# Patient Record
Sex: MALE | Race: BLACK OR AFRICAN AMERICAN | NOT HISPANIC OR LATINO | Employment: UNEMPLOYED | URBAN - METROPOLITAN AREA
[De-identification: names, ages, dates, MRNs, and addresses within clinical notes are randomized per-mention and may not be internally consistent; named-entity substitution may affect disease eponyms.]

---

## 2017-01-10 ENCOUNTER — LAB VISIT (OUTPATIENT)
Dept: LAB | Facility: HOSPITAL | Age: 52
End: 2017-01-10
Attending: NURSE PRACTITIONER
Payer: MEDICAID

## 2017-01-10 ENCOUNTER — OFFICE VISIT (OUTPATIENT)
Dept: INTERNAL MEDICINE | Facility: CLINIC | Age: 52
End: 2017-01-10
Payer: MEDICAID

## 2017-01-10 VITALS
HEIGHT: 66 IN | TEMPERATURE: 97 F | BODY MASS INDEX: 32.56 KG/M2 | OXYGEN SATURATION: 97 % | RESPIRATION RATE: 16 BRPM | WEIGHT: 202.63 LBS | HEART RATE: 90 BPM | DIASTOLIC BLOOD PRESSURE: 98 MMHG | SYSTOLIC BLOOD PRESSURE: 178 MMHG

## 2017-01-10 DIAGNOSIS — Z79.4 TYPE 2 DIABETES MELLITUS WITHOUT COMPLICATION, WITH LONG-TERM CURRENT USE OF INSULIN: ICD-10-CM

## 2017-01-10 DIAGNOSIS — I10 ESSENTIAL HYPERTENSION: ICD-10-CM

## 2017-01-10 DIAGNOSIS — R60.9 EDEMA, UNSPECIFIED TYPE: ICD-10-CM

## 2017-01-10 DIAGNOSIS — K21.00 GASTROESOPHAGEAL REFLUX DISEASE WITH ESOPHAGITIS: Chronic | ICD-10-CM

## 2017-01-10 DIAGNOSIS — K85.22 ALCOHOL-INDUCED ACUTE PANCREATITIS WITH INFECTED NECROSIS: ICD-10-CM

## 2017-01-10 DIAGNOSIS — E11.69 HYPERLIPIDEMIA ASSOCIATED WITH TYPE 2 DIABETES MELLITUS: ICD-10-CM

## 2017-01-10 DIAGNOSIS — E78.5 HYPERLIPIDEMIA ASSOCIATED WITH TYPE 2 DIABETES MELLITUS: ICD-10-CM

## 2017-01-10 DIAGNOSIS — E11.9 TYPE 2 DIABETES MELLITUS WITHOUT COMPLICATION, WITH LONG-TERM CURRENT USE OF INSULIN: ICD-10-CM

## 2017-01-10 DIAGNOSIS — I10 ESSENTIAL HYPERTENSION: Primary | ICD-10-CM

## 2017-01-10 LAB
BASOPHILS # BLD AUTO: 0.02 K/UL
BASOPHILS NFR BLD: 0.3 %
DIFFERENTIAL METHOD: ABNORMAL
EOSINOPHIL # BLD AUTO: 0.1 K/UL
EOSINOPHIL NFR BLD: 1.2 %
ERYTHROCYTE [DISTWIDTH] IN BLOOD BY AUTOMATED COUNT: 13 %
HCT VFR BLD AUTO: 37.7 %
HGB BLD-MCNC: 12.2 G/DL
LYMPHOCYTES # BLD AUTO: 2.1 K/UL
LYMPHOCYTES NFR BLD: 35.6 %
MCH RBC QN AUTO: 29.8 PG
MCHC RBC AUTO-ENTMCNC: 32.4 %
MCV RBC AUTO: 92 FL
MONOCYTES # BLD AUTO: 0.4 K/UL
MONOCYTES NFR BLD: 6.4 %
NEUTROPHILS # BLD AUTO: 3.3 K/UL
NEUTROPHILS NFR BLD: 56.2 %
PLATELET # BLD AUTO: 294 K/UL
PMV BLD AUTO: 11.6 FL
RBC # BLD AUTO: 4.09 M/UL
WBC # BLD AUTO: 5.79 K/UL

## 2017-01-10 PROCEDURE — 80061 LIPID PANEL: CPT

## 2017-01-10 PROCEDURE — 99999 PR PBB SHADOW E&M-EST. PATIENT-LVL IV: CPT | Mod: PBBFAC,,, | Performed by: NURSE PRACTITIONER

## 2017-01-10 PROCEDURE — 84443 ASSAY THYROID STIM HORMONE: CPT

## 2017-01-10 PROCEDURE — 36415 COLL VENOUS BLD VENIPUNCTURE: CPT | Mod: PO

## 2017-01-10 PROCEDURE — 99215 OFFICE O/P EST HI 40 MIN: CPT | Mod: S$PBB,,, | Performed by: NURSE PRACTITIONER

## 2017-01-10 PROCEDURE — 80053 COMPREHEN METABOLIC PANEL: CPT

## 2017-01-10 PROCEDURE — 83036 HEMOGLOBIN GLYCOSYLATED A1C: CPT

## 2017-01-10 PROCEDURE — 85025 COMPLETE CBC W/AUTO DIFF WBC: CPT

## 2017-01-10 RX ORDER — PEN NEEDLE, DIABETIC 31 G X1/4"
1 NEEDLE, DISPOSABLE MISCELLANEOUS NIGHTLY
Qty: 100 EACH | Refills: 3 | Status: SHIPPED | OUTPATIENT
Start: 2017-01-10 | End: 2018-02-19 | Stop reason: SDUPTHER

## 2017-01-10 RX ORDER — ATORVASTATIN CALCIUM 10 MG/1
10 TABLET, FILM COATED ORAL DAILY
Qty: 30 TABLET | Refills: 6 | Status: SHIPPED | OUTPATIENT
Start: 2017-01-10 | End: 2017-03-27 | Stop reason: SDUPTHER

## 2017-01-10 RX ORDER — INSULIN GLARGINE 100 [IU]/ML
40 INJECTION, SOLUTION SUBCUTANEOUS NIGHTLY
Qty: 1 BOX | Refills: 3 | Status: SHIPPED | OUTPATIENT
Start: 2017-01-10 | End: 2017-03-14 | Stop reason: SDUPTHER

## 2017-01-10 RX ORDER — SPIRONOLACTONE 50 MG/1
50 TABLET, FILM COATED ORAL DAILY
Qty: 90 TABLET | Refills: 3 | Status: SHIPPED | OUTPATIENT
Start: 2017-01-10 | End: 2018-02-09 | Stop reason: SDUPTHER

## 2017-01-10 RX ORDER — BLOOD SUGAR DIAGNOSTIC
1 STRIP MISCELLANEOUS DAILY
Qty: 100 EACH | Refills: 11 | Status: SHIPPED | OUTPATIENT
Start: 2017-01-10 | End: 2017-01-31 | Stop reason: SDUPTHER

## 2017-01-10 RX ORDER — VERAPAMIL HYDROCHLORIDE 180 MG/1
180 TABLET, FILM COATED, EXTENDED RELEASE ORAL DAILY
Qty: 30 TABLET | Refills: 1 | Status: SHIPPED | OUTPATIENT
Start: 2017-01-10 | End: 2017-03-14 | Stop reason: SDUPTHER

## 2017-01-10 RX ORDER — PANTOPRAZOLE SODIUM 20 MG/1
40 TABLET, DELAYED RELEASE ORAL 2 TIMES DAILY
Qty: 60 TABLET | Refills: 0 | Status: SHIPPED | OUTPATIENT
Start: 2017-01-10 | End: 2017-02-24 | Stop reason: SDUPTHER

## 2017-01-10 RX ORDER — FUROSEMIDE 20 MG/1
20 TABLET ORAL 2 TIMES DAILY
Qty: 60 TABLET | Refills: 1 | Status: SHIPPED | OUTPATIENT
Start: 2017-01-10 | End: 2017-10-31 | Stop reason: SDUPTHER

## 2017-01-10 RX ORDER — LISINOPRIL 40 MG/1
40 TABLET ORAL DAILY
Qty: 30 TABLET | Refills: 0 | Status: SHIPPED | OUTPATIENT
Start: 2017-01-10 | End: 2017-08-28

## 2017-01-10 RX ORDER — POTASSIUM CHLORIDE 750 MG/1
10 CAPSULE, EXTENDED RELEASE ORAL DAILY
Qty: 30 CAPSULE | Refills: 1 | Status: ON HOLD | OUTPATIENT
Start: 2017-01-10 | End: 2017-02-08 | Stop reason: HOSPADM

## 2017-01-10 NOTE — MR AVS SNAPSHOT
[facility]  [address] [address]  [place] [address]  Phone: [phone]                  Jorgito Arreaga   1/10/2017 10:40 AM   Office Visit    Description:  Male : 1965   Provider:  BLANCHE Pruitt,SHIREENP-C   Department:  [facility]           Reason for Visit     Follow-up     Medication Refill           Diagnoses this Visit        Comments    Essential hypertension    -  Primary     Edema, unspecified type         Type 2 diabetes mellitus without complication, with long-term current use of insulin         Gastroesophageal reflux disease with esophagitis         Alcohol-induced acute pancreatitis with infected necrosis         Hyperlipidemia associated with type 2 diabetes mellitus                To Do List           Goals (5 Years of Data)     None       These Medications        Disp Refills Start End    verapamil (CALAN-SR) 180 MG CR tablet 30 tablet 1 1/10/2017     Take 1 tablet (180 mg total) by mouth once daily. - Oral    Pharmacy: [address] [address] Ph #: [phone]       spironolactone (ALDACTONE) 50 MG tablet 90 tablet 3 1/10/2017 1/10/2018    Take 1 tablet (50 mg total) by mouth once daily. - Oral    Pharmacy: [address] [address] Ph #: [phone]       atorvastatin (LIPITOR) 10 MG tablet 30 tablet 6 1/10/2017     Take 1 tablet (10 mg total) by mouth once daily. - Oral    Pharmacy: [address] [address] Ph #: [phone]       furosemide (LASIX) 20 MG tablet 60 tablet 1 1/10/2017     Take 1 tablet (20 mg total) by mouth 2 (two) times daily. - Oral    Pharmacy: [facility] [address] [place] [id number] Ph #: [phone]       insulin glargine (LANTUS SOLOSTAR) 100 unit/mL (3 mL) InPn pen 1 Box 3 1/10/2017     Inject 40 Units into the skin every evening. - Subcutaneous    Pharmacy: [facility]  "Pharmacy [address] [place] [address] Ph #: [phone]       PEN NEEDLE 31 gauge x 1/4" Ndle 100 each 3 1/10/2017     Inject 1 each into the skin nightly. - Subcutaneous    Pharmacy: [address] Ph #: [phone]       CONTOUR TEST STRIPS Strp 100 each 11 1/10/2017     1 strip by Other route once daily. - Other    Pharmacy: [address] Ph #: [phone]       lisinopril (PRINIVIL,ZESTRIL) 40 MG tablet 30 tablet 0 1/10/2017     Take 1 tablet (40 mg total) by mouth once daily. - Oral    Pharmacy: [address] Ph #: [phone]       pantoprazole (PROTONIX) 20 MG tablet 60 tablet 0 1/10/2017 1/10/2018    Take 2 tablets (40 mg total) by mouth 2 (two) times daily. - Oral    Pharmacy: [address] Ph #: [phone]       potassium chloride (MICRO-K) 10 MEQ CpSR 30 capsule 1 1/10/2017     Take 1 capsule (10 mEq total) by mouth once daily. - Oral    Pharmacy: [address] Ph #: [phone]         [facility]     [facility] - 24/7 Assistance  Registered nurses in the [facility] provide clinical advisement, health education, appointment booking, and other advisory services.  Call for this free service at [phone].             Medications           Message regarding Medications     Verify the changes and/or additions to your medication regime listed below are the same as discussed with your clinician today.  If any of these changes or additions are incorrect, please notify your healthcare provider.        CHANGE how you are taking these medications     Start Taking Instead of    atorvastatin (LIPITOR) 10 MG tablet atorvastatin (LIPITOR) 10 MG tablet    Dosage:  Take 1 tablet (10 mg total) by mouth once daily. " "Dosage:  Take 10 mg by mouth once daily.    Reason for Change:  Reorder     insulin glargine (LANTUS SOLOSTAR) 100 unit/mL (3 mL) InPn pen LANTUS SOLOSTAR 100 unit/mL (3 mL) InPn pen    Dosage:  Inject 40 Units into the skin every evening. Dosage:  INJECT 15 UNITS SUB-Q IN THE EVENING    Reason for Change:  Reorder     PEN NEEDLE 31 gauge x 1/4" Ndle PEN NEEDLE 31 gauge x 1/4" Ndle    Dosage:  Inject 1 each into the skin nightly.     Reason for Change:  Reorder     CONTOUR TEST STRIPS Strp CONTOUR TEST STRIPS Strp    Dosage:  1 strip by Other route once daily.     Reason for Change:  Reorder            Verify that the below list of medications is an accurate representation of the medications you are currently taking.  If none reported, the list may be blank. If incorrect, please contact your healthcare provider. Carry this list with you in case of emergency.           Current Medications     blood-glucose meter (CONTOUR METER) Misc Use to check sugar before meals and at bedtime    insulin glargine (LANTUS SOLOSTAR) 100 unit/mL (3 mL) InPn pen Inject 40 Units into the skin every evening.    lisinopril (PRINIVIL,ZESTRIL) 40 MG tablet Take 1 tablet (40 mg total) by mouth once daily.    atorvastatin (LIPITOR) 10 MG tablet Take 1 tablet (10 mg total) by mouth once daily.    CONTOUR TEST STRIPS Strp 1 strip by Other route once daily.    furosemide (LASIX) 20 MG tablet Take 1 tablet (20 mg total) by mouth 2 (two) times daily.    hydrocodone-acetaminophen 7.5-325mg (NORCO) 7.5-325 mg per tablet Take 1 tablet by mouth every 6 (six) hours as needed for Pain.    MICROLET LANCET Misc     pantoprazole (PROTONIX) 20 MG tablet Take 2 tablets (40 mg total) by mouth 2 (two) times daily.    PEN NEEDLE 31 gauge x 1/4" Ndle Inject 1 each into the skin nightly.    potassium chloride (MICRO-K) 10 MEQ CpSR Take 1 capsule (10 mEq total) by mouth once daily.    spironolactone (ALDACTONE) 50 MG tablet Take 1 tablet (50 mg total) by mouth " "once daily.    verapamil (CALAN-SR) 180 MG CR tablet Take 1 tablet (180 mg total) by mouth once daily.           Clinical Reference Information           Vital Signs - Last Recorded  Most recent update: 1/10/2017 11:19 AM by Keron Sánchez MA    BP Pulse Temp Resp    (!) 178/98 (BP Location: Right arm, Patient Position: Sitting, BP Method: Manual) 90 96.6 °F (35.9 °C) (Tympanic) 16    Ht Wt SpO2 BMI    5' 6" (1.676 m) 91.9 kg (202 lb 9.6 oz) 97% 32.7 kg/m2      Blood Pressure          Most Recent Value    BP  (!)  178/98      Allergies as of 1/10/2017     Lipitor [Atorvastatin]      Immunizations Administered on Date of Encounter - 1/10/2017     None      Orders Placed During Today's Visit      Normal Orders This Visit    Ambulatory Referral to General Surgery     Future Labs/Procedures Expected by Expires    CBC auto differential  1/10/2017 3/11/2018    Comprehensive metabolic panel  1/10/2017 4/10/2017    Hemoglobin A1c  1/10/2017 1/10/2018    Lipid panel  1/10/2017 3/11/2018    TSH  1/10/2017 3/11/2018      "

## 2017-01-10 NOTE — PROGRESS NOTES
"Subjective:       Patient ID: Jorgito Arreaga is a 51 y.o. male.    Chief Complaint: Follow-up and Medication Refill (ALL )    HPI Comments: Pt lost his job and subsequently his insurance several months ago. Due to this he missed his follow ups with us and all specialists. He also could not afford to fill his medications and has only been on lisinopril and modified lantus dosing for the last 3-4 months.       Essential hypertension: pt has been taking lisinopril but has been out of other meds. Does not check at home    Type 2 diabetes: been taking lantus every other day to stretch it. Not checking blood sugar often, but when he does it is around 200     Gastroesophageal reflux disease with esophagitis: some occasional abdominal discomfort, improves when watches diet    Alcohol-induced acute pancreatitis with infected necrosis : Has not seen surgeon as per Dr Louise recommendations. Would like to see someone now.    Hyperlipidemia : Has not been taking meds even prior to loosing insurance because they "make him feel lousy sometimes" would like to restart lipitor as he has not had a true allergic reaction and try it again      Review of Systems   Constitutional: Negative for activity change, appetite change, chills, diaphoresis, fatigue and unexpected weight change.   HENT: Negative.    Eyes: Negative.    Respiratory: Negative for chest tightness, shortness of breath and wheezing.    Cardiovascular: Negative for chest pain and palpitations.   Gastrointestinal: Positive for abdominal distention, abdominal pain and nausea. Negative for constipation, diarrhea and vomiting.   Endocrine: Negative for cold intolerance, heat intolerance, polydipsia, polyphagia and polyuria.   Genitourinary: Negative for decreased urine volume, difficulty urinating, hematuria and urgency.   Musculoskeletal: Negative for arthralgias and myalgias.   Skin: Negative for rash and wound.   Neurological: Negative for dizziness, weakness, " light-headedness, numbness and headaches.   Hematological: Negative for adenopathy.   Psychiatric/Behavioral: Negative for dysphoric mood, sleep disturbance and suicidal ideas. The patient is not nervous/anxious.        Objective:      Physical Exam   Constitutional: He is oriented to person, place, and time. He appears well-developed and well-nourished. No distress.   HENT:   Head: Normocephalic and atraumatic.   Right Ear: Hearing, tympanic membrane, external ear and ear canal normal.   Left Ear: Hearing, tympanic membrane, external ear and ear canal normal.   Nose: Nose normal. No mucosal edema, rhinorrhea, sinus tenderness or nasal deformity.   Mouth/Throat: Oropharynx is clear and moist. No oral lesions. Normal dentition. No oropharyngeal exudate, posterior oropharyngeal edema or posterior oropharyngeal erythema.   Eyes: Conjunctivae, EOM and lids are normal. Pupils are equal, round, and reactive to light.   Neck: Normal range of motion and full passive range of motion without pain. Neck supple. No JVD present. No tracheal deviation present. No thyromegaly present.   Cardiovascular: Normal rate, regular rhythm, S1 normal, S2 normal, normal heart sounds and intact distal pulses.    No murmur heard.  Pulmonary/Chest: Effort normal and breath sounds normal. No stridor. No respiratory distress.   Abdominal: Soft. Normal appearance and bowel sounds are normal. He exhibits distension (mild). There is generalized tenderness (mild).   Musculoskeletal: Normal range of motion. He exhibits no edema or tenderness.   Lymphadenopathy:     He has no cervical adenopathy.   Neurological: He is alert and oriented to person, place, and time. He has normal reflexes.   Skin: Skin is warm, dry and intact. No rash noted. He is not diaphoretic. No cyanosis. Nails show no clubbing.   Psychiatric: He has a normal mood and affect. His speech is normal and behavior is normal. Judgment and thought content normal. Cognition and memory are  "normal.       Assessment:       1. Essential hypertension    2. Edema, unspecified type    3. Type 2 diabetes mellitus without complication, with long-term current use of insulin    4. Gastroesophageal reflux disease with esophagitis    5. Alcohol-induced acute pancreatitis with infected necrosis    6. Hyperlipidemia associated with type 2 diabetes mellitus        Plan:       Resume all previous meds. Get baseline labs today and follow up with Dr Donaldson to est care and monitor response to meds in 2 weeks.   Instructed to make appointments with specialist ([facility])  for follow up soon now that he has insurance again      *Essential hypertension  -     verapamil (CALAN-SR) 180 MG CR tablet; Take 1 tablet (180 mg total) by mouth once daily.  Dispense: 30 tablet; Refill: 1  -     furosemide (LASIX) 20 MG tablet; Take 1 tablet (20 mg total) by mouth 2 (two) times daily.  Dispense: 60 tablet; Refill: 1  -     lisinopril (PRINIVIL,ZESTRIL) 40 MG tablet; Take 1 tablet (40 mg total) by mouth once daily.  Dispense: 30 tablet; Refill: 0  -     potassium chloride (MICRO-K) 10 MEQ CpSR; Take 1 capsule (10 mEq total) by mouth once daily.  Dispense: 30 capsule; Refill: 1  -     CBC auto differential; Future; Expected date: 1/10/17  -     Comprehensive metabolic panel; Future; Expected date: 1/10/17  -     TSH; Future; Expected date: 1/10/17    Edema, unspecified type  -     spironolactone (ALDACTONE) 50 MG tablet; Take 1 tablet (50 mg total) by mouth once daily.  Dispense: 90 tablet; Refill: 3  -     Comprehensive metabolic panel; Future; Expected date: 1/10/17    Type 2 diabetes mellitus without complication, with long-term current use of insulin  -     insulin glargine (LANTUS SOLOSTAR) 100 unit/mL (3 mL) InPn pen; Inject 40 Units into the skin every evening.  Dispense: 1 Box; Refill: 3  -     PEN NEEDLE 31 gauge x 1/4" Ndle; Inject 1 each into the skin nightly.  Dispense: 100 each; Refill: 3  -     CONTOUR TEST STRIPS " Strp; 1 strip by Other route once daily.  Dispense: 100 each; Refill: 11  -     Hemoglobin A1c; Future; Expected date: 1/10/17  - Return to normal lantus doseing and take and record fasting sugar daily. If less that 70 hold lantus and call office. Bring record of sugars to office    Gastroesophageal reflux disease with esophagitis  -     pantoprazole (PROTONIX) 20 MG tablet; Take 2 tablets (40 mg total) by mouth 2 (two) times daily.  Dispense: 60 tablet; Refill: 0   Discussed diet/ lifestyle changes for improvement    Alcohol-induced acute pancreatitis with infected necrosis  -     Ambulatory Referral to General Surgery- Would like to see Dr Baker out here in [place]    Hyperlipidemia associated with type 2 diabetes mellitus  -     atorvastatin (LIPITOR) 10 MG tablet; Take 1 tablet (10 mg total) by mouth once daily.  Dispense: 30 tablet; Refill: 6  -     Lipid panel; Future; Expected date: 1/10/17   Will inform if Lipitor is causing any adverse affects again    **

## 2017-01-11 ENCOUNTER — TELEPHONE (OUTPATIENT)
Dept: INTERNAL MEDICINE | Facility: CLINIC | Age: 52
End: 2017-01-11

## 2017-01-11 LAB
ALBUMIN SERPL BCP-MCNC: 4.3 G/DL
ALP SERPL-CCNC: 119 U/L
ALT SERPL W/O P-5'-P-CCNC: 26 U/L
ANION GAP SERPL CALC-SCNC: 10 MMOL/L
AST SERPL-CCNC: 18 U/L
BILIRUB SERPL-MCNC: 0.6 MG/DL
BUN SERPL-MCNC: 17 MG/DL
CALCIUM SERPL-MCNC: 9.8 MG/DL
CHLORIDE SERPL-SCNC: 104 MMOL/L
CHOLEST/HDLC SERPL: 4 {RATIO}
CO2 SERPL-SCNC: 28 MMOL/L
CREAT SERPL-MCNC: 0.9 MG/DL
EST. GFR  (AFRICAN AMERICAN): >60 ML/MIN/1.73 M^2
EST. GFR  (NON AFRICAN AMERICAN): >60 ML/MIN/1.73 M^2
ESTIMATED AVG GLUCOSE: 157 MG/DL
GLUCOSE SERPL-MCNC: 123 MG/DL
HBA1C MFR BLD HPLC: 7.1 %
HDL/CHOLESTEROL RATIO: 25 %
HDLC SERPL-MCNC: 276 MG/DL
HDLC SERPL-MCNC: 69 MG/DL
LDLC SERPL CALC-MCNC: 179.6 MG/DL
NONHDLC SERPL-MCNC: 207 MG/DL
POTASSIUM SERPL-SCNC: 4.7 MMOL/L
PROT SERPL-MCNC: 7.8 G/DL
SODIUM SERPL-SCNC: 142 MMOL/L
TRIGL SERPL-MCNC: 137 MG/DL
TSH SERPL DL<=0.005 MIU/L-ACNC: 1.05 UIU/ML

## 2017-01-11 NOTE — TELEPHONE ENCOUNTER
----- Message from BLANCHE Pruitt,FNP-C sent at 1/11/2017 11:43 AM CST -----  Diabetes level good, continue lantus  Thyroid, kidney, liver, electrolyte levels good  Cholesterol high, be sure to restart lipitor  Still slightly anemic but improved over last 5 months, no treatment needed at this time.     Keep f/u as scheduled with Dr Donaldson

## 2017-01-12 NOTE — TELEPHONE ENCOUNTER
----- Message from Tiffanie Romero LPN sent at 1/11/2017  4:15 PM CST -----  Please follow up with patient.   ----- Message -----     From: BLANCHE Pruitt,FNP-C     Sent: 1/11/2017  11:43 AM       To: [facility] Staff    Diabetes level good, continue lantus  Thyroid, kidney, liver, electrolyte levels good  Cholesterol high, be sure to restart lipitor  Still slightly anemic but improved over last 5 months, no treatment needed at this time.     Keep f/u as scheduled with Dr Donaldson

## 2017-01-18 ENCOUNTER — PATIENT OUTREACH (OUTPATIENT)
Dept: ADMINISTRATIVE | Facility: HOSPITAL | Age: 52
End: 2017-01-18

## 2017-01-18 NOTE — LETTER
January 18, 2017    Jorgito Arreaga  [address]  [place] [address]             [facility]  [address]  [place] [address]  Phone: [phone] Dear Mr. Arreaga:    [facility] is committed to your overall health.  To help you get the most out of each of your visits, we will review your information to make sure you are up to date on all of your recommended tests and/or procedures.      BLANCHE Pruitt,FNP-C has found that you may be due for   Health Maintenance Due   Topic    Eye Exam     TETANUS VACCINE     Pneumococcal PPSV23 (Medium Risk) (1)    Influenza Vaccine     Colonoscopy         If you have had any of the above done at another facility, please bring the records or information with you so that your record at [facility] will be complete.    If you are currently taking medication, please bring it with you to your appointment for review.    We will be happy to assist you with scheduling any necessary appointments or you may contact the [facility] appointment desk at [phone] to schedule at your convenience.     Thank you for choosing [facility] for your healthcare needs,      If you have any questions or concerns, please don't hesitate to call.    Sincerely,  Randi GRAHAM LPN Care Coordinator  [facility]

## 2017-01-31 DIAGNOSIS — E11.9 TYPE 2 DIABETES MELLITUS WITHOUT COMPLICATION, WITH LONG-TERM CURRENT USE OF INSULIN: ICD-10-CM

## 2017-01-31 DIAGNOSIS — Z79.4 TYPE 2 DIABETES MELLITUS WITHOUT COMPLICATION, WITH LONG-TERM CURRENT USE OF INSULIN: ICD-10-CM

## 2017-01-31 RX ORDER — LANCETS
1 EACH MISCELLANEOUS
Qty: 100 EACH | Refills: 5 | Status: SHIPPED | OUTPATIENT
Start: 2017-01-31 | End: 2018-06-22 | Stop reason: SDUPTHER

## 2017-01-31 RX ORDER — DEXTROSE 4 G
TABLET,CHEWABLE ORAL
Qty: 1 EACH | Refills: 0 | Status: SHIPPED | OUTPATIENT
Start: 2017-01-31 | End: 2017-03-15

## 2017-01-31 RX ORDER — BLOOD SUGAR DIAGNOSTIC
1 STRIP MISCELLANEOUS DAILY
Qty: 100 EACH | Refills: 11 | Status: SHIPPED | OUTPATIENT
Start: 2017-01-31 | End: 2017-03-14 | Stop reason: SDUPTHER

## 2017-01-31 NOTE — TELEPHONE ENCOUNTER
[facility] pharmacy requesting a new script for the contour test strips, True Metrix Glucose monitor and lancets

## 2017-02-04 ENCOUNTER — HOSPITAL ENCOUNTER (INPATIENT)
Facility: HOSPITAL | Age: 52
LOS: 4 days | Discharge: HOME | DRG: 440 | End: 2017-02-08
Attending: EMERGENCY MEDICINE | Admitting: INTERNAL MEDICINE
Payer: MEDICAID

## 2017-02-04 DIAGNOSIS — R79.89 ELEVATED LFTS: ICD-10-CM

## 2017-02-04 DIAGNOSIS — K85.22 ALCOHOL-INDUCED ACUTE PANCREATITIS WITH INFECTED NECROSIS: Primary | ICD-10-CM

## 2017-02-04 DIAGNOSIS — R11.2 INTRACTABLE VOMITING WITH NAUSEA, UNSPECIFIED VOMITING TYPE: ICD-10-CM

## 2017-02-04 DIAGNOSIS — K85.21 ALCOHOL-INDUCED ACUTE PANCREATITIS WITH UNINFECTED NECROSIS: ICD-10-CM

## 2017-02-04 DIAGNOSIS — I10 ESSENTIAL HYPERTENSION: Chronic | ICD-10-CM

## 2017-02-04 LAB
ALBUMIN SERPL BCP-MCNC: 4.2 G/DL
ALP SERPL-CCNC: 112 U/L
ALT SERPL W/O P-5'-P-CCNC: 252 U/L
AMPHET+METHAMPHET UR QL: NEGATIVE
AMYLASE SERPL-CCNC: 76 U/L
ANION GAP SERPL CALC-SCNC: 23 MMOL/L
AST SERPL-CCNC: 728 U/L
BARBITURATES UR QL SCN>200 NG/ML: NEGATIVE
BASOPHILS # BLD AUTO: 0.02 K/UL
BASOPHILS NFR BLD: 0.3 %
BENZODIAZ UR QL SCN>200 NG/ML: NEGATIVE
BILIRUB SERPL-MCNC: 1.5 MG/DL
BILIRUB UR QL STRIP: NEGATIVE
BUN SERPL-MCNC: 27 MG/DL
BZE UR QL SCN: NEGATIVE
CALCIUM SERPL-MCNC: 9.4 MG/DL
CANNABINOIDS UR QL SCN: NEGATIVE
CHLORIDE SERPL-SCNC: 98 MMOL/L
CLARITY UR REFRACT.AUTO: CLEAR
CO2 SERPL-SCNC: 17 MMOL/L
COLOR UR AUTO: ABNORMAL
CREAT SERPL-MCNC: 1.2 MG/DL
CREAT UR-MCNC: 281.3 MG/DL
DIFFERENTIAL METHOD: ABNORMAL
EOSINOPHIL # BLD AUTO: 0 K/UL
EOSINOPHIL NFR BLD: 0 %
ERYTHROCYTE [DISTWIDTH] IN BLOOD BY AUTOMATED COUNT: 12.1 %
EST. GFR  (AFRICAN AMERICAN): >60 ML/MIN/1.73 M^2
EST. GFR  (NON AFRICAN AMERICAN): >60 ML/MIN/1.73 M^2
ETHANOL SERPL-MCNC: 42 MG/DL
GLUCOSE SERPL-MCNC: 205 MG/DL
GLUCOSE UR QL STRIP: NEGATIVE
HCT VFR BLD AUTO: 40.8 %
HGB BLD-MCNC: 13.8 G/DL
HGB UR QL STRIP: NEGATIVE
INR PPP: 1.2
KETONES UR QL STRIP: NEGATIVE
LEUKOCYTE ESTERASE UR QL STRIP: NEGATIVE
LIPASE SERPL-CCNC: 273 U/L
LYMPHOCYTES # BLD AUTO: 1.3 K/UL
LYMPHOCYTES NFR BLD: 20.8 %
MCH RBC QN AUTO: 29.2 PG
MCHC RBC AUTO-ENTMCNC: 33.8 %
MCV RBC AUTO: 86 FL
METHADONE UR QL SCN>300 NG/ML: NEGATIVE
MONOCYTES # BLD AUTO: 0.5 K/UL
MONOCYTES NFR BLD: 8.1 %
NEUTROPHILS # BLD AUTO: 4.3 K/UL
NEUTROPHILS NFR BLD: 70.6 %
NITRITE UR QL STRIP: NEGATIVE
OPIATES UR QL SCN: NEGATIVE
PCP UR QL SCN>25 NG/ML: NEGATIVE
PH UR STRIP: 5 [PH] (ref 5–8)
PLATELET # BLD AUTO: 269 K/UL
PMV BLD AUTO: 10.4 FL
POTASSIUM SERPL-SCNC: 4.5 MMOL/L
PROT SERPL-MCNC: 8 G/DL
PROT UR QL STRIP: NEGATIVE
PROTHROMBIN TIME: 12.2 SEC
RBC # BLD AUTO: 4.73 M/UL
SODIUM SERPL-SCNC: 138 MMOL/L
SP GR UR STRIP: >=1.03 (ref 1–1.03)
TOXICOLOGY INFORMATION: NORMAL
URN SPEC COLLECT METH UR: ABNORMAL
UROBILINOGEN UR STRIP-ACNC: NEGATIVE EU/DL
WBC # BLD AUTO: 6.14 K/UL

## 2017-02-04 PROCEDURE — 93005 ELECTROCARDIOGRAM TRACING: CPT

## 2017-02-04 PROCEDURE — 63600175 PHARM REV CODE 636 W HCPCS: Performed by: EMERGENCY MEDICINE

## 2017-02-04 PROCEDURE — 85610 PROTHROMBIN TIME: CPT

## 2017-02-04 PROCEDURE — 25000003 PHARM REV CODE 250: Performed by: EMERGENCY MEDICINE

## 2017-02-04 PROCEDURE — 80053 COMPREHEN METABOLIC PANEL: CPT

## 2017-02-04 PROCEDURE — 93010 ELECTROCARDIOGRAM REPORT: CPT | Mod: ,,, | Performed by: INTERNAL MEDICINE

## 2017-02-04 PROCEDURE — 82150 ASSAY OF AMYLASE: CPT

## 2017-02-04 PROCEDURE — 11000001 HC ACUTE MED/SURG PRIVATE ROOM

## 2017-02-04 PROCEDURE — 99285 EMERGENCY DEPT VISIT HI MDM: CPT | Mod: 25

## 2017-02-04 PROCEDURE — 96375 TX/PRO/DX INJ NEW DRUG ADDON: CPT

## 2017-02-04 PROCEDURE — 96376 TX/PRO/DX INJ SAME DRUG ADON: CPT

## 2017-02-04 PROCEDURE — 80320 DRUG SCREEN QUANTALCOHOLS: CPT

## 2017-02-04 PROCEDURE — 85025 COMPLETE CBC W/AUTO DIFF WBC: CPT

## 2017-02-04 PROCEDURE — 83690 ASSAY OF LIPASE: CPT

## 2017-02-04 PROCEDURE — 96361 HYDRATE IV INFUSION ADD-ON: CPT

## 2017-02-04 PROCEDURE — 81003 URINALYSIS AUTO W/O SCOPE: CPT

## 2017-02-04 PROCEDURE — 82570 ASSAY OF URINE CREATININE: CPT

## 2017-02-04 PROCEDURE — 96365 THER/PROPH/DIAG IV INF INIT: CPT

## 2017-02-04 RX ORDER — MORPHINE SULFATE 4 MG/ML
4 INJECTION, SOLUTION INTRAMUSCULAR; INTRAVENOUS
Status: COMPLETED | OUTPATIENT
Start: 2017-02-04 | End: 2017-02-04

## 2017-02-04 RX ORDER — HYDROMORPHONE HYDROCHLORIDE 2 MG/ML
INJECTION, SOLUTION INTRAMUSCULAR; INTRAVENOUS; SUBCUTANEOUS
Status: DISPENSED
Start: 2017-02-04 | End: 2017-02-05

## 2017-02-04 RX ORDER — HYDROMORPHONE HYDROCHLORIDE 2 MG/ML
1 INJECTION, SOLUTION INTRAMUSCULAR; INTRAVENOUS; SUBCUTANEOUS
Status: COMPLETED | OUTPATIENT
Start: 2017-02-04 | End: 2017-02-04

## 2017-02-04 RX ORDER — SODIUM CHLORIDE 9 MG/ML
1000 INJECTION, SOLUTION INTRAVENOUS
Status: COMPLETED | OUTPATIENT
Start: 2017-02-04 | End: 2017-02-04

## 2017-02-04 RX ORDER — ONDANSETRON 2 MG/ML
8 INJECTION INTRAMUSCULAR; INTRAVENOUS
Status: COMPLETED | OUTPATIENT
Start: 2017-02-04 | End: 2017-02-04

## 2017-02-04 RX ORDER — SODIUM CHLORIDE 9 MG/ML
INJECTION, SOLUTION INTRAVENOUS CONTINUOUS
Status: DISCONTINUED | OUTPATIENT
Start: 2017-02-04 | End: 2017-02-08 | Stop reason: HOSPADM

## 2017-02-04 RX ADMIN — PROMETHAZINE HYDROCHLORIDE 12.5 MG: 25 INJECTION INTRAMUSCULAR; INTRAVENOUS at 08:02

## 2017-02-04 RX ADMIN — MORPHINE SULFATE 4 MG: 4 INJECTION, SOLUTION INTRAMUSCULAR; INTRAVENOUS at 08:02

## 2017-02-04 RX ADMIN — HYDROMORPHONE HYDROCHLORIDE 1 MG: 2 INJECTION, SOLUTION INTRAMUSCULAR; INTRAVENOUS; SUBCUTANEOUS at 11:02

## 2017-02-04 RX ADMIN — THIAMINE HYDROCHLORIDE 100 MG: 100 INJECTION, SOLUTION INTRAMUSCULAR; INTRAVENOUS at 07:02

## 2017-02-04 RX ADMIN — SODIUM CHLORIDE 1000 ML: 0.9 INJECTION, SOLUTION INTRAVENOUS at 07:02

## 2017-02-04 RX ADMIN — SODIUM CHLORIDE: 0.9 INJECTION, SOLUTION INTRAVENOUS at 08:02

## 2017-02-04 RX ADMIN — FOLIC ACID 1 MG: 5 INJECTION, SOLUTION INTRAMUSCULAR; INTRAVENOUS; SUBCUTANEOUS at 07:02

## 2017-02-04 RX ADMIN — MORPHINE SULFATE 4 MG: 4 INJECTION, SOLUTION INTRAMUSCULAR; INTRAVENOUS at 07:02

## 2017-02-04 RX ADMIN — ONDANSETRON 8 MG: 2 INJECTION INTRAMUSCULAR; INTRAVENOUS at 07:02

## 2017-02-04 RX ADMIN — MORPHINE SULFATE 4 MG: 4 INJECTION, SOLUTION INTRAMUSCULAR; INTRAVENOUS at 10:02

## 2017-02-04 RX ADMIN — SODIUM CHLORIDE 1000 ML: 0.9 INJECTION, SOLUTION INTRAVENOUS at 08:02

## 2017-02-04 NOTE — IP AVS SNAPSHOT
[address] [place] [address]           Patient Discharge Instructions     Our goal is to set you up for success. This packet includes information on your condition, medications, and your home care. It will help you to care for yourself so you don't get sicker and need to go back to the hospital.     Please ask your nurse if you have any questions.        There are many details to remember when preparing to leave the hospital. Here is what you will need to do:    1. Take your medicine. If you are prescribed medications, review your Medication List in the following pages. You may have new medications to  at the pharmacy and others that you'll need to stop taking. Review the instructions for how and when to take your medications. Talk with your doctor or nurses if you are unsure of what to do.     2. Go to your follow-up appointments. Specific follow-up information is listed in the following pages. Your may be contacted by a transition nurse or clinical provider about future appointments. Be sure we have all of the phone numbers to reach you, if needed. Please contact your provider's office if you are unable to make an appointment.     3. Watch for warning signs. Your doctor or nurse will give you detailed warning signs to watch for and when to call for assistance. These instructions may also include educational information about your condition. If you experience any of warning signs to your health, call your doctor.               ** Verify the list of medication(s) below is accurate and up to date. Carry this with you in case of emergency. If your medications have changed, please notify your healthcare provider.             Medication List      START taking these medications        Additional Info                      tramadol 50 mg tablet   Commonly known as:  ULTRAM   Quantity:  15 tablet   Refills:  0   Dose:  50 mg    Instructions:  Take 1 tablet (50 mg  total) by mouth every 6 (six) hours as needed for Pain.     Begin Date    AM    Noon    PM    Bedtime         CONTINUE taking these medications        Additional Info                      atorvastatin 10 MG tablet   Commonly known as:  LIPITOR   Quantity:  30 tablet   Refills:  6   Dose:  10 mg    Instructions:  Take 1 tablet (10 mg total) by mouth once daily.     Begin Date    AM    Noon    PM    Bedtime       blood-glucose meter Misc   Commonly known as:  CONTOUR METER   Quantity:  1 each   Refills:  0   Indications:  DM, new diagnosis, uncontrolled wityh A1c 8.9%, Insulin Requiring   Comments:  Please dispense preferred unit or patient choice and Please dispense with 100 Strips and Lancets    Instructions:  Use to check sugar before meals and at bedtime     Begin Date    AM    Noon    PM    Bedtime       CONTOUR TEST STRIPS Strp   Quantity:  100 each   Refills:  11   Dose:  1 strip   Generic drug:  blood sugar diagnostic    Instructions:  1 strip by Other route once daily.     Begin Date    AM    Noon    PM    Bedtime       furosemide 20 MG tablet   Commonly known as:  LASIX   Quantity:  60 tablet   Refills:  1   Dose:  20 mg    Instructions:  Take 1 tablet (20 mg total) by mouth 2 (two) times daily.     Begin Date    AM    Noon    PM    Bedtime       insulin glargine 100 unit/mL (3 mL) Inpn pen   Commonly known as:  LANTUS SOLOSTAR   Quantity:  1 Box   Refills:  3   Dose:  40 Units    Instructions:  Inject 40 Units into the skin every evening.     Begin Date    AM    Noon    PM    Bedtime       lisinopril 40 MG tablet   Commonly known as:  PRINIVIL,ZESTRIL   Quantity:  30 tablet   Refills:  0   Dose:  40 mg    Instructions:  Take 1 tablet (40 mg total) by mouth once daily.     Begin Date    AM    Noon    PM    Bedtime       MICROLET LANCET Misc   Quantity:  100 each   Refills:  5   Dose:  1 each   Generic drug:  lancets    Instructions:  Inject 1 each into the skin every meal as needed.     Begin Date    AM     "Noon    PM    Bedtime       pantoprazole 20 MG tablet   Commonly known as:  PROTONIX   Quantity:  60 tablet   Refills:  0   Dose:  40 mg    Instructions:  Take 2 tablets (40 mg total) by mouth 2 (two) times daily.     Begin Date    AM    Noon    PM    Bedtime       PEN NEEDLE 31 gauge x 1/4" Ndle   Quantity:  100 each   Refills:  3   Dose:  1 each   Generic drug:  pen needle, diabetic    Instructions:  Inject 1 each into the skin nightly.     Begin Date    AM    Noon    PM    Bedtime       spironolactone 50 MG tablet   Commonly known as:  ALDACTONE   Quantity:  90 tablet   Refills:  3   Dose:  50 mg    Last time this was given:  50 mg on 2/8/2017 12:15 PM   Instructions:  Take 1 tablet (50 mg total) by mouth once daily.     Begin Date    AM    Noon    PM    Bedtime       verapamil 180 MG CR tablet   Commonly known as:  CALAN-SR   Quantity:  30 tablet   Refills:  1   Dose:  180 mg    Last time this was given:  120 mg on 2/7/2017  9:36 PM   Instructions:  Take 1 tablet (180 mg total) by mouth once daily.     Begin Date    AM    Noon    PM    Bedtime         STOP taking these medications     hydrocodone-acetaminophen 7.5-325mg 7.5-325 mg per tablet   Commonly known as:  NORCO       potassium chloride 10 MEQ Cpsr   Commonly known as:  MICRO-K            Where to Get Your Medications      These medications were sent to [facility] [address] [place] [address] [facility]  [address] [facility] [address]     Phone:  [phone]     tramadol 50 mg tablet                  Please bring to all follow up appointments:    1. A copy of your discharge instructions.  2. All medicines you are currently taking in their original bottles.  3. Identification and insurance card.    Please arrive 15 minutes ahead of scheduled appointment time.    Please call 24 hours in advance if you must reschedule your appointment and/or time.        Your Scheduled Appointments     Feb 14, 2017 10:40 AM CST   New Patient with " "[facility]   [facility]    [address]  [place] [address]   [phone]            Feb 14, 2017 11:00 AM [facility]   Hospital Follow Up with Derrek Donaldson DO   [facility] Medicine ([facility]    [address]  [place] [address]   [phone]              Follow-up Information     Follow up with Derrek Donaldson DO.    Specialty:  Family Medicine    Why:  as scheduled on 2/14 at 11:00am    Contact information:    [address] [address]  [place] [address]  [phone]          Follow up with Joo Baker MD.    Specialty:  General Surgery    Why:  at [facility] on 2/14 at 10:40 am     Contact information:    [address]  [place] [address]  [phone]          Discharge Instructions     Future Orders    Activity as tolerated     Call MD for:  persistent nausea and vomiting or diarrhea     Call MD for:  severe uncontrolled pain     Call MD for:  temperature >100.4     Diet general     Questions:    Total calories:      Fat restriction, if any:      Protein restriction, if any:      Na restriction, if any:      Fluid restriction:      Additional restrictions:        Discharge References/Attachments     ACUTE PANCREATITIS, DISCHARGE INSTRUCTIONS FOR (ENGLISH)        Primary Diagnosis     Your primary diagnosis was:  Alcohol-Induced Pancreatitis      Admission Information     Date & Time Provider Department CSN    2/4/2017  6:51 PM Olya Paz MD [facility] -  [id number]      Care Providers     Provider Role Specialty Primary office phone    Olya Paz MD Attending Provider Internal Medicine [phone]    Olya Paz MD Team Attending  Internal Medicine [phone]      Your Vitals Were     BP Pulse Temp Resp Height Weight    157/88 (BP Location: Left arm, Patient Position: Sitting, BP Method: Automatic) 78 98.5 °F (36.9 °C) (Oral) 20 5' 6" (1.676 m) 89.6 kg (197 lb 9.6 oz)    SpO2 BMI             99% 31.89 kg/m2         Recent " Lab Values        6/8/2016 8/5/2016 1/10/2017                     5:44 AM  2:40 PM 12:22 PM         A1C 5.5 8.9 (H) 7.1 (H)         Comment for A1C at  2:40 PM on 8/5/2016:  According to ADA guidelines, hemoglobin A1C <7.0% represents  optimal control in non-pregnant diabetic patients.  Different  metrics may apply to specific populations.   Standards of Medical Care in Diabetes - 2016.  For the purpose of screening for the presence of diabetes:  <5.7%     Consistent with the absence of diabetes  5.7-6.4%  Consistent with increasing risk for diabetes   (prediabetes)  >or=6.5%  Consistent with diabetes  Currently no consensus exists for use of hemoglobin A1C  for diagnosis of diabetes for children.      Comment for A1C at 12:22 PM on 1/10/2017:  According to ADA guidelines, hemoglobin A1C <7.0% represents  optimal control in non-pregnant diabetic patients.  Different  metrics may apply to specific populations.   Standards of Medical Care in Diabetes - 2016.  For the purpose of screening for the presence of diabetes:  <5.7%     Consistent with the absence of diabetes  5.7-6.4%  Consistent with increasing risk for diabetes   (prediabetes)  >or=6.5%  Consistent with diabetes  Currently no consensus exists for use of hemoglobin A1C  for diagnosis of diabetes for children.        Pending Labs     Order Current Status    Blood culture Preliminary result      Allergies as of 2/8/2017     No Known Allergies      [facility]     [facility] Nurse Care Line - 24/7 Assistance  Unless otherwise directed by your provider, please contact [facility], our nurse care line that is available for 24/7 assistance.     Registered nurses in the [facility] provide clinical advisement, health education, appointment booking, and other advisory services.  Call for this free service at [phone].        Advance Directives     An advance directive is a document which, in the event you are no longer able to make  decisions for yourself, tells your healthcare team what kind of treatment you do or do not want to receive, or who you would like to make those decisions for you.  If you do not currently have an advance directive, [facility] encourages you to create one.  For more information call:  [phone]), [phone] ([phone]),  or log on to [email or web address].        Language Assistance Services     ATTENTION: Language assistance services are available, free of charge. Please call [phone].      ATENCIÓN: Si habla español, tiene a white disposición servicios gratuitos de asistencia lingüística. Llame al [phone].     CHÚ Ý: N?u b?n nói Ti?ng Vi?t, có các d?ch v? h? tr? ngôn ng? mi?n phí dành cho b?n. G?i s? [phone].        Diabetes Discharge Instructions                                    [facility] complies with applicable Federal civil rights laws and does not discriminate on the basis of race, color, national origin, age, disability, or sex.

## 2017-02-05 LAB
ALBUMIN SERPL BCP-MCNC: 3.5 G/DL
ALP SERPL-CCNC: 104 U/L
ALT SERPL W/O P-5'-P-CCNC: 204 U/L
ANION GAP SERPL CALC-SCNC: 17 MMOL/L
AST SERPL-CCNC: 354 U/L
BASOPHILS # BLD AUTO: 0.01 K/UL
BASOPHILS NFR BLD: 0.1 %
BILIRUB SERPL-MCNC: 2.9 MG/DL
BUN SERPL-MCNC: 20 MG/DL
CALCIUM SERPL-MCNC: 8.5 MG/DL
CHLORIDE SERPL-SCNC: 101 MMOL/L
CO2 SERPL-SCNC: 21 MMOL/L
CREAT SERPL-MCNC: 1 MG/DL
DIFFERENTIAL METHOD: ABNORMAL
EOSINOPHIL # BLD AUTO: 0 K/UL
EOSINOPHIL NFR BLD: 0 %
ERYTHROCYTE [DISTWIDTH] IN BLOOD BY AUTOMATED COUNT: 12.7 %
EST. GFR  (AFRICAN AMERICAN): >60 ML/MIN/1.73 M^2
EST. GFR  (NON AFRICAN AMERICAN): >60 ML/MIN/1.73 M^2
GLUCOSE SERPL-MCNC: 280 MG/DL
HCT VFR BLD AUTO: 37.2 %
HGB BLD-MCNC: 12.7 G/DL
LYMPHOCYTES # BLD AUTO: 1.1 K/UL
LYMPHOCYTES NFR BLD: 10.7 %
MAGNESIUM SERPL-MCNC: 1.4 MG/DL
MCH RBC QN AUTO: 29.5 PG
MCHC RBC AUTO-ENTMCNC: 34.1 %
MCV RBC AUTO: 87 FL
MONOCYTES # BLD AUTO: 0.7 K/UL
MONOCYTES NFR BLD: 7.2 %
NEUTROPHILS # BLD AUTO: 8.2 K/UL
NEUTROPHILS NFR BLD: 82 %
PHOSPHATE SERPL-MCNC: 3.3 MG/DL
PLATELET # BLD AUTO: 183 K/UL
PMV BLD AUTO: 10.1 FL
POTASSIUM SERPL-SCNC: 4 MMOL/L
PROT SERPL-MCNC: 6.8 G/DL
RBC # BLD AUTO: 4.3 M/UL
SODIUM SERPL-SCNC: 139 MMOL/L
WBC # BLD AUTO: 10 K/UL

## 2017-02-05 PROCEDURE — 80053 COMPREHEN METABOLIC PANEL: CPT

## 2017-02-05 PROCEDURE — 83735 ASSAY OF MAGNESIUM: CPT

## 2017-02-05 PROCEDURE — 25000003 PHARM REV CODE 250: Performed by: EMERGENCY MEDICINE

## 2017-02-05 PROCEDURE — 63600175 PHARM REV CODE 636 W HCPCS: Performed by: INTERNAL MEDICINE

## 2017-02-05 PROCEDURE — 25000003 PHARM REV CODE 250: Performed by: INTERNAL MEDICINE

## 2017-02-05 PROCEDURE — 11000001 HC ACUTE MED/SURG PRIVATE ROOM

## 2017-02-05 PROCEDURE — 85025 COMPLETE CBC W/AUTO DIFF WBC: CPT

## 2017-02-05 PROCEDURE — 84100 ASSAY OF PHOSPHORUS: CPT

## 2017-02-05 PROCEDURE — 36415 COLL VENOUS BLD VENIPUNCTURE: CPT

## 2017-02-05 RX ORDER — HYDROMORPHONE HYDROCHLORIDE 2 MG/ML
1 INJECTION, SOLUTION INTRAMUSCULAR; INTRAVENOUS; SUBCUTANEOUS
Status: DISCONTINUED | OUTPATIENT
Start: 2017-02-05 | End: 2017-02-08 | Stop reason: HOSPADM

## 2017-02-05 RX ORDER — CHLORDIAZEPOXIDE HYDROCHLORIDE 10 MG/1
10 CAPSULE, GELATIN COATED ORAL 3 TIMES DAILY
Status: DISCONTINUED | OUTPATIENT
Start: 2017-02-05 | End: 2017-02-06

## 2017-02-05 RX ORDER — HEPARIN SODIUM 5000 [USP'U]/ML
5000 INJECTION, SOLUTION INTRAVENOUS; SUBCUTANEOUS EVERY 8 HOURS
Status: DISCONTINUED | OUTPATIENT
Start: 2017-02-05 | End: 2017-02-08 | Stop reason: HOSPADM

## 2017-02-05 RX ORDER — IBUPROFEN 200 MG
1 TABLET ORAL DAILY
Status: DISCONTINUED | OUTPATIENT
Start: 2017-02-05 | End: 2017-02-08 | Stop reason: HOSPADM

## 2017-02-05 RX ORDER — HYDRALAZINE HYDROCHLORIDE 20 MG/ML
10 INJECTION INTRAMUSCULAR; INTRAVENOUS EVERY 6 HOURS PRN
Status: DISCONTINUED | OUTPATIENT
Start: 2017-02-05 | End: 2017-02-08 | Stop reason: HOSPADM

## 2017-02-05 RX ORDER — MORPHINE SULFATE 4 MG/ML
4 INJECTION, SOLUTION INTRAMUSCULAR; INTRAVENOUS ONCE
Status: COMPLETED | OUTPATIENT
Start: 2017-02-05 | End: 2017-02-05

## 2017-02-05 RX ORDER — ACETAMINOPHEN 10 MG/ML
1000 INJECTION, SOLUTION INTRAVENOUS EVERY 8 HOURS
Status: DISCONTINUED | OUTPATIENT
Start: 2017-02-05 | End: 2017-02-05

## 2017-02-05 RX ORDER — ONDANSETRON 2 MG/ML
4 INJECTION INTRAMUSCULAR; INTRAVENOUS EVERY 8 HOURS PRN
Status: DISCONTINUED | OUTPATIENT
Start: 2017-02-05 | End: 2017-02-08 | Stop reason: HOSPADM

## 2017-02-05 RX ORDER — ENALAPRILAT 1.25 MG/ML
1.25 INJECTION INTRAVENOUS EVERY 6 HOURS
Status: COMPLETED | OUTPATIENT
Start: 2017-02-05 | End: 2017-02-05

## 2017-02-05 RX ORDER — ACETAMINOPHEN 10 MG/ML
1000 INJECTION, SOLUTION INTRAVENOUS EVERY 8 HOURS
Status: COMPLETED | OUTPATIENT
Start: 2017-02-05 | End: 2017-02-06

## 2017-02-05 RX ORDER — MORPHINE SULFATE 4 MG/ML
4 INJECTION, SOLUTION INTRAMUSCULAR; INTRAVENOUS
Status: DISCONTINUED | OUTPATIENT
Start: 2017-02-05 | End: 2017-02-05

## 2017-02-05 RX ADMIN — CHLORDIAZEPOXIDE HYDROCHLORIDE 10 MG: 10 CAPSULE ORAL at 09:02

## 2017-02-05 RX ADMIN — HYDROMORPHONE HYDROCHLORIDE 1 MG: 2 INJECTION, SOLUTION INTRAMUSCULAR; INTRAVENOUS; SUBCUTANEOUS at 09:02

## 2017-02-05 RX ADMIN — INSULIN DETEMIR 20 UNITS: 100 INJECTION, SOLUTION SUBCUTANEOUS at 09:02

## 2017-02-05 RX ADMIN — MORPHINE SULFATE 4 MG: 4 INJECTION, SOLUTION INTRAMUSCULAR; INTRAVENOUS at 01:02

## 2017-02-05 RX ADMIN — ENALAPRILAT 1.25 MG: 1.25 INJECTION, SOLUTION INTRAVENOUS at 05:02

## 2017-02-05 RX ADMIN — HYDRALAZINE HYDROCHLORIDE 10 MG: 20 INJECTION INTRAMUSCULAR; INTRAVENOUS at 01:02

## 2017-02-05 RX ADMIN — SODIUM BICARBONATE 150 MEQ: 84 INJECTION, SOLUTION INTRAVENOUS at 01:02

## 2017-02-05 RX ADMIN — ACETAMINOPHEN 1000 MG: 10 INJECTION, SOLUTION INTRAVENOUS at 08:02

## 2017-02-05 RX ADMIN — ENALAPRILAT 1.25 MG: 1.25 INJECTION, SOLUTION INTRAVENOUS at 11:02

## 2017-02-05 RX ADMIN — INSULIN DETEMIR 20 UNITS: 100 INJECTION, SOLUTION SUBCUTANEOUS at 01:02

## 2017-02-05 RX ADMIN — HEPARIN SODIUM 5000 UNITS: 5000 INJECTION, SOLUTION INTRAVENOUS; SUBCUTANEOUS at 01:02

## 2017-02-05 RX ADMIN — CHLORDIAZEPOXIDE HYDROCHLORIDE 10 MG: 10 CAPSULE ORAL at 01:02

## 2017-02-05 RX ADMIN — HEPARIN SODIUM 5000 UNITS: 5000 INJECTION, SOLUTION INTRAVENOUS; SUBCUTANEOUS at 05:02

## 2017-02-05 RX ADMIN — SODIUM CHLORIDE: 0.9 INJECTION, SOLUTION INTRAVENOUS at 01:02

## 2017-02-05 RX ADMIN — ENALAPRILAT 1.25 MG: 1.25 INJECTION, SOLUTION INTRAVENOUS at 04:02

## 2017-02-05 RX ADMIN — MORPHINE SULFATE 4 MG: 4 INJECTION, SOLUTION INTRAMUSCULAR; INTRAVENOUS at 03:02

## 2017-02-05 RX ADMIN — ENALAPRILAT 1.25 MG: 1.25 INJECTION, SOLUTION INTRAVENOUS at 12:02

## 2017-02-05 RX ADMIN — SODIUM CHLORIDE: 0.9 INJECTION, SOLUTION INTRAVENOUS at 11:02

## 2017-02-05 RX ADMIN — HEPARIN SODIUM 5000 UNITS: 5000 INJECTION, SOLUTION INTRAVENOUS; SUBCUTANEOUS at 09:02

## 2017-02-05 RX ADMIN — MORPHINE SULFATE 4 MG: 4 INJECTION, SOLUTION INTRAMUSCULAR; INTRAVENOUS at 06:02

## 2017-02-05 NOTE — ED NOTES
Patient verbally verified and Spelled Full Name and Date of Birth.  C/O pain across epigastric area with nausea & vomiting since 0200.  Hx pancreatitis & went on a drinking binge yesterday (vodka).  Has been sober since June until yesterday  (Wife states pt has been drinking 4-5 pints vodka/day since July) Extreme tenderness to upper abdomen,  No blood with emesis.  LOC: The patient is awake, alert and aware of environment with an appropriate affect, the patient is oriented x 3 and speaking appropriately.  APPEARANCE: Patient in obvious pain, patient is clean and well groomed, patient's clothing is properly fastened.  HEENT: Brief WNL  SKIN: cool & dry   MUSCULOSKELETAL: MAEW, states his legs feel weak  RESPIRATORY: Brief WNL  CARDIAC: Brief WNL  GASTRO: Brief WNL  : Brief WNL  Peripheral Vasc: Brief WNL  NEURO: Brief WNL  PSYCH: Brief WNL

## 2017-02-05 NOTE — PROGRESS NOTES
Notified MD Rivera that the patient has arrived from [facility].  Advised that his BP is 193/104.  He ordered  Hydralazine 10mg IVP Q6H PRN for a SBP >170.  Will administer and continue to monitor BP

## 2017-02-05 NOTE — NURSING
Patient arrived to the room via stretcher with EMS  Oriented to the room, call light, fall precautions, rounding sheet and menu  Patient stated understanding  Heart monitor applied  Vital signs taken and BP elevated at 194/103.  All other vitals stable

## 2017-02-05 NOTE — PROGRESS NOTES
Patient BP still elevated at 188/102. Notified MD Rivera.  Advised that he received both 10mg of hydralazine and 4mg of morphine. He asked if the patient was still in pain. I advised yes.  He  Said to administer another 4mg of morphine.  Will administer and continue to monitor patients pain and BP.

## 2017-02-05 NOTE — PLAN OF CARE
Problem: Patient Care Overview  Goal: Plan of Care Review  Outcome: Ongoing (interventions implemented as appropriate)  Patient free of falls this shift. Pain better controled with dilaudid. Only received IV pain med twice this shift. Patient on ice chips. Started on librium today. No signs of DTs.

## 2017-02-05 NOTE — PLAN OF CARE
Problem: Patient Care Overview  Goal: Plan of Care Review  Outcome: Ongoing (interventions implemented as appropriate)  Patient AAOx4.  BP elevated.  Other vitals stable. Pain controlled with PRN meds. IV fluids maintained.  BG elevated.  Insulin administered.  No falls on shift.  Yates encouraged

## 2017-02-05 NOTE — ED NOTES
Pt reporting continued pain at this time. MD Garland notified. See chart for new order given for morphine.

## 2017-02-05 NOTE — ED PROVIDER NOTES
"Encounter Date: 2/4/2017       History     Chief Complaint   Patient presents with    Abdominal Pain     Pt states, " I have been dx'd with severe alcohol induced pancreatitis and I quit drinking but started back." C/o epigastric and RUQ pain. Reports emesis today.      Review of patient's allergies indicates:  No Known Allergies  Patient is a 51 y.o. male presenting with the following complaint: abdominal pain. The history is provided by the patient.   Abdominal Pain   The current episode started today (wife states he was in ICU in june 2016 for alcoholic pancreatitis.  restarted drinking in July and drinks about 4-5 pints of vodka a day since then.). The onset of the illness was gradual. The abdominal pain is located in the epigastric region. The abdominal pain does not radiate. Pain scale: moderate. The abdominal pain is relieved by nothing. The abdominal pain is exacerbated by vomiting, eating and alcohol use. The other symptoms of the illness do not include fever, fatigue, jaundice, melena, shortness of breath, nausea, vomiting, diarrhea, dysuria, hematemesis or hematochezia.   The patient states that she believes she is currently not pregnant. The patient has not had a change in bowel habit. Risk factors: pt has hx of pancreatitis, restarted etoh use yesterday and drank several pints of vodka.  woke up at 2am  c epigastric pain and vomiting.  no hematemesis. Symptoms associated with the illness do not include chills, anorexia, diaphoresis, heartburn, constipation, urgency, hematuria, frequency or back pain. Significant associated medical issues do not include PUD, GERD, inflammatory bowel disease, diabetes, sickle cell disease, gallstones, liver disease, substance abuse, diverticulitis, HIV or cardiac disease.     Past Medical History   Diagnosis Date    Diabetes mellitus, type 2     GERD (gastroesophageal reflux disease)     Hypertension     Inflammatory polyps of colon     Pancreatitis, alcoholic, " acute     Port catheter in place     TIA (transient ischemic attack)      No past medical history pertinent negatives.  History reviewed. No pertinent past surgical history.  Family History   Problem Relation Age of Onset    Hypertension Mother     Hypertension Father      Social History   Substance Use Topics    Smoking status: Never Smoker    Smokeless tobacco: Never Used    Alcohol use Yes      Comment:  Daily - States that he drinks 1/2 pint of vodka and about 1-2 cans of beer daily     Review of Systems   Constitutional: Negative for chills, diaphoresis, fatigue and fever.   HENT: Negative for sore throat.    Respiratory: Negative for shortness of breath.    Cardiovascular: Negative for chest pain.   Gastrointestinal: Positive for abdominal pain. Negative for anorexia, constipation, diarrhea, heartburn, hematemesis, hematochezia, jaundice, melena, nausea and vomiting.   Genitourinary: Negative for dysuria, frequency, hematuria and urgency.   Musculoskeletal: Negative for back pain.   Skin: Negative for rash.   Neurological: Negative for weakness.   Hematological: Does not bruise/bleed easily.   All other systems reviewed and are negative.      Physical Exam   Initial Vitals   BP Pulse Resp Temp SpO2   02/04/17 1847 02/04/17 1847 02/04/17 1847 02/04/17 1847 02/04/17 1847   111/65 80 16 97.8 °F (36.6 °C) 97 %     Physical Exam    Nursing note and vitals reviewed.  Constitutional: He appears well-developed and well-nourished. He is not diaphoretic. No distress.   HENT:   Head: Normocephalic and atraumatic.   Eyes: EOM are normal. Pupils are equal, round, and reactive to light. No scleral icterus.   Neck: Normal range of motion. Neck supple. No thyromegaly present.   Cardiovascular: Normal rate, regular rhythm, normal heart sounds and intact distal pulses. Exam reveals no gallop and no friction rub.    No murmur heard.  Pulmonary/Chest: Breath sounds normal. No respiratory distress. He has no wheezes. He has  no rhonchi. He exhibits no tenderness.   Abdominal: Soft. Bowel sounds are normal. He exhibits no distension. There is tenderness in the epigastric area. There is no rebound and no guarding.       Musculoskeletal: Normal range of motion. He exhibits no edema or tenderness.   Lymphadenopathy:     He has no cervical adenopathy.   Neurological: He is alert and oriented to person, place, and time. He has normal strength. No cranial nerve deficit or sensory deficit.   Skin: Skin is warm and dry.   Psychiatric: He has a normal mood and affect. His behavior is normal. Judgment and thought content normal.         ED Course   Procedures  Labs Reviewed   CBC W/ AUTO DIFFERENTIAL - Abnormal; Notable for the following:        Result Value    Hemoglobin 13.8 (*)     All other components within normal limits   COMPREHENSIVE METABOLIC PANEL - Abnormal; Notable for the following:     CO2 17 (*)     Glucose 205 (*)     BUN, Bld 27 (*)     Total Bilirubin 1.5 (*)      (*)      (*)     Anion Gap 23 (*)     All other components within normal limits   LIPASE - Abnormal; Notable for the following:     Lipase 273 (*)     All other components within normal limits   URINALYSIS - Abnormal; Notable for the following:     Specific Gravity, UA >=1.030 (*)     All other components within normal limits   ALCOHOL,MEDICAL (ETHANOL) - Abnormal; Notable for the following:     Alcohol, Medical, Serum 42 (*)     All other components within normal limits   PROTIME-INR   AMYLASE   DRUG SCREEN PANEL, URINE EMERGENCY     EKG Readings: (Independently Interpreted)   Initial Reading: No STEMI. Rhythm: Normal Sinus Rhythm. Heart Rate: 73. Ectopy: No Ectopy. Conduction: Normal. ST Segments: Normal ST Segments. T Waves: Normal. Axis: Normal. Clinical Impression: Normal Sinus Rhythm       Vitals:    02/04/17 1847 02/04/17 1924 02/04/17 1946 02/04/17 2008   BP: 111/65 (!) 167/96 139/85 (!) 152/85   Pulse: 80 80 86 75   Resp: 16 18 (!) 22 (!) 22  "  Temp: 97.8 °F (36.6 °C)      TempSrc: Oral      SpO2: 97% 100% 100% 100%   Weight: 84.4 kg (186 lb)      Height: 5' 6" (1.676 m)       02/04/17 2055 02/04/17 2120 02/04/17 2235 02/04/17 2301   BP: (!) 153/89 (!) 152/82 (!) 163/88 (!) 168/96   Pulse: 70 74 88 83   Resp: (!) 28 (!) 24 20 (!) 24   Temp:   97.9 °F (36.6 °C)    TempSrc:   Oral    SpO2: 99% 98% 99% 98%   Weight:       Height:        02/04/17 2331 02/04/17 2334   BP: (!) 179/99 (!) 188/104   Pulse: 87 91   Resp: 17 (!) 23   Temp:     TempSrc:     SpO2: 98% 99%   Weight:     Height:         Results for orders placed or performed during the hospital encounter of 02/04/17   CBC W/ AUTO DIFFERENTIAL   Result Value Ref Range    WBC 6.14 3.90 - 12.70 K/uL    RBC 4.73 4.60 - 6.20 M/uL    Hemoglobin 13.8 (L) 14.0 - 18.0 g/dL    Hematocrit 40.8 40.0 - 54.0 %    MCV 86 82 - 98 fL    MCH 29.2 27.0 - 31.0 pg    MCHC 33.8 32.0 - 36.0 %    RDW 12.1 11.5 - 14.5 %    Platelets 269 150 - 350 K/uL    MPV 10.4 9.2 - 12.9 fL    Gran # 4.3 1.8 - 7.7 K/uL    Lymph # 1.3 1.0 - 4.8 K/uL    Mono # 0.5 0.3 - 1.0 K/uL    Eos # 0.0 0.0 - 0.5 K/uL    Baso # 0.02 0.00 - 0.20 K/uL    Gran% 70.6 38.0 - 73.0 %    Lymph% 20.8 18.0 - 48.0 %    Mono% 8.1 4.0 - 15.0 %    Eosinophil% 0.0 0.0 - 8.0 %    Basophil% 0.3 0.0 - 1.9 %    Differential Method Automated    Comp. Metabolic Panel   Result Value Ref Range    Sodium 138 136 - 145 mmol/L    Potassium 4.5 3.5 - 5.1 mmol/L    Chloride 98 95 - 110 mmol/L    CO2 17 (L) 23 - 29 mmol/L    Glucose 205 (H) 70 - 110 mg/dL    BUN, Bld 27 (H) 6 - 20 mg/dL    Creatinine 1.2 0.5 - 1.4 mg/dL    Calcium 9.4 8.7 - 10.5 mg/dL    Total Protein 8.0 6.0 - 8.4 g/dL    Albumin 4.2 3.5 - 5.2 g/dL    Total Bilirubin 1.5 (H) 0.1 - 1.0 mg/dL    Alkaline Phosphatase 112 55 - 135 U/L     (H) 10 - 40 U/L     (H) 10 - 44 U/L    Anion Gap 23 (H) 8 - 16 mmol/L    eGFR if African American >60.0 >60 mL/min/1.73 m^2    eGFR if non African American >60.0 >60 " mL/min/1.73 m^2   Lipase   Result Value Ref Range    Lipase 273 (H) 4 - 60 U/L   Urinalysis - Clean Catch   Result Value Ref Range    Specimen UA Urine, Clean Catch     Color, UA Brittany Yellow, Straw, Brittany    Appearance, UA Clear Clear    pH, UA 5.0 5.0 - 8.0    Specific Gravity, UA >=1.030 (A) 1.005 - 1.030    Protein, UA Negative Negative    Glucose, UA Negative Negative    Ketones, UA Negative Negative    Bilirubin (UA) Negative Negative    Occult Blood UA Negative Negative    Nitrite, UA Negative Negative    Urobilinogen, UA Negative <2.0 EU/dL    Leukocytes, UA Negative Negative   Protime-INR   Result Value Ref Range    Prothrombin Time 12.2 9.0 - 12.5 sec    INR 1.2 0.8 - 1.2   Amylase   Result Value Ref Range    Amylase 76 20 - 110 U/L   Ethanol   Result Value Ref Range    Alcohol, Medical, Serum 42 (H) <10 mg/dL   Drug screen panel, emergency   Result Value Ref Range    Benzodiazepines Negative     Methadone metabolites Negative     Cocaine (Metab.) Negative     Opiate Scrn, Ur Negative     Barbiturate Screen, Ur Negative     Amphetamine Screen, Ur Negative     THC Negative     Phencyclidine Negative     Creatinine, Random Ur 281.3 23.0 - 375.0 mg/dL    Toxicology Information SEE COMMENT          Imaging Results         X-Ray Chest 1 View (Final result) Result time:  02/04/17 19:47:43    Final result by Srinivasa Ray MD (02/04/17 19:47:43)    Impression:     No acute cardiopulmonary disease.      Electronically signed by: SRINIVASA RAY MD  Date:     02/04/17  Time:    19:47     Narrative:    Exam: Portable chest radiograph    Clinical History:   epigastric abd pain.    Findings:     The lungs are clear. The cardiac silhouette is within normal limits.            CT Abdomen Pelvis  Without Contrast (Final result) Result time:  02/04/17 19:57:03    Final result by Srinivasa Ray MD (02/04/17 19:57:03)    Impression:             Indistinct soft tissue planes surrounding the pancreas which could represent acute  pancreatitis, the patient had pancreatitis on a prior CT scan dated 08/11/2016 and the indistinct fat planes may be secondary to a healing pancreatitis.  Small fluid collection adjacent to the tail of the pancreas and extending into the left lateral gutter is much smaller on this scan in comparison to the prior study.        All CT scans at this facility use dose modulation, iterative reconstruction, and/or weight based dosing when appropriate to reduce radiation dose to as low as reasonably achievable.       Electronically signed by: TANIYA WADR MD  Date:     02/04/17  Time:    19:57     Narrative:    Exam: CT abdomen and pelvis without intravenous contrast.    Technique: Axial CT images performed through the abdomen and pelvis without intravenous contrast. Multiplanar reformats were performed and interpreted.    Clinical History: epigastric abd pain.       Findings:         Lung bases are clear.    No urolithiasis, hydronephrosis, or perinephric stranding.  The liver appears normal.  The spleen is normal.  The adrenal glands are normal.  There is indistinct fat planes surrounding the head, body and tail of the pancreas.  While this could represent acute pancreatitis differential considerations would include residual of the patient's pancreatitis described on prior CT scan dated 08/11/2016.  There is a small fluid collection adjacent to the tail of the pancreas which extends into the left lateral lateral gutter consistent with an improving fluid collection/phlegmon as seen on prior CT scan.  Atherosclerotic calcifications of a nondilated abdominal aorta and proximal iliac vessels.   The gallbladder is unremarkable.  The bowel is nondistended and within normal limits.    The urinary bladder is unremarkable.       Degenerative changes of facets at the L5-S1 level.              Medications   0.9%  NaCl infusion ( Intravenous New Bag 2/4/17 7962)   hydromorphone (PF) (DILAUDID) 2 mg/mL injection (not administered)    sodium chloride 0.9% bolus 1,000 mL (0 mLs Intravenous Stopped 2/4/17 2000)   morphine injection 4 mg (4 mg Intravenous Given 2/4/17 1936)   ondansetron injection 8 mg (8 mg Intravenous Given 2/4/17 1936)   thiamine (B-1) 100 mg in dextrose 5 % 50 mL IVPB (100 mg Intravenous Given 2/4/17 1937)   folic acid 1 mg in sodium chloride 0.9% 100 mL IVPB (1 mg Intravenous Given 2/4/17 1939)   0.9%  NaCl infusion (0 mLs Intravenous Stopped 2/4/17 2047)   promethazine (PHENERGAN) 12.5 mg in dextrose 5 % 50 mL IVPB (0 mg Intravenous Stopped 2/4/17 2115)   morphine injection 4 mg (4 mg Intravenous Given 2/4/17 2046)   morphine injection 4 mg (4 mg Intravenous Given 2/4/17 2234)   hydromorphone (PF) injection 1 mg (1 mg Intravenous Given 2/4/17 2346)       8:00 PM Re-evaluation. Dr. Garland reassessed the pt. The pt is resting comfortably and is in no acute distress.  Pt requests to go to [facility] if he needs to be admitted. Discussed available test results and notified pt of pending labs. Answered any questions at this time.     8:16 PM - CONSULT: Dr. Diamond discussed the case with Dr. Rivera. Agrees with current management. Recommends admit, control pain and emesis and will see pt in hospital room.  Admitting Service: hosp med   Admitting Physician: Nicole   Admit to inpt med surg    8:20 PM - Re-evaluation:  Discussed test results, shared treatment plan, and the need for admission with patient and family. They understand and agree to the plan as discussed. Answered questions at this time.     All historical, clinical, radiographic, and laboratory findings were reviewed with the patient/family in detail along with the indications for admission in order to receive treatment for pancreatitis and intractable vomiting.  All remaining questions and concerns were addressed at that time and the patient/family agrees to proceed accordingly.  Similarly all pertinent details of the encounter were discussed with Dr. Rivera who  agrees to accept the patient for admission to [facility] for further care as outlined above.  Patient will be transferred by [facility] ambulance services secondary to a need for ongoing IVF administration en route.  Ivan Garland MD  8:40 PM    Pre-hypertension/Hypertension: The pt has been informed that they may have pre-hypertension or hypertension based on a blood pressure reading in the ED. I recommend that the pt call the PCP listed on their discharge instructions or a physician of their choice this week to arrange f/u for further evaluation of possible pre-hypertension or hypertension.           New Prescriptions    No medications on file          ED Diagnosis  1. Alcohol-induced acute pancreatitis with infected necrosis    2. Intractable vomiting with nausea, unspecified vomiting type    3. Elevated LFTs                             ED Course     Clinical Impression:   The primary encounter diagnosis was Alcohol-induced acute pancreatitis with infected necrosis. Diagnoses of Intractable vomiting with nausea, unspecified vomiting type and Elevated LFTs were also pertinent to this visit.    Disposition:   Disposition: Admitted  Condition: Stable       Ivan Garland Jr., MD  02/04/17 1125

## 2017-02-05 NOTE — H&P
"[facility]  History & Physical    Patient Name: Jorgito Arreaga  MRN: [id number]  Admission Date: 2/4/2017  Attending Physician: Rainer Rivera MD  Primary Care Provider: Derrek Donaldson DO         Patient information was obtained from patient and ER records.     Subjective:     Principal Problem:Alcohol-induced acute pancreatitis    Chief Complaint:   Chief Complaint   Patient presents with    Abdominal Pain     Pt states, " I have been dx'd with severe alcohol induced pancreatitis and I quit drinking but started back." C/o epigastric and RUQ pain. Reports emesis today.         HPI: Mr. Arreaga is a 52 y/o AA male with h/o chronic alcoholism, T2DM admitted August 2016 for acute alcoholic pancreatitis, but unfortunately continues to drink since his discharge 5 months ago, presented to [facility] ED c/o acute epigastric abdominal pain associated with intractable nausea and vomiting. He is unable to keep anything down. Labs show lipase of 273, CO2 17, Cr 1.2, Glucose 204, blood alcohol level of 42. CT abdomen shows evidence of acute pancreatitis.    Past Medical History   Diagnosis Date    Diabetes mellitus, type 2     GERD (gastroesophageal reflux disease)     Hypertension     Inflammatory polyps of colon     Pancreatitis, alcoholic, acute     Port catheter in place     TIA (transient ischemic attack)        History reviewed. No pertinent past surgical history.    Review of patient's allergies indicates:  No Known Allergies    No current facility-administered medications on file prior to encounter.      Current Outpatient Prescriptions on File Prior to Encounter   Medication Sig    atorvastatin (LIPITOR) 10 MG tablet Take 1 tablet (10 mg total) by mouth once daily.    furosemide (LASIX) 20 MG tablet Take 1 tablet (20 mg total) by mouth 2 (two) times daily.    insulin glargine (LANTUS SOLOSTAR) 100 unit/mL (3 mL) InPn pen Inject 40 Units into the skin every evening.    " "lisinopril (PRINIVIL,ZESTRIL) 40 MG tablet Take 1 tablet (40 mg total) by mouth once daily.    pantoprazole (PROTONIX) 20 MG tablet Take 2 tablets (40 mg total) by mouth 2 (two) times daily.    potassium chloride (MICRO-K) 10 MEQ CpSR Take 1 capsule (10 mEq total) by mouth once daily.    spironolactone (ALDACTONE) 50 MG tablet Take 1 tablet (50 mg total) by mouth once daily.    verapamil (CALAN-SR) 180 MG CR tablet Take 1 tablet (180 mg total) by mouth once daily.    blood-glucose meter (CONTOUR METER) Misc Use to check sugar before meals and at bedtime    CONTOUR TEST STRIPS Strp 1 strip by Other route once daily.    hydrocodone-acetaminophen 7.5-325mg (NORCO) 7.5-325 mg per tablet Take 1 tablet by mouth every 6 (six) hours as needed for Pain.    MICROLET LANCET Misc Inject 1 each into the skin every meal as needed.    PEN NEEDLE 31 gauge x 1/4" Ndle Inject 1 each into the skin nightly.     Family History     Problem Relation (Age of Onset)    Hypertension Mother, Father        Social History Main Topics    Smoking status: Never Smoker    Smokeless tobacco: Never Used    Alcohol use Yes      Comment:  Daily - States that he drinks 1/2 pint of vodka and about 1-2 cans of beer daily    Drug use: No    Sexual activity: Yes     Partners: Female     Review of Systems   Constitutional: Negative.  Negative for appetite change, fatigue and fever.   HENT: Negative.  Negative for congestion, nosebleeds and sore throat.    Eyes: Negative.  Negative for photophobia, redness and visual disturbance.   Respiratory: Negative.  Negative for cough, shortness of breath and wheezing.    Cardiovascular: Negative.  Negative for chest pain, palpitations and leg swelling.   Gastrointestinal: Positive for abdominal pain. Negative for constipation, diarrhea, nausea and vomiting.   Endocrine: Negative.  Negative for polydipsia, polyphagia and polyuria.   Genitourinary: Negative.  Negative for dysuria, flank pain, frequency and " urgency.   Musculoskeletal: Negative.  Negative for arthralgias, back pain and joint swelling.   Skin: Negative.  Negative for color change, pallor and rash.   Allergic/Immunologic: Negative.  Negative for environmental allergies, food allergies and immunocompromised state.   Neurological: Negative.  Negative for dizziness, syncope, weakness, light-headedness, numbness and headaches.   Hematological: Negative.    Psychiatric/Behavioral: Negative for confusion and hallucinations. The patient is nervous/anxious.    All other systems reviewed and are negative.    Objective:     Vital Signs (Most Recent):  Temp: 98.5 °F (36.9 °C) (02/05/17 0423)  Pulse: 107 (02/05/17 0423)  Resp: 18 (02/05/17 0423)  BP: (!) 181/110 (02/05/17 0423)  SpO2: (!) 93 % (02/05/17 0423) Vital Signs (24h Range):  Temp:  [97.8 °F (36.6 °C)-98.5 °F (36.9 °C)] 98.5 °F (36.9 °C)  Pulse:  [] 107  Resp:  [16-28] 18  SpO2:  [93 %-100 %] 93 %  BP: (111-194)/() 181/110     Weight: 85.2 kg (187 lb 14.4 oz)  Body mass index is 30.33 kg/(m^2).    Physical Exam   Constitutional: He is oriented to person, place, and time. He appears well-developed and well-nourished. No distress.   Uncomfortable   HENT:   Head: Normocephalic and atraumatic.   Eyes: Conjunctivae and EOM are normal. Pupils are equal, round, and reactive to light. No scleral icterus.   Neck: Normal range of motion. Neck supple. No thyromegaly present.   Cardiovascular: Normal rate, regular rhythm and normal heart sounds.    No murmur heard.  Pulmonary/Chest: Effort normal and breath sounds normal. No respiratory distress. He has no wheezes. He exhibits no tenderness.   Abdominal: Soft. Bowel sounds are normal. There is tenderness (epigastric).   Musculoskeletal: Normal range of motion. He exhibits no edema or tenderness.   Lymphadenopathy:     He has no cervical adenopathy.   Neurological: He is alert and oriented to person, place, and time. No cranial nerve deficit. He exhibits  normal muscle tone. Coordination normal.   Skin: Skin is warm and dry. He is not diaphoretic.   Psychiatric: He has a normal mood and affect. His behavior is normal. Thought content normal.   Nursing note and vitals reviewed.       Significant Labs:   BMP:   Recent Labs  Lab 02/04/17  1900   *      K 4.5   CL 98   CO2 17*   BUN 27*   CREATININE 1.2   CALCIUM 9.4     CBC:   Recent Labs  Lab 02/04/17 1900   WBC 6.14   HGB 13.8*   HCT 40.8        CMP:   Recent Labs  Lab 02/04/17  1900      K 4.5   CL 98   CO2 17*   *   BUN 27*   CREATININE 1.2   CALCIUM 9.4   PROT 8.0   ALBUMIN 4.2   BILITOT 1.5*   ALKPHOS 112   *   *   ANIONGAP 23*   EGFRNONAA >60.0     Lactic Acid: No results for input(s): LACTATE in the last 48 hours.  Lipase:   Recent Labs  Lab 02/04/17  1900   LIPASE 273*     Urine Studies:   Recent Labs  Lab 02/04/17 1910   COLORU Brittany   APPEARANCEUA Clear   PHUR 5.0   SPECGRAV >=1.030*   PROTEINUA Negative   GLUCUA Negative   KETONESU Negative   BILIRUBINUA Negative   OCCULTUA Negative   NITRITE Negative   UROBILINOGEN Negative   LEUKOCYTESUR Negative     All pertinent labs within the past 24 hours have been reviewed.    Significant Imaging:   Imaging Results         X-Ray Chest 1 View (Final result) Result time:  02/04/17 19:47:43    Final result by Srinivasa Ray MD (02/04/17 19:47:43)    Impression:     No acute cardiopulmonary disease.      Electronically signed by: SRINIVASA RAY MD  Date:     02/04/17  Time:    19:47     Narrative:    Exam: Portable chest radiograph    Clinical History:   epigastric abd pain.    Findings:     The lungs are clear. The cardiac silhouette is within normal limits.            CT Abdomen Pelvis  Without Contrast (Final result) Result time:  02/04/17 19:57:03    Final result by Srinivasa Ray MD (02/04/17 19:57:03)    Impression:             Indistinct soft tissue planes surrounding the pancreas which could represent acute  pancreatitis, the patient had pancreatitis on a prior CT scan dated 08/11/2016 and the indistinct fat planes may be secondary to a healing pancreatitis.  Small fluid collection adjacent to the tail of the pancreas and extending into the left lateral gutter is much smaller on this scan in comparison to the prior study.        All CT scans at this facility use dose modulation, iterative reconstruction, and/or weight based dosing when appropriate to reduce radiation dose to as low as reasonably achievable.       Electronically signed by: TANIYA WARD MD  Date:     02/04/17  Time:    19:57     Narrative:    Exam: CT abdomen and pelvis without intravenous contrast.    Technique: Axial CT images performed through the abdomen and pelvis without intravenous contrast. Multiplanar reformats were performed and interpreted.    Clinical History: epigastric abd pain.       Findings:         Lung bases are clear.    No urolithiasis, hydronephrosis, or perinephric stranding.  The liver appears normal.  The spleen is normal.  The adrenal glands are normal.  There is indistinct fat planes surrounding the head, body and tail of the pancreas.  While this could represent acute pancreatitis differential considerations would include residual of the patient's pancreatitis described on prior CT scan dated 08/11/2016.  There is a small fluid collection adjacent to the tail of the pancreas which extends into the left lateral lateral gutter consistent with an improving fluid collection/phlegmon as seen on prior CT scan.  Atherosclerotic calcifications of a nondilated abdominal aorta and proximal iliac vessels.   The gallbladder is unremarkable.  The bowel is nondistended and within normal limits.    The urinary bladder is unremarkable.       Degenerative changes of facets at the L5-S1 level.                Assessment/Plan:     * Alcohol-induced acute pancreatitis  - Aggressive IV hydration  - Pain control  - Needs to quit alcohol  - Keep  NPO      Intractable vomiting with nausea  - IV zofran, phenergan prn  - NPO  - IV fluids  - Supportive care      Type 2 diabetes mellitus with hyperglycemia  - Resume lantus at half the dose as patient is NPO.  - Place him on sliding scale.      VTE Risk Mitigation         Ordered     Medium Risk of VTE  Once      02/05/17 0043     Place sequential compression device  Until discontinued      02/05/17 0043     Place SASKIA hose  Until discontinued      02/05/17 0043     heparin (porcine) injection 5,000 Units  Every 8 hours     Route:  Subcutaneous        02/05/17 0043        Rainer Rivera MD  Department of Hospital Medicine   [facility]

## 2017-02-05 NOTE — SUBJECTIVE & OBJECTIVE
"Past Medical History   Diagnosis Date    Diabetes mellitus, type 2     GERD (gastroesophageal reflux disease)     Hypertension     Inflammatory polyps of colon     Pancreatitis, alcoholic, acute     Port catheter in place     TIA (transient ischemic attack)        History reviewed. No pertinent past surgical history.    Review of patient's allergies indicates:  No Known Allergies    No current facility-administered medications on file prior to encounter.      Current Outpatient Prescriptions on File Prior to Encounter   Medication Sig    atorvastatin (LIPITOR) 10 MG tablet Take 1 tablet (10 mg total) by mouth once daily.    furosemide (LASIX) 20 MG tablet Take 1 tablet (20 mg total) by mouth 2 (two) times daily.    insulin glargine (LANTUS SOLOSTAR) 100 unit/mL (3 mL) InPn pen Inject 40 Units into the skin every evening.    lisinopril (PRINIVIL,ZESTRIL) 40 MG tablet Take 1 tablet (40 mg total) by mouth once daily.    pantoprazole (PROTONIX) 20 MG tablet Take 2 tablets (40 mg total) by mouth 2 (two) times daily.    potassium chloride (MICRO-K) 10 MEQ CpSR Take 1 capsule (10 mEq total) by mouth once daily.    spironolactone (ALDACTONE) 50 MG tablet Take 1 tablet (50 mg total) by mouth once daily.    verapamil (CALAN-SR) 180 MG CR tablet Take 1 tablet (180 mg total) by mouth once daily.    blood-glucose meter (CONTOUR METER) Misc Use to check sugar before meals and at bedtime    CONTOUR TEST STRIPS Strp 1 strip by Other route once daily.    hydrocodone-acetaminophen 7.5-325mg (NORCO) 7.5-325 mg per tablet Take 1 tablet by mouth every 6 (six) hours as needed for Pain.    MICROLET LANCET Misc Inject 1 each into the skin every meal as needed.    PEN NEEDLE 31 gauge x 1/4" Ndle Inject 1 each into the skin nightly.     Family History     Problem Relation (Age of Onset)    Hypertension Mother, Father        Social History Main Topics    Smoking status: Never Smoker    Smokeless tobacco: Never Used    " Alcohol use Yes      Comment:  Daily - States that he drinks 1/2 pint of vodka and about 1-2 cans of beer daily    Drug use: No    Sexual activity: Yes     Partners: Female     Review of Systems   Constitutional: Negative.  Negative for appetite change, fatigue and fever.   HENT: Negative.  Negative for congestion, nosebleeds and sore throat.    Eyes: Negative.  Negative for photophobia, redness and visual disturbance.   Respiratory: Negative.  Negative for cough, shortness of breath and wheezing.    Cardiovascular: Negative.  Negative for chest pain, palpitations and leg swelling.   Gastrointestinal: Positive for abdominal pain. Negative for constipation, diarrhea, nausea and vomiting.   Endocrine: Negative.  Negative for polydipsia, polyphagia and polyuria.   Genitourinary: Negative.  Negative for dysuria, flank pain, frequency and urgency.   Musculoskeletal: Negative.  Negative for arthralgias, back pain and joint swelling.   Skin: Negative.  Negative for color change, pallor and rash.   Allergic/Immunologic: Negative.  Negative for environmental allergies, food allergies and immunocompromised state.   Neurological: Negative.  Negative for dizziness, syncope, weakness, light-headedness, numbness and headaches.   Hematological: Negative.    Psychiatric/Behavioral: Negative for confusion and hallucinations. The patient is nervous/anxious.    All other systems reviewed and are negative.    Objective:     Vital Signs (Most Recent):  Temp: 98.5 °F (36.9 °C) (02/05/17 0423)  Pulse: 107 (02/05/17 0423)  Resp: 18 (02/05/17 0423)  BP: (!) 181/110 (02/05/17 0423)  SpO2: (!) 93 % (02/05/17 0423) Vital Signs (24h Range):  Temp:  [97.8 °F (36.6 °C)-98.5 °F (36.9 °C)] 98.5 °F (36.9 °C)  Pulse:  [] 107  Resp:  [16-28] 18  SpO2:  [93 %-100 %] 93 %  BP: (111-194)/() 181/110     Weight: 85.2 kg (187 lb 14.4 oz)  Body mass index is 30.33 kg/(m^2).    Physical Exam   Constitutional: He is oriented to person, place, and  time. He appears well-developed and well-nourished. No distress.   Uncomfortable   HENT:   Head: Normocephalic and atraumatic.   Eyes: Conjunctivae and EOM are normal. Pupils are equal, round, and reactive to light. No scleral icterus.   Neck: Normal range of motion. Neck supple. No thyromegaly present.   Cardiovascular: Normal rate, regular rhythm and normal heart sounds.    No murmur heard.  Pulmonary/Chest: Effort normal and breath sounds normal. No respiratory distress. He has no wheezes. He exhibits no tenderness.   Abdominal: Soft. Bowel sounds are normal. There is tenderness (epigastric).   Musculoskeletal: Normal range of motion. He exhibits no edema or tenderness.   Lymphadenopathy:     He has no cervical adenopathy.   Neurological: He is alert and oriented to person, place, and time. No cranial nerve deficit. He exhibits normal muscle tone. Coordination normal.   Skin: Skin is warm and dry. He is not diaphoretic.   Psychiatric: He has a normal mood and affect. His behavior is normal. Thought content normal.   Nursing note and vitals reviewed.       Significant Labs:   BMP:   Recent Labs  Lab 02/04/17  1900   *      K 4.5   CL 98   CO2 17*   BUN 27*   CREATININE 1.2   CALCIUM 9.4     CBC:   Recent Labs  Lab 02/04/17 1900   WBC 6.14   HGB 13.8*   HCT 40.8        CMP:   Recent Labs  Lab 02/04/17  1900      K 4.5   CL 98   CO2 17*   *   BUN 27*   CREATININE 1.2   CALCIUM 9.4   PROT 8.0   ALBUMIN 4.2   BILITOT 1.5*   ALKPHOS 112   *   *   ANIONGAP 23*   EGFRNONAA >60.0     Lactic Acid: No results for input(s): LACTATE in the last 48 hours.  Lipase:   Recent Labs  Lab 02/04/17 1900   LIPASE 273*     Urine Studies:   Recent Labs  Lab 02/04/17 1910   COLORU Brittany   APPEARANCEUA Clear   PHUR 5.0   SPECGRAV >=1.030*   PROTEINUA Negative   GLUCUA Negative   KETONESU Negative   BILIRUBINUA Negative   OCCULTUA Negative   NITRITE Negative   UROBILINOGEN Negative    LEUKOCYTESUR Negative     All pertinent labs within the past 24 hours have been reviewed.    Significant Imaging:   Imaging Results         X-Ray Chest 1 View (Final result) Result time:  02/04/17 19:47:43    Final result by Srinivasa Ray MD (02/04/17 19:47:43)    Impression:     No acute cardiopulmonary disease.      Electronically signed by: SRINIVASA RAY MD  Date:     02/04/17  Time:    19:47     Narrative:    Exam: Portable chest radiograph    Clinical History:   epigastric abd pain.    Findings:     The lungs are clear. The cardiac silhouette is within normal limits.            CT Abdomen Pelvis  Without Contrast (Final result) Result time:  02/04/17 19:57:03    Final result by Srinivasa Ray MD (02/04/17 19:57:03)    Impression:             Indistinct soft tissue planes surrounding the pancreas which could represent acute pancreatitis, the patient had pancreatitis on a prior CT scan dated 08/11/2016 and the indistinct fat planes may be secondary to a healing pancreatitis.  Small fluid collection adjacent to the tail of the pancreas and extending into the left lateral gutter is much smaller on this scan in comparison to the prior study.        All CT scans at this facility use dose modulation, iterative reconstruction, and/or weight based dosing when appropriate to reduce radiation dose to as low as reasonably achievable.       Electronically signed by: SRINIVASA RAY MD  Date:     02/04/17  Time:    19:57     Narrative:    Exam: CT abdomen and pelvis without intravenous contrast.    Technique: Axial CT images performed through the abdomen and pelvis without intravenous contrast. Multiplanar reformats were performed and interpreted.    Clinical History: epigastric abd pain.       Findings:         Lung bases are clear.    No urolithiasis, hydronephrosis, or perinephric stranding.  The liver appears normal.  The spleen is normal.  The adrenal glands are normal.  There is indistinct fat planes surrounding the head,  body and tail of the pancreas.  While this could represent acute pancreatitis differential considerations would include residual of the patient's pancreatitis described on prior CT scan dated 08/11/2016.  There is a small fluid collection adjacent to the tail of the pancreas which extends into the left lateral lateral gutter consistent with an improving fluid collection/phlegmon as seen on prior CT scan.  Atherosclerotic calcifications of a nondilated abdominal aorta and proximal iliac vessels.   The gallbladder is unremarkable.  The bowel is nondistended and within normal limits.    The urinary bladder is unremarkable.       Degenerative changes of facets at the L5-S1 level.

## 2017-02-06 LAB
ALBUMIN SERPL BCP-MCNC: 3.3 G/DL
ALP SERPL-CCNC: 123 U/L
ALT SERPL W/O P-5'-P-CCNC: 125 U/L
ANION GAP SERPL CALC-SCNC: 15 MMOL/L
AST SERPL-CCNC: 96 U/L
BILIRUB SERPL-MCNC: 2.6 MG/DL
BUN SERPL-MCNC: 12 MG/DL
CALCIUM SERPL-MCNC: 8.8 MG/DL
CHLORIDE SERPL-SCNC: 104 MMOL/L
CO2 SERPL-SCNC: 20 MMOL/L
CREAT SERPL-MCNC: 1.2 MG/DL
EST. GFR  (AFRICAN AMERICAN): >60 ML/MIN/1.73 M^2
EST. GFR  (NON AFRICAN AMERICAN): >60 ML/MIN/1.73 M^2
GLUCOSE SERPL-MCNC: 119 MG/DL
LIPASE SERPL-CCNC: 89 U/L
POCT GLUCOSE: 161 MG/DL (ref 70–110)
POCT GLUCOSE: 161 MG/DL (ref 70–110)
POCT GLUCOSE: 242 MG/DL (ref 70–110)
POTASSIUM SERPL-SCNC: 3.9 MMOL/L
PROT SERPL-MCNC: 7.4 G/DL
SODIUM SERPL-SCNC: 139 MMOL/L

## 2017-02-06 PROCEDURE — 63600175 PHARM REV CODE 636 W HCPCS: Performed by: INTERNAL MEDICINE

## 2017-02-06 PROCEDURE — 25000003 PHARM REV CODE 250: Performed by: EMERGENCY MEDICINE

## 2017-02-06 PROCEDURE — 36415 COLL VENOUS BLD VENIPUNCTURE: CPT

## 2017-02-06 PROCEDURE — 87040 BLOOD CULTURE FOR BACTERIA: CPT

## 2017-02-06 PROCEDURE — 11000001 HC ACUTE MED/SURG PRIVATE ROOM

## 2017-02-06 PROCEDURE — 80053 COMPREHEN METABOLIC PANEL: CPT

## 2017-02-06 PROCEDURE — 83690 ASSAY OF LIPASE: CPT

## 2017-02-06 PROCEDURE — 25000003 PHARM REV CODE 250: Performed by: INTERNAL MEDICINE

## 2017-02-06 PROCEDURE — C9113 INJ PANTOPRAZOLE SODIUM, VIA: HCPCS | Performed by: INTERNAL MEDICINE

## 2017-02-06 RX ORDER — PANTOPRAZOLE SODIUM 40 MG/10ML
40 INJECTION, POWDER, LYOPHILIZED, FOR SOLUTION INTRAVENOUS DAILY
Status: DISCONTINUED | OUTPATIENT
Start: 2017-02-06 | End: 2017-02-08 | Stop reason: HOSPADM

## 2017-02-06 RX ORDER — VERAPAMIL HYDROCHLORIDE 120 MG/1
120 TABLET, FILM COATED, EXTENDED RELEASE ORAL NIGHTLY
Status: DISCONTINUED | OUTPATIENT
Start: 2017-02-06 | End: 2017-02-08 | Stop reason: HOSPADM

## 2017-02-06 RX ORDER — CHLORDIAZEPOXIDE HYDROCHLORIDE 10 MG/1
20 CAPSULE, GELATIN COATED ORAL 3 TIMES DAILY
Status: DISCONTINUED | OUTPATIENT
Start: 2017-02-06 | End: 2017-02-06

## 2017-02-06 RX ORDER — LORAZEPAM 2 MG/ML
1 INJECTION INTRAMUSCULAR EVERY 6 HOURS PRN
Status: DISCONTINUED | OUTPATIENT
Start: 2017-02-06 | End: 2017-02-08 | Stop reason: HOSPADM

## 2017-02-06 RX ORDER — KETOROLAC TROMETHAMINE 30 MG/ML
30 INJECTION, SOLUTION INTRAMUSCULAR; INTRAVENOUS ONCE
Status: COMPLETED | OUTPATIENT
Start: 2017-02-06 | End: 2017-02-06

## 2017-02-06 RX ORDER — CEFEPIME HYDROCHLORIDE 1 G/50ML
1 INJECTION, SOLUTION INTRAVENOUS
Status: DISCONTINUED | OUTPATIENT
Start: 2017-02-06 | End: 2017-02-08 | Stop reason: HOSPADM

## 2017-02-06 RX ORDER — DIPHENHYDRAMINE HCL 25 MG
25 CAPSULE ORAL EVERY 6 HOURS PRN
Status: DISCONTINUED | OUTPATIENT
Start: 2017-02-06 | End: 2017-02-08 | Stop reason: HOSPADM

## 2017-02-06 RX ORDER — CHLORDIAZEPOXIDE HYDROCHLORIDE 25 MG/1
25 CAPSULE, GELATIN COATED ORAL 3 TIMES DAILY
Status: DISCONTINUED | OUTPATIENT
Start: 2017-02-06 | End: 2017-02-08 | Stop reason: HOSPADM

## 2017-02-06 RX ADMIN — HYDROMORPHONE HYDROCHLORIDE 1 MG: 2 INJECTION, SOLUTION INTRAMUSCULAR; INTRAVENOUS; SUBCUTANEOUS at 09:02

## 2017-02-06 RX ADMIN — HEPARIN SODIUM 5000 UNITS: 5000 INJECTION, SOLUTION INTRAVENOUS; SUBCUTANEOUS at 05:02

## 2017-02-06 RX ADMIN — HYDROMORPHONE HYDROCHLORIDE 1 MG: 2 INJECTION, SOLUTION INTRAMUSCULAR; INTRAVENOUS; SUBCUTANEOUS at 05:02

## 2017-02-06 RX ADMIN — CEFEPIME 1 G: 1 INJECTION, POWDER, FOR SOLUTION INTRAMUSCULAR; INTRAVENOUS at 02:02

## 2017-02-06 RX ADMIN — HYDRALAZINE HYDROCHLORIDE 10 MG: 20 INJECTION INTRAMUSCULAR; INTRAVENOUS at 03:02

## 2017-02-06 RX ADMIN — CHLORDIAZEPOXIDE HYDROCHLORIDE 10 MG: 10 CAPSULE ORAL at 05:02

## 2017-02-06 RX ADMIN — DIPHENHYDRAMINE HYDROCHLORIDE 25 MG: 25 CAPSULE ORAL at 08:02

## 2017-02-06 RX ADMIN — HEPARIN SODIUM 5000 UNITS: 5000 INJECTION, SOLUTION INTRAVENOUS; SUBCUTANEOUS at 01:02

## 2017-02-06 RX ADMIN — SODIUM CHLORIDE: 0.9 INJECTION, SOLUTION INTRAVENOUS at 05:02

## 2017-02-06 RX ADMIN — CEFEPIME 1 G: 1 INJECTION, POWDER, FOR SOLUTION INTRAMUSCULAR; INTRAVENOUS at 08:02

## 2017-02-06 RX ADMIN — HYDROMORPHONE HYDROCHLORIDE 1 MG: 2 INJECTION, SOLUTION INTRAMUSCULAR; INTRAVENOUS; SUBCUTANEOUS at 01:02

## 2017-02-06 RX ADMIN — PANTOPRAZOLE SODIUM 40 MG: 40 INJECTION, POWDER, FOR SOLUTION INTRAVENOUS at 07:02

## 2017-02-06 RX ADMIN — ACETAMINOPHEN 1000 MG: 10 INJECTION, SOLUTION INTRAVENOUS at 05:02

## 2017-02-06 RX ADMIN — KETOROLAC TROMETHAMINE 30 MG: 30 INJECTION, SOLUTION INTRAMUSCULAR at 04:02

## 2017-02-06 RX ADMIN — HEPARIN SODIUM 5000 UNITS: 5000 INJECTION, SOLUTION INTRAVENOUS; SUBCUTANEOUS at 09:02

## 2017-02-06 RX ADMIN — ONDANSETRON 4 MG: 2 INJECTION INTRAMUSCULAR; INTRAVENOUS at 02:02

## 2017-02-06 RX ADMIN — ACETAMINOPHEN 1000 MG: 10 INJECTION, SOLUTION INTRAVENOUS at 01:02

## 2017-02-06 RX ADMIN — CHLORDIAZEPOXIDE HYDROCHLORIDE 20 MG: 10 CAPSULE ORAL at 01:02

## 2017-02-06 RX ADMIN — VERAPAMIL HYDROCHLORIDE 120 MG: 120 TABLET, FILM COATED, EXTENDED RELEASE ORAL at 08:02

## 2017-02-06 RX ADMIN — INSULIN DETEMIR 20 UNITS: 100 INJECTION, SOLUTION SUBCUTANEOUS at 09:02

## 2017-02-06 RX ADMIN — CEFEPIME 1 G: 1 INJECTION, POWDER, FOR SOLUTION INTRAMUSCULAR; INTRAVENOUS at 05:02

## 2017-02-06 RX ADMIN — CHLORDIAZEPOXIDE HYDROCHLORIDE 25 MG: 25 CAPSULE ORAL at 09:02

## 2017-02-06 RX ADMIN — SODIUM CHLORIDE: 0.9 INJECTION, SOLUTION INTRAVENOUS at 08:02

## 2017-02-06 NOTE — NURSING
Patient assessed for diabetes educational needs following chart review  He reports was diagnosed 6/2016 and received diabetes education at that time  He has a home glucose meter and checks daily  He is prescribed insulin, but self-adjusts his dose depending on his glucose  He has good recall on teach back, but reinforced info on target glucose values/hyper/hypoglycemia  Verbalizes understanding   Does not need literature

## 2017-02-06 NOTE — PROGRESS NOTES
Patient started complaining of nausea.  PRN Zofran administered.  Will continue to monitor patients nausea.  Will hold off on advancing diet until reevaluated by day shift.

## 2017-02-06 NOTE — PLAN OF CARE
Problem: Patient Care Overview  Goal: Plan of Care Review  Outcome: Ongoing (interventions implemented as appropriate)  Patient free of falls this shift. Pain meds given once. BP remains elevated. Patient TMAX 99.0 this shift. Started on clear liquid diet. Tolerated well. Patient reports improved pain control today.

## 2017-02-06 NOTE — PLAN OF CARE
Problem: Patient Care Overview  Goal: Plan of Care Review  Outcome: Ongoing (interventions implemented as appropriate)  Patient AAOx4.  BP elevated with a SBP in the 160s and DBP in the 100s. Patient spiked a temperature of 101.3.  Iv tylenol ordered x 3 doses.  Patient still has a low grade fever. Patient HR elevated to the 120s-130s.  Librium administered as ordered. No signs of DTs. IV fluids maintained. Dilaudid administered for pain. Patient tolerated ice chips and sips of water on shift.  Will advance diet to clear liquid in AM.   No falls on shift.  [place] encouraged

## 2017-02-06 NOTE — PROGRESS NOTES
Patient seen and examined. Admits to drinking 1 pint of vodka daily. Added librium scheduled due to risk of DT/ETOH withdrawal. Ok to start ice chips.

## 2017-02-06 NOTE — PROGRESS NOTES
[facility] Hospital Medicine  Progress Note    Patient Name: Jorgito Arreaga  MRN: [id number]  Patient Class: IP- Inpatient   Admission Date: 2/4/2017  Length of Stay: 2 days  Attending Physician: Olya Paz MD  Primary Care Provider: Derrek Donaldson DO        Subjective:     Principal Problem:Alcohol-induced acute pancreatitis    HPI:  Mr. Arreaga is a 52 y/o AA male with h/o chronic alcoholism, T2DM admitted August 2016 for acute alcoholic pancreatitis, but unfortunately continues to drink since his discharge 5 months ago, presented to [facility] ED c/o acute epigastric abdominal pain associated with intractable nausea and vomiting. He is unable to keep anything down. Labs show lipase of 273, CO2 17, Cr 1.2, Glucose 204, blood alcohol level of 42. CT abdomen shows evidence of acute pancreatitis.    Hospital Course:  Patient was treated with bowel rest, IV hydration, pain control and librium for DT prophylaxis. Patient spiked a fever; blood cultures were obtained and patient was empirically placed on cefepime.     Interval History:   Patient spiked a fever overnight. Blood cultures were obtained. Empirically started on cefepime. Lipase and LFTs are trending down. Tolerating clears. Monitor fever curve overnight.     Review of Systems   Constitutional: Negative for activity change, appetite change, chills, diaphoresis, fatigue and fever.   HENT: Negative for congestion, ear pain, mouth sores, sinus pressure, sore throat, tinnitus and trouble swallowing.    Eyes: Negative for pain, discharge, redness and visual disturbance.   Respiratory: Negative for apnea, cough, chest tightness, shortness of breath and wheezing.    Cardiovascular: Negative for chest pain, palpitations and leg swelling.   Gastrointestinal: Positive for abdominal pain. Negative for abdominal distention, blood in stool, constipation, diarrhea, nausea and vomiting.        Improved abdominal pain    Endocrine: Negative for cold  intolerance, heat intolerance, polydipsia, polyphagia and polyuria.   Genitourinary: Negative for decreased urine volume, difficulty urinating, discharge, dysuria, hematuria, scrotal swelling and urgency.   Musculoskeletal: Negative for arthralgias, joint swelling, myalgias and neck stiffness.   Skin: Negative for pallor, rash and wound.   Allergic/Immunologic: Negative for environmental allergies, food allergies and immunocompromised state.   Neurological: Negative for dizziness, tremors, seizures, syncope, speech difficulty, weakness, light-headedness and headaches.   Hematological: Negative for adenopathy. Does not bruise/bleed easily.   Psychiatric/Behavioral: Negative for agitation, behavioral problems, confusion, decreased concentration, hallucinations, sleep disturbance and suicidal ideas. The patient is not nervous/anxious.      Objective:     Vital Signs (Most Recent):  Temp: 98.4 °F (36.9 °C) (02/06/17 1513)  Pulse: 84 (02/06/17 1513)  Resp: 18 (02/06/17 1513)  BP: (!) 151/100 (02/06/17 1513)  SpO2: 97 % (02/06/17 1513) Vital Signs (24h Range):  Temp:  [98.4 °F (36.9 °C)-102.5 °F (39.2 °C)] 98.4 °F (36.9 °C)  Pulse:  [] 84  Resp:  [18] 18  SpO2:  [95 %-97 %] 97 %  BP: (126-176)/() 151/100     Weight: 85.2 kg (187 lb 14.4 oz)  Body mass index is 30.33 kg/(m^2).    Intake/Output Summary (Last 24 hours) at 02/06/17 1736  Last data filed at 02/06/17 0600   Gross per 24 hour   Intake             2550 ml   Output              700 ml   Net             1850 ml      Physical Exam   Constitutional: He is oriented to person, place, and time. He appears well-developed and well-nourished. No distress.   HENT:   Head: Normocephalic and atraumatic.   Eyes: Conjunctivae and EOM are normal. Pupils are equal, round, and reactive to light.   Neck: Normal range of motion. Neck supple. No JVD present. No tracheal deviation present. No thyromegaly present.   Cardiovascular: Normal rate and regular rhythm.  Exam  reveals no gallop and no friction rub.    No murmur heard.  Pulmonary/Chest: Effort normal. He has no wheezes. He has no rales. He exhibits no tenderness.   Abdominal: Soft. Bowel sounds are normal. He exhibits no distension and no mass. There is tenderness.   mid epigastric tenderness    Musculoskeletal: Normal range of motion. He exhibits no edema or tenderness.   Neurological: He is alert and oriented to person, place, and time.   Skin: Skin is warm and dry. No rash noted.   Psychiatric: He has a normal mood and affect. Thought content normal.       Significant Labs:   Blood Culture: No results for input(s): LABBLOO in the last 48 hours.  BMP:   Recent Labs  Lab 02/05/17 0457 02/06/17  0455   * 119*    139   K 4.0 3.9    104   CO2 21* 20*   BUN 20 12   CREATININE 1.0 1.2   CALCIUM 8.5* 8.8   MG 1.4*  --      CBC:   Recent Labs  Lab 02/04/17 1900 02/05/17 0457   WBC 6.14 10.00   HGB 13.8* 12.7*   HCT 40.8 37.2*    183     CMP:   Recent Labs  Lab 02/04/17 1900 02/05/17 0457 02/06/17  0455    139 139   K 4.5 4.0 3.9   CL 98 101 104   CO2 17* 21* 20*   * 280* 119*   BUN 27* 20 12   CREATININE 1.2 1.0 1.2   CALCIUM 9.4 8.5* 8.8   PROT 8.0 6.8 7.4   ALBUMIN 4.2 3.5 3.3*   BILITOT 1.5* 2.9* 2.6*   ALKPHOS 112 104 123   * 354* 96*   * 204* 125*   ANIONGAP 23* 17* 15   EGFRNONAA >60.0 >60 >60     Lipase:   Recent Labs  Lab 02/04/17 1900 02/06/17  0455   LIPASE 273* 89*       Significant Imaging: I have reviewed all pertinent imaging results/findings within the past 24 hours.    Assessment/Plan:      * Alcohol-induced acute pancreatitis  - Aggressive IV hydration  - Pain control  -explained importance of ETOH cessation   -advanced from NPO to clears   -monitor fever curve, blood cultures. Empirically started on cefepime due to fever       Essential hypertension  -IV hydralazine  -resume home dose of verapamil       VTE Risk Mitigation         Ordered     Medium  Risk of VTE  Once      02/05/17 0043     Place sequential compression device  Until discontinued      02/05/17 0043     Place SASKIA hose  Until discontinued      02/05/17 0043     heparin (porcine) injection 5,000 Units  Every 8 hours     Route:  Subcutaneous        02/05/17 0043          Olya Paz MD  Department of Hospital Medicine   [facility]

## 2017-02-06 NOTE — PROGRESS NOTES
Patient Temp up to 102.5.  -140.  /124.  Administered 10mg of IV Hydralazine.  Notified MD Rivera. He stated to give 30mg IV Toradol once.  Will continue to monitor patients vitals

## 2017-02-06 NOTE — PROGRESS NOTES
Patient has a temperature of 101.3.  HR jumping up to 135.  MD Rivera notified and he advised to give 1g of IV Tylenol Q8H for 4 doses.  Will administer and continue to monitor patients temperature and HR.

## 2017-02-06 NOTE — ASSESSMENT & PLAN NOTE
- Aggressive IV hydration  - Pain control  -explained importance of ETOH cessation   -advanced from NPO to clears   -monitor fever curve, blood cultures. Empirically started on cefepime due to fever

## 2017-02-06 NOTE — PROGRESS NOTES
Patient has a temperature of 101.4 and a HR of 125-130.  Spoke to MD Rivera.  He advised to get blood cultures and to give Cefepime 1g IVPB Q8H.  Will collect Blood cultures and administer Abx as ordered.

## 2017-02-06 NOTE — SUBJECTIVE & OBJECTIVE
Interval History:   Patient spiked a fever overnight. Blood cultures were obtained. Empirically started on cefepime. Lipase and LFTs are trending down. Tolerating clears. Monitor fever curve overnight.     Review of Systems   Constitutional: Negative for activity change, appetite change, chills, diaphoresis, fatigue and fever.   HENT: Negative for congestion, ear pain, mouth sores, sinus pressure, sore throat, tinnitus and trouble swallowing.    Eyes: Negative for pain, discharge, redness and visual disturbance.   Respiratory: Negative for apnea, cough, chest tightness, shortness of breath and wheezing.    Cardiovascular: Negative for chest pain, palpitations and leg swelling.   Gastrointestinal: Positive for abdominal pain. Negative for abdominal distention, blood in stool, constipation, diarrhea, nausea and vomiting.        Improved abdominal pain    Endocrine: Negative for cold intolerance, heat intolerance, polydipsia, polyphagia and polyuria.   Genitourinary: Negative for decreased urine volume, difficulty urinating, discharge, dysuria, hematuria, scrotal swelling and urgency.   Musculoskeletal: Negative for arthralgias, joint swelling, myalgias and neck stiffness.   Skin: Negative for pallor, rash and wound.   Allergic/Immunologic: Negative for environmental allergies, food allergies and immunocompromised state.   Neurological: Negative for dizziness, tremors, seizures, syncope, speech difficulty, weakness, light-headedness and headaches.   Hematological: Negative for adenopathy. Does not bruise/bleed easily.   Psychiatric/Behavioral: Negative for agitation, behavioral problems, confusion, decreased concentration, hallucinations, sleep disturbance and suicidal ideas. The patient is not nervous/anxious.      Objective:     Vital Signs (Most Recent):  Temp: 98.4 °F (36.9 °C) (02/06/17 1513)  Pulse: 84 (02/06/17 1513)  Resp: 18 (02/06/17 1513)  BP: (!) 151/100 (02/06/17 1513)  SpO2: 97 % (02/06/17 1513) Vital  Signs (24h Range):  Temp:  [98.4 °F (36.9 °C)-102.5 °F (39.2 °C)] 98.4 °F (36.9 °C)  Pulse:  [] 84  Resp:  [18] 18  SpO2:  [95 %-97 %] 97 %  BP: (126-176)/() 151/100     Weight: 85.2 kg (187 lb 14.4 oz)  Body mass index is 30.33 kg/(m^2).    Intake/Output Summary (Last 24 hours) at 02/06/17 1736  Last data filed at 02/06/17 0600   Gross per 24 hour   Intake             2550 ml   Output              700 ml   Net             1850 ml      Physical Exam   Constitutional: He is oriented to person, place, and time. He appears well-developed and well-nourished. No distress.   HENT:   Head: Normocephalic and atraumatic.   Eyes: Conjunctivae and EOM are normal. Pupils are equal, round, and reactive to light.   Neck: Normal range of motion. Neck supple. No JVD present. No tracheal deviation present. No thyromegaly present.   Cardiovascular: Normal rate and regular rhythm.  Exam reveals no gallop and no friction rub.    No murmur heard.  Pulmonary/Chest: Effort normal. He has no wheezes. He has no rales. He exhibits no tenderness.   Abdominal: Soft. Bowel sounds are normal. He exhibits no distension and no mass. There is tenderness.   mid epigastric tenderness    Musculoskeletal: Normal range of motion. He exhibits no edema or tenderness.   Neurological: He is alert and oriented to person, place, and time.   Skin: Skin is warm and dry. No rash noted.   Psychiatric: He has a normal mood and affect. Thought content normal.       Significant Labs:   Blood Culture: No results for input(s): LABBLOO in the last 48 hours.  BMP:   Recent Labs  Lab 02/05/17  0457 02/06/17  0455   * 119*    139   K 4.0 3.9    104   CO2 21* 20*   BUN 20 12   CREATININE 1.0 1.2   CALCIUM 8.5* 8.8   MG 1.4*  --      CBC:   Recent Labs  Lab 02/04/17  1900 02/05/17  0457   WBC 6.14 10.00   HGB 13.8* 12.7*   HCT 40.8 37.2*    183     CMP:   Recent Labs  Lab 02/04/17  1900 02/05/17  0457 02/06/17  0455    139 139   K  4.5 4.0 3.9   CL 98 101 104   CO2 17* 21* 20*   * 280* 119*   BUN 27* 20 12   CREATININE 1.2 1.0 1.2   CALCIUM 9.4 8.5* 8.8   PROT 8.0 6.8 7.4   ALBUMIN 4.2 3.5 3.3*   BILITOT 1.5* 2.9* 2.6*   ALKPHOS 112 104 123   * 354* 96*   * 204* 125*   ANIONGAP 23* 17* 15   EGFRNONAA >60.0 >60 >60     Lipase:   Recent Labs  Lab 02/04/17  1900 02/06/17  0455   LIPASE 273* 89*       Significant Imaging: I have reviewed all pertinent imaging results/findings within the past 24 hours.

## 2017-02-07 LAB
ALBUMIN SERPL BCP-MCNC: 2.7 G/DL
ALP SERPL-CCNC: 96 U/L
ALT SERPL W/O P-5'-P-CCNC: 68 U/L
ANION GAP SERPL CALC-SCNC: 9 MMOL/L
AST SERPL-CCNC: 35 U/L
BASOPHILS # BLD AUTO: 0.02 K/UL
BASOPHILS NFR BLD: 0.3 %
BILIRUB SERPL-MCNC: 1.9 MG/DL
BUN SERPL-MCNC: 7 MG/DL
CALCIUM SERPL-MCNC: 8.3 MG/DL
CHLORIDE SERPL-SCNC: 104 MMOL/L
CO2 SERPL-SCNC: 25 MMOL/L
CREAT SERPL-MCNC: 0.8 MG/DL
DIFFERENTIAL METHOD: ABNORMAL
EOSINOPHIL # BLD AUTO: 0.1 K/UL
EOSINOPHIL NFR BLD: 1.2 %
ERYTHROCYTE [DISTWIDTH] IN BLOOD BY AUTOMATED COUNT: 12.8 %
EST. GFR  (AFRICAN AMERICAN): >60 ML/MIN/1.73 M^2
EST. GFR  (NON AFRICAN AMERICAN): >60 ML/MIN/1.73 M^2
GLUCOSE SERPL-MCNC: 58 MG/DL
HCT VFR BLD AUTO: 32.5 %
HGB BLD-MCNC: 11 G/DL
LIPASE SERPL-CCNC: 34 U/L
LYMPHOCYTES # BLD AUTO: 1.3 K/UL
LYMPHOCYTES NFR BLD: 21.6 %
MCH RBC QN AUTO: 29.3 PG
MCHC RBC AUTO-ENTMCNC: 33.8 %
MCV RBC AUTO: 87 FL
MONOCYTES # BLD AUTO: 0.4 K/UL
MONOCYTES NFR BLD: 7 %
NEUTROPHILS # BLD AUTO: 4.2 K/UL
NEUTROPHILS NFR BLD: 69.9 %
PLATELET # BLD AUTO: 146 K/UL
PMV BLD AUTO: 10.4 FL
POTASSIUM SERPL-SCNC: 3.2 MMOL/L
PROT SERPL-MCNC: 6.1 G/DL
RBC # BLD AUTO: 3.75 M/UL
SODIUM SERPL-SCNC: 138 MMOL/L
WBC # BLD AUTO: 6.02 K/UL

## 2017-02-07 PROCEDURE — 11000001 HC ACUTE MED/SURG PRIVATE ROOM

## 2017-02-07 PROCEDURE — C9113 INJ PANTOPRAZOLE SODIUM, VIA: HCPCS | Performed by: INTERNAL MEDICINE

## 2017-02-07 PROCEDURE — 85025 COMPLETE CBC W/AUTO DIFF WBC: CPT

## 2017-02-07 PROCEDURE — 83690 ASSAY OF LIPASE: CPT

## 2017-02-07 PROCEDURE — 63600175 PHARM REV CODE 636 W HCPCS: Performed by: INTERNAL MEDICINE

## 2017-02-07 PROCEDURE — 80053 COMPREHEN METABOLIC PANEL: CPT

## 2017-02-07 PROCEDURE — 25000003 PHARM REV CODE 250: Performed by: EMERGENCY MEDICINE

## 2017-02-07 PROCEDURE — 25000003 PHARM REV CODE 250: Performed by: INTERNAL MEDICINE

## 2017-02-07 PROCEDURE — 63600175 PHARM REV CODE 636 W HCPCS: Performed by: HOSPITALIST

## 2017-02-07 PROCEDURE — 25500020 PHARM REV CODE 255: Performed by: INTERNAL MEDICINE

## 2017-02-07 PROCEDURE — 36415 COLL VENOUS BLD VENIPUNCTURE: CPT

## 2017-02-07 PROCEDURE — 63600175 PHARM REV CODE 636 W HCPCS: Performed by: NURSE PRACTITIONER

## 2017-02-07 RX ORDER — SPIRONOLACTONE 25 MG/1
50 TABLET ORAL DAILY
Status: DISCONTINUED | OUTPATIENT
Start: 2017-02-07 | End: 2017-02-08 | Stop reason: HOSPADM

## 2017-02-07 RX ORDER — ACETAMINOPHEN 10 MG/ML
1000 INJECTION, SOLUTION INTRAVENOUS ONCE
Status: COMPLETED | OUTPATIENT
Start: 2017-02-07 | End: 2017-02-07

## 2017-02-07 RX ORDER — ACETAMINOPHEN 10 MG/ML
1000 INJECTION, SOLUTION INTRAVENOUS EVERY 8 HOURS
Status: DISPENSED | OUTPATIENT
Start: 2017-02-07 | End: 2017-02-07

## 2017-02-07 RX ADMIN — HEPARIN SODIUM 5000 UNITS: 5000 INJECTION, SOLUTION INTRAVENOUS; SUBCUTANEOUS at 09:02

## 2017-02-07 RX ADMIN — INSULIN DETEMIR 20 UNITS: 100 INJECTION, SOLUTION SUBCUTANEOUS at 09:02

## 2017-02-07 RX ADMIN — CEFEPIME 1 G: 1 INJECTION, POWDER, FOR SOLUTION INTRAMUSCULAR; INTRAVENOUS at 10:02

## 2017-02-07 RX ADMIN — IOHEXOL 50 ML: 350 INJECTION, SOLUTION INTRAVENOUS at 11:02

## 2017-02-07 RX ADMIN — ACETAMINOPHEN 1000 MG: 10 INJECTION, SOLUTION INTRAVENOUS at 11:02

## 2017-02-07 RX ADMIN — SPIRONOLACTONE 50 MG: 25 TABLET ORAL at 04:02

## 2017-02-07 RX ADMIN — CHLORDIAZEPOXIDE HYDROCHLORIDE 25 MG: 25 CAPSULE ORAL at 09:02

## 2017-02-07 RX ADMIN — IOHEXOL 75 ML: 350 INJECTION, SOLUTION INTRAVENOUS at 04:02

## 2017-02-07 RX ADMIN — VERAPAMIL HYDROCHLORIDE 120 MG: 120 TABLET, FILM COATED, EXTENDED RELEASE ORAL at 09:02

## 2017-02-07 RX ADMIN — CHLORDIAZEPOXIDE HYDROCHLORIDE 25 MG: 25 CAPSULE ORAL at 05:02

## 2017-02-07 RX ADMIN — HEPARIN SODIUM 5000 UNITS: 5000 INJECTION, SOLUTION INTRAVENOUS; SUBCUTANEOUS at 05:02

## 2017-02-07 RX ADMIN — SODIUM CHLORIDE: 0.9 INJECTION, SOLUTION INTRAVENOUS at 03:02

## 2017-02-07 RX ADMIN — ACETAMINOPHEN 1000 MG: 10 INJECTION, SOLUTION INTRAVENOUS at 02:02

## 2017-02-07 RX ADMIN — CEFEPIME 1 G: 1 INJECTION, POWDER, FOR SOLUTION INTRAMUSCULAR; INTRAVENOUS at 01:02

## 2017-02-07 RX ADMIN — ACETAMINOPHEN 1000 MG: 10 INJECTION, SOLUTION INTRAVENOUS at 03:02

## 2017-02-07 RX ADMIN — PANTOPRAZOLE SODIUM 40 MG: 40 INJECTION, POWDER, FOR SOLUTION INTRAVENOUS at 08:02

## 2017-02-07 RX ADMIN — CHLORDIAZEPOXIDE HYDROCHLORIDE 25 MG: 25 CAPSULE ORAL at 03:02

## 2017-02-07 RX ADMIN — CEFEPIME 1 G: 1 INJECTION, POWDER, FOR SOLUTION INTRAMUSCULAR; INTRAVENOUS at 04:02

## 2017-02-07 RX ADMIN — HEPARIN SODIUM 5000 UNITS: 5000 INJECTION, SOLUTION INTRAVENOUS; SUBCUTANEOUS at 03:02

## 2017-02-07 NOTE — SUBJECTIVE & OBJECTIVE
Interval History:   Advance diet as tolerated. Still spiking fever. Ordered CT abdomen with pancreas protocol. Continue abx and monitor fever curve. Patient states abdominal pain has mostly resolved.     Review of Systems   Constitutional: Positive for fever. Negative for activity change, appetite change, chills, diaphoresis and fatigue.   HENT: Negative for congestion, ear pain, mouth sores, sinus pressure, sore throat, tinnitus and trouble swallowing.    Eyes: Negative for pain, discharge, redness and visual disturbance.   Respiratory: Negative for apnea, cough, chest tightness, shortness of breath and wheezing.    Cardiovascular: Negative for chest pain, palpitations and leg swelling.   Gastrointestinal: Negative for abdominal distention, abdominal pain, blood in stool, constipation, diarrhea, nausea and vomiting.   Endocrine: Negative for cold intolerance, heat intolerance, polydipsia, polyphagia and polyuria.   Genitourinary: Negative for decreased urine volume, difficulty urinating, discharge, dysuria, hematuria, scrotal swelling and urgency.   Musculoskeletal: Negative for arthralgias, joint swelling, myalgias and neck stiffness.   Skin: Negative for pallor, rash and wound.   Allergic/Immunologic: Negative for environmental allergies, food allergies and immunocompromised state.   Neurological: Negative for dizziness, tremors, seizures, syncope, speech difficulty, weakness, light-headedness and headaches.   Hematological: Negative for adenopathy. Does not bruise/bleed easily.   Psychiatric/Behavioral: Negative for agitation, behavioral problems, confusion, decreased concentration, hallucinations, sleep disturbance and suicidal ideas. The patient is not nervous/anxious.      Objective:     Vital Signs (Most Recent):  Temp: 99 °F (37.2 °C) (02/07/17 1248)  Pulse: 99 (02/07/17 1507)  Resp: 18 (02/07/17 1248)  BP: (!) 155/114 (02/07/17 1248)  SpO2: 99 % (02/07/17 1248) Vital Signs (24h Range):  Temp:  [98.1 °F  (36.7 °C)-101 °F (38.3 °C)] 99 °F (37.2 °C)  Pulse:  [88-99] 99  Resp:  [18] 18  SpO2:  [94 %-99 %] 99 %  BP: (146-158)/() 155/114     Weight: 85.2 kg (187 lb 14.4 oz)  Body mass index is 30.33 kg/(m^2).    Intake/Output Summary (Last 24 hours) at 02/07/17 1547  Last data filed at 02/07/17 1300   Gross per 24 hour   Intake             6780 ml   Output             1000 ml   Net             5780 ml      Physical Exam   Constitutional: He is oriented to person, place, and time. He appears well-developed and well-nourished. No distress.   HENT:   Head: Normocephalic and atraumatic.   Eyes: Conjunctivae and EOM are normal. Pupils are equal, round, and reactive to light.   Neck: Normal range of motion. Neck supple. No JVD present. No tracheal deviation present. No thyromegaly present.   Cardiovascular: Normal rate and regular rhythm.  Exam reveals no gallop and no friction rub.    No murmur heard.  Pulmonary/Chest: Effort normal. He has no wheezes. He has no rales. He exhibits no tenderness.   Abdominal: Soft. Bowel sounds are normal. He exhibits no distension and no mass. There is no tenderness.   Musculoskeletal: Normal range of motion. He exhibits no edema or tenderness.   Neurological: He is alert and oriented to person, place, and time.   Skin: Skin is warm and dry. No rash noted.   Psychiatric: He has a normal mood and affect. Thought content normal.       Significant Labs:   CMP:   Recent Labs  Lab 02/06/17 0455 02/07/17  0526    138   K 3.9 3.2*    104   CO2 20* 25   * 58*   BUN 12 7   CREATININE 1.2 0.8   CALCIUM 8.8 8.3*   PROT 7.4 6.1   ALBUMIN 3.3* 2.7*   BILITOT 2.6* 1.9*   ALKPHOS 123 96   AST 96* 35   * 68*   ANIONGAP 15 9   EGFRNONAA >60 >60     Lipase:   Recent Labs  Lab 02/06/17 0455 02/07/17  0526   LIPASE 89* 34       Significant Imaging: I have reviewed all pertinent imaging results/findings within the past 24 hours.

## 2017-02-07 NOTE — PROGRESS NOTES
[facility] Hospital Medicine  Progress Note    Patient Name: Jorgito Arreaga  MRN: [id number]  Patient Class: IP- Inpatient   Admission Date: 2/4/2017  Length of Stay: 3 days  Attending Physician: Olya Paz MD  Primary Care Provider: Derrek Donaldson DO        Subjective:     Principal Problem:Alcohol-induced acute pancreatitis    HPI:  Mr. Arreaga is a 52 y/o AA male with h/o chronic alcoholism, T2DM admitted August 2016 for acute alcoholic pancreatitis, but unfortunately continues to drink since his discharge 5 months ago, presented to [facility] ED c/o acute epigastric abdominal pain associated with intractable nausea and vomiting. He is unable to keep anything down. Labs show lipase of 273, CO2 17, Cr 1.2, Glucose 204, blood alcohol level of 42. CT abdomen shows evidence of acute pancreatitis.    Hospital Course:  Patient was treated with bowel rest, IV hydration, pain control and librium for DT prophylaxis. Patient spiked a fever; blood cultures were obtained and patient was empirically placed on cefepime. Due to persistent fever, CT abdomen pancreas protocol was ordered.     Interval History:   Advance diet as tolerated. Still spiking fever. Ordered CT abdomen with pancreas protocol. Continue abx and monitor fever curve. Patient states abdominal pain has mostly resolved.     Review of Systems   Constitutional: Positive for fever. Negative for activity change, appetite change, chills, diaphoresis and fatigue.   HENT: Negative for congestion, ear pain, mouth sores, sinus pressure, sore throat, tinnitus and trouble swallowing.    Eyes: Negative for pain, discharge, redness and visual disturbance.   Respiratory: Negative for apnea, cough, chest tightness, shortness of breath and wheezing.    Cardiovascular: Negative for chest pain, palpitations and leg swelling.   Gastrointestinal: Negative for abdominal distention, abdominal pain, blood in stool, constipation, diarrhea, nausea and vomiting.    Endocrine: Negative for cold intolerance, heat intolerance, polydipsia, polyphagia and polyuria.   Genitourinary: Negative for decreased urine volume, difficulty urinating, discharge, dysuria, hematuria, scrotal swelling and urgency.   Musculoskeletal: Negative for arthralgias, joint swelling, myalgias and neck stiffness.   Skin: Negative for pallor, rash and wound.   Allergic/Immunologic: Negative for environmental allergies, food allergies and immunocompromised state.   Neurological: Negative for dizziness, tremors, seizures, syncope, speech difficulty, weakness, light-headedness and headaches.   Hematological: Negative for adenopathy. Does not bruise/bleed easily.   Psychiatric/Behavioral: Negative for agitation, behavioral problems, confusion, decreased concentration, hallucinations, sleep disturbance and suicidal ideas. The patient is not nervous/anxious.      Objective:     Vital Signs (Most Recent):  Temp: 99 °F (37.2 °C) (02/07/17 1248)  Pulse: 99 (02/07/17 1507)  Resp: 18 (02/07/17 1248)  BP: (!) 155/114 (02/07/17 1248)  SpO2: 99 % (02/07/17 1248) Vital Signs (24h Range):  Temp:  [98.1 °F (36.7 °C)-101 °F (38.3 °C)] 99 °F (37.2 °C)  Pulse:  [88-99] 99  Resp:  [18] 18  SpO2:  [94 %-99 %] 99 %  BP: (146-158)/() 155/114     Weight: 85.2 kg (187 lb 14.4 oz)  Body mass index is 30.33 kg/(m^2).    Intake/Output Summary (Last 24 hours) at 02/07/17 1547  Last data filed at 02/07/17 1300   Gross per 24 hour   Intake             6780 ml   Output             1000 ml   Net             5780 ml      Physical Exam   Constitutional: He is oriented to person, place, and time. He appears well-developed and well-nourished. No distress.   HENT:   Head: Normocephalic and atraumatic.   Eyes: Conjunctivae and EOM are normal. Pupils are equal, round, and reactive to light.   Neck: Normal range of motion. Neck supple. No JVD present. No tracheal deviation present. No thyromegaly present.   Cardiovascular: Normal rate and  regular rhythm.  Exam reveals no gallop and no friction rub.    No murmur heard.  Pulmonary/Chest: Effort normal. He has no wheezes. He has no rales. He exhibits no tenderness.   Abdominal: Soft. Bowel sounds are normal. He exhibits no distension and no mass. There is no tenderness.   Musculoskeletal: Normal range of motion. He exhibits no edema or tenderness.   Neurological: He is alert and oriented to person, place, and time.   Skin: Skin is warm and dry. No rash noted.   Psychiatric: He has a normal mood and affect. Thought content normal.       Significant Labs:   CMP:   Recent Labs  Lab 02/06/17 0455 02/07/17 0526    138   K 3.9 3.2*    104   CO2 20* 25   * 58*   BUN 12 7   CREATININE 1.2 0.8   CALCIUM 8.8 8.3*   PROT 7.4 6.1   ALBUMIN 3.3* 2.7*   BILITOT 2.6* 1.9*   ALKPHOS 123 96   AST 96* 35   * 68*   ANIONGAP 15 9   EGFRNONAA >60 >60     Lipase:   Recent Labs  Lab 02/06/17 0455 02/07/17 0526   LIPASE 89* 34       Significant Imaging: I have reviewed all pertinent imaging results/findings within the past 24 hours.    Assessment/Plan:      * Alcohol-induced acute pancreatitis  - Aggressive IV hydration  - Pain control  -explained importance of ETOH cessation   -advanced from NPO to clears to regular diet as tolerated  -monitor fever curve, blood cultures. Empirically started on cefepime due to fever   -CT abdomen (pancreas protocol) ordered to assess for pseudocyst or necrosis due to persistent fever       Essential hypertension  -IV hydralazine  -resume home dose of verapamil       VTE Risk Mitigation         Ordered     Medium Risk of VTE  Once      02/05/17 0043     Place sequential compression device  Until discontinued      02/05/17 0043     Place SASKIA hose  Until discontinued      02/05/17 0043     heparin (porcine) injection 5,000 Units  Every 8 hours     Route:  Subcutaneous        02/05/17 0043          Olya Paz MD  Department of Hospital Medicine   [facility]

## 2017-02-07 NOTE — PLAN OF CARE
Problem: Patient Care Overview  Goal: Plan of Care Review  Outcome: Ongoing (interventions implemented as appropriate)  Patient AAOx4.  BP elevated on shift.  Patient become febrile.  IV tylenol reordered.  Librium adminsitered as ordered.  IV abx administered as ordered.  IV fluids administered as ordered.  No falls on shift.  [place] encouraged

## 2017-02-07 NOTE — PROGRESS NOTES
Patient has a temperature of 101.  Notified MD Correa.  He stated that it was okay to reorder the IV Tylenol Q8H for 3 doses.  Will administer and continue to monitor.

## 2017-02-07 NOTE — PLAN OF CARE
02/07/17 1232   Discharge Assessment   Assessment Type Discharge Planning Assessment   Confirmed/corrected address and phone number on facesheet? Yes  (corrected)   Assessment information obtained from? Medical Record;Patient   Expected Length of Stay (days) (tbd)   Communicated expected length of stay with patient/caregiver yes   Type of Healthcare Directive Received Other (Comment)  (does not have, declines information)   Prior to hospitilization cognitive status: Alert/Oriented   Prior to hospitalization functional status: Independent   Current cognitive status: Alert/Oriented   Current Functional Status: Independent   Arrived From home or self-care   Lives With spouse   Able to Return to Prior Arrangements yes   Is patient able to care for self after discharge? Yes   How many people do you have in your home that can help with your care after discharge? 1   Who are your caregiver(s) and their phone number(s)? Lucia Butts ( spouse ) [phone]   Patient's perception of discharge disposition home or selfcare   Readmission Within The Last 30 Days no previous admission in last 30 days   Patient currently being followed by outpatient case management? No   Patient currently receives home health services? No   Does the patient currently use HME? No   Patient currently receives private duty nursing? No   Patient currently receives any other outside agency services? No   Equipment Currently Used at Home none   Do you have any problems affording any of your prescribed medications? No   Is the patient taking medications as prescribed? yes   Do you have any financial concerns preventing you from receiving the healthcare you need? No   Does the patient have transportation to healthcare appointments? Yes   Transportation Available family or friend will provide;car   On Dialysis? No   Does the patient receive services at the [facility]? No   Are there any open cases? No   Discharge Plan A Home;Home with family    Patient/Family In Agreement With Plan yes

## 2017-02-07 NOTE — ASSESSMENT & PLAN NOTE
- Aggressive IV hydration  - Pain control  -explained importance of ETOH cessation   -advanced from NPO to clears to regular diet as tolerated  -monitor fever curve, blood cultures. Empirically started on cefepime due to fever   -CT abdomen (pancreas protocol) ordered to assess for pseudocyst or necrosis due to persistent fever

## 2017-02-08 VITALS
SYSTOLIC BLOOD PRESSURE: 157 MMHG | DIASTOLIC BLOOD PRESSURE: 88 MMHG | RESPIRATION RATE: 20 BRPM | HEIGHT: 66 IN | OXYGEN SATURATION: 99 % | BODY MASS INDEX: 31.76 KG/M2 | TEMPERATURE: 99 F | HEART RATE: 78 BPM | WEIGHT: 197.63 LBS

## 2017-02-08 PROBLEM — R11.2 INTRACTABLE VOMITING WITH NAUSEA: Status: RESOLVED | Noted: 2017-02-04 | Resolved: 2017-02-08

## 2017-02-08 LAB — POCT GLUCOSE: 227 MG/DL (ref 70–110)

## 2017-02-08 PROCEDURE — 25000003 PHARM REV CODE 250: Performed by: EMERGENCY MEDICINE

## 2017-02-08 PROCEDURE — 63600175 PHARM REV CODE 636 W HCPCS: Performed by: INTERNAL MEDICINE

## 2017-02-08 PROCEDURE — 3E0234Z INTRODUCTION OF SERUM, TOXOID AND VACCINE INTO MUSCLE, PERCUTANEOUS APPROACH: ICD-10-PCS | Performed by: INTERNAL MEDICINE

## 2017-02-08 PROCEDURE — 99233 SBSQ HOSP IP/OBS HIGH 50: CPT | Mod: ,,, | Performed by: SURGERY

## 2017-02-08 PROCEDURE — 90670 PCV13 VACCINE IM: CPT | Performed by: INTERNAL MEDICINE

## 2017-02-08 PROCEDURE — 90471 IMMUNIZATION ADMIN: CPT | Performed by: INTERNAL MEDICINE

## 2017-02-08 PROCEDURE — 90472 IMMUNIZATION ADMIN EACH ADD: CPT | Performed by: INTERNAL MEDICINE

## 2017-02-08 PROCEDURE — 90686 IIV4 VACC NO PRSV 0.5 ML IM: CPT | Performed by: INTERNAL MEDICINE

## 2017-02-08 PROCEDURE — 25000003 PHARM REV CODE 250: Performed by: INTERNAL MEDICINE

## 2017-02-08 PROCEDURE — C9113 INJ PANTOPRAZOLE SODIUM, VIA: HCPCS | Performed by: INTERNAL MEDICINE

## 2017-02-08 RX ORDER — TRAMADOL HYDROCHLORIDE 50 MG/1
50 TABLET ORAL EVERY 6 HOURS PRN
Qty: 15 TABLET | Refills: 0 | Status: SHIPPED | OUTPATIENT
Start: 2017-02-08 | End: 2017-02-18

## 2017-02-08 RX ADMIN — INFLUENZA A VIRUS A/CALIFORNIA/7/2009 X-179A (H1N1) ANTIGEN (FORMALDEHYDE INACTIVATED), INFLUENZA A VIRUS A/HONG KONG/4801/2014 X-263B (H3N2) ANTIGEN (FORMALDEHYDE INACTIVATED), INFLUENZA B VIRUS B/PHUKET/3073/2013 ANTIGEN (FORMALDEHYDE INACTIVATED), AND INFLUENZA B VIRUS B/BRISBANE/60/2008 ANTIGEN (FORMALDEHYDE INACTIVATED) 0.5 ML: 15; 15; 15; 15 INJECTION, SUSPENSION INTRAMUSCULAR at 04:02

## 2017-02-08 RX ADMIN — HEPARIN SODIUM 5000 UNITS: 5000 INJECTION, SOLUTION INTRAVENOUS; SUBCUTANEOUS at 01:02

## 2017-02-08 RX ADMIN — CEFEPIME 1 G: 1 INJECTION, POWDER, FOR SOLUTION INTRAMUSCULAR; INTRAVENOUS at 01:02

## 2017-02-08 RX ADMIN — CEFEPIME 1 G: 1 INJECTION, POWDER, FOR SOLUTION INTRAMUSCULAR; INTRAVENOUS at 11:02

## 2017-02-08 RX ADMIN — HEPARIN SODIUM 5000 UNITS: 5000 INJECTION, SOLUTION INTRAVENOUS; SUBCUTANEOUS at 06:02

## 2017-02-08 RX ADMIN — CHLORDIAZEPOXIDE HYDROCHLORIDE 25 MG: 25 CAPSULE ORAL at 01:02

## 2017-02-08 RX ADMIN — SPIRONOLACTONE 50 MG: 25 TABLET ORAL at 12:02

## 2017-02-08 RX ADMIN — CHLORDIAZEPOXIDE HYDROCHLORIDE 25 MG: 25 CAPSULE ORAL at 06:02

## 2017-02-08 RX ADMIN — PNEUMOCOCCAL 13-VALENT CONJUGATE VACCINE 0.5 ML: 2.2; 2.2; 2.2; 2.2; 2.2; 4.4; 2.2; 2.2; 2.2; 2.2; 2.2; 2.2; 2.2 INJECTION, SUSPENSION INTRAMUSCULAR at 04:02

## 2017-02-08 RX ADMIN — SODIUM CHLORIDE: 0.9 INJECTION, SOLUTION INTRAVENOUS at 01:02

## 2017-02-08 RX ADMIN — PANTOPRAZOLE SODIUM 40 MG: 40 INJECTION, POWDER, FOR SOLUTION INTRAVENOUS at 12:02

## 2017-02-08 NOTE — CONSULTS
Consult Note  General Surgery    Consult Requested By: Hospital medicine  Reason for Consult: Alcoholic pancreatitis    SUBJECTIVE:     History of Present Illness:  Patient is a 51 y.o. male presents with another episode of abdominal pain related to alcoholic pancreatitis.    Patient states that he was admitted to the hospital about 6 months ago with severe pancreatitis.  He had.  Pancreatic inflammation and developed a pseudocyst.  He was to see a surgeon about the pseudocyst however due to the flattening and other issues she was unable to keep these appointments.    The patient states he was doing well and improving and still he started drinking some alcohol during the end of football season.  He developed a sharp [place] abdominal pain located in the epigastrium.  He presented to the emergency room was found to have another episode of gallstone pancreatitis.    A repeat CAT scan done at admission showed improvement in the amount of sterile pancreatic necrosis in the size of the pseudocyst.  Currently he is resting comfortably and is in no acute distress.         Scheduled Meds:   ceFEPime (MAXIPIME) IVPB  1 g Intravenous Q8H    chlordiazepoxide  25 mg Oral TID    heparin (porcine)  5,000 Units Subcutaneous Q8H    insulin detemir  20 Units Subcutaneous QHS    nicotine  1 patch Transdermal Daily    pantoprazole  40 mg Intravenous Daily    spironolactone  50 mg Oral Daily    verapamil  120 mg Oral Nightly     Continuous Infusions:   sodium chloride 0.9% 125 mL/hr at 02/08/17 0152     PRN Meds:diphenhydrAMINE, hydrALAZINE, HYDROmorphone, flu vac [id number] 36mos up(PF), lorazepam, ondansetron, pneumoc 13-guanako conj-dip cr(PF), promethazine (PHENERGAN) IVPB    Review of patient's allergies indicates:  No Known Allergies    Past Medical History   Diagnosis Date    Diabetes mellitus, type 2     GERD (gastroesophageal reflux disease)     Hypertension     Inflammatory polyps of colon     Pancreatitis, alcoholic,  acute     Port catheter in place     TIA (transient ischemic attack)      History reviewed. No pertinent past surgical history.  Family History   Problem Relation Age of Onset    Hypertension Mother     Hypertension Father      Social History   Substance Use Topics    Smoking status: Never Smoker    Smokeless tobacco: Never Used    Alcohol use Yes      Comment:  Daily - States that he drinks 1/2 pint of vodka and about 1-2 cans of beer daily        Review of Systems:  Constitutional: no fever or chills, pain well controlled  Respiratory: no cough or shortness of breath  Cardiovascular: no chest pain or palpitations  Gastrointestinal: no nausea or vomiting, tolerating diet, positive for abdominal pain, The abdominal pain has now resolved  Genitourinary: no hematuria or dysuria  Integument/Breast: no rash or pruritis  Hematologic/Lymphatic: no easy bruising or lymphadenopathy  Musculoskeletal: no arthralgias or myalgias  Neurological: no seizures or tremors  Behavioral/Psych: no auditory or visual hallucinations  Endocrine: no heat or cold intolerance    OBJECTIVE:     Vital Signs (Most Recent)  Temp: 98 °F (36.7 °C) (02/08/17 1200)  Pulse: 82 (02/08/17 1300)  Resp: 18 (02/08/17 1200)  BP: (!) 162/109 (02/08/17 1200)  SpO2: 100 % (02/08/17 1200)    Vital Signs Range (Last 24H):  Temp:  [98 °F (36.7 °C)-100.3 °F (37.9 °C)]   Pulse:  []   Resp:  [18-20]   BP: (139-166)/()   SpO2:  [94 %-100 %]     Physical Exam:  General: no distress, mildly obese  Eyes:  conjunctivae/corneas clear.  Throat: lips, mucosa, and tongue normal; teeth and gums normal and no throat erythema  Neck: supple, symmetrical, trachea midline  Heart: regular rate and rhythm, S1, S2 normal, no murmur, rub or gallop  Abdomen: soft, non-tender non-distented; bowel sounds normal; no masses,  no organomegaly  Extremities: no cyanosis or edema, or clubbing  Pulses: 2+ and symmetric  Skin: Skin color, texture, turgor normal. No rashes or  lesions    Laboratory:  CBC:   Recent Labs  Lab 02/07/17  0526   WBC 6.02   RBC 3.75*   HGB 11.0*   HCT 32.5*   *     BMP:   Recent Labs  Lab 02/07/17  0526   GLU 58*      K 3.2*      CO2 25   BUN 7   CREATININE 0.8   CALCIUM 8.3*     CMP:   Recent Labs  Lab 02/07/17  0526   GLU 58*   CALCIUM 8.3*   ALBUMIN 2.7*   PROT 6.1      K 3.2*   CO2 25      BUN 7   CREATININE 0.8   ALKPHOS 96   ALT 68*   AST 35   BILITOT 1.9*       Diagnostic Results:  CT: Reviewed    ASSESSMENT/PLAN:     Patient Active Problem List   Diagnosis    Alcohol-induced acute pancreatitis    Gastroesophageal reflux disease    Alcohol abuse    Essential hypertension    Edema    Type 2 diabetes mellitus with hyperglycemia     Improvement in the CAT scan findings associated with acute/chronic alcoholic pancreatitis.  Patient continues to drink alcohol       Recommendations  1.  ABSTAIN FROM ALL ALCOHOL    2.  No need for surgical intervention regarding the sterile pancreatic necrosis or improvement in the pseudocyst  3.Patient could follow-up with the pancreatic surgeons at [facility] to discuss procedures for chronic pancreatitis however he needs to abstain from alcohol       No need for surgical intervention.  Please recall if needed

## 2017-02-08 NOTE — PLAN OF CARE
Problem: Patient Care Overview  Goal: Plan of Care Review  Outcome: Ongoing (interventions implemented as appropriate)  pateint rested comfortably throughout the night, no complaints of pain, remains NPO and verblaized understanding of plan of care.

## 2017-02-08 NOTE — NURSING
Reviewed AVS with pt. IV removed. Telemonitor removed. Immunizations given. Pt will remain on unit until his ride arrives.

## 2017-02-09 NOTE — DISCHARGE SUMMARY
[facility] Hospital Medicine  Discharge Summary      Patient Name: Jorgito Arreaga  MRN: [id number]  Admission Date: 2/4/2017  Hospital Length of Stay: 4 days  Discharge Date and Time:  02/08/2017 7:06 PM  Attending Physician: No att. providers found   Discharging Provider: Olya Hurst MD  Primary Care Provider: Derrek Donaldson DO      HPI:   Mr. Arreaga is a 52 y/o AA male with h/o chronic alcoholism, T2DM admitted August 2016 for acute alcoholic pancreatitis, but unfortunately continues to drink since his discharge 5 months ago, presented to [facility] ED c/o acute epigastric abdominal pain associated with intractable nausea and vomiting. He is unable to keep anything down. Labs show lipase of 273, CO2 17, Cr 1.2, Glucose 204, blood alcohol level of 42. CT abdomen shows evidence of acute pancreatitis.    * No surgery found *      Indwelling Lines/Drains at time of discharge:   Lines/Drains/Airways          No matching active lines, drains, or airways        Hospital Course:   Patient was treated with bowel rest, IV hydration, pain control and librium for DT prophylaxis. Patient spiked a fever; blood cultures were obtained and patient was empirically placed on cefepime. 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 Patient had an episode of acute pancreatitis last year due to drinking. Patient has an appointment with General surgery (Dr. Baker) on 2/14 for surgical evaluation. Dr. Denny assessed patient during hospital course and stated that patient does not need surgical intervention at this time. Counseled patient on ETOH cessation.      Consults:   Consults         Status Ordering Provider     Inpatient consult to General Surgery  Once     Provider:  Robin Ruiz MD    Completed OLYA HURST          Significant Diagnostic Studies:   CT abdomen/pelvis w/ contrast:  Minor left lung base  atelectasis is present.  Liver is grossly normal.  Gallbladder is present in mildly distended.  Small amount of perisplenic fluid is present.  There is ill-defined enlargement of low attenuation of the pancreas particularly prominent involving the pancreatic body and tail region.  The overall appearance is similar to the recent noncontrast CT.  When compared to the older contrast-enhanced CT the extent of the low attenuation is decreased in volume.  Findings are consistent with chronic pancreatic necrosis.  The overall appearance actually appears relatively improved.  There is poorly defined fluid and soft tissue thickening tracking along the renal fascia greater on the left than right extending deep into the left pelvis.  Both kidneys reveal normal enhancement.  Persistent left IVC is noted.  The bowel loops are nondilated.  The appendix in the right lower quadrant is normal.  No simple fluid or ascites.  Lumbar vertebra appear normal.  Prostate gland is enlarged.    Pending Diagnostic Studies:     None        Final Active Diagnoses:    Diagnosis Date Noted POA    PRINCIPAL PROBLEM:  Alcohol-induced acute pancreatitis [K85.20] 06/06/2016 Yes    Type 2 diabetes mellitus with hyperglycemia [E11.65] 08/22/2016 Yes    Essential hypertension [I10] 06/06/2016 Yes     Chronic      Problems Resolved During this Admission:    Diagnosis Date Noted Date Resolved POA    Intractable vomiting with nausea [R11.2] 02/04/2017 02/08/2017 Yes          Discharged Condition: stable    Disposition: Home or Self Care    Follow Up:  Follow-up Information     Follow up with Derrek Donaldson DO.    Specialty:  Family Medicine    Why:  as scheduled on 2/14 at 11:00am    Contact information:    [address] [address] [address]  [phone]          Follow up with Joo Baker MD.    Specialty:  General Surgery    Why:  at [facility][1] on 2/14 at 10:40 am     Contact information:    [address]  [facility]  [address]  [phone]          Patient Instructions:     Diet general     Activity as tolerated     Call MD for:  temperature >100.4     Call MD for:  severe uncontrolled pain     Call MD for:  persistent nausea and vomiting or diarrhea       Medications:  Reconciled Home Medications:   Discharge Medication List as of 2/8/2017  4:21 PM      START taking these medications    Details   tramadol (ULTRAM) 50 mg tablet Take 1 tablet (50 mg total) by mouth every 6 (six) hours as needed for Pain., Starting 2/8/2017, Until Sat 2/18/17, Normal         CONTINUE these medications which have NOT CHANGED    Details   atorvastatin (LIPITOR) 10 MG tablet Take 1 tablet (10 mg total) by mouth once daily., Starting 1/10/2017, Until Discontinued, Normal      furosemide (LASIX) 20 MG tablet Take 1 tablet (20 mg total) by mouth 2 (two) times daily., Starting 1/10/2017, Until Discontinued, Normal      insulin glargine (LANTUS SOLOSTAR) 100 unit/mL (3 mL) InPn pen Inject 40 Units into the skin every evening., Starting 1/10/2017, Until Discontinued, Normal      lisinopril (PRINIVIL,ZESTRIL) 40 MG tablet Take 1 tablet (40 mg total) by mouth once daily., Starting 1/10/2017, Until Discontinued, Normal      pantoprazole (PROTONIX) 20 MG tablet Take 2 tablets (40 mg total) by mouth 2 (two) times daily., Starting 1/10/2017, Until Wed 1/10/18, Normal      spironolactone (ALDACTONE) 50 MG tablet Take 1 tablet (50 mg total) by mouth once daily., Starting 1/10/2017, Until Wed 1/10/18, Normal      verapamil (CALAN-SR) 180 MG CR tablet Take 1 tablet (180 mg total) by mouth once daily., Starting 1/10/2017, Until Discontinued, Normal      blood-glucose meter (CONTOUR METER) Misc Use to check sugar before meals and at bedtime, Print      CONTOUR TEST STRIPS Strp 1 strip by Other route once daily., Starting 1/31/2017, Until Discontinued, Normal      MICROLET LANCET Misc Inject 1 each into the skin every meal as needed., Starting 1/31/2017, Until  "Discontinued, Normal      PEN NEEDLE 31 gauge x 1/4" Ndle Inject 1 each into the skin nightly., Starting 1/10/2017, Until Discontinued, Normal         STOP taking these medications       potassium chloride (MICRO-K) 10 MEQ CpSR Comments:   Reason for Stopping:         hydrocodone-acetaminophen 7.5-325mg (NORCO) 7.5-325 mg per tablet Comments:   Reason for Stopping:             Time spent on the discharge of patient: 30 minutes    Olya Paz MD  Department of Hospital Medicine  [facility]  "

## 2017-02-11 LAB — BACTERIA BLD CULT: NORMAL

## 2017-02-14 ENCOUNTER — OFFICE VISIT (OUTPATIENT)
Dept: SURGERY | Facility: CLINIC | Age: 52
End: 2017-02-14
Payer: MEDICAID

## 2017-02-14 ENCOUNTER — OFFICE VISIT (OUTPATIENT)
Dept: INTERNAL MEDICINE | Facility: CLINIC | Age: 52
End: 2017-02-14
Payer: MEDICAID

## 2017-02-14 VITALS
DIASTOLIC BLOOD PRESSURE: 98 MMHG | TEMPERATURE: 98 F | BODY MASS INDEX: 31.49 KG/M2 | SYSTOLIC BLOOD PRESSURE: 160 MMHG | HEART RATE: 83 BPM | WEIGHT: 195.13 LBS

## 2017-02-14 VITALS
TEMPERATURE: 97 F | OXYGEN SATURATION: 96 % | SYSTOLIC BLOOD PRESSURE: 147 MMHG | HEIGHT: 66 IN | WEIGHT: 194.44 LBS | HEART RATE: 82 BPM | BODY MASS INDEX: 31.25 KG/M2 | RESPIRATION RATE: 16 BRPM | DIASTOLIC BLOOD PRESSURE: 102 MMHG

## 2017-02-14 DIAGNOSIS — Z86.010 HISTORY OF COLON POLYPS: ICD-10-CM

## 2017-02-14 DIAGNOSIS — F10.10 ALCOHOL ABUSE: Chronic | ICD-10-CM

## 2017-02-14 DIAGNOSIS — K86.0 ALCOHOL-INDUCED CHRONIC PANCREATITIS: Primary | ICD-10-CM

## 2017-02-14 DIAGNOSIS — E11.65 TYPE 2 DIABETES MELLITUS WITH HYPERGLYCEMIA, WITH LONG-TERM CURRENT USE OF INSULIN: ICD-10-CM

## 2017-02-14 DIAGNOSIS — Z79.4 TYPE 2 DIABETES MELLITUS WITH HYPERGLYCEMIA, WITH LONG-TERM CURRENT USE OF INSULIN: ICD-10-CM

## 2017-02-14 DIAGNOSIS — E11.59 HYPERTENSION ASSOCIATED WITH DIABETES: ICD-10-CM

## 2017-02-14 DIAGNOSIS — I15.2 HYPERTENSION ASSOCIATED WITH DIABETES: ICD-10-CM

## 2017-02-14 PROCEDURE — 99999 PR PBB SHADOW E&M-EST. PATIENT-LVL III: CPT | Mod: PBBFAC,,, | Performed by: FAMILY MEDICINE

## 2017-02-14 PROCEDURE — 99214 OFFICE O/P EST MOD 30 MIN: CPT | Mod: S$PBB,,, | Performed by: SURGERY

## 2017-02-14 PROCEDURE — 99214 OFFICE O/P EST MOD 30 MIN: CPT | Mod: S$PBB,,, | Performed by: FAMILY MEDICINE

## 2017-02-14 PROCEDURE — 99213 OFFICE O/P EST LOW 20 MIN: CPT | Mod: PBBFAC,27,PO | Performed by: FAMILY MEDICINE

## 2017-02-14 PROCEDURE — 99999 PR PBB SHADOW E&M-EST. PATIENT-LVL III: CPT | Mod: PBBFAC,,, | Performed by: SURGERY

## 2017-02-14 PROCEDURE — 90715 TDAP VACCINE 7 YRS/> IM: CPT | Mod: PBBFAC,PO | Performed by: FAMILY MEDICINE

## 2017-02-14 PROCEDURE — 90471 IMMUNIZATION ADMIN: CPT | Mod: PBBFAC,PO | Performed by: FAMILY MEDICINE

## 2017-02-14 NOTE — LETTER
February 14, 2017      BLANCHE Pruitt,FNP-C  [address]  [place] [address]           [facility]  [address]  [place] [address]  Phone: [phone]          Patient: Jorgito Arreaga   MR Number: [id number]   YOB: 1965   Date of Visit: 2/14/2017       Dear Pilar Almonte:    Thank you for referring Jorgito Arreaga to me for evaluation. Attached you will find relevant portions of my assessment and plan of care.    If you have questions, please do not hesitate to call me. I look forward to following Jorgito Arreaga along with you.    Sincerely,    Joo Baker MD    Enclosure  CC:  No Recipients    If you would like to receive this communication electronically, please contact [email or web address] or [phone] to request more information on [facility] access.    For providers and/or their staff who would like to refer a patient to [facility], please contact us through our one-stop-shop provider referral line, Moises Vora, at [phone].    If you feel you have received this communication in error or would no longer like to receive these types of communications, please e-mail [email or web address]

## 2017-02-14 NOTE — PROGRESS NOTES
"Subjective:       Patient ID: Jorgito Arreaga is a 51 y.o. male.    Chief Complaint: Follow-up (hosptial follow up pancreatitis)    HPI     52 yo AA M presenting alone for follow-up from recent hospitalization for pancreatitis (admitted 2/4 and discharged 2/8).  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  Pt reports recent hospitalization was due to ETOH induced pancreatitis - his second admission within 6 months for same complaint.  Reports drinking 1/2 pint of vodka followed by beer daily.   Today, he denies abdominal pain and states he is feeling better.  He reports drinking some ETOH since discharge, but believes he can stop drinking on his own.  He reports recent compliance with RX medications and started monitoring his blood sugar again now that he has insurance.  His wife works as an ED tech for [facility].      Family History   Problem Relation Age of Onset    Hypertension Mother     Hypertension Father      Current Outpatient Prescriptions on File Prior to Visit   Medication Sig Dispense Refill    atorvastatin (LIPITOR) 10 MG tablet Take 1 tablet (10 mg total) by mouth once daily. 30 tablet 6    blood-glucose meter (CONTOUR METER) Misc Use to check sugar before meals and at bedtime 1 each 0    CONTOUR TEST STRIPS Strp 1 strip by Other route once daily. 100 each 11    furosemide (LASIX) 20 MG tablet Take 1 tablet (20 mg total) by mouth 2 (two) times daily. 60 tablet 1    insulin glargine (LANTUS SOLOSTAR) 100 unit/mL (3 mL) InPn pen Inject 40 Units into the skin every evening. 1 Box 3    lisinopril (PRINIVIL,ZESTRIL) 40 MG tablet Take 1 tablet (40 mg total) by mouth once daily. 30 tablet 0    MICROLET LANCET Misc Inject 1 each into the skin every meal as needed. 100 each 5    pantoprazole (PROTONIX) 20 MG tablet Take 2 tablets (40 mg total) by mouth 2 (two) times daily. 60 tablet 0    PEN NEEDLE 31 gauge x 1/4" Ndle Inject 1 each into the skin nightly. 100 " "each 3    spironolactone (ALDACTONE) 50 MG tablet Take 1 tablet (50 mg total) by mouth once daily. 90 tablet 3    tramadol (ULTRAM) 50 mg tablet Take 1 tablet (50 mg total) by mouth every 6 (six) hours as needed for Pain. 15 tablet 0    verapamil (CALAN-SR) 180 MG CR tablet Take 1 tablet (180 mg total) by mouth once daily. 30 tablet 1     No current facility-administered medications on file prior to visit.        Review of Systems   Constitutional: Negative for fatigue and fever.   HENT: Negative for ear pain and sore throat.    Respiratory: Negative for shortness of breath.    Cardiovascular: Negative for chest pain and leg swelling.   Gastrointestinal: Negative for abdominal pain, nausea and vomiting.   Musculoskeletal: Negative for back pain.   Neurological: Negative for dizziness, light-headedness and headaches.       Objective:     Visit Vitals    BP (!) 147/102 (BP Location: Left arm, Patient Position: Sitting, BP Method: Automatic)    Pulse 82    Temp 96.7 °F (35.9 °C) (Tympanic)    Resp 16    Ht 5' 6" (1.676 m)    Wt 88.2 kg (194 lb 7.1 oz)    SpO2 96%    BMI 31.38 kg/m2        Physical Exam   Constitutional: He is oriented to person, place, and time. He appears well-developed and well-nourished. No distress.   HENT:   Head: Normocephalic and atraumatic.   Right Ear: External ear normal.   Left Ear: External ear normal.   Nose: Nose normal.   Mouth/Throat: Oropharynx is clear and moist. No oropharyngeal exudate.   Eyes: Conjunctivae are normal. Right eye exhibits no discharge. Left eye exhibits no discharge.   Neck: Neck supple. No tracheal deviation present. No thyromegaly present.   Cardiovascular: Normal rate, regular rhythm, normal heart sounds and intact distal pulses.    No murmur heard.  Pulmonary/Chest: Effort normal. No respiratory distress. He has no wheezes.   Abdominal: Soft. Bowel sounds are normal. He exhibits no distension and no mass. There is no tenderness.   Musculoskeletal: " Normal range of motion. He exhibits no tenderness.   Neurological: He is alert and oriented to person, place, and time.   Skin: Skin is warm and dry. He is not diaphoretic.   Psychiatric: He has a normal mood and affect. His behavior is normal. Judgment and thought content normal.       Assessment & Plan     Hypertension: trending down, recently became more compliant with medications. Will continue to monitor and have pt come back for a re-check in 4 weeks.      Diabetes: recent HgBAIC indicates tight control.  Now has strips/monitor for testing so will start regular testing.  Continue 40 units Lantus every evening for now.      Hypercholesterolemia: recently started back on Lipitor. Will continue to monitor.    Recurrent pancreatitis: Stop drinking alcohol    Tetanus today.    Schedule colonoscopy and eye exam.

## 2017-02-14 NOTE — PROGRESS NOTES
Duane is 51-year-old -American male patient who is being seen for the evaluation of known abdominal pain due to chronic pancreatitis.  The patient has long history of alcohol abuse and is continued to drink at this time.  The problem with the particular patient is that he drinks intermittently and has exacerbation of pancreatitis.  He was seen by an emergency room physician several days ago and had completed abdominal CT scan.  The CT scan showed findings consistent with the necrosis and severe chronic pancreatitis with small pseudocyst as well.  The patient has been referred for surgical evaluation.  The physical exam is unremarkable.  He denies of any nausea and vomiting.  He denies of any standing chronic abdominal pain.  I have personally reviewed all the abdominal CT images with the patient present.  All the anatomical landmarks were covered.  The CT scan showed diffuse phlegmon was pancreatitis along with possible necrosis as well as pseudocyst at the tail of the pancreas.  I have spoken with the patient about the pathogenesis of alcohol-induced pancreatitis as well as the morbidity and mortality of the pancreatitis.  The patient was strongly recommended to avoid alcohol at all cost.  At this time, the patient will not require any surgical intervention unless the phlegmons are infected.  Total time approximately half an hour was spent for this patient, and more than 50% of that was spent for treatment planning and counseling.    Joo Baker

## 2017-02-14 NOTE — MEDICAL/APP STUDENT
"Subjective:       Patient ID: Jorgito Arreaga is a 51 y.o. male.    Chief Complaint: Follow-up (hosptial follow up pancreatitis)    HPI     50 yo AA M presenting alone for follow-up from recent hospitalization for pancreatitis (admitted 2/4 and discharged 2/8).  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  Pt reports recent hospitalization was due to ETOH induced pancreatitis - his second admission within 6 months for same complaint.  Reports drinking 1/2 pint of vodka followed by beer daily.   Today, he denies abdominal pain and states he is feeling better.  He reports drinking some ETOH since discharge, but believes he can stop drinking on his own.  He reports recent compliance with RX medications and started monitoring his blood sugar again now that he has insurance.  His wife works as an ED tech for [employer].      Family History   Problem Relation Age of Onset    Hypertension Mother     Hypertension Father      Current Outpatient Prescriptions on File Prior to Visit   Medication Sig Dispense Refill    atorvastatin (LIPITOR) 10 MG tablet Take 1 tablet (10 mg total) by mouth once daily. 30 tablet 6    blood-glucose meter (CONTOUR METER) Misc Use to check sugar before meals and at bedtime 1 each 0    CONTOUR TEST STRIPS Strp 1 strip by Other route once daily. 100 each 11    furosemide (LASIX) 20 MG tablet Take 1 tablet (20 mg total) by mouth 2 (two) times daily. 60 tablet 1    insulin glargine (LANTUS SOLOSTAR) 100 unit/mL (3 mL) InPn pen Inject 40 Units into the skin every evening. 1 Box 3    lisinopril (PRINIVIL,ZESTRIL) 40 MG tablet Take 1 tablet (40 mg total) by mouth once daily. 30 tablet 0    MICROLET LANCET Misc Inject 1 each into the skin every meal as needed. 100 each 5    pantoprazole (PROTONIX) 20 MG tablet Take 2 tablets (40 mg total) by mouth 2 (two) times daily. 60 tablet 0    PEN NEEDLE 31 gauge x 1/4" Ndle Inject 1 each into the skin nightly. 100 " "each 3    spironolactone (ALDACTONE) 50 MG tablet Take 1 tablet (50 mg total) by mouth once daily. 90 tablet 3    tramadol (ULTRAM) 50 mg tablet Take 1 tablet (50 mg total) by mouth every 6 (six) hours as needed for Pain. 15 tablet 0    verapamil (CALAN-SR) 180 MG CR tablet Take 1 tablet (180 mg total) by mouth once daily. 30 tablet 1     No current facility-administered medications on file prior to visit.        Review of Systems   Constitutional: Negative for fatigue and fever.   HENT: Negative for ear pain and sore throat.    Respiratory: Negative for shortness of breath.    Cardiovascular: Negative for chest pain and leg swelling.   Gastrointestinal: Negative for abdominal pain, nausea and vomiting.   Musculoskeletal: Negative for back pain.   Neurological: Negative for dizziness, light-headedness and headaches.       Objective:     Visit Vitals    BP (!) 147/102 (BP Location: Left arm, Patient Position: Sitting, BP Method: Automatic)    Pulse 82    Temp 96.7 °F (35.9 °C) (Tympanic)    Resp 16    Ht 5' 6" (1.676 m)    Wt 88.2 kg (194 lb 7.1 oz)    SpO2 96%    BMI 31.38 kg/m2        Physical Exam   Constitutional: He is oriented to person, place, and time. He appears well-developed and well-nourished. No distress.   HENT:   Head: Normocephalic and atraumatic.   Right Ear: External ear normal.   Left Ear: External ear normal.   Nose: Nose normal.   Mouth/Throat: Oropharynx is clear and moist. No oropharyngeal exudate.   Eyes: Conjunctivae are normal. Right eye exhibits no discharge. Left eye exhibits no discharge.   Neck: Neck supple. No tracheal deviation present. No thyromegaly present.   Cardiovascular: Normal rate, regular rhythm, normal heart sounds and intact distal pulses.    No murmur heard.  Pulmonary/Chest: Effort normal. No respiratory distress. He has no wheezes.   Abdominal: Soft. Bowel sounds are normal. He exhibits no distension and no mass. There is no tenderness.   Musculoskeletal: " Normal range of motion. He exhibits edema. He exhibits no tenderness.   Neurological: He is alert and oriented to person, place, and time.   Skin: Skin is warm and dry. He is not diaphoretic.   Psychiatric: He has a normal mood and affect. His behavior is normal. Judgment and thought content normal.       Assessment & Plan     Hypertension: trending down, recently became more compliant with medications. Will continue to monitor and have pt come back for a re-check in 4 weeks.      Diabetes: recent HgBAIC indicates tight control.  Now has strips/monitor for testing so will start regular testing.  Continue 40 units Lantus every evening for now.      Hypercholesterolemia: recently started back on Lipitor. Will continue to monitor.    Stop drinking alcohol    Tetanus today.    Schedule colonoscopy and eye exam.

## 2017-02-14 NOTE — MR AVS SNAPSHOT
[facility]  [address] [address] [address]  Phone: [phone]                  Jorgito Arreaga   2017 11:00 AM   Office Visit    Description:  Male : 1965   Provider:  Derrek Donaldson DO   Department:  [facility]           Reason for Visit     Follow-up           Diagnoses this Visit        Comments    Essential hypertension    -  Primary     Recurrent pancreatitis         Alcohol abuse         Type 2 diabetes mellitus with hyperglycemia, with long-term current use of insulin         History of colon polyps                To Do List           Future Appointments        Provider Department Dept Phone    3/14/2017 10:00 AM BLANCHE Pruitt,FNP-C [facility] [phone]      Goals (5 Years of Data)     None      Follow-Up and Disposition     Return in about 4 weeks (around 3/14/2017) for Blood pressure monitoring.      [facility]     [facility] Nurse Care Line - / Assistance  Registered nurses in the [facility] provide clinical advisement, health education, appointment booking, and other advisory services.  Call for this free service at [phone].             Medications           Message regarding Medications     Verify the changes and/or additions to your medication regime listed below are the same as discussed with your clinician today.  If any of these changes or additions are incorrect, please notify your healthcare provider.             Verify that the below list of medications is an accurate representation of the medications you are currently taking.  If none reported, the list may be blank. If incorrect, please contact your healthcare provider. Carry this list with you in case of emergency.           Current Medications     atorvastatin (LIPITOR) 10 MG tablet Take 1 tablet (10 mg total) by mouth once daily.    blood-glucose meter (CONTOUR METER) Misc Use to check sugar before meals and  "at bedtime    CONTOUR TEST STRIPS Strp 1 strip by Other route once daily.    furosemide (LASIX) 20 MG tablet Take 1 tablet (20 mg total) by mouth 2 (two) times daily.    insulin glargine (LANTUS SOLOSTAR) 100 unit/mL (3 mL) InPn pen Inject 40 Units into the skin every evening.    lisinopril (PRINIVIL,ZESTRIL) 40 MG tablet Take 1 tablet (40 mg total) by mouth once daily.    MICROLET LANCET Misc Inject 1 each into the skin every meal as needed.    pantoprazole (PROTONIX) 20 MG tablet Take 2 tablets (40 mg total) by mouth 2 (two) times daily.    PEN NEEDLE 31 gauge x 1/4" Ndle Inject 1 each into the skin nightly.    spironolactone (ALDACTONE) 50 MG tablet Take 1 tablet (50 mg total) by mouth once daily.    tramadol (ULTRAM) 50 mg tablet Take 1 tablet (50 mg total) by mouth every 6 (six) hours as needed for Pain.    verapamil (CALAN-SR) 180 MG CR tablet Take 1 tablet (180 mg total) by mouth once daily.           Clinical Reference Information           Your Vitals Were     BP Pulse Temp Resp    147/102 (BP Location: Left arm, Patient Position: Sitting, BP Method: Automatic) 82 96.7 °F (35.9 °C) (Tympanic) 16    Height Weight SpO2 BMI    5' 6" (1.676 m) 88.2 kg (194 lb 7.1 oz) 96% 31.38 kg/m2      Blood Pressure          Most Recent Value    BP  (!)  147/102      Allergies as of 2/14/2017     No Known Allergies      Immunizations Administered on Date of Encounter - 2/14/2017     Name Date Dose VIS Date Route    TDAP 2/14/2017 0.5 mL 2/24/2015 Intramuscular      Orders Placed During Today's Visit      Normal Orders This Visit    Ambulatory referral to Ophthalmology     Case request GI: COLONOSCOPY     Tdap Vaccine       Language Assistance Services     ATTENTION: Language assistance services are available, free of charge. Please call [phone].      ATENCIÓN: Si habla carlos, tiene a white disposición servicios gratuitos de asistencia lingüística. Llame al [phone].     CHÚ Ý: N?u b?n nói Ti?ng Vi?t, có các d?ch " v? h? tr? ngôn ng? mi?n phí malinah cho b?n. G?i s? [phone].         [facility] complies with applicable Federal civil rights laws and does not discriminate on the basis of race, color, national origin, age, disability, or sex.

## 2017-02-24 ENCOUNTER — TELEPHONE (OUTPATIENT)
Dept: INTERNAL MEDICINE | Facility: CLINIC | Age: 52
End: 2017-02-24

## 2017-02-24 DIAGNOSIS — K21.00 GASTROESOPHAGEAL REFLUX DISEASE WITH ESOPHAGITIS: Chronic | ICD-10-CM

## 2017-02-24 RX ORDER — PANTOPRAZOLE SODIUM 40 MG/1
40 TABLET, DELAYED RELEASE ORAL DAILY
Qty: 30 TABLET | Refills: 5 | Status: SHIPPED | OUTPATIENT
Start: 2017-02-24 | End: 2017-09-15 | Stop reason: SDUPTHER

## 2017-02-24 NOTE — TELEPHONE ENCOUNTER
Patient's insurance isn't covering the protonix, is there something else that can be sent to the pharmacy. The pharmacist stated that if it is written take 1 40 mg tablet daily dispense 30 then the insurance will cover it. Please review and advise.

## 2017-03-01 ENCOUNTER — PATIENT OUTREACH (OUTPATIENT)
Dept: ADMINISTRATIVE | Facility: HOSPITAL | Age: 52
End: 2017-03-01

## 2017-03-01 NOTE — LETTER
March 1, 2017    Jorgito Arreaga  [address] [facility] [address]             [facility]  [address]  [facility] [address]  Phone: [phone] Dear Mr. Arreaga:    [facility] is committed to your overall health.  To help you get the most out of each of your visits, we will review your information to make sure you are up to date on all of your recommended tests and/or procedures.      Derrek Donaldson DO has found that you may be due for   Health Maintenance Due   Topic    Eye Exam     Pneumococcal PPSV23 (Medium Risk) (1)    Colonoscopy         If you have had any of the above done at another facility, please bring the records or information with you so that your record at [facility] will be complete.    If you are currently taking medication, please bring it with you to your appointment for review.    We will be happy to assist you with scheduling any necessary appointments or you may contact the [facility] appointment desk at [phone] to schedule at your convenience.     Thank you for choosing [facility] for your healthcare needs,            If you have any questions or concerns, please don't hesitate to call.    Sincerely,  Randi GRAHAM LPN Care Coordinator  [facility]

## 2017-03-14 ENCOUNTER — OFFICE VISIT (OUTPATIENT)
Dept: INTERNAL MEDICINE | Facility: CLINIC | Age: 52
End: 2017-03-14
Payer: MEDICAID

## 2017-03-14 ENCOUNTER — PATIENT OUTREACH (OUTPATIENT)
Dept: ADMINISTRATIVE | Facility: HOSPITAL | Age: 52
End: 2017-03-14
Payer: MEDICAID

## 2017-03-14 VITALS
TEMPERATURE: 96 F | HEART RATE: 90 BPM | HEIGHT: 66 IN | DIASTOLIC BLOOD PRESSURE: 103 MMHG | BODY MASS INDEX: 31.78 KG/M2 | WEIGHT: 197.75 LBS | RESPIRATION RATE: 18 BRPM | OXYGEN SATURATION: 95 % | SYSTOLIC BLOOD PRESSURE: 141 MMHG

## 2017-03-14 DIAGNOSIS — I15.2 HYPERTENSION ASSOCIATED WITH DIABETES: ICD-10-CM

## 2017-03-14 DIAGNOSIS — R60.9 EDEMA, UNSPECIFIED TYPE: ICD-10-CM

## 2017-03-14 DIAGNOSIS — Z79.4 TYPE 2 DIABETES MELLITUS WITH HYPERGLYCEMIA, WITH LONG-TERM CURRENT USE OF INSULIN: Primary | ICD-10-CM

## 2017-03-14 DIAGNOSIS — E11.65 TYPE 2 DIABETES MELLITUS WITH HYPERGLYCEMIA, WITH LONG-TERM CURRENT USE OF INSULIN: Primary | ICD-10-CM

## 2017-03-14 DIAGNOSIS — E11.59 HYPERTENSION ASSOCIATED WITH DIABETES: ICD-10-CM

## 2017-03-14 DIAGNOSIS — K63.5 POLYP OF COLON, UNSPECIFIED PART OF COLON, UNSPECIFIED TYPE: ICD-10-CM

## 2017-03-14 PROCEDURE — 99214 OFFICE O/P EST MOD 30 MIN: CPT | Mod: PBBFAC,PO | Performed by: NURSE PRACTITIONER

## 2017-03-14 PROCEDURE — 99214 OFFICE O/P EST MOD 30 MIN: CPT | Mod: S$PBB,,, | Performed by: NURSE PRACTITIONER

## 2017-03-14 PROCEDURE — 99999 PR PBB SHADOW E&M-EST. PATIENT-LVL IV: CPT | Mod: PBBFAC,,, | Performed by: NURSE PRACTITIONER

## 2017-03-14 RX ORDER — VERAPAMIL HYDROCHLORIDE 240 MG/1
240 TABLET, FILM COATED, EXTENDED RELEASE ORAL DAILY
Qty: 30 TABLET | Refills: 1 | Status: SHIPPED | OUTPATIENT
Start: 2017-03-14 | End: 2017-05-24 | Stop reason: SDUPTHER

## 2017-03-14 RX ORDER — ASPIRIN 81 MG/1
81 TABLET ORAL DAILY
COMMUNITY
End: 2017-10-18

## 2017-03-14 RX ORDER — BLOOD SUGAR DIAGNOSTIC
1 STRIP MISCELLANEOUS 3 TIMES DAILY
Qty: 100 EACH | Refills: 11 | Status: SHIPPED | OUTPATIENT
Start: 2017-03-14 | End: 2017-03-15

## 2017-03-14 RX ORDER — INSULIN GLARGINE 100 [IU]/ML
45 INJECTION, SOLUTION SUBCUTANEOUS NIGHTLY
Qty: 1 BOX | Refills: 3 | Status: SHIPPED | OUTPATIENT
Start: 2017-03-14 | End: 2017-08-07

## 2017-03-14 NOTE — MR AVS SNAPSHOT
[facility]  [address] [address]  [place] [address]  Phone: [phone]                  Jorgito Arreaga   3/14/2017 10:00 AM   Office Visit    Description:  Male : 1965   Provider:  BLANCHE Pruitt FNP-C   Department:  [place] [facility]           Reason for Visit     Follow-up           Diagnoses this Visit        Comments    Type 2 diabetes mellitus with hyperglycemia, with long-term current use of insulin    -  Primary     Edema, unspecified type         Hypertension associated with diabetes         Recurrent pancreatitis         Essential hypertension         Type 2 diabetes mellitus without complication, with long-term current use of insulin         Polyp of colon, unspecified part of colon, unspecified type                To Do List           Future Appointments        Provider Department Dept Phone    3/28/2017 10:20 AM BLANCHE Pruitt,AMANDA [facility] [phone]      Goals (5 Years of Data)     None       These Medications        Disp Refills Start End    verapamil (CALAN-SR) 240 MG CR tablet 30 tablet 1 3/14/2017     Take 1 tablet (240 mg total) by mouth once daily. - Oral    Pharmacy: [facility] [place] [place] [facility] Ph #: [phone]       CONTOUR TEST STRIPS Strp 100 each 11 3/14/2017     1 strip by Other route 3 (three) times daily. - Other    Pharmacy: [facility] [place] [place] [facility] Ph #: [phone]       insulin glargine (LANTUS SOLOSTAR) 100 unit/mL (3 mL) InPn pen 1 Box 3 3/14/2017     Inject 45 Units into the skin every evening. - Subcutaneous    [facility] [id number] Ph #: [phone]         [facility]     [facility] On [facility] Nurse Care Line -  Assistance  Registered nurses in the [facility] provide clinical advisement, health education, appointment booking, and other  advisory services.  Call for this free service at [phone].             Medications           Message regarding Medications     Verify the changes and/or additions to your medication regime listed below are the same as discussed with your clinician today.  If any of these changes or additions are incorrect, please notify your healthcare provider.        CHANGE how you are taking these medications     Start Taking Instead of    verapamil (CALAN-SR) 240 MG CR tablet verapamil (CALAN-SR) 180 MG CR tablet    Dosage:  Take 1 tablet (240 mg total) by mouth once daily. Dosage:  Take 1 tablet (180 mg total) by mouth once daily.    Reason for Change:  Reorder     CONTOUR TEST STRIPS Strp CONTOUR TEST STRIPS Strp    Dosage:  1 strip by Other route 3 (three) times daily. Dosage:  1 strip by Other route once daily.    Reason for Change:  Reorder     insulin glargine (LANTUS SOLOSTAR) 100 unit/mL (3 mL) InPn pen insulin glargine (LANTUS SOLOSTAR) 100 unit/mL (3 mL) InPn pen    Dosage:  Inject 45 Units into the skin every evening. Dosage:  Inject 40 Units into the skin every evening.    Reason for Change:  Reorder            Verify that the below list of medications is an accurate representation of the medications you are currently taking.  If none reported, the list may be blank. If incorrect, please contact your healthcare provider. Carry this list with you in case of emergency.           Current Medications     aspirin (ECOTRIN) 81 MG EC tablet Take 81 mg by mouth once daily.    atorvastatin (LIPITOR) 10 MG tablet Take 1 tablet (10 mg total) by mouth once daily.    blood-glucose meter (CONTOUR METER) Misc Use to check sugar before meals and at bedtime    CONTOUR TEST STRIPS Strp 1 strip by Other route 3 (three) times daily.    furosemide (LASIX) 20 MG tablet Take 1 tablet (20 mg total) by mouth 2 (two) times daily.    insulin glargine (LANTUS SOLOSTAR) 100 unit/mL (3 mL) InPn pen Inject 45 Units into the skin every  "evening.    lisinopril (PRINIVIL,ZESTRIL) 40 MG tablet Take 1 tablet (40 mg total) by mouth once daily.    MICROLET LANCET Misc Inject 1 each into the skin every meal as needed.    pantoprazole (PROTONIX) 40 MG tablet Take 1 tablet (40 mg total) by mouth once daily.    PEN NEEDLE 31 gauge x 1/4" Ndle Inject 1 each into the skin nightly.    spironolactone (ALDACTONE) 50 MG tablet Take 1 tablet (50 mg total) by mouth once daily.    verapamil (CALAN-SR) 240 MG CR tablet Take 1 tablet (240 mg total) by mouth once daily.           Clinical Reference Information           Your Vitals Were     BP Pulse Temp Resp Height Weight    141/103 (BP Location: Left arm, Patient Position: Sitting, BP Method: Automatic) 90 96 °F (35.6 °C) (Tympanic) 18 5' 6" (1.676 m) 89.7 kg (197 lb 12 oz)    SpO2 BMI             95% 31.92 kg/m2         Blood Pressure          Most Recent Value    BP  (!)  141/103      Allergies as of 3/14/2017     No Known Allergies      Immunizations Administered on Date of Encounter - 3/14/2017     None      Orders Placed During Today's Visit      Normal Orders This Visit    Case request GI: COLONOSCOPY       Instructions      Hypoglycemia (Low Blood Sugar)     Fast-acting sugar includes a cup of nonfat milk.     Too little sugar (glucose) in your blood is called hypoglycemia or low blood sugar. Low blood sugar usually means anything lower than 70 mg/dL. Talk with your healthcare provider about your target range and what level is too low for you. Diabetes itself doesnt cause low blood sugar. But some of the treatments for diabetes, such as pills or insulin, may raise your risk for it. Low blood sugar may cause you to pass out or have a seizure. So always treat low blood sugar right away, but don't overeat.  Special note: Always carry a source of fast-acting sugar and a snack in case of hypoglycemia.   What you may notice  If you have low blood sugar, you may have one or more of these symptoms:  · Shakiness or " dizziness  · Cold, clammy skin or sweating  · Feelings of hunger  · Headache  · Nervousness  · A hard, fast heartbeat  · Weakness  · Confusion or irritability  · Blurred vision  · Having nightmares or waking up confused or sweating  · Numbness or tingling in the lips or tongue  What you should do  Here are tips to follow if you have hypoglycemia:   · First check your blood sugar. If it is too low (out of your target range), eat or drink 15 to 20 grams of fast-acting sugar. This may be 3 to 4 glucose tablets, 4 ounces (half a cup) of fruit juice or regular (nondiet) soda, 8 ounces (1 cup) of fat-free milk, or 1 tablespoon of honey. Dont take more than this, or your blood sugar may go too high.  · Wait 15 minutes. Then recheck your blood sugar if you can.  · If your blood sugar is still too low, repeat the steps above and check your blood sugar again. If your blood sugar still has not returned to your target range, contact your healthcare provider or seek emergency care.  · Once your blood sugar returns to target range, eat a snack or meal.  Preventing low blood sugar  Things you can do include the following:   · If your condition needs a strict treatment plan, eat your meals and snacks at the same times each day. Dont skip meals!  · If your treatment plan lets you change when you eat and what you eat, learn how to change the time and dose of your rapid-acting insulin to match this.   · Ask your healthcare provider if it is safe for you to drink alcohol. Never drink on an empty stomach.  · Take your medicine at the prescribed times.  · Always carry a source of fast-acting sugar and a snack when youre away from home.  Other things to do  Additional tips include the following:  · Carry a medical ID card, a compact USB drive, or wear a medical alert bracelet or necklace. It should say that you have diabetes. It should also say what to do if you pass out or have a seizure.  · Make sure your family, friends, and  coworkers know the signs of low blood sugar. Tell them what to do if your blood sugar falls very low and you cant treat yourself.  · Keep a glucagon emergency kit handy. Be sure your family, friends, and coworkers know how and when to use it. Check it regularly and replace the glucagon before it expires.  · Talk with your health care team about other things you can do to prevent low blood sugar.     If you have unexplained hypoglycemia or hypoglycemia several times, call your healthcare provider.   Date Last Reviewed: 5/1/2016 © 2000-2016 [employer]. [address] [place] [address]. All rights reserved. This information is not intended as a substitute for professional medical care. Always follow your healthcare professional's instructions.        Low-Salt Choices  Eating salt (sodium) can make your body retain too much water. Excess water makes your heart work harder. Canned, packaged, and frozen foods are easy to prepare, but they are often high in sodium. Here are some ideas for low-salt foods you can easily prepare yourself.    For breakfast  · Fruit or 100% fruit juice  · Whole-wheat bread or an English muffin. Compare sodium content on labels.  · Low-fat milk or yogurt  · Unsalted eggs  · Shredded wheat  · Corn tortillas  · Unsalted steamed rice  · Regular (not instant) hot cereal, made without salt  Stay away from:  · Sausage, steven, and ham  · Flour tortillas  · Packaged muffins, pancakes, and biscuits  · Instant hot cereals  · Cottage cheese  For lunch and dinner  · Fresh fish, chicken, turkey, or meat--baked, broiled, or roasted without salt  · Dry beans, cooked without salt  · Tofu, stir-fried without salt  · Unsalted fresh fruit and vegetables, or frozen or canned fruit and vegetables with no added salt  Stay away from:  · Lunch or deli meat that is cured or smoked  · Cheese  · Tomato juice and catsup  · Canned vegetables, soups, and fish not labeled as no-salt-added or reduced  sodium  · Packaged gravies and sauces  · Olives, pickles, and relish  · Bottled salad dressings  For snacks and desserts  · Yogurt  · Unsalted, air popped popcorn  · Unsalted nuts or seeds  Stay away from:  · Pies and cakes  · Packaged dessert mixes  · Pizza  · Canned and packaged puddings  · Pretzels, chips, crackers, and nuts--unless the label says unsalted  Date Last Reviewed: 6/17/2015  © 8295-9108 [employer]. [address] [place] [address]. All rights reserved. This information is not intended as a substitute for professional medical care. Always follow your healthcare professional's instructions.        Eating Heart-Healthy Food: Using the DASH Plan    Eating for your heart doesnt have to be hard or boring. You just need to know how to make healthier choices. The DASH eating plan has been developed to help you do just that. DASH stands for Dietary Approaches to Stop Hypertension. It is a plan that has been proven to be healthier for your heart and to lower your risk for high blood pressure. It can also help lower your risk for cancer, heart disease, osteoporosis, and diabetes.  Choosing from each food group  Choose foods from each of the food groups below each day. Try to get the recommended number of servings for each food group. The serving numbers are based on a diet of 2,000 calories a day. Talk to your doctor if youre unsure about your calorie needs. Along with getting the correct servings, the DASH plan also recommends a sodium intake less than 2,300 mg per day.        Grains  Servings: 6 to 8 a day  A serving is:  · 1 slice bread  · 1 ounce dry cereal  · Half a cup cooked rice, pasta or cereal  Best choices: Whole grains and any grains high in fiber. Vegetables  Servings: 4 to 5 a day  A serving is:  · 1 cup raw leafy vegetable  · Half a cup cut-up raw or cooked vegetable  · Half a cup vegetable juice  Best choices: Fresh or frozen vegetables prepared without added salt or  fat.   Fruits  Servings: 4 to 5 a day  A serving is:  · 1 medium fruit  · One-quarter cup dried fruit  · Half a cup fresh, frozen, or canned fruit  · Half a cup of 100% fruit juices  Best choices: A variety of fresh fruits of different colors. Whole fruits are a better choice than fruit juices. Low-fat or fat-free dairy  Servings: 2 to 3 a day  A serving is:  · 1 cup milk  · 1 cup yogurt  · One and a half ounces cheese  Best choices: Skim or 1% milk, low-fat or fat-free yogurt or buttermilk, and low-fat cheeses.         Lean meats, poultry, fish  Servings: 6 or fewer a day  A serving is:  · 1 ounce cooked meats, poultry, or fish  · 1 egg  Best choices: Lean poultry and fish. Trim away visible fat. Broil, grill, roast, or boil instead of frying. Remove skin from poultry before eating. Limit how much red meat you eat.  Nuts, seeds, beans  Servings: 4 to 5 a week  A serving is:  · One-third cup nuts (one and a half ounces)  · 2 tablespoons nut butter or seeds  · Half a cup cooked dry beans or legumes  Best choices: Dry roasted nuts with no salt added, lentils, kidney beans, garbanzo beans, and whole babin beans.   Fats and oils  Servings: 2 to 3 a day  A serving is:  · 1 teaspoon vegetable oil  · 1 teaspoon soft margarine  · 1 tablespoon mayonnaise  · 2 tablespoons salad dressing  Best choices: Nut and vegetable oils (nontropical vegetable oils), such as olive and canola oil. Sweets  Servings: 5 a week or fewer  A serving is:  · 1 tablespoon sugar, maple syrup, or honey  · 1 tablespoon jam or jelly  · 1 half-ounce jelly beans (about 15)  · 1 cup lemonade  Best choices: Dried fruit can be a satisfying sweet. Choose low-fat sweets. And watch your serving sizes!      For more on the DASH eating plan, visit:  www.nhlbi.nih.gov/health/health-topics/topics/dash   Date Last Reviewed: 6/1/2016  © 8189-0992 [employer]. [address] [place] [address], [place] [address]. All rights reserved. This information is not  intended as a substitute for professional medical care. Always follow your healthcare professional's instructions.             Language Assistance Services     ATTENTION: Language assistance services are available, free of charge. Please call [phone].      ATENCIÓN: Si sandra gonzalez, tiene a white disposición servicios gratuitos de asistencia lingüística. Llame al [phone].     [facility] Ý: N?u b?n nói Ti?ng Vi?t, có các d?ch v? h? tr? ngôn ng? mi?n phí dành cho b?n. G?i s? [phone].         [facility] complies with applicable Federal civil rights laws and does not discriminate on the basis of race, color, national origin, age, disability, or sex.

## 2017-03-14 NOTE — PROGRESS NOTES
Subjective:       Patient ID: Jorgito Arreaga is a 51 y.o. male.    Chief Complaint: Follow-up    Hypertension   Associated symptoms include peripheral edema (relieved with lasix). Pertinent negatives include no anxiety, blurred vision, chest pain, headaches, malaise/fatigue, palpitations, PND, shortness of breath or sweats. There are no associated agents to hypertension. Risk factors for coronary artery disease include dyslipidemia, family history, diabetes mellitus, male gender, obesity, sedentary lifestyle and stress. Past treatments include ACE inhibitors, calcium channel blockers and diuretics. The current treatment provides moderate improvement. Compliance problems include diet, exercise and psychosocial issues (ETOH abuse).  There is no history of kidney disease, CAD/MI, heart failure, PVD or retinopathy.   Diabetes   He presents for his follow-up diabetic visit. He has type 2 diabetes mellitus. No MedicAlert identification noted. His disease course has been stable. There are no hypoglycemic associated symptoms. Pertinent negatives for hypoglycemia include no dizziness, headaches, nervousness/anxiousness or sweats. Pertinent negatives for diabetes include no blurred vision, no chest pain, no fatigue, no polydipsia, no polyphagia, no polyuria, no visual change and no weakness. There are no hypoglycemic complications. Symptoms are stable. Pertinent negatives for diabetic complications include no heart disease, nephropathy, PVD or retinopathy. Current diabetic treatment includes insulin injections and oral agent (monotherapy). He is compliant with treatment most of the time. His weight is stable. Diabetic current diet: mixed, some days healthy/ low sodium and some days unhealthy. When asked about meal planning, he reported none. He has not had a previous visit with a dietitian. He participates in exercise three times a week (walking). There is no change in his home blood glucose trend. His breakfast blood  glucose range is generally 180-200 mg/dl. His highest blood glucose is >200 mg/dl. An ACE inhibitor/angiotensin II receptor blocker is being taken. He does not see a podiatrist.Eye exam is not current (plans to schedule with [facility] soon).     Review of Systems   Constitutional: Negative for appetite change, chills, fatigue, malaise/fatigue and unexpected weight change.   Eyes: Negative for blurred vision.   Respiratory: Negative for shortness of breath.    Cardiovascular: Negative for chest pain, palpitations and PND.   Gastrointestinal: Negative.    Endocrine: Negative for polydipsia, polyphagia and polyuria.   Genitourinary: Negative.    Musculoskeletal: Negative for arthralgias and myalgias.   Skin: Negative for color change and rash.   Neurological: Negative for dizziness, syncope, weakness, light-headedness and headaches.   Psychiatric/Behavioral: Negative for dysphoric mood. The patient is not nervous/anxious.         Denies ETOH abuse recently       Objective:      Physical Exam    Assessment:       1. Type 2 diabetes mellitus with hyperglycemia, with long-term current use of insulin    2. Edema, unspecified type    3. Hypertension associated with diabetes    4. Recurrent pancreatitis    5. Polyp of colon, unspecified part of colon, unspecified type        Plan:       *Type 2 diabetes mellitus with hyperglycemia, with long-term current use of insulin  Eye exam due. Will have done at [facility]  Increasing lantus. Discussed hypogylcemia protocol  Follow up in 2 weeks, sooner if recurrent hypoglycemia  -     CONTOUR TEST STRIPS Strp; 1 strip by Other route 3 (three) times daily.  Dispense: 100 each; Refill: 11  -     insulin glargine (LANTUS SOLOSTAR) 100 unit/mL (3 mL) InPn pen; Inject 45 Units into the skin every evening.  Dispense: 1 Box; Refill: 3    Edema, unspecified type  Continue lasix    Hypertension associated with diabetes  Continue lisinopril-HCTZ and spirolactone  Check at home,  RTC for > 160/90  To ER for CP, palp, severe HA, SOB  F/u in 2 weeks for recheck  -     verapamil (CALAN-SR) 240 MG CR tablet; Take 1 tablet (240 mg total) by mouth once daily.  Dispense: 30 tablet; Refill: 1    Recurrent pancreatitis  Continue off of ETOH    Polyp of colon, unspecified part of colon, unspecified type  Last colonscopy 5 years ago with [facility]/ Dr Cui: multiple polyps recommend f/u 5 years been more than 5   -     Case request GI: COLONOSCOPY    **

## 2017-03-14 NOTE — LETTER
March 14, 2017    Jorgito Arreaga  [address] [facility] [address]             [facility]  [address]  [facility] [address]  Phone: [phone] Dear Mr. Arreaga:    [facility] is committed to your overall health.  To help you get the most out of each of your visits, we will review your information to make sure you are up to date on all of your recommended tests and/or procedures.      DARBY DARDEN NP has found that you may be due for   Health Maintenance Due   Topic    Eye Exam     Pneumococcal PPSV23 (Medium Risk) (1)    Colonoscopy         If you have had any of the above done at another facility, please bring the records or information with you so that your record at [facility] will be complete.    If you are currently taking medication, please bring it with you to your appointment for review.    We will be happy to assist you with scheduling any necessary appointments or you may contact the [facility] appointment desk at [phone] to schedule at your convenience.     Thank you for choosing [facility] for your healthcare needs.  If you have any questions or concerns, please don't hesitate to call.    Sincerely,  Randi GRAHAM LPN Care Coordinator  [facility]

## 2017-03-14 NOTE — PATIENT INSTRUCTIONS
Hypoglycemia (Low Blood Sugar)     Fast-acting sugar includes a cup of nonfat milk.     Too little sugar (glucose) in your blood is called hypoglycemia or low blood sugar. Low blood sugar usually means anything lower than 70 mg/dL. Talk with your healthcare provider about your target range and what level is too low for you. Diabetes itself doesnt cause low blood sugar. But some of the treatments for diabetes, such as pills or insulin, may raise your risk for it. Low blood sugar may cause you to pass out or have a seizure. So always treat low blood sugar right away, but don't overeat.  Special note: Always carry a source of fast-acting sugar and a snack in case of hypoglycemia.   What you may notice  If you have low blood sugar, you may have one or more of these symptoms:  · Shakiness or dizziness  · Cold, clammy skin or sweating  · Feelings of hunger  · Headache  · Nervousness  · A hard, fast heartbeat  · Weakness  · Confusion or irritability  · Blurred vision  · Having nightmares or waking up confused or sweating  · Numbness or tingling in the lips or tongue  What you should do  Here are tips to follow if you have hypoglycemia:   · First check your blood sugar. If it is too low (out of your target range), eat or drink 15 to 20 grams of fast-acting sugar. This may be 3 to 4 glucose tablets, 4 ounces (half a cup) of fruit juice or regular (nondiet) soda, 8 ounces (1 cup) of fat-free milk, or 1 tablespoon of honey. Dont take more than this, or your blood sugar may go too high.  · Wait 15 minutes. Then recheck your blood sugar if you can.  · If your blood sugar is still too low, repeat the steps above and check your blood sugar again. If your blood sugar still has not returned to your target range, contact your healthcare provider or seek emergency care.  · Once your blood sugar returns to target range, eat a snack or meal.  Preventing low blood sugar  Things you can do include the following:   · If your condition  needs a strict treatment plan, eat your meals and snacks at the same times each day. Dont skip meals!  · If your treatment plan lets you change when you eat and what you eat, learn how to change the time and dose of your rapid-acting insulin to match this.   · Ask your healthcare provider if it is safe for you to drink alcohol. Never drink on an empty stomach.  · Take your medicine at the prescribed times.  · Always carry a source of fast-acting sugar and a snack when youre away from home.  Other things to do  Additional tips include the following:  · Carry a medical ID card, a compact USB drive, or wear a medical alert bracelet or necklace. It should say that you have diabetes. It should also say what to do if you pass out or have a seizure.  · Make sure your family, friends, and coworkers know the signs of low blood sugar. Tell them what to do if your blood sugar falls very low and you cant treat yourself.  · Keep a glucagon emergency kit handy. Be sure your family, friends, and coworkers know how and when to use it. Check it regularly and replace the glucagon before it expires.  · Talk with your health care team about other things you can do to prevent low blood sugar.     If you have unexplained hypoglycemia or hypoglycemia several times, call your healthcare provider.   Date Last Reviewed: 5/1/2016 © 2000-2016 [employer]. [address] [place] [address], [place] [address]. All rights reserved. This information is not intended as a substitute for professional medical care. Always follow your healthcare professional's instructions.        Low-Salt Choices  Eating salt (sodium) can make your body retain too much water. Excess water makes your heart work harder. Canned, packaged, and frozen foods are easy to prepare, but they are often high in sodium. Here are some ideas for low-salt foods you can easily prepare yourself.    For breakfast  · Fruit or 100% fruit juice  · Whole-wheat bread or an English  muffin. Compare sodium content on labels.  · Low-fat milk or yogurt  · Unsalted eggs  · Shredded wheat  · Corn tortillas  · Unsalted steamed rice  · Regular (not instant) hot cereal, made without salt  Stay away from:  · Sausage, steven, and ham  · Flour tortillas  · Packaged muffins, pancakes, and biscuits  · Instant hot cereals  · Cottage cheese  For lunch and dinner  · Fresh fish, chicken, turkey, or meat--baked, broiled, or roasted without salt  · Dry beans, cooked without salt  · Tofu, stir-fried without salt  · Unsalted fresh fruit and vegetables, or frozen or canned fruit and vegetables with no added salt  Stay away from:  · Lunch or deli meat that is cured or smoked  · Cheese  · Tomato juice and catsup  · Canned vegetables, soups, and fish not labeled as no-salt-added or reduced sodium  · Packaged gravies and sauces  · Olives, pickles, and relish  · Bottled salad dressings  For snacks and desserts  · Yogurt  · Unsalted, air popped popcorn  · Unsalted nuts or seeds  Stay away from:  · Pies and cakes  · Packaged dessert mixes  · Pizza  · Canned and packaged puddings  · Pretzels, chips, crackers, and nuts--unless the label says unsalted  Date Last Reviewed: 6/17/2015  © 4744-2828 [employer]. [address] [place] [address]. All rights reserved. This information is not intended as a substitute for professional medical care. Always follow your healthcare professional's instructions.        Eating Heart-Healthy Food: Using the DASH Plan    Eating for your heart doesnt have to be hard or boring. You just need to know how to make healthier choices. The DASH eating plan has been developed to help you do just that. DASH stands for Dietary Approaches to Stop Hypertension. It is a plan that has been proven to be healthier for your heart and to lower your risk for high blood pressure. It can also help lower your risk for cancer, heart disease, osteoporosis, and diabetes.  Choosing from each food  group  Choose foods from each of the food groups below each day. Try to get the recommended number of servings for each food group. The serving numbers are based on a diet of 2,000 calories a day. Talk to your doctor if youre unsure about your calorie needs. Along with getting the correct servings, the DASH plan also recommends a sodium intake less than 2,300 mg per day.        Grains  Servings: 6 to 8 a day  A serving is:  · 1 slice bread  · 1 ounce dry cereal  · Half a cup cooked rice, pasta or cereal  Best choices: Whole grains and any grains high in fiber. Vegetables  Servings: 4 to 5 a day  A serving is:  · 1 cup raw leafy vegetable  · Half a cup cut-up raw or cooked vegetable  · Half a cup vegetable juice  Best choices: Fresh or frozen vegetables prepared without added salt or fat.   Fruits  Servings: 4 to 5 a day  A serving is:  · 1 medium fruit  · One-quarter cup dried fruit  · Half a cup fresh, frozen, or canned fruit  · Half a cup of 100% fruit juices  Best choices: A variety of fresh fruits of different colors. Whole fruits are a better choice than fruit juices. Low-fat or fat-free dairy  Servings: 2 to 3 a day  A serving is:  · 1 cup milk  · 1 cup yogurt  · One and a half ounces cheese  Best choices: Skim or 1% milk, low-fat or fat-free yogurt or buttermilk, and low-fat cheeses.         Lean meats, poultry, fish  Servings: 6 or fewer a day  A serving is:  · 1 ounce cooked meats, poultry, or fish  · 1 egg  Best choices: Lean poultry and fish. Trim away visible fat. Broil, grill, roast, or boil instead of frying. Remove skin from poultry before eating. Limit how much red meat you eat.  Nuts, seeds, beans  Servings: 4 to 5 a week  A serving is:  · One-third cup nuts (one and a half ounces)  · 2 tablespoons nut butter or seeds  · Half a cup cooked dry beans or legumes  Best choices: Dry roasted nuts with no salt added, lentils, kidney beans, garbanzo beans, and whole babin beans.   Fats and oils  Servings:  2 to 3 a day  A serving is:  · 1 teaspoon vegetable oil  · 1 teaspoon soft margarine  · 1 tablespoon mayonnaise  · 2 tablespoons salad dressing  Best choices: Nut and vegetable oils (nontropical vegetable oils), such as olive and canola oil. Sweets  Servings: 5 a week or fewer  A serving is:  · 1 tablespoon sugar, maple syrup, or honey  · 1 tablespoon jam or jelly  · 1 half-ounce jelly beans (about 15)  · 1 cup lemonade  Best choices: Dried fruit can be a satisfying sweet. Choose low-fat sweets. And watch your serving sizes!      For more on the DASH eating plan, visit:  www.nhlbi.nih.gov/health/health-topics/topics/dash   Date Last Reviewed: 6/1/2016  © 1195-2238 [employer]. [address] [place] [address], [place] [address]. All rights reserved. This information is not intended as a substitute for professional medical care. Always follow your healthcare professional's instructions.

## 2017-03-15 ENCOUNTER — TELEPHONE (OUTPATIENT)
Dept: INTERNAL MEDICINE | Facility: CLINIC | Age: 52
End: 2017-03-15

## 2017-03-15 DIAGNOSIS — E11.65 TYPE 2 DIABETES MELLITUS WITH HYPERGLYCEMIA, WITH LONG-TERM CURRENT USE OF INSULIN: Primary | ICD-10-CM

## 2017-03-15 DIAGNOSIS — Z79.4 TYPE 2 DIABETES MELLITUS WITH HYPERGLYCEMIA, WITH LONG-TERM CURRENT USE OF INSULIN: Primary | ICD-10-CM

## 2017-03-15 RX ORDER — INSULIN PUMP SYRINGE, 3 ML
EACH MISCELLANEOUS
Qty: 1 EACH | Refills: 0 | Status: ON HOLD | OUTPATIENT
Start: 2017-03-15 | End: 2018-06-15 | Stop reason: HOSPADM

## 2017-03-15 NOTE — TELEPHONE ENCOUNTER
The pharmacy stated that the True Metrix strip/ machine would be covered and a new script will need to be sent.

## 2017-03-15 NOTE — TELEPHONE ENCOUNTER
[facility] sent over a fax stating that Contour blood glucose strips are not covered on the  Patient's insurance. Please review and advise.

## 2017-03-21 DIAGNOSIS — Z79.4 TYPE 2 DIABETES MELLITUS WITH HYPERGLYCEMIA, WITH LONG-TERM CURRENT USE OF INSULIN: ICD-10-CM

## 2017-03-21 DIAGNOSIS — E11.65 TYPE 2 DIABETES MELLITUS WITH HYPERGLYCEMIA, WITH LONG-TERM CURRENT USE OF INSULIN: ICD-10-CM

## 2017-03-21 RX ORDER — BLOOD SUGAR DIAGNOSTIC
STRIP MISCELLANEOUS
Qty: 100 STRIP | Refills: 11 | Status: SHIPPED | OUTPATIENT
Start: 2017-03-21 | End: 2017-03-22 | Stop reason: SDUPTHER

## 2017-03-22 NOTE — TELEPHONE ENCOUNTER
----- Message from Lauri Wu sent at 3/22/2017 11:59 AM CDT -----  Contact: [phone]  Pt states he is out of test strips./ States the ones that were order were the wrong ones./ He needs test strips for One Touch./ Pt can be reached at [phone]     [facility] [place], [place] - [address] [address]  [address]  [facility] [address]  Phone: [phone] Fax: [phone]

## 2017-03-27 DIAGNOSIS — E11.69 HYPERLIPIDEMIA ASSOCIATED WITH TYPE 2 DIABETES MELLITUS: ICD-10-CM

## 2017-03-27 DIAGNOSIS — E78.5 HYPERLIPIDEMIA ASSOCIATED WITH TYPE 2 DIABETES MELLITUS: ICD-10-CM

## 2017-03-27 RX ORDER — ATORVASTATIN CALCIUM 10 MG/1
10 TABLET, FILM COATED ORAL DAILY
Qty: 30 TABLET | Refills: 6 | Status: SHIPPED | OUTPATIENT
Start: 2017-03-27 | End: 2017-03-28 | Stop reason: SDUPTHER

## 2017-03-28 DIAGNOSIS — E78.5 HYPERLIPIDEMIA ASSOCIATED WITH TYPE 2 DIABETES MELLITUS: ICD-10-CM

## 2017-03-28 DIAGNOSIS — E11.69 HYPERLIPIDEMIA ASSOCIATED WITH TYPE 2 DIABETES MELLITUS: ICD-10-CM

## 2017-03-29 RX ORDER — ATORVASTATIN CALCIUM 10 MG/1
10 TABLET, FILM COATED ORAL DAILY
Qty: 30 TABLET | Refills: 6 | Status: SHIPPED | OUTPATIENT
Start: 2017-03-29 | End: 2018-04-27 | Stop reason: SDUPTHER

## 2017-05-10 ENCOUNTER — TELEPHONE (OUTPATIENT)
Dept: INTERNAL MEDICINE | Facility: CLINIC | Age: 52
End: 2017-05-10

## 2017-05-10 RX ORDER — TRAMADOL HYDROCHLORIDE 50 MG/1
TABLET ORAL
Qty: 15 TABLET | Refills: 0 | OUTPATIENT
Start: 2017-05-10

## 2017-05-24 DIAGNOSIS — E11.59 HYPERTENSION ASSOCIATED WITH DIABETES: ICD-10-CM

## 2017-05-24 DIAGNOSIS — I15.2 HYPERTENSION ASSOCIATED WITH DIABETES: ICD-10-CM

## 2017-05-24 RX ORDER — VERAPAMIL HYDROCHLORIDE 240 MG/1
TABLET, FILM COATED, EXTENDED RELEASE ORAL
Qty: 30 TABLET | Refills: 0 | Status: SHIPPED | OUTPATIENT
Start: 2017-05-24 | End: 2017-06-26 | Stop reason: SDUPTHER

## 2017-06-26 DIAGNOSIS — E11.59 HYPERTENSION ASSOCIATED WITH DIABETES: ICD-10-CM

## 2017-06-26 DIAGNOSIS — I15.2 HYPERTENSION ASSOCIATED WITH DIABETES: ICD-10-CM

## 2017-06-26 RX ORDER — VERAPAMIL HYDROCHLORIDE 240 MG/1
TABLET, FILM COATED, EXTENDED RELEASE ORAL
Qty: 30 TABLET | Refills: 0 | Status: SHIPPED | OUTPATIENT
Start: 2017-06-26 | End: 2017-07-26 | Stop reason: SDUPTHER

## 2017-06-26 NOTE — TELEPHONE ENCOUNTER
----- Message from Christian Galloway sent at 6/26/2017  3:45 PM CDT -----  ...1. What is the name of the medication you are requesting? Verapamil   2. What is the dose? 240 mg  3. How do you take the medication? Orally, topically, etc? orally  4. How often do you take this medication? Everyday  5. Do you need a 30 day or 90 day supply? 30  6. How many refills are you requesting? 1  7. What is your preferred pharmacy and location of the pharmacy? ..  8. Who can we contact with further questions? Please call pt back at [phone]

## 2017-07-20 ENCOUNTER — PATIENT OUTREACH (OUTPATIENT)
Dept: ADMINISTRATIVE | Facility: HOSPITAL | Age: 52
End: 2017-07-20

## 2017-07-20 NOTE — LETTER
July 20, 2017    Jorgito Arreaga  [address] [facility] [address]             [facility]  [address]  [facility] [address]  Phone: [phone] Dear Mr. Arreaga:    [facility] is committed to your overall health.  To help you get the most out of each of your visits, we will review your information to make sure you are up to date on all of your recommended tests and/or procedures.      Derrek Donaldson DO has found that you may be due for:   Health Maintenance Due   Topic    Pneumococcal PPSV23 (Medium Risk) (1)    Colonoscopy     Hemoglobin A1c     Foot Exam      If you have had any of the above done at another facility, please bring the records or information with you so that your record at [facility] will be complete.  If you are currently taking medication, please bring it with you to your appointment for review.  We will be happy to assist you with scheduling any necessary appointments or you may contact the [facility] appointment desk at [phone] to schedule at your convenience.   Thank you for choosing [facility] for your healthcare needs,    If you have any questions or concerns, please don't hesitate to call.    Sincerely,  Shahrzad YOUNG LPN  Care Coordination Department  [facility]

## 2017-07-26 DIAGNOSIS — E11.59 HYPERTENSION ASSOCIATED WITH DIABETES: ICD-10-CM

## 2017-07-26 DIAGNOSIS — I15.2 HYPERTENSION ASSOCIATED WITH DIABETES: ICD-10-CM

## 2017-07-26 RX ORDER — VERAPAMIL HYDROCHLORIDE 240 MG/1
TABLET, FILM COATED, EXTENDED RELEASE ORAL
Qty: 30 TABLET | Refills: 2 | Status: SHIPPED | OUTPATIENT
Start: 2017-07-26 | End: 2017-10-31 | Stop reason: SDUPTHER

## 2017-08-04 DIAGNOSIS — E11.9 TYPE 2 DIABETES MELLITUS WITHOUT COMPLICATION: ICD-10-CM

## 2017-08-07 DIAGNOSIS — Z79.4 TYPE 2 DIABETES MELLITUS WITH HYPERGLYCEMIA, WITH LONG-TERM CURRENT USE OF INSULIN: ICD-10-CM

## 2017-08-07 DIAGNOSIS — E11.65 TYPE 2 DIABETES MELLITUS WITH HYPERGLYCEMIA, WITH LONG-TERM CURRENT USE OF INSULIN: ICD-10-CM

## 2017-08-07 RX ORDER — INSULIN GLARGINE 100 [IU]/ML
45 INJECTION, SOLUTION SUBCUTANEOUS NIGHTLY
Qty: 15 ML | Refills: 1 | Status: SHIPPED | OUTPATIENT
Start: 2017-08-07 | End: 2017-10-11 | Stop reason: SDUPTHER

## 2017-08-07 RX ORDER — INSULIN GLARGINE 100 [IU]/ML
45 INJECTION, SOLUTION SUBCUTANEOUS NIGHTLY
Qty: 1 BOX | Refills: 3 | Status: CANCELLED | OUTPATIENT
Start: 2017-08-07
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
## 2017-08-24 ENCOUNTER — LAB VISIT (OUTPATIENT)
Dept: LAB | Facility: HOSPITAL | Age: 52
End: 2017-08-24
Attending: FAMILY MEDICINE
Payer: MEDICAID

## 2017-08-24 DIAGNOSIS — E11.9 TYPE 2 DIABETES MELLITUS WITHOUT COMPLICATION: ICD-10-CM

## 2017-08-24 PROCEDURE — 36415 COLL VENOUS BLD VENIPUNCTURE: CPT | Mod: PO

## 2017-08-24 PROCEDURE — 83036 HEMOGLOBIN GLYCOSYLATED A1C: CPT

## 2017-08-25 ENCOUNTER — TELEPHONE (OUTPATIENT)
Dept: INTERNAL MEDICINE | Facility: CLINIC | Age: 52
End: 2017-08-25

## 2017-08-25 DIAGNOSIS — E11.9 TYPE 2 DIABETES MELLITUS WITHOUT COMPLICATION: ICD-10-CM

## 2017-08-25 LAB
ESTIMATED AVG GLUCOSE: 321 MG/DL
HBA1C MFR BLD HPLC: 12.8 %

## 2017-08-25 NOTE — TELEPHONE ENCOUNTER
----- Message from Derrek Donaldson DO sent at 8/25/2017  8:16 AM CDT -----  Diabetes suddenly out of control. Needs appointment with me or Pilar HERNADEZ.

## 2017-08-26 DIAGNOSIS — I10 ESSENTIAL HYPERTENSION: ICD-10-CM

## 2017-08-28 RX ORDER — LISINOPRIL 40 MG/1
TABLET ORAL
Qty: 30 TABLET | Refills: 11 | Status: SHIPPED | OUTPATIENT
Start: 2017-08-28 | End: 2017-10-18 | Stop reason: CLARIF

## 2017-09-14 ENCOUNTER — TELEPHONE (OUTPATIENT)
Dept: INTERNAL MEDICINE | Facility: CLINIC | Age: 52
End: 2017-09-14

## 2017-09-14 NOTE — TELEPHONE ENCOUNTER
Tried calling pt at home and on cell. Left msg for pt to call us back to reschedule his appt for tomorrow with  because he will be in a meeting at 9:15. Will try back again.

## 2017-09-15 ENCOUNTER — OFFICE VISIT (OUTPATIENT)
Dept: INTERNAL MEDICINE | Facility: CLINIC | Age: 52
End: 2017-09-15
Payer: MEDICAID

## 2017-09-15 VITALS
HEIGHT: 67 IN | DIASTOLIC BLOOD PRESSURE: 89 MMHG | BODY MASS INDEX: 33.67 KG/M2 | RESPIRATION RATE: 18 BRPM | WEIGHT: 214.5 LBS | TEMPERATURE: 96 F | HEART RATE: 86 BPM | OXYGEN SATURATION: 97 % | SYSTOLIC BLOOD PRESSURE: 142 MMHG

## 2017-09-15 DIAGNOSIS — E11.65 TYPE 2 DIABETES MELLITUS WITH HYPERGLYCEMIA, WITH LONG-TERM CURRENT USE OF INSULIN: ICD-10-CM

## 2017-09-15 DIAGNOSIS — Z12.11 SCREENING FOR MALIGNANT NEOPLASM OF COLON: ICD-10-CM

## 2017-09-15 DIAGNOSIS — N47.6 BALANOPOSTHITIS: ICD-10-CM

## 2017-09-15 DIAGNOSIS — I15.2 HYPERTENSION ASSOCIATED WITH DIABETES: Primary | ICD-10-CM

## 2017-09-15 DIAGNOSIS — E11.59 HYPERTENSION ASSOCIATED WITH DIABETES: Primary | ICD-10-CM

## 2017-09-15 DIAGNOSIS — Z79.4 TYPE 2 DIABETES MELLITUS WITH HYPERGLYCEMIA, WITH LONG-TERM CURRENT USE OF INSULIN: ICD-10-CM

## 2017-09-15 DIAGNOSIS — B35.6 TINEA CRURIS: ICD-10-CM

## 2017-09-15 DIAGNOSIS — K21.00 GASTROESOPHAGEAL REFLUX DISEASE WITH ESOPHAGITIS: Chronic | ICD-10-CM

## 2017-09-15 DIAGNOSIS — L73.9 FOLLICULITIS: ICD-10-CM

## 2017-09-15 PROCEDURE — 3079F DIAST BP 80-89 MM HG: CPT | Mod: ,,, | Performed by: FAMILY MEDICINE

## 2017-09-15 PROCEDURE — 99999 PR PBB SHADOW E&M-EST. PATIENT-LVL III: CPT | Mod: PBBFAC,,, | Performed by: FAMILY MEDICINE

## 2017-09-15 PROCEDURE — 3046F HEMOGLOBIN A1C LEVEL >9.0%: CPT | Mod: ,,, | Performed by: FAMILY MEDICINE

## 2017-09-15 PROCEDURE — 99215 OFFICE O/P EST HI 40 MIN: CPT | Mod: S$PBB,,, | Performed by: FAMILY MEDICINE

## 2017-09-15 PROCEDURE — 99213 OFFICE O/P EST LOW 20 MIN: CPT | Mod: PBBFAC,PO | Performed by: FAMILY MEDICINE

## 2017-09-15 PROCEDURE — 3077F SYST BP >= 140 MM HG: CPT | Mod: ,,, | Performed by: FAMILY MEDICINE

## 2017-09-15 PROCEDURE — 3008F BODY MASS INDEX DOCD: CPT | Mod: ,,, | Performed by: FAMILY MEDICINE

## 2017-09-15 PROCEDURE — 4010F ACE/ARB THERAPY RXD/TAKEN: CPT | Mod: ,,, | Performed by: FAMILY MEDICINE

## 2017-09-15 RX ORDER — MUPIROCIN 20 MG/G
OINTMENT TOPICAL 3 TIMES DAILY
Qty: 30 G | Refills: 0 | Status: SHIPPED | OUTPATIENT
Start: 2017-09-15 | End: 2017-10-18 | Stop reason: ALTCHOICE

## 2017-09-15 RX ORDER — PANTOPRAZOLE SODIUM 40 MG/1
40 TABLET, DELAYED RELEASE ORAL DAILY
Qty: 30 TABLET | Refills: 5 | Status: SHIPPED | OUTPATIENT
Start: 2017-09-15 | End: 2018-04-20 | Stop reason: SDUPTHER

## 2017-09-15 RX ORDER — CLOTRIMAZOLE AND BETAMETHASONE DIPROPIONATE 10; .64 MG/G; MG/G
CREAM TOPICAL 2 TIMES DAILY
Qty: 15 G | Refills: 0 | Status: SHIPPED | OUTPATIENT
Start: 2017-09-15 | End: 2017-10-18

## 2017-09-15 NOTE — ASSESSMENT & PLAN NOTE
Pt needs to have a f/u visit in 1 month for bp recheck.  a1c in December.  Sugars uncontrolled according to last a1c

## 2017-09-15 NOTE — PATIENT INSTRUCTIONS
Understanding Carbohydrates, Fats, and Protein  Food is a source of fuel and nourishment for your body. Its also a source of pleasure. Having diabetes doesnt mean you have to eat special foods or give up desserts. Instead, your dietitian can show you how to plan meals to suit your body. To start, learn how different foods affect blood sugar.  Carbohydrates  Carbohydrates are the main source of fuel for the body. Carbohydrates raise blood sugar. Many people think carbohydrates are only found in pasta or bread. But carbohydrates are actually in many kinds of foods:  · Sugars occur naturally in foods such as fruit, milk, honey, and molasses. Sugars can also be added to many foods, from cereals and yogurt to candy and desserts. Sugars raise blood sugar.  · Starches are found in bread, cereals, pasta, and dried beans. Theyre also found in corn, peas, potatoes, yam, acorn squash, and butternut squash. Starches also raise blood sugar.   · Fiber is found in foods such as vegetables, fruits, beans, and whole grains. Unlike other carbs, fiber isnt digested or absorbed. So it doesnt raise blood sugar. In fact, fiber can help keep blood sugar from rising too fast. It also helps keep blood cholesterol at a healthy level.  Did you know?  Even though carbohydrates raise blood sugar, its best to have some in every meal. They are an important part of a healthy diet.   Fat  Fat is an energy source that can be stored until needed. Fat does not raise blood sugar. However, it can raise blood cholesterol, increasing the risk of heart disease. Fat is also high in calories, which can cause weight gain. Not all types of fat are the same.  More Healthy:  · Monounsaturated fats are mostly found in vegetable oils, such as olive, canola, and peanut oils. They are also found in avocados and some nuts. Monounsaturated fats are healthy for your heart. Thats because they lower LDL (unhealthy) cholesterol.  · Polyunsaturated fats are mostly  found in vegetable oils, such as corn, safflower, and soybean oils. They are also found in some seeds, nuts, and fish. Polyunsaturated fats lower LDL (unhealthy) cholesterol. So, choosing them instead of saturated fats is healthy for your heart. Certain unsaturated fats can help lower triglycerides.   Less Healthy:  · Saturated fats are found in animal products, such as meat, poultry, whole milk, lard, and butter. Saturated fats raise LDL cholesterol and are not healthy for your heart.  · Hydrogenated oils and trans fats are formed when vegetable oils are processed into solid fats. They are found in many processed foods. Hydrogenated oils and trans fats raise LDL cholesterol and lower HDL (healthy) cholesterol. They are not healthy for your heart.  Protein  Protein helps the body build and repair muscle and other tissue. Protein has little or no effect on blood sugar. However, many foods that contain protein also contain saturated fat. By choosing low-fat protein sources, you can get the benefits of protein without the extra fat:  · Plant protein is found in dry beans and peas, nuts, and soy products, such as tofu and soymilk. These sources tend to be cholesterol-free and low in saturated fat.  · Animal protein is found in fish, poultry, meat, cheese, milk, and eggs. These contain cholesterol and can be high in saturated fat. Aim for lean, lower-fat choices.  Date Last Reviewed: 3/1/2016  © 5360-9183 [employer]. [address] [place] [address], [place] [address]. All rights reserved. This information is not intended as a substitute for professional medical care. Always follow your healthcare professional's instructions.

## 2017-09-15 NOTE — ASSESSMENT & PLAN NOTE
Until sugars are controlled, this will continue to be an issue. Keep area clean and dry.  Use vaseline to affected area, for worsening, rtc.

## 2017-09-15 NOTE — PROGRESS NOTES
Subjective:       Patient ID: Jorgito Arreaga is a 52 y.o. male.    Chief Complaint: Penis Pain    Protective Sensation (w/ 10 gram monofilament):  Right: Intact  Left: Intact    Visual Inspection:  Nails Intact - without Evidence of Foot Deformity- Bilateral    Pedal Pulses:   Right: Present  Left: Present    Posterior tibialis:   Right:Present  Left: Present        Penis Pain   The patient's primary symptoms include genital itching, genital lesions and penile pain. The patient's pertinent negatives include no testicular pain. This is a recurrent problem. Episode onset: 3 months. The problem occurs intermittently. The problem has been waxing and waning. The pain is medium. Pertinent negatives include no abdominal pain, chest pain, dysuria, fever, flank pain, headaches, hematuria, nausea, shortness of breath or vomiting. There is no reported injury. The problem affects both sides. The injury mechanism is unknown. Nothing aggravates the symptoms. He has tried nothing for the symptoms. The treatment provided no relief. He is sexually active. He never uses condoms. No, his partner does not have an STD. There is no history of chlamydia, erectile dysfunction, gonorrhea, herpes simplex, HIV or syphilis.     Review of Systems   Constitutional: Negative for activity change, appetite change and fever.   HENT: Negative for congestion.    Eyes: Negative for pain and redness.   Respiratory: Negative for chest tightness and shortness of breath.    Cardiovascular: Negative for chest pain.   Gastrointestinal: Negative for abdominal pain, nausea and vomiting.   Genitourinary: Positive for penile pain and penile swelling. Negative for dysuria, flank pain and testicular pain.   Musculoskeletal: Negative for arthralgias.   Skin: Negative for color change and pallor.   Neurological: Negative for speech difficulty and headaches.       Objective:      Physical Exam   Constitutional: He is oriented to person, place, and time. He appears  well-developed and well-nourished. No distress.   HENT:   Head: Normocephalic and atraumatic.   Nose: Nose normal.   Eyes: Conjunctivae are normal. Pupils are equal, round, and reactive to light. Right eye exhibits no discharge. Left eye exhibits no discharge.   Neck: Normal range of motion. Neck supple. No thyromegaly present.   Cardiovascular: Normal rate, regular rhythm and intact distal pulses.    Pulmonary/Chest: Effort normal and breath sounds normal. No respiratory distress. He has no wheezes.   Abdominal: Soft. Bowel sounds are normal. There is no tenderness. No hernia.   Genitourinary: Testes normal. Uncircumcised. Penile erythema present. No hypospadias or penile tenderness. No discharge found.         Musculoskeletal: He exhibits no edema.   Lymphadenopathy:     He has no cervical adenopathy.   Neurological: He is alert and oriented to person, place, and time.   Skin: Skin is warm and dry. No rash noted. He is not diaphoretic. No erythema.   Nursing note and vitals reviewed.      Assessment:       1. Hypertension associated with diabetes    2. Screening for malignant neoplasm of colon    3. Type 2 diabetes mellitus with hyperglycemia, with long-term current use of insulin    4. Folliculitis    5. Gastroesophageal reflux disease with esophagitis    6. Tinea cruris    7. Balanoposthitis        Plan:           Screening for malignant neoplasm of colon  c-scope    Gastroesophageal reflux disease  proton pump inhibitor      Hypertension associated with diabetes  Pt needs to have a f/u visit in 1 month for bp recheck.  a1c in December.  Sugars uncontrolled according to last a1c    Folliculitis  Bactroban.    Tinea cruris  lotrisone    Balanoposthitis  Until sugars are controlled, this will continue to be an issue. Keep area clean and dry.  Use vaseline to affected area, for worsening, rtc.

## 2017-10-04 ENCOUNTER — PATIENT OUTREACH (OUTPATIENT)
Dept: ADMINISTRATIVE | Facility: HOSPITAL | Age: 52
End: 2017-10-04

## 2017-10-11 DIAGNOSIS — E11.65 TYPE 2 DIABETES MELLITUS WITH HYPERGLYCEMIA, WITH LONG-TERM CURRENT USE OF INSULIN: ICD-10-CM

## 2017-10-11 DIAGNOSIS — Z79.4 TYPE 2 DIABETES MELLITUS WITH HYPERGLYCEMIA, WITH LONG-TERM CURRENT USE OF INSULIN: ICD-10-CM

## 2017-10-11 RX ORDER — INSULIN GLARGINE 100 [IU]/ML
INJECTION, SOLUTION SUBCUTANEOUS
Qty: 45 ML | Refills: 3 | Status: SHIPPED | OUTPATIENT
Start: 2017-10-11 | End: 2018-06-08 | Stop reason: SDUPTHER

## 2017-10-17 ENCOUNTER — PATIENT OUTREACH (OUTPATIENT)
Dept: ADMINISTRATIVE | Facility: HOSPITAL | Age: 52
End: 2017-10-17

## 2017-10-18 ENCOUNTER — OFFICE VISIT (OUTPATIENT)
Dept: INTERNAL MEDICINE | Facility: CLINIC | Age: 52
End: 2017-10-18
Payer: MEDICAID

## 2017-10-18 VITALS
OXYGEN SATURATION: 97 % | DIASTOLIC BLOOD PRESSURE: 93 MMHG | SYSTOLIC BLOOD PRESSURE: 161 MMHG | RESPIRATION RATE: 14 BRPM | HEART RATE: 80 BPM | BODY MASS INDEX: 34.78 KG/M2 | WEIGHT: 221.56 LBS | HEIGHT: 67 IN | TEMPERATURE: 97 F

## 2017-10-18 DIAGNOSIS — N47.6 BALANOPOSTHITIS: ICD-10-CM

## 2017-10-18 DIAGNOSIS — Z28.9 DELAYED IMMUNIZATIONS: ICD-10-CM

## 2017-10-18 DIAGNOSIS — Z12.11 SCREENING FOR MALIGNANT NEOPLASM OF COLON: ICD-10-CM

## 2017-10-18 DIAGNOSIS — E11.65 TYPE 2 DIABETES MELLITUS WITH HYPERGLYCEMIA, WITH LONG-TERM CURRENT USE OF INSULIN: Primary | ICD-10-CM

## 2017-10-18 DIAGNOSIS — E11.59 HYPERTENSION ASSOCIATED WITH DIABETES: ICD-10-CM

## 2017-10-18 DIAGNOSIS — I15.2 HYPERTENSION ASSOCIATED WITH DIABETES: ICD-10-CM

## 2017-10-18 DIAGNOSIS — Z79.4 TYPE 2 DIABETES MELLITUS WITH HYPERGLYCEMIA, WITH LONG-TERM CURRENT USE OF INSULIN: Primary | ICD-10-CM

## 2017-10-18 PROCEDURE — 99999 PR PBB SHADOW E&M-EST. PATIENT-LVL V: CPT | Mod: PBBFAC,,, | Performed by: FAMILY MEDICINE

## 2017-10-18 PROCEDURE — 99215 OFFICE O/P EST HI 40 MIN: CPT | Mod: PBBFAC,PO | Performed by: FAMILY MEDICINE

## 2017-10-18 PROCEDURE — 90686 IIV4 VACC NO PRSV 0.5 ML IM: CPT | Mod: PBBFAC,PO

## 2017-10-18 PROCEDURE — 99214 OFFICE O/P EST MOD 30 MIN: CPT | Mod: S$PBB,,, | Performed by: FAMILY MEDICINE

## 2017-10-18 RX ORDER — LISINOPRIL 20 MG/1
20 TABLET ORAL DAILY
Qty: 90 TABLET | Refills: 0 | Status: SHIPPED | OUTPATIENT
Start: 2017-10-18 | End: 2017-12-05 | Stop reason: DRUGHIGH

## 2017-10-18 RX ORDER — ASPIRIN 325 MG
325 TABLET ORAL DAILY
COMMUNITY
End: 2018-06-22 | Stop reason: SDUPTHER

## 2017-10-18 RX ORDER — METFORMIN HYDROCHLORIDE 500 MG/1
500 TABLET ORAL 2 TIMES DAILY WITH MEALS
Qty: 180 TABLET | Refills: 3 | Status: SHIPPED | OUTPATIENT
Start: 2017-10-18 | End: 2018-06-08 | Stop reason: SDUPTHER

## 2017-10-18 RX ORDER — GLIPIZIDE 2.5 MG/1
2.5 TABLET, EXTENDED RELEASE ORAL
Qty: 90 TABLET | Refills: 3 | Status: SHIPPED | OUTPATIENT
Start: 2017-10-18 | End: 2018-05-25 | Stop reason: SDUPTHER

## 2017-10-18 NOTE — PROGRESS NOTES
Subjective:       Patient ID: Jorgito Arreaga is a 52 y.o. male.    Chief Complaint: Follow-up    Hypertension   This is a chronic problem. The current episode started more than 1 year ago. The problem is uncontrolled. Pertinent negatives include no anxiety, blurred vision, chest pain, palpitations or shortness of breath. There are no associated agents to hypertension. Risk factors for coronary artery disease include diabetes mellitus, obesity and male gender. Past treatments include ACE inhibitors. The current treatment provides moderate improvement. Compliance problems: pt not taking meds.      Review of Systems   Eyes: Negative for blurred vision.   Respiratory: Negative for shortness of breath.    Cardiovascular: Negative for chest pain and palpitations.   Gastrointestinal: Negative for abdominal pain.       Objective:      Physical Exam   Constitutional: He appears well-developed and well-nourished. No distress.   HENT:   Head: Normocephalic and atraumatic.   Right Ear: External ear normal.   Left Ear: External ear normal.   Nose: Nose normal.   Pulmonary/Chest: Effort normal and breath sounds normal. No respiratory distress. He has no wheezes.   Skin: Skin is warm and dry. No rash noted. He is not diaphoretic. No erythema.   Nursing note and vitals reviewed.      Assessment:       1. Type 2 diabetes mellitus with hyperglycemia, with long-term current use of insulin    2. Balanoposthitis    3. Hypertension associated with diabetes    4. Delayed immunizations    5. Screening for malignant neoplasm of colon        Plan:           Hypertension associated with diabetes  Pt not taking meds, will renew.  Follow up     Type 2 diabetes mellitus with hyperglycemia  Add glipizide and metformin

## 2017-10-30 ENCOUNTER — PATIENT OUTREACH (OUTPATIENT)
Dept: ADMINISTRATIVE | Facility: HOSPITAL | Age: 52
End: 2017-10-30

## 2017-10-31 DIAGNOSIS — I15.2 HYPERTENSION ASSOCIATED WITH DIABETES: ICD-10-CM

## 2017-10-31 DIAGNOSIS — E11.59 HYPERTENSION ASSOCIATED WITH DIABETES: ICD-10-CM

## 2017-10-31 DIAGNOSIS — I10 ESSENTIAL HYPERTENSION: ICD-10-CM

## 2017-10-31 RX ORDER — VERAPAMIL HYDROCHLORIDE 240 MG/1
TABLET, FILM COATED, EXTENDED RELEASE ORAL
Qty: 30 TABLET | Refills: 2 | Status: SHIPPED | OUTPATIENT
Start: 2017-10-31 | End: 2018-02-09 | Stop reason: SDUPTHER

## 2017-10-31 RX ORDER — FUROSEMIDE 20 MG/1
TABLET ORAL
Qty: 60 TABLET | Refills: 1 | Status: SHIPPED | OUTPATIENT
Start: 2017-10-31 | End: 2018-03-02 | Stop reason: ALTCHOICE

## 2017-11-16 ENCOUNTER — PATIENT OUTREACH (OUTPATIENT)
Dept: ADMINISTRATIVE | Facility: HOSPITAL | Age: 52
End: 2017-11-16

## 2017-11-22 ENCOUNTER — HOSPITAL ENCOUNTER (OUTPATIENT)
Dept: RADIOLOGY | Facility: HOSPITAL | Age: 52
Discharge: HOME | End: 2017-11-22
Attending: FAMILY MEDICINE
Payer: MEDICAID

## 2017-11-22 ENCOUNTER — OFFICE VISIT (OUTPATIENT)
Dept: INTERNAL MEDICINE | Facility: CLINIC | Age: 52
End: 2017-11-22
Payer: MEDICAID

## 2017-11-22 VITALS
SYSTOLIC BLOOD PRESSURE: 136 MMHG | BODY MASS INDEX: 35.15 KG/M2 | WEIGHT: 218.69 LBS | DIASTOLIC BLOOD PRESSURE: 80 MMHG | HEART RATE: 89 BPM | OXYGEN SATURATION: 97 % | TEMPERATURE: 97 F | HEIGHT: 66 IN | RESPIRATION RATE: 18 BRPM

## 2017-11-22 DIAGNOSIS — M25.562 ACUTE PAIN OF BOTH KNEES: ICD-10-CM

## 2017-11-22 DIAGNOSIS — M25.561 ACUTE PAIN OF BOTH KNEES: ICD-10-CM

## 2017-11-22 DIAGNOSIS — R60.0 LOCALIZED EDEMA: ICD-10-CM

## 2017-11-22 DIAGNOSIS — M17.0 PRIMARY OSTEOARTHRITIS OF BOTH KNEES: ICD-10-CM

## 2017-11-22 DIAGNOSIS — Z79.4 TYPE 2 DIABETES MELLITUS WITH HYPERGLYCEMIA, WITH LONG-TERM CURRENT USE OF INSULIN: Primary | ICD-10-CM

## 2017-11-22 DIAGNOSIS — E11.65 TYPE 2 DIABETES MELLITUS WITH HYPERGLYCEMIA, WITH LONG-TERM CURRENT USE OF INSULIN: Primary | ICD-10-CM

## 2017-11-22 PROCEDURE — 73562 X-RAY EXAM OF KNEE 3: CPT | Mod: 26,LT,, | Performed by: RADIOLOGY

## 2017-11-22 PROCEDURE — 99214 OFFICE O/P EST MOD 30 MIN: CPT | Mod: S$PBB,,, | Performed by: FAMILY MEDICINE

## 2017-11-22 PROCEDURE — 73562 X-RAY EXAM OF KNEE 3: CPT | Mod: TC,50,PO

## 2017-11-22 PROCEDURE — 73562 X-RAY EXAM OF KNEE 3: CPT | Mod: 26,RT,, | Performed by: RADIOLOGY

## 2017-11-22 PROCEDURE — 99999 PR PBB SHADOW E&M-EST. PATIENT-LVL IV: CPT | Mod: PBBFAC,,, | Performed by: FAMILY MEDICINE

## 2017-11-22 PROCEDURE — 99214 OFFICE O/P EST MOD 30 MIN: CPT | Mod: PBBFAC,25,PO | Performed by: FAMILY MEDICINE

## 2017-11-22 RX ORDER — MELOXICAM 15 MG/1
15 TABLET ORAL DAILY
Qty: 30 TABLET | Refills: 0 | Status: SHIPPED | OUTPATIENT
Start: 2017-11-22 | End: 2018-01-15

## 2017-11-22 NOTE — PROGRESS NOTES
Subjective:       Patient ID: Jorgito Arreaga is a 52 y.o. male.    Chief Complaint: Follow-up and Diabetes    Knee Pain    The incident occurred more than 1 week ago. There was no injury mechanism. The pain is present in the left knee and right knee. The quality of the pain is described as aching and cramping. The pain is severe. The pain has been worsening since onset. Associated symptoms include numbness. Pertinent negatives include no loss of sensation.     Review of Systems   Respiratory: Negative for shortness of breath.    Cardiovascular: Negative for chest pain.   Gastrointestinal: Negative for abdominal pain.   Neurological: Positive for numbness. Negative for dizziness and headaches.        Tingling in feet       Objective:      Physical Exam   Constitutional: He appears well-developed and well-nourished. No distress.   HENT:   Head: Normocephalic and atraumatic.   Right Ear: External ear normal.   Left Ear: External ear normal.   Nose: Nose normal.   Pulmonary/Chest: Effort normal and breath sounds normal. No respiratory distress. He has no wheezes.   Musculoskeletal: He exhibits edema.   Neurological: He is alert.   Feet NTTP.   Skin: Skin is warm and dry. No rash noted. He is not diaphoretic. No erythema.   Nursing note and vitals reviewed.      Assessment:       1. Type 2 diabetes mellitus with hyperglycemia, with long-term current use of insulin    2. Uncontrolled secondary diabetes with peripheral neuropathy    3. Acute pain of both knees    4. Localized edema    5. Primary osteoarthritis of both knees        Plan:           Acute pain of both knees  Given that weightbearing x-ray show.  Patient will follow up on next visit for bilateral knee injection.    Type 2 diabetes mellitus with hyperglycemia, with long-term current use of insulin  -     Ambulatory consult to Diabetic Education    Uncontrolled secondary diabetes with peripheral neuropathy  -     Ambulatory consult to Diabetic  Education    Acute pain of both knees  -     Cancel: X-Ray Knee AP Standing Bilateral; Future; Expected date: 11/22/2017    Localized edema    Primary osteoarthritis of both knees  -     meloxicam (MOBIC) 15 MG tablet; Take 1 tablet (15 mg total) by mouth once daily.  Dispense: 30 tablet; Refill: 0  -     Cancel: X-Ray Knee AP Standing Bilateral; Future; Expected date: 11/22/2017    Other orders  -     Cancel: Case request GI: COLONOSCOPY

## 2017-11-22 NOTE — ASSESSMENT & PLAN NOTE
Given that weightbearing x-ray show.  Patient will follow up on next visit for bilateral knee injection.

## 2017-11-27 ENCOUNTER — PATIENT OUTREACH (OUTPATIENT)
Dept: ADMINISTRATIVE | Facility: HOSPITAL | Age: 52
End: 2017-11-27

## 2017-12-05 ENCOUNTER — OFFICE VISIT (OUTPATIENT)
Dept: INTERNAL MEDICINE | Facility: CLINIC | Age: 52
End: 2017-12-05
Payer: MEDICAID

## 2017-12-05 ENCOUNTER — LAB VISIT (OUTPATIENT)
Dept: LAB | Facility: HOSPITAL | Age: 52
End: 2017-12-05
Attending: FAMILY MEDICINE
Payer: MEDICAID

## 2017-12-05 VITALS
WEIGHT: 218.94 LBS | BODY MASS INDEX: 35.19 KG/M2 | TEMPERATURE: 98 F | HEIGHT: 66 IN | OXYGEN SATURATION: 97 % | DIASTOLIC BLOOD PRESSURE: 100 MMHG | SYSTOLIC BLOOD PRESSURE: 164 MMHG | HEART RATE: 81 BPM

## 2017-12-05 DIAGNOSIS — E11.59 HYPERTENSION ASSOCIATED WITH DIABETES: ICD-10-CM

## 2017-12-05 DIAGNOSIS — Z12.11 SCREEN FOR COLON CANCER: ICD-10-CM

## 2017-12-05 DIAGNOSIS — E11.65 TYPE 2 DIABETES MELLITUS WITH HYPERGLYCEMIA, WITH LONG-TERM CURRENT USE OF INSULIN: ICD-10-CM

## 2017-12-05 DIAGNOSIS — Z79.4 TYPE 2 DIABETES MELLITUS WITH HYPERGLYCEMIA, WITH LONG-TERM CURRENT USE OF INSULIN: ICD-10-CM

## 2017-12-05 DIAGNOSIS — Z00.00 ROUTINE GENERAL MEDICAL EXAMINATION AT A HEALTH CARE FACILITY: Primary | ICD-10-CM

## 2017-12-05 DIAGNOSIS — I15.2 HYPERTENSION ASSOCIATED WITH DIABETES: ICD-10-CM

## 2017-12-05 LAB
ESTIMATED AVG GLUCOSE: 148 MG/DL
HBA1C MFR BLD HPLC: 6.8 %

## 2017-12-05 PROCEDURE — 83036 HEMOGLOBIN GLYCOSYLATED A1C: CPT

## 2017-12-05 PROCEDURE — 99396 PREV VISIT EST AGE 40-64: CPT | Mod: S$PBB,,, | Performed by: FAMILY MEDICINE

## 2017-12-05 PROCEDURE — 99999 PR PBB SHADOW E&M-EST. PATIENT-LVL IV: CPT | Mod: PBBFAC,,, | Performed by: FAMILY MEDICINE

## 2017-12-05 PROCEDURE — 36415 COLL VENOUS BLD VENIPUNCTURE: CPT | Mod: PO

## 2017-12-05 PROCEDURE — 99214 OFFICE O/P EST MOD 30 MIN: CPT | Mod: PBBFAC,PO | Performed by: FAMILY MEDICINE

## 2017-12-05 RX ORDER — ISOSORBIDE DINITRATE AND HYDRALAZINE HYDROCHLORIDE 37.5; 2 MG/1; MG/1
1 TABLET ORAL 3 TIMES DAILY
Qty: 90 TABLET | Refills: 0 | Status: SHIPPED | OUTPATIENT
Start: 2017-12-05 | End: 2017-12-05 | Stop reason: CLARIF

## 2017-12-05 RX ORDER — ISOSORBIDE DINITRATE 20 MG/1
20 TABLET ORAL 3 TIMES DAILY
Qty: 90 TABLET | Refills: 0 | Status: SHIPPED | OUTPATIENT
Start: 2017-12-05 | End: 2018-06-08 | Stop reason: SDUPTHER

## 2017-12-05 RX ORDER — LISINOPRIL 40 MG/1
40 TABLET ORAL DAILY
Qty: 90 TABLET | Refills: 0 | Status: SHIPPED | OUTPATIENT
Start: 2017-12-05 | End: 2018-05-25 | Stop reason: SDUPTHER

## 2017-12-05 RX ORDER — HYDRALAZINE HYDROCHLORIDE 50 MG/1
50 TABLET, FILM COATED ORAL 3 TIMES DAILY
Qty: 60 TABLET | Refills: 0 | Status: SHIPPED | OUTPATIENT
Start: 2017-12-05 | End: 2018-05-25 | Stop reason: SDUPTHER

## 2017-12-05 RX ORDER — AMLODIPINE BESYLATE 10 MG/1
10 TABLET ORAL DAILY
Qty: 90 TABLET | Refills: 0 | Status: CANCELLED | OUTPATIENT
Start: 2017-12-05

## 2017-12-05 NOTE — PROGRESS NOTES
Subjective:       Patient ID: Jorgito Arreaga is a 52 y.o. male.    Chief Complaint: Follow-up (knee pain)    Subjective:     Jorgito Arreaga is a 52 y.o. male and is here for a comprehensive physical exam. The patient reports problems - HTN, DM.    Do you take any herbs or supplements that were not prescribed by a doctor? no  Are you taking calcium supplements? no  Are you taking aspirin daily? yes     History:  Any STD's in the past? none    The following portions of the patient's history were reviewed and updated as appropriate: allergies, current medications, past family history, past medical history, past social history, past surgical history and problem list.    Review of Systems  Do you have pain that bothers you in your daily life? Knee pain  Pertinent items are noted in HPI.     Plan:    No problem-specific Assessment & Plan notes found for this encounter.    2. Patient Counseling:  --Nutrition: Stressed importance of moderation in sodium/caffeine intake, saturated fat and cholesterol, caloric balance, sufficient intake of fresh fruits, vegetables, fiber, calcium, iron, and 1 mg of folate supplement per day (for females capable of pregnancy).  --Discussed the issue of estrogen replacement, calcium supplement, and the daily use of baby aspirin.  --Exercise: Stressed the importance of regular exercise.   --Substance Abuse: Discussed cessation/primary prevention of tobacco, alcohol, or other drug use; driving or other dangerous activities under the influence; availability of treatment for abuse.    --Sexuality: Discussed sexually transmitted diseases, partner selection, use of condoms, avoidance of unintended pregnancy  and contraceptive alternatives.   --Injury prevention: Discussed safety belts, safety helmets, smoke detector, smoking near bedding or upholstery.   --Dental health: Discussed importance of regular tooth brushing, flossing, and dental visits.  --Immunizations reviewed.  --Discussed benefits  of screening colonoscopy.  --After hours service discussed with patient    3. Discussed the patient's BMI with him.  The BMI elevated.  The patient is asked to make an attempt to improve diet and exercise patterns to aid in medical management of this problem.    4. Follow up in 3 days        Review of Systems   Respiratory: Negative for shortness of breath.    Cardiovascular: Negative for chest pain.   Gastrointestinal: Negative for abdominal pain.       Objective:      Physical Exam   Constitutional: He is oriented to person, place, and time. He appears well-developed and well-nourished. No distress.   HENT:   Head: Normocephalic and atraumatic.   Nose: Nose normal.   Mouth/Throat: Oropharynx is clear and moist.   Eyes: Conjunctivae are normal. Right eye exhibits no discharge. Left eye exhibits no discharge.   Cardiovascular: Normal rate and regular rhythm.    Pulmonary/Chest: Effort normal and breath sounds normal. No respiratory distress. He has no wheezes.   Abdominal: Soft. He exhibits no distension. There is no tenderness.   Musculoskeletal: He exhibits no edema.   Neurological: He is alert and oriented to person, place, and time.   Skin: Skin is warm and dry. No rash noted. He is not diaphoretic. No erythema.   Psychiatric: He has a normal mood and affect.   Nursing note and vitals reviewed.      Assessment:       1. Routine general medical examination at a health care facility    2. Screen for colon cancer    3. Type 2 diabetes mellitus with hyperglycemia, with long-term current use of insulin    4. Hypertension associated with diabetes        Plan:           Routine general medical examination at a health care facility  See above note    Routine general medical examination at a health care facility    Screen for colon cancer  -     Fecal Immunochemical Test (iFOBT); Future; Expected date: 12/05/2017    Type 2 diabetes mellitus with hyperglycemia, with long-term current use of insulin  -     Hemoglobin A1c;  Future; Expected date: 12/05/2017  -     Ambulatory consult to Diabetic Education    Hypertension associated with diabetes  -     lisinopril (PRINIVIL,ZESTRIL) 40 MG tablet; Take 1 tablet (40 mg total) by mouth once daily.  Dispense: 90 tablet; Refill: 0  -     isosorbide-hydrALAZINE 20-37.5 mg (BIDIL) 20-37.5 mg Tab; Take 1 tablet by mouth 3 (three) times daily.  Dispense: 90 tablet; Refill: 0    Other orders  -     Cancel: Case request GI: COLONOSCOPY  -     Cancel: Pneumococcal Polysaccharide Vaccine (23 Valent) (SQ/IM)  -     Cancel: amLODIPine (NORVASC) 10 MG tablet; Take 1 tablet (10 mg total) by mouth once daily.  Dispense: 90 tablet; Refill: 0

## 2017-12-06 NOTE — PROGRESS NOTES
Results have been reviewed . All labs are within normal range.   If you have any questions please feel free to contact me.  Pls tell pt that he is doing an excellent job with DM.

## 2017-12-08 ENCOUNTER — OFFICE VISIT (OUTPATIENT)
Dept: INTERNAL MEDICINE | Facility: CLINIC | Age: 52
End: 2017-12-08
Payer: MEDICAID

## 2017-12-08 VITALS
OXYGEN SATURATION: 97 % | WEIGHT: 218.94 LBS | TEMPERATURE: 98 F | SYSTOLIC BLOOD PRESSURE: 136 MMHG | RESPIRATION RATE: 18 BRPM | DIASTOLIC BLOOD PRESSURE: 88 MMHG | BODY MASS INDEX: 34.36 KG/M2 | HEIGHT: 67 IN | HEART RATE: 102 BPM

## 2017-12-08 DIAGNOSIS — M25.562 CHRONIC PAIN OF BOTH KNEES: Primary | ICD-10-CM

## 2017-12-08 DIAGNOSIS — G89.29 CHRONIC PAIN OF BOTH KNEES: Primary | ICD-10-CM

## 2017-12-08 DIAGNOSIS — M25.561 CHRONIC PAIN OF BOTH KNEES: Primary | ICD-10-CM

## 2017-12-08 PROCEDURE — 99213 OFFICE O/P EST LOW 20 MIN: CPT | Mod: PBBFAC,PO | Performed by: FAMILY MEDICINE

## 2017-12-08 PROCEDURE — 99999 PR PBB SHADOW E&M-EST. PATIENT-LVL III: CPT | Mod: PBBFAC,,, | Performed by: FAMILY MEDICINE

## 2017-12-08 PROCEDURE — 20610 DRAIN/INJ JOINT/BURSA W/O US: CPT | Mod: PBBFAC,PO | Performed by: FAMILY MEDICINE

## 2017-12-08 PROCEDURE — 99499 UNLISTED E&M SERVICE: CPT | Mod: S$PBB,,, | Performed by: FAMILY MEDICINE

## 2017-12-08 RX ORDER — BETAMETHASONE SODIUM PHOSPHATE AND BETAMETHASONE ACETATE 3; 3 MG/ML; MG/ML
6 INJECTION, SUSPENSION INTRA-ARTICULAR; INTRALESIONAL; INTRAMUSCULAR; SOFT TISSUE
Status: DISCONTINUED | OUTPATIENT
Start: 2017-12-08 | End: 2017-12-08 | Stop reason: HOSPADM

## 2017-12-08 RX ADMIN — BETAMETHASONE ACETATE AND BETAMETHASONE SODIUM PHOSPHATE 6 MG: 3; 3 INJECTION, SUSPENSION INTRA-ARTICULAR; INTRALESIONAL; INTRAMUSCULAR; SOFT TISSUE at 09:12

## 2017-12-08 NOTE — PROCEDURES
Large Joint Aspiration/Injection of bilateral knees  Date/Time: 12/8/2017 9:24 AM  Performed by: JENNIFER CLARK  Authorized by: JENNIFER CLARK     Consent Done?:  Yes (Written)  Indications:  Pain  Procedure site marked: Yes    Timeout: Prior to procedure the correct patient, procedure, and site was verified      Location:  Knee  Prep: Patient was prepped and draped in usual sterile fashion    Ultrasonic Guidance for needle placement: No  Needle size:  22 G  Approach:  Anterolateral  Medications:  6 mg betamethasone acetate-betamethasone sodium phosphate 6 mg/mL  Patient tolerance:  Patient tolerated the procedure well with no immediate complications

## 2017-12-12 ENCOUNTER — TELEPHONE (OUTPATIENT)
Dept: INTERNAL MEDICINE | Facility: CLINIC | Age: 52
End: 2017-12-12

## 2017-12-21 ENCOUNTER — LAB VISIT (OUTPATIENT)
Dept: LAB | Facility: HOSPITAL | Age: 52
End: 2017-12-21
Attending: FAMILY MEDICINE
Payer: MEDICAID

## 2017-12-21 DIAGNOSIS — Z12.11 SCREEN FOR COLON CANCER: ICD-10-CM

## 2017-12-21 LAB — HEMOCCULT STL QL IA: NEGATIVE

## 2017-12-21 PROCEDURE — 82274 ASSAY TEST FOR BLOOD FECAL: CPT

## 2018-01-11 ENCOUNTER — TELEPHONE (OUTPATIENT)
Dept: INTERNAL MEDICINE | Facility: CLINIC | Age: 53
End: 2018-01-11

## 2018-01-11 NOTE — TELEPHONE ENCOUNTER
----- Message from Arianna Taylor sent at 1/11/2018  2:42 PM CST -----  Contact: wife Lucia  Calling concerning coming in sooner than schedule for prostate issues. Please call wife ASAP today URGENT!! @ [phone]. Thanks, jamar

## 2018-01-12 DIAGNOSIS — E11.9 TYPE 2 DIABETES MELLITUS WITHOUT COMPLICATION: ICD-10-CM

## 2018-01-15 ENCOUNTER — OFFICE VISIT (OUTPATIENT)
Dept: INTERNAL MEDICINE | Facility: CLINIC | Age: 53
End: 2018-01-15
Payer: MEDICAID

## 2018-01-15 ENCOUNTER — LAB VISIT (OUTPATIENT)
Dept: LAB | Facility: HOSPITAL | Age: 53
End: 2018-01-15
Attending: FAMILY MEDICINE
Payer: MEDICAID

## 2018-01-15 VITALS
BODY MASS INDEX: 33.35 KG/M2 | HEIGHT: 67 IN | OXYGEN SATURATION: 98 % | RESPIRATION RATE: 16 BRPM | TEMPERATURE: 98 F | DIASTOLIC BLOOD PRESSURE: 86 MMHG | WEIGHT: 212.5 LBS | HEART RATE: 102 BPM | SYSTOLIC BLOOD PRESSURE: 150 MMHG

## 2018-01-15 DIAGNOSIS — Z28.9 DELAYED IMMUNIZATIONS: ICD-10-CM

## 2018-01-15 DIAGNOSIS — R35.0 URINARY FREQUENCY: Primary | ICD-10-CM

## 2018-01-15 DIAGNOSIS — R35.0 URINARY FREQUENCY: ICD-10-CM

## 2018-01-15 LAB — GLUCOSE SERPL-MCNC: 276 MG/DL (ref 70–110)

## 2018-01-15 PROCEDURE — 99214 OFFICE O/P EST MOD 30 MIN: CPT | Mod: S$PBB,,, | Performed by: FAMILY MEDICINE

## 2018-01-15 PROCEDURE — 99215 OFFICE O/P EST HI 40 MIN: CPT | Mod: PBBFAC,PO,25 | Performed by: FAMILY MEDICINE

## 2018-01-15 PROCEDURE — 90471 IMMUNIZATION ADMIN: CPT | Mod: PBBFAC,PO

## 2018-01-15 PROCEDURE — 99999 PR PBB SHADOW E&M-EST. PATIENT-LVL V: CPT | Mod: PBBFAC,,, | Performed by: FAMILY MEDICINE

## 2018-01-15 PROCEDURE — 82948 REAGENT STRIP/BLOOD GLUCOSE: CPT | Mod: PBBFAC,PO | Performed by: FAMILY MEDICINE

## 2018-01-15 PROCEDURE — 36415 COLL VENOUS BLD VENIPUNCTURE: CPT | Mod: PO

## 2018-01-15 PROCEDURE — 84154 ASSAY OF PSA FREE: CPT

## 2018-01-15 NOTE — PROGRESS NOTES
Subjective:       Patient ID: Jorgito Arreaga is a 52 y.o. male.    Chief Complaint: Urinary Urgency and Urinary Frequency    Urinary Frequency    This is a new problem. Episode onset: 2 weeks. The problem occurs every urination (urinating at least 20 times per day). The problem has been gradually worsening. The patient is experiencing no pain. There has been no fever. He is sexually active. There is no history of pyelonephritis. Associated symptoms include frequency and urgency. Pertinent negatives include no discharge, flank pain, hematuria, nausea, vomiting, weight loss, bubble bath use, constipation or withholding. He has tried nothing for the symptoms. There is no history of kidney stones, a single kidney or STD.     Review of Systems   Constitutional: Negative for weight loss.   Respiratory: Negative for shortness of breath.    Cardiovascular: Negative for chest pain.   Gastrointestinal: Negative for abdominal pain, constipation, nausea and vomiting.   Genitourinary: Positive for frequency and urgency. Negative for flank pain and hematuria.       Objective:      Physical Exam   Constitutional: He appears well-developed and well-nourished. No distress.   HENT:   Head: Normocephalic and atraumatic.   Nose: Nose normal.   Mouth/Throat: Oropharynx is clear and moist.   Pulmonary/Chest: Effort normal and breath sounds normal. No respiratory distress. He has no wheezes.   Skin: Skin is warm and dry. No rash noted. He is not diaphoretic. No erythema.   Nursing note and vitals reviewed.      Assessment:       1. Urinary frequency    2. Uncontrolled secondary diabetes with peripheral neuropathy    3. Delayed immunizations        Plan:     Problem List Items Addressed This Visit        Renal/    Urinary frequency - Primary    Current Assessment & Plan     CBG > 270, feel that frequency related to poorly controlled glucose, but pt states that his issue began before he noticed his glucose was becoming uncontrolled          Relevant Orders    POCT Glucose (Completed)    PSA, total and free    URINALYSIS    Urine culture       ID    Delayed immunizations    Relevant Orders    Pneumococcal Polysaccharide Vaccine (23 Valent) (SQ/IM) (Completed)       Endocrine    Uncontrolled secondary diabetes with peripheral neuropathy    Relevant Orders    POCT Glucose (Completed)

## 2018-01-15 NOTE — ASSESSMENT & PLAN NOTE
CBG > 270, feel that frequency related to poorly controlled glucose, but pt states that his issue began before he noticed his glucose was becoming uncontrolled

## 2018-01-16 ENCOUNTER — LAB VISIT (OUTPATIENT)
Dept: LAB | Facility: HOSPITAL | Age: 53
End: 2018-01-16
Attending: FAMILY MEDICINE
Payer: MEDICAID

## 2018-01-16 DIAGNOSIS — R35.0 URINARY FREQUENCY: Primary | ICD-10-CM

## 2018-01-16 DIAGNOSIS — R35.0 URINARY FREQUENCY: ICD-10-CM

## 2018-01-16 LAB
BILIRUB UR QL STRIP: NEGATIVE
CLARITY UR REFRACT.AUTO: CLEAR
COLOR UR AUTO: YELLOW
GLUCOSE UR QL STRIP: NEGATIVE
HGB UR QL STRIP: NEGATIVE
KETONES UR QL STRIP: NEGATIVE
LEUKOCYTE ESTERASE UR QL STRIP: NEGATIVE
NITRITE UR QL STRIP: NEGATIVE
PH UR STRIP: 7 [PH] (ref 5–8)
PROSTATE SPECIFIC ANTIGEN, TOTAL: 1.3 NG/ML
PROT UR QL STRIP: NEGATIVE
PSA FREE MFR SERPL: 36.15 %
PSA FREE SERPL-MCNC: 0.47 NG/ML
SP GR UR STRIP: 1.01 (ref 1–1.03)
URN SPEC COLLECT METH UR: NORMAL
UROBILINOGEN UR STRIP-ACNC: <2 EU/DL

## 2018-01-16 PROCEDURE — 87086 URINE CULTURE/COLONY COUNT: CPT

## 2018-01-16 PROCEDURE — 81003 URINALYSIS AUTO W/O SCOPE: CPT | Mod: PO

## 2018-01-18 LAB — BACTERIA UR CULT: NO GROWTH

## 2018-01-22 ENCOUNTER — PATIENT OUTREACH (OUTPATIENT)
Dept: ADMINISTRATIVE | Facility: HOSPITAL | Age: 53
End: 2018-01-22

## 2018-02-09 DIAGNOSIS — I15.2 HYPERTENSION ASSOCIATED WITH DIABETES: ICD-10-CM

## 2018-02-09 DIAGNOSIS — E11.59 HYPERTENSION ASSOCIATED WITH DIABETES: ICD-10-CM

## 2018-02-09 DIAGNOSIS — R60.9 EDEMA, UNSPECIFIED TYPE: ICD-10-CM

## 2018-02-09 RX ORDER — VERAPAMIL HYDROCHLORIDE 240 MG/1
TABLET, FILM COATED, EXTENDED RELEASE ORAL
Qty: 30 TABLET | Refills: 2 | Status: SHIPPED | OUTPATIENT
Start: 2018-02-09 | End: 2018-05-25 | Stop reason: SDUPTHER

## 2018-02-09 RX ORDER — SPIRONOLACTONE 50 MG/1
TABLET, FILM COATED ORAL
Qty: 30 TABLET | Refills: 11 | Status: SHIPPED | OUTPATIENT
Start: 2018-02-09 | End: 2018-06-08 | Stop reason: SDUPTHER

## 2018-02-16 ENCOUNTER — PATIENT OUTREACH (OUTPATIENT)
Dept: ADMINISTRATIVE | Facility: HOSPITAL | Age: 53
End: 2018-02-16

## 2018-02-19 DIAGNOSIS — Z79.4 TYPE 2 DIABETES MELLITUS WITHOUT COMPLICATION, WITH LONG-TERM CURRENT USE OF INSULIN: ICD-10-CM

## 2018-02-19 DIAGNOSIS — E11.9 TYPE 2 DIABETES MELLITUS WITHOUT COMPLICATION, WITH LONG-TERM CURRENT USE OF INSULIN: ICD-10-CM

## 2018-02-20 RX ORDER — PEN NEEDLE, DIABETIC 32GX 5/32"
NEEDLE, DISPOSABLE MISCELLANEOUS
Qty: 50 EACH | Refills: 7 | Status: SHIPPED | OUTPATIENT
Start: 2018-02-20 | End: 2018-06-22 | Stop reason: SDUPTHER

## 2018-02-22 ENCOUNTER — TELEPHONE (OUTPATIENT)
Dept: INTERNAL MEDICINE | Facility: CLINIC | Age: 53
End: 2018-02-22

## 2018-02-22 NOTE — TELEPHONE ENCOUNTER
Spoke with pts wife and she said the pt is having issues again on his bottom area and is itching. He is wanting a refill of a fungus cream you prescribed him but I did not see it on his medication list.   He wanted to make an appt with you for tomorrow but you are not here and they would rather see you. So want to know if you can just refill the fungus medication?

## 2018-02-22 NOTE — TELEPHONE ENCOUNTER
----- Message from Susan Solares sent at 2/22/2018 11:54 AM CST -----  Contact: pt wife   Please call pt wife back at [phone] concerning pt need a prescription for a cream.

## 2018-02-22 NOTE — TELEPHONE ENCOUNTER
Left MSG for pt that he needs to call his pharmacy and have them fax us the refill request BC  is not sure what cream it is he wants but will be more than happy to refill if pharmacy send request.

## 2018-02-22 NOTE — TELEPHONE ENCOUNTER
----- Message from Meet Sanon sent at 2/22/2018  2:38 PM CST -----  Contact: Pt-wife   Pt-wife requested a callback from the nurse please will elaborate during callback...[phone]

## 2018-02-22 NOTE — TELEPHONE ENCOUNTER
Spoke with pts wife and informed her that the Rx they are requesting we do not know what they are talking about but if they get the pharmacy to send the request he will be happy to fill it. Pts wife verbalized understanding

## 2018-02-23 RX ORDER — MUPIROCIN 20 MG/G
OINTMENT TOPICAL
Qty: 22 G | Refills: 1 | Status: SHIPPED | OUTPATIENT
Start: 2018-02-23 | End: 2018-05-11 | Stop reason: SDUPTHER

## 2018-02-23 RX ORDER — CLOTRIMAZOLE AND BETAMETHASONE DIPROPIONATE 10; .64 MG/G; MG/G
CREAM TOPICAL
Qty: 15 G | Refills: 1 | Status: SHIPPED | OUTPATIENT
Start: 2018-02-23 | End: 2018-05-11 | Stop reason: SDUPTHER

## 2018-03-02 ENCOUNTER — OFFICE VISIT (OUTPATIENT)
Dept: INTERNAL MEDICINE | Facility: CLINIC | Age: 53
End: 2018-03-02
Payer: MEDICAID

## 2018-03-02 ENCOUNTER — TELEPHONE (OUTPATIENT)
Dept: INTERNAL MEDICINE | Facility: CLINIC | Age: 53
End: 2018-03-02

## 2018-03-02 ENCOUNTER — LAB VISIT (OUTPATIENT)
Dept: LAB | Facility: HOSPITAL | Age: 53
End: 2018-03-02
Attending: FAMILY MEDICINE
Payer: MEDICAID

## 2018-03-02 VITALS
HEART RATE: 111 BPM | BODY MASS INDEX: 31.94 KG/M2 | TEMPERATURE: 98 F | RESPIRATION RATE: 16 BRPM | HEIGHT: 67 IN | OXYGEN SATURATION: 98 % | WEIGHT: 203.5 LBS | DIASTOLIC BLOOD PRESSURE: 58 MMHG | SYSTOLIC BLOOD PRESSURE: 110 MMHG

## 2018-03-02 DIAGNOSIS — I15.2 HYPERTENSION ASSOCIATED WITH DIABETES: Primary | ICD-10-CM

## 2018-03-02 DIAGNOSIS — E11.59 HYPERTENSION ASSOCIATED WITH DIABETES: Primary | ICD-10-CM

## 2018-03-02 DIAGNOSIS — Z79.899 ENCOUNTER FOR LONG-TERM (CURRENT) USE OF MEDICATIONS: ICD-10-CM

## 2018-03-02 DIAGNOSIS — K63.5 POLYP OF COLON, UNSPECIFIED PART OF COLON, UNSPECIFIED TYPE: ICD-10-CM

## 2018-03-02 LAB
ALBUMIN SERPL BCP-MCNC: 4.2 G/DL
ALP SERPL-CCNC: 72 U/L
ALT SERPL W/O P-5'-P-CCNC: 16 U/L
ANION GAP SERPL CALC-SCNC: 10 MMOL/L
AST SERPL-CCNC: 17 U/L
BILIRUB SERPL-MCNC: 0.7 MG/DL
BUN SERPL-MCNC: 23 MG/DL
CALCIUM SERPL-MCNC: 9.8 MG/DL
CHLORIDE SERPL-SCNC: 99 MMOL/L
CHOLEST SERPL-MCNC: 237 MG/DL
CHOLEST/HDLC SERPL: 3.8 {RATIO}
CO2 SERPL-SCNC: 25 MMOL/L
CREAT SERPL-MCNC: 1.5 MG/DL
EST. GFR  (AFRICAN AMERICAN): >60 ML/MIN/1.73 M^2
EST. GFR  (NON AFRICAN AMERICAN): 53.1 ML/MIN/1.73 M^2
GLUCOSE SERPL-MCNC: 507 MG/DL
HDLC SERPL-MCNC: 63 MG/DL
HDLC SERPL: 26.6 %
LDLC SERPL CALC-MCNC: 117.2 MG/DL
NONHDLC SERPL-MCNC: 174 MG/DL
POTASSIUM SERPL-SCNC: 5.2 MMOL/L
PROT SERPL-MCNC: 7.7 G/DL
SODIUM SERPL-SCNC: 134 MMOL/L
TRIGL SERPL-MCNC: 284 MG/DL

## 2018-03-02 PROCEDURE — 80053 COMPREHEN METABOLIC PANEL: CPT | Mod: PO

## 2018-03-02 PROCEDURE — 99215 OFFICE O/P EST HI 40 MIN: CPT | Mod: PBBFAC,PO | Performed by: FAMILY MEDICINE

## 2018-03-02 PROCEDURE — 36415 COLL VENOUS BLD VENIPUNCTURE: CPT | Mod: PO

## 2018-03-02 PROCEDURE — 99999 PR PBB SHADOW E&M-EST. PATIENT-LVL V: CPT | Mod: PBBFAC,,, | Performed by: FAMILY MEDICINE

## 2018-03-02 PROCEDURE — 99214 OFFICE O/P EST MOD 30 MIN: CPT | Mod: S$PBB,,, | Performed by: FAMILY MEDICINE

## 2018-03-02 PROCEDURE — 80061 LIPID PANEL: CPT

## 2018-03-02 RX ORDER — FUROSEMIDE 20 MG/1
10 TABLET ORAL DAILY
Qty: 30 TABLET | Refills: 0 | Status: SHIPPED | OUTPATIENT
Start: 2018-03-02 | End: 2018-05-25 | Stop reason: SDUPTHER

## 2018-03-02 NOTE — ASSESSMENT & PLAN NOTE
Needs cmp to re-assess rfp. Given low Bp, and tachycardia, want to ensure pt doesn't have any kidney etiology

## 2018-03-02 NOTE — PROGRESS NOTES
Subjective:       Patient ID: Jorgito Arreaga is a 52 y.o. male.    Chief Complaint: Follow-up (htn)    Hypertension   This is a recurrent problem. The current episode started more than 1 year ago. The problem has been resolved since onset. The problem is controlled. Pertinent negatives include no anxiety, blurred vision, chest pain, peripheral edema or shortness of breath. There are no associated agents to hypertension. Risk factors for coronary artery disease include diabetes mellitus, male gender and obesity. Past treatments include ACE inhibitors and diuretics. The current treatment provides significant improvement. There are no compliance problems.      Review of Systems   Eyes: Negative for blurred vision.   Respiratory: Negative for shortness of breath.    Cardiovascular: Negative for chest pain.   Gastrointestinal: Negative for abdominal pain.       Objective:      Physical Exam   Constitutional: He appears well-developed and well-nourished. No distress.   HENT:   Head: Normocephalic and atraumatic.   Right Ear: External ear normal.   Left Ear: External ear normal.   Nose: Nose normal.   Pulmonary/Chest: Effort normal and breath sounds normal. No respiratory distress. He has no wheezes.   Skin: Skin is warm and dry. No rash noted. He is not diaphoretic. No erythema.   Nursing note and vitals reviewed.      Assessment:       1. Hypertension associated with diabetes    2. Polyp of colon, unspecified part of colon, unspecified type    3. Uncontrolled secondary diabetes with peripheral neuropathy    4. Encounter for long-term (current) use of medications        Plan:     Problem List Items Addressed This Visit        Psychiatric    Encounter for long-term (current) use of medications    Current Assessment & Plan     Needs cmp to re-assess rfp. Given low Bp, and tachycardia, want to ensure pt doesn't have any kidney etiology         Relevant Orders    Comprehensive metabolic panel       Cardiac/Vascular     Hypertension associated with diabetes - Primary    Current Assessment & Plan     Given low Bp, and tachycardia, decrease lasix to 10mg, f/u 2 months.         Relevant Medications    furosemide (LASIX) 20 MG tablet       Endocrine    Uncontrolled secondary diabetes with peripheral neuropathy    Relevant Orders    Lipid panel       GI    Polyp of colon    Current Assessment & Plan     No records, but I he has a personal hx of multiple polyps while at [facility]. He states 42 polyps.  F/u c-scope         Relevant Orders    Case request GI: COLONOSCOPY (Completed)

## 2018-03-02 NOTE — ASSESSMENT & PLAN NOTE
No records, but I he has a personal hx of multiple polyps while at [facility]. He states 42 polyps.  F/u c-scope

## 2018-03-05 NOTE — PROGRESS NOTES
Please call pt and schedule appt to review abnormal lab findings.  Lipids elevated, KOREY - likely 2/2 to Dm, hyperkalemia, Pt needs to see diabetic education, and he needs to get medication very soon. His glucose levels are dangerous

## 2018-03-06 ENCOUNTER — TELEPHONE (OUTPATIENT)
Dept: DIABETES | Facility: CLINIC | Age: 53
End: 2018-03-06

## 2018-03-06 NOTE — TELEPHONE ENCOUNTER
----- Message from Romelia Brumfield LPN sent at 3/5/2018  3:14 PM CST -----  Left ms for pt that he needs to call us asap regarding lab work and meds and appts. Can someone in diabetes please call this pt to schedule him for an appt sooner that 5/11/18 to be seen by diabetes. Please let me know when yall are able to get him scheduled. It can be in [place]. Thanks!

## 2018-03-07 ENCOUNTER — DOCUMENTATION ONLY (OUTPATIENT)
Dept: ENDOSCOPY | Facility: HOSPITAL | Age: 53
End: 2018-03-07

## 2018-03-07 ENCOUNTER — TELEPHONE (OUTPATIENT)
Dept: INTERNAL MEDICINE | Facility: CLINIC | Age: 53
End: 2018-03-07

## 2018-03-07 NOTE — TELEPHONE ENCOUNTER
----- Message from Salina Madden sent at 3/7/2018  8:01 AM CST -----  Contact: pt  He's calling in regards to result pls call pt back at [phone] (home)

## 2018-03-12 PROBLEM — Z00.00 ROUTINE GENERAL MEDICAL EXAMINATION AT A HEALTH CARE FACILITY: Status: RESOLVED | Noted: 2017-12-05 | Resolved: 2018-03-12

## 2018-04-12 ENCOUNTER — HOSPITAL ENCOUNTER (EMERGENCY)
Facility: HOSPITAL | Age: 53
Discharge: HOME | End: 2018-04-12
Attending: EMERGENCY MEDICINE
Payer: MEDICAID

## 2018-04-12 VITALS
DIASTOLIC BLOOD PRESSURE: 89 MMHG | HEART RATE: 73 BPM | BODY MASS INDEX: 31.32 KG/M2 | SYSTOLIC BLOOD PRESSURE: 149 MMHG | RESPIRATION RATE: 16 BRPM | WEIGHT: 197 LBS | TEMPERATURE: 98 F | OXYGEN SATURATION: 97 %

## 2018-04-12 DIAGNOSIS — K85.90 ACUTE PANCREATITIS, UNSPECIFIED COMPLICATION STATUS, UNSPECIFIED PANCREATITIS TYPE: Primary | ICD-10-CM

## 2018-04-12 DIAGNOSIS — R10.13 EPIGASTRIC PAIN: ICD-10-CM

## 2018-04-12 LAB
ALBUMIN SERPL BCP-MCNC: 4.5 G/DL
ALP SERPL-CCNC: 63 U/L
ALT SERPL W/O P-5'-P-CCNC: 14 U/L
AMYLASE SERPL-CCNC: 57 U/L
ANION GAP SERPL CALC-SCNC: 13 MMOL/L
AST SERPL-CCNC: 11 U/L
BASOPHILS # BLD AUTO: 0.02 K/UL
BASOPHILS NFR BLD: 0.3 %
BILIRUB SERPL-MCNC: 1 MG/DL
BUN SERPL-MCNC: 18 MG/DL
CALCIUM SERPL-MCNC: 10.7 MG/DL
CHLORIDE SERPL-SCNC: 98 MMOL/L
CO2 SERPL-SCNC: 24 MMOL/L
CREAT SERPL-MCNC: 1.3 MG/DL
DIFFERENTIAL METHOD: ABNORMAL
EOSINOPHIL # BLD AUTO: 0.1 K/UL
EOSINOPHIL NFR BLD: 0.9 %
ERYTHROCYTE [DISTWIDTH] IN BLOOD BY AUTOMATED COUNT: 12.2 %
EST. GFR  (AFRICAN AMERICAN): >60 ML/MIN/1.73 M^2
EST. GFR  (NON AFRICAN AMERICAN): >60 ML/MIN/1.73 M^2
GLUCOSE SERPL-MCNC: 322 MG/DL
HCT VFR BLD AUTO: 41.1 %
HGB BLD-MCNC: 13.5 G/DL
LIPASE SERPL-CCNC: 133 U/L
LYMPHOCYTES # BLD AUTO: 1.6 K/UL
LYMPHOCYTES NFR BLD: 24.1 %
MCH RBC QN AUTO: 29.1 PG
MCHC RBC AUTO-ENTMCNC: 32.8 G/DL
MCV RBC AUTO: 89 FL
MONOCYTES # BLD AUTO: 0.9 K/UL
MONOCYTES NFR BLD: 12.5 %
NEUTROPHILS # BLD AUTO: 4.2 K/UL
NEUTROPHILS NFR BLD: 61.9 %
PLATELET # BLD AUTO: 289 K/UL
PMV BLD AUTO: 10.7 FL
POTASSIUM SERPL-SCNC: 4.1 MMOL/L
PROT SERPL-MCNC: 8.5 G/DL
RBC # BLD AUTO: 4.64 M/UL
SODIUM SERPL-SCNC: 135 MMOL/L
WBC # BLD AUTO: 6.81 K/UL

## 2018-04-12 PROCEDURE — 63600175 PHARM REV CODE 636 W HCPCS: Performed by: EMERGENCY MEDICINE

## 2018-04-12 PROCEDURE — 80053 COMPREHEN METABOLIC PANEL: CPT

## 2018-04-12 PROCEDURE — 85025 COMPLETE CBC W/AUTO DIFF WBC: CPT

## 2018-04-12 PROCEDURE — 25000003 PHARM REV CODE 250: Performed by: EMERGENCY MEDICINE

## 2018-04-12 PROCEDURE — 99284 EMERGENCY DEPT VISIT MOD MDM: CPT | Mod: 25

## 2018-04-12 PROCEDURE — 96361 HYDRATE IV INFUSION ADD-ON: CPT

## 2018-04-12 PROCEDURE — 96374 THER/PROPH/DIAG INJ IV PUSH: CPT

## 2018-04-12 PROCEDURE — 96375 TX/PRO/DX INJ NEW DRUG ADDON: CPT

## 2018-04-12 PROCEDURE — 83690 ASSAY OF LIPASE: CPT

## 2018-04-12 PROCEDURE — 82150 ASSAY OF AMYLASE: CPT

## 2018-04-12 RX ORDER — ONDANSETRON 2 MG/ML
4 INJECTION INTRAMUSCULAR; INTRAVENOUS
Status: COMPLETED | OUTPATIENT
Start: 2018-04-12 | End: 2018-04-12

## 2018-04-12 RX ORDER — HYDROCODONE BITARTRATE AND ACETAMINOPHEN 7.5; 325 MG/1; MG/1
1 TABLET ORAL EVERY 6 HOURS PRN
Qty: 11 TABLET | Refills: 0 | Status: SHIPPED | OUTPATIENT
Start: 2018-04-12 | End: 2018-04-22

## 2018-04-12 RX ORDER — PROMETHAZINE HYDROCHLORIDE 25 MG/1
25 TABLET ORAL EVERY 6 HOURS PRN
Qty: 15 TABLET | Refills: 0 | Status: SHIPPED | OUTPATIENT
Start: 2018-04-12 | End: 2018-05-11 | Stop reason: SDUPTHER

## 2018-04-12 RX ORDER — FENTANYL CITRATE 50 UG/ML
25 INJECTION, SOLUTION INTRAMUSCULAR; INTRAVENOUS
Status: COMPLETED | OUTPATIENT
Start: 2018-04-12 | End: 2018-04-12

## 2018-04-12 RX ADMIN — ONDANSETRON 4 MG: 2 INJECTION INTRAMUSCULAR; INTRAVENOUS at 09:04

## 2018-04-12 RX ADMIN — FENTANYL CITRATE 25 MCG: 50 INJECTION, SOLUTION INTRAMUSCULAR; INTRAVENOUS at 09:04

## 2018-04-12 RX ADMIN — SODIUM CHLORIDE 1000 ML: 0.9 INJECTION, SOLUTION INTRAVENOUS at 09:04

## 2018-04-12 NOTE — ED PROVIDER NOTES
Encounter Date: 4/12/2018       History     Chief Complaint   Patient presents with    Abdominal Pain     Abd pain onset Sat, hx pancreatitis     The history is provided by the patient.   Abdominal Pain   The current episode started several days ago. The onset of the illness was gradual. The problem has not changed since onset.The abdominal pain is located in the epigastric region. The abdominal pain does not radiate. The abdominal pain is relieved by nothing. The other symptoms of the illness include nausea and vomiting. The other symptoms of the illness do not include fever, fatigue, melena, shortness of breath, diarrhea or dysuria.   Nausea began 3 to 5 days ago. The nausea is associated with eating.   The vomiting began more than 2 days ago.   Symptoms associated with the illness do not include back pain.     Review of patient's allergies indicates:  No Known Allergies  Past Medical History:   Diagnosis Date    Cataracts, bilateral     Diabetes mellitus, type 2     GERD (gastroesophageal reflux disease)     Hypertension     Inflammatory polyps of colon     Pancreatitis, alcoholic, acute     Port catheter in place     TIA (transient ischemic attack)      Past Surgical History:   Procedure Laterality Date    COLONOSCOPY       Family History   Problem Relation Age of Onset    Hypertension Mother     Hypertension Father      Social History   Substance Use Topics    Smoking status: Former Smoker     Types: Cigarettes    Smokeless tobacco: Never Used    Alcohol use Yes      Comment:  Daily - States that he drinks 1/2 pint of vodka and about 1-2 cans of beer daily     Review of Systems   Constitutional: Negative for fatigue and fever.   HENT: Negative for sore throat.    Respiratory: Negative for shortness of breath.    Cardiovascular: Negative for chest pain.   Gastrointestinal: Positive for abdominal pain, nausea and vomiting. Negative for diarrhea and melena.   Genitourinary: Negative for dysuria.    Musculoskeletal: Negative for back pain.   Skin: Negative for rash.   Neurological: Negative for weakness.   Hematological: Does not bruise/bleed easily.       Physical Exam     Initial Vitals [04/12/18 0909]   BP Pulse Resp Temp SpO2   (!) 145/93 102 18 98.4 °F (36.9 °C) 99 %      MAP       110.33         Physical Exam    Nursing note and vitals reviewed.  Constitutional: He appears well-developed and well-nourished. No distress.   HENT:   Head: Normocephalic and atraumatic.   Mouth/Throat: Oropharynx is clear and moist.   Eyes: Conjunctivae and EOM are normal. Pupils are equal, round, and reactive to light.   Neck: Normal range of motion. Neck supple.   Cardiovascular: Normal rate, regular rhythm and normal heart sounds. Exam reveals no gallop and no friction rub.    No murmur heard.  Pulmonary/Chest: Breath sounds normal. No respiratory distress. He has no wheezes. He has no rhonchi. He has no rales.   Abdominal: Soft. Bowel sounds are normal. He exhibits no distension and no mass. There is tenderness in the epigastric area. There is no rebound and no guarding.   Musculoskeletal: Normal range of motion. He exhibits no edema.   Neurological: He is alert and oriented to person, place, and time. He has normal strength.   Skin: Skin is warm and dry. No rash noted.   Psychiatric: He has a normal mood and affect. Thought content normal.         ED Course   Procedures  Labs Reviewed   CBC W/ AUTO DIFFERENTIAL - Abnormal; Notable for the following:        Result Value    Hemoglobin 13.5 (*)     All other components within normal limits   COMPREHENSIVE METABOLIC PANEL - Abnormal; Notable for the following:     Sodium 135 (*)     Glucose 322 (*)     Calcium 10.7 (*)     Total Protein 8.5 (*)     All other components within normal limits   LIPASE - Abnormal; Notable for the following:     Lipase 133 (*)     All other components within normal limits   AMYLASE   URINALYSIS        ED Vital Signs:  Vitals:    04/12/18 0909  04/12/18 0931 04/12/18 1001   BP: (!) 145/93 138/81 (!) 149/89   Pulse: 102 75 73   Resp: 18 16 16   Temp: 98.4 °F (36.9 °C)     TempSrc: Oral     SpO2: 99% 96% 97%   Weight: 89.4 kg (197 lb)           Abnormal Lab Results:  Labs Reviewed   CBC W/ AUTO DIFFERENTIAL - Abnormal; Notable for the following:        Result Value    Hemoglobin 13.5 (*)     All other components within normal limits   COMPREHENSIVE METABOLIC PANEL - Abnormal; Notable for the following:     Sodium 135 (*)     Glucose 322 (*)     Calcium 10.7 (*)     Total Protein 8.5 (*)     All other components within normal limits   LIPASE - Abnormal; Notable for the following:     Lipase 133 (*)     All other components within normal limits   AMYLASE   URINALYSIS          All Lab Results:  Results for orders placed or performed during the hospital encounter of 04/12/18   CBC auto differential   Result Value Ref Range    WBC 6.81 3.90 - 12.70 K/uL    RBC 4.64 4.60 - 6.20 M/uL    Hemoglobin 13.5 (L) 14.0 - 18.0 g/dL    Hematocrit 41.1 40.0 - 54.0 %    MCV 89 82 - 98 fL    MCH 29.1 27.0 - 31.0 pg    MCHC 32.8 32.0 - 36.0 g/dL    RDW 12.2 11.5 - 14.5 %    Platelets 289 150 - 350 K/uL    MPV 10.7 9.2 - 12.9 fL    Gran # (ANC) 4.2 1.8 - 7.7 K/uL    Lymph # 1.6 1.0 - 4.8 K/uL    Mono # 0.9 0.3 - 1.0 K/uL    Eos # 0.1 0.0 - 0.5 K/uL    Baso # 0.02 0.00 - 0.20 K/uL    Gran% 61.9 38.0 - 73.0 %    Lymph% 24.1 18.0 - 48.0 %    Mono% 12.5 4.0 - 15.0 %    Eosinophil% 0.9 0.0 - 8.0 %    Basophil% 0.3 0.0 - 1.9 %    Differential Method Automated    Comprehensive metabolic panel   Result Value Ref Range    Sodium 135 (L) 136 - 145 mmol/L    Potassium 4.1 3.5 - 5.1 mmol/L    Chloride 98 95 - 110 mmol/L    CO2 24 23 - 29 mmol/L    Glucose 322 (H) 70 - 110 mg/dL    BUN, Bld 18 6 - 20 mg/dL    Creatinine 1.3 0.5 - 1.4 mg/dL    Calcium 10.7 (H) 8.7 - 10.5 mg/dL    Total Protein 8.5 (H) 6.0 - 8.4 g/dL    Albumin 4.5 3.5 - 5.2 g/dL    Total Bilirubin 1.0 0.1 - 1.0 mg/dL     Alkaline Phosphatase 63 55 - 135 U/L    AST 11 10 - 40 U/L    ALT 14 10 - 44 U/L    Anion Gap 13 8 - 16 mmol/L    eGFR if African American >60.0 >60 mL/min/1.73 m^2    eGFR if non African American >60.0 >60 mL/min/1.73 m^2   Lipase   Result Value Ref Range    Lipase 133 (H) 4 - 60 U/L   Amylase   Result Value Ref Range    Amylase 57 20 - 110 U/L           Imaging Results:  Imaging Results          X-Ray Abdomen Flat And Erect (Final result)  Result time 04/12/18 10:37:49    Final result by SAMMY Anderson Sr., MD (04/12/18 10:37:49)                 Impression:      1. The bowel gas pattern is normal in appearance.   2. There is a mild amount of dextroconvex curvature of the thoracolumbar spine.      Electronically signed by: SAMMY ANDERSON MD  Date:     04/12/18  Time:    10:37              Narrative:    Flat and erect KUB    History: Epigastric pain    Finding: The bowel gas pattern is normal in appearance. There is no pneumoperitoneum. There is a mild amount of dextroconvex curvature of the thoracolumbar spine.                                 The Emergency Provider reviewed the vital signs and test results, which are outlined above.    ED Discussions:  10:44 AM: Reassessed pt at this time.  Pt states his condition has improved at this time. Discussed with pt all pertinent ED information and results. Discussed pt dx of pancreatitis   and plan of tx. Gave pt all f/u and return to the ED instructions. All questions and concerns were addressed at this time. Pt expresses understanding of information and instructions, and is comfortable with plan to discharge. Pt is stable for discharge.                                    Clinical Impression:       ICD-10-CM ICD-9-CM   1. Acute pancreatitis, unspecified complication status, unspecified pancreatitis type K85.90 577.0   2. Epigastric pain R10.13 789.06         Disposition:   Disposition: Discharged  Condition: Stable                        Job Gould MD  04/12/18  1043

## 2018-04-19 ENCOUNTER — PATIENT OUTREACH (OUTPATIENT)
Dept: ADMINISTRATIVE | Facility: HOSPITAL | Age: 53
End: 2018-04-19

## 2018-04-19 NOTE — LETTER
April 19, 2018    Jorgito Arreaga  [address] [place] [address]             [facility]  [address]  [facility] [address]  Phone: [phone] Dear Mr. Arreaga:    [facility] is committed to your overall health.  To help you get the most out of each of your visits, we will review your information to make sure you are up to date on all of your recommended tests and/or procedures.      Otf Roblero MD has found that you may be due for   Health Maintenance Due   Topic    Colonoscopy     Eye Exam         If you have had any of the above done at another facility, please bring the records or information with you so that your record at [facility] will be complete.    If you are currently taking medication, please bring it with you to your appointment for review.    We will be happy to assist you with scheduling any necessary appointments or you may contact the [facility] appointment desk at [phone] to schedule at your convenience.     Thank you for choosing [facility] for your healthcare needs,    Randi GRAHAM LPN Care Coordinator  [facility]  [phone]

## 2018-04-20 DIAGNOSIS — K21.00 GASTROESOPHAGEAL REFLUX DISEASE WITH ESOPHAGITIS: Chronic | ICD-10-CM

## 2018-04-20 RX ORDER — PANTOPRAZOLE SODIUM 40 MG/1
TABLET, DELAYED RELEASE ORAL
Qty: 30 TABLET | Refills: 5 | Status: SHIPPED | OUTPATIENT
Start: 2018-04-20 | End: 2018-06-08 | Stop reason: SDUPTHER

## 2018-04-27 DIAGNOSIS — E78.5 HYPERLIPIDEMIA ASSOCIATED WITH TYPE 2 DIABETES MELLITUS: ICD-10-CM

## 2018-04-27 DIAGNOSIS — E11.69 HYPERLIPIDEMIA ASSOCIATED WITH TYPE 2 DIABETES MELLITUS: ICD-10-CM

## 2018-04-30 RX ORDER — ATORVASTATIN CALCIUM 10 MG/1
TABLET, FILM COATED ORAL
Qty: 30 TABLET | Refills: 6 | Status: SHIPPED | OUTPATIENT
Start: 2018-04-30 | End: 2018-05-11 | Stop reason: SDUPTHER

## 2018-05-03 ENCOUNTER — TELEPHONE (OUTPATIENT)
Dept: INTERNAL MEDICINE | Facility: CLINIC | Age: 53
End: 2018-05-03

## 2018-05-03 NOTE — TELEPHONE ENCOUNTER
Left msg for pt that he missed his appt today with  for 2pm. That I do see he has another appt with  for 5/11/18. To call us back if he needs to reschedule.

## 2018-05-11 ENCOUNTER — TELEPHONE (OUTPATIENT)
Dept: INTERNAL MEDICINE | Facility: CLINIC | Age: 53
End: 2018-05-11

## 2018-05-11 ENCOUNTER — DOCUMENTATION ONLY (OUTPATIENT)
Dept: ENDOSCOPY | Facility: HOSPITAL | Age: 53
End: 2018-05-11

## 2018-05-11 ENCOUNTER — TELEPHONE (OUTPATIENT)
Dept: GASTROENTEROLOGY | Facility: CLINIC | Age: 53
End: 2018-05-11

## 2018-05-11 ENCOUNTER — OFFICE VISIT (OUTPATIENT)
Dept: INTERNAL MEDICINE | Facility: CLINIC | Age: 53
End: 2018-05-11
Payer: MEDICAID

## 2018-05-11 ENCOUNTER — LAB VISIT (OUTPATIENT)
Dept: LAB | Facility: HOSPITAL | Age: 53
End: 2018-05-11
Attending: FAMILY MEDICINE
Payer: MEDICAID

## 2018-05-11 VITALS
HEIGHT: 67 IN | OXYGEN SATURATION: 98 % | TEMPERATURE: 98 F | HEART RATE: 98 BPM | BODY MASS INDEX: 33.21 KG/M2 | DIASTOLIC BLOOD PRESSURE: 90 MMHG | WEIGHT: 211.63 LBS | SYSTOLIC BLOOD PRESSURE: 130 MMHG | RESPIRATION RATE: 16 BRPM

## 2018-05-11 DIAGNOSIS — K92.1 MELENA: ICD-10-CM

## 2018-05-11 DIAGNOSIS — E11.69 HYPERLIPIDEMIA ASSOCIATED WITH TYPE 2 DIABETES MELLITUS: ICD-10-CM

## 2018-05-11 DIAGNOSIS — R11.0 NAUSEA: ICD-10-CM

## 2018-05-11 DIAGNOSIS — K92.1 MELENA: Primary | ICD-10-CM

## 2018-05-11 DIAGNOSIS — E78.5 HYPERLIPIDEMIA ASSOCIATED WITH TYPE 2 DIABETES MELLITUS: ICD-10-CM

## 2018-05-11 DIAGNOSIS — N47.6 BALANOPOSTHITIS: ICD-10-CM

## 2018-05-11 LAB
ALBUMIN SERPL BCP-MCNC: 3.9 G/DL
ALP SERPL-CCNC: 57 U/L
ALT SERPL W/O P-5'-P-CCNC: 14 U/L
ANION GAP SERPL CALC-SCNC: 8 MMOL/L
AST SERPL-CCNC: 15 U/L
BASOPHILS # BLD AUTO: 0.01 K/UL
BASOPHILS NFR BLD: 0.2 %
BILIRUB SERPL-MCNC: 0.6 MG/DL
BUN SERPL-MCNC: 18 MG/DL
CALCIUM SERPL-MCNC: 9.3 MG/DL
CHLORIDE SERPL-SCNC: 105 MMOL/L
CO2 SERPL-SCNC: 26 MMOL/L
CREAT SERPL-MCNC: 1 MG/DL
DIFFERENTIAL METHOD: ABNORMAL
EOSINOPHIL # BLD AUTO: 0.1 K/UL
EOSINOPHIL NFR BLD: 1.7 %
ERYTHROCYTE [DISTWIDTH] IN BLOOD BY AUTOMATED COUNT: 12.4 %
EST. GFR  (AFRICAN AMERICAN): >60 ML/MIN/1.73 M^2
EST. GFR  (NON AFRICAN AMERICAN): >60 ML/MIN/1.73 M^2
GLUCOSE SERPL-MCNC: 202 MG/DL
HCT VFR BLD AUTO: 35.3 %
HGB BLD-MCNC: 11.6 G/DL
LYMPHOCYTES # BLD AUTO: 1.7 K/UL
LYMPHOCYTES NFR BLD: 36.6 %
MCH RBC QN AUTO: 29.3 PG
MCHC RBC AUTO-ENTMCNC: 32.9 G/DL
MCV RBC AUTO: 89 FL
MONOCYTES # BLD AUTO: 0.5 K/UL
MONOCYTES NFR BLD: 11 %
NEUTROPHILS # BLD AUTO: 2.3 K/UL
NEUTROPHILS NFR BLD: 50.5 %
PLATELET # BLD AUTO: 183 K/UL
PMV BLD AUTO: 10 FL
POTASSIUM SERPL-SCNC: 4.8 MMOL/L
PROT SERPL-MCNC: 7.3 G/DL
RBC # BLD AUTO: 3.96 M/UL
SODIUM SERPL-SCNC: 139 MMOL/L
WBC # BLD AUTO: 4.62 K/UL

## 2018-05-11 PROCEDURE — 80053 COMPREHEN METABOLIC PANEL: CPT | Mod: PO

## 2018-05-11 PROCEDURE — 99214 OFFICE O/P EST MOD 30 MIN: CPT | Mod: S$PBB,,, | Performed by: FAMILY MEDICINE

## 2018-05-11 PROCEDURE — 36415 COLL VENOUS BLD VENIPUNCTURE: CPT | Mod: PO

## 2018-05-11 PROCEDURE — 99215 OFFICE O/P EST HI 40 MIN: CPT | Mod: PBBFAC,PO | Performed by: FAMILY MEDICINE

## 2018-05-11 PROCEDURE — 85025 COMPLETE CBC W/AUTO DIFF WBC: CPT | Mod: PO

## 2018-05-11 PROCEDURE — 99999 PR PBB SHADOW E&M-EST. PATIENT-LVL V: CPT | Mod: PBBFAC,,, | Performed by: FAMILY MEDICINE

## 2018-05-11 RX ORDER — CLOTRIMAZOLE AND BETAMETHASONE DIPROPIONATE 10; .64 MG/G; MG/G
CREAM TOPICAL
Qty: 15 G | Refills: 1 | Status: CANCELLED | OUTPATIENT
Start: 2018-05-11

## 2018-05-11 RX ORDER — MUPIROCIN 20 MG/G
OINTMENT TOPICAL
Qty: 22 G | Refills: 1 | Status: CANCELLED | OUTPATIENT
Start: 2018-05-11

## 2018-05-11 RX ORDER — PROMETHAZINE HYDROCHLORIDE 25 MG/1
25 TABLET ORAL EVERY 6 HOURS PRN
Qty: 15 TABLET | Refills: 0 | Status: SHIPPED | OUTPATIENT
Start: 2018-05-11 | End: 2018-06-22 | Stop reason: ALTCHOICE

## 2018-05-11 RX ORDER — MUPIROCIN 20 MG/G
OINTMENT TOPICAL
Qty: 22 G | Refills: 1 | Status: ON HOLD | OUTPATIENT
Start: 2018-05-11 | End: 2018-12-07 | Stop reason: HOSPADM

## 2018-05-11 RX ORDER — ATORVASTATIN CALCIUM 10 MG/1
10 TABLET, FILM COATED ORAL DAILY
Qty: 90 TABLET | Refills: 3 | Status: SHIPPED | OUTPATIENT
Start: 2018-05-11 | End: 2018-06-08 | Stop reason: SDUPTHER

## 2018-05-11 RX ORDER — CLOTRIMAZOLE AND BETAMETHASONE DIPROPIONATE 10; .64 MG/G; MG/G
CREAM TOPICAL
Qty: 15 G | Refills: 1 | Status: SHIPPED | OUTPATIENT
Start: 2018-05-11 | End: 2018-06-22 | Stop reason: SDUPTHER

## 2018-05-11 NOTE — PROGRESS NOTES
Subjective:       Patient ID: Jorgito Arreaga is a 52 y.o. male.    Chief Complaint: Hospital Follow Up (pancreas/knees) and Rectal Bleeding    Melenic stools - descr as black and tarry.  Also noting black blood, and occasional red blood.      Rectal Bleeding   This is a new problem. Episode onset: 3/14/18. The problem occurs 2 to 4 times per day. The problem has been unchanged. Associated symptoms include abdominal pain, a change in bowel habit, fatigue, nausea and weakness. Pertinent negatives include no diaphoresis, rash or vomiting. Nothing aggravates the symptoms. He has tried nothing for the symptoms. The treatment provided no relief.     Review of Systems   Constitutional: Positive for fatigue. Negative for diaphoresis.   Gastrointestinal: Positive for abdominal pain, change in bowel habit, hematochezia and nausea. Negative for vomiting.   Skin: Negative for rash.   Neurological: Positive for weakness.       Objective:      Physical Exam   Constitutional: He appears well-developed and well-nourished. No distress.   HENT:   Head: Normocephalic and atraumatic.   Pulmonary/Chest: Effort normal and breath sounds normal. No respiratory distress. He has no wheezes.   Abdominal: Soft. Bowel sounds are normal. There is tenderness in the epigastric area and left upper quadrant.   Skin: Skin is warm and dry. No rash noted. He is not diaphoretic. No erythema.   Nursing note and vitals reviewed.      Assessment:       1. Melena    2. Hyperlipidemia associated with type 2 diabetes mellitus    3. Nausea    4. Balanoposthitis        Plan:     Problem List Items Addressed This Visit        Renal/    RESOLVED: Balanoposthitis    Current Assessment & Plan     Pt had an acute issue in the past, treated, and states that it reoccurs.         Relevant Medications    mupirocin (BACTROBAN) 2 % ointment    clotrimazole-betamethasone 1-0.05% (LOTRISONE) cream    Other Relevant Orders    Ambulatory Referral to Urology       GI     Nausea    Relevant Medications    promethazine (PHENERGAN) 25 MG tablet    Melena - Primary    Current Assessment & Plan     No alcohol, no spicy foods, no caffeine.         Relevant Orders    Comprehensive metabolic panel    CBC auto differential    Case request GI: ESOPHAGOGASTRODUODENOSCOPY (EGD) (Completed)      Other Visit Diagnoses     Hyperlipidemia associated with type 2 diabetes mellitus        Relevant Medications    atorvastatin (LIPITOR) 10 MG tablet

## 2018-05-11 NOTE — TELEPHONE ENCOUNTER
Dr. Mann's next available appointment is 6/6/18 at 2PM. Called to notify patient. No answer; left message to call back. Mailing appointment slip.

## 2018-05-11 NOTE — TELEPHONE ENCOUNTER
Pt has been scheduled for EGD and Colonoscopy 7/6/2018. Pt stated it had to be on a Friday due to his work schedule.

## 2018-05-11 NOTE — TELEPHONE ENCOUNTER
Hey can someone in urology please call the pt to have him scheduled per  for urology in [place]. The referral is in. The pt has medicaid so it will not allow me to schedule him and he needs to be soon. Thanks in advance.

## 2018-05-11 NOTE — PROGRESS NOTES
5/11/2018 Pt called to schedule colonoscopy.  Pt stated he could only have the procedure on a Friday.  Pt scheduled. PCP notified and instructions sent via mail.

## 2018-05-16 ENCOUNTER — DOCUMENTATION ONLY (OUTPATIENT)
Dept: ENDOSCOPY | Facility: HOSPITAL | Age: 53
End: 2018-05-16

## 2018-05-16 NOTE — PROGRESS NOTES
5.16.2018 0926  Phoned pt on cell number in an attempt to move his procedure up due to new availability on 6/8/2018.  No answer.  Message left with call back number.

## 2018-05-22 ENCOUNTER — PATIENT OUTREACH (OUTPATIENT)
Dept: ADMINISTRATIVE | Facility: HOSPITAL | Age: 53
End: 2018-05-22

## 2018-05-22 NOTE — PROGRESS NOTES
Attempted to schedule diabetic eye exam. Patient has not schedule with [facility], will schedule and call back to retrieve results.

## 2018-05-24 DIAGNOSIS — E11.59 HYPERTENSION ASSOCIATED WITH DIABETES: ICD-10-CM

## 2018-05-24 DIAGNOSIS — I15.2 HYPERTENSION ASSOCIATED WITH DIABETES: ICD-10-CM

## 2018-05-24 RX ORDER — VERAPAMIL HYDROCHLORIDE 240 MG/1
TABLET, FILM COATED, EXTENDED RELEASE ORAL
Qty: 30 TABLET | Refills: 2 | OUTPATIENT
Start: 2018-05-24

## 2018-05-25 ENCOUNTER — OFFICE VISIT (OUTPATIENT)
Dept: INTERNAL MEDICINE | Facility: CLINIC | Age: 53
End: 2018-05-25
Payer: MEDICAID

## 2018-05-25 ENCOUNTER — LAB VISIT (OUTPATIENT)
Dept: LAB | Facility: HOSPITAL | Age: 53
End: 2018-05-25
Attending: FAMILY MEDICINE
Payer: MEDICAID

## 2018-05-25 VITALS
BODY MASS INDEX: 32.98 KG/M2 | DIASTOLIC BLOOD PRESSURE: 82 MMHG | WEIGHT: 210.13 LBS | SYSTOLIC BLOOD PRESSURE: 132 MMHG | TEMPERATURE: 96 F | OXYGEN SATURATION: 97 % | HEIGHT: 67 IN | RESPIRATION RATE: 18 BRPM | HEART RATE: 93 BPM

## 2018-05-25 DIAGNOSIS — Z79.899 ENCOUNTER FOR LONG-TERM (CURRENT) USE OF MEDICATIONS: ICD-10-CM

## 2018-05-25 DIAGNOSIS — E11.59 HYPERTENSION ASSOCIATED WITH DIABETES: ICD-10-CM

## 2018-05-25 DIAGNOSIS — Z79.4 TYPE 2 DIABETES MELLITUS WITH HYPERGLYCEMIA, WITH LONG-TERM CURRENT USE OF INSULIN: ICD-10-CM

## 2018-05-25 DIAGNOSIS — E11.65 TYPE 2 DIABETES MELLITUS WITH HYPERGLYCEMIA, WITH LONG-TERM CURRENT USE OF INSULIN: ICD-10-CM

## 2018-05-25 DIAGNOSIS — I15.2 HYPERTENSION ASSOCIATED WITH DIABETES: ICD-10-CM

## 2018-05-25 DIAGNOSIS — K92.1 MELENA: Primary | ICD-10-CM

## 2018-05-25 DIAGNOSIS — M17.0 PRIMARY OSTEOARTHRITIS OF BOTH KNEES: ICD-10-CM

## 2018-05-25 LAB
ANION GAP SERPL CALC-SCNC: 8 MMOL/L
BASOPHILS # BLD AUTO: 0.02 K/UL
BASOPHILS NFR BLD: 0.4 %
BUN SERPL-MCNC: 16 MG/DL
CALCIUM SERPL-MCNC: 9.4 MG/DL
CHLORIDE SERPL-SCNC: 108 MMOL/L
CO2 SERPL-SCNC: 26 MMOL/L
CREAT SERPL-MCNC: 0.8 MG/DL
DIFFERENTIAL METHOD: ABNORMAL
EOSINOPHIL # BLD AUTO: 0 K/UL
EOSINOPHIL NFR BLD: 0.8 %
ERYTHROCYTE [DISTWIDTH] IN BLOOD BY AUTOMATED COUNT: 13.5 %
EST. GFR  (AFRICAN AMERICAN): >60 ML/MIN/1.73 M^2
EST. GFR  (NON AFRICAN AMERICAN): >60 ML/MIN/1.73 M^2
GLUCOSE SERPL-MCNC: 106 MG/DL
HCT VFR BLD AUTO: 32.3 %
HGB BLD-MCNC: 10.2 G/DL
LYMPHOCYTES # BLD AUTO: 1.7 K/UL
LYMPHOCYTES NFR BLD: 34.9 %
MCH RBC QN AUTO: 28.9 PG
MCHC RBC AUTO-ENTMCNC: 31.6 G/DL
MCV RBC AUTO: 92 FL
MONOCYTES # BLD AUTO: 0.4 K/UL
MONOCYTES NFR BLD: 8.8 %
NEUTROPHILS # BLD AUTO: 2.7 K/UL
NEUTROPHILS NFR BLD: 54.9 %
PLATELET # BLD AUTO: 265 K/UL
PMV BLD AUTO: 9.4 FL
POTASSIUM SERPL-SCNC: 4.2 MMOL/L
RBC # BLD AUTO: 3.53 M/UL
SODIUM SERPL-SCNC: 142 MMOL/L
WBC # BLD AUTO: 4.9 K/UL

## 2018-05-25 PROCEDURE — 36415 COLL VENOUS BLD VENIPUNCTURE: CPT | Mod: PO

## 2018-05-25 PROCEDURE — 20610 DRAIN/INJ JOINT/BURSA W/O US: CPT | Mod: S$PBB,50,, | Performed by: FAMILY MEDICINE

## 2018-05-25 PROCEDURE — 99214 OFFICE O/P EST MOD 30 MIN: CPT | Mod: 25,S$PBB,, | Performed by: FAMILY MEDICINE

## 2018-05-25 PROCEDURE — 20610 DRAIN/INJ JOINT/BURSA W/O US: CPT | Mod: 50,PBBFAC,PO | Performed by: FAMILY MEDICINE

## 2018-05-25 PROCEDURE — 99999 PR PBB SHADOW E&M-EST. PATIENT-LVL III: CPT | Mod: PBBFAC,,, | Performed by: FAMILY MEDICINE

## 2018-05-25 PROCEDURE — 80048 BASIC METABOLIC PNL TOTAL CA: CPT | Mod: PO

## 2018-05-25 PROCEDURE — 20610 DRAIN/INJ JOINT/BURSA W/O US: CPT | Mod: PBBFAC,PO | Performed by: FAMILY MEDICINE

## 2018-05-25 PROCEDURE — 85025 COMPLETE CBC W/AUTO DIFF WBC: CPT | Mod: PO

## 2018-05-25 PROCEDURE — 99213 OFFICE O/P EST LOW 20 MIN: CPT | Mod: PBBFAC,PO,25 | Performed by: FAMILY MEDICINE

## 2018-05-25 RX ORDER — BETAMETHASONE SODIUM PHOSPHATE AND BETAMETHASONE ACETATE 3; 3 MG/ML; MG/ML
6 INJECTION, SUSPENSION INTRA-ARTICULAR; INTRALESIONAL; INTRAMUSCULAR; SOFT TISSUE
Status: DISCONTINUED | OUTPATIENT
Start: 2018-05-25 | End: 2018-05-25 | Stop reason: HOSPADM

## 2018-05-25 RX ORDER — HYDRALAZINE HYDROCHLORIDE 50 MG/1
50 TABLET, FILM COATED ORAL 3 TIMES DAILY
Qty: 180 TABLET | Refills: 0 | Status: SHIPPED | OUTPATIENT
Start: 2018-05-25 | End: 2018-06-08 | Stop reason: SDUPTHER

## 2018-05-25 RX ORDER — POTASSIUM CHLORIDE 750 MG/1
10 TABLET, EXTENDED RELEASE ORAL 2 TIMES DAILY
Qty: 180 TABLET | Refills: 0 | Status: SHIPPED | OUTPATIENT
Start: 2018-05-25 | End: 2018-06-08 | Stop reason: SDUPTHER

## 2018-05-25 RX ORDER — LISINOPRIL 40 MG/1
40 TABLET ORAL DAILY
Qty: 90 TABLET | Refills: 0 | Status: SHIPPED | OUTPATIENT
Start: 2018-05-25 | End: 2018-06-08 | Stop reason: SDUPTHER

## 2018-05-25 RX ORDER — FUROSEMIDE 20 MG/1
10 TABLET ORAL DAILY
Qty: 90 TABLET | Refills: 0 | Status: SHIPPED | OUTPATIENT
Start: 2018-05-25 | End: 2018-06-08 | Stop reason: SDUPTHER

## 2018-05-25 RX ORDER — GLIPIZIDE 2.5 MG/1
2.5 TABLET, EXTENDED RELEASE ORAL
Qty: 90 TABLET | Refills: 3 | Status: SHIPPED | OUTPATIENT
Start: 2018-05-25 | End: 2018-06-08 | Stop reason: SDUPTHER

## 2018-05-25 RX ORDER — VERAPAMIL HYDROCHLORIDE 240 MG/1
240 TABLET, FILM COATED, EXTENDED RELEASE ORAL DAILY
Qty: 90 TABLET | Refills: 0 | Status: SHIPPED | OUTPATIENT
Start: 2018-05-25 | End: 2018-06-08 | Stop reason: SDUPTHER

## 2018-05-25 RX ADMIN — BETAMETHASONE ACETATE AND BETAMETHASONE SODIUM PHOSPHATE 6 MG: 3; 3 INJECTION, SUSPENSION INTRA-ARTICULAR; INTRALESIONAL; INTRAMUSCULAR; SOFT TISSUE at 09:05

## 2018-05-25 NOTE — PROGRESS NOTES
Subjective:       Patient ID: Jorgito Arreaga is a 52 y.o. male.    Chief Complaint: Injections (hitesh knee)    Melena / BRBPR.  O: 2 months ago  L: rectum  D: constant  C: resolved  Yakov: ppi, stopped drinking  Exac:        Review of Systems   Respiratory: Negative for shortness of breath.    Cardiovascular: Negative for chest pain.   Gastrointestinal: Negative for abdominal pain.       Objective:      Physical Exam   Constitutional: He appears well-developed and well-nourished. No distress.   HENT:   Head: Normocephalic and atraumatic.   Pulmonary/Chest: Effort normal and breath sounds normal. No respiratory distress. He has no wheezes.   Abdominal: Soft. He exhibits no distension. There is no tenderness. There is no guarding.   Skin: Skin is warm and dry. No rash noted. He is not diaphoretic. No erythema.   Nursing note and vitals reviewed.      Assessment:       1. Melena    2. Hypertension associated with diabetes    3. Type 2 diabetes mellitus with hyperglycemia, with long-term current use of insulin    4. Primary osteoarthritis of both knees    5. Encounter for long-term (current) use of medications        Plan:     Problem List Items Addressed This Visit        Psychiatric    Encounter for long-term (current) use of medications    Relevant Orders    Basic metabolic panel    CBC auto differential       Cardiac/Vascular    Hypertension associated with diabetes    Relevant Medications    verapamil (CALAN-SR) 240 MG CR tablet    lisinopril (PRINIVIL,ZESTRIL) 40 MG tablet    hydrALAZINE (APRESOLINE) 50 MG tablet    furosemide (LASIX) 20 MG tablet    potassium chloride SA (K-DUR,KLOR-CON) 10 MEQ tablet       Endocrine    Type 2 diabetes mellitus with hyperglycemia    Relevant Medications    glipiZIDE (GLUCOTROL) 2.5 MG TR24       GI    Melena - Primary    Current Assessment & Plan     Pt has not had melena in a week.  cbc, bmp              Orthopedic    Primary osteoarthritis of both knees    Relevant Medications     betamethasone acetate-betamethasone sodium phosphate injection 6 mg    Other Relevant Orders    Large Joint Aspiration/Injection (Completed)

## 2018-05-25 NOTE — PROCEDURES
Large Joint Aspiration/Injection  Date/Time: 5/25/2018 9:41 AM  Performed by: JENNIFER CLARK  Authorized by: JENNIFER CLARK     Consent Done?:  Yes (Written)  Indications:  Pain  Procedure site marked: Yes    Timeout: Prior to procedure the correct patient, procedure, and site was verified      Location:  Knee  Site:  R knee and L knee  Prep: Patient was prepped and draped in usual sterile fashion    Ultrasonic Guidance for needle placement: No  Needle size:  25 G  Approach:  Anterolateral  Medications:  6 mg betamethasone acetate-betamethasone sodium phosphate 6 mg/mL  Patient tolerance:  Patient tolerated the procedure well with no immediate complications

## 2018-05-26 ENCOUNTER — PATIENT OUTREACH (OUTPATIENT)
Dept: ADMINISTRATIVE | Facility: HOSPITAL | Age: 53
End: 2018-05-26

## 2018-05-26 NOTE — LETTER
May 26, 2018      We are seeing Jorgito Arreaga, 1965, at [facility]. Otf Roblero MD is their primary care physician. To help with our [place] maintenance records could you please send the following:       MOST RECENT EYE EXAM     Please fax to [facility] at [phone], attention Nevaeh Morgan.     Thank-you in advance for your assistance. If you have any questions or concerns please contact me at [phone].     Nevaeh DE LA CRUZ LPN  Care Coordination Department  [facility]  [phone]

## 2018-06-08 ENCOUNTER — OFFICE VISIT (OUTPATIENT)
Dept: INTERNAL MEDICINE | Facility: CLINIC | Age: 53
End: 2018-06-08
Payer: MEDICAID

## 2018-06-08 ENCOUNTER — LAB VISIT (OUTPATIENT)
Dept: LAB | Facility: HOSPITAL | Age: 53
End: 2018-06-08
Attending: FAMILY MEDICINE
Payer: MEDICAID

## 2018-06-08 VITALS
TEMPERATURE: 97 F | OXYGEN SATURATION: 96 % | RESPIRATION RATE: 18 BRPM | WEIGHT: 210.31 LBS | HEART RATE: 90 BPM | DIASTOLIC BLOOD PRESSURE: 74 MMHG | SYSTOLIC BLOOD PRESSURE: 122 MMHG | BODY MASS INDEX: 33.01 KG/M2 | HEIGHT: 67 IN

## 2018-06-08 DIAGNOSIS — K63.5 POLYP OF COLON, UNSPECIFIED PART OF COLON, UNSPECIFIED TYPE: ICD-10-CM

## 2018-06-08 DIAGNOSIS — E11.65 TYPE 2 DIABETES MELLITUS WITH HYPERGLYCEMIA, WITH LONG-TERM CURRENT USE OF INSULIN: ICD-10-CM

## 2018-06-08 DIAGNOSIS — E11.69 HYPERLIPIDEMIA ASSOCIATED WITH TYPE 2 DIABETES MELLITUS: ICD-10-CM

## 2018-06-08 DIAGNOSIS — I15.2 HYPERTENSION ASSOCIATED WITH DIABETES: Primary | ICD-10-CM

## 2018-06-08 DIAGNOSIS — E78.5 HYPERLIPIDEMIA ASSOCIATED WITH TYPE 2 DIABETES MELLITUS: ICD-10-CM

## 2018-06-08 DIAGNOSIS — Z12.11 SCREEN FOR COLON CANCER: ICD-10-CM

## 2018-06-08 DIAGNOSIS — K21.00 GASTROESOPHAGEAL REFLUX DISEASE WITH ESOPHAGITIS: Chronic | ICD-10-CM

## 2018-06-08 DIAGNOSIS — Z79.4 TYPE 2 DIABETES MELLITUS WITH HYPERGLYCEMIA, WITH LONG-TERM CURRENT USE OF INSULIN: ICD-10-CM

## 2018-06-08 DIAGNOSIS — R60.9 EDEMA, UNSPECIFIED TYPE: ICD-10-CM

## 2018-06-08 DIAGNOSIS — E11.59 HYPERTENSION ASSOCIATED WITH DIABETES: Primary | ICD-10-CM

## 2018-06-08 PROBLEM — M25.561 ACUTE PAIN OF BOTH KNEES: Status: RESOLVED | Noted: 2017-11-22 | Resolved: 2018-06-08

## 2018-06-08 PROBLEM — R11.0 NAUSEA: Status: RESOLVED | Noted: 2018-05-11 | Resolved: 2018-06-08

## 2018-06-08 PROBLEM — R60.0 LOCALIZED EDEMA: Status: RESOLVED | Noted: 2017-11-22 | Resolved: 2018-06-08

## 2018-06-08 PROBLEM — M25.562 ACUTE PAIN OF BOTH KNEES: Status: RESOLVED | Noted: 2017-11-22 | Resolved: 2018-06-08

## 2018-06-08 LAB
ESTIMATED AVG GLUCOSE: 192 MG/DL
HBA1C MFR BLD HPLC: 8.3 %

## 2018-06-08 PROCEDURE — 99214 OFFICE O/P EST MOD 30 MIN: CPT | Mod: S$PBB,,, | Performed by: FAMILY MEDICINE

## 2018-06-08 PROCEDURE — 83036 HEMOGLOBIN GLYCOSYLATED A1C: CPT

## 2018-06-08 PROCEDURE — 99999 PR PBB SHADOW E&M-EST. PATIENT-LVL V: CPT | Mod: PBBFAC,,, | Performed by: FAMILY MEDICINE

## 2018-06-08 PROCEDURE — 36415 COLL VENOUS BLD VENIPUNCTURE: CPT | Mod: PO

## 2018-06-08 PROCEDURE — 99215 OFFICE O/P EST HI 40 MIN: CPT | Mod: PBBFAC,PO | Performed by: FAMILY MEDICINE

## 2018-06-08 RX ORDER — ISOSORBIDE DINITRATE 20 MG/1
20 TABLET ORAL 3 TIMES DAILY
Qty: 90 TABLET | Refills: 0 | Status: SHIPPED | OUTPATIENT
Start: 2018-06-08 | End: 2018-06-22 | Stop reason: SDUPTHER

## 2018-06-08 RX ORDER — POTASSIUM CHLORIDE 750 MG/1
10 TABLET, EXTENDED RELEASE ORAL 2 TIMES DAILY
Qty: 180 TABLET | Refills: 0 | Status: SHIPPED | OUTPATIENT
Start: 2018-06-08 | End: 2018-06-22 | Stop reason: SDUPTHER

## 2018-06-08 RX ORDER — ATORVASTATIN CALCIUM 10 MG/1
10 TABLET, FILM COATED ORAL DAILY
Qty: 90 TABLET | Refills: 3 | Status: SHIPPED | OUTPATIENT
Start: 2018-06-08 | End: 2018-06-22 | Stop reason: SDUPTHER

## 2018-06-08 RX ORDER — HYDRALAZINE HYDROCHLORIDE 50 MG/1
50 TABLET, FILM COATED ORAL 3 TIMES DAILY
Qty: 180 TABLET | Refills: 0 | Status: SHIPPED | OUTPATIENT
Start: 2018-06-08 | End: 2018-06-22 | Stop reason: SDUPTHER

## 2018-06-08 RX ORDER — METFORMIN HYDROCHLORIDE 500 MG/1
500 TABLET ORAL 2 TIMES DAILY WITH MEALS
Qty: 180 TABLET | Refills: 0 | Status: SHIPPED | OUTPATIENT
Start: 2018-06-08 | End: 2018-06-22 | Stop reason: SDUPTHER

## 2018-06-08 RX ORDER — VERAPAMIL HYDROCHLORIDE 240 MG/1
240 TABLET, FILM COATED, EXTENDED RELEASE ORAL DAILY
Qty: 90 TABLET | Refills: 1 | Status: SHIPPED | OUTPATIENT
Start: 2018-06-08 | End: 2018-06-22 | Stop reason: SDUPTHER

## 2018-06-08 RX ORDER — GLIPIZIDE 2.5 MG/1
2.5 TABLET, EXTENDED RELEASE ORAL
Qty: 90 TABLET | Refills: 3 | Status: SHIPPED | OUTPATIENT
Start: 2018-06-08 | End: 2018-06-22 | Stop reason: SDUPTHER

## 2018-06-08 RX ORDER — LISINOPRIL 40 MG/1
40 TABLET ORAL DAILY
Qty: 90 TABLET | Refills: 0 | Status: SHIPPED | OUTPATIENT
Start: 2018-06-08 | End: 2018-06-22 | Stop reason: SDUPTHER

## 2018-06-08 RX ORDER — SPIRONOLACTONE 50 MG/1
50 TABLET, FILM COATED ORAL DAILY
Qty: 90 TABLET | Refills: 1 | Status: SHIPPED | OUTPATIENT
Start: 2018-06-08 | End: 2018-06-22 | Stop reason: SDUPTHER

## 2018-06-08 RX ORDER — PANTOPRAZOLE SODIUM 40 MG/1
40 TABLET, DELAYED RELEASE ORAL DAILY
Qty: 90 TABLET | Refills: 1 | Status: SHIPPED | OUTPATIENT
Start: 2018-06-08 | End: 2018-06-22 | Stop reason: SDUPTHER

## 2018-06-08 RX ORDER — INSULIN GLARGINE 100 [IU]/ML
INJECTION, SOLUTION SUBCUTANEOUS
Qty: 45 ML | Refills: 3 | Status: SHIPPED | OUTPATIENT
Start: 2018-06-08 | End: 2018-06-22 | Stop reason: SDUPTHER

## 2018-06-08 RX ORDER — FUROSEMIDE 20 MG/1
10 TABLET ORAL DAILY
Qty: 90 TABLET | Refills: 0 | Status: SHIPPED | OUTPATIENT
Start: 2018-06-08 | End: 2018-06-22 | Stop reason: SDUPTHER

## 2018-06-08 NOTE — PROGRESS NOTES
Subjective:       Patient ID: Jorgito Arreaga is a 52 y.o. male.    Chief Complaint: Follow-up (Dm, Htn) and Spasms (leg,thighs)    Hypertension   This is a chronic problem. The current episode started more than 1 year ago. The problem is controlled. Pertinent negatives include no anxiety, blurred vision, chest pain or shortness of breath. There are no associated agents to hypertension. Risk factors for coronary artery disease include diabetes mellitus, dyslipidemia and male gender.     Review of Systems   Eyes: Negative for blurred vision.   Respiratory: Negative for shortness of breath.    Cardiovascular: Negative for chest pain.   Gastrointestinal: Negative for abdominal pain.       Objective:      Physical Exam   Constitutional: He appears well-developed and well-nourished. No distress.   HENT:   Head: Normocephalic and atraumatic.   Nose: Nose normal.   Mouth/Throat: Oropharynx is clear and moist.   Pulmonary/Chest: Effort normal and breath sounds normal. No respiratory distress. He has no wheezes.   Skin: Skin is warm and dry. No rash noted. He is not diaphoretic. No erythema.   Nursing note and vitals reviewed.      Assessment:       1. Hypertension associated with diabetes    2. Uncontrolled secondary diabetes with peripheral neuropathy    3. Screen for colon cancer    4. Polyp of colon, unspecified part of colon, unspecified type    5. Hyperlipidemia associated with type 2 diabetes mellitus    6. Type 2 diabetes mellitus with hyperglycemia, with long-term current use of insulin    7. Edema, unspecified type    8. Gastroesophageal reflux disease with esophagitis        Plan:     Problem List Items Addressed This Visit        Cardiac/Vascular    Hypertension associated with diabetes - Primary    Relevant Medications    furosemide (LASIX) 20 MG tablet    hydrALAZINE (APRESOLINE) 50 MG tablet    isosorbide dinitrate (ISORDIL) 20 MG tablet    lisinopril (PRINIVIL,ZESTRIL) 40 MG tablet    verapamil (CALAN-SR)  240 MG CR tablet    potassium chloride SA (K-DUR,KLOR-CON) 10 MEQ tablet       Endocrine    Type 2 diabetes mellitus with hyperglycemia    Relevant Medications    insulin glargine (BASAGLAR KWIKPEN U-100 INSULIN) 100 unit/mL (3 mL) InPn pen    glipiZIDE (GLUCOTROL) 2.5 MG TR24    Uncontrolled secondary diabetes with peripheral neuropathy    Relevant Orders    Ambulatory Referral to Optometry    Hemoglobin A1c       GI    Gastroesophageal reflux disease (Chronic)    Relevant Medications    pantoprazole (PROTONIX) 40 MG tablet    Polyp of colon    Current Assessment & Plan     History of 42 polyps on last c-scope, > 8 yrs ago         Relevant Orders    Case request GI: COLONOSCOPY (Completed)    Screen for colon cancer    Relevant Orders    Case request GI: COLONOSCOPY (Completed)       Other    Edema    Relevant Medications    spironolactone (ALDACTONE) 50 MG tablet      Other Visit Diagnoses     Hyperlipidemia associated with type 2 diabetes mellitus        Relevant Medications    atorvastatin (LIPITOR) 10 MG tablet

## 2018-06-11 NOTE — PROGRESS NOTES
Please call pt and schedule appt to review abnormal lab findings.  Patient is an appointment to discuss his diabetes and possibly consider starting Victoza.  His A1c is above 8.

## 2018-06-14 PROBLEM — Z86.010 HISTORY OF COLON POLYPS: Status: ACTIVE | Noted: 2018-06-14

## 2018-06-14 PROBLEM — Z86.0100 HISTORY OF COLON POLYPS: Status: ACTIVE | Noted: 2018-06-14

## 2018-06-15 ENCOUNTER — ANESTHESIA (OUTPATIENT)
Dept: ENDOSCOPY | Facility: HOSPITAL | Age: 53
End: 2018-06-15
Payer: MEDICAID

## 2018-06-15 ENCOUNTER — ANESTHESIA EVENT (OUTPATIENT)
Dept: ENDOSCOPY | Facility: HOSPITAL | Age: 53
End: 2018-06-15
Payer: MEDICAID

## 2018-06-15 ENCOUNTER — HOSPITAL ENCOUNTER (OUTPATIENT)
Facility: HOSPITAL | Age: 53
Discharge: HOME | End: 2018-06-15
Attending: INTERNAL MEDICINE | Admitting: INTERNAL MEDICINE
Payer: MEDICAID

## 2018-06-15 ENCOUNTER — SURGERY (OUTPATIENT)
Age: 53
End: 2018-06-15

## 2018-06-15 VITALS
WEIGHT: 205 LBS | RESPIRATION RATE: 18 BRPM | SYSTOLIC BLOOD PRESSURE: 124 MMHG | TEMPERATURE: 98 F | HEART RATE: 70 BPM | OXYGEN SATURATION: 99 % | BODY MASS INDEX: 32.95 KG/M2 | DIASTOLIC BLOOD PRESSURE: 70 MMHG | HEIGHT: 66 IN

## 2018-06-15 DIAGNOSIS — Z86.010 HISTORY OF COLON POLYPS: ICD-10-CM

## 2018-06-15 DIAGNOSIS — D12.3 BENIGN NEOPLASM OF TRANSVERSE COLON: ICD-10-CM

## 2018-06-15 DIAGNOSIS — K21.9 GERD (GASTROESOPHAGEAL REFLUX DISEASE): ICD-10-CM

## 2018-06-15 DIAGNOSIS — K21.9 GASTROESOPHAGEAL REFLUX DISEASE WITHOUT ESOPHAGITIS: Primary | Chronic | ICD-10-CM

## 2018-06-15 LAB — POCT GLUCOSE: 100 MG/DL (ref 70–110)

## 2018-06-15 PROCEDURE — 45385 COLONOSCOPY W/LESION REMOVAL: CPT | Performed by: INTERNAL MEDICINE

## 2018-06-15 PROCEDURE — 00813 ANES UPR LWR GI NDSC PX: CPT | Performed by: INTERNAL MEDICINE

## 2018-06-15 PROCEDURE — 88305 TISSUE EXAM BY PATHOLOGIST: CPT | Performed by: PATHOLOGY

## 2018-06-15 PROCEDURE — 27201012 HC FORCEPS, HOT/COLD, DISP: Performed by: INTERNAL MEDICINE

## 2018-06-15 PROCEDURE — 37000009 HC ANESTHESIA EA ADD 15 MINS: Performed by: INTERNAL MEDICINE

## 2018-06-15 PROCEDURE — 43239 EGD BIOPSY SINGLE/MULTIPLE: CPT | Mod: 51,,, | Performed by: INTERNAL MEDICINE

## 2018-06-15 PROCEDURE — 43239 EGD BIOPSY SINGLE/MULTIPLE: CPT | Performed by: INTERNAL MEDICINE

## 2018-06-15 PROCEDURE — 25000003 PHARM REV CODE 250: Performed by: NURSE ANESTHETIST, CERTIFIED REGISTERED

## 2018-06-15 PROCEDURE — 45385 COLONOSCOPY W/LESION REMOVAL: CPT | Mod: ,,, | Performed by: INTERNAL MEDICINE

## 2018-06-15 PROCEDURE — 88305 TISSUE EXAM BY PATHOLOGIST: CPT | Mod: 26,,, | Performed by: PATHOLOGY

## 2018-06-15 PROCEDURE — 37000008 HC ANESTHESIA 1ST 15 MINUTES: Performed by: INTERNAL MEDICINE

## 2018-06-15 PROCEDURE — 25000003 PHARM REV CODE 250: Performed by: INTERNAL MEDICINE

## 2018-06-15 PROCEDURE — 27201089 HC SNARE, DISP (ANY): Performed by: INTERNAL MEDICINE

## 2018-06-15 PROCEDURE — 63600175 PHARM REV CODE 636 W HCPCS: Performed by: NURSE ANESTHETIST, CERTIFIED REGISTERED

## 2018-06-15 RX ORDER — LIDOCAINE HYDROCHLORIDE 20 MG/ML
INJECTION, SOLUTION EPIDURAL; INFILTRATION; INTRACAUDAL; PERINEURAL
Status: DISCONTINUED | OUTPATIENT
Start: 2018-06-15 | End: 2018-06-15

## 2018-06-15 RX ORDER — SODIUM CHLORIDE, SODIUM LACTATE, POTASSIUM CHLORIDE, CALCIUM CHLORIDE 600; 310; 30; 20 MG/100ML; MG/100ML; MG/100ML; MG/100ML
INJECTION, SOLUTION INTRAVENOUS CONTINUOUS
Status: DISCONTINUED | OUTPATIENT
Start: 2018-06-15 | End: 2018-06-15 | Stop reason: HOSPADM

## 2018-06-15 RX ORDER — PROPOFOL 10 MG/ML
VIAL (ML) INTRAVENOUS
Status: DISCONTINUED | OUTPATIENT
Start: 2018-06-15 | End: 2018-06-15

## 2018-06-15 RX ADMIN — SODIUM CHLORIDE, SODIUM LACTATE, POTASSIUM CHLORIDE, AND CALCIUM CHLORIDE: .6; .31; .03; .02 INJECTION, SOLUTION INTRAVENOUS at 07:06

## 2018-06-15 RX ADMIN — PROPOFOL 60 MG: 10 INJECTION, EMULSION INTRAVENOUS at 07:06

## 2018-06-15 RX ADMIN — PROPOFOL 50 MG: 10 INJECTION, EMULSION INTRAVENOUS at 07:06

## 2018-06-15 RX ADMIN — PROPOFOL 100 MG: 10 INJECTION, EMULSION INTRAVENOUS at 07:06

## 2018-06-15 RX ADMIN — PROPOFOL 40 MG: 10 INJECTION, EMULSION INTRAVENOUS at 07:06

## 2018-06-15 RX ADMIN — LIDOCAINE HYDROCHLORIDE 80 MG: 20 INJECTION, SOLUTION EPIDURAL; INFILTRATION; INTRACAUDAL; PERINEURAL at 07:06

## 2018-06-15 NOTE — TRANSFER OF CARE
"Anesthesia Transfer of Care Note    Patient: Jorgito Arreaga    Procedure(s) Performed: Procedure(s) (LRB):  ESOPHAGOGASTRODUODENOSCOPY (EGD) (N/A)  COLONOSCOPY (N/A)    Patient location: Other: GI PACU    Anesthesia Type: MAC    Transport from OR: Transported from OR on room air with adequate spontaneous ventilation    Post pain: adequate analgesia    Post assessment: no apparent anesthetic complications    Post vital signs: stable    Level of consciousness: awake    Nausea/Vomiting: no nausea/vomiting    Complications: none    Transfer of care protocol was followed      Last vitals:   Visit Vitals  /85 (BP Location: Left arm, Patient Position: Lying)   Pulse (!) 18   Temp 36.6 °C (97.9 °F) (Oral)   Resp (!) 70   Ht 5' 6" (1.676 m)   Wt 93 kg (205 lb)   SpO2 100%   BMI 33.09 kg/m²     "

## 2018-06-15 NOTE — DISCHARGE SUMMARY
Endoscopy Discharge Summary      Admit Date: 6/15/2018    Discharge Date and Time:  6/15/2018 7:54 AM    Attending Physician: Jim Hicks MD     Discharge Physician: Jim Hicks MD     Principal Admitting Diagnoses: Gastroesophageal reflux disease         Discharge Diagnosis: The primary encounter diagnosis was Gastroesophageal reflux disease without esophagitis. Diagnoses of History of colon polyps, GERD (gastroesophageal reflux disease), and Benign neoplasm of transverse colon were also pertinent to this visit.     Discharged Condition: Good    Indication for Admission: Gastroesophageal reflux disease     Hospital Course: Patient was admitted for an inpatient procedure and tolerated the procedure well with no complications.    Significant Diagnostic Studies:  EGD & COLON    Pathology (if any):  Specimen (12h ago through future)    Start     Ordered    06/15/18 0739  Specimen to Pathology - Surgery  Once     Comments:  1. Antrum bx, r/o h.pylori2. Colon Polyps      06/15/18 0752          Disposition: Home.    Bleeding: None    No Implants         Follow Up/Patient Instructions:   Current Discharge Medication List      CONTINUE these medications which have NOT CHANGED    Details   aspirin 325 MG tablet Take 325 mg by mouth once daily.      atorvastatin (LIPITOR) 10 MG tablet Take 1 tablet (10 mg total) by mouth once daily.  Qty: 90 tablet, Refills: 3    Comments: Please consider 90 day supplies to promote better adherence  Associated Diagnoses: Hyperlipidemia associated with type 2 diabetes mellitus      blood sugar diagnostic (ONETOUCH ULTRA TEST) Strp USE TO CHECK SUGAR BEFORE MEALS AND AT BEDTIME  Qty: 100 strip, Refills: 11    Associated Diagnoses: Type 2 diabetes mellitus with hyperglycemia, with long-term current use of insulin      clotrimazole-betamethasone 1-0.05% (LOTRISONE) cream APPLY  CREAM TOPICALLY TO AFFECTED AREA TWICE DAILY  Qty: 15 g, Refills: 1    Comments: Please consider 90 day supplies  to promote better adherence  Associated Diagnoses: Balanoposthitis      furosemide (LASIX) 20 MG tablet Take 0.5 tablets (10 mg total) by mouth once daily.  Qty: 90 tablet, Refills: 0    Associated Diagnoses: Hypertension associated with diabetes      glipiZIDE (GLUCOTROL) 2.5 MG TR24 Take 1 tablet (2.5 mg total) by mouth daily with breakfast.  Qty: 90 tablet, Refills: 3    Associated Diagnoses: Type 2 diabetes mellitus with hyperglycemia, with long-term current use of insulin      hydrALAZINE (APRESOLINE) 50 MG tablet Take 1 tablet (50 mg total) by mouth 3 (three) times daily.  Qty: 180 tablet, Refills: 0    Associated Diagnoses: Hypertension associated with diabetes      insulin glargine (BASAGLAR KWIKPEN U-100 INSULIN) 100 unit/mL (3 mL) InPn pen INJECT 45 UNITS SUB-Q IN THE EVENING  Qty: 45 mL, Refills: 3    Comments: Please consider 90 day supplies to promote better adherence  Associated Diagnoses: Type 2 diabetes mellitus with hyperglycemia, with long-term current use of insulin      isosorbide dinitrate (ISORDIL) 20 MG tablet Take 1 tablet (20 mg total) by mouth 3 (three) times daily.  Qty: 90 tablet, Refills: 0    Associated Diagnoses: Hypertension associated with diabetes      lisinopril (PRINIVIL,ZESTRIL) 40 MG tablet Take 1 tablet (40 mg total) by mouth once daily.  Qty: 90 tablet, Refills: 0    Associated Diagnoses: Hypertension associated with diabetes      metFORMIN (GLUCOPHAGE) 500 MG tablet Take 1 tablet (500 mg total) by mouth 2 (two) times daily with meals.  Qty: 180 tablet, Refills: 0      !! MICROLET LANCET Misc Inject 1 each into the skin every meal as needed.  Qty: 100 each, Refills: 5    Associated Diagnoses: Type 2 diabetes mellitus without complication, with long-term current use of insulin      mupirocin (BACTROBAN) 2 % ointment APPLY  OINTMENT TOPICALLY TO AFFECTED AREA THREE TIMES DAILY  Qty: 22 g, Refills: 1    Comments: Please consider 90 day supplies to promote better  "adherence  Associated Diagnoses: Balanoposthitis      !! ONETOUCH DELICA LANCETS 33 gauge Misc USE TO CHECK SUGAR BEFORE MEALS AND AT BEDTIME  Qty: 100 each, Refills: 11      pantoprazole (PROTONIX) 40 MG tablet Take 1 tablet (40 mg total) by mouth once daily.  Qty: 90 tablet, Refills: 1    Comments: Please consider 90 day supplies to promote better adherence  Associated Diagnoses: Gastroesophageal reflux disease with esophagitis      potassium chloride SA (K-DUR,KLOR-CON) 10 MEQ tablet Take 1 tablet (10 mEq total) by mouth 2 (two) times daily.  Qty: 180 tablet, Refills: 0    Associated Diagnoses: Hypertension associated with diabetes      promethazine (PHENERGAN) 25 MG tablet Take 1 tablet (25 mg total) by mouth every 6 (six) hours as needed for Nausea.  Qty: 15 tablet, Refills: 0    Associated Diagnoses: Nausea      spironolactone (ALDACTONE) 50 MG tablet Take 1 tablet (50 mg total) by mouth once daily.  Qty: 90 tablet, Refills: 1    Comments: Please consider 90 day supplies to promote better adherence  Associated Diagnoses: Edema, unspecified type      [employer]IC[employer] PEN NEEDLE 31 gauge x 1/4" Ndle USE ONE ONCE NIGHTLY  Qty: 50 each, Refills: 7    Comments: Please consider 90 day supplies to promote better adherence  Associated Diagnoses: Type 2 diabetes mellitus without complication, with long-term current use of insulin      verapamil (CALAN-SR) 240 MG CR tablet Take 1 tablet (240 mg total) by mouth once daily.  Qty: 90 tablet, Refills: 1    Comments: Please consider 90 day supplies to promote better adherence  Associated Diagnoses: Hypertension associated with diabetes       !! - Potential duplicate medications found. Please discuss with provider.      STOP taking these medications       blood-glucose meter kit Comments:   Reason for Stopping:                 Discharge Procedure Orders  Diet general     Activity as tolerated     Call MD for:  temperature >100.4     Call MD for:  persistent nausea and vomiting "     Call MD for:  severe uncontrolled pain     Call MD for:  difficulty breathing, headache or visual disturbances     Call MD for:  redness, tenderness, or signs of infection (pain, swelling, redness, odor or green/yellow discharge around incision site)     Call MD for:  hives     Call MD for:  persistent dizziness or light-headedness     No dressing needed         Follow-up Information     Otf Roblero MD.    Specialty:  Family Medicine  Why:  As needed  Contact information:  [address] [address] [address] [phone]

## 2018-06-15 NOTE — ANESTHESIA PREPROCEDURE EVALUATION
06/15/2018  Jorgito Arreaga is a 52 y.o., male.    Anesthesia Evaluation    I have reviewed the Patient Summary Reports.    I have reviewed the Nursing Notes.   I have reviewed the Medications.     Review of Systems  Anesthesia Hx:  No problems with previous Anesthesia Denies Hx of Anesthetic complications  History of prior surgery of interest to airway management or planning: Previous anesthesia: MAC Denies Family Hx of Anesthesia complications.   Denies Personal Hx of Anesthesia complications.   Social:  Former Smoker, Alcohol Use    Hematology/Oncology:  Hematology Normal   Oncology Normal     EENT/Dental:EENT/Dental Normal   Cardiovascular:   Hypertension, well controlled    Pulmonary:  Pulmonary Normal    Renal/:   Chronic Renal Disease, ARF    Hepatic/GI:   GERD, well controlled  Pancreatic Disease   Musculoskeletal:   Arthritis     Neurological:   TIA,    Endocrine:   Diabetes, well controlled, type 2    Psych:   Psychiatric History          Physical Exam  General:  Well nourished    Airway/Jaw/Neck:  Airway Findings: Mouth Opening: Normal Tongue: Normal  General Airway Assessment: Adult  Mallampati: II  TM Distance: Normal, at least 6 cm      Dental:  Dental Findings: In tact   Chest/Lungs:  Chest/Lungs Findings: Clear to auscultation, Normal Respiratory Rate     Heart/Vascular:  Heart Findings: Rate: Normal  Rhythm: Regular Rhythm  Sounds: Normal        Mental Status:  Mental Status Findings:  Cooperative, Alert and Oriented         Anesthesia Plan  Type of Anesthesia, risks & benefits discussed:  Anesthesia Type:  MAC  Patient's Preference:   Intra-op Monitoring Plan: standard ASA monitors  Intra-op Monitoring Plan Comments:   Post Op Pain Control Plan:   Post Op Pain Control Plan Comments:   Induction:   IV  Beta Blocker:  Patient is not currently on a Beta-Blocker (No further documentation  required).       Informed Consent: Patient understands risks and agrees with Anesthesia plan.  Questions answered. Anesthesia consent signed with patient.  ASA Score: 3     Day of Surgery Review of History & Physical: I have interviewed and examined the patient. I have reviewed the patient's H&P dated:            Ready For Surgery From Anesthesia Perspective.

## 2018-06-15 NOTE — INTERVAL H&P NOTE
The patient has been examined and the H&P has been reviewed:    I concur with the findings and no changes have occurred since H&P was written.    Anesthesia/Surgery risks, benefits and alternative options discussed and understood by patient/family.          Active Hospital Problems    Diagnosis  POA    *Gastroesophageal reflux disease [K21.9]  Yes     Priority: 1 - High     Chronic    GERD (gastroesophageal reflux disease) [K21.9]  Yes    History of colon polyps [Z86.010]  Not Applicable     Last Colon in 2010        Resolved Hospital Problems    Diagnosis Date Resolved POA   No resolved problems to display.

## 2018-06-15 NOTE — ANESTHESIA POSTPROCEDURE EVALUATION
"Anesthesia Post Evaluation    Patient: Jorgito Arreaga    Procedure(s) Performed: Procedure(s) (LRB):  ESOPHAGOGASTRODUODENOSCOPY (EGD) (N/A)  COLONOSCOPY (N/A)    Final Anesthesia Type: MAC  Patient location during evaluation: GI PACU  Patient participation: Yes- Able to Participate  Level of consciousness: awake and alert and oriented  Post-procedure vital signs: reviewed and stable  Pain management: adequate  Airway patency: patent  PONV status at discharge: No PONV  Anesthetic complications: no      Cardiovascular status: hemodynamically stable and blood pressure returned to baseline  Respiratory status: unassisted, spontaneous ventilation and room air  Hydration status: euvolemic  Follow-up not needed.        Visit Vitals  /85 (BP Location: Left arm, Patient Position: Lying)   Pulse (!) 18   Temp 36.6 °C (97.9 °F) (Oral)   Resp (!) 70   Ht 5' 6" (1.676 m)   Wt 93 kg (205 lb)   SpO2 100%   BMI 33.09 kg/m²       Pain/Marely Score: Pain Assessment Performed: Yes (6/15/2018  7:13 AM)  Presence of Pain: denies (6/15/2018  7:13 AM)      "

## 2018-06-15 NOTE — DISCHARGE INSTRUCTIONS
If you develop fevers, severe abdominal pain, significant bleeding, nausea or vomiting, please contact us or come to our Emergency Department at [facility].  Our  contact number is [phone] available for emergencies or any question that you may have.      You may return to normal activities tomorrow.  Written discharge instructions were provided to you today and have them handy since you may not remember our conversation today.       I also recommend that you follow a high fiber diet and take calcium supplements daily as well as to continue your present medications.      If you take Aspirin, please resume taking it at your prior dose today. If we obtained biopsies or removed polyps during your procedure, we will contact you within 5 to 6 days with the results.      Thanks for trusting us with your healthcare needs.      Sincerely,     Jim Hicks M.D.        To rate your experience with Dr. Hicks, please click on the link below     [email or web address]

## 2018-06-15 NOTE — PLAN OF CARE
Dr Hicks came to bedside and discussed findings. NO N/V,  no abdominal pain, no GI bleeding, and vitals stable.  Pt discharged from unit.

## 2018-06-15 NOTE — ANESTHESIA RELEASE NOTE
"Anesthesia Release from PACU Note    Patient: Jorgito Arreaga    Procedure(s) Performed: Procedure(s) (LRB):  ESOPHAGOGASTRODUODENOSCOPY (EGD) (N/A)  COLONOSCOPY (N/A)    Anesthesia type: MAC    Post pain: Adequate analgesia    Post assessment: no apparent anesthetic complications, tolerated procedure well and no evidence of recall    Last Vitals:   Visit Vitals  /85 (BP Location: Left arm, Patient Position: Lying)   Pulse (!) 18   Temp 36.6 °C (97.9 °F) (Oral)   Resp (!) 70   Ht 5' 6" (1.676 m)   Wt 93 kg (205 lb)   SpO2 100%   BMI 33.09 kg/m²       Post vital signs: stable    Level of consciousness: awake, alert  and oriented    Nausea/Vomiting: no nausea/no vomiting    Complications: none    Airway Patency: patent    Respiratory: unassisted, spontaneous ventilation, room air    Cardiovascular: stable and blood pressure at baseline    Hydration: euvolemic  "

## 2018-06-15 NOTE — [employer] PATIENT INSTRUCTIONS
Discharge Summary/Instructions after an Endoscopic Procedure  Patient Name: Jorgito Arreaga  Patient MRN: [id number]  Patient YOB: 1965  Friday, Sandi 15, 2018 Jim Hicks MD  RESTRICTIONS:  During your procedure today, you received medications for sedation.  These   medications may affect your judgment, balance and coordination.  Therefore,   for 24 hours, you have the following restrictions:   - DO NOT drive a car, operate machinery, make legal/financial decisions,   sign important papers or drink alcohol.    ACTIVITY:  Today: no heavy lifting, straining or running due to procedural   sedation/anesthesia.  The following day: return to full activity including work.  DIET:  Eat and drink normally unless instructed otherwise.     TREATMENT FOR COMMON SIDE EFFECTS:  - Mild abdominal pain, nausea, belching, bloating or excessive gas:  rest,   eat lightly and use a heating pad.  - Sore Throat: treat with throat lozenges and/or gargle with warm salt   water.  - Because air was used during the procedure, expelling large amounts of air   from your rectum or belching is normal.  - If a bowel prep was taken, you may not have a bowel movement for 1-3 days.    This is normal.  SYMPTOMS TO WATCH FOR AND REPORT TO YOUR PHYSICIAN:  1. Abdominal pain or bloating, other than gas cramps.  2. Chest pain.  3. Back pain.  4. Signs of infection such as: chills or fever occurring within 24 hours   after the procedure.  5. Rectal bleeding, which would show as bright red, maroon, or black stools.   (A tablespoon of blood from the rectum is not serious, especially if   hemorrhoids are present.)  6. Vomiting.  7. Weakness or dizziness.  GO DIRECTLY TO THE NEAREST EMERGENCY ROOM IF YOU HAVE ANY OF THE FOLLOWING:      Difficulty breathing              Chills and/or fever over 101 F   Persistent vomiting and/or vomiting blood   Severe abdominal pain   Severe chest pain   Black, tarry stools   Bleeding- more than one tablespoon   Any  other symptom or condition that you feel may need urgent attention  Your doctor recommends these additional instructions:  If any biopsies were taken, your doctors clinic will contact you in 1 to 2   weeks with any results.  - The patient will be observed post-procedure, until all discharge criteria   are met.   - Discharge patient to home (ambulatory).   - Patient has a contact number available for emergencies.  The signs and   symptoms of potential delayed complications were discussed with the   patient.  Return to normal activities tomorrow.  Written discharge   instructions were provided to the patient.   - Resume previous diet.   - Continue present medications.   - Await pathology results.   - No repeat upper endoscopy.   - Return to referring physician as previously scheduled.   - Follow an antireflux regimen daily.  For questions, problems or results please call your physician Jim Hicks MD at Work:  [phone]  If you have any questions about the above instructions, call the GI   department at [phone] or call the endoscopy unit at [phone]   from 7am until 3 pm.  [facility] [place], EMERGENCY ROOM PHONE NUMBER:   [phone]  IF A COMPLICATION OR EMERGENCY SITUATION ARISES AND YOU ARE UNABLE TO REACH   YOUR PHYSICIAN - GO DIRECTLY TO THE EMERGENCY ROOM.  I have read or have had read to me these discharge instructions for my   procedure and have received a written copy.  I understand these   instructions and will follow-up with my physician if I have any questions.     __________________________________       _____________________________________  Nurse Signature                                          Patient/Designated   Responsible Party Signature  Jim Hicks MD  6/15/2018 7:58:22 AM  This report has been verified and signed electronically.  [employer]

## 2018-06-15 NOTE — [employer] PATIENT INSTRUCTIONS
Discharge Summary/Instructions after an Endoscopic Procedure  Patient Name: Jorgito Arreaga  Patient MRN: [id number]  Patient YOB: 1965  Friday, Sandi 15, 2018 Jim Hicks MD  RESTRICTIONS:  During your procedure today, you received medications for sedation.  These   medications may affect your judgment, balance and coordination.  Therefore,   for 24 hours, you have the following restrictions:   - DO NOT drive a car, operate machinery, make legal/financial decisions,   sign important papers or drink alcohol.    ACTIVITY:  Today: no heavy lifting, straining or running due to procedural   sedation/anesthesia.  The following day: return to full activity including work.  DIET:  Eat and drink normally unless instructed otherwise.     TREATMENT FOR COMMON SIDE EFFECTS:  - Mild abdominal pain, nausea, belching, bloating or excessive gas:  rest,   eat lightly and use a heating pad.  - Sore Throat: treat with throat lozenges and/or gargle with warm salt   water.  - Because air was used during the procedure, expelling large amounts of air   from your rectum or belching is normal.  - If a bowel prep was taken, you may not have a bowel movement for 1-3 days.    This is normal.  SYMPTOMS TO WATCH FOR AND REPORT TO YOUR PHYSICIAN:  1. Abdominal pain or bloating, other than gas cramps.  2. Chest pain.  3. Back pain.  4. Signs of infection such as: chills or fever occurring within 24 hours   after the procedure.  5. Rectal bleeding, which would show as bright red, maroon, or black stools.   (A tablespoon of blood from the rectum is not serious, especially if   hemorrhoids are present.)  6. Vomiting.  7. Weakness or dizziness.  GO DIRECTLY TO THE NEAREST EMERGENCY ROOM IF YOU HAVE ANY OF THE FOLLOWING:      Difficulty breathing              Chills and/or fever over 101 F   Persistent vomiting and/or vomiting blood   Severe abdominal pain   Severe chest pain   Black, tarry stools   Bleeding- more than one tablespoon   Any  other symptom or condition that you feel may need urgent attention  Your doctor recommends these additional instructions:  If any biopsies were taken, your doctors clinic will contact you in 1 to 2   weeks with any results.  - The patient will be observed post-procedure, until all discharge criteria   are met.   - Discharge patient to home (ambulatory).   - Patient has a contact number available for emergencies.  The signs and   symptoms of potential delayed complications were discussed with the   patient.  Return to normal activities tomorrow.  Written discharge   instructions were provided to the patient.   - Resume previous diet.   - Continue present medications.   - Await pathology results.   - Repeat colonoscopy in 5 years for surveillance.   - Return to referring physician as previously scheduled.  For questions, problems or results please call your physician Jim Hicks MD at Work:  [phone]  If you have any questions about the above instructions, call the GI   department at [phone] or call the endoscopy unit at [phone]   from 7am until 3 pm.  [facility] [place], EMERGENCY ROOM PHONE NUMBER:   [phone]  IF A COMPLICATION OR EMERGENCY SITUATION ARISES AND YOU ARE UNABLE TO REACH   YOUR PHYSICIAN - GO DIRECTLY TO THE EMERGENCY ROOM.  I have read or have had read to me these discharge instructions for my   procedure and have received a written copy.  I understand these   instructions and will follow-up with my physician if I have any questions.     __________________________________       _____________________________________  Nurse Signature                                          Patient/Designated   Responsible Party Signature  Jim Hicks MD  6/15/2018 7:56:50 AM  This report has been verified and signed electronically.  [employer]

## 2018-06-18 NOTE — PROGRESS NOTES
Please let the patient know that biopsies taken during procedure are essentially OK.     FINAL PATHOLOGIC DIAGNOSIS  1  Antrum biopsy. Rule out H. pylori:  Antral mucosa without significant histologic alteration;  H. pylori is not identified on H&E-stained sections.    Please let the patient know that The colon polyp(s) removed showed adenomatous tissue. No further action is needed at this point. Adenomatous polyps are the most common of the classically neoplastic polyps. About two-thirds of all colonic polyps are adenomas. Adenomas are by definition dysplastic and thus have malignant potential. Most colorectal cancers arise from adenomas, but only a small minority of adenomas progress to cancer (5 percent or less). Studies reporting the average age at presentation of patients with adenomatous polyps versus colorectal cancer suggest the time for development of adenomas to cancer is about 7 to 10 years. The risk of progression is higher for advanced adenomas.      Colon polyps:  Fragments of tubular adenoma and normal colonic mucosa    Jim Hicks M.D.

## 2018-06-22 ENCOUNTER — OFFICE VISIT (OUTPATIENT)
Dept: INTERNAL MEDICINE | Facility: CLINIC | Age: 53
End: 2018-06-22
Payer: MEDICAID

## 2018-06-22 VITALS
DIASTOLIC BLOOD PRESSURE: 74 MMHG | RESPIRATION RATE: 18 BRPM | BODY MASS INDEX: 33.98 KG/M2 | HEART RATE: 107 BPM | TEMPERATURE: 98 F | SYSTOLIC BLOOD PRESSURE: 126 MMHG | HEIGHT: 66 IN | OXYGEN SATURATION: 96 % | WEIGHT: 211.44 LBS

## 2018-06-22 DIAGNOSIS — K21.00 GASTROESOPHAGEAL REFLUX DISEASE WITH ESOPHAGITIS: Chronic | ICD-10-CM

## 2018-06-22 DIAGNOSIS — E11.59 HYPERTENSION ASSOCIATED WITH DIABETES: Primary | ICD-10-CM

## 2018-06-22 DIAGNOSIS — Z79.4 TYPE 2 DIABETES MELLITUS WITH HYPERGLYCEMIA, WITH LONG-TERM CURRENT USE OF INSULIN: ICD-10-CM

## 2018-06-22 DIAGNOSIS — Z79.4 TYPE 2 DIABETES MELLITUS WITHOUT COMPLICATION, WITH LONG-TERM CURRENT USE OF INSULIN: ICD-10-CM

## 2018-06-22 DIAGNOSIS — N47.6 BALANOPOSTHITIS: ICD-10-CM

## 2018-06-22 DIAGNOSIS — I15.2 HYPERTENSION ASSOCIATED WITH DIABETES: Primary | ICD-10-CM

## 2018-06-22 DIAGNOSIS — R60.9 EDEMA, UNSPECIFIED TYPE: ICD-10-CM

## 2018-06-22 DIAGNOSIS — E11.69 HYPERLIPIDEMIA ASSOCIATED WITH TYPE 2 DIABETES MELLITUS: ICD-10-CM

## 2018-06-22 DIAGNOSIS — E11.65 TYPE 2 DIABETES MELLITUS WITH HYPERGLYCEMIA, WITH LONG-TERM CURRENT USE OF INSULIN: ICD-10-CM

## 2018-06-22 DIAGNOSIS — E11.9 TYPE 2 DIABETES MELLITUS WITHOUT COMPLICATION, WITH LONG-TERM CURRENT USE OF INSULIN: ICD-10-CM

## 2018-06-22 DIAGNOSIS — E78.5 HYPERLIPIDEMIA ASSOCIATED WITH TYPE 2 DIABETES MELLITUS: ICD-10-CM

## 2018-06-22 PROCEDURE — 99999 PR PBB SHADOW E&M-EST. PATIENT-LVL III: CPT | Mod: PBBFAC,,, | Performed by: FAMILY MEDICINE

## 2018-06-22 PROCEDURE — 99213 OFFICE O/P EST LOW 20 MIN: CPT | Mod: PBBFAC,PO | Performed by: FAMILY MEDICINE

## 2018-06-22 PROCEDURE — 99214 OFFICE O/P EST MOD 30 MIN: CPT | Mod: S$PBB,,, | Performed by: FAMILY MEDICINE

## 2018-06-22 RX ORDER — ATORVASTATIN CALCIUM 10 MG/1
10 TABLET, FILM COATED ORAL DAILY
Qty: 90 TABLET | Refills: 3 | Status: SHIPPED | OUTPATIENT
Start: 2018-06-22 | End: 2018-10-03 | Stop reason: SDUPTHER

## 2018-06-22 RX ORDER — ASPIRIN 325 MG
325 TABLET ORAL DAILY
Qty: 360 TABLET | Refills: 1 | Status: SHIPPED | OUTPATIENT
Start: 2018-06-22 | End: 2019-12-31 | Stop reason: SDUPTHER

## 2018-06-22 RX ORDER — LANCETS 33 GAUGE
1 EACH MISCELLANEOUS
Qty: 100 EACH | Refills: 11 | Status: SHIPPED | OUTPATIENT
Start: 2018-06-22 | End: 2018-07-23 | Stop reason: SDUPTHER

## 2018-06-22 RX ORDER — CLOTRIMAZOLE AND BETAMETHASONE DIPROPIONATE 10; .64 MG/G; MG/G
CREAM TOPICAL
Qty: 15 G | Refills: 1 | Status: SHIPPED | OUTPATIENT
Start: 2018-06-22 | End: 2018-06-28

## 2018-06-22 RX ORDER — FUROSEMIDE 20 MG/1
10 TABLET ORAL DAILY
Qty: 90 TABLET | Refills: 0 | Status: ON HOLD | OUTPATIENT
Start: 2018-06-22 | End: 2018-07-21

## 2018-06-22 RX ORDER — GLIPIZIDE 2.5 MG/1
2.5 TABLET, EXTENDED RELEASE ORAL
Qty: 90 TABLET | Refills: 3 | Status: SHIPPED | OUTPATIENT
Start: 2018-06-22 | End: 2018-10-03 | Stop reason: SDUPTHER

## 2018-06-22 RX ORDER — INSULIN GLARGINE 100 [IU]/ML
INJECTION, SOLUTION SUBCUTANEOUS
Qty: 45 ML | Refills: 3 | Status: SHIPPED | OUTPATIENT
Start: 2018-06-22 | End: 2018-10-03 | Stop reason: SDUPTHER

## 2018-06-22 RX ORDER — SPIRONOLACTONE 50 MG/1
50 TABLET, FILM COATED ORAL DAILY
Qty: 90 TABLET | Refills: 1 | Status: ON HOLD | OUTPATIENT
Start: 2018-06-22 | End: 2018-07-21

## 2018-06-22 RX ORDER — LISINOPRIL 40 MG/1
40 TABLET ORAL DAILY
Qty: 90 TABLET | Refills: 0 | Status: ON HOLD | OUTPATIENT
Start: 2018-06-22 | End: 2018-07-21

## 2018-06-22 RX ORDER — ISOSORBIDE DINITRATE 20 MG/1
20 TABLET ORAL 3 TIMES DAILY
Qty: 90 TABLET | Refills: 0 | Status: SHIPPED | OUTPATIENT
Start: 2018-06-22 | End: 2018-06-28

## 2018-06-22 RX ORDER — METFORMIN HYDROCHLORIDE 500 MG/1
500 TABLET ORAL 2 TIMES DAILY WITH MEALS
Qty: 180 TABLET | Refills: 0 | Status: SHIPPED | OUTPATIENT
Start: 2018-06-22 | End: 2018-10-03 | Stop reason: SDUPTHER

## 2018-06-22 RX ORDER — VERAPAMIL HYDROCHLORIDE 240 MG/1
240 TABLET, FILM COATED, EXTENDED RELEASE ORAL DAILY
Qty: 90 TABLET | Refills: 1 | Status: SHIPPED | OUTPATIENT
Start: 2018-06-22 | End: 2018-10-03 | Stop reason: SDUPTHER

## 2018-06-22 RX ORDER — PEN NEEDLE, DIABETIC 30 GX3/16"
NEEDLE, DISPOSABLE MISCELLANEOUS
Qty: 50 EACH | Refills: 7 | Status: SHIPPED | OUTPATIENT
Start: 2018-06-22 | End: 2018-10-03 | Stop reason: SDUPTHER

## 2018-06-22 RX ORDER — HYDRALAZINE HYDROCHLORIDE 50 MG/1
50 TABLET, FILM COATED ORAL 3 TIMES DAILY
Qty: 180 TABLET | Refills: 0 | Status: SHIPPED | OUTPATIENT
Start: 2018-06-22 | End: 2018-10-03 | Stop reason: SDUPTHER

## 2018-06-22 RX ORDER — POTASSIUM CHLORIDE 750 MG/1
10 TABLET, EXTENDED RELEASE ORAL 2 TIMES DAILY
Qty: 180 TABLET | Refills: 0 | Status: ON HOLD | OUTPATIENT
Start: 2018-06-22 | End: 2018-07-21 | Stop reason: HOSPADM

## 2018-06-22 RX ORDER — LANCETS
1 EACH MISCELLANEOUS
Qty: 100 EACH | Refills: 5 | Status: ON HOLD | OUTPATIENT
Start: 2018-06-22 | End: 2018-07-21 | Stop reason: HOSPADM

## 2018-06-22 RX ORDER — PANTOPRAZOLE SODIUM 40 MG/1
40 TABLET, DELAYED RELEASE ORAL DAILY
Qty: 90 TABLET | Refills: 1 | Status: SHIPPED | OUTPATIENT
Start: 2018-06-22 | End: 2018-10-03 | Stop reason: SDUPTHER

## 2018-06-22 NOTE — PROGRESS NOTES
Subjective:       Patient ID: Jorgito Arreaga is a 52 y.o. male.    Chief Complaint: Follow-up and Results    Hypertension   This is a chronic problem. The current episode started more than 1 month ago. The problem has been resolved since onset. The problem is controlled. Pertinent negatives include no anxiety, chest pain, peripheral edema or shortness of breath. There are no associated agents to hypertension. Risk factors for coronary artery disease include diabetes mellitus, male gender and obesity.     Review of Systems   Respiratory: Negative for shortness of breath.    Cardiovascular: Negative for chest pain.   Gastrointestinal: Negative for abdominal pain.       Objective:      Physical Exam   Constitutional: He appears well-developed and well-nourished. No distress.   HENT:   Head: Normocephalic and atraumatic.   Nose: Nose normal.   Mouth/Throat: Oropharynx is clear and moist.   Pulmonary/Chest: Effort normal and breath sounds normal. No respiratory distress. He has no wheezes.   Skin: Skin is warm and dry. No rash noted. He is not diaphoretic. No erythema.   Nursing note and vitals reviewed.      Assessment:       1. Hypertension associated with diabetes    2. Hyperlipidemia associated with type 2 diabetes mellitus    3. Type 2 diabetes mellitus with hyperglycemia, with long-term current use of insulin    4. Balanoposthitis    5. Type 2 diabetes mellitus without complication, with long-term current use of insulin    6. Gastroesophageal reflux disease with esophagitis    7. Edema, unspecified type        Plan:     Problem List Items Addressed This Visit        Cardiac/Vascular    Hypertension associated with diabetes - Primary    Relevant Medications    furosemide (LASIX) 20 MG tablet    hydrALAZINE (APRESOLINE) 50 MG tablet    isosorbide dinitrate (ISORDIL) 20 MG tablet    lisinopril (PRINIVIL,ZESTRIL) 40 MG tablet    potassium chloride SA (K-DUR,KLOR-CON) 10 MEQ tablet    verapamil (CALAN-SR) 240 MG CR  "tablet       Endocrine    Type 2 diabetes mellitus with hyperglycemia    Relevant Medications    blood sugar diagnostic (ONETOUCH ULTRA TEST) Strp    glipiZIDE (GLUCOTROL) 2.5 MG TR24    insulin glargine (BASAGLAR KWIKPEN U-100 INSULIN) 100 unit/mL (3 mL) InPn pen       GI    GERD (gastroesophageal reflux disease)    Relevant Medications    pantoprazole (PROTONIX) 40 MG tablet       Other    Edema    Relevant Medications    spironolactone (ALDACTONE) 50 MG tablet      Other Visit Diagnoses     Hyperlipidemia associated with type 2 diabetes mellitus        Relevant Medications    atorvastatin (LIPITOR) 10 MG tablet    Balanoposthitis        Relevant Medications    clotrimazole-betamethasone 1-0.05% (LOTRISONE) cream    Type 2 diabetes mellitus without complication, with long-term current use of insulin        Relevant Medications    metFORMIN (GLUCOPHAGE) 500 MG tablet    MICROLET LANCET Misc    pen needle, diabetic ([employer] PEN NEEDLE) 31 gauge x 1/4" Ndle        "

## 2018-06-28 DIAGNOSIS — N47.6 BALANOPOSTHITIS: ICD-10-CM

## 2018-06-28 DIAGNOSIS — I15.2 HYPERTENSION ASSOCIATED WITH DIABETES: ICD-10-CM

## 2018-06-28 DIAGNOSIS — E11.59 HYPERTENSION ASSOCIATED WITH DIABETES: ICD-10-CM

## 2018-06-28 RX ORDER — ISOSORBIDE DINITRATE 20 MG/1
TABLET ORAL
Qty: 90 TABLET | Refills: 0 | Status: SHIPPED | OUTPATIENT
Start: 2018-06-28 | End: 2018-10-03 | Stop reason: SDUPTHER

## 2018-06-28 RX ORDER — CLOTRIMAZOLE AND BETAMETHASONE DIPROPIONATE 10; .64 MG/G; MG/G
CREAM TOPICAL
Qty: 45 G | Refills: 0 | Status: ON HOLD | OUTPATIENT
Start: 2018-06-28 | End: 2018-12-07 | Stop reason: HOSPADM

## 2018-07-10 ENCOUNTER — HOSPITAL ENCOUNTER (EMERGENCY)
Facility: HOSPITAL | Age: 53
Discharge: HOME | End: 2018-07-10
Attending: EMERGENCY MEDICINE
Payer: MEDICAID

## 2018-07-10 VITALS
RESPIRATION RATE: 19 BRPM | HEIGHT: 66 IN | BODY MASS INDEX: 32.38 KG/M2 | WEIGHT: 201.5 LBS | OXYGEN SATURATION: 99 % | TEMPERATURE: 99 F | HEART RATE: 81 BPM | DIASTOLIC BLOOD PRESSURE: 79 MMHG | SYSTOLIC BLOOD PRESSURE: 125 MMHG

## 2018-07-10 DIAGNOSIS — N28.9 RENAL INSUFFICIENCY: Primary | ICD-10-CM

## 2018-07-10 DIAGNOSIS — R07.9 CHEST PAIN: ICD-10-CM

## 2018-07-10 LAB
ALBUMIN SERPL BCP-MCNC: 3.7 G/DL
ALP SERPL-CCNC: 50 U/L
ALT SERPL W/O P-5'-P-CCNC: 18 U/L
AMYLASE SERPL-CCNC: 39 U/L
ANION GAP SERPL CALC-SCNC: 10 MMOL/L
ANION GAP SERPL CALC-SCNC: 16 MMOL/L
AST SERPL-CCNC: 19 U/L
BASOPHILS # BLD AUTO: 0.01 K/UL
BASOPHILS NFR BLD: 0.1 %
BILIRUB SERPL-MCNC: 0.8 MG/DL
BILIRUB UR QL STRIP: NEGATIVE
BNP SERPL-MCNC: <10 PG/ML
BUN SERPL-MCNC: 16 MG/DL
BUN SERPL-MCNC: 19 MG/DL
CALCIUM SERPL-MCNC: 8.9 MG/DL
CALCIUM SERPL-MCNC: 9.5 MG/DL
CHLORIDE SERPL-SCNC: 102 MMOL/L
CHLORIDE SERPL-SCNC: 95 MMOL/L
CK SERPL-CCNC: 61 U/L
CLARITY UR REFRACT.AUTO: ABNORMAL
CO2 SERPL-SCNC: 21 MMOL/L
CO2 SERPL-SCNC: 22 MMOL/L
COLOR UR AUTO: YELLOW
CREAT SERPL-MCNC: 1.5 MG/DL
CREAT SERPL-MCNC: 2.3 MG/DL
DIFFERENTIAL METHOD: ABNORMAL
EOSINOPHIL # BLD AUTO: 0.2 K/UL
EOSINOPHIL NFR BLD: 2.3 %
ERYTHROCYTE [DISTWIDTH] IN BLOOD BY AUTOMATED COUNT: 13.1 %
EST. GFR  (AFRICAN AMERICAN): 36.6 ML/MIN/1.73 M^2
EST. GFR  (AFRICAN AMERICAN): >60 ML/MIN/1.73 M^2
EST. GFR  (NON AFRICAN AMERICAN): 31.7 ML/MIN/1.73 M^2
EST. GFR  (NON AFRICAN AMERICAN): 53.1 ML/MIN/1.73 M^2
GLUCOSE SERPL-MCNC: 278 MG/DL
GLUCOSE SERPL-MCNC: 387 MG/DL
GLUCOSE UR QL STRIP: ABNORMAL
HCT VFR BLD AUTO: 34.4 %
HGB BLD-MCNC: 11.2 G/DL
HGB UR QL STRIP: NEGATIVE
KETONES UR QL STRIP: NEGATIVE
LEUKOCYTE ESTERASE UR QL STRIP: NEGATIVE
LIPASE SERPL-CCNC: 54 U/L
LYMPHOCYTES # BLD AUTO: 1 K/UL
LYMPHOCYTES NFR BLD: 10.9 %
MCH RBC QN AUTO: 30.2 PG
MCHC RBC AUTO-ENTMCNC: 32.6 G/DL
MCV RBC AUTO: 93 FL
MONOCYTES # BLD AUTO: 0.8 K/UL
MONOCYTES NFR BLD: 8 %
NEUTROPHILS # BLD AUTO: 7.5 K/UL
NEUTROPHILS NFR BLD: 78.4 %
NITRITE UR QL STRIP: NEGATIVE
PH UR STRIP: 6 [PH] (ref 5–8)
PLATELET # BLD AUTO: 211 K/UL
PMV BLD AUTO: 10.7 FL
POTASSIUM SERPL-SCNC: 4.4 MMOL/L
POTASSIUM SERPL-SCNC: 4.4 MMOL/L
PROT SERPL-MCNC: 7.4 G/DL
PROT UR QL STRIP: ABNORMAL
RBC # BLD AUTO: 3.71 M/UL
SODIUM SERPL-SCNC: 133 MMOL/L
SODIUM SERPL-SCNC: 133 MMOL/L
SP GR UR STRIP: >=1.03 (ref 1–1.03)
TROPONIN I SERPL DL<=0.01 NG/ML-MCNC: <0.006 NG/ML
URN SPEC COLLECT METH UR: ABNORMAL
UROBILINOGEN UR STRIP-ACNC: NEGATIVE EU/DL
WBC # BLD AUTO: 9.52 K/UL

## 2018-07-10 PROCEDURE — 96361 HYDRATE IV INFUSION ADD-ON: CPT

## 2018-07-10 PROCEDURE — 82150 ASSAY OF AMYLASE: CPT

## 2018-07-10 PROCEDURE — 80048 BASIC METABOLIC PNL TOTAL CA: CPT

## 2018-07-10 PROCEDURE — 96374 THER/PROPH/DIAG INJ IV PUSH: CPT

## 2018-07-10 PROCEDURE — 80053 COMPREHEN METABOLIC PANEL: CPT

## 2018-07-10 PROCEDURE — 99284 EMERGENCY DEPT VISIT MOD MDM: CPT | Mod: 25

## 2018-07-10 PROCEDURE — 63600175 PHARM REV CODE 636 W HCPCS: Performed by: EMERGENCY MEDICINE

## 2018-07-10 PROCEDURE — 85025 COMPLETE CBC W/AUTO DIFF WBC: CPT

## 2018-07-10 PROCEDURE — 25000003 PHARM REV CODE 250: Performed by: EMERGENCY MEDICINE

## 2018-07-10 PROCEDURE — 93010 ELECTROCARDIOGRAM REPORT: CPT | Mod: ,,, | Performed by: INTERNAL MEDICINE

## 2018-07-10 PROCEDURE — 99900035 HC TECH TIME PER 15 MIN (STAT)

## 2018-07-10 PROCEDURE — 83880 ASSAY OF NATRIURETIC PEPTIDE: CPT

## 2018-07-10 PROCEDURE — 93005 ELECTROCARDIOGRAM TRACING: CPT

## 2018-07-10 PROCEDURE — 83690 ASSAY OF LIPASE: CPT

## 2018-07-10 PROCEDURE — 84484 ASSAY OF TROPONIN QUANT: CPT

## 2018-07-10 PROCEDURE — 81003 URINALYSIS AUTO W/O SCOPE: CPT

## 2018-07-10 PROCEDURE — 82550 ASSAY OF CK (CPK): CPT

## 2018-07-10 RX ORDER — HYDROCODONE BITARTRATE AND ACETAMINOPHEN 5; 325 MG/1; MG/1
1 TABLET ORAL EVERY 4 HOURS PRN
Qty: 11 TABLET | Refills: 0 | Status: ON HOLD | OUTPATIENT
Start: 2018-07-10 | End: 2018-07-21 | Stop reason: HOSPADM

## 2018-07-10 RX ORDER — ONDANSETRON 2 MG/ML
4 INJECTION INTRAMUSCULAR; INTRAVENOUS
Status: COMPLETED | OUTPATIENT
Start: 2018-07-10 | End: 2018-07-10

## 2018-07-10 RX ORDER — OMEPRAZOLE 20 MG/1
20 CAPSULE, DELAYED RELEASE ORAL DAILY
Qty: 30 CAPSULE | Refills: 11 | Status: SHIPPED | OUTPATIENT
Start: 2018-07-10 | End: 2018-07-19

## 2018-07-10 RX ORDER — PROMETHAZINE HYDROCHLORIDE 25 MG/1
25 TABLET ORAL EVERY 6 HOURS PRN
Qty: 15 TABLET | Refills: 0 | Status: SHIPPED | OUTPATIENT
Start: 2018-07-10 | End: 2018-09-04

## 2018-07-10 RX ADMIN — SODIUM CHLORIDE 1000 ML: 0.9 INJECTION, SOLUTION INTRAVENOUS at 11:07

## 2018-07-10 RX ADMIN — ONDANSETRON 4 MG: 2 INJECTION INTRAMUSCULAR; INTRAVENOUS at 11:07

## 2018-07-10 RX ADMIN — DICYCLOMINE HYDROCHLORIDE 50 ML: 10 SOLUTION ORAL at 11:07

## 2018-07-10 NOTE — ED PROVIDER NOTES
Encounter Date: 7/10/2018       History     Chief Complaint   Patient presents with    Chest Pain     lower R side chest pain, with nausea, and dizziness     The history is provided by the patient.   Chest Pain   The current episode started several days ago. Chest pain occurs constantly. The chest pain is unchanged. The quality of the pain is described as aching. The pain does not radiate. Primary symptoms include abdominal pain and nausea. Pertinent negatives for primary symptoms include no fever, no shortness of breath, no cough, no wheezing, no palpitations, no vomiting and no dizziness.   The abdominal pain began more than 2 days ago. The abdominal pain is located in the epigastric region. The abdominal pain does not radiate.   Pertinent negatives for associated symptoms include no weakness.     Review of patient's allergies indicates:  No Known Allergies  Past Medical History:   Diagnosis Date    Cataracts, bilateral     Diabetes mellitus, type 2     GERD (gastroesophageal reflux disease)     Hypertension     Inflammatory polyps of colon     Pancreatitis, alcoholic, acute     Port catheter in place     TIA (transient ischemic attack)      Past Surgical History:   Procedure Laterality Date    COLONOSCOPY      COLONOSCOPY N/A 6/15/2018    Procedure: COLONOSCOPY;  Surgeon: Jim Hicks MD;  Location: [facility];  Service: Endoscopy;  Laterality: N/A;    ESOPHAGOGASTRODUODENOSCOPY N/A 6/15/2018    Procedure: ESOPHAGOGASTRODUODENOSCOPY (EGD);  Surgeon: Jim Hicks MD;  Location: [facility];  Service: Endoscopy;  Laterality: N/A;     Family History   Problem Relation Age of Onset    Hypertension Mother     Hypertension Father      Social History   Substance Use Topics    Smoking status: Former Smoker     Types: Cigarettes    Smokeless tobacco: Never Used    Alcohol use Yes      Comment:  Daily - States that he drinks 1/2 pint of vodka and about 1-2 cans of beer daily     Review of Systems    Constitutional: Negative for fever.   HENT: Negative for sore throat.    Respiratory: Negative for cough, shortness of breath and wheezing.    Cardiovascular: Positive for chest pain. Negative for palpitations.   Gastrointestinal: Positive for abdominal pain and nausea. Negative for vomiting.   Genitourinary: Negative for dysuria.   Musculoskeletal: Negative for back pain.   Skin: Negative for rash.   Neurological: Negative for dizziness and weakness.   Hematological: Does not bruise/bleed easily.       Physical Exam     Initial Vitals [07/10/18 1008]   BP Pulse Resp Temp SpO2   (!) 109/57 109 20 97.4 °F (36.3 °C) 97 %      MAP       --         Physical Exam    Nursing note and vitals reviewed.  Constitutional: He appears well-developed and well-nourished. No distress.   HENT:   Head: Normocephalic and atraumatic.   Mouth/Throat: Oropharynx is clear and moist.   Eyes: Conjunctivae and EOM are normal. Pupils are equal, round, and reactive to light.   Neck: Normal range of motion. Neck supple.   Cardiovascular: Normal rate, regular rhythm and normal heart sounds. Exam reveals no gallop and no friction rub.    No murmur heard.  Pulmonary/Chest: Breath sounds normal. No respiratory distress. He has no wheezes. He has no rhonchi. He has no rales.   Abdominal: Soft. Bowel sounds are normal. He exhibits no distension and no mass. There is no tenderness. There is no rebound and no guarding.   Musculoskeletal: Normal range of motion. He exhibits no edema.   Neurological: He is alert and oriented to person, place, and time. He has normal strength.   Skin: Skin is warm and dry. No rash noted.   Psychiatric: He has a normal mood and affect. Thought content normal.         ED Course   Procedures  Labs Reviewed   CBC W/ AUTO DIFFERENTIAL - Abnormal; Notable for the following:        Result Value    RBC 3.71 (*)     Hemoglobin 11.2 (*)     Hematocrit 34.4 (*)     Gran% 78.4 (*)     Lymph% 10.9 (*)     All other components within  normal limits   COMPREHENSIVE METABOLIC PANEL - Abnormal; Notable for the following:     Sodium 133 (*)     CO2 22 (*)     Glucose 387 (*)     Creatinine 2.3 (*)     Alkaline Phosphatase 50 (*)     eGFR if  36.6 (*)     eGFR if non  31.7 (*)     All other components within normal limits   URINALYSIS, REFLEX TO URINE CULTURE - Abnormal; Notable for the following:     Appearance, UA Hazy (*)     Specific Gravity, UA >=1.030 (*)     Protein, UA Trace (*)     Glucose, UA 2+ (*)     All other components within normal limits    Narrative:     Preferred Collection Type->Urine, Clean Catch   BASIC METABOLIC PANEL - Abnormal; Notable for the following:     Sodium 133 (*)     CO2 21 (*)     Glucose 278 (*)     Creatinine 1.5 (*)     eGFR if non  53.1 (*)     All other components within normal limits   LIPASE   AMYLASE   B-TYPE NATRIURETIC PEPTIDE   CK   TROPONIN I     EKG Readings: (Independently Interpreted)   Rhythm: Normal Sinus Rhythm. Heart Rate: 92. Ectopy: No Ectopy. Conduction: Normal. ST Segments: Normal ST Segments. T Waves: Normal. Clinical Impression: Normal Sinus Rhythm       Imaging Results          X-Ray Chest PA And Lateral (Final result)  Result time 07/10/18 10:41:32    Final result by Srinivasa Dubose MD (07/10/18 10:41:32)                 Impression:      No acute process seen.      Electronically signed by: Srinivasa Dubose MD  Date:    07/10/2018  Time:    10:41             Narrative:    EXAMINATION:  XR CHEST PA AND LATERAL    CLINICAL HISTORY:  epigastric pain;    COMPARISON:  02/04/2017    FINDINGS:  Aorta demonstrates atherosclerotic disease.    Cardiac silhouette is normal.  The lungs demonstrate no evidence of active disease.  No evidence of pleural effusion or pneumothorax.  Bones appear intact.                              ED Vital Signs:  Vitals:    07/10/18 1008 07/10/18 1019 07/10/18 1020 07/10/18 1042   BP: (!) 109/57   (!) 113/56   Pulse: 109 96  "96 96   Resp: 20   20   Temp: 97.4 °F (36.3 °C)      TempSrc: Oral      SpO2: 97%   97%   Weight: 91.4 kg (201 lb 8 oz)      Height: 5' 6" (1.676 m)       07/10/18 1132 07/10/18 1202 07/10/18 1401   BP: 132/81 131/75 123/82   Pulse: 82 63 82   Resp: 17 17 20   Temp:      TempSrc:      SpO2: 96% 100% 98%   Weight:      Height:            Abnormal Lab Results:  Labs Reviewed   CBC W/ AUTO DIFFERENTIAL - Abnormal; Notable for the following:        Result Value    RBC 3.71 (*)     Hemoglobin 11.2 (*)     Hematocrit 34.4 (*)     Gran% 78.4 (*)     Lymph% 10.9 (*)     All other components within normal limits   COMPREHENSIVE METABOLIC PANEL - Abnormal; Notable for the following:     Sodium 133 (*)     CO2 22 (*)     Glucose 387 (*)     Creatinine 2.3 (*)     Alkaline Phosphatase 50 (*)     eGFR if  36.6 (*)     eGFR if non  31.7 (*)     All other components within normal limits   URINALYSIS, REFLEX TO URINE CULTURE - Abnormal; Notable for the following:     Appearance, UA Hazy (*)     Specific Gravity, UA >=1.030 (*)     Protein, UA Trace (*)     Glucose, UA 2+ (*)     All other components within normal limits    Narrative:     Preferred Collection Type->Urine, Clean Catch   BASIC METABOLIC PANEL - Abnormal; Notable for the following:     Sodium 133 (*)     CO2 21 (*)     Glucose 278 (*)     Creatinine 1.5 (*)     eGFR if non  53.1 (*)     All other components within normal limits   LIPASE   AMYLASE   B-TYPE NATRIURETIC PEPTIDE   CK   TROPONIN I          All Lab Results:  Results for orders placed or performed during the hospital encounter of 07/10/18   CBC auto differential   Result Value Ref Range    WBC 9.52 3.90 - 12.70 K/uL    RBC 3.71 (L) 4.60 - 6.20 M/uL    Hemoglobin 11.2 (L) 14.0 - 18.0 g/dL    Hematocrit 34.4 (L) 40.0 - 54.0 %    MCV 93 82 - 98 fL    MCH 30.2 27.0 - 31.0 pg    MCHC 32.6 32.0 - 36.0 g/dL    RDW 13.1 11.5 - 14.5 %    Platelets 211 150 - 350 K/uL    " MPV 10.7 9.2 - 12.9 fL    Gran # (ANC) 7.5 1.8 - 7.7 K/uL    Lymph # 1.0 1.0 - 4.8 K/uL    Mono # 0.8 0.3 - 1.0 K/uL    Eos # 0.2 0.0 - 0.5 K/uL    Baso # 0.01 0.00 - 0.20 K/uL    Gran% 78.4 (H) 38.0 - 73.0 %    Lymph% 10.9 (L) 18.0 - 48.0 %    Mono% 8.0 4.0 - 15.0 %    Eosinophil% 2.3 0.0 - 8.0 %    Basophil% 0.1 0.0 - 1.9 %    Differential Method Automated    Comprehensive metabolic panel   Result Value Ref Range    Sodium 133 (L) 136 - 145 mmol/L    Potassium 4.4 3.5 - 5.1 mmol/L    Chloride 95 95 - 110 mmol/L    CO2 22 (L) 23 - 29 mmol/L    Glucose 387 (H) 70 - 110 mg/dL    BUN, Bld 19 6 - 20 mg/dL    Creatinine 2.3 (H) 0.5 - 1.4 mg/dL    Calcium 9.5 8.7 - 10.5 mg/dL    Total Protein 7.4 6.0 - 8.4 g/dL    Albumin 3.7 3.5 - 5.2 g/dL    Total Bilirubin 0.8 0.1 - 1.0 mg/dL    Alkaline Phosphatase 50 (L) 55 - 135 U/L    AST 19 10 - 40 U/L    ALT 18 10 - 44 U/L    Anion Gap 16 8 - 16 mmol/L    eGFR if African American 36.6 (A) >60 mL/min/1.73 m^2    eGFR if non  31.7 (A) >60 mL/min/1.73 m^2   Urinalysis, Reflex to Urine Culture Urine, Clean Catch   Result Value Ref Range    Specimen UA Urine, Clean Catch     Color, UA Yellow Yellow, Straw, Brittany    Appearance, UA Hazy (A) Clear    pH, UA 6.0 5.0 - 8.0    Specific Gravity, UA >=1.030 (A) 1.005 - 1.030    Protein, UA Trace (A) Negative    Glucose, UA 2+ (A) Negative    Ketones, UA Negative Negative    Bilirubin (UA) Negative Negative    Occult Blood UA Negative Negative    Nitrite, UA Negative Negative    Urobilinogen, UA Negative <2.0 EU/dL    Leukocytes, UA Negative Negative   Lipase   Result Value Ref Range    Lipase 54 4 - 60 U/L   Amylase   Result Value Ref Range    Amylase 39 20 - 110 U/L   Brain natriuretic peptide   Result Value Ref Range    BNP <10 0 - 99 pg/mL   CK   Result Value Ref Range    CPK 61 20 - 200 U/L   Troponin I   Result Value Ref Range    Troponin I <0.006 0.000 - 0.026 ng/mL   Basic metabolic panel   Result Value Ref Range     Sodium 133 (L) 136 - 145 mmol/L    Potassium 4.4 3.5 - 5.1 mmol/L    Chloride 102 95 - 110 mmol/L    CO2 21 (L) 23 - 29 mmol/L    Glucose 278 (H) 70 - 110 mg/dL    BUN, Bld 16 6 - 20 mg/dL    Creatinine 1.5 (H) 0.5 - 1.4 mg/dL    Calcium 8.9 8.7 - 10.5 mg/dL    Anion Gap 10 8 - 16 mmol/L    eGFR if African American >60.0 >60 mL/min/1.73 m^2    eGFR if non  53.1 (A) >60 mL/min/1.73 m^2           Imaging Results:  Imaging Results          X-Ray Chest PA And Lateral (Final result)  Result time 07/10/18 10:41:32    Final result by Srinivasa Dubose MD (07/10/18 10:41:32)                 Impression:      No acute process seen.      Electronically signed by: Srinivasa Dubose MD  Date:    07/10/2018  Time:    10:41             Narrative:    EXAMINATION:  XR CHEST PA AND LATERAL    CLINICAL HISTORY:  epigastric pain;    COMPARISON:  02/04/2017    FINDINGS:  Aorta demonstrates atherosclerotic disease.    Cardiac silhouette is normal.  The lungs demonstrate no evidence of active disease.  No evidence of pleural effusion or pneumothorax.  Bones appear intact.                                   The Emergency Provider reviewed the vital signs and test results, which are outlined above.    ED Discussions:  11:40 AM: Dr. Gould discussed the pt's case with Dr. Meyer (nephrology) who recommends giving IV fluids and repeating labs in a few hours.    3:08 PM: Reassessed pt at this time.  Pt states his condition has improved at this time. Creatinine 1.5 now Discussed with pt all pertinent ED information and results. Discussed pt dx of renal insufficiency and plan of tx. Gave pt all f/u and return to the ED instructions. All questions and concerns were addressed at this time. Pt expresses understanding of information and instructions, and is comfortable with plan to discharge. Pt is stable for discharge.                                  Clinical Impression:       ICD-10-CM ICD-9-CM   1. Renal insufficiency N28.9 593.9    2. Chest pain R07.9 786.50           Disposition:   Disposition: Discharged  Condition: Stable                        Job Gould MD  07/10/18 0349

## 2018-07-19 ENCOUNTER — HOSPITAL ENCOUNTER (INPATIENT)
Facility: HOSPITAL | Age: 53
LOS: 2 days | Discharge: HOME | DRG: 683 | End: 2018-07-21
Attending: EMERGENCY MEDICINE | Admitting: INTERNAL MEDICINE
Payer: MEDICAID

## 2018-07-19 DIAGNOSIS — I15.2 HYPERTENSION ASSOCIATED WITH DIABETES: ICD-10-CM

## 2018-07-19 DIAGNOSIS — E11.59 HYPERTENSION ASSOCIATED WITH DIABETES: ICD-10-CM

## 2018-07-19 DIAGNOSIS — N17.9 AKI (ACUTE KIDNEY INJURY): Primary | ICD-10-CM

## 2018-07-19 DIAGNOSIS — R42 DIZZINESS: ICD-10-CM

## 2018-07-19 DIAGNOSIS — E86.0 DEHYDRATION: ICD-10-CM

## 2018-07-19 DIAGNOSIS — R60.9 EDEMA, UNSPECIFIED TYPE: ICD-10-CM

## 2018-07-19 DIAGNOSIS — R79.89 PRERENAL AZOTEMIA: ICD-10-CM

## 2018-07-19 DIAGNOSIS — F10.10 ALCOHOL ABUSE: Chronic | ICD-10-CM

## 2018-07-19 LAB
ALBUMIN SERPL BCP-MCNC: 4.5 G/DL
ALP SERPL-CCNC: 53 U/L
ALT SERPL W/O P-5'-P-CCNC: 12 U/L
AMPHET+METHAMPHET UR QL: NEGATIVE
AMYLASE SERPL-CCNC: 44 U/L
ANION GAP SERPL CALC-SCNC: 12 MMOL/L
ANION GAP SERPL CALC-SCNC: 13 MMOL/L
AST SERPL-CCNC: 11 U/L
BACTERIA #/AREA URNS AUTO: NORMAL /HPF
BARBITURATES UR QL SCN>200 NG/ML: NEGATIVE
BASOPHILS # BLD AUTO: 0.03 K/UL
BASOPHILS NFR BLD: 0.5 %
BENZODIAZ UR QL SCN>200 NG/ML: NEGATIVE
BILIRUB SERPL-MCNC: 0.8 MG/DL
BILIRUB UR QL STRIP: NEGATIVE
BNP SERPL-MCNC: <10 PG/ML
BUN SERPL-MCNC: 43 MG/DL
BUN SERPL-MCNC: 51 MG/DL
BZE UR QL SCN: NEGATIVE
CALCIUM SERPL-MCNC: 10.3 MG/DL
CALCIUM SERPL-MCNC: 9.3 MG/DL
CANNABINOIDS UR QL SCN: NEGATIVE
CHLORIDE SERPL-SCNC: 102 MMOL/L
CHLORIDE SERPL-SCNC: 96 MMOL/L
CK SERPL-CCNC: 107 U/L
CLARITY UR REFRACT.AUTO: CLEAR
CO2 SERPL-SCNC: 20 MMOL/L
CO2 SERPL-SCNC: 24 MMOL/L
COLOR UR AUTO: YELLOW
CREAT SERPL-MCNC: 2.8 MG/DL
CREAT SERPL-MCNC: 4.3 MG/DL
CREAT UR-MCNC: 165.3 MG/DL
DIFFERENTIAL METHOD: ABNORMAL
EOSINOPHIL # BLD AUTO: 0.1 K/UL
EOSINOPHIL NFR BLD: 0.8 %
ERYTHROCYTE [DISTWIDTH] IN BLOOD BY AUTOMATED COUNT: 12.4 %
EST. GFR  (AFRICAN AMERICAN): 17.2 ML/MIN/1.73 M^2
EST. GFR  (AFRICAN AMERICAN): 28.9 ML/MIN/1.73 M^2
EST. GFR  (NON AFRICAN AMERICAN): 14.9 ML/MIN/1.73 M^2
EST. GFR  (NON AFRICAN AMERICAN): 25 ML/MIN/1.73 M^2
ETHANOL SERPL-MCNC: <10 MG/DL
GLUCOSE SERPL-MCNC: 253 MG/DL
GLUCOSE SERPL-MCNC: 350 MG/DL
GLUCOSE SERPL-MCNC: 354 MG/DL (ref 70–110)
GLUCOSE UR QL STRIP: ABNORMAL
HCT VFR BLD AUTO: 36.8 %
HGB BLD-MCNC: 12.1 G/DL
HGB UR QL STRIP: ABNORMAL
KETONES UR QL STRIP: NEGATIVE
LEUKOCYTE ESTERASE UR QL STRIP: NEGATIVE
LIPASE SERPL-CCNC: 10 U/L
LYMPHOCYTES # BLD AUTO: 1.8 K/UL
LYMPHOCYTES NFR BLD: 29 %
MAGNESIUM SERPL-MCNC: 2.3 MG/DL
MCH RBC QN AUTO: 29.7 PG
MCHC RBC AUTO-ENTMCNC: 32.9 G/DL
MCV RBC AUTO: 90 FL
METHADONE UR QL SCN>300 NG/ML: NEGATIVE
MICROSCOPIC COMMENT: NORMAL
MONOCYTES # BLD AUTO: 0.5 K/UL
MONOCYTES NFR BLD: 8.1 %
NEUTROPHILS # BLD AUTO: 3.9 K/UL
NEUTROPHILS NFR BLD: 61.4 %
NITRITE UR QL STRIP: NEGATIVE
OPIATES UR QL SCN: NEGATIVE
PCP UR QL SCN>25 NG/ML: NEGATIVE
PH UR STRIP: 5 [PH] (ref 5–8)
PHOSPHATE SERPL-MCNC: 5.3 MG/DL
PLATELET # BLD AUTO: 343 K/UL
PMV BLD AUTO: 10.4 FL
POCT GLUCOSE: 260 MG/DL (ref 70–110)
POCT GLUCOSE: 354 MG/DL (ref 70–110)
POTASSIUM SERPL-SCNC: 4.8 MMOL/L
POTASSIUM SERPL-SCNC: 5.8 MMOL/L
PROT SERPL-MCNC: 8.4 G/DL
PROT UR QL STRIP: NEGATIVE
RBC # BLD AUTO: 4.07 M/UL
RBC #/AREA URNS AUTO: 0 /HPF (ref 0–4)
SODIUM SERPL-SCNC: 133 MMOL/L
SODIUM SERPL-SCNC: 134 MMOL/L
SP GR UR STRIP: 1.02 (ref 1–1.03)
SQUAMOUS #/AREA URNS AUTO: 1 /HPF
TOXICOLOGY INFORMATION: NORMAL
TROPONIN I SERPL DL<=0.01 NG/ML-MCNC: <0.006 NG/ML
TSH SERPL DL<=0.005 MIU/L-ACNC: 1.62 UIU/ML
URN SPEC COLLECT METH UR: ABNORMAL
UROBILINOGEN UR STRIP-ACNC: NEGATIVE EU/DL
WBC # BLD AUTO: 6.28 K/UL
WBC #/AREA URNS AUTO: 2 /HPF (ref 0–5)
YEAST UR QL AUTO: NORMAL

## 2018-07-19 PROCEDURE — 80307 DRUG TEST PRSMV CHEM ANLYZR: CPT

## 2018-07-19 PROCEDURE — 83690 ASSAY OF LIPASE: CPT

## 2018-07-19 PROCEDURE — 81000 URINALYSIS NONAUTO W/SCOPE: CPT | Mod: 59

## 2018-07-19 PROCEDURE — 21400001 HC TELEMETRY ROOM

## 2018-07-19 PROCEDURE — 84100 ASSAY OF PHOSPHORUS: CPT

## 2018-07-19 PROCEDURE — 85025 COMPLETE CBC W/AUTO DIFF WBC: CPT

## 2018-07-19 PROCEDURE — 84484 ASSAY OF TROPONIN QUANT: CPT

## 2018-07-19 PROCEDURE — 96360 HYDRATION IV INFUSION INIT: CPT

## 2018-07-19 PROCEDURE — 83735 ASSAY OF MAGNESIUM: CPT

## 2018-07-19 PROCEDURE — 93005 ELECTROCARDIOGRAM TRACING: CPT

## 2018-07-19 PROCEDURE — 82962 GLUCOSE BLOOD TEST: CPT

## 2018-07-19 PROCEDURE — 80048 BASIC METABOLIC PNL TOTAL CA: CPT

## 2018-07-19 PROCEDURE — 84443 ASSAY THYROID STIM HORMONE: CPT

## 2018-07-19 PROCEDURE — 25000003 PHARM REV CODE 250: Performed by: EMERGENCY MEDICINE

## 2018-07-19 PROCEDURE — 80320 DRUG SCREEN QUANTALCOHOLS: CPT

## 2018-07-19 PROCEDURE — 82150 ASSAY OF AMYLASE: CPT

## 2018-07-19 PROCEDURE — 83880 ASSAY OF NATRIURETIC PEPTIDE: CPT

## 2018-07-19 PROCEDURE — 82550 ASSAY OF CK (CPK): CPT

## 2018-07-19 PROCEDURE — 63600175 PHARM REV CODE 636 W HCPCS: Performed by: HOSPITALIST

## 2018-07-19 PROCEDURE — 96361 HYDRATE IV INFUSION ADD-ON: CPT

## 2018-07-19 PROCEDURE — 99900035 HC TECH TIME PER 15 MIN (STAT)

## 2018-07-19 PROCEDURE — 80053 COMPREHEN METABOLIC PANEL: CPT

## 2018-07-19 PROCEDURE — 25000003 PHARM REV CODE 250: Performed by: HOSPITALIST

## 2018-07-19 PROCEDURE — 93010 ELECTROCARDIOGRAM REPORT: CPT | Mod: ,,, | Performed by: INTERNAL MEDICINE

## 2018-07-19 PROCEDURE — 99285 EMERGENCY DEPT VISIT HI MDM: CPT | Mod: 25

## 2018-07-19 RX ORDER — SODIUM CHLORIDE 9 MG/ML
INJECTION, SOLUTION INTRAVENOUS CONTINUOUS
Status: DISCONTINUED | OUTPATIENT
Start: 2018-07-19 | End: 2018-07-19

## 2018-07-19 RX ORDER — ACETAMINOPHEN 325 MG/1
650 TABLET ORAL EVERY 6 HOURS PRN
Status: DISCONTINUED | OUTPATIENT
Start: 2018-07-19 | End: 2018-07-21 | Stop reason: HOSPADM

## 2018-07-19 RX ORDER — ONDANSETRON 2 MG/ML
4 INJECTION INTRAMUSCULAR; INTRAVENOUS EVERY 12 HOURS PRN
Status: DISCONTINUED | OUTPATIENT
Start: 2018-07-19 | End: 2018-07-19

## 2018-07-19 RX ORDER — ACETAMINOPHEN 325 MG/1
650 TABLET ORAL EVERY 8 HOURS PRN
Status: DISCONTINUED | OUTPATIENT
Start: 2018-07-19 | End: 2018-07-19

## 2018-07-19 RX ORDER — HEPARIN SODIUM 5000 [USP'U]/ML
5000 INJECTION, SOLUTION INTRAVENOUS; SUBCUTANEOUS EVERY 8 HOURS
Status: DISCONTINUED | OUTPATIENT
Start: 2018-07-19 | End: 2018-07-21 | Stop reason: HOSPADM

## 2018-07-19 RX ORDER — RAMELTEON 8 MG/1
8 TABLET ORAL NIGHTLY PRN
Status: DISCONTINUED | OUTPATIENT
Start: 2018-07-19 | End: 2018-07-21 | Stop reason: HOSPADM

## 2018-07-19 RX ORDER — SODIUM CHLORIDE 9 MG/ML
INJECTION, SOLUTION INTRAVENOUS CONTINUOUS
Status: DISCONTINUED | OUTPATIENT
Start: 2018-07-19 | End: 2018-07-21

## 2018-07-19 RX ORDER — IBUPROFEN 200 MG
16 TABLET ORAL
Status: DISCONTINUED | OUTPATIENT
Start: 2018-07-19 | End: 2018-07-21 | Stop reason: HOSPADM

## 2018-07-19 RX ORDER — PANTOPRAZOLE SODIUM 40 MG/1
40 TABLET, DELAYED RELEASE ORAL DAILY
Status: DISCONTINUED | OUTPATIENT
Start: 2018-07-20 | End: 2018-07-21 | Stop reason: HOSPADM

## 2018-07-19 RX ORDER — HYDROCODONE BITARTRATE AND ACETAMINOPHEN 5; 325 MG/1; MG/1
1 TABLET ORAL EVERY 6 HOURS PRN
Status: DISCONTINUED | OUTPATIENT
Start: 2018-07-19 | End: 2018-07-21 | Stop reason: HOSPADM

## 2018-07-19 RX ORDER — ONDANSETRON 2 MG/ML
4 INJECTION INTRAMUSCULAR; INTRAVENOUS EVERY 8 HOURS PRN
Status: DISCONTINUED | OUTPATIENT
Start: 2018-07-19 | End: 2018-07-21 | Stop reason: HOSPADM

## 2018-07-19 RX ORDER — INSULIN ASPART 100 [IU]/ML
0-5 INJECTION, SOLUTION INTRAVENOUS; SUBCUTANEOUS
Status: DISCONTINUED | OUTPATIENT
Start: 2018-07-19 | End: 2018-07-21 | Stop reason: HOSPADM

## 2018-07-19 RX ORDER — POLYETHYLENE GLYCOL-3350 AND ELECTROLYTES 236; 6.74; 5.86; 2.97; 22.74 G/274.31G; G/274.31G; G/274.31G; G/274.31G; G/274.31G
POWDER, FOR SOLUTION ORAL
Status: ON HOLD | COMMUNITY
Start: 2018-06-14 | End: 2018-07-21 | Stop reason: HOSPADM

## 2018-07-19 RX ORDER — IBUPROFEN 200 MG
24 TABLET ORAL
Status: DISCONTINUED | OUTPATIENT
Start: 2018-07-19 | End: 2018-07-21 | Stop reason: HOSPADM

## 2018-07-19 RX ORDER — GLUCAGON 1 MG
1 KIT INJECTION
Status: DISCONTINUED | OUTPATIENT
Start: 2018-07-19 | End: 2018-07-21 | Stop reason: HOSPADM

## 2018-07-19 RX ORDER — ATORVASTATIN CALCIUM 10 MG/1
10 TABLET, FILM COATED ORAL DAILY
Status: DISCONTINUED | OUTPATIENT
Start: 2018-07-20 | End: 2018-07-21 | Stop reason: HOSPADM

## 2018-07-19 RX ORDER — ASPIRIN 81 MG/1
81 TABLET ORAL DAILY
Status: DISCONTINUED | OUTPATIENT
Start: 2018-07-20 | End: 2018-07-20 | Stop reason: SDUPTHER

## 2018-07-19 RX ADMIN — SODIUM CHLORIDE: 0.9 INJECTION, SOLUTION INTRAVENOUS at 09:07

## 2018-07-19 RX ADMIN — HEPARIN SODIUM 5000 UNITS: 5000 INJECTION, SOLUTION INTRAVENOUS; SUBCUTANEOUS at 09:07

## 2018-07-19 RX ADMIN — INSULIN DETEMIR 45 UNITS: 100 INJECTION, SOLUTION SUBCUTANEOUS at 09:07

## 2018-07-19 RX ADMIN — SODIUM CHLORIDE: 0.9 INJECTION, SOLUTION INTRAVENOUS at 05:07

## 2018-07-19 RX ADMIN — SODIUM CHLORIDE 1000 ML: 0.9 INJECTION, SOLUTION INTRAVENOUS at 10:07

## 2018-07-19 NOTE — ED NOTES
Pt ate all of meal provided. Lying in stretcher, sleeping. Arouses to verbal stimuli. No complaints at this time. Updated on plan of care. Apologized to delay in bed assignment at [facility] [place] [facility].

## 2018-07-19 NOTE — ED PROVIDER NOTES
"Encounter Date: 7/19/2018       History     Chief Complaint   Patient presents with    Dizziness     Pt c/o dizziness and back pain to point of "blacking out".    Back Pain     Neck to lower back. Worse between shoulder blades.    Nausea     No appetite.     The history is provided by the patient.   Dizziness   This is a recurrent problem. The current episode started more than 2 days ago. The problem occurs every several days. The problem has not changed since onset.Pertinent negatives include no chest pain, no abdominal pain, no headaches and no shortness of breath. The symptoms are aggravated by walking. The symptoms are relieved by rest. He has tried nothing for the symptoms.   Back Pain    Pertinent negatives include no chest pain, no fever, no headaches, no abdominal pain, no dysuria and no weakness.   Nausea   Pertinent negatives include no chest pain, no abdominal pain, no headaches and no shortness of breath.     Review of patient's allergies indicates:  No Known Allergies  Past Medical History:   Diagnosis Date    Cataracts, bilateral     Diabetes mellitus, type 2     GERD (gastroesophageal reflux disease)     Hypertension     Inflammatory polyps of colon     Pancreatitis, alcoholic, acute     Port catheter in place     TIA (transient ischemic attack)      Past Surgical History:   Procedure Laterality Date    COLONOSCOPY      COLONOSCOPY N/A 6/15/2018    Procedure: COLONOSCOPY;  Surgeon: Jim Hicks MD;  Location: [facility];  Service: Endoscopy;  Laterality: N/A;    ESOPHAGOGASTRODUODENOSCOPY N/A 6/15/2018    Procedure: ESOPHAGOGASTRODUODENOSCOPY (EGD);  Surgeon: Jim Hicks MD;  Location: [facility];  Service: Endoscopy;  Laterality: N/A;     Family History   Problem Relation Age of Onset    Hypertension Mother     Hypertension Father      Social History   Substance Use Topics    Smoking status: Former Smoker     Types: Cigarettes    Smokeless tobacco: Never Used    Alcohol use Yes "      Comment:  Daily - States that he drinks 1/2 pint of vodka and about 1-2 cans of beer daily     Review of Systems   Constitutional: Negative for fever.   HENT: Negative for sore throat.    Respiratory: Negative for shortness of breath.    Cardiovascular: Negative for chest pain.   Gastrointestinal: Positive for nausea. Negative for abdominal pain.   Genitourinary: Negative for dysuria.   Musculoskeletal: Positive for back pain.   Skin: Negative for rash.   Neurological: Positive for dizziness. Negative for weakness and headaches.   Hematological: Does not bruise/bleed easily.       Physical Exam     Initial Vitals [07/19/18 0939]   BP Pulse Resp Temp SpO2   136/62 94 18 98.3 °F (36.8 °C) 98 %      MAP       --         Physical Exam    Nursing note and vitals reviewed.  Constitutional: He appears well-developed and well-nourished. No distress.   HENT:   Head: Normocephalic and atraumatic.   Mouth/Throat: Oropharynx is clear and moist.   Eyes: Conjunctivae and EOM are normal. Pupils are equal, round, and reactive to light.   Neck: Normal range of motion. Neck supple.   Cardiovascular: Normal rate, regular rhythm and normal heart sounds. Exam reveals no gallop and no friction rub.    No murmur heard.  Pulmonary/Chest: Breath sounds normal. No respiratory distress. He has no wheezes. He has no rhonchi. He has no rales.   Abdominal: Soft. Bowel sounds are normal. He exhibits no distension and no mass. There is no tenderness. There is no rebound and no guarding.   Musculoskeletal: Normal range of motion. He exhibits no edema.        Cervical back: He exhibits spasm (Paraspinal). He exhibits no bony tenderness.        Thoracic back: He exhibits spasm (Paraspinal). He exhibits no bony tenderness.   Neurological: He is alert and oriented to person, place, and time. He has normal strength.   Skin: Skin is warm and dry. No rash noted.   Psychiatric: He has a normal mood and affect. Thought content normal.         ED Course    Procedures  Labs Reviewed   CBC W/ AUTO DIFFERENTIAL - Abnormal; Notable for the following:        Result Value    RBC 4.07 (*)     Hemoglobin 12.1 (*)     Hematocrit 36.8 (*)     All other components within normal limits   COMPREHENSIVE METABOLIC PANEL - Abnormal; Notable for the following:     Sodium 133 (*)     Potassium 5.8 (*)     Glucose 350 (*)     BUN, Bld 51 (*)     Creatinine 4.3 (*)     Alkaline Phosphatase 53 (*)     eGFR if  17.2 (*)     eGFR if non  14.9 (*)     All other components within normal limits   URINALYSIS, REFLEX TO URINE CULTURE - Abnormal; Notable for the following:     Glucose, UA 3+ (*)     Occult Blood UA Trace (*)     All other components within normal limits    Narrative:     Preferred Collection Type->Urine, Clean Catch   POCT GLUCOSE MONITORING CONTINUOUS - Abnormal; Notable for the following:     POC Glucose 354 (*)     All other components within normal limits   LIPASE   AMYLASE   B-TYPE NATRIURETIC PEPTIDE   CK   TROPONIN I   ALCOHOL,MEDICAL (ETHANOL)   DRUG SCREEN PANEL, URINE EMERGENCY    Narrative:     Preferred Collection Type->Urine, Clean Catch   TSH   URINALYSIS MICROSCOPIC    Narrative:     Preferred Collection Type->Urine, Clean Catch     EKG Readings: (Independently Interpreted)   Rhythm: Normal Sinus Rhythm. Heart Rate: 80. Ectopy: No Ectopy. Conduction: Normal. ST Segments: Normal ST Segments. T Waves: Normal. Clinical Impression: Normal Sinus Rhythm       Imaging Results          CT Renal Stone Study ABD Pelvis WO (Final result)  Result time 07/19/18 11:52:25    Final result by SAMMY Anderson Sr., MD (07/19/18 11:52:25)                 Impression:      1. There is haziness adjacent to the tail of the pancreas with inflammatory changes extending from the tail of the pancreas to the left pericolic gutter. There are several pseudocysts or abscesses in the area of the inflammatory changes leading to the left pericolic gutter. One of  the larger areas measures 12 mm.  This is consistent with the patient's history.  2. There is a mild amount of atherosclerosis.  3. There is a mild amount dextroconvex curvature of the thoracolumbar spine.  All CT scans at this facility use dose modulation, iterative reconstruction, and/or weight base dosing when appropriate to reduce radiation dose when appropriate to reduce radiation dose to as low as reasonably achievable.      Electronically signed by: Mayo Anderson MD  Date:    07/19/2018  Time:    11:52             Narrative:    EXAMINATION:  CT RENAL STONE STUDY ABD PELVIS WO    CLINICAL HISTORY:  renal insufficiency; chronic pancreatic problems; back pain    TECHNIQUE:  Standard abdomen and pelvis CT protocol without oral or IV contrast was performed.    FINDINGS:  Finding: Comparison was made to a prior examination performed on 02/07/2017.  The size of the heart is within normal limits. The lungs are clear. There is no pneumothorax or pleural effusion.    There is haziness adjacent to the tail of the pancreas with inflammatory changes extending from the tail of the pancreas to the left pericolic gutter.  There are several pseudocysts or abscesses in the area of the inflammatory changes leading to the left pericolic gutter.  One of the larger areas measures 12 mm.  The liver, gallbladder, spleen, adrenals, and kidneys are normal in appearance. The ureters and the urinary bladder are normal in appearance. The appendix and the rest of the gastrointestinal system are normal in appearance. There is no free fluid within the abdomen or pelvis. There is no pneumoperitoneum.  There is a mild amount of atherosclerosis.  There is a mild amount dextroconvex curvature of the thoracolumbar spine.                               X-Ray Chest PA And Lateral (Final result)  Result time 07/19/18 10:22:50    Final result by SAMMY Anderson Sr., MD (07/19/18 10:22:50)                 Impression:      1. There is no evidence of  an acute pulmonary process.  2. There is spina bifida occulta of T1.  .      Electronically signed by: Mayo Anderson MD  Date:    07/19/2018  Time:    10:22             Narrative:    EXAMINATION:  XR CHEST PA AND LATERAL    CLINICAL HISTORY:  weakness;    COMPARISON:  07/10/2018    FINDINGS:  The size and contour of the heart are normal.  There is no evidence of an acute pulmonary process.  There is no pneumothorax or pleural effusion.  There is spina bifida occulta of T1.                               CT Head Without Contrast (Final result)  Result time 07/19/18 10:17:29    Final result by Srinivasa Dubose MD (07/19/18 10:17:29)                 Impression:      No acute abnormality.    All CT scans at this facility use dose modulation, iterative reconstruction, and/or weight based dosing when appropriate to reduce radiation dose to as low as reasonable achievable.      Electronically signed by: Srinivasa Dubose MD  Date:    07/19/2018  Time:    10:17             Narrative:    EXAMINATION:  CT HEAD WITHOUT CONTRAST    CLINICAL HISTORY:  dizziness;    TECHNIQUE:  Low dose axial CT images obtained throughout the head without intravenous contrast. Sagittal and coronal reconstructions were performed.    All CT scans at this facility use dose modulation, iterative reconstruction, and/or weight based dosing when appropriate to reduce radiation dose to as low as reasonable achievable.    COMPARISON:  None.    FINDINGS:  Intracranial compartment:    The brain parenchyma appears normal. No parenchymal mass, hemorrhage, edema or major vascular distribution infarct.    Ventricles and sulci are normal in size for age without evidence of hydrocephalus.    No extra-axial blood or fluid collections.    Skull/extracranial contents (limited evaluation): No fracture. Mastoid air cells and paranasal sinuses are essentially clear.                              ED Vital Signs:  Vitals:    07/19/18 0939 07/19/18 0953 07/19/18 1025 07/19/18  "1026   BP: 136/62  (!) 102/59 98/64   Pulse: 94 84 82 77   Resp: 18  (!) 21 (!) 21   Temp: 98.3 °F (36.8 °C)      TempSrc: Oral      SpO2: 98%  95% 95%   Weight: 92 kg (202 lb 13.2 oz)      Height: 5' 6" (1.676 m)       07/19/18 1027 07/19/18 1138 07/19/18 1202   BP: 95/62 (!) 104/54 107/77   Pulse: 83 76 76   Resp: (!) 21 18 18   Temp:      TempSrc:      SpO2: 96% 98% 97%   Weight:      Height:            Abnormal Lab Results:  Labs Reviewed   CBC W/ AUTO DIFFERENTIAL - Abnormal; Notable for the following:        Result Value    RBC 4.07 (*)     Hemoglobin 12.1 (*)     Hematocrit 36.8 (*)     All other components within normal limits   COMPREHENSIVE METABOLIC PANEL - Abnormal; Notable for the following:     Sodium 133 (*)     Potassium 5.8 (*)     Glucose 350 (*)     BUN, Bld 51 (*)     Creatinine 4.3 (*)     Alkaline Phosphatase 53 (*)     eGFR if  17.2 (*)     eGFR if non  14.9 (*)     All other components within normal limits   URINALYSIS, REFLEX TO URINE CULTURE - Abnormal; Notable for the following:     Glucose, UA 3+ (*)     Occult Blood UA Trace (*)     All other components within normal limits    Narrative:     Preferred Collection Type->Urine, Clean Catch   POCT GLUCOSE MONITORING CONTINUOUS - Abnormal; Notable for the following:     POC Glucose 354 (*)     All other components within normal limits   LIPASE   AMYLASE   B-TYPE NATRIURETIC PEPTIDE   CK   TROPONIN I   ALCOHOL,MEDICAL (ETHANOL)   DRUG SCREEN PANEL, URINE EMERGENCY    Narrative:     Preferred Collection Type->Urine, Clean Catch   TSH   URINALYSIS MICROSCOPIC    Narrative:     Preferred Collection Type->Urine, Clean Catch          All Lab Results:  Results for orders placed or performed during the hospital encounter of 07/19/18   CBC auto differential   Result Value Ref Range    WBC 6.28 3.90 - 12.70 K/uL    RBC 4.07 (L) 4.60 - 6.20 M/uL    Hemoglobin 12.1 (L) 14.0 - 18.0 g/dL    Hematocrit 36.8 (L) 40.0 - 54.0 % "    MCV 90 82 - 98 fL    MCH 29.7 27.0 - 31.0 pg    MCHC 32.9 32.0 - 36.0 g/dL    RDW 12.4 11.5 - 14.5 %    Platelets 343 150 - 350 K/uL    MPV 10.4 9.2 - 12.9 fL    Gran # (ANC) 3.9 1.8 - 7.7 K/uL    Lymph # 1.8 1.0 - 4.8 K/uL    Mono # 0.5 0.3 - 1.0 K/uL    Eos # 0.1 0.0 - 0.5 K/uL    Baso # 0.03 0.00 - 0.20 K/uL    Gran% 61.4 38.0 - 73.0 %    Lymph% 29.0 18.0 - 48.0 %    Mono% 8.1 4.0 - 15.0 %    Eosinophil% 0.8 0.0 - 8.0 %    Basophil% 0.5 0.0 - 1.9 %    Differential Method Automated    Comprehensive metabolic panel   Result Value Ref Range    Sodium 133 (L) 136 - 145 mmol/L    Potassium 5.8 (H) 3.5 - 5.1 mmol/L    Chloride 96 95 - 110 mmol/L    CO2 24 23 - 29 mmol/L    Glucose 350 (H) 70 - 110 mg/dL    BUN, Bld 51 (H) 6 - 20 mg/dL    Creatinine 4.3 (H) 0.5 - 1.4 mg/dL    Calcium 10.3 8.7 - 10.5 mg/dL    Total Protein 8.4 6.0 - 8.4 g/dL    Albumin 4.5 3.5 - 5.2 g/dL    Total Bilirubin 0.8 0.1 - 1.0 mg/dL    Alkaline Phosphatase 53 (L) 55 - 135 U/L    AST 11 10 - 40 U/L    ALT 12 10 - 44 U/L    Anion Gap 13 8 - 16 mmol/L    eGFR if African American 17.2 (A) >60 mL/min/1.73 m^2    eGFR if non African American 14.9 (A) >60 mL/min/1.73 m^2   Urinalysis, Reflex to Urine Culture Urine, Clean Catch   Result Value Ref Range    Specimen UA Urine, Clean Catch     Color, UA Yellow Yellow, Straw, Brittany    Appearance, UA Clear Clear    pH, UA 5.0 5.0 - 8.0    Specific Gravity, UA 1.025 1.005 - 1.030    Protein, UA Negative Negative    Glucose, UA 3+ (A) Negative    Ketones, UA Negative Negative    Bilirubin (UA) Negative Negative    Occult Blood UA Trace (A) Negative    Nitrite, UA Negative Negative    Urobilinogen, UA Negative <2.0 EU/dL    Leukocytes, UA Negative Negative   Lipase   Result Value Ref Range    Lipase 10 4 - 60 U/L   Amylase   Result Value Ref Range    Amylase 44 20 - 110 U/L   Brain natriuretic peptide   Result Value Ref Range    BNP <10 0 - 99 pg/mL   CK   Result Value Ref Range     20 - 200 U/L    Troponin I   Result Value Ref Range    Troponin I <0.006 0.000 - 0.026 ng/mL   Ethanol   Result Value Ref Range    Alcohol, Medical, Serum <10 <10 mg/dL   Drug screen panel, emergency   Result Value Ref Range    Benzodiazepines Negative     Methadone metabolites Negative     Cocaine (Metab.) Negative     Opiate Scrn, Ur Negative     Barbiturate Screen, Ur Negative     Amphetamine Screen, Ur Negative     THC Negative     Phencyclidine Negative     Creatinine, Random Ur 165.3 23.0 - 375.0 mg/dL    Toxicology Information SEE COMMENT    TSH   Result Value Ref Range    TSH 1.621 0.400 - 4.000 uIU/mL   Urinalysis Microscopic   Result Value Ref Range    RBC, UA 0 0 - 4 /hpf    WBC, UA 2 0 - 5 /hpf    Bacteria, UA None None-Occ /hpf    Yeast, UA None None    Squam Epithel, UA 1 /hpf    Microscopic Comment SEE COMMENT    POCT glucose   Result Value Ref Range    POC Glucose 354 (A) 70 - 110 MG/DL           Imaging Results:  Imaging Results          CT Renal Stone Study ABD Pelvis WO (Final result)  Result time 07/19/18 11:52:25    Final result by SAMMY Anderson Sr., MD (07/19/18 11:52:25)                 Impression:      1. There is haziness adjacent to the tail of the pancreas with inflammatory changes extending from the tail of the pancreas to the left pericolic gutter. There are several pseudocysts or abscesses in the area of the inflammatory changes leading to the left pericolic gutter. One of the larger areas measures 12 mm.  This is consistent with the patient's history.  2. There is a mild amount of atherosclerosis.  3. There is a mild amount dextroconvex curvature of the thoracolumbar spine.  All CT scans at this facility use dose modulation, iterative reconstruction, and/or weight base dosing when appropriate to reduce radiation dose when appropriate to reduce radiation dose to as low as reasonably achievable.      Electronically signed by: Mayo Anderson MD  Date:    07/19/2018  Time:    11:52              Narrative:    EXAMINATION:  CT RENAL STONE STUDY ABD PELVIS WO    CLINICAL HISTORY:  renal insufficiency; chronic pancreatic problems; back pain    TECHNIQUE:  Standard abdomen and pelvis CT protocol without oral or IV contrast was performed.    FINDINGS:  Finding: Comparison was made to a prior examination performed on 02/07/2017.  The size of the heart is within normal limits. The lungs are clear. There is no pneumothorax or pleural effusion.    There is haziness adjacent to the tail of the pancreas with inflammatory changes extending from the tail of the pancreas to the left pericolic gutter.  There are several pseudocysts or abscesses in the area of the inflammatory changes leading to the left pericolic gutter.  One of the larger areas measures 12 mm.  The liver, gallbladder, spleen, adrenals, and kidneys are normal in appearance. The ureters and the urinary bladder are normal in appearance. The appendix and the rest of the gastrointestinal system are normal in appearance. There is no free fluid within the abdomen or pelvis. There is no pneumoperitoneum.  There is a mild amount of atherosclerosis.  There is a mild amount dextroconvex curvature of the thoracolumbar spine.                               X-Ray Chest PA And Lateral (Final result)  Result time 07/19/18 10:22:50    Final result by SAMMY Anderson Sr., MD (07/19/18 10:22:50)                 Impression:      1. There is no evidence of an acute pulmonary process.  2. There is spina bifida occulta of T1.  .      Electronically signed by: Mayo Anderson MD  Date:    07/19/2018  Time:    10:22             Narrative:    EXAMINATION:  XR CHEST PA AND LATERAL    CLINICAL HISTORY:  weakness;    COMPARISON:  07/10/2018    FINDINGS:  The size and contour of the heart are normal.  There is no evidence of an acute pulmonary process.  There is no pneumothorax or pleural effusion.  There is spina bifida occulta of T1.                               CT Head Without  Contrast (Final result)  Result time 07/19/18 10:17:29    Final result by Srinivasa Dubose MD (07/19/18 10:17:29)                 Impression:      No acute abnormality.    All CT scans at this facility use dose modulation, iterative reconstruction, and/or weight based dosing when appropriate to reduce radiation dose to as low as reasonable achievable.      Electronically signed by: Srinivasa Dubose MD  Date:    07/19/2018  Time:    10:17             Narrative:    EXAMINATION:  CT HEAD WITHOUT CONTRAST    CLINICAL HISTORY:  dizziness;    TECHNIQUE:  Low dose axial CT images obtained throughout the head without intravenous contrast. Sagittal and coronal reconstructions were performed.    All CT scans at this facility use dose modulation, iterative reconstruction, and/or weight based dosing when appropriate to reduce radiation dose to as low as reasonable achievable.    COMPARISON:  None.    FINDINGS:  Intracranial compartment:    The brain parenchyma appears normal. No parenchymal mass, hemorrhage, edema or major vascular distribution infarct.    Ventricles and sulci are normal in size for age without evidence of hydrocephalus.    No extra-axial blood or fluid collections.    Skull/extracranial contents (limited evaluation): No fracture. Mastoid air cells and paranasal sinuses are essentially clear.                                   The Emergency Provider reviewed the vital signs and test results, which are outlined above.    ED Discussions:  11:07 AM: Dr. Gould discussed the pt's case with Kamilah () who recommends CT abd/pelvis without contrast to rule out obstruction..      All historical, clinical, radiographic, and laboratory findings were reviewed with the patient/family in detail along with the indications for transport to the facility in [place] in order to receive iv fluids, nephrology consult.  All remaining questions and concerns were addressed at this time and the patient/family communicates  understanding and agrees to proceed accordingly.  Similarly, all pertinent details of the encounter were discussed with Dr. Burris via Kamilah JORGENSEN who agrees to receive the patient at [facility] for further care as outlined above.  The patient will be transferred by [facility] ambulance services secondary to a need for ongoing iv fluids en route.  Job Gould MD  12:18 PM                                Clinical Impression:       ICD-10-CM ICD-9-CM   1. KOREY (acute kidney injury) N17.9 584.9   2. Dizziness R42 780.4           Disposition:   Disposition: Admitted  Condition: Fair                        Job Gould MD  07/19/18 5905

## 2018-07-19 NOTE — ED NOTES
Pt lying in stretcher, NAD. Requesting meal. Will discuss with MD options available for patient's meal.

## 2018-07-19 NOTE — ED NOTES
Pt c/o intermittent back pain since Monday, also reports dizziness since yesterday morning at work. Dizziness worse with certain movements such as bending over and standing up. Back pain worse with movement. Reports symptoms were worse while he was at work. Symptoms mild at this time.     Level of Consciousness: Patient is awake, alert, oriented to person, place, time, and situation.    Appearance: Pt resting comfortably in stretcher, no acute distress at this time. Clothing appropriately placed and clean. Hygiene is appropriate.   Skin: Skin is warm, dry, and intact. Skin turgor is normal/elastic. Mucous membranes moist. Skin color is normal for ethnicity. No skin breakdown noted.  Musculoskeletal: Moves all extremities well. Full active ROM. No deformities noted. Denies any weakness. Gait unwitnessed, denies use of assistive devices at home. Reports back pain, from neck to mid back with movement, worse when at work. Denies any known injury or trauma.   Respiratory: Airway open and patent. Respirations equal and unlabored. Breath sounds clear to auscultation. Denies any SOB.   Cardiac: Regular rate and rhythm, NSR at 71 bpm. No peripheral edema noted. Radial and pedal pulses present and normal. Capillary refill is within normal limits. Denies chest pain.    GI: Abdomen soft, non-tender to all quadrants with palpitation. Bowel sounds present and active in all quads. Abdomen symmetric. Reports mild intermittent nausea, none at this time.    Neurological: Symmetrical expressions noted to face. No obvious neurological deficits noted. Reports intermittent dizziness with motion, genoveva standing up from bent over position.   Psychosocial: Speech spontaneous, clear, and coherent. Appropriate to situation. Pt is calm and cooperative.     Pt informed of plan of care, verbalizes understanding, and denies any other questions, complaints, or concerns at this time. Bed in locked in lowest position, siderails up x2, call light  within reach. Pt continued on cardiac monitor, blood pressure cuff and continuous pulse ox. Will continue to monitor.

## 2018-07-19 NOTE — ED NOTES
Pt lying in stretcher. NAD. Lights dimmed. Resp e/u. No complaints or questions at this time. Awaiting room assignment. Pt aware. Will continue to monitor.

## 2018-07-20 LAB
ANION GAP SERPL CALC-SCNC: 11 MMOL/L
BASOPHILS # BLD AUTO: 0.02 K/UL
BASOPHILS NFR BLD: 0.4 %
BUN SERPL-MCNC: 42 MG/DL
CALCIUM SERPL-MCNC: 9.5 MG/DL
CHLORIDE SERPL-SCNC: 106 MMOL/L
CO2 SERPL-SCNC: 22 MMOL/L
CREAT SERPL-MCNC: 2.1 MG/DL
CREAT UR-MCNC: 68.2 MG/DL
DIFFERENTIAL METHOD: ABNORMAL
EOSINOPHIL # BLD AUTO: 0.1 K/UL
EOSINOPHIL NFR BLD: 1.1 %
ERYTHROCYTE [DISTWIDTH] IN BLOOD BY AUTOMATED COUNT: 12.6 %
EST. GFR  (AFRICAN AMERICAN): 40.9 ML/MIN/1.73 M^2
EST. GFR  (NON AFRICAN AMERICAN): 35.4 ML/MIN/1.73 M^2
ESTIMATED AVG GLUCOSE: 209 MG/DL
GLUCOSE SERPL-MCNC: 75 MG/DL
HBA1C MFR BLD HPLC: 8.9 %
HCT VFR BLD AUTO: 34.7 %
HGB BLD-MCNC: 11.4 G/DL
LYMPHOCYTES # BLD AUTO: 1.9 K/UL
LYMPHOCYTES NFR BLD: 40.7 %
MCH RBC QN AUTO: 30.1 PG
MCHC RBC AUTO-ENTMCNC: 32.9 G/DL
MCV RBC AUTO: 92 FL
MONOCYTES # BLD AUTO: 0.4 K/UL
MONOCYTES NFR BLD: 9.1 %
NEUTROPHILS # BLD AUTO: 2.3 K/UL
NEUTROPHILS NFR BLD: 48.5 %
PLATELET # BLD AUTO: 305 K/UL
PMV BLD AUTO: 10.3 FL
POCT GLUCOSE: 157 MG/DL (ref 70–110)
POCT GLUCOSE: 165 MG/DL (ref 70–110)
POCT GLUCOSE: 307 MG/DL (ref 70–110)
POTASSIUM SERPL-SCNC: 4.3 MMOL/L
RBC # BLD AUTO: 3.79 M/UL
SODIUM SERPL-SCNC: 139 MMOL/L
SODIUM UR-SCNC: 114 MMOL/L
WBC # BLD AUTO: 4.74 K/UL

## 2018-07-20 PROCEDURE — 85025 COMPLETE CBC W/AUTO DIFF WBC: CPT

## 2018-07-20 PROCEDURE — 36415 COLL VENOUS BLD VENIPUNCTURE: CPT

## 2018-07-20 PROCEDURE — 84300 ASSAY OF URINE SODIUM: CPT

## 2018-07-20 PROCEDURE — 25000003 PHARM REV CODE 250: Performed by: NURSE PRACTITIONER

## 2018-07-20 PROCEDURE — 83036 HEMOGLOBIN GLYCOSYLATED A1C: CPT

## 2018-07-20 PROCEDURE — 25000003 PHARM REV CODE 250: Performed by: HOSPITALIST

## 2018-07-20 PROCEDURE — 63600175 PHARM REV CODE 636 W HCPCS: Performed by: HOSPITALIST

## 2018-07-20 PROCEDURE — 82570 ASSAY OF URINE CREATININE: CPT

## 2018-07-20 PROCEDURE — 99233 SBSQ HOSP IP/OBS HIGH 50: CPT | Mod: ,,, | Performed by: INTERNAL MEDICINE

## 2018-07-20 PROCEDURE — 96372 THER/PROPH/DIAG INJ SC/IM: CPT

## 2018-07-20 PROCEDURE — 80048 BASIC METABOLIC PNL TOTAL CA: CPT

## 2018-07-20 PROCEDURE — 21400001 HC TELEMETRY ROOM

## 2018-07-20 RX ORDER — THIAMINE HCL 100 MG
100 TABLET ORAL DAILY
Status: DISCONTINUED | OUTPATIENT
Start: 2018-07-20 | End: 2018-07-21 | Stop reason: HOSPADM

## 2018-07-20 RX ORDER — ASPIRIN 325 MG
325 TABLET ORAL DAILY
Status: DISCONTINUED | OUTPATIENT
Start: 2018-07-21 | End: 2018-07-21 | Stop reason: HOSPADM

## 2018-07-20 RX ORDER — FOLIC ACID 1 MG/1
1 TABLET ORAL DAILY
Status: DISCONTINUED | OUTPATIENT
Start: 2018-07-20 | End: 2018-07-21 | Stop reason: HOSPADM

## 2018-07-20 RX ORDER — CHLORDIAZEPOXIDE HYDROCHLORIDE 10 MG/1
10 CAPSULE, GELATIN COATED ORAL 3 TIMES DAILY
Status: DISCONTINUED | OUTPATIENT
Start: 2018-07-20 | End: 2018-07-20

## 2018-07-20 RX ADMIN — FOLIC ACID 1 MG: 1 TABLET ORAL at 02:07

## 2018-07-20 RX ADMIN — Medication 100 MG: at 02:07

## 2018-07-20 RX ADMIN — PANTOPRAZOLE SODIUM 40 MG: 40 TABLET, DELAYED RELEASE ORAL at 09:07

## 2018-07-20 RX ADMIN — HEPARIN SODIUM 5000 UNITS: 5000 INJECTION, SOLUTION INTRAVENOUS; SUBCUTANEOUS at 02:07

## 2018-07-20 RX ADMIN — INSULIN DETEMIR 45 UNITS: 100 INJECTION, SOLUTION SUBCUTANEOUS at 09:07

## 2018-07-20 RX ADMIN — INSULIN ASPART 4 UNITS: 100 INJECTION, SOLUTION INTRAVENOUS; SUBCUTANEOUS at 11:07

## 2018-07-20 RX ADMIN — THERA TABS 1 TABLET: TAB at 02:07

## 2018-07-20 RX ADMIN — ATORVASTATIN CALCIUM 10 MG: 10 TABLET, FILM COATED ORAL at 11:07

## 2018-07-20 RX ADMIN — HEPARIN SODIUM 5000 UNITS: 5000 INJECTION, SOLUTION INTRAVENOUS; SUBCUTANEOUS at 06:07

## 2018-07-20 RX ADMIN — HEPARIN SODIUM 5000 UNITS: 5000 INJECTION, SOLUTION INTRAVENOUS; SUBCUTANEOUS at 09:07

## 2018-07-20 RX ADMIN — ASPIRIN 81 MG: 81 TABLET, COATED ORAL at 11:07

## 2018-07-20 NOTE — PLAN OF CARE
Met with patient. Patient denies any post hospital needs or services at this time.  Patient is independent with all his adl's and iadl's, works full time and indicated that he has very good family support. Transitional Care Folder, Discharge Planning Begins on Admission pamphlet, [facility] Bedside Delivery pamphlet, Advance Directive information given to patient along with the contact information given.Instructed patient  to call with any questions or concerns.        [facility] - [place], [place] - [address]  [id number]  [place] [address]  Phone: [phone] Fax: [phone]    [facility] - [place], [place] - [address] AT [facility]  [address]  [place] [address]  Phone: [phone] Fax: [phone]    Otf Roblero MD  Payor: MEDICAID / Plan: [facility] / Product Type: Managed Medicaid /           07/20/18 1508   Discharge Assessment   Assessment Type Discharge Planning Assessment   Confirmed/corrected address and phone number on facesheet? Yes   Assessment information obtained from? Patient;Medical Record   Expected Length of Stay (days) (tbd)   Communicated expected length of stay with patient/caregiver no   Prior to hospitilization cognitive status: Alert/Oriented   Prior to hospitalization functional status: Independent   Current cognitive status: Alert/Oriented   Current Functional Status: Independent   Facility Arrived From: home   Lives With spouse   Able to Return to Prior Arrangements yes   Is patient able to care for self after discharge? Yes   Who are your caregiver(s) and their phone number(s)? Lucia Arreaga ( spouse 0 [phone]   Patient's perception of discharge disposition home or selfcare   Readmission Within The Last 30 Days no previous admission in last 30 days   Patient currently being followed by outpatient case management? No   Patient currently receives any  other outside agency services? No   Equipment Currently Used at Home glucometer;other (see comments)  (b/p monitor)   Do you have any problems affording any of your prescribed medications? TBD   Is the patient taking medications as prescribed? yes   Does the patient have transportation home? Yes   Transportation Available car;family or friend will provide   Does the patient receive services at the [facility]? No   Discharge Plan A Home;Home with family   Discharge Plan B Home   Patient/Family In Agreement With Plan yes

## 2018-07-20 NOTE — ED NOTES
Spoke with ANNA Van with hospital medicine regarding orders for patient. Will place new orders. Pt lying in stretcher, NAD. Resp e/u. Will continue to monitor.

## 2018-07-20 NOTE — ASSESSMENT & PLAN NOTE
Pt states that he has reduced the amount he is drinking  Advised complete cessation due to pancreatitis  Monitor for S/S of alcohol withdrawal  Ativan prn for S/S withdrawal  Folic acid, MVI and thiamine

## 2018-07-20 NOTE — ASSESSMENT & PLAN NOTE
No hydronephrosis present on CT scan  Hold lasix and lisinopril, metformin and glipizide  Continue IV hydration  Nephrology consult  Daily BMP

## 2018-07-20 NOTE — H&P
"[facility] Hospital Medicine  History & Physical    Patient Name: Jorgito Arreaga  MRN: [id number]  Admission Date: 7/19/2018  Attending Physician: Nilesh Burris MD   Primary Care Provider: Otf Roblero MD         Patient information was obtained from patient, past medical records and ER records.     Subjective:     Principal Problem:KOREY (acute kidney injury)    Chief Complaint:   Chief Complaint   Patient presents with    Dizziness     Pt c/o dizziness and back pain to point of "blacking out".    Back Pain     Neck to lower back. Worse between shoulder blades.    Nausea     No appetite.        HPI: Jorgito Arreaga is a 53 yo male with PMhx of chronic pancreatitis, DM II &  HTN who presented to [facility] ED with complaints of dizziness mostly noted with position changes. Pt explains over the last 3 days his symptoms of dizziness, generalized weakness, nausea, poor appetite and SOB has worsened. He explains over the July 4th weekend he was having abdominal pain which he attributed to his pancreas. On July 10, he presented for evaluation and his creatinine = 2.3 (baseline 0.8). He was given IV hydration, creatinine improved and he was sent home. His symptoms have been waxing and waning until the last 3 days when they worsened. He takes lisinopril, metformin, spironolactone and lasix. Pt denies fever, chills, cough, chest pain, abdominal pain, vomiting, diarrhea and urinary symptoms. His back pain has improved at this time. CT of Head was negative for acute findings; CXR negative, CT renal stone study indicates inflammatory changes to tail of pancreas and pseudocysts. Kidneys are normal in appearance. Labs noted anemia, potassium 5.8, BUN 51, creatinine 4.3, Na 133, blood glucose = 354 and  Lipase normal. Pt is admitted for evaluation and management of KOREY.     Past Medical History:   Diagnosis Date    Cataracts, bilateral     Diabetes mellitus, type 2     GERD (gastroesophageal reflux " disease)     Hypertension     Inflammatory polyps of colon     Pancreatitis, alcoholic, acute     Port catheter in place     TIA (transient ischemic attack)        Past Surgical History:   Procedure Laterality Date    COLONOSCOPY      COLONOSCOPY N/A 6/15/2018    Procedure: COLONOSCOPY;  Surgeon: Jim Hicks MD;  Location: [facility];  Service: Endoscopy;  Laterality: N/A;    ESOPHAGOGASTRODUODENOSCOPY N/A 6/15/2018    Procedure: ESOPHAGOGASTRODUODENOSCOPY (EGD);  Surgeon: Jim Hicks MD;  Location: [facility];  Service: Endoscopy;  Laterality: N/A;       Review of patient's allergies indicates:  No Known Allergies    No current facility-administered medications on file prior to encounter.      Current Outpatient Prescriptions on File Prior to Encounter   Medication Sig    aspirin 325 MG tablet Take 1 tablet (325 mg total) by mouth once daily.    atorvastatin (LIPITOR) 10 MG tablet Take 1 tablet (10 mg total) by mouth once daily.    blood sugar diagnostic (ONETOUCH ULTRA TEST) Strp USE TO CHECK SUGAR BEFORE MEALS AND AT BEDTIME    clotrimazole-betamethasone 1-0.05% (LOTRISONE) cream APPLY  CREAM TOPICALLY TO AFFECTED AREA TWICE DAILY    furosemide (LASIX) 20 MG tablet Take 1/2 tablet (10 mg total) by mouth once daily.    glipiZIDE (GLUCOTROL) 2.5 MG TR24 Take 1 tablet (2.5 mg total) by mouth daily with breakfast.    hydrALAZINE (APRESOLINE) 50 MG tablet Take 1 tablet (50 mg total) by mouth 3 (three) times daily.    HYDROcodone-acetaminophen (NORCO) 5-325 mg per tablet Take 1 tablet by mouth every 4 (four) hours as needed for Pain.    insulin glargine (BASAGLAR KWIKPEN U-100 INSULIN) 100 unit/mL (3 mL) InPn pen INJECT 45 UNITS UNDER THE SKIN IN THE EVENING (Patient taking differently: Inject 50 Units into the skin every evening. INJECT 45 UNITS UNDER THE SKIN IN THE EVENING)    isosorbide dinitrate (ISORDIL) 20 MG tablet TAKE ONE TABLET BY MOUTH THREE TIMES DAILY    lancets (ONEUCH DELICA  "LANCETS) 33 gauge Misc Use to check sugar 4 (four) times daily before meals and nightly.    lisinopril (PRINIVIL,ZESTRIL) 40 MG tablet Take 1 tablet (40 mg total) by mouth once daily.    metFORMIN (GLUCOPHAGE) 500 MG tablet Take 1 tablet (500 mg total) by mouth 2 (two) times daily with meals.    MICROLET LANCET Misc Inject 1 each into the skin every meal as needed.    mupirocin (BACTROBAN) 2 % ointment APPLY  OINTMENT TOPICALLY TO AFFECTED AREA THREE TIMES DAILY    pantoprazole (PROTONIX) 40 MG tablet Take 1 tablet (40 mg total) by mouth once daily.    pen needle, diabetic 31 gauge x 3/16" Ndle USE ONE NEEDLE ONCE NIGHTLY    potassium chloride SA (K-DUR,KLOR-CON) 10 MEQ tablet Take 1 tablet (10 mEq total) by mouth 2 (two) times daily.    promethazine (PHENERGAN) 25 MG tablet Take 1 tablet (25 mg total) by mouth every 6 (six) hours as needed for Nausea.    spironolactone (ALDACTONE) 50 MG tablet Take 1 tablet (50 mg total) by mouth once daily.    verapamil (CALAN-SR) 240 MG CR tablet Take 1 tablet (240 mg total) by mouth once daily.     Family History     Problem Relation (Age of Onset)    Hypertension Mother, Father        Social History Main Topics    Smoking status: Former Smoker     Types: Cigarettes    Smokeless tobacco: Never Used    Alcohol use Yes      Comment:  Daily - States that he drinks 1/2 pint of vodka and about 1-2 cans of beer daily    Drug use: No    Sexual activity: Yes     Partners: Female     Review of Systems   Constitutional: Positive for activity change, appetite change and fatigue. Negative for chills, diaphoresis and fever.   HENT: Negative.    Eyes: Negative for pain and redness.   Respiratory: Negative for cough, shortness of breath and wheezing.    Cardiovascular: Negative for chest pain, palpitations and leg swelling.   Gastrointestinal: Positive for nausea. Negative for abdominal pain, constipation, diarrhea and vomiting.   Genitourinary: Negative for decreased urine " volume, difficulty urinating, flank pain, frequency and hematuria.   Musculoskeletal: Positive for back pain.   Skin: Negative for pallor, rash and wound.   Neurological: Positive for dizziness, weakness and light-headedness. Negative for seizures, facial asymmetry, numbness and headaches.   Psychiatric/Behavioral: Negative.      Objective:     Vital Signs (Most Recent):  Temp: 97.9 °F (36.6 °C) (07/20/18 1101)  Pulse: 70 (07/20/18 1101)  Resp: 18 (07/20/18 1101)  BP: 124/83 (07/20/18 1101)  SpO2: 96 % (07/20/18 1101) Vital Signs (24h Range):  Temp:  [97.9 °F (36.6 °C)-98.7 °F (37.1 °C)] 97.9 °F (36.6 °C)  Pulse:  [60-75] 70  Resp:  [16-20] 18  SpO2:  [95 %-98 %] 96 %  BP: (102-132)/(58-87) 124/83     Weight: 92 kg (202 lb 13.2 oz)  Body mass index is 32.74 kg/m².    Physical Exam   Constitutional: He is oriented to person, place, and time. He appears well-developed and well-nourished.   HENT:   Head: Normocephalic and atraumatic.   Nose: Nose normal.   Mouth/Throat: Oropharynx is clear and moist.   Eyes: Conjunctivae are normal. Pupils are equal, round, and reactive to light. No scleral icterus.   Neck: Normal range of motion. Neck supple.   Cardiovascular: Normal rate, regular rhythm and normal heart sounds.  Exam reveals no gallop and no friction rub.    No murmur heard.  Pulmonary/Chest: Effort normal and breath sounds normal.   Abdominal: Soft. Bowel sounds are normal.   Musculoskeletal: Normal range of motion. He exhibits no edema or tenderness.   Paraspinal muscle tenderness to thoracic area   Neurological: He is alert and oriented to person, place, and time.   Skin: Skin is warm and dry.   Psychiatric: He has a normal mood and affect. His behavior is normal.   Nursing note and vitals reviewed.        CRANIAL NERVES     CN III, IV, VI   Pupils are equal, round, and reactive to light.       Significant Labs:   CBC:   Recent Labs  Lab 07/19/18  1008 07/20/18  0551   WBC 6.28 4.74   HGB 12.1* 11.4*   HCT 36.8*  34.7*    305     CMP:   Recent Labs  Lab 07/19/18  1008 07/19/18 2125 07/20/18  0551   * 134* 139   K 5.8* 4.8 4.3   CL 96 102 106   CO2 24 20* 22*   * 253* 75   BUN 51* 43* 42*   CREATININE 4.3* 2.8* 2.1*   CALCIUM 10.3 9.3 9.5   PROT 8.4  --   --    ALBUMIN 4.5  --   --    BILITOT 0.8  --   --    ALKPHOS 53*  --   --    AST 11  --   --    ALT 12  --   --    ANIONGAP 13 12 11   EGFRNONAA 14.9* 25.0* 35.4*     Urine Studies:   Recent Labs  Lab 07/19/18  1009   COLORU Yellow   APPEARANCEUA Clear   PHUR 5.0   SPECGRAV 1.025   PROTEINUA Negative   GLUCUA 3+*   KETONESU Negative   BILIRUBINUA Negative   OCCULTUA Trace*   NITRITE Negative   UROBILINOGEN Negative   LEUKOCYTESUR Negative   RBCUA 0   WBCUA 2   BACTERIA None   SQUAMEPITHEL 1     All pertinent labs within the past 24 hours have been reviewed.    Significant Imaging: I have reviewed all pertinent imaging results/findings within the past 24 hours.    Assessment/Plan:     * KOREY (acute kidney injury)    No hydronephrosis present on CT scan  Hold lasix and lisinopril, metformin and glipizide  Continue IV hydration  Nephrology consult  Daily BMP            Type 2 diabetes mellitus with hyperglycemia    Uncontrolled  Resume long acting insulin  Diabetic diet  Hold metformin and glipizide  Hgb A1 C pending          Hypertension associated with diabetes    Hold antihypertensives as b/p in normal range - resume at time of discharge          Alcohol abuse    Pt states that he has reduced the amount he is drinking  Advised complete cessation due to pancreatitis  Monitor for S/S of alcohol withdrawal  Ativan prn for S/S withdrawal  Folic acid, MVI and thiamine            VTE Risk Mitigation         Ordered     heparin (porcine) injection 5,000 Units  Every 8 hours      07/19/18 2124     IP VTE HIGH RISK PATIENT  Once      07/19/18 2111     Place sequential compression device  Until discontinued      07/19/18 2111             Tiffanie Osborne,  NP  Department of Hospital Medicine   [facility]

## 2018-07-20 NOTE — ASSESSMENT & PLAN NOTE
Uncontrolled  Resume long acting insulin  Diabetic diet  Hold metformin and glipizide  Hgb A1 C pending

## 2018-07-20 NOTE — HPI
Jorgito Arreaga is a 51 yo male with PMhx of chronic pancreatitis, DM II &  HTN who presented to [facility] ED with complaints of dizziness mostly noted with position changes. Pt explains over the last 3 days his symptoms of dizziness, generalized weakness, nausea, poor appetite and SOB has worsened. He explains over the July 4th weekend he was having abdominal pain which he attributed to his pancreas. On July 10, he presented for evaluation and his creatinine = 2.3 (baseline 0.8). He was given IV hydration, creatinine improved and he was sent home. His symptoms have been waxing and waning until the last 3 days when they worsened. He takes lisinopril, metformin, spironolactone and lasix. Pt denies fever, chills, cough, chest pain, abdominal pain, vomiting, diarrhea and urinary symptoms. His back pain has improved at this time. CT of Head was negative for acute findings; CXR negative, CT renal stone study indicates inflammatory changes to tail of pancreas and pseudocysts. Kidneys are normal in appearance. Labs noted anemia, potassium 5.8, BUN 51, creatinine 4.3, Na 133, blood glucose = 354 and  Lipase normal. Pt is admitted for evaluation and management of KOREY.

## 2018-07-20 NOTE — ED NOTES
Level of Consciousness: Patient is awake, alert, oriented to person, place, time, and situation.    Appearance: Pt resting comfortably in stretcher, no acute distress at this time. Clothing appropriately placed and clean. Hygiene is appropriate.   Skin: Skin is warm, dry, and intact. Skin turgor is normal/elastic. Mucous membranes moist. Skin color is normal for ethnicity. No skin breakdown noted.  Musculoskeletal: Moves all extremities well. Full active ROM. No deformities noted. Denies any weakness. Gait unwitnessed, denies use of assistive devices at home. Reports back pain, from neck to mid back with movement, mild at this time. Denies any known injury or trauma.   Respiratory: Airway open and patent. Respirations equal and unlabored. Breath sounds clear to auscultation. Denies any SOB.   Cardiac: Regular rate and rhythm. No peripheral edema noted. Radial and pedal pulses present and normal. Capillary refill is within normal limits. Denies chest pain.    GI: Abdomen soft, non-tender to all quadrants with palpitation. Bowel sounds present and active in all quads. Abdomen symmetric. Reports mild intermittent nausea, none at this time.    Neurological: Symmetrical expressions noted to face. No obvious neurological deficits noted. Denies any dizziness at this time.   Psychosocial: Speech spontaneous, clear, and coherent. Appropriate to situation. Pt is calm and cooperative.

## 2018-07-20 NOTE — ED NOTES
Level of Consciousness: Patient is awake, alert, oriented to person, place, time, and situation.    Appearance: Pt resting comfortably in stretcher, no acute distress at this time. Clothing appropriately placed and clean. Hygiene is appropriate.   Skin: Skin is warm, dry, and intact. Skin turgor is normal/elastic. Mucous membranes moist. Skin color is normal for ethnicity. No skin breakdown noted.  Musculoskeletal: Moves all extremities well. Full active ROM. No deformities noted. Denies any weakness. Reports mild back pain, from neck to mid back with movement. Denies any known injury or trauma.   Respiratory: Airway open and patent. Respirations equal and unlabored. Breath sounds clear to auscultation. Denies any SOB.   Cardiac: Regular rate and rhythm. No peripheral edema noted. Radial and pedal pulses present and normal. Capillary refill is within normal limits. Denies chest pain.    GI: Abdomen soft, non-tender to all quadrants with palpitation. Bowel sounds present and active in all quads. Abdomen symmetric. Reports mild intermittent nausea, none at this time.    Neurological: Symmetrical expressions noted to face. No obvious neurological deficits noted. Denies any dizziness at this time.   Psychosocial: Speech spontaneous, clear, and coherent. Appropriate to situation. Pt is calm and cooperative.

## 2018-07-20 NOTE — SUBJECTIVE & OBJECTIVE
Past Medical History:   Diagnosis Date    Cataracts, bilateral     Diabetes mellitus, type 2     GERD (gastroesophageal reflux disease)     Hypertension     Inflammatory polyps of colon     Pancreatitis, alcoholic, acute     Port catheter in place     TIA (transient ischemic attack)        Past Surgical History:   Procedure Laterality Date    COLONOSCOPY      COLONOSCOPY N/A 6/15/2018    Procedure: COLONOSCOPY;  Surgeon: Jim Hicks MD;  Location: [facility] END[facility];  Service: Endoscopy;  Laterality: N/A;    ESOPHAGOGASTRODUODENOSCOPY N/A 6/15/2018    Procedure: ESOPHAGOGASTRODUODENOSCOPY (EGD);  Surgeon: iJm Hicks MD;  Location: [facility] ENDO;  Service: Endoscopy;  Laterality: N/A;       Review of patient's allergies indicates:  No Known Allergies  Current Facility-Administered Medications   Medication Frequency    0.9%  NaCl infusion Continuous    acetaminophen tablet 650 mg Q6H PRN    [START ON 7/21/2018] aspirin tablet 325 mg Daily    atorvastatin tablet 10 mg Daily    dextrose 50% injection 12.5 g PRN    dextrose 50% injection 25 g PRN    folic acid tablet 1 mg Daily    glucagon (human recombinant) injection 1 mg PRN    glucose chewable tablet 16 g PRN    glucose chewable tablet 24 g PRN    heparin (porcine) injection 5,000 Units Q8H    HYDROcodone-acetaminophen 5-325 mg per tablet 1 tablet Q6H PRN    insulin aspart U-100 pen 0-5 Units QID (AC + HS) PRN    insulin detemir U-100 pen 45 Units QHS    lorazepam (ATIVAN) injection 1 mg Q4H PRN    multivitamin tablet 1 tablet Daily    ondansetron injection 4 mg Q8H PRN    pantoprazole EC tablet 40 mg Daily    promethazine (PHENERGAN) 6.25 mg in dextrose 5 % 50 mL IVPB Q6H PRN    ramelteon tablet 8 mg Nightly PRN    thiamine tablet 100 mg Daily     Family History     Problem Relation (Age of Onset)    Hypertension Mother, Father        Social History Main Topics    Smoking status: Former Smoker     Types: Cigarettes    Smokeless tobacco:  Never Used    Alcohol use Yes      Comment:  Daily - States that he drinks 1/2 pint of vodka and about 1-2 cans of beer daily    Drug use: No    Sexual activity: Yes     Partners: Female     Review of Systems   Constitutional: Negative.    HENT: Negative.    Respiratory: Negative.    Cardiovascular: Negative.    Gastrointestinal: Positive for nausea.   Genitourinary: Negative.    Neurological: Negative.    Psychiatric/Behavioral: Negative.      Objective:     Vital Signs (Most Recent):  Temp: 97.8 °F (36.6 °C) (07/20/18 1750)  Pulse: 86 (07/20/18 1750)  Resp: 18 (07/20/18 1750)  BP: (!) 140/78 (07/20/18 1750)  SpO2: 99 % (07/20/18 1750)  O2 Device (Oxygen Therapy): room air (07/20/18 1750) Vital Signs (24h Range):  Temp:  [97.1 °F (36.2 °C)-98.7 °F (37.1 °C)] 97.8 °F (36.6 °C)  Pulse:  [60-86] 86  Resp:  [16-20] 18  SpO2:  [96 %-99 %] 99 %  BP: (103-140)/(58-92) 140/78     Weight: 92 kg (202 lb 13.2 oz) (07/19/18 0939)  Body mass index is 32.74 kg/m².  Body surface area is 2.07 meters squared.    I/O last 3 completed shifts:  In: 1416.7 [I.V.:416.7; IV Piggyback:1000]  Out: 1175 [Urine:1175]    Physical Exam   Constitutional: He is oriented to person, place, and time. He appears well-developed and well-nourished. No distress.   HENT:   Head: Normocephalic and atraumatic.   Neck: No JVD present.   Cardiovascular: Normal rate and regular rhythm.  Exam reveals no friction rub.    Pulmonary/Chest: Effort normal and breath sounds normal. No respiratory distress. He has no rales.   Abdominal: Soft. Bowel sounds are normal. He exhibits distension. There is no tenderness. There is no guarding.   Musculoskeletal: He exhibits no edema.   Neurological: He is alert and oriented to person, place, and time.   Skin: Skin is warm and dry.   Psychiatric: He has a normal mood and affect. His behavior is normal. Thought content normal.   Nursing note and vitals reviewed.      Significant Labs: reviewed  BMP  Lab Results   Component  Value Date     07/20/2018    K 4.3 07/20/2018     07/20/2018    CO2 22 (L) 07/20/2018    BUN 42 (H) 07/20/2018    CREATININE 2.1 (H) 07/20/2018    CALCIUM 9.5 07/20/2018    ANIONGAP 11 07/20/2018    ESTGFRAFRICA 40.9 (A) 07/20/2018    EGFRNONAA 35.4 (A) 07/20/2018     Lab Results   Component Value Date    WBC 4.74 07/20/2018    HGB 11.4 (L) 07/20/2018    HCT 34.7 (L) 07/20/2018    MCV 92 07/20/2018     07/20/2018     Lab Results   Component Value Date    AMYLASE 44 07/19/2018     Lab Results   Component Value Date    LIPASE 10 07/19/2018       Significant Imaging: reviewed

## 2018-07-20 NOTE — ASSESSMENT & PLAN NOTE
Despite having a h/o of chronic pancreatitis, pt uses alcohol in clusters  Advised pt to avoid alcohol all together  Amylase and lipase are normal, likely because pt has chronic pancreatitis, not acute

## 2018-07-20 NOTE — CONSULTS
[facility] -   Nephrology  Consult Note    Patient Name: Jorgito Arreaga  MRN: [id number]  Admission Date: 7/19/2018  Hospital Length of Stay: 1 days  Attending Provider: Nilesh Burris MD   Primary Care Physician: Otf Roblero MD  Principal Problem:KOREY (acute kidney injury)     Reason for consult: KOREY  Referring physician: Dr. Burris    Consults  Subjective:     HPI: Pt was seen and examined. H/o was reviewed. Pt is a 53 y/o male with DM, HTN, chronic pancreatitis and intermittent alcohol use who presented with severe nausea without vomiting. s Cr was found to be elevated over baseline of 1.5 to 4.3 mg/dl. Pt was not eating or drinking much for about 1-2 days. He denies diarrhea, no NSAIds, no abx, no urinary issues. Pt now feels better, no new c/o's, no discomfort, no n/v, no diarrhea, no new issues since admit. s Cr has improved with IV hydration.    Past Medical History:   Diagnosis Date    Cataracts, bilateral     Diabetes mellitus, type 2     GERD (gastroesophageal reflux disease)     Hypertension     Inflammatory polyps of colon     Pancreatitis, alcoholic, acute     Port catheter in place     TIA (transient ischemic attack)        Past Surgical History:   Procedure Laterality Date    COLONOSCOPY      COLONOSCOPY N/A 6/15/2018    Procedure: COLONOSCOPY;  Surgeon: Jim Hicks MD;  Location: [facility];  Service: Endoscopy;  Laterality: N/A;    ESOPHAGOGASTRODUODENOSCOPY N/A 6/15/2018    Procedure: ESOPHAGOGASTRODUODENOSCOPY (EGD);  Surgeon: Jim Hicks MD;  Location: [facility];  Service: Endoscopy;  Laterality: N/A;       Review of patient's allergies indicates:  No Known Allergies  Current Facility-Administered Medications   Medication Frequency    0.9%  NaCl infusion Continuous    acetaminophen tablet 650 mg Q6H PRN    [START ON 7/21/2018] aspirin tablet 325 mg Daily    atorvastatin tablet 10 mg Daily    dextrose 50% injection 12.5 g PRN    dextrose 50% injection 25  g PRN    folic acid tablet 1 mg Daily    glucagon (human recombinant) injection 1 mg PRN    glucose chewable tablet 16 g PRN    glucose chewable tablet 24 g PRN    heparin (porcine) injection 5,000 Units Q8H    HYDROcodone-acetaminophen 5-325 mg per tablet 1 tablet Q6H PRN    insulin aspart U-100 pen 0-5 Units QID (AC + HS) PRN    insulin detemir U-100 pen 45 Units QHS    lorazepam (ATIVAN) injection 1 mg Q4H PRN    multivitamin tablet 1 tablet Daily    ondansetron injection 4 mg Q8H PRN    pantoprazole EC tablet 40 mg Daily    promethazine (PHENERGAN) 6.25 mg in dextrose 5 % 50 mL IVPB Q6H PRN    ramelteon tablet 8 mg Nightly PRN    thiamine tablet 100 mg Daily     Family History     Problem Relation (Age of Onset)    Hypertension Mother, Father        Social History Main Topics    Smoking status: Former Smoker     Types: Cigarettes    Smokeless tobacco: Never Used    Alcohol use Yes      Comment:  Daily - States that he drinks 1/2 pint of vodka and about 1-2 cans of beer daily    Drug use: No    Sexual activity: Yes     Partners: Female     Review of Systems   Constitutional: Negative.    HENT: Negative.    Respiratory: Negative.    Cardiovascular: Negative.    Gastrointestinal: Positive for nausea.   Genitourinary: Negative.    Neurological: Negative.    Psychiatric/Behavioral: Negative.      Objective:     Vital Signs (Most Recent):  Temp: 97.8 °F (36.6 °C) (07/20/18 1750)  Pulse: 86 (07/20/18 1750)  Resp: 18 (07/20/18 1750)  BP: (!) 140/78 (07/20/18 1750)  SpO2: 99 % (07/20/18 1750)  O2 Device (Oxygen Therapy): room air (07/20/18 1750) Vital Signs (24h Range):  Temp:  [97.1 °F (36.2 °C)-98.7 °F (37.1 °C)] 97.8 °F (36.6 °C)  Pulse:  [60-86] 86  Resp:  [16-20] 18  SpO2:  [96 %-99 %] 99 %  BP: (103-140)/(58-92) 140/78     Weight: 92 kg (202 lb 13.2 oz) (07/19/18 0939)  Body mass index is 32.74 kg/m².  Body surface area is 2.07 meters squared.    I/O last 3 completed shifts:  In: 1416.7  [I.V.:416.7; IV Piggyback:1000]  Out: 1175 [Urine:1175]    Physical Exam   Constitutional: He is oriented to person, place, and time. He appears well-developed and well-nourished. No distress.   HENT:   Head: Normocephalic and atraumatic.   Neck: No JVD present.   Cardiovascular: Normal rate and regular rhythm.  Exam reveals no friction rub.    Pulmonary/Chest: Effort normal and breath sounds normal. No respiratory distress. He has no rales.   Abdominal: Soft. Bowel sounds are normal. He exhibits distension. There is no tenderness. There is no guarding.   Musculoskeletal: He exhibits no edema.   Neurological: He is alert and oriented to person, place, and time.   Skin: Skin is warm and dry.   Psychiatric: He has a normal mood and affect. His behavior is normal. Thought content normal.   Nursing note and vitals reviewed.      Significant Labs: reviewed  BMP  Lab Results   Component Value Date     07/20/2018    K 4.3 07/20/2018     07/20/2018    CO2 22 (L) 07/20/2018    BUN 42 (H) 07/20/2018    CREATININE 2.1 (H) 07/20/2018    CALCIUM 9.5 07/20/2018    ANIONGAP 11 07/20/2018    ESTGFRAFRICA 40.9 (A) 07/20/2018    EGFRNONAA 35.4 (A) 07/20/2018     Lab Results   Component Value Date    WBC 4.74 07/20/2018    HGB 11.4 (L) 07/20/2018    HCT 34.7 (L) 07/20/2018    MCV 92 07/20/2018     07/20/2018     Lab Results   Component Value Date    AMYLASE 44 07/19/2018     Lab Results   Component Value Date    LIPASE 10 07/19/2018       Significant Imaging: reviewed    Assessment/Plan:   53 y/o male with KOREY:    * KOREY (acute kidney injury)    Renal: KOREY: unsure what causes the KOREY, but pt was severely nauseated on presentation  Prerenal azotemia due to dehydration is the possible etiology  Will send urine Na and Cr to calculate FENa  K normal  s Cr has now improved  KOREY resolving  Hemodynamically stable          Alcohol abuse    Despite having a h/o of chronic pancreatitis, pt uses alcohol in clusters  Advised pt  to avoid alcohol all together  Amylase and lipase are normal, likely because pt has chronic pancreatitis, not acute        Plans and recommendations:  As discussed above  Agree with current mgmt  Continue IV hydration with NS  Total time spent 70 minutes including time needed to review the records, the   patient evaluation, documentation, face-to-face discussion with the patient,   more than 50% of the time was spent on coordination of care and counseling.    Level V visit.'    Delonte Santizo MD   Nephrology  [facility]

## 2018-07-20 NOTE — ED NOTES
Pt updated on the further delay of bed assignment at [facility]. Aware of the lack of cafeteria at this location, and the need for a renal diet due to current condition. Pt offered to swap to a larger, more comfortable bed, but denied the need to be moved at this time. Patient will contact wife and family about possibly bringing in food for patient, and if not will be given another lean cuisine for meal. Apologized to patient regarding delay. Pt verbalized understanding, and will be updated with any further information.

## 2018-07-20 NOTE — SUBJECTIVE & OBJECTIVE
Past Medical History:   Diagnosis Date    Cataracts, bilateral     Diabetes mellitus, type 2     GERD (gastroesophageal reflux disease)     Hypertension     Inflammatory polyps of colon     Pancreatitis, alcoholic, acute     Port catheter in place     TIA (transient ischemic attack)        Past Surgical History:   Procedure Laterality Date    COLONOSCOPY      COLONOSCOPY N/A 6/15/2018    Procedure: COLONOSCOPY;  Surgeon: Jim Hicks MD;  Location: [facility];  Service: Endoscopy;  Laterality: N/A;    ESOPHAGOGASTRODUODENOSCOPY N/A 6/15/2018    Procedure: ESOPHAGOGASTRODUODENOSCOPY (EGD);  Surgeon: Jim Hicks MD;  Location: [facility];  Service: Endoscopy;  Laterality: N/A;       Review of patient's allergies indicates:  No Known Allergies    No current facility-administered medications on file prior to encounter.      Current Outpatient Prescriptions on File Prior to Encounter   Medication Sig    aspirin 325 MG tablet Take 1 tablet (325 mg total) by mouth once daily.    atorvastatin (LIPITOR) 10 MG tablet Take 1 tablet (10 mg total) by mouth once daily.    blood sugar diagnostic (ONETOUCH ULTRA TEST) Strp USE TO CHECK SUGAR BEFORE MEALS AND AT BEDTIME    clotrimazole-betamethasone 1-0.05% (LOTRISONE) cream APPLY  CREAM TOPICALLY TO AFFECTED AREA TWICE DAILY    furosemide (LASIX) 20 MG tablet Take 1/2 tablet (10 mg total) by mouth once daily.    glipiZIDE (GLUCOTROL) 2.5 MG TR24 Take 1 tablet (2.5 mg total) by mouth daily with breakfast.    hydrALAZINE (APRESOLINE) 50 MG tablet Take 1 tablet (50 mg total) by mouth 3 (three) times daily.    HYDROcodone-acetaminophen (NORCO) 5-325 mg per tablet Take 1 tablet by mouth every 4 (four) hours as needed for Pain.    insulin glargine (BASAGLAR KWIKPEN U-100 INSULIN) 100 unit/mL (3 mL) InPn pen INJECT 45 UNITS UNDER THE SKIN IN THE EVENING (Patient taking differently: Inject 50 Units into the skin every evening. INJECT 45 UNITS UNDER THE SKIN IN THE  "EVENING)    isosorbide dinitrate (ISORDIL) 20 MG tablet TAKE ONE TABLET BY MOUTH THREE TIMES DAILY    lancets (ONETOUCH DELICA LANCETS) 33 gauge Misc Use to check sugar 4 (four) times daily before meals and nightly.    lisinopril (PRINIVIL,ZESTRIL) 40 MG tablet Take 1 tablet (40 mg total) by mouth once daily.    metFORMIN (GLUCOPHAGE) 500 MG tablet Take 1 tablet (500 mg total) by mouth 2 (two) times daily with meals.    MICROLET LANCET Misc Inject 1 each into the skin every meal as needed.    mupirocin (BACTROBAN) 2 % ointment APPLY  OINTMENT TOPICALLY TO AFFECTED AREA THREE TIMES DAILY    pantoprazole (PROTONIX) 40 MG tablet Take 1 tablet (40 mg total) by mouth once daily.    pen needle, diabetic 31 gauge x 3/16" Ndle USE ONE NEEDLE ONCE NIGHTLY    potassium chloride SA (K-DUR,KLOR-CON) 10 MEQ tablet Take 1 tablet (10 mEq total) by mouth 2 (two) times daily.    promethazine (PHENERGAN) 25 MG tablet Take 1 tablet (25 mg total) by mouth every 6 (six) hours as needed for Nausea.    spironolactone (ALDACTONE) 50 MG tablet Take 1 tablet (50 mg total) by mouth once daily.    verapamil (CALAN-SR) 240 MG CR tablet Take 1 tablet (240 mg total) by mouth once daily.     Family History     Problem Relation (Age of Onset)    Hypertension Mother, Father        Social History Main Topics    Smoking status: Former Smoker     Types: Cigarettes    Smokeless tobacco: Never Used    Alcohol use Yes      Comment:  Daily - States that he drinks 1/2 pint of vodka and about 1-2 cans of beer daily    Drug use: No    Sexual activity: Yes     Partners: Female     Review of Systems   Constitutional: Positive for activity change, appetite change and fatigue. Negative for chills, diaphoresis and fever.   HENT: Negative.    Eyes: Negative for pain and redness.   Respiratory: Negative for cough, shortness of breath and wheezing.    Cardiovascular: Negative for chest pain, palpitations and leg swelling.   Gastrointestinal: Positive " for nausea. Negative for abdominal pain, constipation, diarrhea and vomiting.   Genitourinary: Negative for decreased urine volume, difficulty urinating, flank pain, frequency and hematuria.   Musculoskeletal: Positive for back pain.   Skin: Negative for pallor, rash and wound.   Neurological: Positive for dizziness, weakness and light-headedness. Negative for seizures, facial asymmetry, numbness and headaches.   Psychiatric/Behavioral: Negative.      Objective:     Vital Signs (Most Recent):  Temp: 97.9 °F (36.6 °C) (07/20/18 1101)  Pulse: 70 (07/20/18 1101)  Resp: 18 (07/20/18 1101)  BP: 124/83 (07/20/18 1101)  SpO2: 96 % (07/20/18 1101) Vital Signs (24h Range):  Temp:  [97.9 °F (36.6 °C)-98.7 °F (37.1 °C)] 97.9 °F (36.6 °C)  Pulse:  [60-75] 70  Resp:  [16-20] 18  SpO2:  [95 %-98 %] 96 %  BP: (102-132)/(58-87) 124/83     Weight: 92 kg (202 lb 13.2 oz)  Body mass index is 32.74 kg/m².    Physical Exam   Constitutional: He is oriented to person, place, and time. He appears well-developed and well-nourished.   HENT:   Head: Normocephalic and atraumatic.   Nose: Nose normal.   Mouth/Throat: Oropharynx is clear and moist.   Eyes: Conjunctivae are normal. Pupils are equal, round, and reactive to light. No scleral icterus.   Neck: Normal range of motion. Neck supple.   Cardiovascular: Normal rate, regular rhythm and normal heart sounds.  Exam reveals no gallop and no friction rub.    No murmur heard.  Pulmonary/Chest: Effort normal and breath sounds normal.   Abdominal: Soft. Bowel sounds are normal.   Musculoskeletal: Normal range of motion. He exhibits no edema or tenderness.   Paraspinal muscle tenderness to thoracic area   Neurological: He is alert and oriented to person, place, and time.   Skin: Skin is warm and dry.   Psychiatric: He has a normal mood and affect. His behavior is normal.   Nursing note and vitals reviewed.        CRANIAL NERVES     CN III, IV, VI   Pupils are equal, round, and reactive to light.        Significant Labs:   CBC:   Recent Labs  Lab 07/19/18  1008 07/20/18  0551   WBC 6.28 4.74   HGB 12.1* 11.4*   HCT 36.8* 34.7*    305     CMP:   Recent Labs  Lab 07/19/18  1008 07/19/18  2125 07/20/18  0551   * 134* 139   K 5.8* 4.8 4.3   CL 96 102 106   CO2 24 20* 22*   * 253* 75   BUN 51* 43* 42*   CREATININE 4.3* 2.8* 2.1*   CALCIUM 10.3 9.3 9.5   PROT 8.4  --   --    ALBUMIN 4.5  --   --    BILITOT 0.8  --   --    ALKPHOS 53*  --   --    AST 11  --   --    ALT 12  --   --    ANIONGAP 13 12 11   EGFRNONAA 14.9* 25.0* 35.4*     Urine Studies:   Recent Labs  Lab 07/19/18  1009   COLORU Yellow   APPEARANCEUA Clear   PHUR 5.0   SPECGRAV 1.025   PROTEINUA Negative   GLUCUA 3+*   KETONESU Negative   BILIRUBINUA Negative   OCCULTUA Trace*   NITRITE Negative   UROBILINOGEN Negative   LEUKOCYTESUR Negative   RBCUA 0   WBCUA 2   BACTERIA None   SQUAMEPITHEL 1     All pertinent labs within the past 24 hours have been reviewed.    Significant Imaging: I have reviewed all pertinent imaging results/findings within the past 24 hours.

## 2018-07-20 NOTE — ED NOTES
LOC: The patient is awake, alert and aware of environment with an appropriate affect, the patient is oriented x 3 and speaking appropriately.  APPEARANCE: Patient resting comfortably and in no acute distress, patient is clean and well groomed, patient's clothing is properly fastened.  HEENT: Brief WNL  SKIN: Brief WNL.   MUSCULOSKELETAL: Brief WNL  RESPIRATORY: Brief WNL  CARDIAC: Brief WNL  GASTRO: Brief WNL  : Brief WNL  Peripheral Vasc: Brief WNL  NEURO: Brief WNL. Reports some dizziness with movement. aaox 3 perrla  PSYCH: Brief WNL

## 2018-07-20 NOTE — ASSESSMENT & PLAN NOTE
Renal: KOREY: unsure what causes the KOREY, but pt was severely nauseated on presentation  Prerenal azotemia due to dehydration is the possible etiology  Will send urine Na and Cr to calculate FENa  K normal  s Cr has now improved  KOREY resolving  Hemodynamically stable

## 2018-07-20 NOTE — ED NOTES
Discussed moving patient to back patient care rooms for comfort measures. Pt accepts the option to be moved to try and get some rest. No further complaints or questions. Will move patient to back room 18.

## 2018-07-20 NOTE — ED NOTES
Hospital medicine paged to ask about patient's standing orders, and administration of home medication.

## 2018-07-20 NOTE — HPI
Pt was seen and examined. H/o was reviewed. Pt is a 53 y/o male with DM, HTN, chronic pancreatitis and intermittent alcohol use who presented with severe nausea without vomiting. s Cr was found to be elevated over baseline of 1.5 to 4.3 mg/dl. Pt was not eating or drinking much for about 1-2 days. He denies diarrhea, no NSAIds, no abx, no urinary issues. Pt now feels better, no new c/o's, no discomfort, no n/v, no diarrhea, no new issues since admit. s Cr has improved with IV hydration.

## 2018-07-20 NOTE — PLAN OF CARE
Problem: Patient Care Overview  Goal: Plan of Care Review  Outcome: Ongoing (interventions implemented as appropriate)  Pt AAO x4.  VSS. Pt remained afebrile. Medications administered as ordered. NS at 150. Plan of care reviewed with patient. Patient verbalizes understanding. Bed low, side rails up x2, wheels locked, call light within reach. Pt. Instructed to call for assitance. Resting at this time. Will continue to monitor.

## 2018-07-21 VITALS
DIASTOLIC BLOOD PRESSURE: 99 MMHG | WEIGHT: 208.56 LBS | HEIGHT: 66 IN | OXYGEN SATURATION: 97 % | HEART RATE: 86 BPM | SYSTOLIC BLOOD PRESSURE: 138 MMHG | TEMPERATURE: 98 F | RESPIRATION RATE: 18 BRPM | BODY MASS INDEX: 33.52 KG/M2

## 2018-07-21 PROBLEM — N17.9 AKI (ACUTE KIDNEY INJURY): Status: RESOLVED | Noted: 2018-07-19 | Resolved: 2018-07-21

## 2018-07-21 LAB
ANION GAP SERPL CALC-SCNC: 10 MMOL/L
BASOPHILS # BLD AUTO: 0.02 K/UL
BASOPHILS NFR BLD: 0.4 %
BUN SERPL-MCNC: 25 MG/DL
CALCIUM SERPL-MCNC: 9.4 MG/DL
CHLORIDE SERPL-SCNC: 107 MMOL/L
CO2 SERPL-SCNC: 20 MMOL/L
CREAT SERPL-MCNC: 1.4 MG/DL
DIFFERENTIAL METHOD: ABNORMAL
EOSINOPHIL # BLD AUTO: 0.1 K/UL
EOSINOPHIL NFR BLD: 1 %
ERYTHROCYTE [DISTWIDTH] IN BLOOD BY AUTOMATED COUNT: 13 %
EST. GFR  (AFRICAN AMERICAN): >60 ML/MIN/1.73 M^2
EST. GFR  (NON AFRICAN AMERICAN): 58 ML/MIN/1.73 M^2
GLUCOSE SERPL-MCNC: 55 MG/DL
HCT VFR BLD AUTO: 34.4 %
HGB BLD-MCNC: 11.1 G/DL
LYMPHOCYTES # BLD AUTO: 2 K/UL
LYMPHOCYTES NFR BLD: 42.3 %
MCH RBC QN AUTO: 29.9 PG
MCHC RBC AUTO-ENTMCNC: 32.3 G/DL
MCV RBC AUTO: 93 FL
MONOCYTES # BLD AUTO: 0.3 K/UL
MONOCYTES NFR BLD: 5.2 %
NEUTROPHILS # BLD AUTO: 2.5 K/UL
NEUTROPHILS NFR BLD: 51.1 %
PLATELET # BLD AUTO: 317 K/UL
PMV BLD AUTO: 10.6 FL
POCT GLUCOSE: 341 MG/DL (ref 70–110)
POCT GLUCOSE: 55 MG/DL (ref 70–110)
POCT GLUCOSE: 66 MG/DL (ref 70–110)
POTASSIUM SERPL-SCNC: 4.2 MMOL/L
RBC # BLD AUTO: 3.71 M/UL
SODIUM SERPL-SCNC: 137 MMOL/L
WBC # BLD AUTO: 4.8 K/UL

## 2018-07-21 PROCEDURE — 80048 BASIC METABOLIC PNL TOTAL CA: CPT

## 2018-07-21 PROCEDURE — 25000003 PHARM REV CODE 250: Performed by: HOSPITALIST

## 2018-07-21 PROCEDURE — 36415 COLL VENOUS BLD VENIPUNCTURE: CPT

## 2018-07-21 PROCEDURE — 63600175 PHARM REV CODE 636 W HCPCS: Performed by: HOSPITALIST

## 2018-07-21 PROCEDURE — 85025 COMPLETE CBC W/AUTO DIFF WBC: CPT

## 2018-07-21 PROCEDURE — 99233 SBSQ HOSP IP/OBS HIGH 50: CPT | Mod: ,,, | Performed by: INTERNAL MEDICINE

## 2018-07-21 PROCEDURE — 25000003 PHARM REV CODE 250: Performed by: NURSE PRACTITIONER

## 2018-07-21 RX ORDER — LISINOPRIL 40 MG/1
40 TABLET ORAL DAILY
Qty: 90 TABLET | Refills: 0 | Status: SHIPPED | OUTPATIENT
Start: 2018-07-21 | End: 2018-09-21 | Stop reason: SDUPTHER

## 2018-07-21 RX ORDER — FUROSEMIDE 20 MG/1
10 TABLET ORAL DAILY
Qty: 90 TABLET | Refills: 0 | Status: SHIPPED | OUTPATIENT
Start: 2018-07-21 | End: 2018-08-02

## 2018-07-21 RX ORDER — SPIRONOLACTONE 50 MG/1
50 TABLET, FILM COATED ORAL DAILY
Qty: 90 TABLET | Refills: 1 | Status: SHIPPED | OUTPATIENT
Start: 2018-07-21 | End: 2018-10-03 | Stop reason: SDUPTHER

## 2018-07-21 RX ADMIN — PANTOPRAZOLE SODIUM 40 MG: 40 TABLET, DELAYED RELEASE ORAL at 09:07

## 2018-07-21 RX ADMIN — THERA TABS 1 TABLET: TAB at 09:07

## 2018-07-21 RX ADMIN — FOLIC ACID 1 MG: 1 TABLET ORAL at 09:07

## 2018-07-21 RX ADMIN — ASPIRIN 325 MG ORAL TABLET 325 MG: 325 PILL ORAL at 09:07

## 2018-07-21 RX ADMIN — ATORVASTATIN CALCIUM 10 MG: 10 TABLET, FILM COATED ORAL at 09:07

## 2018-07-21 RX ADMIN — SODIUM CHLORIDE: 0.9 INJECTION, SOLUTION INTRAVENOUS at 01:07

## 2018-07-21 RX ADMIN — Medication 100 MG: at 09:07

## 2018-07-21 RX ADMIN — HEPARIN SODIUM 5000 UNITS: 5000 INJECTION, SOLUTION INTRAVENOUS; SUBCUTANEOUS at 06:07

## 2018-07-21 RX ADMIN — INSULIN ASPART 4 UNITS: 100 INJECTION, SOLUTION INTRAVENOUS; SUBCUTANEOUS at 12:07

## 2018-07-21 NOTE — HOSPITAL COURSE
Subsequently pt admitted to tele and rehydrated aggressively with NS and his Lasix, aldactone, lisinopril and metformin held. He responded well to IVF and his Bun/Cr improved to 25/1.4 and he is feeling lot better, his weakness, dizziness all resolved. He is eating drinking well and walking around well. He had a BM too. He has had no s/s of any pancreatitis. He wishes to be discahrged home now and he was seen and examined and deemed stable for discharge. His Lasix has been changed to prn only. He was given dietary advise which he accepts and understands. He will see his PCP in 3 days and repeat his BMP and then resume his Lisinopril, Aldactone and Metformin. He understands and accepts.

## 2018-07-21 NOTE — PLAN OF CARE
Problem: Patient Care Overview  Goal: Interdisciplinary Rounds/Family Conf  Outcome: Ongoing (interventions implemented as appropriate)  Pt stable. Plan of care reviewed with patient. Patient verbalized understanding. Bed low, wheels locked, bed alarm on, call light within reach. Patient instructed to call for assistance. Will continue to monitor.     NS @ 150ml/hr  POCT provided      Value Min Max   Temp 97.1 °F (36.2 °C) 98.8 °F (37.1 °C)   Pulse 63 86   Resp 16 20   BP: Systolic 121 140   BP: Diastolic 71 94   MAP (mmHg) 92 109   SpO2 95 % 99 %

## 2018-07-21 NOTE — SUBJECTIVE & OBJECTIVE
Interval History: Pt was seen and examined. Stable last pm, no new c/o's, no further n/v, no diarrhea, no abd pain, no fever, no discomfort.    Review of patient's allergies indicates:  No Known Allergies  Current Facility-Administered Medications   Medication Frequency    0.9%  NaCl infusion Continuous    acetaminophen tablet 650 mg Q6H PRN    aspirin tablet 325 mg Daily    atorvastatin tablet 10 mg Daily    dextrose 50% injection 12.5 g PRN    dextrose 50% injection 25 g PRN    folic acid tablet 1 mg Daily    glucagon (human recombinant) injection 1 mg PRN    glucose chewable tablet 16 g PRN    glucose chewable tablet 24 g PRN    heparin (porcine) injection 5,000 Units Q8H    HYDROcodone-acetaminophen 5-325 mg per tablet 1 tablet Q6H PRN    insulin aspart U-100 pen 0-5 Units QID (AC + HS) PRN    insulin detemir U-100 pen 45 Units QHS    lorazepam (ATIVAN) injection 1 mg Q4H PRN    multivitamin tablet 1 tablet Daily    ondansetron injection 4 mg Q8H PRN    pantoprazole EC tablet 40 mg Daily    promethazine (PHENERGAN) 6.25 mg in dextrose 5 % 50 mL IVPB Q6H PRN    ramelteon tablet 8 mg Nightly PRN    thiamine tablet 100 mg Daily       Objective:     Vital Signs (Most Recent):  Temp: 97.9 °F (36.6 °C) (07/21/18 0759)  Pulse: 83 (07/21/18 0759)  Resp: 16 (07/21/18 0759)  BP: 120/79 (07/21/18 0759)  SpO2: 97 % (07/21/18 0759)  O2 Device (Oxygen Therapy): room air (07/21/18 0759) Vital Signs (24h Range):  Temp:  [97.1 °F (36.2 °C)-98.8 °F (37.1 °C)] 97.9 °F (36.6 °C)  Pulse:  [58-86] 83  Resp:  [16-20] 16  SpO2:  [95 %-99 %] 97 %  BP: (120-140)/(78-94) 120/79     Weight: 94.6 kg (208 lb 8.9 oz) (07/21/18 0426)  Body mass index is 33.66 kg/m².  Body surface area is 2.1 meters squared.    I/O last 3 completed shifts:  In: 1466.7 [I.V.:1466.7]  Out: 2800 [Urine:2800]    Physical Exam   Constitutional: He is oriented to person, place, and time. He appears well-developed and well-nourished. No distress.    HENT:   Head: Normocephalic and atraumatic.   Neck: No JVD present.   Cardiovascular: Normal rate and regular rhythm.  Exam reveals no friction rub.    Pulmonary/Chest: Effort normal and breath sounds normal. No respiratory distress. He has no rales.   Abdominal: Soft. Bowel sounds are normal. He exhibits distension. There is no tenderness. There is no guarding.   Musculoskeletal: He exhibits no edema.   Neurological: He is alert and oriented to person, place, and time.   Skin: Skin is warm and dry.   Psychiatric: He has a normal mood and affect. His behavior is normal. Thought content normal.   Nursing note and vitals reviewed.      Significant Labs: reviewed  [facility]  Lab Results   Component Value Date     07/21/2018    K 4.2 07/21/2018     07/21/2018    CO2 20 (L) 07/21/2018    BUN 25 (H) 07/21/2018    CREATININE 1.4 07/21/2018    CALCIUM 9.4 07/21/2018    ANIONGAP 10 07/21/2018    ESTGFRAFRICA >60 07/21/2018    EGFRNONAA 58 (A) 07/21/2018     Lab Results   Component Value Date    WBC 4.80 07/21/2018    HGB 11.1 (L) 07/21/2018    HCT 34.4 (L) 07/21/2018    MCV 93 07/21/2018     07/21/2018          Significant Imaging:  reviewed

## 2018-07-21 NOTE — DISCHARGE SUMMARY
[facility] Hospital Medicine  Discharge Summary      Patient Name: Jorgito Arreaga  MRN: [id number]  Admission Date: 7/19/2018  Hospital Length of Stay: 2 days  Discharge Date and Time:  07/21/2018 2:42 PM  Attending Physician: Fan Jo MD   Discharging Provider: Fan Jo MD  Primary Care Provider: Otf Roblero MD      HPI:   Jorgito Arreaga is a 51 yo male with PMhx of chronic pancreatitis, DM II &  HTN who presented to [facility] ED with complaints of dizziness mostly noted with position changes. Pt explains over the last 3 days his symptoms of dizziness, generalized weakness, nausea, poor appetite and SOB has worsened. He explains over the July 4th weekend he was having abdominal pain which he attributed to his pancreas. On July 10, he presented for evaluation and his creatinine = 2.3 (baseline 0.8). He was given IV hydration, creatinine improved and he was sent home. His symptoms have been waxing and waning until the last 3 days when they worsened. He takes lisinopril, metformin, spironolactone and lasix. Pt denies fever, chills, cough, chest pain, abdominal pain, vomiting, diarrhea and urinary symptoms. His back pain has improved at this time. CT of Head was negative for acute findings; CXR negative, CT renal stone study indicates inflammatory changes to tail of pancreas and pseudocysts. Kidneys are normal in appearance. Labs noted anemia, potassium 5.8, BUN 51, creatinine 4.3, Na 133, blood glucose = 354 and  Lipase normal. Pt is admitted for evaluation and management of KOREY.     * No surgery found *      Hospital Course:   Subsequently pt admitted to [facility] and rehydrated aggressively with NS and his Lasix, aldactone, lisinopril and metformin held. He responded well to IVF and his Bun/Cr improved to 25/1.4 and he is feeling lot better, his weakness, dizziness all resolved. He is eating drinking well and walking around well. He had a BM too. He has had no s/s of any pancreatitis. He  wishes to be discahrged home now and he was seen and examined and deemed stable for discharge. His Lasix has been changed to prn only. He was given dietary advise which he accepts and understands. He will see his PCP in 3 days and repeat his BMP and then resume his Lisinopril, Aldactone and Metformin. He understands and accepts.      Consults:   Consults         Status Ordering Provider     Inpatient consult to Nephrology  Once     Provider:  Delonte Santizo MD    Acknowledged JESSICA ROBLERO     IP consult to case management  Once     Provider:  (Not yet assigned)    GEORGIE Barraza new Assessment & Plan notes have been filed under this hospital service since the last note was generated.  Service: Hospital Medicine    Final Active Diagnoses:    Diagnosis Date Noted POA    Type 2 diabetes mellitus with hyperglycemia [E11.65] 08/22/2016 Yes    Hypertension associated with diabetes [E11.59, I10] 06/06/2016 Yes      Problems Resolved During this Admission:    Diagnosis Date Noted Date Resolved POA    PRINCIPAL PROBLEM:  KOREY (acute kidney injury) [N17.9] 07/19/2018 07/21/2018 Yes    Dizziness [R42]  07/21/2018 Yes    Prerenal azotemia [R79.89]  07/21/2018 Yes    Dehydration [E86.0]  07/21/2018 Yes    Alcohol abuse [F10.10] 06/06/2016 07/21/2018 Yes     Chronic       Discharged Condition: stable    Disposition: Home or Self Care    Follow Up:  Follow-up Information     Otf Roblero MD. Go in 3 days.    Specialty:  Family Medicine  Why:  Hospital follow up for ARF, repeat BMP  Contact information:  [address] [address] [address] [phone]                 Patient Instructions:     Diet diabetic     Diet Cardiac     Activity as tolerated         Significant Diagnostic Studies: Labs:   BMP:   Recent Labs  Lab 07/19/18  2125 07/20/18  0551 07/21/18  0524   * 75 55*   * 139 137   K 4.8 4.3 4.2    106 107   CO2 20* 22* 20*   BUN 43* 42* 25*   CREATININE 2.8* 2.1*  1.4   CALCIUM 9.3 9.5 9.4   , CMP   Recent Labs  Lab 07/19/18  2125 07/20/18  0551 07/21/18  0524   * 139 137   K 4.8 4.3 4.2    106 107   CO2 20* 22* 20*   * 75 55*   BUN 43* 42* 25*   CREATININE 2.8* 2.1* 1.4   CALCIUM 9.3 9.5 9.4   ANIONGAP 12 11 10   ESTGFRAFRICA 28.9* 40.9* >60   EGFRNONAA 25.0* 35.4* 58*   , CBC   Recent Labs  Lab 07/20/18  0551 07/21/18  0524   WBC 4.74 4.80   HGB 11.4* 11.1*   HCT 34.7* 34.4*    317   , Troponin   Recent Labs  Lab 07/19/18  1008   TROPONINI <0.006   , A1C:   Recent Labs  Lab 06/08/18  1011 07/20/18  1420   HGBA1C 8.3* 8.9*   All labs within the past 24 hours have been reviewed      Pending Diagnostic Studies:     None         Medications:  Reconciled Home Medications:      Medication List      CHANGE how you take these medications    insulin glargine 100 unit/mL (3 mL) Inpn pen  Commonly known as:  BASAGLAR KWIKPEN U-100 INSULIN  INJECT 45 UNITS UNDER THE SKIN IN THE EVENING  What changed:  · how much to take  · how to take this  · when to take this  · additional instructions     lancets 33 gauge Misc  Commonly known as:  ONETOUCH DELICA LANCETS  Use to check sugar 4 (four) times daily before meals and nightly.  What changed:  Another medication with the same name was removed. Continue taking this medication, and follow the directions you see here.     lisinopril 40 MG tablet  Commonly known as:  PRINIVIL,ZESTRIL  Take 1 tablet (40 mg total) by mouth once daily. Hold for three days and then resume at half the tab for 3 days then 1 tab full daily  What changed:  additional instructions     spironolactone 50 MG tablet  Commonly known as:  ALDACTONE  Take 1 tablet (50 mg total) by mouth once daily. Hold for three days  What changed:  additional instructions        CONTINUE taking these medications    aspirin 325 MG tablet  Take 1 tablet (325 mg total) by mouth once daily.     atorvastatin 10 MG tablet  Commonly known as:  LIPITOR  Take 1 tablet (10 mg  "total) by mouth once daily.     blood sugar diagnostic Strp  Commonly known as:  ONETOUCH ULTRA TEST  USE TO CHECK SUGAR BEFORE MEALS AND AT BEDTIME     clotrimazole-betamethasone 1-0.05% cream  Commonly known as:  LOTRISONE  APPLY  CREAM TOPICALLY TO AFFECTED AREA TWICE DAILY     furosemide 20 MG tablet  Commonly known as:  LASIX  Take 1/2 tablet (10 mg total) by mouth once daily.     glipiZIDE 2.5 MG Tr24  Commonly known as:  GLUCOTROL  Take 1 tablet (2.5 mg total) by mouth daily with breakfast.     hydrALAZINE 50 MG tablet  Commonly known as:  APRESOLINE  Take 1 tablet (50 mg total) by mouth 3 (three) times daily.     isosorbide dinitrate 20 MG tablet  Commonly known as:  ISORDIL  TAKE ONE TABLET BY MOUTH THREE TIMES DAILY     metFORMIN 500 MG tablet  Commonly known as:  GLUCOPHAGE  Take 1 tablet (500 mg total) by mouth 2 (two) times daily with meals.     mupirocin 2 % ointment  Commonly known as:  BACTROBAN  APPLY  OINTMENT TOPICALLY TO AFFECTED AREA THREE TIMES DAILY     pantoprazole 40 MG tablet  Commonly known as:  PROTONIX  Take 1 tablet (40 mg total) by mouth once daily.     pen needle, diabetic 31 gauge x 3/16" Ndle  USE ONE NEEDLE ONCE NIGHTLY     promethazine 25 MG tablet  Commonly known as:  PHENERGAN  Take 1 tablet (25 mg total) by mouth every 6 (six) hours as needed for Nausea.     verapamil 240 MG CR tablet  Commonly known as:  CALAN-SR  Take 1 tablet (240 mg total) by mouth once daily.        STOP taking these medications    GAVILYTE-G 236-22.74-6.74 -5.86 gram suspension  Generic drug:  polyethylene glycol     HYDROcodone-acetaminophen 5-325 mg per tablet  Commonly known as:  NORCO     potassium chloride SA 10 MEQ tablet  Commonly known as:  K-DUR,KLOR-CON            Indwelling Lines/Drains at time of discharge:   Lines/Drains/Airways          No matching active lines, drains, or airways          Time spent on the discharge of patient: 35 minutes  Patient was seen and examined on the date of " discharge and determined to be suitable for discharge.         Fan Jo MD  Department of Hospital Medicine  [facility]

## 2018-07-21 NOTE — PROGRESS NOTES
Patient discharged home. PIV removed. Tele-monitor removed. Discharge instructions given to the patient, patient acknowleged understanding.

## 2018-07-23 ENCOUNTER — TELEPHONE (OUTPATIENT)
Dept: INTERNAL MEDICINE | Facility: CLINIC | Age: 53
End: 2018-07-23

## 2018-07-23 ENCOUNTER — HOSPITAL ENCOUNTER (EMERGENCY)
Facility: HOSPITAL | Age: 53
Discharge: HOME | End: 2018-07-23
Attending: EMERGENCY MEDICINE
Payer: MEDICAID

## 2018-07-23 ENCOUNTER — OFFICE VISIT (OUTPATIENT)
Dept: INTERNAL MEDICINE | Facility: CLINIC | Age: 53
End: 2018-07-23
Payer: MEDICAID

## 2018-07-23 ENCOUNTER — LAB VISIT (OUTPATIENT)
Dept: LAB | Facility: HOSPITAL | Age: 53
End: 2018-07-23
Attending: FAMILY MEDICINE
Payer: MEDICAID

## 2018-07-23 VITALS
SYSTOLIC BLOOD PRESSURE: 132 MMHG | BODY MASS INDEX: 31.6 KG/M2 | OXYGEN SATURATION: 98 % | HEART RATE: 76 BPM | HEIGHT: 66 IN | DIASTOLIC BLOOD PRESSURE: 83 MMHG | RESPIRATION RATE: 16 BRPM | TEMPERATURE: 99 F | WEIGHT: 196.63 LBS

## 2018-07-23 VITALS
TEMPERATURE: 98 F | RESPIRATION RATE: 16 BRPM | DIASTOLIC BLOOD PRESSURE: 84 MMHG | HEIGHT: 67 IN | BODY MASS INDEX: 31.21 KG/M2 | OXYGEN SATURATION: 95 % | HEART RATE: 86 BPM | WEIGHT: 198.88 LBS | SYSTOLIC BLOOD PRESSURE: 123 MMHG

## 2018-07-23 DIAGNOSIS — Z79.4 TYPE 2 DIABETES MELLITUS WITH HYPERGLYCEMIA, WITH LONG-TERM CURRENT USE OF INSULIN: Primary | ICD-10-CM

## 2018-07-23 DIAGNOSIS — N28.9 RENAL DYSFUNCTION: Primary | ICD-10-CM

## 2018-07-23 DIAGNOSIS — E11.65 TYPE 2 DIABETES MELLITUS WITH HYPERGLYCEMIA, WITH LONG-TERM CURRENT USE OF INSULIN: ICD-10-CM

## 2018-07-23 DIAGNOSIS — E11.65 TYPE 2 DIABETES MELLITUS WITH HYPERGLYCEMIA, WITH LONG-TERM CURRENT USE OF INSULIN: Primary | ICD-10-CM

## 2018-07-23 DIAGNOSIS — Z79.4 TYPE 2 DIABETES MELLITUS WITH HYPERGLYCEMIA, WITH LONG-TERM CURRENT USE OF INSULIN: ICD-10-CM

## 2018-07-23 DIAGNOSIS — R73.9 HYPERGLYCEMIA: ICD-10-CM

## 2018-07-23 DIAGNOSIS — E86.0 DEHYDRATION: ICD-10-CM

## 2018-07-23 LAB
ALBUMIN SERPL BCP-MCNC: 4.3 G/DL
ALP SERPL-CCNC: 51 U/L
ALT SERPL W/O P-5'-P-CCNC: 18 U/L
ANION GAP SERPL CALC-SCNC: 11 MMOL/L
ANION GAP SERPL CALC-SCNC: 13 MMOL/L
ANION GAP SERPL CALC-SCNC: 14 MMOL/L
AST SERPL-CCNC: 14 U/L
BACTERIA #/AREA URNS AUTO: NORMAL /HPF
BASOPHILS # BLD AUTO: 0.04 K/UL
BASOPHILS NFR BLD: 0.6 %
BILIRUB SERPL-MCNC: 0.6 MG/DL
BILIRUB UR QL STRIP: NEGATIVE
BUN SERPL-MCNC: 22 MG/DL
BUN SERPL-MCNC: 25 MG/DL
BUN SERPL-MCNC: 25 MG/DL
CALCIUM SERPL-MCNC: 10 MG/DL
CALCIUM SERPL-MCNC: 10.4 MG/DL
CALCIUM SERPL-MCNC: 8.7 MG/DL
CHLORIDE SERPL-SCNC: 109 MMOL/L
CHLORIDE SERPL-SCNC: 95 MMOL/L
CHLORIDE SERPL-SCNC: 97 MMOL/L
CK SERPL-CCNC: 53 U/L
CLARITY UR REFRACT.AUTO: CLEAR
CO2 SERPL-SCNC: 22 MMOL/L
CO2 SERPL-SCNC: 24 MMOL/L
CO2 SERPL-SCNC: 26 MMOL/L
COLOR UR AUTO: YELLOW
CREAT SERPL-MCNC: 1.5 MG/DL
CREAT SERPL-MCNC: 1.9 MG/DL
CREAT SERPL-MCNC: 2.1 MG/DL
DIFFERENTIAL METHOD: ABNORMAL
EOSINOPHIL # BLD AUTO: 0 K/UL
EOSINOPHIL NFR BLD: 0.6 %
ERYTHROCYTE [DISTWIDTH] IN BLOOD BY AUTOMATED COUNT: 12.2 %
EST. GFR  (AFRICAN AMERICAN): 40.9 ML/MIN/1.73 M^2
EST. GFR  (AFRICAN AMERICAN): 46.2 ML/MIN/1.73 M^2
EST. GFR  (AFRICAN AMERICAN): >60 ML/MIN/1.73 M^2
EST. GFR  (NON AFRICAN AMERICAN): 35.4 ML/MIN/1.73 M^2
EST. GFR  (NON AFRICAN AMERICAN): 39.9 ML/MIN/1.73 M^2
EST. GFR  (NON AFRICAN AMERICAN): 53.1 ML/MIN/1.73 M^2
GLUCOSE SERPL-MCNC: 106 MG/DL
GLUCOSE SERPL-MCNC: 468 MG/DL
GLUCOSE SERPL-MCNC: 507 MG/DL
GLUCOSE UR QL STRIP: ABNORMAL
HCT VFR BLD AUTO: 35.9 %
HGB BLD-MCNC: 12.1 G/DL
HGB UR QL STRIP: NEGATIVE
KETONES UR QL STRIP: NEGATIVE
LEUKOCYTE ESTERASE UR QL STRIP: NEGATIVE
LYMPHOCYTES # BLD AUTO: 2 K/UL
LYMPHOCYTES NFR BLD: 29.2 %
MCH RBC QN AUTO: 29.9 PG
MCHC RBC AUTO-ENTMCNC: 33.7 G/DL
MCV RBC AUTO: 89 FL
MICROSCOPIC COMMENT: NORMAL
MONOCYTES # BLD AUTO: 0.6 K/UL
MONOCYTES NFR BLD: 8.5 %
NEUTROPHILS # BLD AUTO: 4.2 K/UL
NEUTROPHILS NFR BLD: 61 %
NITRITE UR QL STRIP: NEGATIVE
PH UR STRIP: 6 [PH] (ref 5–8)
PLATELET # BLD AUTO: 363 K/UL
PMV BLD AUTO: 11 FL
POCT GLUCOSE: 222 MG/DL (ref 70–110)
POCT GLUCOSE: 484 MG/DL (ref 70–110)
POTASSIUM SERPL-SCNC: 4.4 MMOL/L
PROT SERPL-MCNC: 8.3 G/DL
PROT UR QL STRIP: NEGATIVE
RBC # BLD AUTO: 4.05 M/UL
SODIUM SERPL-SCNC: 134 MMOL/L
SODIUM SERPL-SCNC: 135 MMOL/L
SODIUM SERPL-SCNC: 142 MMOL/L
SP GR UR STRIP: <=1.005 (ref 1–1.03)
URN SPEC COLLECT METH UR: ABNORMAL
UROBILINOGEN UR STRIP-ACNC: NEGATIVE EU/DL
WBC # BLD AUTO: 6.85 K/UL
YEAST UR QL AUTO: NORMAL

## 2018-07-23 PROCEDURE — 82962 GLUCOSE BLOOD TEST: CPT

## 2018-07-23 PROCEDURE — 82550 ASSAY OF CK (CPK): CPT

## 2018-07-23 PROCEDURE — 80048 BASIC METABOLIC PNL TOTAL CA: CPT | Mod: PO

## 2018-07-23 PROCEDURE — 80053 COMPREHEN METABOLIC PANEL: CPT

## 2018-07-23 PROCEDURE — 80048 BASIC METABOLIC PNL TOTAL CA: CPT | Mod: 91

## 2018-07-23 PROCEDURE — 99284 EMERGENCY DEPT VISIT MOD MDM: CPT | Mod: 25,27

## 2018-07-23 PROCEDURE — 81000 URINALYSIS NONAUTO W/SCOPE: CPT

## 2018-07-23 PROCEDURE — 63600175 PHARM REV CODE 636 W HCPCS: Performed by: EMERGENCY MEDICINE

## 2018-07-23 PROCEDURE — 99213 OFFICE O/P EST LOW 20 MIN: CPT | Mod: S$PBB,,, | Performed by: FAMILY MEDICINE

## 2018-07-23 PROCEDURE — 85025 COMPLETE CBC W/AUTO DIFF WBC: CPT

## 2018-07-23 PROCEDURE — 25000003 PHARM REV CODE 250: Performed by: EMERGENCY MEDICINE

## 2018-07-23 PROCEDURE — 96361 HYDRATE IV INFUSION ADD-ON: CPT

## 2018-07-23 PROCEDURE — 36415 COLL VENOUS BLD VENIPUNCTURE: CPT | Mod: PO

## 2018-07-23 PROCEDURE — 99999 PR PBB SHADOW E&M-EST. PATIENT-LVL V: CPT | Mod: PBBFAC,,, | Performed by: FAMILY MEDICINE

## 2018-07-23 PROCEDURE — 96374 THER/PROPH/DIAG INJ IV PUSH: CPT

## 2018-07-23 PROCEDURE — 93005 ELECTROCARDIOGRAM TRACING: CPT

## 2018-07-23 PROCEDURE — 99900035 HC TECH TIME PER 15 MIN (STAT)

## 2018-07-23 PROCEDURE — 99215 OFFICE O/P EST HI 40 MIN: CPT | Mod: PBBFAC,PO,25 | Performed by: FAMILY MEDICINE

## 2018-07-23 PROCEDURE — 93010 ELECTROCARDIOGRAM REPORT: CPT | Mod: ,,, | Performed by: NUCLEAR MEDICINE

## 2018-07-23 RX ORDER — LANCETS 33 GAUGE
1 EACH MISCELLANEOUS
Qty: 100 EACH | Refills: 11 | Status: SHIPPED | OUTPATIENT
Start: 2018-07-23 | End: 2018-07-26 | Stop reason: SDUPTHER

## 2018-07-23 RX ADMIN — INSULIN HUMAN 10 UNITS: 100 INJECTION, SOLUTION PARENTERAL at 06:07

## 2018-07-23 RX ADMIN — SODIUM CHLORIDE 1000 ML: 0.9 INJECTION, SOLUTION INTRAVENOUS at 07:07

## 2018-07-23 RX ADMIN — SODIUM CHLORIDE 2000 ML: 0.9 INJECTION, SOLUTION INTRAVENOUS at 05:07

## 2018-07-23 NOTE — TELEPHONE ENCOUNTER
Tried calling pts number twice with no answer or way to leave a MSG to let him know to go to the ER per  BC his Glucose is really high and his kidney function is worse. Called pts wife and asked her to have the pt call us or for her to call us back to let us know the pt knows to go to the ER. She said she will get back with us and call him right now that she was at work,

## 2018-07-23 NOTE — TELEPHONE ENCOUNTER
Hey can yall call this pt please to schedule him to see Ory per  preferably here at the [facility]? Thanks!

## 2018-07-23 NOTE — PROGRESS NOTES
Subjective:       Patient ID: Jorgito Arreaga is a 52 y.o. male.    Chief Complaint: Hospital Follow Up    Pt d/c'd from hospital for dehydration, ARF.    O: in the past week.  L:  D: improved.  C:  Yakov: IVF rescusitation  Exac:        Review of Systems   Respiratory: Negative for shortness of breath.    Cardiovascular: Negative for chest pain.   Gastrointestinal: Negative for abdominal pain.       Objective:      Physical Exam   Constitutional: He appears well-developed and well-nourished. No distress.   HENT:   Head: Normocephalic and atraumatic.   Eyes: Conjunctivae are normal.   No pallor   Pulmonary/Chest: Effort normal and breath sounds normal. No respiratory distress. He has no wheezes.   Skin: Skin is warm and dry. No rash noted. He is not diaphoretic. No erythema.   Nursing note and vitals reviewed.      Assessment:       1. Type 2 diabetes mellitus with hyperglycemia, with long-term current use of insulin        Plan:     Problem List Items Addressed This Visit        Endocrine    Type 2 diabetes mellitus with hyperglycemia - Primary    Relevant Orders    Ambulatory consult to Diabetic Education    Basic metabolic panel

## 2018-07-23 NOTE — TELEPHONE ENCOUNTER
pts home number does not work. Left msg on cell number for pt to call us back regarding making a FU appt for this week. To call us at [phone] to minda

## 2018-07-23 NOTE — TELEPHONE ENCOUNTER
----- Message from Rosangela Wu sent at 7/23/2018  9:06 AM CDT -----  Contact: pt  The pt need to be worked in this week for a hospital f/u, the pt can be reached at [phone]///[id number]

## 2018-07-23 NOTE — ED PROVIDER NOTES
"Encounter Date: 7/23/2018       History     Chief Complaint   Patient presents with    Abnormal Lab     Sent from PCP for elevated glucose and renal function test.      HPI   Pt was sent from Clinic to ER for management of Hyperglycemia/Renal insufficiency per lab collected at 15:00 today. Pt reports recent hospitalization for similar problem last week. Pt denies sx, No CP,No SOB,N/V/D,Abd pain, weakness, numbness, or dysuria. Pt does note polyuria of a "gallon" of urine last night.    Review of patient's allergies indicates:  No Known Allergies  Past Medical History:   Diagnosis Date    Cataracts, bilateral     Diabetes mellitus, type 2     GERD (gastroesophageal reflux disease)     Hypertension     Inflammatory polyps of colon     Pancreatitis, alcoholic, acute     Port catheter in place     TIA (transient ischemic attack)      Past Surgical History:   Procedure Laterality Date    COLONOSCOPY      COLONOSCOPY N/A 6/15/2018    Procedure: COLONOSCOPY;  Surgeon: Jim Hicks MD;  Location: [facility];  Service: Endoscopy;  Laterality: N/A;    ESOPHAGOGASTRODUODENOSCOPY N/A 6/15/2018    Procedure: ESOPHAGOGASTRODUODENOSCOPY (EGD);  Surgeon: Jim Hicks MD;  Location: [facility];  Service: Endoscopy;  Laterality: N/A;     Family History   Problem Relation Age of Onset    Hypertension Mother     Hypertension Father      Social History   Substance Use Topics    Smoking status: Former Smoker     Types: Cigarettes    Smokeless tobacco: Never Used    Alcohol use Yes      Comment:  Daily - States that he drinks 1/2 pint of vodka and about 1-2 cans of beer daily     Review of Systems   HENT: Negative.    Respiratory: Negative.    Cardiovascular: Negative.    Gastrointestinal: Negative.    Endocrine: Positive for polyuria.   All other systems reviewed and are negative.      Physical Exam     Initial Vitals [07/23/18 1730]   BP Pulse Resp Temp SpO2   (!) 135/90 104 16 98.6 °F (37 °C) 96 %      MAP       --   "       Physical Exam    Nursing note and vitals reviewed.  Constitutional: He appears well-developed and well-nourished. No distress.   HENT:   Head: Normocephalic and atraumatic.   Mouth/Throat: Oropharynx is clear and moist.   Eyes: Conjunctivae and EOM are normal. Pupils are equal, round, and reactive to light.   Neck: Normal range of motion. Neck supple.   Cardiovascular: Normal rate and regular rhythm.   Pulmonary/Chest: Breath sounds normal. No respiratory distress.   Abdominal: Soft. Bowel sounds are normal.   Musculoskeletal: Normal range of motion.   Neurological: He is alert and oriented to person, place, and time. He has normal strength.   Skin: Skin is warm and dry.   Psychiatric: He has a normal mood and affect. Thought content normal.         ED Course   Procedures  Labs Reviewed   CBC W/ AUTO DIFFERENTIAL - Abnormal; Notable for the following:        Result Value    RBC 4.05 (*)     Hemoglobin 12.1 (*)     Hematocrit 35.9 (*)     Platelets 363 (*)     All other components within normal limits   COMPREHENSIVE METABOLIC PANEL - Abnormal; Notable for the following:     Sodium 135 (*)     Glucose 507 (*)     BUN, Bld 25 (*)     Creatinine 2.1 (*)     Alkaline Phosphatase 51 (*)     eGFR if  40.9 (*)     eGFR if non  35.4 (*)     All other components within normal limits    Narrative:      Glucose  critical result(s) called and verbal readback obtained from   Catalina Cm, 07/23/2018 18:31   URINALYSIS - Abnormal; Notable for the following:     Specific Gravity, UA <=1.005 (*)     Glucose, UA 3+ (*)     All other components within normal limits   BASIC METABOLIC PANEL - Abnormal; Notable for the following:     CO2 22 (*)     BUN, Bld 22 (*)     Creatinine 1.5 (*)     eGFR if non  53.1 (*)     All other components within normal limits    Narrative:     Draw after 3rd NS bolus is complete.   POCT GLUCOSE - Abnormal; Notable for the following:     POCT Glucose 484  "(*)     All other components within normal limits   CK   CK   URINALYSIS MICROSCOPIC   POCT GLUCOSE MONITORING CONTINUOUS     EKG Readings: (Independently Interpreted)   Initial Reading: No STEMI. Rhythm: Normal Sinus Rhythm. Heart Rate: 96. Ectopy: No Ectopy. ST Segments: Normal ST Segments. T Waves: Normal. Axis: Left Axis Deviation. Clinical Impression: Normal Sinus Rhythm       Imaging Results          X-Ray Chest 1 View (Final result)  Result time 07/23/18 18:21:39    Final result by Otf Wood MD (07/23/18 18:21:39)                 Impression:      No acute process.      Electronically signed by: Otf Wood MD  Date:    07/23/2018  Time:    18:21             Narrative:    EXAMINATION:  XR CHEST 1 VIEW    CLINICAL HISTORY:  hyperglycemia;    FINDINGS:  Comparison study 07/10/2018.  No change.  Mild elevation right hemidiaphragm with minimal plate like atelectasis or scarring right lung base.  Otherwise, lungs are clear.  Normal sized heart.  Aortic tortuosity.  No congestion.  Spina bifida occulta with nonunion posterior neural arch T1.                                    Vitals:    07/23/18 1730 07/23/18 1803 07/23/18 1839 07/23/18 1900   BP: (!) 135/90  132/79 137/79   Pulse: 104 102 90 90   Resp: 16   16   Temp: 98.6 °F (37 °C)      TempSrc: Oral      SpO2: 96%  100% 100%   Weight: 89.2 kg (196 lb 10.4 oz)      Height: 5' 6" (1.676 m)       07/23/18 1925 07/23/18 1930 07/23/18 2025 07/23/18 2030   BP: 135/70 (!) 143/78 135/87 132/83   Pulse: 87 85 80 76   Resp: 18 16  16   Temp:       TempSrc:       SpO2: 98% 99% 100% 98%   Weight:       Height:           Results for orders placed or performed during the hospital encounter of 07/23/18   CBC auto differential   Result Value Ref Range    WBC 6.85 3.90 - 12.70 K/uL    RBC 4.05 (L) 4.60 - 6.20 M/uL    Hemoglobin 12.1 (L) 14.0 - 18.0 g/dL    Hematocrit 35.9 (L) 40.0 - 54.0 %    MCV 89 82 - 98 fL    MCH 29.9 27.0 - 31.0 pg    MCHC 33.7 32.0 - 36.0 g/dL    " RDW 12.2 11.5 - 14.5 %    Platelets 363 (H) 150 - 350 K/uL    MPV 11.0 9.2 - 12.9 fL    Gran # (ANC) 4.2 1.8 - 7.7 K/uL    Lymph # 2.0 1.0 - 4.8 K/uL    Mono # 0.6 0.3 - 1.0 K/uL    Eos # 0.0 0.0 - 0.5 K/uL    Baso # 0.04 0.00 - 0.20 K/uL    Gran% 61.0 38.0 - 73.0 %    Lymph% 29.2 18.0 - 48.0 %    Mono% 8.5 4.0 - 15.0 %    Eosinophil% 0.6 0.0 - 8.0 %    Basophil% 0.6 0.0 - 1.9 %    Differential Method Automated    Comprehensive metabolic panel   Result Value Ref Range    Sodium 135 (L) 136 - 145 mmol/L    Potassium 4.4 3.5 - 5.1 mmol/L    Chloride 97 95 - 110 mmol/L    CO2 24 23 - 29 mmol/L    Glucose 507 (HH) 70 - 110 mg/dL    BUN, Bld 25 (H) 6 - 20 mg/dL    Creatinine 2.1 (H) 0.5 - 1.4 mg/dL    Calcium 10.0 8.7 - 10.5 mg/dL    Total Protein 8.3 6.0 - 8.4 g/dL    Albumin 4.3 3.5 - 5.2 g/dL    Total Bilirubin 0.6 0.1 - 1.0 mg/dL    Alkaline Phosphatase 51 (L) 55 - 135 U/L    AST 14 10 - 40 U/L    ALT 18 10 - 44 U/L    Anion Gap 14 8 - 16 mmol/L    eGFR if African American 40.9 (A) >60 mL/min/1.73 m^2    eGFR if non  35.4 (A) >60 mL/min/1.73 m^2   Urinalysis   Result Value Ref Range    Specimen UA Urine, Clean Catch     Color, UA Yellow Yellow, Straw, Brittany    Appearance, UA Clear Clear    pH, UA 6.0 5.0 - 8.0    Specific Gravity, UA <=1.005 (A) 1.005 - 1.030    Protein, UA Negative Negative    Glucose, UA 3+ (A) Negative    Ketones, UA Negative Negative    Bilirubin (UA) Negative Negative    Occult Blood UA Negative Negative    Nitrite, UA Negative Negative    Urobilinogen, UA Negative <2.0 EU/dL    Leukocytes, UA Negative Negative   CK   Result Value Ref Range    CPK 53 20 - 200 U/L   Urinalysis Microscopic   Result Value Ref Range    Bacteria, UA None None-Occ /hpf    Yeast, UA None None    Microscopic Comment SEE COMMENT    Basic metabolic panel   Result Value Ref Range    Sodium 142 136 - 145 mmol/L    Potassium 4.4 3.5 - 5.1 mmol/L    Chloride 109 95 - 110 mmol/L    CO2 22 (L) 23 - 29 mmol/L     Glucose 106 70 - 110 mg/dL    BUN, Bld 22 (H) 6 - 20 mg/dL    Creatinine 1.5 (H) 0.5 - 1.4 mg/dL    Calcium 8.7 8.7 - 10.5 mg/dL    Anion Gap 11 8 - 16 mmol/L    eGFR if African American >60.0 >60 mL/min/1.73 m^2    eGFR if non  53.1 (A) >60 mL/min/1.73 m^2   POCT glucose   Result Value Ref Range    POCT Glucose 484 (HH) 70 - 110 mg/dL         Imaging Results          X-Ray Chest 1 View (Final result)  Result time 07/23/18 18:21:39    Final result by Otf Wood MD (07/23/18 18:21:39)                 Impression:      No acute process.      Electronically signed by: Otf Wood MD  Date:    07/23/2018  Time:    18:21             Narrative:    EXAMINATION:  XR CHEST 1 VIEW    CLINICAL HISTORY:  hyperglycemia;    FINDINGS:  Comparison study 07/10/2018.  No change.  Mild elevation right hemidiaphragm with minimal plate like atelectasis or scarring right lung base.  Otherwise, lungs are clear.  Normal sized heart.  Aortic tortuosity.  No congestion.  Spina bifida occulta with nonunion posterior neural arch T1.                                Medications   sodium chloride 0.9% bolus 2,000 mL (0 mLs Intravenous Stopped 7/23/18 1857)   insulin regular injection 10 Units (10 Units Intravenous Given 7/23/18 1824)   sodium chloride 0.9% bolus 1,000 mL (0 mLs Intravenous Stopped 7/23/18 2020)     6:00 PM - Transfer of Care: Patient care transferred from Dr. Ramirez to Dr. Garland, pending labs and studies.      6:14 PM - Re-evaluation:  The patient is resting comfortably and is in no acute distress. Discussed test results and notified of pending labs. Answered questions at this time.     8:56 PM - Re-evaluation: The patient is resting comfortably and is in no acute distress. He states that his symptoms have improved after treatment within ER. Discussed test results, shared treatment plan, specific conditions for return, and importance of follow up with patient and family.  He understands and agrees with  the plan as discussed. Answered  his questions at this time. He has remained hemodynamically stable throughout the ED course and is appropriate for discharge home.     Pre-hypertension/Hypertension: The pt has been informed that they may have pre-hypertension or hypertension based on a blood pressure reading in the ED. I recommend that the pt call the PCP listed on their discharge instructions or a physician of their choice this week to arrange f/u for further evaluation of possible pre-hypertension or hypertension.     Regarding HYPERGLYCEMIA, I advised patient that symptoms of an elevated blood glucose include fatigue, frequent urination, blurry vision, and feeling thirsty.  I advised patient that hyperglycemia can occur when one eats too much food, is ill, is under a lot of stress,  or does not take medications for diabetes as prescribed.  I reiterated that healthy eating is an important part of controlling blood glucose level.  I recommended that the patient: eat a variety of foods every day from all the food groups; eat meals at regular times every day and do not skip meals; choose high fiber carbohydrates (such as whole grains, legumes, and fruit) more often than less healthy carbohydrates (like white bread and sweets); and drink water and sugar-free beverages rather than sweetened beverages. I instructed patient to see care in the ED or with primary care provider if they experience: severe abdominal pain;  pain that spreads to the back; have increased vomiting; have trouble staying awake or focusing; are shaking or sweating; have blurred or double vision; have a fruity, sweet smell to breath; experience breathing  that is deep and labored, or rapid and shallow; or have a heartbeat that is fast and weak.    Regarding DEHYDRATION, advised patient to call 911 if they should experience confusion, dizziness, lethargy, and/or lightheadedness.  The patient was instructed to contact their primary care provider  immediately or return to the ED for any of the following symptoms: blood in the stool or vomit; diarrhea or vomiting for an extended period of time (vomiting > 24 hours or diarrhea for > 3 days); dry mouth or dry eyes; poor skin turgor; listlessness and inactiveness; tachycardia; little or no urine output for 8 hours; inability to keep fluids down; and sunken eyes.  The patient was encouraged to drink plenty of water daily and to increase water intake when weather is hot or during exercise.    Jorgito Arreaga was given a handout which discussed their disease process, precautions, and instructions for follow-up and therapy.    Follow-up Information     Otf Roblero MD. Schedule an appointment as soon as possible for a visit in 1 week.    Specialty:  Family Medicine  Contact information:  [address] [address] [address] [phone]             [facility] - Nephrology In 2 days.    Specialty:  Nephrology  Contact information:  [address] [facility] [address] [phone]  Additional information:  (off [facility]) 3th floor           [facility].    Specialty:  Emergency Medicine  Why:  As needed, If symptoms worsen  Contact information:  [address] [address] [address] [phone]                 Discharge Medication List as of 7/23/2018  8:54 PM             ED Diagnosis  1. Renal dysfunction    2. Hyperglycemia    3. Dehydration                             Clinical Impression:   The primary encounter diagnosis was Renal dysfunction. Diagnoses of Hyperglycemia and Dehydration were also pertinent to this visit.      Disposition:   Disposition: Discharged  Condition: Stable                        Ivan Garland Jr., MD  07/23/18 8981

## 2018-07-24 ENCOUNTER — TELEPHONE (OUTPATIENT)
Dept: INTERNAL MEDICINE | Facility: CLINIC | Age: 53
End: 2018-07-24

## 2018-07-24 RX ORDER — INSULIN ASPART 100 [IU]/ML
INJECTION, SOLUTION INTRAVENOUS; SUBCUTANEOUS
Qty: 3 ML | Refills: 0 | Status: SHIPPED | OUTPATIENT
Start: 2018-07-24 | End: 2018-07-25 | Stop reason: ALTCHOICE

## 2018-07-24 NOTE — DISCHARGE INSTRUCTIONS
Regarding HYPERGLYCEMIA, I advised patient that symptoms of an elevated blood glucose include fatigue, frequent urination, blurry vision, and feeling thirsty.  I advised patient that hyperglycemia can occur when one eats too much food, is ill, is under a lot of stress,  or does not take medications for diabetes as prescribed.  I reiterated that healthy eating is an important part of controlling blood glucose level.  I recommended that the patient: eat a variety of foods every day from all the food groups; eat meals at regular times every day and do not skip meals; choose high fiber carbohydrates (such as whole grains, legumes, and fruit) more often than less healthy carbohydrates (like white bread and sweets); and drink water and sugar-free beverages rather than sweetened beverages. I instructed patient to see care in the ED or with primary care provider if they experience: severe abdominal pain;  pain that spreads to the back; have increased vomiting; have trouble staying awake or focusing; are shaking or sweating; have blurred or double vision; have a fruity, sweet smell to breath; experience breathing  that is deep and labored, or rapid and shallow; or have a heartbeat that is fast and weak.    Regarding DEHYDRATION, advised patient to call 911 if they should experience confusion, dizziness, lethargy, and/or lightheadedness.  The patient was instructed to contact their primary care provider immediately or return to the ED for any of the following symptoms: blood in the stool or vomit; diarrhea or vomiting for an extended period of time (vomiting > 24 hours or diarrhea for > 3 days); dry mouth or dry eyes; poor skin turgor; listlessness and inactiveness; tachycardia; little or no urine output for 8 hours; inability to keep fluids down; and sunken eyes.  The patient was encouraged to drink plenty of water daily and to increase water intake when weather is hot or during exercise.    Return to ER if symptoms persist  or worsen.

## 2018-07-24 NOTE — TELEPHONE ENCOUNTER
----- Message from Erin Wu sent at 7/24/2018 12:21 PM CDT -----  Patient calling to give his blood sugar results. State on last night he was seen in the ER and received medication through the IV. State his blood sugar was elevated this morning it was 300 and he took 50 shots of insulin. Please adv/call [phone].//cw

## 2018-07-24 NOTE — TELEPHONE ENCOUNTER
That insulin is long acting that he currently has. the one I have sent is short acting.  Pls  the new one, and if he thinks he is unsure how to use, pls conference with pharmacist.

## 2018-07-24 NOTE — TELEPHONE ENCOUNTER
I have sent in rx for insulin. Should take 4 units at largest meal, then if elevated prior to next meal, to then take an additional 4 units prior to meal.

## 2018-07-24 NOTE — TELEPHONE ENCOUNTER
Spoke with pt and informed him that the pharmacist was going to check on it and call [employer] for us. That when I hear from him I will call him back and or if he hears from them first to call me please. Pt verbalized understanding

## 2018-07-24 NOTE — ED NOTES
Patient reports sore throat. Informed Dr. Garland. No redness noted. Pt resting comfortably. Will continue to monitor.

## 2018-07-24 NOTE — TELEPHONE ENCOUNTER
Patient was seen in in hospital for kidney, patient was given medicine through IV. Blood Sugar was high was give medicine in hospital to bring it down. Patient states on leaving hospital blood sugar reading [id number]. Patient states was adviseto eat something if feel shaky. Patient states got home he ate. Patient got this morning and took fasting blood sugar reading was 300. Patient states took 50 units of insulin.  Patient states he ate salad and will recheck again. patient states is feeling good , relax and will recheck in couple of hours and call with the reading.    Please advise

## 2018-07-24 NOTE — TELEPHONE ENCOUNTER
Spoke with pt and informed him that he can  that novolog Rx from the pharm. and he said that he checked his sugar not to long ago after eating a salad with Italian dressing on it and some cranberry juice and his sugar is still 330 even after giving himself 50 units of insulin.

## 2018-07-24 NOTE — TELEPHONE ENCOUNTER
Spoke with patient with instruction pre Dr. Roblero. Patient was informed that a prescription has been sent to his pharmacy, but pharmacy has sent something in reqards to the RX. So wait before going to his pharmacy. Will call him back

## 2018-07-24 NOTE — TELEPHONE ENCOUNTER
Spoke with pt and informed him of 's prevoious msg. Pt said he went to [facility] to  the Rx and they said it will cost $600 and needs a prior auth. I explained to the pt that I did the prior auth and they sent me a response saying this drug is on the formulary and does not require a prior auth. And that I faxed that to the pharm. To let me call the pharmacy and see. I spoke with the pharmacist and he said it still is not going through and he said he is going to call [facility] and see bc this does not make any since. I told him thanks.

## 2018-07-25 ENCOUNTER — TELEPHONE (OUTPATIENT)
Dept: INTERNAL MEDICINE | Facility: CLINIC | Age: 53
End: 2018-07-25

## 2018-07-25 RX ORDER — INSULIN LISPRO 100 [IU]/ML
INJECTION, SOLUTION INTRAVENOUS; SUBCUTANEOUS
Qty: 10 ML | Refills: 3 | Status: SHIPPED | OUTPATIENT
Start: 2018-07-25 | End: 2018-10-03 | Stop reason: SDUPTHER

## 2018-07-25 NOTE — TELEPHONE ENCOUNTER
Spoke with pt and informed him that I called [facility] pharmacy this morning to see what the pharmacist found out from [facility] regarding pts novolog but that Owen the tech said that he wan not in yet to call back at 11. She tried to run the med through but it did not work. I called back at 11:30 and he wan not there she said he will be in at 12. I told him I will call him back after 12 to see what he found out.Once I find out whats' going on will call him back.  Pt verbalized understanding.

## 2018-07-25 NOTE — TELEPHONE ENCOUNTER
----- Message from Dania Patel sent at 7/25/2018 11:18 AM CDT -----  Contact: pt  He's returning a call, please advise [phone]

## 2018-07-26 RX ORDER — LANCETS 33 GAUGE
1 EACH MISCELLANEOUS
Qty: 100 EACH | Refills: 11 | Status: SHIPPED | OUTPATIENT
Start: 2018-07-26 | End: 2018-10-03 | Stop reason: SDUPTHER

## 2018-08-02 ENCOUNTER — OFFICE VISIT (OUTPATIENT)
Dept: DIABETES | Facility: CLINIC | Age: 53
End: 2018-08-02
Payer: MEDICAID

## 2018-08-02 VITALS
DIASTOLIC BLOOD PRESSURE: 76 MMHG | SYSTOLIC BLOOD PRESSURE: 114 MMHG | BODY MASS INDEX: 32.42 KG/M2 | WEIGHT: 206.56 LBS | HEIGHT: 67 IN

## 2018-08-02 LAB — GLUCOSE SERPL-MCNC: 127 MG/DL (ref 70–110)

## 2018-08-02 PROCEDURE — 99214 OFFICE O/P EST MOD 30 MIN: CPT | Mod: PBBFAC,PN | Performed by: PHYSICIAN ASSISTANT

## 2018-08-02 PROCEDURE — 99999 PR PBB SHADOW E&M-EST. PATIENT-LVL IV: CPT | Mod: PBBFAC,,, | Performed by: PHYSICIAN ASSISTANT

## 2018-08-02 PROCEDURE — 99215 OFFICE O/P EST HI 40 MIN: CPT | Mod: S$PBB,,, | Performed by: PHYSICIAN ASSISTANT

## 2018-08-02 PROCEDURE — 82948 REAGENT STRIP/BLOOD GLUCOSE: CPT | Mod: PBBFAC,PN | Performed by: PHYSICIAN ASSISTANT

## 2018-08-02 NOTE — PATIENT INSTRUCTIONS
" I have reviewed your results and they are still quite high. I would like you to start "Treating to Target". The treatment will be Insulin and your target will be the Fasting and 2 hour post meal blood sugar. It will work in this manner;    1. Goal for Fasting blood sugar is  mg/dl. I realize that you will need time to adjust to the new levels and presently you may feel too low if you are too aggressive now. So go slow and aim to lower your blood sugar to below 200 then 150 then 100 over several months.    2. Goal for 2 hour post meal blood sugar is below 180 mg/dl, here the same rules apply as in #1.    3. You will check your fasting blood sugar daily, if not where we would like it to be over a 3 day period then that evening we will increase the Basaglar dose by 5 units. Then repeat the process over the next 3 days. Remember this is a slow process and take our time getting to goal. But, each week should be better than prior weeks. Blood sugars below 70 are unacceptable and should raise a "RED FLAG" where we may have to reduce our dose of insulin.    4. You will check your post meal glucose daily as well. However, each day you will check a different meal, (ie. Monday-breakfast; Tuesday- lunch; Wednesday- supper, then repeat). If your post meal glucose is not where we would like, increase pre-meal insulin by 2 units next time. A word of CAUTION: mealtime insulin is dependant on the size and concentration of your meal content. If not consuming a large meal do not take large dose of insulin. Use the reasonable person rule.     5. If you have any questions please do not hesitate to call.    Intensive insulin Therapy with correction factor:    You are on Intensive insulin therapy with Basal and Bolus insulin. Basaglar is your Basal insulin and will help maintain your fasting and between meal sugar. Your fast acting or rescue insulin is Humalog insulin and will control your post meal sugar.     You will Take 45 " "units of basaglar at 9 PM daily. This will be adjusted up or down depending on your fasting blood sugar before breakfast.    Humalog will follow this pre meal schedule; Correction factor of "2 units per 50 mg/dl" and is based on 15-45 grams of carbohydrates per meal.    If blood sugar is below 70 eat first then check your blood sugar 2 hours later and make correction.  If blood pre-meal sugar is  70 -150 take 6 units of Humalog;  If blood pre-meal sugar is 151-200 take +2 units of Humalog;  If blood pre-meal sugar is 201-250 take +4 units of Humalog;  If blood pre-meal sugar is 251-300 take +6 units of Humalog;  If blood pre-meal sugar is 301-350 take +8 units of Humalog;  If blood pre-meal sugar is 351-400+ take +10 units of Humalog;  Also increase water intake and call for appointment.    "

## 2018-08-02 NOTE — LETTER
August 2, 2018      Otf Roblero MD  [address]  [place] [address]           [facility]  [address]  [place] [address]  Phone: [phone]  Fax: [phone]          Patient: Jorgito Arreaga   MR Number: [id number]   YOB: 1965   Date of Visit: 8/2/2018       Dear Dr. Otf Roblero:    Thank you for referring Jorgito Arreaga to me for evaluation. Attached you will find relevant portions of my assessment and plan of care.    If you have questions, please do not hesitate to call me. I look forward to following Jorgito Arreaga along with you.    Sincerely,    Stacie Marie Jr., DIMAS    Enclosure  CC:  No Recipients    If you would like to receive this communication electronically, please contact [email or web address] or [phone] to request more information on [employer] access.    For providers and/or their staff who would like to refer a patient to [facility], please contact us through our one-stop-shop provider referral line, [facility], at [phone].    If you feel you have received this communication in error or would no longer like to receive these types of communications, please e-mail [email or web address]

## 2018-08-02 NOTE — PROGRESS NOTES
PCP: Otf Roblero MD    Subjective:    Patient ID: Jorgito Arreaga is a 52 y.o. male.    PCP: Otf Roblero MD      Jorgito Arreaga is a pleasant 52 y.o. male presenting for his initial evaluation with me establish care and follow up on diabetes mellitus. He has had diabetes for 2 or more years. Since his last visit he has had mild improvement in his glycemia. His blood sugar range fasting has been 160-180 and 2 hour post meal has been 180-200+, and he has been monitoring 2 times per day.  He is currently taking insulin glargine 45 units nightly and has been taking 4 units of Humalog before his largest meal.  This is usually been his evening meal and occasionally he will take it at breakfast in an attempt to control his lunchtime blood sugars.  We did have a discussion on mealtime insulin and its pharmacology.  Additionally, he is having complications with pancreatitis, chronic kidney disease, and hyperglycemia.  He still consumes alcohol in spite of his chronic recurring pancreatitis.  We also discussed his alcohol consumption and diagnosis of alcoholism.  His current concerns are glycemic control.  He is to be enrolled in diabetes education classes.     His comorbid conditions are, Diabetes Type 2, Hypertension, Hyperlipidemia, Alcoholism and Pancreatitis    He has the following diabetic complications, Chronic kidney disease Stage 2    He denies any hospital admissions, emergency room visits, hypoglycemia, syncope, diaphoresis, chest pain, or dyspnea.    He has gained 10 pounds since last visit. His BMI is 32.84 kg/m²    His blood sugar in the clinic today was: .resufast  Lab Results   Component Value Date    POCGLU 127 (A) 08/02/2018       We discussed the American diabetes Association recommendations:  hemoglobin A1c below 7.0%; all diabetics should be on statins unless contraindicated; one aspirin daily unless contraindicated; fasting blood sugar between 80 and 130 mg/dL; postprandial blood sugar below  180 mg/dl; prevention of hypoglycemia, may adjust goals to higher levels if persistent; ACE or ARB therapy if not contraindicated; and maintain in an ideal body weight with BMI below 25.    Jorgito is noncompliant much of the time with DM medications.     Jorgito is noncompliant much of the time with lifestyle modifications to include activity and meal planning.       Current Outpatient Prescriptions:     aspirin 325 MG tablet, Take 1 tablet (325 mg total) by mouth once daily., Disp: 360 tablet, Rfl: 1    atorvastatin (LIPITOR) 10 MG tablet, Take 1 tablet (10 mg total) by mouth once daily., Disp: 90 tablet, Rfl: 3    blood sugar diagnostic (ONETOUCH ULTRA TEST) Strp, USE TO CHECK SUGAR BEFORE MEALS AND AT BEDTIME, Disp: 100 strip, Rfl: 11    clotrimazole-betamethasone 1-0.05% (LOTRISONE) cream, APPLY  CREAM TOPICALLY TO AFFECTED AREA TWICE DAILY, Disp: 45 g, Rfl: 0    furosemide (LASIX) 20 MG tablet, Take 1/2 tablet (10 mg total) by mouth once daily., Disp: 90 tablet, Rfl: 0    glipiZIDE (GLUCOTROL) 2.5 MG TR24, Take 1 tablet (2.5 mg total) by mouth daily with breakfast., Disp: 90 tablet, Rfl: 3    hydrALAZINE (APRESOLINE) 50 MG tablet, Take 1 tablet (50 mg total) by mouth 3 (three) times daily., Disp: 180 tablet, Rfl: 0    insulin glargine (BASAGLAR KWIKPEN U-100 INSULIN) 100 unit/mL (3 mL) InPn pen, INJECT 45 UNITS UNDER THE SKIN IN THE EVENING (Patient taking differently: Inject 50 Units into the skin every evening. INJECT 45 UNITS UNDER THE SKIN IN THE EVENING), Disp: 45 mL, Rfl: 3    insulin lispro (HUMALOG) 100 unit/mL injection, Take 4 units at largest meal of the day, then if glucose is elevated at next meal, then patient may take 4 more units at that time., Disp: 10 mL, Rfl: 3    isosorbide dinitrate (ISORDIL) 20 MG tablet, TAKE ONE TABLET BY MOUTH THREE TIMES DAILY, Disp: 90 tablet, Rfl: 0    lancets (ONETOUCH DELICA LANCETS) 33 gauge Misc, Use to check sugar 4 (four) times daily before meals and  "nightly., Disp: 100 each, Rfl: 11    lisinopril (PRINIVIL,ZESTRIL) 40 MG tablet, Take 1 tablet (40 mg total) by mouth once daily. Hold for three days and then resume at half the tab for 3 days then 1 tab full daily, Disp: 90 tablet, Rfl: 0    metFORMIN (GLUCOPHAGE) 500 MG tablet, Take 1 tablet (500 mg total) by mouth 2 (two) times daily with meals., Disp: 180 tablet, Rfl: 0    mupirocin (BACTROBAN) 2 % ointment, APPLY  OINTMENT TOPICALLY TO AFFECTED AREA THREE TIMES DAILY, Disp: 22 g, Rfl: 1    pantoprazole (PROTONIX) 40 MG tablet, Take 1 tablet (40 mg total) by mouth once daily., Disp: 90 tablet, Rfl: 1    pen needle, diabetic 31 gauge x 3/16" Ndle, USE ONE NEEDLE ONCE NIGHTLY, Disp: 50 each, Rfl: 7    promethazine (PHENERGAN) 25 MG tablet, Take 1 tablet (25 mg total) by mouth every 6 (six) hours as needed for Nausea., Disp: 15 tablet, Rfl: 0    spironolactone (ALDACTONE) 50 MG tablet, Take 1 tablet (50 mg total) by mouth once daily. Hold for three days, Disp: 90 tablet, Rfl: 1    verapamil (CALAN-SR) 240 MG CR tablet, Take 1 tablet (240 mg total) by mouth once daily., Disp: 90 tablet, Rfl: 1    Past Medical History:   Diagnosis Date    Cataracts, bilateral     Diabetes mellitus, type 2     GERD (gastroesophageal reflux disease)     Hypertension     Inflammatory polyps of colon     Pancreatitis, alcoholic, acute     Port catheter in place     TIA (transient ischemic attack)        Family History   Problem Relation Age of Onset    Hypertension Mother     Hypertension Father      Social History     Social History    Marital status:      Spouse name: N/A    Number of children: 3    Years of education: N/A     Occupational History    Not on file.     Social History Main Topics    Smoking status: Former Smoker     Types: Cigarettes    Smokeless tobacco: Never Used    Alcohol use Yes      Comment:  Daily - States that he drinks 1/2 pint of vodka and about 1-2 cans of beer daily    Drug " use: No    Sexual activity: Yes     Partners: Female     Other Topics Concern    Not on file     Social History Narrative    No narrative on file         STANDARDS OF CARE:  Eye doctor: Dr. [facility], last exam 2016.  Dental exam: Recommend regular exams; denies gums bleeding.  Podiatry doctor:  ACE/ARB: Yes  Statin: Yes    ACTIVITY LEVEL: He exercises rarely.  BLOOD GLUCOSE TESTING: Self-monitoring with   SOCIAL HISTORY: . Lives with spouse. 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    Health Maintenance   Topic Date Due    Eye Exam  05/10/2018    Influenza Vaccine  08/01/2018    Foot Exam  09/15/2018    Fecal Occult Blood Test (FOBT)/[employer]  12/21/2018    Hemoglobin A1c  01/20/2019    Lipid Panel  03/02/2019    Colonoscopy  06/15/2019    TETANUS VACCINE  02/14/2027    Pneumococcal PPSV23 (Medium Risk) (2) 08/22/2031       Diabetes Management Status    Statin: Taking  ACE/ARB: Taking    Screening or Prevention Patient's value Goal Complete/Controlled?   HgA1C Testing and Control   Lab Results   Component Value Date    HGBA1C 8.9 (H) 07/20/2018      Annually/Less than 8% No   Lipid profile : 03/02/2018 Annually Yes   LDL control Lab Results   Component Value Date    LDLCALC 117.2 03/02/2018    Annually/Less than 100 mg/dl  No   Nephropathy screening Lab Results   Component Value Date    LABMICR 51.0 08/18/2016     Lab Results   Component Value Date    PROTEINUA Negative 07/23/2018    Annually Yes   Blood pressure BP Readings from Last 1 Encounters:   08/02/18 114/76    Less than 140/90 Yes   Dilated retinal exam : 05/10/2017 Annually No   Foot exam   : 08/02/2018 Annually Yes     The following results were reviewed with patient.    Lab Results   Component Value Date    WBC 6.85 07/23/2018    HGB 12.1 (L) 07/23/2018    HCT 35.9 (L) 07/23/2018     (H) 07/23/2018    CHOL 237 (H) 03/02/2018    TRIG 284 (H) 03/02/2018    HDL 63 03/02/2018    LDLCALC 117.2 03/02/2018    ALT 18  07/23/2018    AST 14 07/23/2018     07/23/2018    K 4.4 07/23/2018     07/23/2018    CREATININE 1.5 (H) 07/23/2018    ESTGFRAFRICA >60.0 (61.59) 07/23/2018    EGFRNONAA 53.1 (A) 07/23/2018    BUN 22 (H) 07/23/2018    CO2 22 (L) 07/23/2018    TSH 1.621 07/19/2018    INR 1.2 02/04/2017     07/23/2018       Lab Results   Component Value Date    HGBA1C 8.9 (H) 07/20/2018    HGBA1C 8.3 (H) 06/08/2018    HGBA1C 6.8 (H) 12/05/2017     Lab Results   Component Value Date    TSH 1.621 07/19/2018     Lab Results   Component Value Date    IRON 69 08/01/2016    TIBC 300 08/01/2016    FERRITIN 727 (H) 08/01/2016     Lab Results   Component Value Date    EAQGUJJU32 1010 (H) 08/01/2016     Lab Results   Component Value Date    CALCIUM 8.7 07/23/2018    PHOS 5.3 (H) 07/19/2018     Review of patient's allergies indicates:  No Known Allergies    Past Medical History:   Diagnosis Date    Cataracts, bilateral     Diabetes mellitus, type 2     GERD (gastroesophageal reflux disease)     Hypertension     Inflammatory polyps of colon     Pancreatitis, alcoholic, acute     Port catheter in place     TIA (transient ischemic attack)        Review of Systems   Constitutional: Negative.  Negative for activity change, appetite change, chills, diaphoresis, fatigue, fever and unexpected weight change.   HENT: Negative.  Negative for dental problem, facial swelling, hearing loss, nosebleeds, trouble swallowing and voice change.    Eyes: Negative.  Negative for photophobia, pain, discharge, redness, itching and visual disturbance.   Respiratory: Negative.  Negative for cough, choking, chest tightness, shortness of breath and wheezing.    Cardiovascular: Negative.  Negative for chest pain, palpitations and leg swelling.   Gastrointestinal: Negative.  Negative for abdominal distention, abdominal pain, blood in stool, constipation, diarrhea, nausea and vomiting.   Endocrine: Negative.  Negative for cold intolerance, heat  "intolerance, polydipsia, polyphagia and polyuria.   Genitourinary: Negative.  Negative for decreased urine volume, difficulty urinating, dysuria, frequency, scrotal swelling, testicular pain and urgency.   Musculoskeletal: Negative.  Negative for arthralgias, back pain, gait problem, joint swelling, myalgias, neck pain and neck stiffness.   Skin: Negative.  Negative for color change, pallor, rash and wound.   Allergic/Immunologic: Negative.  Negative for environmental allergies, food allergies and immunocompromised state.   Neurological: Negative.  Negative for dizziness, tremors, seizures, syncope, facial asymmetry, speech difficulty, weakness, light-headedness, numbness and headaches.   Hematological: Negative.  Negative for adenopathy. Does not bruise/bleed easily.   Psychiatric/Behavioral: Negative.  Negative for agitation, behavioral problems, confusion, decreased concentration, dysphoric mood, hallucinations, self-injury, sleep disturbance and suicidal ideas. The patient is not nervous/anxious and is not hyperactive.           Objective:   Last 3 sets of Vitals:   Vitals - 1 value per visit 7/23/2018 7/23/2018 8/2/2018   SYSTOLIC 123 132 114   DIASTOLIC 84 83 76   PULSE 86 76 -   TEMPERATURE 98.3 98.6 -   RESPIRATIONS 16 16 -   SPO2 95 98 -   Weight (lb) 198.86 196.65 206.57   Weight (kg) 90.2 89.2 93.7   HEIGHT 5' 6.5" 5' 6" 5' 6.5"   BODY MASS INDEX 31.62 31.74 32.84   VISIT REPORT - - -   Pain Score  0 - 0   Some recent data might be hidden          Physical Exam   Constitutional: He is oriented to person, place, and time. He appears well-developed and well-nourished. He is cooperative.  Non-toxic appearance. He does not have a sickly appearance. He does not appear ill. No distress. He is not intubated.   HENT:   Head: Normocephalic and atraumatic. Not macrocephalic and not microcephalic. Head is without raccoon's eyes, without Mcneal's sign, without abrasion, without contusion, without laceration, without " right periorbital erythema and without left periorbital erythema. Hair is normal.   Right Ear: External ear normal. No lacerations. No drainage, swelling or tenderness. No foreign bodies. No mastoid tenderness. Tympanic membrane is not injected, not scarred, not perforated, not erythematous, not retracted and not bulging. Tympanic membrane mobility is normal. No middle ear effusion. No hemotympanum. No decreased hearing is noted.   Left Ear: External ear normal. No lacerations. No drainage, swelling or tenderness. No foreign bodies. No mastoid tenderness. Tympanic membrane is not injected, not scarred, not perforated, not erythematous, not retracted and not bulging. Tympanic membrane mobility is normal.  No middle ear effusion. No hemotympanum. No decreased hearing is noted.   Nose: Nose normal.   Mouth/Throat: Oropharynx is clear and moist.   Eyes: EOM and lids are normal. Pupils are equal, round, and reactive to light. Right eye exhibits no chemosis, no discharge, no exudate and no hordeolum. No foreign body present in the right eye. Left eye exhibits no chemosis, no discharge, no exudate and no hordeolum. No foreign body present in the left eye. Right conjunctiva is not injected. Right conjunctiva has no hemorrhage. Left conjunctiva is not injected. Left conjunctiva has no hemorrhage. No scleral icterus. Right eye exhibits normal extraocular motion and no nystagmus. Left eye exhibits normal extraocular motion and no nystagmus. Right pupil is round and reactive. Left pupil is round and reactive. Pupils are equal.   Neck: Normal range of motion, full passive range of motion without pain and phonation normal. Neck supple. Normal carotid pulses, no hepatojugular reflux and no JVD present. No tracheal tenderness, no spinous process tenderness and no muscular tenderness present. Carotid bruit is not present. No neck rigidity. No tracheal deviation, no edema, no erythema and normal range of motion present. No thyroid  mass and no thyromegaly present.   Cardiovascular: Normal rate, regular rhythm, normal heart sounds and intact distal pulses.   No extrasystoles are present. PMI is not displaced.  Exam reveals no gallop, no friction rub and no decreased pulses.    No murmur heard.  Pulses:       Carotid pulses are 2+ on the right side, and 2+ on the left side.       Radial pulses are 2+ on the right side, and 2+ on the left side.        Dorsalis pedis pulses are 2+ on the right side, and 2+ on the left side.        Posterior tibial pulses are 2+ on the right side, and 2+ on the left side.   Pulmonary/Chest: Effort normal and breath sounds normal. No accessory muscle usage or stridor. No apnea, no tachypnea and no bradypnea. He is not intubated. No respiratory distress. He has no decreased breath sounds. He has no wheezes. He has no rhonchi. He has no rales. He exhibits no tenderness.   Abdominal: Soft. Bowel sounds are normal. He exhibits no shifting dullness, no distension, no pulsatile liver, no fluid wave, no abdominal bruit, no ascites, no pulsatile midline mass and no mass. There is no hepatosplenomegaly, splenomegaly or hepatomegaly. There is no tenderness. There is no rigidity, no rebound, no guarding, no CVA tenderness and no tenderness at McBurney's point. No hernia. Hernia confirmed negative in the ventral area.   Musculoskeletal: Normal range of motion. He exhibits no edema or tenderness.        Right foot: There is normal range of motion and no deformity.        Left foot: There is normal range of motion and no deformity.   Feet:   Right Foot:   Protective Sensation: 6 sites tested. 6 sites sensed.   Skin Integrity: Negative for ulcer, blister, skin breakdown, erythema, warmth, callus or dry skin.   Left Foot:   Protective Sensation: 6 sites tested. 6 sites sensed.   Skin Integrity: Negative for ulcer, blister, skin breakdown, erythema, warmth, callus or dry skin.   Lymphadenopathy:        Head (right side): No  submental, no submandibular, no tonsillar, no preauricular, no posterior auricular and no occipital adenopathy present.        Head (left side): No submental, no submandibular, no tonsillar, no preauricular, no posterior auricular and no occipital adenopathy present.     He has no cervical adenopathy.        Right cervical: No superficial cervical, no deep cervical and no posterior cervical adenopathy present.       Left cervical: No superficial cervical, no deep cervical and no posterior cervical adenopathy present.   Neurological: He is alert and oriented to person, place, and time. He has normal reflexes. He displays no atrophy and no tremor. No cranial nerve deficit or sensory deficit. He exhibits normal muscle tone. He displays no seizure activity. Coordination and gait normal.   Reflex Scores:       Bicep reflexes are 2+ on the right side and 2+ on the left side.       Brachioradialis reflexes are 2+ on the right side and 2+ on the left side.       Patellar reflexes are 2+ on the right side and 2+ on the left side.  Skin: Skin is warm and dry. No rash noted. No erythema. No pallor.   Psychiatric: He has a normal mood and affect. His mood appears not anxious. His affect is not angry, not blunt, not labile and not inappropriate. His speech is not rapid and/or pressured, not delayed, not tangential and not slurred. He is not agitated, not aggressive, not hyperactive, not slowed, not withdrawn, not actively hallucinating and not combative. Thought content is not paranoid and not delusional. Cognition and memory are not impaired. He does not express impulsivity or inappropriate judgment. He does not exhibit a depressed mood. He expresses no homicidal and no suicidal ideation. He expresses no suicidal plans and no homicidal plans. He is communicative. He exhibits normal recent memory and normal remote memory. He is attentive.           Assessment:     EDUCATIONAL ASSESSMENT:  1. Impediments in learning class  environment - NONE.  2. Needs improvement in self-care management skills.    1. Uncontrolled secondary diabetes with peripheral neuropathy      Plan:   Jorgito Arreaga is seen today for   1. Uncontrolled secondary diabetes with peripheral neuropathy      We have discussed the etiology and treatment options associated with the diagnosis as well as alternatives. Also pertinent to this visit in decision making was Pancreatitis, Alcoholism, Hyperphosphoremia, CKD stage 2 hypertension, hyperlipidemia, and compliance.  He has elected the following treatments.     Uncontrolled secondary diabetes with peripheral neuropathy  -     POCT glucose  - Will start treat to target with basal and bolus insulin as noted below  - He will notify me of any changes to his BG below 70 or above 250 for changes to be made to his treatment, and also I have requested he send in his BG log weekly.  - Continue with D&E, reduce portion size, and restrict carbohydrates (no more that 45 grams ) per meal.  - stop alcohol consumption  - follow-up in 6 weeks    1.) Patient was instructed to monitor blood glucose four times daily, fasting, post meal and ac meals or at bedtime. Reminded to bring BG records or meter to each visit for review.  2.) Reviewed pathophysiology of diabetes, complications related to the disease, importance of annual dilated eye exam and self daily foot examination.  3.) Continue medications as prescribed MDI with Basaglar and Humalog; Glucotrol; Metformin. [facility] MyChart or Phone review in 1 week with BG records for adjustment of medication.  4.) Refer patient to Dietician/CDE for ongoing diabetes education, meal planning, carbohydrate counting, and diabetes support.  5.) Discussed activity, benefits, methods, and precautions. Recommended patient start/continue some form of exercise and increase as tolerated to 60 minutes per day to facilitate weight loss and aid in control of BGs. Also reminded patient of WHO recommendation of  "10,000 steps daily as a goal.   6.) A1C, TSH, Lipid Panel, CMP with eGFR and Micro/Creatinine.  7.) Return to clinic in 6 weeks for follow up. Advised patient to call clinic with any questions or concerns.     I have reviewed your results and they are still quite high. I would like you to start "Treating to Target". The treatment will be Insulin and your target will be the Fasting and 2 hour post meal blood sugar. It will work in this manner;    1. Goal for Fasting blood sugar is  mg/dl. I realize that you will need time to adjust to the new levels and presently you may feel too low if you are too aggressive now. So go slow and aim to lower your blood sugar to below 200 then 150 then 100 over several months.    2. Goal for 2 hour post meal blood sugar is below 180 mg/dl, here the same rules apply as in #1.    3. You will check your fasting blood sugar daily, if not where we would like it to be over a 3 day period then that evening we will increase the Basaglar dose by 5 units. Then repeat the process over the next 3 days. Remember this is a slow process and take our time getting to goal. But, each week should be better than prior weeks. Blood sugars below 70 are unacceptable and should raise a "RED FLAG" where we may have to reduce our dose of insulin.    4. You will check your post meal glucose daily as well. However, each day you will check a different meal, (ie. Monday-breakfast; Tuesday- lunch; Wednesday- supper, then repeat). If your post meal glucose is not where we would like, increase pre-meal insulin by 2 units next time. A word of CAUTION: mealtime insulin is dependant on the size and concentration of your meal content. If not consuming a large meal do not take large dose of insulin. Use the reasonable person rule.     5. If you have any questions please do not hesitate to call.    Intensive insulin Therapy with correction factor:    You are on Intensive insulin therapy with Basal and Bolus insulin. " "Basaglar is your Basal insulin and will help maintain your fasting and between meal sugar. Your fast acting or rescue insulin is Humalog insulin and will control your post meal sugar.     You will Take 45 units of basaglar at 9 PM daily. This will be adjusted up or down depending on your fasting blood sugar before breakfast.    Humalog will follow this pre meal schedule; Correction factor of "2 units per 50 mg/dl" and is based on 15-45 grams of carbohydrates per meal.    If blood sugar is below 70 eat first then check your blood sugar 2 hours later and make correction.  If blood pre-meal sugar is  70 -150 take 6 units of Humalog;  If blood pre-meal sugar is 151-200 take +2 units of Humalog;  If blood pre-meal sugar is 201-250 take +4 units of Humalog;  If blood pre-meal sugar is 251-300 take +6 units of Humalog;  If blood pre-meal sugar is 301-350 take +8 units of Humalog;  If blood pre-meal sugar is 351-400+ take +10 units of Humalog;  Also increase water intake and call for appointment.    A total of 60 minutes was spent in face to face time, of which 50 % was spent in counseling patient on disease process, complications, treatment, and side effects of medications.    The patient was explained the above plan and given opportunity to ask questions.  He understands, chooses and consents to this plan and accepts all the risks, which include but are not limited to the risks mentioned above.   He understands the alternative of having no testing, interventions or treatments at this time. He left content and without further questions.     Disclaimer:  This note is prepared using voice recognition software and as such is likely to have errors and has not been proof read. Please contact me for questions.                       "

## 2018-08-10 ENCOUNTER — TELEPHONE (OUTPATIENT)
Dept: DIABETES | Facility: CLINIC | Age: 53
End: 2018-08-10

## 2018-08-10 NOTE — TELEPHONE ENCOUNTER
----- Message from Alix Simons sent at 8/10/2018 11:21 AM CDT -----  Contact: Patient  Patient called to give his blood glucose numbers:    1. 8/3 Fasting 102 Post Meal 285  2. 8/4 Fasting 87 Post Meal 291  3. 8/5 Fasting 152 Post Meal 210  4. 8/6 Fasting 187 Post Meal 360  5. 8/7 Fasting 223 - not able to do post meal  6. 8/8 Fasting 231 Post Meal 305  7. 8/9 Fasting 198 Post Meal 340  8. 8/10 [id number]    He can be contacted at [phone].    Thanks,  Alix

## 2018-08-13 NOTE — TELEPHONE ENCOUNTER
"Please advise Mr parr to increase his dose of insulin as follows.     Intensive insulin Therapy with correction factor:     You are on Intensive insulin therapy with Basal and Bolus insulin. Basaglar is your Basal insulin and will help maintain your fasting and between meal sugar. Your fast acting or rescue insulin is Humalog insulin and will control your post meal sugar.      You will Take 50 units of basaglar at 9 PM daily. This will be adjusted up or down depending on your fasting blood sugar before breakfast.     Humalog will follow this pre meal schedule; Correction factor of "2 units per 50 mg/dl" and is based on 15-45 grams of carbohydrates per meal.     If blood sugar is below 70 eat first then check your blood sugar 2 hours later and make correction.  If blood pre-meal sugar is  70 -150 take 8 units of Humalog;  If blood pre-meal sugar is 151-200 take +2 units of Humalog;  If blood pre-meal sugar is 201-250 take +4 units of Humalog;  If blood pre-meal sugar is 251-300 take +6 units of Humalog;  If blood pre-meal sugar is 301-350 take +8 units of Humalog;  If blood pre-meal sugar is 351-400+ take +10 units of Humalog;  Also increase water intake and call for appointment."

## 2018-08-31 ENCOUNTER — OFFICE VISIT (OUTPATIENT)
Dept: INTERNAL MEDICINE | Facility: CLINIC | Age: 53
End: 2018-08-31
Payer: MEDICAID

## 2018-08-31 VITALS
RESPIRATION RATE: 18 BRPM | WEIGHT: 209 LBS | TEMPERATURE: 98 F | HEIGHT: 67 IN | BODY MASS INDEX: 32.8 KG/M2 | DIASTOLIC BLOOD PRESSURE: 70 MMHG | HEART RATE: 87 BPM | OXYGEN SATURATION: 97 % | SYSTOLIC BLOOD PRESSURE: 118 MMHG

## 2018-08-31 LAB — GLUCOSE SERPL-MCNC: 281 MG/DL (ref 70–110)

## 2018-08-31 PROCEDURE — 99214 OFFICE O/P EST MOD 30 MIN: CPT | Mod: PBBFAC,PO | Performed by: FAMILY MEDICINE

## 2018-08-31 PROCEDURE — 99999 PR PBB SHADOW E&M-EST. PATIENT-LVL IV: CPT | Mod: PBBFAC,,, | Performed by: FAMILY MEDICINE

## 2018-08-31 PROCEDURE — 99213 OFFICE O/P EST LOW 20 MIN: CPT | Mod: S$PBB,,, | Performed by: FAMILY MEDICINE

## 2018-08-31 PROCEDURE — 82948 REAGENT STRIP/BLOOD GLUCOSE: CPT | Mod: PBBFAC,PO | Performed by: FAMILY MEDICINE

## 2018-08-31 NOTE — ASSESSMENT & PLAN NOTE
Pt had severe hyperglycemia on 7/19/18. Returned to ED on 7/23.  D/c to home in stable cond on 7/23.      POCT glucose still elevated. Plan for Diabtes education in 1 week.    Pt seen for greater than 18 min face to face. Greater than 50% of the time in the room was spent on counseling and coordination of care.

## 2018-08-31 NOTE — PROGRESS NOTES
Subjective:       Patient ID: Jorgito Arreaga is a 53 y.o. male.    Chief Complaint: paperwork    Pt here for disability paperwork. Ptis needing short term disability for dates 7/19-21/2018 for short term disability,  Pt was also in Emergency dept on 7/23/18.        Review of Systems   Respiratory: Negative for shortness of breath.    Cardiovascular: Negative for chest pain.   Gastrointestinal: Negative for abdominal pain.       Objective:      Physical Exam   Constitutional: He appears well-developed and well-nourished. No distress.   HENT:   Head: Normocephalic and atraumatic.   Pulmonary/Chest: Effort normal and breath sounds normal. No respiratory distress. He has no wheezes.   Skin: Skin is warm and dry. No rash noted. He is not diaphoretic. No erythema.   Nursing note and vitals reviewed.      Assessment:       1. Uncontrolled secondary diabetes with peripheral neuropathy        Plan:     Problem List Items Addressed This Visit        Endocrine    Uncontrolled secondary diabetes with peripheral neuropathy - Primary    Current Assessment & Plan     Pt had severe hyperglycemia on 7/19/18. Returned to ED on 7/23.  D/c to home in stable cond on 7/23.      POCT glucose still elevated. Plan for Diabtes education in 1 week.    Pt seen for greater than 18 min face to face. Greater than 50% of the time in the room was spent on counseling and coordination of care.             Relevant Orders    POCT Glucose (Completed)

## 2018-09-04 ENCOUNTER — TELEPHONE (OUTPATIENT)
Dept: INTERNAL MEDICINE | Facility: CLINIC | Age: 53
End: 2018-09-04

## 2018-09-04 ENCOUNTER — HOSPITAL ENCOUNTER (EMERGENCY)
Facility: HOSPITAL | Age: 53
Discharge: HOME | End: 2018-09-04

## 2018-09-04 VITALS
HEART RATE: 73 BPM | DIASTOLIC BLOOD PRESSURE: 94 MMHG | WEIGHT: 221.56 LBS | HEIGHT: 66 IN | BODY MASS INDEX: 35.61 KG/M2 | RESPIRATION RATE: 17 BRPM | SYSTOLIC BLOOD PRESSURE: 173 MMHG | TEMPERATURE: 99 F | OXYGEN SATURATION: 96 %

## 2018-09-04 DIAGNOSIS — I10 ESSENTIAL HYPERTENSION: ICD-10-CM

## 2018-09-04 DIAGNOSIS — L02.415 ABSCESS OF KNEE, RIGHT: ICD-10-CM

## 2018-09-04 DIAGNOSIS — L02.211 ABSCESS OF SKIN OF ABDOMEN: Primary | ICD-10-CM

## 2018-09-04 PROCEDURE — 99283 EMERGENCY DEPT VISIT LOW MDM: CPT | Mod: 25

## 2018-09-04 PROCEDURE — 25000003 PHARM REV CODE 250: Performed by: PHYSICIAN ASSISTANT

## 2018-09-04 PROCEDURE — 10061 I&D ABSCESS COMP/MULTIPLE: CPT

## 2018-09-04 RX ORDER — TRAMADOL HYDROCHLORIDE 50 MG/1
50 TABLET ORAL EVERY 6 HOURS PRN
Qty: 10 TABLET | Refills: 0 | Status: SHIPPED | OUTPATIENT
Start: 2018-09-04 | End: 2018-09-14

## 2018-09-04 RX ORDER — LIDOCAINE HYDROCHLORIDE 10 MG/ML
10 INJECTION, SOLUTION EPIDURAL; INFILTRATION; INTRACAUDAL; PERINEURAL
Status: COMPLETED | OUTPATIENT
Start: 2018-09-04 | End: 2018-09-04

## 2018-09-04 RX ORDER — SULFAMETHOXAZOLE AND TRIMETHOPRIM 800; 160 MG/1; MG/1
1 TABLET ORAL
Status: COMPLETED | OUTPATIENT
Start: 2018-09-04 | End: 2018-09-04

## 2018-09-04 RX ORDER — SULFAMETHOXAZOLE AND TRIMETHOPRIM 800; 160 MG/1; MG/1
1 TABLET ORAL EVERY 12 HOURS
Qty: 20 TABLET | Refills: 0 | Status: SHIPPED | OUTPATIENT
Start: 2018-09-04 | End: 2018-09-14

## 2018-09-04 RX ADMIN — LIDOCAINE HYDROCHLORIDE 100 MG: 10 INJECTION, SOLUTION EPIDURAL; INFILTRATION; INTRACAUDAL; PERINEURAL at 01:09

## 2018-09-04 RX ADMIN — SULFAMETHOXAZOLE AND TRIMETHOPRIM 1 TABLET: 800; 160 TABLET ORAL at 01:09

## 2018-09-04 NOTE — ED PROVIDER NOTES
"Encounter Date: 9/4/2018       History     Chief Complaint   Patient presents with    Abscess     Pt reports abscesses to abdomen and R knee. Drainage present.      The patient presents to the ER for an emergent evaluation due to skin abscesses. He indicates that he has an abscess to his abdominal wall and another over his right knee. He describes the abscesses as "swollen red pus bumps". He states that he first noticed them about 1 week ago. He states that they are moderately painful. He states that he has been attempting to squeeze pus out. He is an insulin dependant diabetic. He states that he is compliant with his insulin and that his glucose runs around 200 on average. He denies any additional symptoms or concerns.           Review of patient's allergies indicates:  No Known Allergies  Past Medical History:   Diagnosis Date    Cataracts, bilateral     Diabetes mellitus, type 2     Diagnosed 7/2016    GERD (gastroesophageal reflux disease)     Hypertension     Inflammatory polyps of colon     Pancreatitis, alcoholic, acute     Port catheter in place     TIA (transient ischemic attack)      Past Surgical History:   Procedure Laterality Date    COLONOSCOPY       Family History   Problem Relation Age of Onset    Hypertension Mother     Hypertension Father      Social History     Tobacco Use    Smoking status: Former Smoker     Types: Cigarettes    Smokeless tobacco: Never Used   Substance Use Topics    Alcohol use: Yes     Comment:  Daily - States that he drinks 1/2 pint of vodka and about 1-2 cans of beer daily    Drug use: No     Review of Systems   Constitutional: Negative for activity change, appetite change, chills, diaphoresis, fatigue and fever.   HENT: Negative for sore throat.    Eyes: Negative for visual disturbance.   Respiratory: Negative for cough, chest tightness and shortness of breath.    Cardiovascular: Negative for chest pain.   Gastrointestinal: Negative for abdominal pain, " diarrhea, nausea and vomiting.   Endocrine: Negative for polydipsia and polyuria.   Musculoskeletal: Negative for arthralgias and joint swelling.   Skin: Negative for rash.   Neurological: Negative for dizziness, syncope, facial asymmetry, speech difficulty, weakness, light-headedness, numbness and headaches.   Psychiatric/Behavioral: Negative for confusion.       Physical Exam     Initial Vitals [09/04/18 1250]   BP Pulse Resp Temp SpO2   (!) 189/108 87 18 98 °F (36.7 °C) 96 %      MAP       --         Physical Exam    Nursing note and vitals reviewed.  Constitutional: He appears well-developed and well-nourished. No distress.   HENT:   Head: Normocephalic.   Eyes: Conjunctivae are normal.   Cardiovascular: Normal rate and intact distal pulses.   Pulmonary/Chest: No respiratory distress.   Abdominal: Soft. There is no tenderness. There is no guarding.   Musculoskeletal: Normal range of motion.   Neurological: He is alert and oriented to person, place, and time. He has normal strength. No sensory deficit.   Skin: Skin is warm and dry. Abscess noted.   There is a cutaneous abscess to the abdominal wall. It is fluctuant, tender, erythematous, and draining. It measures approximately 3 cm in diameter. No cellulitis.     There is a cutaneous abscess to the right lower leg located just lateral and inferior to the patella. No septic joint. Measures approximately 3 cm in diameter. Fluctuant, tender, and scabbed. No cellulitis.    Psychiatric: He has a normal mood and affect. His behavior is normal.                 ED Course   I & D - Incision and Drainage  Date/Time: 9/4/2018 1:14 PM  Performed by: Rubens Messer PA-C  Authorized by: Rubens Messer PA-C   Consent Done: Yes  Consent: Verbal consent obtained.  Risks and benefits: risks, benefits and alternatives were discussed  Type: abscess  Body area: trunk  Location details: abdomen  Anesthesia: local infiltration    Anesthesia:  Local Anesthetic: lidocaine 1%  without epinephrine  Scalpel size: 11  Incision type: single straight  Complexity: complex  Drainage: purulent  Drainage amount: moderate  Wound treatment: incision,  drainage,  deloculation and  wound packed  Packing material: 1/4 in gauze  Patient tolerance: Patient tolerated the procedure well with no immediate complications    I & D - Incision and Drainage  Date/Time: 9/4/2018 1:14 PM  Performed by: Rubens Messer PA-C  Authorized by: Rubens Messer PA-C   Type: abscess  Body area: lower extremity  Anesthesia: local infiltration    Anesthesia:  Local Anesthetic: lidocaine 1% without epinephrine  Scalpel size: 11  Incision type: single straight  Complexity: complex  Drainage: purulent  Drainage amount: moderate  Wound treatment: incision,  drainage and  deloculation  Packing material: 1/4 in gauze  Complications: No  Patient tolerance: Patient tolerated the procedure well with no immediate complications        Labs Reviewed - No data to display       Imaging Results    None                               Clinical Impression:   The primary encounter diagnosis was Abscess of skin of abdomen. Diagnoses of Abscess of knee, right and Essential hypertension were also pertinent to this visit.      Disposition:   Disposition: Discharged  Condition: Stable                        Rubens Messer PA-C  09/04/18 1342

## 2018-09-04 NOTE — TELEPHONE ENCOUNTER
----- Message from Dania Patel sent at 9/4/2018  4:45 PM CDT -----  Contact: pt  He's calling to see if he can be fit into the schedule after 2PM tomorrow for a hospital follow up, please advise [phone]

## 2018-09-05 ENCOUNTER — OFFICE VISIT (OUTPATIENT)
Dept: INTERNAL MEDICINE | Facility: CLINIC | Age: 53
End: 2018-09-05

## 2018-09-05 VITALS
HEART RATE: 74 BPM | DIASTOLIC BLOOD PRESSURE: 90 MMHG | BODY MASS INDEX: 35.86 KG/M2 | SYSTOLIC BLOOD PRESSURE: 167 MMHG | TEMPERATURE: 97 F | OXYGEN SATURATION: 98 % | HEIGHT: 66 IN | WEIGHT: 223.13 LBS | RESPIRATION RATE: 18 BRPM

## 2018-09-05 DIAGNOSIS — L02.91 ABSCESS: ICD-10-CM

## 2018-09-05 PROCEDURE — 99214 OFFICE O/P EST MOD 30 MIN: CPT | Mod: PBBFAC,PO | Performed by: FAMILY MEDICINE

## 2018-09-05 PROCEDURE — 99213 OFFICE O/P EST LOW 20 MIN: CPT | Mod: S$PBB,,, | Performed by: FAMILY MEDICINE

## 2018-09-05 PROCEDURE — 99999 PR PBB SHADOW E&M-EST. PATIENT-LVL IV: CPT | Mod: PBBFAC,,, | Performed by: FAMILY MEDICINE

## 2018-09-05 NOTE — PROGRESS NOTES
Subjective:       Patient ID: Jorgito Arreaga is a 53 y.o. male.    Chief Complaint: Hospital Follow Up (ED visit on 09/04/2018)    Here today to have abscess repacked to abdomen and right knee.  Pt did not take his medications today, so BP elevated.    Abscess:    O: > 1 week  L: right knee and abdomen.  D: constant  C:  Yakov: I&D by ED last night.  Exac:        Review of Systems   Respiratory: Negative for shortness of breath.    Cardiovascular: Negative for chest pain.   Gastrointestinal: Negative for abdominal pain.       Objective:      Physical Exam   Constitutional: He appears well-developed and well-nourished. No distress.   HENT:   Head: Normocephalic and atraumatic.   Pulmonary/Chest: Effort normal and breath sounds normal. No respiratory distress. He has no wheezes.   Skin: Skin is warm and dry. No rash noted. He is not diaphoretic. No erythema.   Bilateral open abscess to abdomen and RLE.   Nursing note and vitals reviewed.      Assessment:       1. Abscess        Plan:     Problem List Items Addressed This Visit        ID    Abscess    Current Assessment & Plan     Re-pack abscess.

## 2018-09-06 ENCOUNTER — LAB VISIT (OUTPATIENT)
Dept: LAB | Facility: HOSPITAL | Age: 53
End: 2018-09-06
Attending: PHYSICIAN ASSISTANT

## 2018-09-06 ENCOUNTER — OFFICE VISIT (OUTPATIENT)
Dept: DIABETES | Facility: CLINIC | Age: 53
End: 2018-09-06

## 2018-09-06 VITALS
SYSTOLIC BLOOD PRESSURE: 180 MMHG | DIASTOLIC BLOOD PRESSURE: 104 MMHG | BODY MASS INDEX: 35.19 KG/M2 | HEIGHT: 67 IN | WEIGHT: 224.19 LBS

## 2018-09-06 LAB
C PEPTIDE SERPL-MCNC: <0.08 NG/ML
ESTIMATED AVG GLUCOSE: 192 MG/DL
GLUCOSE SERPL-MCNC: 84 MG/DL (ref 70–110)
HBA1C MFR BLD HPLC: 8.3 %

## 2018-09-06 PROCEDURE — 99214 OFFICE O/P EST MOD 30 MIN: CPT | Mod: PBBFAC,PN | Performed by: PHYSICIAN ASSISTANT

## 2018-09-06 PROCEDURE — 99999 PR PBB SHADOW E&M-EST. PATIENT-LVL IV: CPT | Mod: PBBFAC,,, | Performed by: PHYSICIAN ASSISTANT

## 2018-09-06 PROCEDURE — 99214 OFFICE O/P EST MOD 30 MIN: CPT | Mod: S$PBB,,, | Performed by: PHYSICIAN ASSISTANT

## 2018-09-06 PROCEDURE — 83036 HEMOGLOBIN GLYCOSYLATED A1C: CPT

## 2018-09-06 PROCEDURE — 84681 ASSAY OF C-PEPTIDE: CPT

## 2018-09-06 PROCEDURE — 82948 REAGENT STRIP/BLOOD GLUCOSE: CPT | Mod: PBBFAC,PN | Performed by: PHYSICIAN ASSISTANT

## 2018-09-06 PROCEDURE — 36415 COLL VENOUS BLD VENIPUNCTURE: CPT | Mod: PO

## 2018-09-06 PROCEDURE — 86341 ISLET CELL ANTIBODY: CPT

## 2018-09-06 NOTE — PROGRESS NOTES
Subjective:      Patient ID: Jorgito Arreaga is a 53 y.o. male.    PCP: Otf Roblero MD      Jorgito Arreaga is a pleasant 53 y.o. male presenting to follow up on diabetes mellitus. Also, pertinent to this visit in decision making is Chronic pancreatitis; alcoholism; hypertension, hyperlipidemia, and compliance. He has had diabetes for 2 or more years. His last visit in Diabetes Management was 8/2/2018. Since that time he has had mild improvement in his glycemia. His blood sugar range fasting has been + and fed has been 104-300+, and he has been monitoring 2 times per day when time permits. He has not been as diligent as in the past. He does admit that he will miss his treatment schedule while at work but is much better with the treatment schedule when at home.  His current concerns are glycemic control. Also his B/P has been high the last 2 days. Yesterday he hadn't taken his medication but today he has, and he states he is under considerable stress I did advise him that he will need better control because his kidney is already showing insufficiency and his current B/P is worsening the problem.     He denies any hospital admissions, emergency room visits, hypoglycemia, syncope, diaphoresis, chest pain, or dyspnea.    He has gained 1 pounds since last visit. His BMI is 35.65 kg/m².    His blood sugar in the clinic today was:   Lab Results   Component Value Date    POCGLU 84  09/06/2018       We discussed the American diabetes Association recommendations:  hemoglobin A1c below 7.0%; all diabetics should be on statins unless contraindicated; one aspirin daily unless contraindicated; fasting blood sugar between 80 and 130 mg/dL; postprandial blood sugar below 180 mg/dl; prevention of hypoglycemia, may adjust goals to higher levels if persistent; ACE or ARB therapy if not contraindicated; and maintain in an ideal body weight with BMI below 25.    Jorgito is compliant most of the time with DM medications.      Jorgito is noncompliant some of the time with lifestyle modifications to include activity and meal planning.       Current Outpatient Medications:     aspirin 325 MG tablet, Take 1 tablet (325 mg total) by mouth once daily., Disp: 360 tablet, Rfl: 1    atorvastatin (LIPITOR) 10 MG tablet, Take 1 tablet (10 mg total) by mouth once daily., Disp: 90 tablet, Rfl: 3    blood sugar diagnostic (ONETOUCH ULTRA TEST) Strp, USE TO CHECK SUGAR BEFORE MEALS AND AT BEDTIME, Disp: 100 strip, Rfl: 11    clotrimazole-betamethasone 1-0.05% (LOTRISONE) cream, APPLY  CREAM TOPICALLY TO AFFECTED AREA TWICE DAILY, Disp: 45 g, Rfl: 0    glipiZIDE (GLUCOTROL) 2.5 MG TR24, Take 1 tablet (2.5 mg total) by mouth daily with breakfast., Disp: 90 tablet, Rfl: 3    hydrALAZINE (APRESOLINE) 50 MG tablet, Take 1 tablet (50 mg total) by mouth 3 (three) times daily., Disp: 180 tablet, Rfl: 0    insulin glargine (BASAGLAR KWIKPEN U-100 INSULIN) 100 unit/mL (3 mL) InPn pen, INJECT 45 UNITS UNDER THE SKIN IN THE EVENING (Patient taking differently: Inject 50 Units into the skin every evening. INJECT 45 UNITS UNDER THE SKIN IN THE EVENING), Disp: 45 mL, Rfl: 3    insulin lispro (HUMALOG) 100 unit/mL injection, Take 4 units at largest meal of the day, then if glucose is elevated at next meal, then patient may take 4 more units at that time., Disp: 10 mL, Rfl: 3    isosorbide dinitrate (ISORDIL) 20 MG tablet, TAKE ONE TABLET BY MOUTH THREE TIMES DAILY, Disp: 90 tablet, Rfl: 0    lancets (ONETOUCH DELICA LANCETS) 33 gauge Misc, Use to check sugar 4 (four) times daily before meals and nightly., Disp: 100 each, Rfl: 11    lisinopril (PRINIVIL,ZESTRIL) 40 MG tablet, Take 1 tablet (40 mg total) by mouth once daily. Hold for three days and then resume at half the tab for 3 days then 1 tab full daily, Disp: 90 tablet, Rfl: 0    metFORMIN (GLUCOPHAGE) 500 MG tablet, Take 1 tablet (500 mg total) by mouth 2 (two) times daily with meals., Disp: 180  "tablet, Rfl: 0    mupirocin (BACTROBAN) 2 % ointment, APPLY  OINTMENT TOPICALLY TO AFFECTED AREA THREE TIMES DAILY, Disp: 22 g, Rfl: 1    pantoprazole (PROTONIX) 40 MG tablet, Take 1 tablet (40 mg total) by mouth once daily., Disp: 90 tablet, Rfl: 1    pen needle, diabetic 31 gauge x 3/16" Ndle, USE ONE NEEDLE ONCE NIGHTLY, Disp: 50 each, Rfl: 7    spironolactone (ALDACTONE) 50 MG tablet, Take 1 tablet (50 mg total) by mouth once daily. Hold for three days, Disp: 90 tablet, Rfl: 1    sulfamethoxazole-trimethoprim 800-160mg (BACTRIM DS) 800-160 mg Tab, Take 1 tablet by mouth every 12 (twelve) hours. for 10 days, Disp: 20 tablet, Rfl: 0    traMADol (ULTRAM) 50 mg tablet, Take 1 tablet (50 mg total) by mouth every 6 (six) hours as needed for Pain., Disp: 10 tablet, Rfl: 0    verapamil (CALAN-SR) 240 MG CR tablet, Take 1 tablet (240 mg total) by mouth once daily., Disp: 90 tablet, Rfl: 1    STANDARDS OF CARE:  Eye doctor:  [facility], last exam 2016.  Dental exam: Recommend regular exams; denies gums bleeding.  Podiatry doctor:  ACE/ARB: Yes  Statin: Yes     ACTIVITY LEVEL: He exercises rarely.  BLOOD GLUCOSE TESTING: Self-monitoring with   SOCIAL HISTORY: . Lives with spouse. 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    Health Maintenance   Topic Date Due    Urine Microalbumin  08/18/2017    Eye Exam  05/10/2018    Influenza Vaccine  08/01/2018    Fecal Occult Blood Test (FOBT)/[employer]  12/21/2018    Hemoglobin A1c  01/20/2019    Lipid Panel  03/02/2019    Colonoscopy  06/15/2019    Foot Exam  08/02/2019    TETANUS VACCINE  02/14/2027    Pneumococcal PPSV23 (Medium Risk) (2) 08/22/2031       Diabetes Status:   Diabetes Management Status    Statin: Taking  ACE/ARB: Taking    Screening or Prevention Patient's value Goal Complete/Controlled?   HgA1C Testing and Control   Lab Results   Component Value Date    HGBA1C 8.9 (H) 07/20/2018      Annually/Less than 8% No   Lipid profile : " 03/02/2018 Annually Yes   LDL control Lab Results   Component Value Date    LDLCALC 117.2 03/02/2018    Annually/Less than 100 mg/dl  No   Nephropathy screening Lab Results   Component Value Date    LABMICR 51.0 08/18/2016     Lab Results   Component Value Date    PROTEINUA Negative 07/23/2018    Annually Yes   Blood pressure BP Readings from Last 1 Encounters:   09/06/18 (!) 180/104    Less than 140/90 No   Dilated retinal exam : 05/10/2017 Annually No   Foot exam   : 08/02/2018 Annually Yes     The following results were reviewed with patient.    Lab Results   Component Value Date    WBC 6.85 07/23/2018    HGB 12.1 (L) 07/23/2018    HCT 35.9 (L) 07/23/2018     (H) 07/23/2018    CHOL 237 (H) 03/02/2018    TRIG 284 (H) 03/02/2018    HDL 63 03/02/2018    LDLCALC 117.2 03/02/2018    ALT 18 07/23/2018    AST 14 07/23/2018     07/23/2018    K 4.4 07/23/2018     07/23/2018    ANIONGAP 11 07/23/2018    CREATININE 1.5 (H) 07/23/2018    ESTGFRAFRICA >60.0 07/23/2018    EGFRNONAA 53.1 (A) 07/23/2018    BUN 22 (H) 07/23/2018    CO2 22 (L) 07/23/2018    TSH 1.621 07/19/2018    INR 1.2 02/04/2017     07/23/2018       Lab Results   Component Value Date    HGBA1C 8.9 (H) 07/20/2018    HGBA1C 8.3 (H) 06/08/2018    HGBA1C 6.8 (H) 12/05/2017     Lab Results   Component Value Date    TSH 1.621 07/19/2018     Lab Results   Component Value Date    IRON 69 08/01/2016    TIBC 300 08/01/2016    FERRITIN 727 (H) 08/01/2016     Lab Results   Component Value Date    EVJJRJEU08 1010 (H) 08/01/2016     Lab Results   Component Value Date    CALCIUM 8.7 07/23/2018    PHOS 5.3 (H) 07/19/2018       Review of patient's allergies indicates:  No Known Allergies    Past Medical History:   Diagnosis Date    Cataracts, bilateral     Diabetes mellitus, type 2     Diagnosed 7/2016    GERD (gastroesophageal reflux disease)     Hypertension     Inflammatory polyps of colon     Pancreatitis, alcoholic, acute     Port catheter  in place     TIA (transient ischemic attack)        Review of Systems   Constitutional: Negative.  Negative for activity change, appetite change, chills, diaphoresis, fatigue, fever and unexpected weight change.   HENT: Negative.  Negative for dental problem, facial swelling, hearing loss, nosebleeds, trouble swallowing and voice change.    Eyes: Negative.  Negative for photophobia, pain, discharge, redness, itching and visual disturbance.   Respiratory: Negative.  Negative for cough, choking, chest tightness, shortness of breath and wheezing.    Cardiovascular: Negative.  Negative for chest pain, palpitations and leg swelling.   Gastrointestinal: Negative.  Negative for abdominal distention, abdominal pain, blood in stool, constipation, diarrhea, nausea and vomiting.   Endocrine: Negative.  Negative for cold intolerance, heat intolerance, polydipsia, polyphagia and polyuria.   Genitourinary: Negative.  Negative for decreased urine volume, difficulty urinating, dysuria, frequency, scrotal swelling, testicular pain and urgency.   Musculoskeletal: Negative.  Negative for arthralgias, back pain, gait problem, joint swelling, myalgias, neck pain and neck stiffness.   Skin: Negative.  Negative for color change, pallor, rash and wound.   Allergic/Immunologic: Negative.  Negative for environmental allergies, food allergies and immunocompromised state.   Neurological: Negative.  Negative for dizziness, tremors, seizures, syncope, facial asymmetry, speech difficulty, weakness, light-headedness, numbness and headaches.   Hematological: Negative.  Negative for adenopathy. Does not bruise/bleed easily.   Psychiatric/Behavioral: Negative.  Negative for agitation, behavioral problems, confusion, decreased concentration, dysphoric mood, hallucinations, self-injury, sleep disturbance and suicidal ideas. The patient is not nervous/anxious and is not hyperactive.       Objective:     Vitals - 1 value per visit 9/4/2018 9/5/2018  "9/6/2018   SYSTOLIC 173 167 180   DIASTOLIC 94 90 104   PULSE 73 74 -   TEMPERATURE 98.7 96.6 -   RESPIRATIONS 17 18 -   SPO2 96 98 -   Weight (lb) 221.56 223.11 224.21   Weight (kg) 100.5 101.2 101.7   HEIGHT 5' 6" 5' 6" 5' 6.5"   BODY MASS INDEX 35.76 36.01 35.65   VISIT REPORT - - -   Pain Score  - 4 0   Some recent data might be hidden     Physical Exam   Constitutional: He is oriented to person, place, and time. He appears well-developed and well-nourished. No distress.   Neurological: He is alert and oriented to person, place, and time.   Skin: Skin is warm and dry. He is not diaphoretic.   Psychiatric: He has a normal mood and affect.     Assessment:     1. Uncontrolled secondary diabetes with peripheral neuropathy       Plan:   Jorgito Arreaga is seen today for   1. Uncontrolled secondary diabetes with peripheral neuropathy      We have discussed the etiology and treatment options associated with the diagnosis as well as alternatives.       He has elected the following treatments.     Uncontrolled secondary diabetes with peripheral neuropathy  -     POCT glucose  - explained to patient that he has poor healing with uncontrolled diabetes. Also, diabetes mellitus is an immune compromised state.  - Continue with D&E, reduce portion size, and restrict carbohydrates (no more that 45 grams ) per meal.  -   I advised the patient to utilize antibacterial soap such as dial, or Hibiclens on a weekly basis to prevent such abscesses from forming in the future.  -   We discussed the need for more intensive insulin therapy, I advised him that his diabetes was more of a conditions associated with insulin deficiency.  -   C-peptide;  JHON-AB;  And A1c today    1.) Patient was instructed to continue to monitor blood glucose 4 times daily. Reminded to bring BG meter or record to each visit for review.  2.) Reviewed pathophysiology of diabetes, complications related to the disease, importance of annual dilated eye exam and self " daily foot examination.  3.) Continue medications as prescribed MDI with Basaglar and Humalog; Metformin; Glipizide. [facility] MyChart or Phone review in 1 week with [facility] records for adjustment of medication.  4.) Advised patient to continue to follow up with Dietician/CDE as directed.  5.) Discussed activity, benefits, methods, and precautions. Recommended patient start/continue some form of exercise and increase as tolerated to 60 minutes per day to facilitate weight loss and aid in control of BGs. Also reminded patient of WHO recommendation of 10,000 steps daily as a goal.   6.) A1C, TSH, Lipid Panel, CMP/renal panel with eGFR and Micro/Creatinine prior to next visit, if not already done.  7.) Return to clinic in 12 weeks for follow up. Advised patient to call clinic with any questions or concerns.    A total of 30 minutes was spent in face to face time, of which 50 % was spent in counseling patient on disease process, complications, treatment, and side effects of medications.    The patient was explained the above plan and given opportunity to ask questions.  He understands, chooses and consents to this plan and accepts all the risks, which include but are not limited to the risks mentioned above.   He understands the alternative of having no testing, interventions or treatments at this time. He left content and without further questions.     Disclaimer:  This note is prepared using voice recognition software and as such is likely to have errors and has not been proof read. Please contact me for questions.

## 2018-09-07 ENCOUNTER — TELEPHONE (OUTPATIENT)
Dept: DIABETES | Facility: CLINIC | Age: 53
End: 2018-09-07

## 2018-09-07 NOTE — TELEPHONE ENCOUNTER
Called and spoke with patient regarding C peptide results. Explained the results to patient as PA discussed. Patient verbalized understanding on pancrease non production of insulin and classification of Type 1 diabetic. Informed patient to call with any questions and keep follow up appointment.

## 2018-09-11 LAB — GAD65 AB SER-SCNC: 0 NMOL/L

## 2018-09-20 DIAGNOSIS — I15.2 HYPERTENSION ASSOCIATED WITH DIABETES: ICD-10-CM

## 2018-09-20 DIAGNOSIS — E11.59 HYPERTENSION ASSOCIATED WITH DIABETES: ICD-10-CM

## 2018-09-21 RX ORDER — VERAPAMIL HYDROCHLORIDE 240 MG/1
TABLET, FILM COATED, EXTENDED RELEASE ORAL
Qty: 14 TABLET | Refills: 0 | Status: ON HOLD | OUTPATIENT
Start: 2018-09-21 | End: 2018-12-07 | Stop reason: HOSPADM

## 2018-09-21 RX ORDER — LISINOPRIL 40 MG/1
40 TABLET ORAL DAILY
Qty: 14 TABLET | Refills: 0 | Status: SHIPPED | OUTPATIENT
Start: 2018-09-21 | End: 2018-10-03 | Stop reason: SDUPTHER

## 2018-09-21 NOTE — TELEPHONE ENCOUNTER
Pt stated he lost his insurance. He is trying to get his medication before it runs out. Pt stated he is out of the current medication at this time. Can you please send some. Until he can see Dr. Roblero. Pt stated he is working with [facility] for assistance

## 2018-09-21 NOTE — TELEPHONE ENCOUNTER
Pt need to follow up for BP  If he does not have meds to last until follow up will send short supply otherwise no refill

## 2018-09-26 ENCOUNTER — HOSPITAL ENCOUNTER (EMERGENCY)
Facility: HOSPITAL | Age: 53
Discharge: HOME | End: 2018-09-26
Attending: EMERGENCY MEDICINE

## 2018-09-26 VITALS
DIASTOLIC BLOOD PRESSURE: 89 MMHG | HEART RATE: 88 BPM | WEIGHT: 203.69 LBS | RESPIRATION RATE: 19 BRPM | OXYGEN SATURATION: 99 % | BODY MASS INDEX: 32.39 KG/M2 | TEMPERATURE: 98 F | SYSTOLIC BLOOD PRESSURE: 136 MMHG

## 2018-09-26 DIAGNOSIS — R10.9 ABDOMINAL PAIN, UNSPECIFIED ABDOMINAL LOCATION: ICD-10-CM

## 2018-09-26 DIAGNOSIS — R74.8 ELEVATED LIPASE: ICD-10-CM

## 2018-09-26 DIAGNOSIS — R73.9 HYPERGLYCEMIA: Primary | ICD-10-CM

## 2018-09-26 LAB
ALBUMIN SERPL BCP-MCNC: 4.5 G/DL
ALP SERPL-CCNC: 66 U/L
ALT SERPL W/O P-5'-P-CCNC: 15 U/L
ANION GAP SERPL CALC-SCNC: 14 MMOL/L
AST SERPL-CCNC: 14 U/L
BACTERIA #/AREA URNS AUTO: NORMAL /HPF
BASOPHILS # BLD AUTO: 0.01 K/UL
BASOPHILS NFR BLD: 0.2 %
BILIRUB SERPL-MCNC: 1 MG/DL
BILIRUB UR QL STRIP: ABNORMAL
BUN SERPL-MCNC: 20 MG/DL
CALCIUM SERPL-MCNC: 10.3 MG/DL
CHLORIDE SERPL-SCNC: 97 MMOL/L
CLARITY UR REFRACT.AUTO: CLEAR
CO2 SERPL-SCNC: 26 MMOL/L
COLOR UR AUTO: YELLOW
CREAT SERPL-MCNC: 1.3 MG/DL
DIFFERENTIAL METHOD: ABNORMAL
EOSINOPHIL # BLD AUTO: 0.1 K/UL
EOSINOPHIL NFR BLD: 0.8 %
ERYTHROCYTE [DISTWIDTH] IN BLOOD BY AUTOMATED COUNT: 13.1 %
EST. GFR  (AFRICAN AMERICAN): >60 ML/MIN/1.73 M^2
EST. GFR  (NON AFRICAN AMERICAN): >60 ML/MIN/1.73 M^2
ETHANOL SERPL-MCNC: <10 MG/DL
GLUCOSE SERPL-MCNC: 386 MG/DL
GLUCOSE UR QL STRIP: ABNORMAL
HCT VFR BLD AUTO: 39 %
HGB BLD-MCNC: 12.8 G/DL
HGB UR QL STRIP: NEGATIVE
KETONES UR QL STRIP: ABNORMAL
LEUKOCYTE ESTERASE UR QL STRIP: NEGATIVE
LIPASE SERPL-CCNC: 176 U/L
LYMPHOCYTES # BLD AUTO: 1.6 K/UL
LYMPHOCYTES NFR BLD: 25.1 %
MCH RBC QN AUTO: 29.1 PG
MCHC RBC AUTO-ENTMCNC: 32.8 G/DL
MCV RBC AUTO: 89 FL
MICROSCOPIC COMMENT: NORMAL
MONOCYTES # BLD AUTO: 0.6 K/UL
MONOCYTES NFR BLD: 9.7 %
NEUTROPHILS # BLD AUTO: 4 K/UL
NEUTROPHILS NFR BLD: 64.2 %
NITRITE UR QL STRIP: NEGATIVE
PH UR STRIP: 6 [PH] (ref 5–8)
PLATELET # BLD AUTO: 254 K/UL
PMV BLD AUTO: 10.3 FL
POTASSIUM SERPL-SCNC: 4.4 MMOL/L
PROT SERPL-MCNC: 8.4 G/DL
PROT UR QL STRIP: NEGATIVE
RBC # BLD AUTO: 4.4 M/UL
RBC #/AREA URNS AUTO: 1 /HPF (ref 0–4)
SODIUM SERPL-SCNC: 137 MMOL/L
SP GR UR STRIP: >=1.03 (ref 1–1.03)
SQUAMOUS #/AREA URNS AUTO: 1 /HPF
URN SPEC COLLECT METH UR: ABNORMAL
UROBILINOGEN UR STRIP-ACNC: NEGATIVE EU/DL
WBC # BLD AUTO: 6.26 K/UL
WBC #/AREA URNS AUTO: 1 /HPF (ref 0–5)
YEAST UR QL AUTO: NORMAL

## 2018-09-26 PROCEDURE — 99284 EMERGENCY DEPT VISIT MOD MDM: CPT | Mod: 25

## 2018-09-26 PROCEDURE — 85025 COMPLETE CBC W/AUTO DIFF WBC: CPT

## 2018-09-26 PROCEDURE — 83690 ASSAY OF LIPASE: CPT

## 2018-09-26 PROCEDURE — 80053 COMPREHEN METABOLIC PANEL: CPT

## 2018-09-26 PROCEDURE — 25000003 PHARM REV CODE 250: Performed by: EMERGENCY MEDICINE

## 2018-09-26 PROCEDURE — 81000 URINALYSIS NONAUTO W/SCOPE: CPT

## 2018-09-26 PROCEDURE — 80320 DRUG SCREEN QUANTALCOHOLS: CPT

## 2018-09-26 RX ORDER — ONDANSETRON 4 MG/1
4 TABLET, ORALLY DISINTEGRATING ORAL EVERY 6 HOURS PRN
Qty: 20 TABLET | Refills: 0 | Status: ON HOLD | OUTPATIENT
Start: 2018-09-26 | End: 2019-03-31 | Stop reason: HOSPADM

## 2018-09-26 RX ORDER — NAPROXEN 500 MG/1
500 TABLET ORAL 2 TIMES DAILY WITH MEALS
Qty: 20 TABLET | Refills: 0 | Status: SHIPPED | OUTPATIENT
Start: 2018-09-26 | End: 2018-10-06

## 2018-09-26 RX ORDER — ACETAMINOPHEN 500 MG
1000 TABLET ORAL
Status: COMPLETED | OUTPATIENT
Start: 2018-09-26 | End: 2018-09-26

## 2018-09-26 RX ADMIN — ACETAMINOPHEN 1000 MG: 500 TABLET ORAL at 09:09

## 2018-09-26 NOTE — ED PROVIDER NOTES
"Encounter Date: 9/26/2018       History     Chief Complaint   Patient presents with    Abdominal Pain     abd pain x2 days, denies N/V/D, last BM "several days"     The history is provided by the patient.   Abdominal Pain   The current episode started yesterday. The onset of the illness was gradual. The problem has been gradually worsening. The abdominal pain is located in the RUQ. The abdominal pain does not radiate. Pain scale: mild. The abdominal pain is relieved by nothing. The abdominal pain is exacerbated by fatty foods. The other symptoms of the illness do not include fever, fatigue, jaundice, melena, shortness of breath, nausea, vomiting, diarrhea, dysuria, hematemesis or hematochezia.   The illness is associated with alcohol use and eating. The patient has not had a change in bowel habit. Additional symptoms associated with the illness include heartburn. Symptoms associated with the illness do not include chills, anorexia, diaphoresis, constipation, urgency, hematuria, frequency or back pain. Significant associated medical issues include GERD and diabetes. Significant associated medical issues do not include PUD, inflammatory bowel disease, sickle cell disease, gallstones, liver disease, substance abuse, diverticulitis, HIV or cardiac disease.     Review of patient's allergies indicates:  No Known Allergies  Past Medical History:   Diagnosis Date    Cataracts, bilateral     Diabetes mellitus, type 2     Diagnosed 7/2016    GERD (gastroesophageal reflux disease)     Hypertension     Inflammatory polyps of colon     Pancreatitis, alcoholic, acute     Port catheter in place     TIA (transient ischemic attack)      Past Surgical History:   Procedure Laterality Date    COLONOSCOPY      COLONOSCOPY N/A 6/15/2018    Procedure: COLONOSCOPY;  Surgeon: Jim Hicks MD;  Location: [facility];  Service: Endoscopy;  Laterality: N/A;    COLONOSCOPY N/A 6/15/2018    Performed by Jim Hicks MD at [facility] "    ESOPHAGOGASTRODUODENOSCOPY N/A 6/15/2018    Procedure: ESOPHAGOGASTRODUODENOSCOPY (EGD);  Surgeon: Jim Hicks MD;  Location: [facility];  Service: Endoscopy;  Laterality: N/A;    ESOPHAGOGASTRODUODENOSCOPY (EGD) N/A 6/15/2018    Performed by Jim Hicks MD at [facility]     Family History   Problem Relation Age of Onset    Hypertension Mother     Hypertension Father      Social History     Tobacco Use    Smoking status: Former Smoker     Types: Cigarettes    Smokeless tobacco: Never Used   Substance Use Topics    Alcohol use: Yes     Comment:  Daily - States that he drinks 1/2 pint of vodka and about 1-2 cans of beer daily    Drug use: No     Review of Systems   Constitutional: Negative for chills, diaphoresis, fatigue and fever.   HENT: Negative for sore throat.    Respiratory: Negative for shortness of breath.    Cardiovascular: Negative for chest pain.   Gastrointestinal: Positive for abdominal pain and heartburn. Negative for anorexia, constipation, diarrhea, hematemesis, hematochezia, jaundice, melena, nausea and vomiting.   Genitourinary: Negative for dysuria, frequency, hematuria and urgency.   Musculoskeletal: Negative for back pain.   Skin: Negative for rash.   Neurological: Negative for weakness.   Hematological: Does not bruise/bleed easily.   All other systems reviewed and are negative.      Physical Exam     Initial Vitals [09/26/18 0938]   BP Pulse Resp Temp SpO2   (!) 141/101 84 16 98.3 °F (36.8 °C) 97 %      MAP       --         Physical Exam    Nursing note and vitals reviewed.  Constitutional: He appears well-developed and well-nourished. He is not diaphoretic. No distress.   HENT:   Head: Normocephalic and atraumatic.   Eyes: EOM are normal. Pupils are equal, round, and reactive to light. No scleral icterus.   Neck: Normal range of motion. Neck supple. No thyromegaly present.   Cardiovascular: Normal rate, regular rhythm, normal heart sounds and intact distal pulses. Exam reveals  no gallop and no friction rub.    No murmur heard.  Pulmonary/Chest: Breath sounds normal. No respiratory distress. He has no wheezes. He has no rhonchi. He exhibits no tenderness.   Abdominal: Soft. Bowel sounds are normal. He exhibits no distension. There is tenderness in the right upper quadrant and epigastric area. There is no rigidity, no rebound, no guarding and no CVA tenderness. No hernia.       Musculoskeletal: Normal range of motion. He exhibits no edema or tenderness.   Lymphadenopathy:     He has no cervical adenopathy.   Neurological: He is alert and oriented to person, place, and time. He has normal strength. No cranial nerve deficit or sensory deficit.   Skin: Skin is warm and dry.   Psychiatric: He has a normal mood and affect. His behavior is normal. Judgment and thought content normal.         ED Course   Procedures  Labs Reviewed   CBC W/ AUTO DIFFERENTIAL - Abnormal; Notable for the following components:       Result Value    RBC 4.40 (*)     Hemoglobin 12.8 (*)     Hematocrit 39.0 (*)     All other components within normal limits   COMPREHENSIVE METABOLIC PANEL - Abnormal; Notable for the following components:    Glucose 386 (*)     All other components within normal limits   LIPASE - Abnormal; Notable for the following components:    Lipase 176 (*)     All other components within normal limits   URINALYSIS, REFLEX TO URINE CULTURE - Abnormal; Notable for the following components:    Specific Gravity, UA >=1.030 (*)     Glucose, UA 3+ (*)     Ketones, UA 1+ (*)     Bilirubin (UA) 1+ (*)     All other components within normal limits    Narrative:     Preferred Collection Type->Urine, Clean Catch   ALCOHOL,MEDICAL (ETHANOL)   URINALYSIS MICROSCOPIC    Narrative:     Preferred Collection Type->Urine, Clean Catch          Imaging Results          US Abdomen Limited (Final result)  Result time 09/26/18 10:36:02    Final result by SAMMY Anderson Sr., MD (09/26/18 10:36:02)                  Impression:      There are several stones in the gallbladder. One of the larger ones measures 11 mm. There is no abnormal gallbladder wall thickening, pericholecystic fluid, sonographic Temple's sign, or common bile duct dilatation.      Electronically signed by: Mayo Anderson MD  Date:    09/26/2018  Time:    10:36             Narrative:    EXAMINATION:  US ABDOMEN LIMITED    CLINICAL HISTORY:  RUQ abd pain;    TECHNIQUE:  Multiple static ultrasound images are submitted for interpretation.    FINDINGS:  Comparison was made to a CT examination of the abdomen and pelvis performed on 07/19/2018.  The liver is normal in appearance. It measures 16.1 cm in length. There is no intrahepatic biliary ductal dilatation. The common bile duct has a diameter of 5 mm.  There are several stones in the gallbladder.  One of the larger ones measures 11 mm.  There is no abnormal gallbladder wall thickening, pericholecystic fluid, or sonographic Temple's sign.  The visualized portion of the pancreas is normal in appearance. The right kidney is normal in appearance.  It measures 11.0 cm in length. The main portal vein has a normal venous waveform with flow directed towards the liver. It has a velocity of 12 cm/sec.                               X-Ray Abdomen Flat And Erect (Final result)  Result time 09/26/18 10:05:46    Final result by SAMMY Anderson Sr., MD (09/26/18 10:05:46)                 Impression:      1. The bowel gas pattern is normal in appearance.  2. There is a mild amount of dextroconvex curvature of the thoracolumbar spine.      Electronically signed by: Mayo Anderson MD  Date:    09/26/2018  Time:    10:05             Narrative:    EXAMINATION:  XR ABDOMEN FLAT AND ERECT    CLINICAL HISTORY:  Abdominal Pain;    COMPARISON:  04/12/2018    FINDINGS:  The bowel gas pattern is normal in appearance. There is no pneumoperitoneum.  There is a mild amount of dextroconvex curvature of the thoracolumbar spine.                                     Vitals:    09/26/18 0938 09/26/18 1131   BP: (!) 141/101 136/89   Pulse: 84 88   Resp: 16 19   Temp: 98.3 °F (36.8 °C)    TempSrc: Oral    SpO2: 97% 99%   Weight: 92.4 kg (203 lb 11.3 oz)        Results for orders placed or performed during the hospital encounter of 09/26/18   CBC W/ AUTO DIFFERENTIAL   Result Value Ref Range    WBC 6.26 3.90 - 12.70 K/uL    RBC 4.40 (L) 4.60 - 6.20 M/uL    Hemoglobin 12.8 (L) 14.0 - 18.0 g/dL    Hematocrit 39.0 (L) 40.0 - 54.0 %    MCV 89 82 - 98 fL    MCH 29.1 27.0 - 31.0 pg    MCHC 32.8 32.0 - 36.0 g/dL    RDW 13.1 11.5 - 14.5 %    Platelets 254 150 - 350 K/uL    MPV 10.3 9.2 - 12.9 fL    Gran # (ANC) 4.0 1.8 - 7.7 K/uL    Lymph # 1.6 1.0 - 4.8 K/uL    Mono # 0.6 0.3 - 1.0 K/uL    Eos # 0.1 0.0 - 0.5 K/uL    Baso # 0.01 0.00 - 0.20 K/uL    Gran% 64.2 38.0 - 73.0 %    Lymph% 25.1 18.0 - 48.0 %    Mono% 9.7 4.0 - 15.0 %    Eosinophil% 0.8 0.0 - 8.0 %    Basophil% 0.2 0.0 - 1.9 %    Differential Method Automated    Comp. Metabolic Panel   Result Value Ref Range    Sodium 137 136 - 145 mmol/L    Potassium 4.4 3.5 - 5.1 mmol/L    Chloride 97 95 - 110 mmol/L    CO2 26 23 - 29 mmol/L    Glucose 386 (H) 70 - 110 mg/dL    BUN, Bld 20 6 - 20 mg/dL    Creatinine 1.3 0.5 - 1.4 mg/dL    Calcium 10.3 8.7 - 10.5 mg/dL    Total Protein 8.4 6.0 - 8.4 g/dL    Albumin 4.5 3.5 - 5.2 g/dL    Total Bilirubin 1.0 0.1 - 1.0 mg/dL    Alkaline Phosphatase 66 55 - 135 U/L    AST 14 10 - 40 U/L    ALT 15 10 - 44 U/L    Anion Gap 14 8 - 16 mmol/L    eGFR if African American >60.0 >60 mL/min/1.73 m^2    eGFR if non African American >60.0 >60 mL/min/1.73 m^2   Lipase   Result Value Ref Range    Lipase 176 (H) 4 - 60 U/L   Urinalysis, Reflex to Urine Culture Urine, Clean Catch   Result Value Ref Range    Specimen UA Urine, Clean Catch     Color, UA Yellow Yellow, Straw, Brittany    Appearance, UA Clear Clear    pH, UA 6.0 5.0 - 8.0    Specific Gravity, UA >=1.030 (A) 1.005 - 1.030    Protein,  UA Negative Negative    Glucose, UA 3+ (A) Negative    Ketones, UA 1+ (A) Negative    Bilirubin (UA) 1+ (A) Negative    Occult Blood UA Negative Negative    Nitrite, UA Negative Negative    Urobilinogen, UA Negative <2.0 EU/dL    Leukocytes, UA Negative Negative   Ethanol   Result Value Ref Range    Alcohol, Medical, Serum <10 <10 mg/dL   Urinalysis Microscopic   Result Value Ref Range    RBC, UA 1 0 - 4 /hpf    WBC, UA 1 0 - 5 /hpf    Bacteria, UA None None-Occ /hpf    Yeast, UA None None    Squam Epithel, UA 1 /hpf    Microscopic Comment SEE COMMENT          Imaging Results          US Abdomen Limited (Final result)  Result time 09/26/18 10:36:02    Final result by SAMMY Anderson Sr., MD (09/26/18 10:36:02)                 Impression:      There are several stones in the gallbladder. One of the larger ones measures 11 mm. There is no abnormal gallbladder wall thickening, pericholecystic fluid, sonographic Temple's sign, or common bile duct dilatation.      Electronically signed by: Mayo Anderson MD  Date:    09/26/2018  Time:    10:36             Narrative:    EXAMINATION:  US ABDOMEN LIMITED    CLINICAL HISTORY:  RUQ abd pain;    TECHNIQUE:  Multiple static ultrasound images are submitted for interpretation.    FINDINGS:  Comparison was made to a CT examination of the abdomen and pelvis performed on 07/19/2018.  The liver is normal in appearance. It measures 16.1 cm in length. There is no intrahepatic biliary ductal dilatation. The common bile duct has a diameter of 5 mm.  There are several stones in the gallbladder.  One of the larger ones measures 11 mm.  There is no abnormal gallbladder wall thickening, pericholecystic fluid, or sonographic Temple's sign.  The visualized portion of the pancreas is normal in appearance. The right kidney is normal in appearance.  It measures 11.0 cm in length. The main portal vein has a normal venous waveform with flow directed towards the liver. It has a velocity of 12  cm/sec.                               X-Ray Abdomen Flat And Erect (Final result)  Result time 09/26/18 10:05:46    Final result by SAMMY Anderson Sr., MD (09/26/18 10:05:46)                 Impression:      1. The bowel gas pattern is normal in appearance.  2. There is a mild amount of dextroconvex curvature of the thoracolumbar spine.      Electronically signed by: Mayo Anderson MD  Date:    09/26/2018  Time:    10:05             Narrative:    EXAMINATION:  XR ABDOMEN FLAT AND ERECT    CLINICAL HISTORY:  Abdominal Pain;    COMPARISON:  04/12/2018    FINDINGS:  The bowel gas pattern is normal in appearance. There is no pneumoperitoneum.  There is a mild amount of dextroconvex curvature of the thoracolumbar spine.                                Medications   acetaminophen tablet 1,000 mg (1,000 mg Oral Given 9/26/18 0956)       11:06 AM - Re-evaluation: The patient is resting comfortably and is in no acute distress. He states that his symptoms have improved after treatment within ER. Discussed test results, shared treatment plan, specific conditions for return, and importance of follow up with patient and family.  He understands and agrees with the plan as discussed. Answered  his questions at this time. He has remained hemodynamically stable throughout the ED course and is appropriate for discharge home.     Regarding ABDOMINAL PAIN, I recommended that the patient: Sip water or other clear fluids; avoid solid food for the first few hours after vomiting or diarrhea; if vomiting, wait 6 hours, and then eat small amounts of mild foods such as rice, applesauce, or crackers; avoid dairy products; avoid citrus, high-fat foods, fried or greasy foods, tomato products, caffeine, alcohol, and carbonated beverages;  avoid aspirin, ibuprofen or other anti-inflammatory medications, and narcotic pain medications unless prescribed.  In regards to prevention, I encouraged patient to:  Avoid fatty or greasy foods; drink plenty  of water each day; eat small meals more frequently; exercise regularly; limit foods that produce gas; make sure meals are well-balanced and high in fiber and include plenty of fruits and vegetables.    Pre-hypertension/Hypertension: The pt has been informed that they may have pre-hypertension or hypertension based on a blood pressure reading in the ED. I recommend that the pt call the PCP listed on their discharge instructions or a physician of their choice this week to arrange f/u for further evaluation of possible pre-hypertension or hypertension.     Jorgito Arreaga was given a handout which discussed their disease process, precautions, and instructions for follow-up and therapy.    Follow-up Information     Otf Roblero MD. Schedule an appointment as soon as possible for a visit in 1 week.    Specialty:  Family Medicine  Contact information:  [address] [address] [address] [phone]             [facility].    Specialty:  Emergency Medicine  Why:  As needed, If symptoms worsen  Contact information:  [address] [address] [address] [phone]                    Medication List      START taking these medications    ondansetron 4 MG Tbdl  Commonly known as:  ZOFRAN-ODT  Take 1 tablet (4 mg total) by mouth every 6 (six) hours as needed (NAUSEA/VOMITING).        ASK your doctor about these medications    aspirin 325 MG tablet  Take 1 tablet (325 mg total) by mouth once daily.     clotrimazole-betamethasone 1-0.05% cream  Commonly known as:  LOTRISONE  APPLY  CREAM TOPICALLY TO AFFECTED AREA TWICE DAILY     mupirocin 2 % ointment  Commonly known as:  BACTROBAN  APPLY  OINTMENT TOPICALLY TO AFFECTED AREA THREE TIMES DAILY     naproxen 500 MG EC tablet  Commonly known as:  EC NAPROSYN  Take 1 tablet (500 mg total) by mouth 2 (two) times daily with meals. for 10 days  Ask about: Should I take this medication?     verapamil 240 MG CR tablet  Commonly known as:   CALAN-SR  TAKE 1 TABLET BY MOUTH ONCE DAILY           Where to Get Your Medications      You can get these medications from any pharmacy    Bring a paper prescription for each of these medications  · naproxen 500 MG EC tablet  · ondansetron 4 MG Tbdl            ED Diagnosis  1. Hyperglycemia    2. Abdominal pain, unspecified abdominal location    3. Elevated lipase                             Clinical Impression:   The primary encounter diagnosis was Hyperglycemia. Diagnoses of Abdominal pain, unspecified abdominal location and Elevated lipase were also pertinent to this visit.      Disposition:   Disposition: Discharged  Condition: Stable                        Ivan Garland Jr., MD  09/26/18 1218       Ivan Garland Jr., MD  10/11/18 2396

## 2018-10-03 ENCOUNTER — OFFICE VISIT (OUTPATIENT)
Dept: INTERNAL MEDICINE | Facility: CLINIC | Age: 53
End: 2018-10-03

## 2018-10-03 VITALS
RESPIRATION RATE: 18 BRPM | HEIGHT: 67 IN | WEIGHT: 210.13 LBS | OXYGEN SATURATION: 96 % | SYSTOLIC BLOOD PRESSURE: 126 MMHG | TEMPERATURE: 97 F | HEART RATE: 88 BPM | BODY MASS INDEX: 32.98 KG/M2 | DIASTOLIC BLOOD PRESSURE: 72 MMHG

## 2018-10-03 DIAGNOSIS — E11.9 TYPE 2 DIABETES MELLITUS WITHOUT COMPLICATION, WITH LONG-TERM CURRENT USE OF INSULIN: ICD-10-CM

## 2018-10-03 DIAGNOSIS — R60.9 EDEMA, UNSPECIFIED TYPE: ICD-10-CM

## 2018-10-03 DIAGNOSIS — E11.59 HYPERTENSION ASSOCIATED WITH DIABETES: Primary | ICD-10-CM

## 2018-10-03 DIAGNOSIS — K21.00 GASTROESOPHAGEAL REFLUX DISEASE WITH ESOPHAGITIS: Chronic | ICD-10-CM

## 2018-10-03 DIAGNOSIS — E11.65 TYPE 2 DIABETES MELLITUS WITH HYPERGLYCEMIA, WITH LONG-TERM CURRENT USE OF INSULIN: ICD-10-CM

## 2018-10-03 DIAGNOSIS — Z79.4 TYPE 2 DIABETES MELLITUS WITHOUT COMPLICATION, WITH LONG-TERM CURRENT USE OF INSULIN: ICD-10-CM

## 2018-10-03 DIAGNOSIS — Z79.4 TYPE 2 DIABETES MELLITUS WITH HYPERGLYCEMIA, WITH LONG-TERM CURRENT USE OF INSULIN: ICD-10-CM

## 2018-10-03 DIAGNOSIS — I15.2 HYPERTENSION ASSOCIATED WITH DIABETES: Primary | ICD-10-CM

## 2018-10-03 DIAGNOSIS — M17.0 PRIMARY OSTEOARTHRITIS OF BOTH KNEES: ICD-10-CM

## 2018-10-03 DIAGNOSIS — E78.5 HYPERLIPIDEMIA ASSOCIATED WITH TYPE 2 DIABETES MELLITUS: ICD-10-CM

## 2018-10-03 DIAGNOSIS — E11.69 HYPERLIPIDEMIA ASSOCIATED WITH TYPE 2 DIABETES MELLITUS: ICD-10-CM

## 2018-10-03 LAB
ALBUMIN/CREAT UR: 3.4 UG/MG
CREAT UR-MCNC: 234 MG/DL
GLUCOSE SERPL-MCNC: 254 MG/DL (ref 70–110)
MICROALBUMIN UR DL<=1MG/L-MCNC: 8 UG/ML

## 2018-10-03 PROCEDURE — 99999 PR PBB SHADOW E&M-EST. PATIENT-LVL IV: CPT | Mod: PBBFAC,,, | Performed by: FAMILY MEDICINE

## 2018-10-03 PROCEDURE — 20610 DRAIN/INJ JOINT/BURSA W/O US: CPT | Mod: 50,PBBFAC,PO | Performed by: FAMILY MEDICINE

## 2018-10-03 PROCEDURE — 82948 REAGENT STRIP/BLOOD GLUCOSE: CPT | Mod: PBBFAC,PO | Performed by: FAMILY MEDICINE

## 2018-10-03 PROCEDURE — 20610 DRAIN/INJ JOINT/BURSA W/O US: CPT | Mod: PBBFAC,PO | Performed by: FAMILY MEDICINE

## 2018-10-03 PROCEDURE — 90686 IIV4 VACC NO PRSV 0.5 ML IM: CPT | Mod: PBBFAC,PO

## 2018-10-03 PROCEDURE — 82043 UR ALBUMIN QUANTITATIVE: CPT

## 2018-10-03 PROCEDURE — 99214 OFFICE O/P EST MOD 30 MIN: CPT | Mod: PBBFAC,PO,25 | Performed by: FAMILY MEDICINE

## 2018-10-03 PROCEDURE — 99214 OFFICE O/P EST MOD 30 MIN: CPT | Mod: 25,S$PBB,, | Performed by: FAMILY MEDICINE

## 2018-10-03 RX ORDER — ATORVASTATIN CALCIUM 10 MG/1
10 TABLET, FILM COATED ORAL DAILY
Qty: 90 TABLET | Refills: 1 | Status: SHIPPED | OUTPATIENT
Start: 2018-10-03 | End: 2019-01-14 | Stop reason: SDUPTHER

## 2018-10-03 RX ORDER — PEN NEEDLE, DIABETIC 30 GX3/16"
NEEDLE, DISPOSABLE MISCELLANEOUS
Qty: 50 EACH | Refills: 7 | Status: ON HOLD | OUTPATIENT
Start: 2018-10-03 | End: 2018-12-07 | Stop reason: HOSPADM

## 2018-10-03 RX ORDER — BETAMETHASONE SODIUM PHOSPHATE AND BETAMETHASONE ACETATE 3; 3 MG/ML; MG/ML
6 INJECTION, SUSPENSION INTRA-ARTICULAR; INTRALESIONAL; INTRAMUSCULAR; SOFT TISSUE
Status: DISCONTINUED | OUTPATIENT
Start: 2018-10-03 | End: 2018-10-03 | Stop reason: HOSPADM

## 2018-10-03 RX ORDER — ISOSORBIDE DINITRATE 20 MG/1
20 TABLET ORAL 3 TIMES DAILY
Qty: 90 TABLET | Refills: 0 | Status: SHIPPED | OUTPATIENT
Start: 2018-10-03 | End: 2019-01-14 | Stop reason: SDUPTHER

## 2018-10-03 RX ORDER — METFORMIN HYDROCHLORIDE 500 MG/1
500 TABLET ORAL 2 TIMES DAILY WITH MEALS
Qty: 180 TABLET | Refills: 0 | Status: ON HOLD | OUTPATIENT
Start: 2018-10-03 | End: 2018-12-07 | Stop reason: HOSPADM

## 2018-10-03 RX ORDER — LANCETS 33 GAUGE
1 EACH MISCELLANEOUS
Qty: 100 EACH | Refills: 11 | Status: ON HOLD | OUTPATIENT
Start: 2018-10-03 | End: 2018-12-07 | Stop reason: SDUPTHER

## 2018-10-03 RX ORDER — NAPROXEN 500 MG/1
500 TABLET ORAL 2 TIMES DAILY WITH MEALS
Qty: 20 TABLET | Refills: 0 | Status: CANCELLED | OUTPATIENT
Start: 2018-10-03 | End: 2018-10-13

## 2018-10-03 RX ORDER — VERAPAMIL HYDROCHLORIDE 240 MG/1
240 TABLET, FILM COATED, EXTENDED RELEASE ORAL DAILY
Qty: 90 TABLET | Refills: 1 | Status: SHIPPED | OUTPATIENT
Start: 2018-10-03 | End: 2019-01-14 | Stop reason: SDUPTHER

## 2018-10-03 RX ORDER — GLIPIZIDE 2.5 MG/1
2.5 TABLET, EXTENDED RELEASE ORAL
Qty: 90 TABLET | Refills: 1 | Status: ON HOLD | OUTPATIENT
Start: 2018-10-03 | End: 2018-12-07 | Stop reason: HOSPADM

## 2018-10-03 RX ORDER — HYDRALAZINE HYDROCHLORIDE 50 MG/1
50 TABLET, FILM COATED ORAL 3 TIMES DAILY
Qty: 180 TABLET | Refills: 1 | Status: SHIPPED | OUTPATIENT
Start: 2018-10-03 | End: 2019-01-14 | Stop reason: SDUPTHER

## 2018-10-03 RX ORDER — INSULIN LISPRO 100 [IU]/ML
INJECTION, SOLUTION INTRAVENOUS; SUBCUTANEOUS
Qty: 10 ML | Refills: 3 | Status: ON HOLD | OUTPATIENT
Start: 2018-10-03 | End: 2018-12-07 | Stop reason: HOSPADM

## 2018-10-03 RX ORDER — LISINOPRIL 40 MG/1
40 TABLET ORAL DAILY
Qty: 90 TABLET | Refills: 1 | Status: ON HOLD | OUTPATIENT
Start: 2018-10-03 | End: 2018-12-07 | Stop reason: HOSPADM

## 2018-10-03 RX ORDER — INSULIN GLARGINE 100 [IU]/ML
50 INJECTION, SOLUTION SUBCUTANEOUS NIGHTLY
Qty: 15 ML | Refills: 0 | Status: ON HOLD | OUTPATIENT
Start: 2018-10-03 | End: 2018-12-07 | Stop reason: HOSPADM

## 2018-10-03 RX ORDER — PANTOPRAZOLE SODIUM 40 MG/1
40 TABLET, DELAYED RELEASE ORAL DAILY
Qty: 90 TABLET | Refills: 1 | Status: SHIPPED | OUTPATIENT
Start: 2018-10-03 | End: 2019-01-14 | Stop reason: SDUPTHER

## 2018-10-03 RX ORDER — SPIRONOLACTONE 50 MG/1
50 TABLET, FILM COATED ORAL DAILY
Qty: 90 TABLET | Refills: 1 | Status: ON HOLD | OUTPATIENT
Start: 2018-10-03 | End: 2018-12-07 | Stop reason: HOSPADM

## 2018-10-03 RX ADMIN — BETAMETHASONE ACETATE AND BETAMETHASONE SODIUM PHOSPHATE 6 MG: 3; 3 INJECTION, SUSPENSION INTRA-ARTICULAR; INTRALESIONAL; INTRAMUSCULAR; SOFT TISSUE at 07:10

## 2018-10-03 NOTE — PROCEDURES
Large Joint Aspiration/Injection: R knee, L knee  Date/Time: 10/3/2018 7:43 AM  Performed by: Otf Roblero MD  Authorized by: Otf Roblero MD     Consent Done?:  Yes (Verbal)  Indications:  Pain  Procedure site marked: Yes    Timeout: Prior to procedure the correct patient, procedure, and site was verified      Location:  Knee  Site:  R knee and L knee  Prep: Patient was prepped and draped in usual sterile fashion    Ultrasonic Guidance for needle placement: No  Needle size:  25 G  Approach:  Anterolateral  Medications:  6 mg betamethasone acetate-betamethasone sodium phosphate 6 mg/mL  Aspirate amount (ml):  0  Lab: Fluid sent for laboratory analysis    Patient tolerance:  Patient tolerated the procedure well with no immediate complications

## 2018-10-03 NOTE — PROGRESS NOTES
Subjective:       Patient ID: Jorgito Arreaga is a 53 y.o. male.    Chief Complaint: Diabetes and Knee Pain    Hypertension   This is a chronic problem. The current episode started more than 1 year ago. The problem has been resolved since onset. The problem is controlled. Pertinent negatives include no anxiety, blurred vision, chest pain, headaches or shortness of breath. There are no associated agents to hypertension. Risk factors for coronary artery disease include diabetes mellitus, dyslipidemia and male gender. Past treatments include ACE inhibitors and calcium channel blockers. The current treatment provides no improvement.     Review of Systems   Eyes: Negative for blurred vision.   Respiratory: Negative for shortness of breath.    Cardiovascular: Negative for chest pain.   Gastrointestinal: Negative for abdominal pain.   Neurological: Negative for headaches.       Objective:      Physical Exam   Constitutional: He appears well-developed and well-nourished. No distress.   HENT:   Head: Normocephalic and atraumatic.   Nose: Nose normal.   Mouth/Throat: Oropharynx is clear and moist.   Pulmonary/Chest: Effort normal and breath sounds normal. No respiratory distress. He has no wheezes.   Skin: Skin is warm and dry. No rash noted. He is not diaphoretic. No erythema.   Nursing note and vitals reviewed.      Assessment:       1. Hyperlipidemia associated with type 2 diabetes mellitus    2. Type 2 diabetes mellitus with hyperglycemia, with long-term current use of insulin    3. Hypertension associated with diabetes    4. Uncontrolled secondary diabetes with peripheral neuropathy    5. Type 2 diabetes mellitus without complication, with long-term current use of insulin    6. Gastroesophageal reflux disease with esophagitis    7. Edema, unspecified type        Plan:     Problem List Items Addressed This Visit        Cardiac/Vascular    Hypertension associated with diabetes    Relevant Medications    glipiZIDE  (GLUCOTROL) 2.5 MG TR24    hydrALAZINE (APRESOLINE) 50 MG tablet    insulin lispro (HUMALOG) 100 unit/mL injection    insulin glargine (BASAGLAR KWIKPEN U-100 INSULIN) 100 unit/mL (3 mL) InPn pen    isosorbide dinitrate (ISORDIL) 20 MG tablet    lisinopril (PRINIVIL,ZESTRIL) 40 MG tablet    metFORMIN (GLUCOPHAGE) 500 MG tablet    verapamil (CALAN-SR) 240 MG CR tablet       Endocrine    Uncontrolled secondary diabetes with peripheral neuropathy    Relevant Medications    glipiZIDE (GLUCOTROL) 2.5 MG TR24    insulin lispro (HUMALOG) 100 unit/mL injection    insulin glargine (BASAGLAR KWIKPEN U-100 INSULIN) 100 unit/mL (3 mL) InPn pen    metFORMIN (GLUCOPHAGE) 500 MG tablet       GI    Gastroesophageal reflux disease (Chronic)    Relevant Medications    pantoprazole (PROTONIX) 40 MG tablet       Other    Edema    Relevant Medications    spironolactone (ALDACTONE) 50 MG tablet      Other Visit Diagnoses     Hyperlipidemia associated with type 2 diabetes mellitus        Relevant Medications    atorvastatin (LIPITOR) 10 MG tablet    glipiZIDE (GLUCOTROL) 2.5 MG TR24    insulin lispro (HUMALOG) 100 unit/mL injection    insulin glargine (BASAGLAR KWIKPEN U-100 INSULIN) 100 unit/mL (3 mL) InPn pen    metFORMIN (GLUCOPHAGE) 500 MG tablet    Type 2 diabetes mellitus with hyperglycemia, with long-term current use of insulin        Relevant Medications    glipiZIDE (GLUCOTROL) 2.5 MG TR24    insulin lispro (HUMALOG) 100 unit/mL injection    insulin glargine (BASAGLAR KWIKPEN U-100 INSULIN) 100 unit/mL (3 mL) InPn pen    metFORMIN (GLUCOPHAGE) 500 MG tablet    blood sugar diagnostic (ONETOUCH ULTRA TEST) Strp    Other Relevant Orders    MICROALBUMIN / CREATININE RATIO URINE    Ambulatory Referral to Optometry    Type 2 diabetes mellitus without complication, with long-term current use of insulin        Relevant Medications    glipiZIDE (GLUCOTROL) 2.5 MG TR24    insulin lispro (HUMALOG) 100 unit/mL injection    insulin glargine  "(BASAGLAR KWIKPEN U-100 INSULIN) 100 unit/mL (3 mL) InPn pen    metFORMIN (GLUCOPHAGE) 500 MG tablet    pen needle, diabetic 31 gauge x 3/16" Ndle        "

## 2018-10-04 DIAGNOSIS — M25.561 PAIN IN BOTH KNEES, UNSPECIFIED CHRONICITY: Primary | ICD-10-CM

## 2018-10-04 DIAGNOSIS — M25.562 PAIN IN BOTH KNEES, UNSPECIFIED CHRONICITY: Primary | ICD-10-CM

## 2018-10-05 ENCOUNTER — TELEPHONE (OUTPATIENT)
Dept: ORTHOPEDICS | Facility: CLINIC | Age: 53
End: 2018-10-05

## 2018-10-05 NOTE — TELEPHONE ENCOUNTER
Called the patient to see if he was coming to his 9:20 appointment with Dr. Loco. Patient is late for his x-rays. Couldn't leave a message voice mail was not set up. Patient was not available.FP

## 2018-11-07 ENCOUNTER — PATIENT OUTREACH (OUTPATIENT)
Dept: ADMINISTRATIVE | Facility: HOSPITAL | Age: 53
End: 2018-11-07

## 2018-11-07 NOTE — PROGRESS NOTES
Contacted patient to follow up on overdue fit kit. Patient will  fit kit from the office 12/10/18.

## 2018-11-26 ENCOUNTER — PATIENT OUTREACH (OUTPATIENT)
Dept: ADMINISTRATIVE | Facility: HOSPITAL | Age: 53
End: 2018-11-26

## 2018-11-26 NOTE — LETTER
November 26, 2018        Jorgito Arreaga  [address] [facility] [address]      Dear Mr. Arreaga,    You have an upcoming appointment with Otf Roblero MD on 12/10/18.      Your chart is indicating you may be due for the following and I will be happy to assist you in scheduling any needed appointments:  Health Maintenance Due   Topic    Eye Exam           If you have had any of the above done at another facility, please bring the records or information with you so that your record at [facility] will be complete.    We will be happy to assist you with scheduling any necessary appointments or you may contact the [facility] appointment desk at [phone] to schedule at your convenience.     Thank you for choosing [facility] for your healthcare needs,    Randi GRAHAM LPN Care Coordinator  [facility]  [phone]

## 2018-12-06 ENCOUNTER — HOSPITAL ENCOUNTER (OUTPATIENT)
Facility: HOSPITAL | Age: 53
Discharge: HOME | End: 2018-12-07
Attending: EMERGENCY MEDICINE | Admitting: EMERGENCY MEDICINE
Payer: MEDICAID

## 2018-12-06 DIAGNOSIS — E11.65 TYPE 2 DIABETES MELLITUS WITH HYPERGLYCEMIA, WITH LONG-TERM CURRENT USE OF INSULIN: ICD-10-CM

## 2018-12-06 DIAGNOSIS — Z79.4 TYPE 2 DIABETES MELLITUS WITH HYPERGLYCEMIA, WITH LONG-TERM CURRENT USE OF INSULIN: ICD-10-CM

## 2018-12-06 DIAGNOSIS — E11.9 TYPE 2 DIABETES MELLITUS WITHOUT COMPLICATION, WITH LONG-TERM CURRENT USE OF INSULIN: ICD-10-CM

## 2018-12-06 DIAGNOSIS — R07.9 CHEST PAIN: ICD-10-CM

## 2018-12-06 DIAGNOSIS — R73.9 HYPERGLYCEMIA: Primary | ICD-10-CM

## 2018-12-06 DIAGNOSIS — Z79.4 TYPE 2 DIABETES MELLITUS WITHOUT COMPLICATION, WITH LONG-TERM CURRENT USE OF INSULIN: ICD-10-CM

## 2018-12-06 DIAGNOSIS — R42 DIZZINESS: ICD-10-CM

## 2018-12-06 LAB
ALBUMIN SERPL BCP-MCNC: 4.6 G/DL
ALLENS TEST: ABNORMAL
ALP SERPL-CCNC: 68 U/L
ALT SERPL W/O P-5'-P-CCNC: 22 U/L
ANION GAP SERPL CALC-SCNC: 15 MMOL/L
ANION GAP SERPL CALC-SCNC: 18 MMOL/L
AST SERPL-CCNC: 19 U/L
B-OH-BUTYR BLD STRIP-SCNC: 3.7 MMOL/L
B-OH-BUTYR BLD STRIP-SCNC: 3.8 MMOL/L
BACTERIA #/AREA URNS AUTO: NORMAL /HPF
BASOPHILS # BLD AUTO: 0.03 K/UL
BASOPHILS NFR BLD: 0.5 %
BILIRUB SERPL-MCNC: 0.8 MG/DL
BILIRUB UR QL STRIP: NEGATIVE
BUN SERPL-MCNC: 29 MG/DL
BUN SERPL-MCNC: 31 MG/DL
CALCIUM SERPL-MCNC: 10 MG/DL
CALCIUM SERPL-MCNC: 11.3 MG/DL
CHLORIDE SERPL-SCNC: 100 MMOL/L
CHLORIDE SERPL-SCNC: 91 MMOL/L
CLARITY UR REFRACT.AUTO: CLEAR
CO2 SERPL-SCNC: 21 MMOL/L
CO2 SERPL-SCNC: 21 MMOL/L
COLOR UR AUTO: YELLOW
CREAT SERPL-MCNC: 1.3 MG/DL
CREAT SERPL-MCNC: 1.5 MG/DL
DIFFERENTIAL METHOD: NORMAL
EOSINOPHIL # BLD AUTO: 0.1 K/UL
EOSINOPHIL NFR BLD: 1 %
ERYTHROCYTE [DISTWIDTH] IN BLOOD BY AUTOMATED COUNT: 12.7 %
EST. GFR  (AFRICAN AMERICAN): >60 ML/MIN/1.73 M^2
EST. GFR  (AFRICAN AMERICAN): >60 ML/MIN/1.73 M^2
EST. GFR  (NON AFRICAN AMERICAN): 52.8 ML/MIN/1.73 M^2
EST. GFR  (NON AFRICAN AMERICAN): >60 ML/MIN/1.73 M^2
GLUCOSE SERPL-MCNC: 308 MG/DL
GLUCOSE SERPL-MCNC: 484 MG/DL
GLUCOSE UR QL STRIP: ABNORMAL
HCO3 UR-SCNC: 25.7 MMOL/L (ref 24–28)
HCT VFR BLD AUTO: 46.5 %
HGB BLD-MCNC: 15.4 G/DL
HGB UR QL STRIP: NEGATIVE
KETONES UR QL STRIP: ABNORMAL
LEUKOCYTE ESTERASE UR QL STRIP: NEGATIVE
LYMPHOCYTES # BLD AUTO: 1.8 K/UL
LYMPHOCYTES NFR BLD: 31.1 %
MAGNESIUM SERPL-MCNC: 1.8 MG/DL
MCH RBC QN AUTO: 28.5 PG
MCHC RBC AUTO-ENTMCNC: 33.1 G/DL
MCV RBC AUTO: 86 FL
MICROSCOPIC COMMENT: NORMAL
MONOCYTES # BLD AUTO: 0.4 K/UL
MONOCYTES NFR BLD: 7.3 %
NEUTROPHILS # BLD AUTO: 3.5 K/UL
NEUTROPHILS NFR BLD: 59.8 %
NITRITE UR QL STRIP: NEGATIVE
PCO2 BLDA: 49.5 MMHG (ref 35–45)
PH SMN: 7.32 [PH] (ref 7.35–7.45)
PH UR STRIP: 6 [PH] (ref 5–8)
PHOSPHATE SERPL-MCNC: 3.8 MG/DL
PLATELET # BLD AUTO: 245 K/UL
PMV BLD AUTO: 11.4 FL
PO2 BLDA: 28 MMHG (ref 40–60)
POC BE: 0 MMOL/L
POC SATURATED O2: 46 % (ref 95–100)
POCT GLUCOSE: 267 MG/DL (ref 70–110)
POCT GLUCOSE: 437 MG/DL (ref 70–110)
POTASSIUM SERPL-SCNC: 4.4 MMOL/L
POTASSIUM SERPL-SCNC: 5.4 MMOL/L
PROT SERPL-MCNC: 8.5 G/DL
PROT UR QL STRIP: NEGATIVE
RBC # BLD AUTO: 5.41 M/UL
RBC #/AREA URNS AUTO: 1 /HPF (ref 0–4)
SAMPLE: ABNORMAL
SITE: ABNORMAL
SODIUM SERPL-SCNC: 130 MMOL/L
SODIUM SERPL-SCNC: 136 MMOL/L
SP GR UR STRIP: 1.01 (ref 1–1.03)
TROPONIN I SERPL DL<=0.01 NG/ML-MCNC: <0.006 NG/ML
TROPONIN I SERPL DL<=0.01 NG/ML-MCNC: <0.006 NG/ML
URN SPEC COLLECT METH UR: ABNORMAL
UROBILINOGEN UR STRIP-ACNC: NEGATIVE EU/DL
WBC # BLD AUTO: 5.86 K/UL
YEAST UR QL AUTO: NORMAL

## 2018-12-06 PROCEDURE — 82010 KETONE BODYS QUAN: CPT | Mod: 91

## 2018-12-06 PROCEDURE — 96375 TX/PRO/DX INJ NEW DRUG ADDON: CPT

## 2018-12-06 PROCEDURE — 82800 BLOOD PH: CPT

## 2018-12-06 PROCEDURE — 84100 ASSAY OF PHOSPHORUS: CPT

## 2018-12-06 PROCEDURE — 80048 BASIC METABOLIC PNL TOTAL CA: CPT

## 2018-12-06 PROCEDURE — 63600175 PHARM REV CODE 636 W HCPCS: Performed by: EMERGENCY MEDICINE

## 2018-12-06 PROCEDURE — 82962 GLUCOSE BLOOD TEST: CPT

## 2018-12-06 PROCEDURE — 94760 N-INVAS EAR/PLS OXIMETRY 1: CPT

## 2018-12-06 PROCEDURE — 85025 COMPLETE CBC W/AUTO DIFF WBC: CPT

## 2018-12-06 PROCEDURE — 84484 ASSAY OF TROPONIN QUANT: CPT | Mod: 91

## 2018-12-06 PROCEDURE — 83036 HEMOGLOBIN GLYCOSYLATED A1C: CPT

## 2018-12-06 PROCEDURE — 83735 ASSAY OF MAGNESIUM: CPT

## 2018-12-06 PROCEDURE — 96374 THER/PROPH/DIAG INJ IV PUSH: CPT

## 2018-12-06 PROCEDURE — 81000 URINALYSIS NONAUTO W/SCOPE: CPT

## 2018-12-06 PROCEDURE — 25000003 PHARM REV CODE 250: Performed by: EMERGENCY MEDICINE

## 2018-12-06 PROCEDURE — 84484 ASSAY OF TROPONIN QUANT: CPT

## 2018-12-06 PROCEDURE — 36415 COLL VENOUS BLD VENIPUNCTURE: CPT

## 2018-12-06 PROCEDURE — 99285 EMERGENCY DEPT VISIT HI MDM: CPT | Mod: 25

## 2018-12-06 PROCEDURE — 25000003 PHARM REV CODE 250: Performed by: HOSPITALIST

## 2018-12-06 PROCEDURE — 93005 ELECTROCARDIOGRAM TRACING: CPT

## 2018-12-06 PROCEDURE — G0378 HOSPITAL OBSERVATION PER HR: HCPCS

## 2018-12-06 PROCEDURE — 63600175 PHARM REV CODE 636 W HCPCS: Performed by: HOSPITALIST

## 2018-12-06 PROCEDURE — 93010 ELECTROCARDIOGRAM REPORT: CPT | Mod: ,,, | Performed by: INTERNAL MEDICINE

## 2018-12-06 PROCEDURE — 99900035 HC TECH TIME PER 15 MIN (STAT)

## 2018-12-06 PROCEDURE — 96376 TX/PRO/DX INJ SAME DRUG ADON: CPT

## 2018-12-06 PROCEDURE — 96361 HYDRATE IV INFUSION ADD-ON: CPT

## 2018-12-06 PROCEDURE — 80053 COMPREHEN METABOLIC PANEL: CPT

## 2018-12-06 RX ORDER — SPIRONOLACTONE 25 MG/1
50 TABLET ORAL DAILY
Status: DISCONTINUED | OUTPATIENT
Start: 2018-12-07 | End: 2018-12-06

## 2018-12-06 RX ORDER — VERAPAMIL HYDROCHLORIDE 240 MG/1
240 TABLET, FILM COATED, EXTENDED RELEASE ORAL DAILY
Status: DISCONTINUED | OUTPATIENT
Start: 2018-12-07 | End: 2018-12-07 | Stop reason: HOSPADM

## 2018-12-06 RX ORDER — ISOSORBIDE DINITRATE 10 MG/1
20 TABLET ORAL 3 TIMES DAILY
Status: DISCONTINUED | OUTPATIENT
Start: 2018-12-06 | End: 2018-12-07 | Stop reason: HOSPADM

## 2018-12-06 RX ORDER — ASPIRIN 325 MG
325 TABLET, DELAYED RELEASE (ENTERIC COATED) ORAL
Status: COMPLETED | OUTPATIENT
Start: 2018-12-06 | End: 2018-12-06

## 2018-12-06 RX ORDER — SODIUM CHLORIDE 9 MG/ML
INJECTION, SOLUTION INTRAVENOUS CONTINUOUS
Status: ACTIVE | OUTPATIENT
Start: 2018-12-06 | End: 2018-12-07

## 2018-12-06 RX ORDER — LISINOPRIL 20 MG/1
40 TABLET ORAL DAILY
Status: DISCONTINUED | OUTPATIENT
Start: 2018-12-07 | End: 2018-12-06

## 2018-12-06 RX ORDER — IBUPROFEN 200 MG
16 TABLET ORAL
Status: DISCONTINUED | OUTPATIENT
Start: 2018-12-06 | End: 2018-12-07 | Stop reason: HOSPADM

## 2018-12-06 RX ORDER — MORPHINE SULFATE 2 MG/ML
2 INJECTION, SOLUTION INTRAMUSCULAR; INTRAVENOUS EVERY 4 HOURS PRN
Status: DISCONTINUED | OUTPATIENT
Start: 2018-12-06 | End: 2018-12-07 | Stop reason: HOSPADM

## 2018-12-06 RX ORDER — ONDANSETRON 2 MG/ML
4 INJECTION INTRAMUSCULAR; INTRAVENOUS EVERY 8 HOURS PRN
Status: DISCONTINUED | OUTPATIENT
Start: 2018-12-06 | End: 2018-12-07 | Stop reason: HOSPADM

## 2018-12-06 RX ORDER — ATORVASTATIN CALCIUM 10 MG/1
10 TABLET, FILM COATED ORAL DAILY
Status: DISCONTINUED | OUTPATIENT
Start: 2018-12-07 | End: 2018-12-07 | Stop reason: HOSPADM

## 2018-12-06 RX ORDER — ASPIRIN 325 MG
325 TABLET ORAL DAILY
Status: DISCONTINUED | OUTPATIENT
Start: 2018-12-07 | End: 2018-12-06

## 2018-12-06 RX ORDER — GLUCAGON 1 MG
1 KIT INJECTION
Status: DISCONTINUED | OUTPATIENT
Start: 2018-12-06 | End: 2018-12-07 | Stop reason: HOSPADM

## 2018-12-06 RX ORDER — HYDRALAZINE HYDROCHLORIDE 50 MG/1
50 TABLET, FILM COATED ORAL 3 TIMES DAILY
Status: DISCONTINUED | OUTPATIENT
Start: 2018-12-06 | End: 2018-12-07 | Stop reason: HOSPADM

## 2018-12-06 RX ORDER — ASPIRIN 81 MG/1
81 TABLET ORAL DAILY
Status: DISCONTINUED | OUTPATIENT
Start: 2018-12-07 | End: 2018-12-07 | Stop reason: HOSPADM

## 2018-12-06 RX ORDER — INSULIN ASPART 100 [IU]/ML
1-10 INJECTION, SOLUTION INTRAVENOUS; SUBCUTANEOUS
Status: DISCONTINUED | OUTPATIENT
Start: 2018-12-06 | End: 2018-12-07 | Stop reason: HOSPADM

## 2018-12-06 RX ORDER — IBUPROFEN 200 MG
24 TABLET ORAL
Status: DISCONTINUED | OUTPATIENT
Start: 2018-12-06 | End: 2018-12-07 | Stop reason: HOSPADM

## 2018-12-06 RX ORDER — PANTOPRAZOLE SODIUM 40 MG/1
40 TABLET, DELAYED RELEASE ORAL DAILY
Status: DISCONTINUED | OUTPATIENT
Start: 2018-12-07 | End: 2018-12-07 | Stop reason: HOSPADM

## 2018-12-06 RX ORDER — MORPHINE SULFATE 4 MG/ML
4 INJECTION, SOLUTION INTRAMUSCULAR; INTRAVENOUS
Status: COMPLETED | OUTPATIENT
Start: 2018-12-06 | End: 2018-12-06

## 2018-12-06 RX ORDER — SODIUM CHLORIDE 9 MG/ML
INJECTION, SOLUTION INTRAVENOUS CONTINUOUS
Status: DISCONTINUED | OUTPATIENT
Start: 2018-12-06 | End: 2018-12-06

## 2018-12-06 RX ORDER — ACETAMINOPHEN 325 MG/1
650 TABLET ORAL EVERY 8 HOURS PRN
Status: DISCONTINUED | OUTPATIENT
Start: 2018-12-06 | End: 2018-12-07 | Stop reason: HOSPADM

## 2018-12-06 RX ADMIN — ISOSORBIDE DINITRATE 20 MG: 10 TABLET ORAL at 09:12

## 2018-12-06 RX ADMIN — SODIUM CHLORIDE 1000 ML: 0.9 INJECTION, SOLUTION INTRAVENOUS at 02:12

## 2018-12-06 RX ADMIN — HUMAN INSULIN 15 UNITS: 100 INJECTION, SUSPENSION SUBCUTANEOUS at 10:12

## 2018-12-06 RX ADMIN — INSULIN HUMAN 7 UNITS: 100 INJECTION, SOLUTION PARENTERAL at 02:12

## 2018-12-06 RX ADMIN — SODIUM CHLORIDE 1000 ML: 0.9 INJECTION, SOLUTION INTRAVENOUS at 01:12

## 2018-12-06 RX ADMIN — SODIUM CHLORIDE: 0.9 INJECTION, SOLUTION INTRAVENOUS at 03:12

## 2018-12-06 RX ADMIN — ASPIRIN 325 MG: 325 TABLET, COATED ORAL at 03:12

## 2018-12-06 RX ADMIN — SODIUM CHLORIDE 1000 ML: 0.9 INJECTION, SOLUTION INTRAVENOUS at 12:12

## 2018-12-06 RX ADMIN — MORPHINE SULFATE 4 MG: 4 INJECTION, SOLUTION INTRAMUSCULAR; INTRAVENOUS at 03:12

## 2018-12-06 RX ADMIN — SODIUM CHLORIDE: 0.9 INJECTION, SOLUTION INTRAVENOUS at 10:12

## 2018-12-06 RX ADMIN — INSULIN ASPART 4 UNITS: 100 INJECTION, SOLUTION INTRAVENOUS; SUBCUTANEOUS at 10:12

## 2018-12-06 RX ADMIN — INSULIN HUMAN 10 UNITS: 100 INJECTION, SOLUTION PARENTERAL at 01:12

## 2018-12-06 RX ADMIN — HYDRALAZINE HYDROCHLORIDE 50 MG: 50 TABLET, FILM COATED ORAL at 09:12

## 2018-12-06 RX ADMIN — NITROGLYCERIN 0.5 INCH: 20 OINTMENT TOPICAL at 03:12

## 2018-12-06 RX ADMIN — NITROGLYCERIN 0.5 INCH: 20 OINTMENT TOPICAL at 09:12

## 2018-12-06 NOTE — ED NOTES
Patient complains of hyperglycemia with symptoms. Reports onset of symptoms x several days. Pt out of insulin x 2 weeks    Level of Consciousness: Patient is awake, alert, and oriented to person, place, time, and situation. Speech is clear.   HEENT: Symmetrical face, PERRLA, moist mucous membranes, no congestion/drainage, no JVD, pt able to swallow. Polydipsia per pt  Appearance: Patient resting comfortably in bed, hygiene and clothing are both intact and appropriate.   Skin: Skin is warm, dry, and intact. Skin is of normal color, free of any skin breakdown. Mucus membranes pink and moist.   Musculoskeletal: Moves all extremities well. Full active ROM. No deformities noted. Denies any weakness. Gait steady, patient ambulates independently, without assistive device.   Respiratory: Airway open and patent. Respirations equal and unlabored. Lung sounds clear upon auscultation. Patient denies any SOB.   Cardiac: Regular rate and rhythm. No peripheral edema noted to bilateral lower extremities. Denies any chest pain or discomfort.   GI: Abdomen soft, non-tender. Bowel sounds present and active in all quadrants x 4. No distention noted. Reports nausea, no vomiting.   : polyuria per pt. Denies any problem with bowel movement.   Neurological: Normal sensation reported to all extremities. Symmetrical expressions noted to face. No obvious neurological deficits noted.   Psychosocial: Patient is calm and cooperative, appropriate to situation. Family is involved in patient care.     Patient informed of plan of care, verbalizes understanding, and has no questions or concern at this time. Bed is in the lowest position and locked. Call bell within reach of the patient. Will continue to monitor.

## 2018-12-06 NOTE — ED NOTES
Pt belched and states feels better. Pt eating crackers and drinking water, as he states he hasnt eaten today.

## 2018-12-06 NOTE — ED NOTES
"Dr Ramirez aware pt c/o midsternal chest pain. Voices "I don't know if it's gas or not." Pain post urinal use and laying back on bed.   "

## 2018-12-06 NOTE — ED PROVIDER NOTES
Encounter Date: 12/6/2018       History     Chief Complaint   Patient presents with    Hyperglycemia     at home for several days, out of insulin for 2 weeks      The history is provided by the patient.   Diabetes   This is a chronic problem. He has type 2 diabetes mellitus. His disease course has been worsening. Associated symptoms include fatigue and polyuria. Pertinent negatives for diabetes include no chest pain. Symptoms are worsening. Hypoglycemia symptoms include dizziness.   Pt ran out of his Insulin 2 weeks ago secondary to losing his job and insurance.  Pt reports feeling dizzy and fatigued.    Review of patient's allergies indicates:  No Known Allergies  Past Medical History:   Diagnosis Date    Cataracts, bilateral     Diabetes mellitus, type 2     Diagnosed 7/2016    GERD (gastroesophageal reflux disease)     Hypertension     Inflammatory polyps of colon     Pancreatitis, alcoholic, acute     Port catheter in place     TIA (transient ischemic attack)      Past Surgical History:   Procedure Laterality Date    COLONOSCOPY      COLONOSCOPY N/A 6/15/2018    Procedure: COLONOSCOPY;  Surgeon: Jim Hicks MD;  Location: [facility];  Service: Endoscopy;  Laterality: N/A;    COLONOSCOPY N/A 6/15/2018    Performed by Jim Hicks MD at [facility]    ESOPHAGOGASTRODUODENOSCOPY N/A 6/15/2018    Procedure: ESOPHAGOGASTRODUODENOSCOPY (EGD);  Surgeon: Jim Hicks MD;  Location: [facility];  Service: Endoscopy;  Laterality: N/A;    ESOPHAGOGASTRODUODENOSCOPY (EGD) N/A 6/15/2018    Performed by Jim Hicks MD at [facility]     Family History   Problem Relation Age of Onset    Hypertension Mother     Hypertension Father      Social History     Tobacco Use    Smoking status: Former Smoker     Types: Cigarettes    Smokeless tobacco: Never Used   Substance Use Topics    Alcohol use: Yes     Comment:  Daily - States that he drinks 1/2 pint of vodka and about 1-2 cans of beer daily    Drug use: No      Review of Systems   Constitutional: Positive for fatigue. Negative for fever.   HENT: Negative for congestion and rhinorrhea.    Respiratory: Negative for shortness of breath.    Cardiovascular: Negative for chest pain.   Endocrine: Positive for polyuria.   Neurological: Positive for dizziness.   All other systems reviewed and are negative.      Physical Exam     Initial Vitals [12/06/18 1158]   BP Pulse Resp Temp SpO2   (!) 140/75 100 20 98.7 °F (37.1 °C) 98 %      MAP       --         Physical Exam    Nursing note and vitals reviewed.  Constitutional: He appears well-developed and well-nourished. No distress.   HENT:   Head: Normocephalic and atraumatic.   Mouth/Throat: Mucous membranes are dry.   Eyes: Conjunctivae and EOM are normal. Pupils are equal, round, and reactive to light.   Neck: Normal range of motion. Neck supple.   Cardiovascular: Normal rate, regular rhythm and normal heart sounds.   Pulmonary/Chest: Breath sounds normal. No respiratory distress. He has no wheezes. He has no rales.   Abdominal: Soft. Bowel sounds are normal. He exhibits no distension.   Musculoskeletal: Normal range of motion.   Neurological: He is alert and oriented to person, place, and time. He has normal strength.   Skin: Skin is warm and dry.   Psychiatric: He has a normal mood and affect. His behavior is normal. Thought content normal.         ED Course   Procedures  Labs Reviewed   COMPREHENSIVE METABOLIC PANEL - Abnormal; Notable for the following components:       Result Value    Sodium 130 (*)     Potassium 5.4 (*)     Chloride 91 (*)     CO2 21 (*)     Glucose 484 (*)     BUN, Bld 31 (*)     Creatinine 1.5 (*)     Calcium 11.3 (*)     Total Protein 8.5 (*)     Anion Gap 18 (*)     eGFR if non  52.8 (*)     All other components within normal limits    Narrative:     Glucose critical result(s) called and verbal readback obtained from   Amelia Mantilla RN in ED. , 12/06/2018 12:53   BETA -  HYDROXYBUTYRATE, SERUM - Abnormal; Notable for the following components:    Beta-Hydroxybutyrate 3.7 (*)     All other components within normal limits   URINALYSIS - Abnormal; Notable for the following components:    Glucose, UA 3+ (*)     Ketones, UA 2+ (*)     All other components within normal limits   BASIC METABOLIC PANEL - Abnormal; Notable for the following components:    CO2 21 (*)     Glucose 308 (*)     BUN, Bld 29 (*)     All other components within normal limits   BETA - HYDROXYBUTYRATE, SERUM - Abnormal; Notable for the following components:    Beta-Hydroxybutyrate 3.8 (*)     All other components within normal limits   POCT GLUCOSE - Abnormal; Notable for the following components:    POCT Glucose 437 (*)     All other components within normal limits   ISTAT PROCEDURE - Abnormal; Notable for the following components:    POC PH 7.323 (*)     POC PCO2 49.5 (*)     POC PO2 28 (*)     POC SATURATED O2 46 (*)     All other components within normal limits   POCT GLUCOSE - Abnormal; Notable for the following components:    POCT Glucose 267 (*)     All other components within normal limits   CBC W/ AUTO DIFFERENTIAL   TROPONIN I   URINALYSIS MICROSCOPIC   TROPONIN I   POCT GLUCOSE MONITORING CONTINUOUS     Results for orders placed or performed during the hospital encounter of 12/06/18   CBC auto differential   Result Value Ref Range    WBC 5.86 3.90 - 12.70 K/uL    RBC 5.41 4.60 - 6.20 M/uL    Hemoglobin 15.4 14.0 - 18.0 g/dL    Hematocrit 46.5 40.0 - 54.0 %    MCV 86 82 - 98 fL    MCH 28.5 27.0 - 31.0 pg    MCHC 33.1 32.0 - 36.0 g/dL    RDW 12.7 11.5 - 14.5 %    Platelets 245 150 - 350 K/uL    MPV 11.4 9.2 - 12.9 fL    Gran # (ANC) 3.5 1.8 - 7.7 K/uL    Lymph # 1.8 1.0 - 4.8 K/uL    Mono # 0.4 0.3 - 1.0 K/uL    Eos # 0.1 0.0 - 0.5 K/uL    Baso # 0.03 0.00 - 0.20 K/uL    Gran% 59.8 38.0 - 73.0 %    Lymph% 31.1 18.0 - 48.0 %    Mono% 7.3 4.0 - 15.0 %    Eosinophil% 1.0 0.0 - 8.0 %    Basophil% 0.5 0.0 - 1.9 %     Differential Method Automated    Comprehensive metabolic panel   Result Value Ref Range    Sodium 130 (L) 136 - 145 mmol/L    Potassium 5.4 (H) 3.5 - 5.1 mmol/L    Chloride 91 (L) 95 - 110 mmol/L    CO2 21 (L) 23 - 29 mmol/L    Glucose 484 (HH) 70 - 110 mg/dL    BUN, Bld 31 (H) 6 - 20 mg/dL    Creatinine 1.5 (H) 0.5 - 1.4 mg/dL    Calcium 11.3 (H) 8.7 - 10.5 mg/dL    Total Protein 8.5 (H) 6.0 - 8.4 g/dL    Albumin 4.6 3.5 - 5.2 g/dL    Total Bilirubin 0.8 0.1 - 1.0 mg/dL    Alkaline Phosphatase 68 55 - 135 U/L    AST 19 10 - 40 U/L    ALT 22 10 - 44 U/L    Anion Gap 18 (H) 8 - 16 mmol/L    eGFR if African American >60.0 >60 mL/min/1.73 m^2    eGFR if non  52.8 (A) >60 mL/min/1.73 m^2   Beta - Hydroxybutyrate, Serum   Result Value Ref Range    Beta-Hydroxybutyrate 3.7 (H) 0.0 - 0.5 mmol/L   Urinalysis   Result Value Ref Range    Specimen UA Urine, Clean Catch     Color, UA Yellow Yellow, Straw, Brittany    Appearance, UA Clear Clear    pH, UA 6.0 5.0 - 8.0    Specific Gravity, UA 1.015 1.005 - 1.030    Protein, UA Negative Negative    Glucose, UA 3+ (A) Negative    Ketones, UA 2+ (A) Negative    Bilirubin (UA) Negative Negative    Occult Blood UA Negative Negative    Nitrite, UA Negative Negative    Urobilinogen, UA Negative <2.0 EU/dL    Leukocytes, UA Negative Negative   Troponin I   Result Value Ref Range    Troponin I <0.006 0.000 - 0.026 ng/mL   Urinalysis Microscopic   Result Value Ref Range    RBC, UA 1 0 - 4 /hpf    Bacteria, UA None None-Occ /hpf    Yeast, UA None None    Microscopic Comment SEE COMMENT    Basic metabolic panel   Result Value Ref Range    Sodium 136 136 - 145 mmol/L    Potassium 4.4 3.5 - 5.1 mmol/L    Chloride 100 95 - 110 mmol/L    CO2 21 (L) 23 - 29 mmol/L    Glucose 308 (H) 70 - 110 mg/dL    BUN, Bld 29 (H) 6 - 20 mg/dL    Creatinine 1.3 0.5 - 1.4 mg/dL    Calcium 10.0 8.7 - 10.5 mg/dL    Anion Gap 15 8 - 16 mmol/L    eGFR if African American >60.0 >60 mL/min/1.73 m^2     eGFR if non African American >60.0 >60 mL/min/1.73 m^2   Beta - Hydroxybutyrate, Serum   Result Value Ref Range    Beta-Hydroxybutyrate 3.8 (H) 0.0 - 0.5 mmol/L   POCT glucose   Result Value Ref Range    POCT Glucose 437 (H) 70 - 110 mg/dL   ISTAT PROCEDURE   Result Value Ref Range    POC PH 7.323 (L) 7.35 - 7.45    POC PCO2 49.5 (H) 35 - 45 mmHg    POC PO2 28 (LL) 40 - 60 mmHg    POC HCO3 25.7 24 - 28 mmol/L    POC BE 0 -2 to 2 mmol/L    POC SATURATED O2 46 (L) 95 - 100 %    Sample VENOUS     Site Other     Allens Test N/A    POCT glucose   Result Value Ref Range    POCT Glucose 267 (H) 70 - 110 mg/dL       EKG Readings: (Independently Interpreted)   Initial Reading: No STEMI. Rhythm: Normal Sinus Rhythm. Heart Rate: 94. Ectopy: No Ectopy. ST Segments: Normal ST Segments. T Waves: Normal. Axis: Normal. Clinical Impression: Normal Sinus Rhythm       Imaging Results    None     3:31 PM Pt complaing of substernal CP radiating to left neck. Repeat EKG shows no acute changes. Will discuss with HM for OBS  Pre-hypertension/Hypertension: The pt has been informed that they may have pre-hypertension or hypertension based on a blood pressure reading in the ED. I recommend that the pt call the PCP listed on their discharge instructions or a physician of their choice this week to arrange f/u for further evaluation of possible pre-hypertension or hypertension.    4:43 PM Discussed lab/imaging studies with patient and the need for further evaluation/admission for Chest Pain/Hyperglycemia. Pt verbalized understanding that this is a stand alone ER and we are unable to admit at this facility. Pt will be transferred to [facility] via [facility] with care en route to include Cardiac Monitoring. I discussed this case with Kamilah JORGENSEN and care was accepted by Dr Jo.                              Clinical Impression:   The primary encounter diagnosis was Hyperglycemia. Diagnoses of Dizziness and Chest pain were also pertinent to  this visit.      Disposition:   Disposition: Placed in Observation  Condition: Stable                        Ajay Ramirez MD  12/06/18 [id number]

## 2018-12-07 VITALS
RESPIRATION RATE: 18 BRPM | OXYGEN SATURATION: 95 % | WEIGHT: 204.13 LBS | TEMPERATURE: 98 F | DIASTOLIC BLOOD PRESSURE: 61 MMHG | HEART RATE: 72 BPM | SYSTOLIC BLOOD PRESSURE: 109 MMHG | BODY MASS INDEX: 32.81 KG/M2 | HEIGHT: 66 IN

## 2018-12-07 PROBLEM — E86.1 INTRAVASCULAR VOLUME DEPLETION: Status: ACTIVE | Noted: 2018-12-07

## 2018-12-07 LAB
ALBUMIN SERPL BCP-MCNC: 3.5 G/DL
ANION GAP SERPL CALC-SCNC: 12 MMOL/L
BASOPHILS # BLD AUTO: 0.02 K/UL
BASOPHILS NFR BLD: 0.4 %
BUN SERPL-MCNC: 21 MG/DL
CALCIUM SERPL-MCNC: 9.6 MG/DL
CHLORIDE SERPL-SCNC: 102 MMOL/L
CO2 SERPL-SCNC: 21 MMOL/L
CREAT SERPL-MCNC: 1.1 MG/DL
DIFFERENTIAL METHOD: ABNORMAL
EOSINOPHIL # BLD AUTO: 0.1 K/UL
EOSINOPHIL NFR BLD: 1.5 %
ERYTHROCYTE [DISTWIDTH] IN BLOOD BY AUTOMATED COUNT: 12.8 %
EST. GFR  (AFRICAN AMERICAN): >60 ML/MIN/1.73 M^2
EST. GFR  (NON AFRICAN AMERICAN): >60 ML/MIN/1.73 M^2
ESTIMATED AVG GLUCOSE: 255 MG/DL
GLUCOSE SERPL-MCNC: 333 MG/DL
HBA1C MFR BLD HPLC: 10.5 %
HCT VFR BLD AUTO: 39 %
HGB BLD-MCNC: 12.9 G/DL
LYMPHOCYTES # BLD AUTO: 2.2 K/UL
LYMPHOCYTES NFR BLD: 44.8 %
MAGNESIUM SERPL-MCNC: 1.7 MG/DL
MCH RBC QN AUTO: 29.2 PG
MCHC RBC AUTO-ENTMCNC: 33.1 G/DL
MCV RBC AUTO: 88 FL
MONOCYTES # BLD AUTO: 0.2 K/UL
MONOCYTES NFR BLD: 4.8 %
NEUTROPHILS # BLD AUTO: 2.3 K/UL
NEUTROPHILS NFR BLD: 48.5 %
PHOSPHATE SERPL-MCNC: 3.7 MG/DL
PHOSPHATE SERPL-MCNC: 3.7 MG/DL
PLATELET # BLD AUTO: 214 K/UL
PMV BLD AUTO: 11.1 FL
POCT GLUCOSE: 283 MG/DL (ref 70–110)
POCT GLUCOSE: 312 MG/DL (ref 70–110)
POCT GLUCOSE: 362 MG/DL (ref 70–110)
POTASSIUM SERPL-SCNC: 4.4 MMOL/L
RBC # BLD AUTO: 4.42 M/UL
SODIUM SERPL-SCNC: 135 MMOL/L
TROPONIN I SERPL DL<=0.01 NG/ML-MCNC: <0.006 NG/ML
WBC # BLD AUTO: 4.8 K/UL

## 2018-12-07 PROCEDURE — 80069 RENAL FUNCTION PANEL: CPT

## 2018-12-07 PROCEDURE — 83735 ASSAY OF MAGNESIUM: CPT

## 2018-12-07 PROCEDURE — G0378 HOSPITAL OBSERVATION PER HR: HCPCS

## 2018-12-07 PROCEDURE — 63600175 PHARM REV CODE 636 W HCPCS: Performed by: EMERGENCY MEDICINE

## 2018-12-07 PROCEDURE — 25000003 PHARM REV CODE 250: Performed by: NURSE PRACTITIONER

## 2018-12-07 PROCEDURE — 36415 COLL VENOUS BLD VENIPUNCTURE: CPT

## 2018-12-07 PROCEDURE — 84484 ASSAY OF TROPONIN QUANT: CPT

## 2018-12-07 PROCEDURE — 85025 COMPLETE CBC W/AUTO DIFF WBC: CPT

## 2018-12-07 PROCEDURE — 25000003 PHARM REV CODE 250: Performed by: EMERGENCY MEDICINE

## 2018-12-07 PROCEDURE — 25000003 PHARM REV CODE 250: Performed by: HOSPITALIST

## 2018-12-07 RX ORDER — LANCETS 33 GAUGE
1 EACH MISCELLANEOUS
Qty: 100 EACH | Refills: 0 | Status: SHIPPED | OUTPATIENT
Start: 2018-12-07 | End: 2019-01-14 | Stop reason: SDUPTHER

## 2018-12-07 RX ORDER — POLYETHYLENE GLYCOL 3350 17 G/17G
17 POWDER, FOR SOLUTION ORAL DAILY
Status: DISCONTINUED | OUTPATIENT
Start: 2018-12-07 | End: 2018-12-07 | Stop reason: HOSPADM

## 2018-12-07 RX ORDER — POLYETHYLENE GLYCOL 3350 17 G/17G
17 POWDER, FOR SOLUTION ORAL DAILY
Qty: 30 PACKET | Refills: 0 | Status: SHIPPED | OUTPATIENT
Start: 2018-12-07 | End: 2019-03-01

## 2018-12-07 RX ORDER — DOCUSATE SODIUM 100 MG/1
100 CAPSULE, LIQUID FILLED ORAL 2 TIMES DAILY
Status: DISCONTINUED | OUTPATIENT
Start: 2018-12-07 | End: 2018-12-07 | Stop reason: HOSPADM

## 2018-12-07 RX ORDER — DOCUSATE SODIUM 100 MG/1
100 CAPSULE, LIQUID FILLED ORAL 2 TIMES DAILY
Qty: 60 CAPSULE | Refills: 0 | Status: SHIPPED | OUTPATIENT
Start: 2018-12-07 | End: 2019-02-06

## 2018-12-07 RX ADMIN — NITROGLYCERIN 0.5 INCH: 20 OINTMENT TOPICAL at 06:12

## 2018-12-07 RX ADMIN — INSULIN ASPART 10 UNITS: 100 INJECTION, SOLUTION INTRAVENOUS; SUBCUTANEOUS at 12:12

## 2018-12-07 RX ADMIN — DOCUSATE SODIUM 100 MG: 100 CAPSULE, LIQUID FILLED ORAL at 03:12

## 2018-12-07 RX ADMIN — INSULIN ASPART 6 UNITS: 100 INJECTION, SOLUTION INTRAVENOUS; SUBCUTANEOUS at 06:12

## 2018-12-07 RX ADMIN — NITROGLYCERIN 0.5 INCH: 20 OINTMENT TOPICAL at 03:12

## 2018-12-07 RX ADMIN — ISOSORBIDE DINITRATE 20 MG: 10 TABLET ORAL at 09:12

## 2018-12-07 RX ADMIN — HYDRALAZINE HYDROCHLORIDE 50 MG: 50 TABLET, FILM COATED ORAL at 09:12

## 2018-12-07 RX ADMIN — ATORVASTATIN CALCIUM 10 MG: 10 TABLET, FILM COATED ORAL at 09:12

## 2018-12-07 RX ADMIN — POLYETHYLENE GLYCOL 3350 17 G: 17 POWDER, FOR SOLUTION ORAL at 03:12

## 2018-12-07 RX ADMIN — HYDRALAZINE HYDROCHLORIDE 50 MG: 50 TABLET, FILM COATED ORAL at 03:12

## 2018-12-07 RX ADMIN — ASPIRIN 81 MG: 81 TABLET, COATED ORAL at 09:12

## 2018-12-07 RX ADMIN — HUMAN INSULIN 15 UNITS: 100 INJECTION, SUSPENSION SUBCUTANEOUS at 06:12

## 2018-12-07 RX ADMIN — PANTOPRAZOLE SODIUM 40 MG: 40 TABLET, DELAYED RELEASE ORAL at 09:12

## 2018-12-07 RX ADMIN — ISOSORBIDE DINITRATE 20 MG: 10 TABLET ORAL at 03:12

## 2018-12-07 RX ADMIN — VERAPAMIL HYDROCHLORIDE 240 MG: 240 TABLET, FILM COATED, EXTENDED RELEASE ORAL at 09:12

## 2018-12-07 NOTE — DISCHARGE SUMMARY
[facility] Hospital Medicine  Discharge Summary      Patient Name: Jorgito Arreaga  MRN: [id number]  Admission Date: 12/6/2018  Hospital Length of Stay: 0 days  Discharge Date and Time:  12/07/2018 4:55 PM  Attending Physician: Abeba Montanez MD   Discharging Provider: Rekha Ignacio NP  Primary Care Provider: Otf Roblero MD      HPI:   53M h/o Diabetes mellitus presetns with fatigue.  Associated with n/v, and polyuria.  Reports he's been out of his insulin for 2 weeks because he lost his job.  He is currently applying to medicaid programs that will help pay for his insulin.  In ER, blood sugar is 484.  PH 7.23 on VBG.  AG 18.  Patient was given 3L NS fluids.  AG improved to 15.  While in ER patient complained of chest discomfort.  EKG NSR.  Initial trop <0.006.  Hospital medicine called for admission.     * No surgery found *      Hospital Course:   The pt was placed in observation for hyperglycemia-blood sugar 484, ARF, hyperkalemia, and IVVD due to hyperglycemia on insulin and IVFs. Home medications lisinopril and Spironolactone discontinued. BP stable on hydralazine, Imdur, and Verapamil. ARF and hyperkalemia resolved. Blood sugars improved. Hgb A1C 10.5. Pt was out of home insulin due to loss of insurance. Pt will be discharged on 70/30 humulin insulin.   Pt complained of CP while in the ED. EKG unchanged from previous dated 7/24/18. Serial troponin normal. ACS ruled out.   Pt counseled on medication compliance. Medication and PCP resources provided to the pt.      Consults:   Consults (From admission, onward)        Status Ordering Provider     Inpatient consult to Diabetes educator  Once     Provider:  (Not yet assigned)    Completed REKHA IGNACIO.     Inpatient consult to Social Work  Once     Provider:  (Not yet assigned)    Completed REKHA IGNACIO.     Inpatient consult to Social Work  Once     Provider:  (Not yet assigned)    Completed REKHA IGNACIO.            Final  "Active Diagnoses:    Diagnosis Date Noted POA    PRINCIPAL PROBLEM:  Hyperglycemia due to type 1 diabetes mellitus [E10.65] 08/22/2016 Yes    Chest pain [R07.9] 12/06/2018 Yes    Intravascular volume depletion [E86.1] 12/07/2018 Yes    Acute renal failure [N17.9]  Yes    Hyperkalemia [E87.5]  Yes    Hypertension associated with diabetes [E11.59, I10] 06/06/2016 Yes      Problems Resolved During this Admission:       Discharged Condition: stable    Disposition: Home or Self Care    Follow Up:  Follow-up Information     Otf Roblero MD. Call in 1 day.    Specialty:  Family Medicine  Contact information:  [address] [address] [id number] [phone]             [facility].    Specialty:  Emergency Medicine  Why:  If symptoms worsen  Contact information:  [address] [address] [address] [phone]           Otf Roblero MD In 3 days.    Specialty:  Family Medicine  Contact information:  [address] [address] [id number] [phone]                 Patient Instructions:      Diet diabetic     Diet Cardiac     Activity as tolerated       Significant Diagnostic Studies:  Imaging Results    None         Pending Diagnostic Studies:     None         Medications:  Reconciled Home Medications:      Medication List      START taking these medications    docusate sodium 100 MG capsule  Commonly known as:  COLACE  Take 1 capsule (100 mg total) by mouth 2 (two) times daily.     insulin NPH-insulin regular (70/30) 100 unit/mL (70-30) injection  Commonly known as:  NOVOLIN 70/30  30 units in am with breakfast and 15 units in pm with dinner     insulin syringe-needle,dispos. 1 mL 28 gauge x 1/2" Syrg  1 each by Misc.(Non-Drug; Combo Route) route 2 (two) times daily.     polyethylene glycol 17 gram Pwpk  Commonly known as:  GLYCOLAX  Take 17 g by mouth once daily.        CHANGE how you take these medications    verapamil 240 MG CR tablet  Commonly known as:  CALAN-SR  Take " "1 tablet (240 mg total) by mouth once daily.  What changed:  Another medication with the same name was removed. Continue taking this medication, and follow the directions you see here.        CONTINUE taking these medications    aspirin 325 MG tablet  Take 1 tablet (325 mg total) by mouth once daily.     atorvastatin 10 MG tablet  Commonly known as:  LIPITOR  Take 1 tablet (10 mg total) by mouth once daily.     blood sugar diagnostic Strp  Commonly known as:  ONETOUCH ULTRA TEST  USE TO CHECK SUGAR BEFORE MEALS AND AT BEDTIME     hydrALAZINE 50 MG tablet  Commonly known as:  APRESOLINE  Take 1 tablet (50 mg total) by mouth 3 (three) times daily.     isosorbide dinitrate 20 MG tablet  Commonly known as:  ISORDIL  Take 1 tablet (20 mg total) by mouth 3 (three) times daily.     lancets 33 gauge Misc  Commonly known as:  ONETOUCH DELICA LANCETS  Use to check sugar 4 (four) times daily before meals and nightly.     ondansetron 4 MG Tbdl  Commonly known as:  ZOFRAN-ODT  Take 1 tablet (4 mg total) by mouth every 6 (six) hours as needed (NAUSEA/VOMITING).     pantoprazole 40 MG tablet  Commonly known as:  PROTONIX  Take 1 tablet (40 mg total) by mouth once daily.        STOP taking these medications    clotrimazole-betamethasone 1-0.05% cream  Commonly known as:  LOTRISONE     glipiZIDE 2.5 MG Tr24  Commonly known as:  GLUCOTROL     insulin glargine 100 unit/mL (3 mL) Inpn pen  Commonly known as:  BASAGLAR KWIKPEN U-100 INSULIN     insulin lispro 100 unit/mL injection  Commonly known as:  HUMALOG     lisinopril 40 MG tablet  Commonly known as:  PRINIVILZESTRIL     metFORMIN 500 MG tablet  Commonly known as:  GLUCOPHAGE     mupirocin 2 % ointment  Commonly known as:  BACTROBAN     pen needle, diabetic 31 gauge x 3/16" Ndle     spironolactone 50 MG tablet  Commonly known as:  ALDACTONE            Indwelling Lines/Drains at time of discharge:   Lines/Drains/Airways          None          Time spent on the discharge of " patient: 44 minutes  Patient was seen and examined on the date of discharge and determined to be suitable for discharge.         Amelia Torres NP  Department of Hospital Medicine  [facility]

## 2018-12-07 NOTE — HPI
53M h/o Diabetes mellitus presetns with fatigue.  Associated with n/v, and polyuria.  Reports he's been out of his insulin for 2 weeks because he lost his job.  He is currently applying to medicaid programs that will help pay for his insulin.  In ER, blood sugar is 484.  PH 7.23 on VBG.  AG 18.  Patient was given 3L NS fluids.  AG improved to 15.  While in ER patient complained of chest discomfort.  EKG NSR.  Initial trop <0.006.  Hospital medicine called for admission.

## 2018-12-07 NOTE — SUBJECTIVE & OBJECTIVE
Past Medical History:   Diagnosis Date    Cataracts, bilateral     Diabetes mellitus, type 2     Diagnosed 7/2016    GERD (gastroesophageal reflux disease)     Hypertension     Inflammatory polyps of colon     Pancreatitis, alcoholic, acute     Port catheter in place     TIA (transient ischemic attack)        Past Surgical History:   Procedure Laterality Date    COLONOSCOPY      COLONOSCOPY N/A 6/15/2018    Procedure: COLONOSCOPY;  Surgeon: Jim Hicks MD;  Location: [facility];  Service: Endoscopy;  Laterality: N/A;    COLONOSCOPY N/A 6/15/2018    Performed by Jim Hicks MD at [facility]    ESOPHAGOGASTRODUODENOSCOPY N/A 6/15/2018    Procedure: ESOPHAGOGASTRODUODENOSCOPY (EGD);  Surgeon: Jim Hicks MD;  Location: [facility];  Service: Endoscopy;  Laterality: N/A;    ESOPHAGOGASTRODUODENOSCOPY (EGD) N/A 6/15/2018    Performed by Jim Hicks MD at [facility]       Review of patient's allergies indicates:  No Known Allergies    No current facility-administered medications on file prior to encounter.      Current Outpatient Medications on File Prior to Encounter   Medication Sig    aspirin 325 MG tablet Take 1 tablet (325 mg total) by mouth once daily.    atorvastatin (LIPITOR) 10 MG tablet Take 1 tablet (10 mg total) by mouth once daily.    glipiZIDE (GLUCOTROL) 2.5 MG TR24 Take 1 tablet (2.5 mg total) by mouth daily with breakfast.    hydrALAZINE (APRESOLINE) 50 MG tablet Take 1 tablet (50 mg total) by mouth 3 (three) times daily.    insulin glargine (BASAGLAR KWIKPEN U-100 INSULIN) 100 unit/mL (3 mL) InPn pen Inject 50 Units into the skin every evening. INJECT 45 UNITS UNDER THE SKIN IN THE EVENING    insulin lispro (HUMALOG) 100 unit/mL injection Take 8 units at largest meal of the day, then if glucose is elevated at next meal, then patient may take 4 more units at that time.    isosorbide dinitrate (ISORDIL) 20 MG tablet Take 1 tablet (20 mg total) by mouth 3 (three) times daily.     "lisinopril (PRINIVIL,ZESTRIL) 40 MG tablet Take 1 tablet (40 mg total) by mouth once daily. Hold for three days and then resume at half the tab for 3 days then 1 tab full daily    metFORMIN (GLUCOPHAGE) 500 MG tablet Take 1 tablet (500 mg total) by mouth 2 (two) times daily with meals.    mupirocin (BACTROBAN) 2 % ointment APPLY  OINTMENT TOPICALLY TO AFFECTED AREA THREE TIMES DAILY    ondansetron (ZOFRAN-ODT) 4 MG TbDL Take 1 tablet (4 mg total) by mouth every 6 (six) hours as needed (NAUSEA/VOMITING).    pantoprazole (PROTONIX) 40 MG tablet Take 1 tablet (40 mg total) by mouth once daily.    pen needle, diabetic 31 gauge x 3/16" Ndle USE ONE NEEDLE ONCE NIGHTLY    spironolactone (ALDACTONE) 50 MG tablet Take 1 tablet (50 mg total) by mouth once daily. Hold for three days    verapamil (CALAN-SR) 240 MG CR tablet Take 1 tablet (240 mg total) by mouth once daily.    blood sugar diagnostic (ONETOUCH ULTRA TEST) Strp USE TO CHECK SUGAR BEFORE MEALS AND AT BEDTIME    clotrimazole-betamethasone 1-0.05% (LOTRISONE) cream APPLY  CREAM TOPICALLY TO AFFECTED AREA TWICE DAILY    lancets (ONETOUCH DELICA LANCETS) 33 gauge Misc Use to check sugar 4 (four) times daily before meals and nightly.    verapamil (CALAN-SR) 240 MG CR tablet TAKE 1 TABLET BY MOUTH ONCE DAILY     Family History     Problem Relation (Age of Onset)    Hypertension Mother, Father        Tobacco Use    Smoking status: Former Smoker     Types: Cigarettes    Smokeless tobacco: Never Used   Substance and Sexual Activity    Alcohol use: Yes     Comment:  Daily - States that he drinks 1/2 pint of vodka and about 1-2 cans of beer daily    Drug use: No    Sexual activity: Yes     Partners: Female     Review of Systems   Constitutional: Positive for fatigue. Negative for activity change, appetite change, chills, diaphoresis and fever.   HENT: Negative for facial swelling, sore throat, tinnitus and trouble swallowing.    Eyes: Negative for photophobia " and visual disturbance.   Respiratory: Negative for apnea, cough, chest tightness, shortness of breath and wheezing.    Cardiovascular: Positive for chest pain. Negative for palpitations and leg swelling.   Gastrointestinal: Positive for nausea and vomiting. Negative for abdominal distention, abdominal pain, constipation and diarrhea.   Endocrine: Positive for polydipsia and polyuria. Negative for polyphagia.   Genitourinary: Negative for decreased urine volume, dysuria, flank pain, frequency and hematuria.   Musculoskeletal: Negative for arthralgias, back pain, joint swelling, myalgias and neck stiffness.   Skin: Negative for pallor and rash.   Allergic/Immunologic: Negative for immunocompromised state.   Neurological: Negative for dizziness, seizures, syncope, weakness, numbness and headaches.   Psychiatric/Behavioral: Negative for confusion, hallucinations and suicidal ideas. The patient is not nervous/anxious.    All other systems reviewed and are negative.    Objective:     Vital Signs (Most Recent):  Temp: 98.5 °F (36.9 °C) (12/06/18 1851)  Pulse: 83 (12/06/18 1851)  Resp: 18 (12/06/18 1851)  BP: (!) 143/94 (12/06/18 1851)  SpO2: 97 % (12/06/18 2119) Vital Signs (24h Range):  Temp:  [97.7 °F (36.5 °C)-98.7 °F (37.1 °C)] 98.5 °F (36.9 °C)  Pulse:  [] 83  Resp:  [16-20] 18  SpO2:  [95 %-99 %] 97 %  BP: (137-161)/(75-98) 143/94     Weight: 90.5 kg (199 lb 8.3 oz)  Body mass index is 32.2 kg/m².    Physical Exam   Constitutional: He is oriented to person, place, and time. He appears well-developed and well-nourished. No distress.   HENT:   Head: Normocephalic and atraumatic.   Mouth/Throat: Oropharynx is clear and moist.   Eyes: Conjunctivae and EOM are normal. Pupils are equal, round, and reactive to light. No scleral icterus.   Neck: Normal range of motion. Neck supple. No JVD present. No thyromegaly present.   Cardiovascular: Normal rate and regular rhythm. Exam reveals no gallop and no friction rub.   No  murmur heard.  Pulmonary/Chest: Effort normal and breath sounds normal. No respiratory distress. He has no wheezes. He has no rales.   Abdominal: Soft. Bowel sounds are normal. He exhibits no distension. There is no tenderness. There is no guarding.   Musculoskeletal: Normal range of motion.   Neurological: He is alert and oriented to person, place, and time. No cranial nerve deficit.   Skin: Skin is warm. Capillary refill takes less than 2 seconds. He is not diaphoretic. No erythema.   Psychiatric: He has a normal mood and affect.   Nursing note and vitals reviewed.        CRANIAL NERVES     CN III, IV, VI   Pupils are equal, round, and reactive to light.  Extraocular motions are normal.        Significant Labs:   ABGs:   Recent Labs   Lab 12/06/18  1256   PH 7.323*   PCO2 49.5*   HCO3 25.7   POCSATURATED 46*   BE 0     CBC:   Recent Labs   Lab 12/06/18  1211   WBC 5.86   HGB 15.4   HCT 46.5        CMP:   Recent Labs   Lab 12/06/18  1211 12/06/18  1409   * 136   K 5.4* 4.4   CL 91* 100   CO2 21* 21*   * 308*   BUN 31* 29*   CREATININE 1.5* 1.3   CALCIUM 11.3* 10.0   PROT 8.5*  --    ALBUMIN 4.6  --    BILITOT 0.8  --    ALKPHOS 68  --    AST 19  --    ALT 22  --    ANIONGAP 18* 15   EGFRNONAA 52.8* >60.0     Troponin:   Recent Labs   Lab 12/06/18  1211 12/06/18  1915   TROPONINI <0.006 <0.006     Urine Studies:   Recent Labs   Lab 12/06/18  1306   COLORU Yellow   APPEARANCEUA Clear   PHUR 6.0   SPECGRAV 1.015   PROTEINUA Negative   GLUCUA 3+*   KETONESU 2+*   BILIRUBINUA Negative   OCCULTUA Negative   NITRITE Negative   UROBILINOGEN Negative   LEUKOCYTESUR Negative   RBCUA 1   BACTERIA None     All pertinent labs within the past 24 hours have been reviewed.    Significant Imaging: I have reviewed all pertinent imaging results/findings within the past 24 hours.   Imaging Results    None

## 2018-12-07 NOTE — HOSPITAL COURSE
The pt was placed in observation for hyperglycemia-blood sugar 484, ARF, hyperkalemia, and IVVD due to hyperglycemia on insulin and IVFs. Home medications lisinopril and Spironolactone discontinued. BP stable on hydralazine, Imdur, and Verapamil. ARF and hyperkalemia resolved. Blood sugars improved. Hgb A1C 10.5. Pt was out of home insulin due to loss of insurance. Pt will be discharged on 70/30 humulin insulin.   Pt complained of CP while in the ED. EKG unchanged from previous dated 7/24/18. Serial troponin normal. ACS ruled out.   Pt counseled on medication compliance. Medication and PCP resources provided to the pt.

## 2018-12-07 NOTE — ASSESSMENT & PLAN NOTE
- substernal, 10/10, no radiation, associated with nausea  - atypical chest pain, in patient with diabetes  - EKG NSR  - troponins x 2 <0.006  - repeat troponin in 6 hours  - continue atovastatin and ASA  - consider TTE in AM per day team

## 2018-12-07 NOTE — ASSESSMENT & PLAN NOTE
- patient reports he has type 1 diabetes now due to chronic pancreatitis  - takes metformin 500mg bid, lantus 45 units qhs and 4 units humulog for largest meal and additional SSI for premeal coverage  - noncompliant due to financial hardship    - will discontinue metformin  - change lantus 45 units to more affordable insulin NPH, starting with 15 units bid  - continue SSI PRN for premeal coverage, POC glucose qac/qhs  - check a1c  - continue fluids

## 2018-12-07 NOTE — DISCHARGE INSTRUCTIONS
Take medications as ordered by physician.  Please call and schedule follow up appointment with your primary care physician.

## 2018-12-07 NOTE — CONSULTS
CM met with patient at bedside.   PCP:  Patient declined PCP referral. Patient reports he already has a PCP, Dr. Otf Roblero @ [facility]. Patient saw him recently and plans to follow up.    [facility]:  Patient declined medication assistance referral. Stated he completed med assistance application with [employer] and was approved for assistance with both insulins, Basaglar and Humalog.     Medicaid application:  Completed Medicaid application last Wednesday.        Yes

## 2018-12-07 NOTE — CONSULTS
Consult received   patient seen by this nurse on routine screening from chart review  He reports was diagnosed in 2016 following a hospitalization with pancreatitis. He has attended several diabetes education classes in the past.  He has a home glucose monitor (One touch) and checks daily with recent readings of HI, reports his meter will read up to 600  He lost his health insurance in 8/18 and was purchasing his insulin  He lost his job in 11/18 and insulin became unaffordable.  On 12/4/18 he applied for Medicaid and has contacted  [employer] (insulin ) and will receive assistance of vouchers for free and or discounted insulin.  Discussed insulins which can be purchased over the counter with dosing guidelines from his PCP.   He is aware of how to administer insulin using either the vial/syringe or insulin pen method of delivery.   He is aware of types of insulin with duration of action.  He is very knowledgeable on teach back but reinforced info on target glucose values, hyper/hypoglycemia  Expected good compliance when insulin is available

## 2018-12-07 NOTE — H&P
[facility] Hospital Medicine  History & Physical    Patient Name: Jorgito Arreaga  MRN: [id number]  Admission Date: 12/6/2018  Attending Physician: Marc Martinez MD  Primary Care Provider: Otf Roblero MD         Patient information was obtained from patient and ER records.     Subjective:     Principal Problem:Chest pain    Chief Complaint:   Chief Complaint   Patient presents with    Hyperglycemia     at home for several days, out of insulin for 2 weeks         HPI: 53M h/o Diabetes mellitus presetns with fatigue.  Associated with n/v, and polyuria.  Reports he's been out of his insulin for 2 weeks because he lost his job.  He is currently applying to medicaid programs that will help pay for his insulin.  In ER, blood sugar is 484.  PH 7.23 on VBG.  AG 18.  Patient was given 3L NS fluids.  AG improved to 15.  While in ER patient complained of chest discomfort.  EKG NSR.  Initial trop <0.006.  Hospital medicine called for admission.     Past Medical History:   Diagnosis Date    Cataracts, bilateral     Diabetes mellitus, type 2     Diagnosed 7/2016    GERD (gastroesophageal reflux disease)     Hypertension     Inflammatory polyps of colon     Pancreatitis, alcoholic, acute     Port catheter in place     TIA (transient ischemic attack)        Past Surgical History:   Procedure Laterality Date    COLONOSCOPY      COLONOSCOPY N/A 6/15/2018    Procedure: COLONOSCOPY;  Surgeon: Jim Hicks MD;  Location: [facility];  Service: Endoscopy;  Laterality: N/A;    COLONOSCOPY N/A 6/15/2018    Performed by Jim Hicks MD at [facility]    ESOPHAGOGASTRODUODENOSCOPY N/A 6/15/2018    Procedure: ESOPHAGOGASTRODUODENOSCOPY (EGD);  Surgeon: Jim Hicks MD;  Location: [facility];  Service: Endoscopy;  Laterality: N/A;    ESOPHAGOGASTRODUODENOSCOPY (EGD) N/A 6/15/2018    Performed by Jim Hicks MD at [facility]       Review of patient's allergies indicates:  No Known Allergies    No current  "facility-administered medications on file prior to encounter.      Current Outpatient Medications on File Prior to Encounter   Medication Sig    aspirin 325 MG tablet Take 1 tablet (325 mg total) by mouth once daily.    atorvastatin (LIPITOR) 10 MG tablet Take 1 tablet (10 mg total) by mouth once daily.    glipiZIDE (GLUCOTROL) 2.5 MG TR24 Take 1 tablet (2.5 mg total) by mouth daily with breakfast.    hydrALAZINE (APRESOLINE) 50 MG tablet Take 1 tablet (50 mg total) by mouth 3 (three) times daily.    insulin glargine (BASAGLAR KWIKPEN U-100 INSULIN) 100 unit/mL (3 mL) InPn pen Inject 50 Units into the skin every evening. INJECT 45 UNITS UNDER THE SKIN IN THE EVENING    insulin lispro (HUMALOG) 100 unit/mL injection Take 8 units at largest meal of the day, then if glucose is elevated at next meal, then patient may take 4 more units at that time.    isosorbide dinitrate (ISORDIL) 20 MG tablet Take 1 tablet (20 mg total) by mouth 3 (three) times daily.    lisinopril (PRINIVIL,ZESTRIL) 40 MG tablet Take 1 tablet (40 mg total) by mouth once daily. Hold for three days and then resume at half the tab for 3 days then 1 tab full daily    metFORMIN (GLUCOPHAGE) 500 MG tablet Take 1 tablet (500 mg total) by mouth 2 (two) times daily with meals.    mupirocin (BACTROBAN) 2 % ointment APPLY  OINTMENT TOPICALLY TO AFFECTED AREA THREE TIMES DAILY    ondansetron (ZOFRAN-ODT) 4 MG TbDL Take 1 tablet (4 mg total) by mouth every 6 (six) hours as needed (NAUSEA/VOMITING).    pantoprazole (PROTONIX) 40 MG tablet Take 1 tablet (40 mg total) by mouth once daily.    pen needle, diabetic 31 gauge x 3/16" Ndle USE ONE NEEDLE ONCE NIGHTLY    spironolactone (ALDACTONE) 50 MG tablet Take 1 tablet (50 mg total) by mouth once daily. Hold for three days    verapamil (CALAN-SR) 240 MG CR tablet Take 1 tablet (240 mg total) by mouth once daily.    blood sugar diagnostic (ONETOUCH ULTRA TEST) Strp USE TO CHECK SUGAR BEFORE MEALS AND AT " BEDTIME    clotrimazole-betamethasone 1-0.05% (LOTRISONE) cream APPLY  CREAM TOPICALLY TO AFFECTED AREA TWICE DAILY    lancets (ONETOUCH DELICA LANCETS) 33 gauge Misc Use to check sugar 4 (four) times daily before meals and nightly.    verapamil (CALAN-SR) 240 MG CR tablet TAKE 1 TABLET BY MOUTH ONCE DAILY     Family History     Problem Relation (Age of Onset)    Hypertension Mother, Father        Tobacco Use    Smoking status: Former Smoker     Types: Cigarettes    Smokeless tobacco: Never Used   Substance and Sexual Activity    Alcohol use: Yes     Comment:  Daily - States that he drinks 1/2 pint of vodka and about 1-2 cans of beer daily    Drug use: No    Sexual activity: Yes     Partners: Female     Review of Systems   Constitutional: Positive for fatigue. Negative for activity change, appetite change, chills, diaphoresis and fever.   HENT: Negative for facial swelling, sore throat, tinnitus and trouble swallowing.    Eyes: Negative for photophobia and visual disturbance.   Respiratory: Negative for apnea, cough, chest tightness, shortness of breath and wheezing.    Cardiovascular: Positive for chest pain. Negative for palpitations and leg swelling.   Gastrointestinal: Positive for nausea and vomiting. Negative for abdominal distention, abdominal pain, constipation and diarrhea.   Endocrine: Positive for polydipsia and polyuria. Negative for polyphagia.   Genitourinary: Negative for decreased urine volume, dysuria, flank pain, frequency and hematuria.   Musculoskeletal: Negative for arthralgias, back pain, joint swelling, myalgias and neck stiffness.   Skin: Negative for pallor and rash.   Allergic/Immunologic: Negative for immunocompromised state.   Neurological: Negative for dizziness, seizures, syncope, weakness, numbness and headaches.   Psychiatric/Behavioral: Negative for confusion, hallucinations and suicidal ideas. The patient is not nervous/anxious.    All other systems reviewed and are  negative.    Objective:     Vital Signs (Most Recent):  Temp: 98.5 °F (36.9 °C) (12/06/18 1851)  Pulse: 83 (12/06/18 1851)  Resp: 18 (12/06/18 1851)  BP: (!) 143/94 (12/06/18 1851)  SpO2: 97 % (12/06/18 2119) Vital Signs (24h Range):  Temp:  [97.7 °F (36.5 °C)-98.7 °F (37.1 °C)] 98.5 °F (36.9 °C)  Pulse:  [] 83  Resp:  [16-20] 18  SpO2:  [95 %-99 %] 97 %  BP: (137-161)/(75-98) 143/94     Weight: 90.5 kg (199 lb 8.3 oz)  Body mass index is 32.2 kg/m².    Physical Exam   Constitutional: He is oriented to person, place, and time. He appears well-developed and well-nourished. No distress.   HENT:   Head: Normocephalic and atraumatic.   Mouth/Throat: Oropharynx is clear and moist.   Eyes: Conjunctivae and EOM are normal. Pupils are equal, round, and reactive to light. No scleral icterus.   Neck: Normal range of motion. Neck supple. No JVD present. No thyromegaly present.   Cardiovascular: Normal rate and regular rhythm. Exam reveals no gallop and no friction rub.   No murmur heard.  Pulmonary/Chest: Effort normal and breath sounds normal. No respiratory distress. He has no wheezes. He has no rales.   Abdominal: Soft. Bowel sounds are normal. He exhibits no distension. There is no tenderness. There is no guarding.   Musculoskeletal: Normal range of motion.   Neurological: He is alert and oriented to person, place, and time. No cranial nerve deficit.   Skin: Skin is warm. Capillary refill takes less than 2 seconds. He is not diaphoretic. No erythema.   Psychiatric: He has a normal mood and affect.   Nursing note and vitals reviewed.        CRANIAL NERVES     CN III, IV, VI   Pupils are equal, round, and reactive to light.  Extraocular motions are normal.        Significant Labs:   ABGs:   Recent Labs   Lab 12/06/18  1256   PH 7.323*   PCO2 49.5*   HCO3 25.7   POCSATURATED 46*   BE 0     CBC:   Recent Labs   Lab 12/06/18  1211   WBC 5.86   HGB 15.4   HCT 46.5        CMP:   Recent Labs   Lab 12/06/18  1211  12/06/18  1409   * 136   K 5.4* 4.4   CL 91* 100   CO2 21* 21*   * 308*   BUN 31* 29*   CREATININE 1.5* 1.3   CALCIUM 11.3* 10.0   PROT 8.5*  --    ALBUMIN 4.6  --    BILITOT 0.8  --    ALKPHOS 68  --    AST 19  --    ALT 22  --    ANIONGAP 18* 15   EGFRNONAA 52.8* >60.0     Troponin:   Recent Labs   Lab 12/06/18  1211 12/06/18  1915   TROPONINI <0.006 <0.006     Urine Studies:   Recent Labs   Lab 12/06/18  1306   COLORU Yellow   APPEARANCEUA Clear   PHUR 6.0   SPECGRAV 1.015   PROTEINUA Negative   GLUCUA 3+*   KETONESU 2+*   BILIRUBINUA Negative   OCCULTUA Negative   NITRITE Negative   UROBILINOGEN Negative   LEUKOCYTESUR Negative   RBCUA 1   BACTERIA None     All pertinent labs within the past 24 hours have been reviewed.    Significant Imaging: I have reviewed all pertinent imaging results/findings within the past 24 hours.   Imaging Results    None           Assessment/Plan:     * Chest pain    - substernal, 10/10, no radiation, associated with nausea  - atypical chest pain, in patient with diabetes  - EKG NSR  - troponins x 2 <0.006  - repeat troponin in 6 hours  - continue atovastatin and ASA  - consider TTE in AM per day team       Hyperglycemia due to type 1 diabetes mellitus    - patient reports he has type 1 diabetes now due to chronic pancreatitis  - takes metformin 500mg bid, lantus 45 units qhs and 4 units humulog for largest meal and additional SSI for premeal coverage  - noncompliant due to financial hardship    - will discontinue metformin  - change lantus 45 units to more affordable insulin NPH, starting with 15 units bid  - continue SSI PRN for premeal coverage, POC glucose qac/qhs  - check a1c  - continue fluids         VTE Risk Mitigation (From admission, onward)        Ordered     IP VTE HIGH RISK PATIENT  Once      12/06/18 1927     Place SASKIA hose  Until discontinued      12/06/18 1927     Place sequential compression device  Until discontinued      12/06/18 1927             Ha  Nikki Martinez MD  Department of Hospital Medicine   [facility]

## 2018-12-07 NOTE — PLAN OF CARE
Problem: Patient Care Overview  Goal: Plan of Care Review  Outcome: Ongoing (interventions implemented as appropriate)  Assessment complete per flow  Alert and oriented to name date and situation   Vital signs stable   Blood glucose monitoring; insulin tolerated   No c/o pain or discomfort   Patient rounds assessed; Free of fall on current shift  Tolerated all scheduled   Will continue to monitor for any signs or symptoms of vital decline

## 2018-12-08 NOTE — NURSING
Saline lock and telemonitor removed prior to rolling pt down for transportation.  Pt thanked staff for care given.

## 2018-12-20 DIAGNOSIS — E11.65 TYPE 2 DIABETES MELLITUS WITH HYPERGLYCEMIA, WITH LONG-TERM CURRENT USE OF INSULIN: ICD-10-CM

## 2018-12-20 DIAGNOSIS — Z79.4 TYPE 2 DIABETES MELLITUS WITH HYPERGLYCEMIA, WITH LONG-TERM CURRENT USE OF INSULIN: ICD-10-CM

## 2018-12-20 RX ORDER — INSULIN GLARGINE 100 [IU]/ML
INJECTION, SOLUTION SUBCUTANEOUS
Refills: 0 | OUTPATIENT
Start: 2018-12-20

## 2018-12-26 DIAGNOSIS — E11.65 TYPE 2 DIABETES MELLITUS WITH HYPERGLYCEMIA, WITH LONG-TERM CURRENT USE OF INSULIN: ICD-10-CM

## 2018-12-26 DIAGNOSIS — Z79.4 TYPE 2 DIABETES MELLITUS WITH HYPERGLYCEMIA, WITH LONG-TERM CURRENT USE OF INSULIN: ICD-10-CM

## 2018-12-27 RX ORDER — INSULIN GLARGINE 100 [IU]/ML
INJECTION, SOLUTION SUBCUTANEOUS
Qty: 9 ML | Refills: 0 | Status: SHIPPED | OUTPATIENT
Start: 2018-12-27 | End: 2019-02-15 | Stop reason: SDUPTHER

## 2018-12-27 NOTE — TELEPHONE ENCOUNTER
Patient would like to restart Basaglar. Patient states was stopped went he loss insurance, but he has insurance and would like to restart the Basaglar

## 2018-12-31 ENCOUNTER — PATIENT OUTREACH (OUTPATIENT)
Dept: ADMINISTRATIVE | Facility: HOSPITAL | Age: 53
End: 2018-12-31

## 2018-12-31 NOTE — LETTER
December 31, 2018        Jorgito Arreaga  [id number] [facility] [address]      Dear Mr. Arreaga,    You have an upcoming appointment with Otf Roblero MD on 01/14/19.      Your chart is indicating you may be due for the following and I will be happy to assist you in scheduling any needed appointments:  Health Maintenance Due   Topic    Eye Exam           If you have had any of the above done at another facility, please bring the records or information with you so that your record at [facility] will be complete.    We will be happy to assist you with scheduling any necessary appointments or you may contact the [facility] appointment desk at [phone] to schedule at your convenience.     Thank you for choosing [facility] for your healthcare needs,    Randi GRAHAM LPN Care Coordinator  [facility]  [phone]

## 2019-01-14 ENCOUNTER — OFFICE VISIT (OUTPATIENT)
Dept: INTERNAL MEDICINE | Facility: CLINIC | Age: 54
End: 2019-01-14
Payer: MEDICAID

## 2019-01-14 VITALS
HEART RATE: 90 BPM | TEMPERATURE: 96 F | HEIGHT: 66 IN | RESPIRATION RATE: 16 BRPM | SYSTOLIC BLOOD PRESSURE: 131 MMHG | WEIGHT: 221.81 LBS | DIASTOLIC BLOOD PRESSURE: 76 MMHG | BODY MASS INDEX: 35.65 KG/M2 | OXYGEN SATURATION: 97 %

## 2019-01-14 DIAGNOSIS — K21.00 GASTROESOPHAGEAL REFLUX DISEASE WITH ESOPHAGITIS: Chronic | ICD-10-CM

## 2019-01-14 DIAGNOSIS — E78.5 HYPERLIPIDEMIA ASSOCIATED WITH TYPE 2 DIABETES MELLITUS: ICD-10-CM

## 2019-01-14 DIAGNOSIS — E11.69 HYPERLIPIDEMIA ASSOCIATED WITH TYPE 2 DIABETES MELLITUS: ICD-10-CM

## 2019-01-14 DIAGNOSIS — I15.2 HYPERTENSION ASSOCIATED WITH DIABETES: ICD-10-CM

## 2019-01-14 DIAGNOSIS — E11.59 HYPERTENSION ASSOCIATED WITH DIABETES: ICD-10-CM

## 2019-01-14 PROCEDURE — 99214 OFFICE O/P EST MOD 30 MIN: CPT | Mod: PBBFAC,PO | Performed by: FAMILY MEDICINE

## 2019-01-14 PROCEDURE — 99214 OFFICE O/P EST MOD 30 MIN: CPT | Mod: S$PBB,,, | Performed by: FAMILY MEDICINE

## 2019-01-14 PROCEDURE — 99214 PR OFFICE/OUTPT VISIT, EST, LEVL IV, 30-39 MIN: ICD-10-PCS | Mod: S$PBB,,, | Performed by: FAMILY MEDICINE

## 2019-01-14 PROCEDURE — 99999 PR PBB SHADOW E&M-EST. PATIENT-LVL IV: ICD-10-PCS | Mod: PBBFAC,,, | Performed by: FAMILY MEDICINE

## 2019-01-14 PROCEDURE — 99999 PR PBB SHADOW E&M-EST. PATIENT-LVL IV: CPT | Mod: PBBFAC,,, | Performed by: FAMILY MEDICINE

## 2019-01-14 RX ORDER — PANTOPRAZOLE SODIUM 40 MG/1
40 TABLET, DELAYED RELEASE ORAL DAILY
Qty: 90 TABLET | Refills: 1 | Status: ON HOLD | OUTPATIENT
Start: 2019-01-14 | End: 2019-03-31 | Stop reason: HOSPADM

## 2019-01-14 RX ORDER — ISOSORBIDE DINITRATE 20 MG/1
20 TABLET ORAL 3 TIMES DAILY
Qty: 90 TABLET | Refills: 0 | Status: SHIPPED | OUTPATIENT
Start: 2019-01-14 | End: 2019-03-01 | Stop reason: SDUPTHER

## 2019-01-14 RX ORDER — HYDRALAZINE HYDROCHLORIDE 50 MG/1
50 TABLET, FILM COATED ORAL 3 TIMES DAILY
Qty: 180 TABLET | Refills: 1 | Status: SHIPPED | OUTPATIENT
Start: 2019-01-14 | End: 2019-03-01 | Stop reason: SDUPTHER

## 2019-01-14 RX ORDER — VERAPAMIL HYDROCHLORIDE 240 MG/1
240 TABLET, FILM COATED, EXTENDED RELEASE ORAL DAILY
Qty: 90 TABLET | Refills: 1 | Status: SHIPPED | OUTPATIENT
Start: 2019-01-14 | End: 2019-05-16 | Stop reason: SDUPTHER

## 2019-01-14 RX ORDER — LANCETS 33 GAUGE
1 EACH MISCELLANEOUS
Qty: 100 EACH | Refills: 0 | Status: SHIPPED | OUTPATIENT
Start: 2019-01-14 | End: 2019-07-10 | Stop reason: SDUPTHER

## 2019-01-14 RX ORDER — ATORVASTATIN CALCIUM 10 MG/1
10 TABLET, FILM COATED ORAL DAILY
Qty: 90 TABLET | Refills: 1 | Status: SHIPPED | OUTPATIENT
Start: 2019-01-14 | End: 2019-09-04 | Stop reason: SDUPTHER

## 2019-01-14 NOTE — PROGRESS NOTES
Subjective:       Patient ID: Jorgito Arreaga is a 53 y.o. male.    Chief Complaint: Follow-up (DM)    Pt has been off his meds b/c of loss of insurance. Back on meds at this time.      Diabetes   He presents for his follow-up diabetic visit. He has type 2 diabetes mellitus. His disease course has been stable. Pertinent negatives for hypoglycemia include no confusion, dizziness or headaches. Pertinent negatives for diabetes include no blurred vision, no chest pain and no weakness. Symptoms are stable. Risk factors for coronary artery disease include diabetes mellitus. Current diabetic treatment includes insulin injections.     Review of Systems   Eyes: Negative for blurred vision.   Respiratory: Negative for shortness of breath.    Cardiovascular: Negative for chest pain.   Gastrointestinal: Negative for abdominal pain.   Neurological: Negative for dizziness, weakness and headaches.   Psychiatric/Behavioral: Negative for confusion.       Objective:      Physical Exam   Constitutional: He appears well-developed and well-nourished. No distress.   HENT:   Head: Normocephalic and atraumatic.   Pulmonary/Chest: Effort normal and breath sounds normal. No respiratory distress. He has no wheezes.   Abdominal: Soft. He exhibits no distension. There is no tenderness. There is no guarding.   Skin: Skin is warm and dry. No rash noted. He is not diaphoretic. No erythema.   Nursing note and vitals reviewed.      Assessment:       1. Uncontrolled secondary diabetes with peripheral neuropathy    2. Hypertension associated with diabetes    3. Gastroesophageal reflux disease with esophagitis    4. Hyperlipidemia associated with type 2 diabetes mellitus        Plan:     Problem List Items Addressed This Visit        Cardiac/Vascular    Hypertension associated with diabetes    Relevant Medications    verapamil (CALAN-SR) 240 MG CR tablet    hydrALAZINE (APRESOLINE) 50 MG tablet    isosorbide dinitrate (ISORDIL) 20 MG tablet        Endocrine    Uncontrolled secondary diabetes with peripheral neuropathy - Primary    Relevant Orders    Ambulatory Referral to Optometry    Ambulatory consult to Diabetic Education       GI    Gastroesophageal reflux disease (Chronic)    Relevant Medications    pantoprazole (PROTONIX) 40 MG tablet      Other Visit Diagnoses     Hyperlipidemia associated with type 2 diabetes mellitus        Relevant Medications    atorvastatin (LIPITOR) 10 MG tablet

## 2019-01-23 ENCOUNTER — TELEPHONE (OUTPATIENT)
Dept: INTERNAL MEDICINE | Facility: CLINIC | Age: 54
End: 2019-01-23

## 2019-01-23 RX ORDER — NAPROXEN 500 MG/1
500 TABLET ORAL 2 TIMES DAILY WITH MEALS
Qty: 30 TABLET | Refills: 0 | Status: ON HOLD | OUTPATIENT
Start: 2019-01-23 | End: 2019-03-31 | Stop reason: HOSPADM

## 2019-01-23 NOTE — TELEPHONE ENCOUNTER
----- Message from Kaelyn Fowler sent at 1/23/2019  7:52 AM CST -----  1. What is the name of the medication you are requesting? naproxen (originally written by dr gaytan in )  2. What is the dose? 500 mg  3. How do you take the medication? Orally, topically, etc? oral  4. How often do you take this medication? when needed  5. Do you need a 30 day or 90 day supply? 30  6. How many refills are you requesting? 0  7. What is your preferred pharmacy and location of the pharmacy? .  [facility] [address] [place] [address] [facility]  [address] [facility] [address]  Phone: [phone] Fax: [phone]    8. Who can we contact with further questions? [phone]

## 2019-01-30 ENCOUNTER — HOSPITAL ENCOUNTER (EMERGENCY)
Facility: HOSPITAL | Age: 54
Discharge: HOME | End: 2019-01-30
Attending: EMERGENCY MEDICINE
Payer: MEDICAID

## 2019-01-30 VITALS
RESPIRATION RATE: 19 BRPM | HEIGHT: 66 IN | SYSTOLIC BLOOD PRESSURE: 181 MMHG | HEART RATE: 75 BPM | DIASTOLIC BLOOD PRESSURE: 107 MMHG | BODY MASS INDEX: 33.52 KG/M2 | TEMPERATURE: 98 F | WEIGHT: 208.56 LBS | OXYGEN SATURATION: 95 %

## 2019-01-30 DIAGNOSIS — E08.65 DIABETES MELLITUS DUE TO UNDERLYING CONDITION WITH HYPERGLYCEMIA, WITH LONG-TERM CURRENT USE OF INSULIN: ICD-10-CM

## 2019-01-30 DIAGNOSIS — F10.10 ALCOHOL ABUSE: Primary | ICD-10-CM

## 2019-01-30 DIAGNOSIS — K86.3 PANCREATIC PSEUDOCYST: ICD-10-CM

## 2019-01-30 DIAGNOSIS — E80.6 HYPERBILIRUBINEMIA: ICD-10-CM

## 2019-01-30 DIAGNOSIS — R74.01 TRANSAMINITIS: ICD-10-CM

## 2019-01-30 DIAGNOSIS — I10 HYPERTENSION, UNSPECIFIED TYPE: ICD-10-CM

## 2019-01-30 DIAGNOSIS — Z79.4 DIABETES MELLITUS DUE TO UNDERLYING CONDITION WITH HYPERGLYCEMIA, WITH LONG-TERM CURRENT USE OF INSULIN: ICD-10-CM

## 2019-01-30 DIAGNOSIS — K80.20 CALCULUS OF GALLBLADDER WITHOUT CHOLECYSTITIS WITHOUT OBSTRUCTION: ICD-10-CM

## 2019-01-30 DIAGNOSIS — K85.20 ALCOHOL-INDUCED ACUTE PANCREATITIS, UNSPECIFIED COMPLICATION STATUS: ICD-10-CM

## 2019-01-30 LAB
ALBUMIN SERPL BCP-MCNC: 3.8 G/DL
ALP SERPL-CCNC: 120 U/L
ALT SERPL W/O P-5'-P-CCNC: 167 U/L
AMPHET+METHAMPHET UR QL: NEGATIVE
ANION GAP SERPL CALC-SCNC: 13 MMOL/L
AST SERPL-CCNC: 572 U/L
BARBITURATES UR QL SCN>200 NG/ML: NEGATIVE
BASOPHILS # BLD AUTO: 0.02 K/UL
BASOPHILS NFR BLD: 0.2 %
BENZODIAZ UR QL SCN>200 NG/ML: NEGATIVE
BILIRUB SERPL-MCNC: 1.7 MG/DL
BILIRUB UR QL STRIP: NEGATIVE
BUN SERPL-MCNC: 16 MG/DL
BZE UR QL SCN: NEGATIVE
CALCIUM SERPL-MCNC: 9 MG/DL
CANNABINOIDS UR QL SCN: NEGATIVE
CHLORIDE SERPL-SCNC: 104 MMOL/L
CK MB SERPL-MCNC: 2.7 NG/ML
CK MB SERPL-RTO: 1.8 %
CK SERPL-CCNC: 146 U/L
CK SERPL-CCNC: 146 U/L
CLARITY UR REFRACT.AUTO: CLEAR
CO2 SERPL-SCNC: 22 MMOL/L
COLOR UR AUTO: YELLOW
CREAT SERPL-MCNC: 0.9 MG/DL
CREAT UR-MCNC: 182.5 MG/DL
DIFFERENTIAL METHOD: ABNORMAL
EOSINOPHIL # BLD AUTO: 0 K/UL
EOSINOPHIL NFR BLD: 0.1 %
ERYTHROCYTE [DISTWIDTH] IN BLOOD BY AUTOMATED COUNT: 12.7 %
EST. GFR  (AFRICAN AMERICAN): >60 ML/MIN/1.73 M^2
EST. GFR  (NON AFRICAN AMERICAN): >60 ML/MIN/1.73 M^2
ETHANOL SERPL-MCNC: <10 MG/DL
GLUCOSE SERPL-MCNC: 280 MG/DL
GLUCOSE UR QL STRIP: ABNORMAL
HCT VFR BLD AUTO: 40.3 %
HGB BLD-MCNC: 13.2 G/DL
HGB UR QL STRIP: NEGATIVE
KETONES UR QL STRIP: ABNORMAL
LEUKOCYTE ESTERASE UR QL STRIP: NEGATIVE
LIPASE SERPL-CCNC: 141 U/L
LYMPHOCYTES # BLD AUTO: 1.1 K/UL
LYMPHOCYTES NFR BLD: 13.5 %
MCH RBC QN AUTO: 29.2 PG
MCHC RBC AUTO-ENTMCNC: 32.8 G/DL
MCV RBC AUTO: 89 FL
METHADONE UR QL SCN>300 NG/ML: NEGATIVE
MONOCYTES # BLD AUTO: 0.7 K/UL
MONOCYTES NFR BLD: 8.8 %
NEUTROPHILS # BLD AUTO: 6.4 K/UL
NEUTROPHILS NFR BLD: 77.2 %
NITRITE UR QL STRIP: NEGATIVE
OPIATES UR QL SCN: NORMAL
PCP UR QL SCN>25 NG/ML: NEGATIVE
PH UR STRIP: 5 [PH] (ref 5–8)
PLATELET # BLD AUTO: 270 K/UL
PMV BLD AUTO: 10.3 FL
POCT GLUCOSE: 263 MG/DL (ref 70–110)
POCT GLUCOSE: 280 MG/DL (ref 70–110)
POTASSIUM SERPL-SCNC: 3.6 MMOL/L
PROT SERPL-MCNC: 6.7 G/DL
PROT UR QL STRIP: NEGATIVE
RBC # BLD AUTO: 4.52 M/UL
SODIUM SERPL-SCNC: 139 MMOL/L
SP GR UR STRIP: >=1.03 (ref 1–1.03)
TOXICOLOGY INFORMATION: NORMAL
TROPONIN I SERPL DL<=0.01 NG/ML-MCNC: <0.006 NG/ML
URN SPEC COLLECT METH UR: ABNORMAL
UROBILINOGEN UR STRIP-ACNC: NEGATIVE EU/DL
WBC # BLD AUTO: 8.27 K/UL

## 2019-01-30 PROCEDURE — 80307 DRUG TEST PRSMV CHEM ANLYZR: CPT | Mod: ER

## 2019-01-30 PROCEDURE — 96375 TX/PRO/DX INJ NEW DRUG ADDON: CPT | Mod: ER

## 2019-01-30 PROCEDURE — 81003 URINALYSIS AUTO W/O SCOPE: CPT | Mod: ER,59

## 2019-01-30 PROCEDURE — 82550 ASSAY OF CK (CPK): CPT | Mod: ER

## 2019-01-30 PROCEDURE — 85025 COMPLETE CBC W/AUTO DIFF WBC: CPT | Mod: ER

## 2019-01-30 PROCEDURE — 25000003 PHARM REV CODE 250: Mod: ER | Performed by: EMERGENCY MEDICINE

## 2019-01-30 PROCEDURE — 84484 ASSAY OF TROPONIN QUANT: CPT | Mod: ER

## 2019-01-30 PROCEDURE — 82962 GLUCOSE BLOOD TEST: CPT | Mod: ER

## 2019-01-30 PROCEDURE — 82553 CREATINE MB FRACTION: CPT | Mod: ER

## 2019-01-30 PROCEDURE — 93005 ELECTROCARDIOGRAM TRACING: CPT | Mod: ER

## 2019-01-30 PROCEDURE — 80320 DRUG SCREEN QUANTALCOHOLS: CPT | Mod: ER

## 2019-01-30 PROCEDURE — 99285 EMERGENCY DEPT VISIT HI MDM: CPT | Mod: 25,ER

## 2019-01-30 PROCEDURE — 96361 HYDRATE IV INFUSION ADD-ON: CPT | Mod: ER

## 2019-01-30 PROCEDURE — 80053 COMPREHEN METABOLIC PANEL: CPT | Mod: ER

## 2019-01-30 PROCEDURE — 83690 ASSAY OF LIPASE: CPT | Mod: ER

## 2019-01-30 PROCEDURE — 25500020 PHARM REV CODE 255: Mod: ER | Performed by: EMERGENCY MEDICINE

## 2019-01-30 PROCEDURE — 96374 THER/PROPH/DIAG INJ IV PUSH: CPT | Mod: ER

## 2019-01-30 PROCEDURE — 93010 ELECTROCARDIOGRAM REPORT: CPT | Mod: ,,, | Performed by: INTERNAL MEDICINE

## 2019-01-30 PROCEDURE — 63600175 PHARM REV CODE 636 W HCPCS: Mod: ER | Performed by: EMERGENCY MEDICINE

## 2019-01-30 PROCEDURE — 93010 EKG 12-LEAD: ICD-10-PCS | Mod: ,,, | Performed by: INTERNAL MEDICINE

## 2019-01-30 RX ORDER — ONDANSETRON 2 MG/ML
4 INJECTION INTRAMUSCULAR; INTRAVENOUS
Status: COMPLETED | OUTPATIENT
Start: 2019-01-30 | End: 2019-01-30

## 2019-01-30 RX ORDER — HYDROCODONE BITARTRATE AND ACETAMINOPHEN 10; 325 MG/1; MG/1
1 TABLET ORAL EVERY 6 HOURS PRN
Qty: 18 TABLET | Refills: 0 | Status: SHIPPED | OUTPATIENT
Start: 2019-01-30 | End: 2019-03-01

## 2019-01-30 RX ORDER — HYDROCODONE BITARTRATE AND ACETAMINOPHEN 10; 325 MG/1; MG/1
1 TABLET ORAL
Status: COMPLETED | OUTPATIENT
Start: 2019-01-30 | End: 2019-01-30

## 2019-01-30 RX ORDER — HYDROMORPHONE HYDROCHLORIDE 2 MG/ML
1 INJECTION, SOLUTION INTRAMUSCULAR; INTRAVENOUS; SUBCUTANEOUS
Status: COMPLETED | OUTPATIENT
Start: 2019-01-30 | End: 2019-01-30

## 2019-01-30 RX ADMIN — ONDANSETRON 4 MG: 2 INJECTION, SOLUTION INTRAMUSCULAR; INTRAVENOUS at 03:01

## 2019-01-30 RX ADMIN — LORAZEPAM 1 MG: 2 INJECTION INTRAMUSCULAR; INTRAVENOUS at 05:01

## 2019-01-30 RX ADMIN — HYDROCODONE BITARTRATE AND ACETAMINOPHEN 1 TABLET: 10; 325 TABLET ORAL at 08:01

## 2019-01-30 RX ADMIN — IOHEXOL 75 ML: 350 INJECTION, SOLUTION INTRAVENOUS at 05:01

## 2019-01-30 RX ADMIN — HYDROMORPHONE HYDROCHLORIDE 1 MG: 2 INJECTION, SOLUTION INTRAMUSCULAR; INTRAVENOUS; SUBCUTANEOUS at 03:01

## 2019-01-30 RX ADMIN — SODIUM CHLORIDE 1000 ML: 0.9 INJECTION, SOLUTION INTRAVENOUS at 03:01

## 2019-01-30 NOTE — ED PROVIDER NOTES
Encounter Date: 1/30/2019       History     Chief Complaint   Patient presents with    Abdominal Pain     concerned with pancreatitis      Long-term alcohol abuse, gastroesophageal reflux, diabetes, hypertension, TIA, other conditions as outlined.  Has been hospitalized in 2016 in 2018 for alcohol induced pancreatitis. Still drinks, and amount is uncertain, has done some heavy drinking recently and reports his last beer was about 4 days ago.  He stopped because he was having increasing upper abdominal pain which has continued until today.  Some nausea, no vomiting or sign of bleeding.  No fever or urinary complaints.  No diarrhea.  Has continued to eat some but oral intake has been decreased.  Generalized upper abdominal pain, feels like previous episodes of pancreatitis.  Counseled on arrival in detail regarding the fact that no amount of alcohol is safe for him to drink now or ever in the future under these circumstances.  No other specific complaints.      The history is provided by the patient. No  was used.     Review of patient's allergies indicates:  No Known Allergies  Past Medical History:   Diagnosis Date    Cataracts, bilateral     Diabetes mellitus, type 2     Diagnosed 7/2016    GERD (gastroesophageal reflux disease)     Hypertension     Inflammatory polyps of colon     Pancreatitis, alcoholic, acute     Port catheter in place     TIA (transient ischemic attack)      Past Surgical History:   Procedure Laterality Date    COLONOSCOPY      COLONOSCOPY N/A 6/15/2018    [facility]    ESOPHAGOGASTRODUODENOSCOPY (EGD) N/A 6/15/2018    Performed by Jim Hikcs MD at [facility]     Family History   Problem Relation Age of Onset    Hypertension Mother     Hypertension Father      Social History     Tobacco Use    Smoking status: Former Smoker     Types: Cigarettes    Smokeless tobacco: Never Used   Substance Use Topics    Alcohol use: Yes      Comment:  Daily - States that he drinks 1/2 pint of vodka and about 1-2 cans of beer daily    Drug use: No     Review of Systems   Constitutional: Negative for chills and fever.   HENT: Negative for congestion, facial swelling, nosebleeds and sinus pressure.    Eyes: Negative for pain and redness.   Respiratory: Negative for chest tightness, shortness of breath and wheezing.    Cardiovascular: Negative for chest pain, palpitations and leg swelling.   Gastrointestinal: Positive for abdominal pain and nausea. Negative for abdominal distention, diarrhea and vomiting.   Endocrine: Negative for cold intolerance, polydipsia and polyphagia.   Genitourinary: Negative for difficulty urinating, dysuria, frequency and hematuria.   Musculoskeletal: Negative for arthralgias, back pain, myalgias and neck pain.   Skin: Negative for color change and rash.   Neurological: Negative for dizziness, weakness, numbness and headaches.   Hematological: Negative for adenopathy. Does not bruise/bleed easily.   Psychiatric/Behavioral: Negative for agitation and behavioral problems.   All other systems reviewed and are negative.      Physical Exam     Initial Vitals [01/30/19 1452]   BP Pulse Resp Temp SpO2   129/84 81 18 98.2 °F (36.8 °C) 96 %      MAP       --         Physical Exam    Nursing note and vitals reviewed.  Constitutional: He appears well-developed and well-nourished. He is not diaphoretic. No distress.   HENT:   Head: Normocephalic and atraumatic.   Mouth/Throat: Oropharynx is clear and moist. No oropharyngeal exudate.   Eyes: Conjunctivae and EOM are normal. Pupils are equal, round, and reactive to light. Right eye exhibits no discharge. Left eye exhibits no discharge. No scleral icterus.   Neck: Normal range of motion. Neck supple. No thyromegaly present. No tracheal deviation present. No JVD present.   Cardiovascular: Normal rate, regular rhythm and normal heart sounds. Exam reveals no gallop and no friction rub.    No  murmur heard.  Pulmonary/Chest: Breath sounds normal. No respiratory distress. He has no wheezes. He has no rhonchi. He has no rales. He exhibits no tenderness.   Abdominal: Soft. Bowel sounds are normal. He exhibits no distension and no mass. There is tenderness in the epigastric area. There is no rebound and no guarding.   Musculoskeletal: Normal range of motion. He exhibits no edema or tenderness.   Lymphadenopathy:     He has no cervical adenopathy.   Neurological: He is alert and oriented to person, place, and time. He has normal strength. No cranial nerve deficit.   Skin: Skin is warm and dry. No rash noted. No erythema.   Psychiatric: He has a normal mood and affect. His behavior is normal. Judgment and thought content normal.         ED Course   Procedures  Labs Reviewed   CBC W/ AUTO DIFFERENTIAL - Abnormal; Notable for the following components:       Result Value    RBC 4.52 (*)     Hemoglobin 13.2 (*)     Gran% 77.2 (*)     Lymph% 13.5 (*)     All other components within normal limits   URINALYSIS, REFLEX TO URINE CULTURE - Abnormal; Notable for the following components:    Specific Gravity, UA >=1.030 (*)     Glucose, UA 2+ (*)     Ketones, UA Trace (*)     All other components within normal limits    Narrative:     Preferred Collection Type->Urine, Clean Catch   COMPREHENSIVE METABOLIC PANEL - Abnormal; Notable for the following components:    CO2 22 (*)     Glucose 280 (*)     Total Bilirubin 1.7 (*)      (*)      (*)     All other components within normal limits   LIPASE - Abnormal; Notable for the following components:    Lipase 141 (*)     All other components within normal limits   POCT GLUCOSE - Abnormal; Notable for the following components:    POCT Glucose 263 (*)     All other components within normal limits   POCT GLUCOSE - Abnormal; Notable for the following components:    POCT Glucose 280 (*)     All other components within normal limits   DRUG SCREEN PANEL, URINE EMERGENCY     Narrative:     Preferred Collection Type->Urine, Clean Catch   ALCOHOL,MEDICAL (ETHANOL)   TROPONIN I   CK-MB   CK     EKG Readings: (Independently Interpreted)   Initial Reading: No STEMI. Rhythm: Normal Sinus Rhythm. Heart Rate: 78. Ectopy: No Ectopy. Conduction: Normal. ST Segments: Normal ST Segments. T Waves: Normal. Axis: Normal. Clinical Impression: Normal Sinus Rhythm       5:18 PM Got anxious on CT table.      Imaging Results          US Abdomen Limited (Final result)  Result time 01/30/19 19:49:19    Final result by Srinivasa Mcdaniels III, MD (01/30/19 19:49:19)                 Impression:      1.  Gallstones.    2.  Pancreas suboptimally seen due to bowel gas.  No other significant findings.      Electronically signed by: Srinivasa Mcdaniels MD  Date:    01/30/2019  Time:    19:49             Narrative:    EXAMINATION:  US ABDOMEN LIMITED    CLINICAL HISTORY:  Epigastric Pain, Pancreatitis/Transaminitis, Hyperbilirubinemia;    FINDINGS:  The liver is normal.  Portal vein is patent.  Doppler analysis shows hepatopetal flow.  Right kidney is normal.  Gallbladder contains stones, the largest measuring 8 mm in diameter.  No gallbladder wall thickening.  No ductal dilatation.    Visualized segments of the pancreas are grossly unremarkable but the majority was obscured by bowel gas.                               CT Abdomen Pelvis With Contrast (Final result)  Result time 01/30/19 18:05:20    Final result by Srinivasa Mcdaniels III, MD (01/30/19 18:05:20)                 Impression:      1.  Generalized pancreatitis with moderate peripancreatic stranding/haziness.  No significant adjacent fluid collection or free air.  No suspicious mass.  I do suspect 1 small residual 1 cm density laterally on the left, possibly a small pseudocyst.    2.  Mild distention of the gallbladder.  No bowel obstruction.  No free air.    3.  Fat containing inguinal and umbilical hernias.  Prostatomegaly indents the base of the  bladder.    All CT scans at this facility are performed  using dose modulation techniques as appropriate to performed exam including the following:  automated exposure control; adjustment of mA and/or kV according to the patients size (this includes techniques or standardized protocols for targeted exams where dose is matched to indication/reason for exam: i.e. extremities or head);  iterative reconstruction technique.      Electronically signed by: Srinivasa Mcdaniels MD  Date:    01/30/2019  Time:    18:05             Narrative:    EXAMINATION:  CT ABDOMEN PELVIS WITH CONTRAST    CLINICAL HISTORY:  Pancreatitis;    TECHNIQUE:  Axial CT imaging was performed through the abdomen and pelvis with  75cc  of intravenous contrast. Multiplanar reformats were performed and interpreted.    COMPARISON:  July    FINDINGS:  The heart size remains normal.  Lung bases show no acute abnormality.  There is no free intraperitoneal air or sagar bowel obstruction.  Bony windows show no detrimental change.  Scoliosis again noted.    The liver and spleen show no focal abnormality.  Gallbladder is mildly distended.  There is no adrenal mass or enlargement.  No solid renal mass or obstruction.    In the right lower quadrant there is no abnormality to suggest acute appendicitis bladder is mildly distended with borderline wall thickening.  Mild prostatomegaly indents the base of the bladder.  Suspect small fat containing inguinal hernias as well as a small fat containing umbilical hernia.    There is peripancreatic stranding/haziness surrounding the entire pancreas.  Lack of oral contrast limits the evaluation.  There is 1 small residual cystic density, presumably a pseudocyst, laterally in the soft tissues between the pancreas and left pericolic gutter.  Overall this region a similar to minimally improved when compared to July 2018.                                 Medical Decision Making:   ED Management:  All findings were reviewed with  the patient/family in detail along with the diagnosis of alcohol associated pancreatitis as well as transaminitis and note of gallstones though these are not felt to be the source.  Patient additionally counseled regarding the possibility of a pseudocyst which will need further imaging in the future.  He has been advised of the need for follow-up regarding the gallstones as well. Patient has been provided with appropriate information regarding dietary changes pending PCP follow-up.  Patient has been provided with opiate an antiemetic prescriptions.  I see no indication of an emergent process beyond that addressed during our encounter but have duly counseled the patient/family regarding the need for prompt follow-up as well as the indications that should prompt immediate return to the emergency room should new or worrisome developments occur.  The patient/family communicates understanding of all this information and all remaining questions and concerns were addressed at this time.                          Clinical Impression:     1. Alcohol abuse    2. Alcohol-induced acute pancreatitis, unspecified complication status    3. Transaminitis    4. Calculus of gallbladder without cholecystitis without obstruction    5. Hyperbilirubinemia    6. Hypertension, unspecified type    7. Diabetes mellitus due to underlying condition with hyperglycemia, with long-term current use of insulin    8. Pancreatic pseudocyst            Disposition:   Condition: Stable                        Bernabe Coleman MD  01/31/19 0309

## 2019-01-31 ENCOUNTER — HOSPITAL ENCOUNTER (EMERGENCY)
Facility: HOSPITAL | Age: 54
Discharge: HOME | End: 2019-01-31
Attending: EMERGENCY MEDICINE
Payer: MEDICAID

## 2019-01-31 VITALS
DIASTOLIC BLOOD PRESSURE: 97 MMHG | TEMPERATURE: 98 F | BODY MASS INDEX: 33.66 KG/M2 | HEART RATE: 84 BPM | OXYGEN SATURATION: 98 % | HEIGHT: 66 IN | WEIGHT: 209.44 LBS | SYSTOLIC BLOOD PRESSURE: 178 MMHG | RESPIRATION RATE: 20 BRPM

## 2019-01-31 DIAGNOSIS — I10 HYPERTENSION, UNSPECIFIED TYPE: ICD-10-CM

## 2019-01-31 DIAGNOSIS — Z91.148 NONCOMPLIANCE WITH MEDICATIONS: ICD-10-CM

## 2019-01-31 DIAGNOSIS — K85.90 ACUTE PANCREATITIS, UNSPECIFIED COMPLICATION STATUS, UNSPECIFIED PANCREATITIS TYPE: Primary | ICD-10-CM

## 2019-01-31 PROCEDURE — 25000003 PHARM REV CODE 250: Mod: ER | Performed by: EMERGENCY MEDICINE

## 2019-01-31 PROCEDURE — 99283 EMERGENCY DEPT VISIT LOW MDM: CPT | Mod: ER

## 2019-01-31 RX ORDER — ONDANSETRON 4 MG/1
4 TABLET, ORALLY DISINTEGRATING ORAL
Status: COMPLETED | OUTPATIENT
Start: 2019-01-31 | End: 2019-01-31

## 2019-01-31 RX ORDER — HYDROCODONE BITARTRATE AND ACETAMINOPHEN 10; 325 MG/1; MG/1
1 TABLET ORAL
Status: COMPLETED | OUTPATIENT
Start: 2019-01-31 | End: 2019-01-31

## 2019-01-31 RX ADMIN — ONDANSETRON 4 MG: 4 TABLET, ORALLY DISINTEGRATING ORAL at 02:01

## 2019-01-31 RX ADMIN — HYDROCODONE BITARTRATE AND ACETAMINOPHEN 1 TABLET: 10; 325 TABLET ORAL at 02:01

## 2019-01-31 NOTE — DISCHARGE INSTRUCTIONS
Patient has been advised to follow up with the primary care physician as previously scheduled on his earlier discharge instructions.  Patient is also advised to make appropriate dietary changes also has provided on the earlier discharge instructions.  We have strongly encouraged him to obtain the prescriptions provided him last evening noting that he is unlikely to maintain satisfactory relief in the interim without them.

## 2019-01-31 NOTE — ED NOTES
LOC: The patient is awake, alert and aware of environment with an appropriate affect, the patient is oriented x 3 and speaking appropriately.  APPEARANCE: Patient resting comfortably and in no acute distress, patient is clean and well groomed, patient's clothing is properly fastened.  HEENT: Brief WNL  SKIN: Brief WNL.   MUSCULOSKELETAL: Brief WNL  RESPIRATORY: Brief WNL  CARDIAC: Brief WNL  GASTRO: Brief WNL. abd pain with nausea denies vomiting bowel sounds active throughout all quads  : Brief WNL  Peripheral Vasc: Brief WNL  NEURO: Brief WNL  PSYCH: Brief WNL

## 2019-02-01 ENCOUNTER — TELEPHONE (OUTPATIENT)
Dept: INTERNAL MEDICINE | Facility: CLINIC | Age: 54
End: 2019-02-01

## 2019-02-01 ENCOUNTER — PES CALL (OUTPATIENT)
Dept: ADMINISTRATIVE | Facility: CLINIC | Age: 54
End: 2019-02-01

## 2019-02-01 NOTE — TELEPHONE ENCOUNTER
Spoke with pt and pt stated he would like to schedule a ER f/u with Dr. Roblero. Nurse scheduled pt on 02/06/19. Pt confirmed appointment.

## 2019-02-01 NOTE — TELEPHONE ENCOUNTER
----- Message from Salina Madden sent at 2/1/2019 12:07 PM CST -----  Contact: pt  He's calling in regards to be worked into schedule , for hosp f/u pls call pt back at [phone] (home)

## 2019-02-04 NOTE — ED PROVIDER NOTES
Encounter Date: 1/31/2019       History     Chief Complaint   Patient presents with    Abdominal Pain     c/o abd paiun with nausea here earlier for same symptoms     Patient returns to the ED having been evaluated just a few hours ago and diagnosed with acute alcohol-induced pancreatitis.  Patient returns with concern of returning pain and nausea but unfortunately has not acquired the antiemetics and pain medications he was prescribed.  He otherwise notes no new complaints.  No further emesis.          Review of patient's allergies indicates:  No Known Allergies  Past Medical History:   Diagnosis Date    Cataracts, bilateral     Diabetes mellitus, type 2     Diagnosed 7/2016    GERD (gastroesophageal reflux disease)     Hypertension     Inflammatory polyps of colon     Pancreatitis, alcoholic, acute     Port catheter in place     TIA (transient ischemic attack)      Past Surgical History:   Procedure Laterality Date    COLONOSCOPY      COLONOSCOPY N/A 6/15/2018    Performed by [facility] at [facility]    ESOPHAGOGASTRODUODENOSCOPY (EGD) N/A 6/15/2018    Performed by Jim Hicks MD at [facility]     Family History   Problem Relation Age of Onset    Hypertension Mother     Hypertension Father      Social History     Tobacco Use    Smoking status: Former Smoker     Types: Cigarettes    Smokeless tobacco: Never Used   Substance Use Topics    Alcohol use: Yes     Comment:  Daily - States that he drinks 1/2 pint of vodka and about 1-2 cans of beer daily    Drug use: No     Review of Systems   Constitutional: Negative for chills and fever.   HENT: Negative for congestion.    Respiratory: Negative for chest tightness and shortness of breath.    Cardiovascular: Negative for chest pain and leg swelling.   Gastrointestinal: Positive for abdominal distention, nausea and vomiting. Negative for abdominal pain, constipation and diarrhea.   Genitourinary: Negative for dysuria, frequency and urgency.  "  Skin: Negative for color change and rash.   Allergic/Immunologic: Negative for immunocompromised state.   Neurological: Negative for weakness and numbness.   Hematological: Negative for adenopathy. Does not bruise/bleed easily.   All other systems reviewed and are negative.    Physical Exam     Initial Vitals [01/31/19 0019]   BP Pulse Resp Temp SpO2   (!) 193/106 85 20 98.4 °F (36.9 °C) 98 %      MAP       --         Vitals:    01/31/19 0019 01/31/19 0131 01/31/19 0314   BP: (!) 193/106 (!) 189/108 (!) 178/97   Pulse: 85 87 84   Resp: 20 20 20   Temp: 98.4 °F (36.9 °C)     TempSrc: Oral     SpO2: 98% 98% 98%   Weight: 95 kg (209 lb 7 oz)     Height: 5' 6" (1.676 m)          Physical Exam    Nursing note and vitals reviewed.  Constitutional: He appears well-developed and well-nourished. He is not diaphoretic. No distress.   HENT:   Head: Normocephalic and atraumatic.   Right Ear: External ear normal.   Left Ear: External ear normal.   Nose: Nose normal.   Mouth/Throat: Oropharynx is clear and moist.   Eyes: Conjunctivae and EOM are normal. Pupils are equal, round, and reactive to light. No scleral icterus.   Neck: Neck supple. No JVD present.   Cardiovascular: Normal rate, regular rhythm, normal heart sounds and intact distal pulses. Exam reveals no gallop and no friction rub.    No murmur heard.  Pulmonary/Chest: Breath sounds normal. No respiratory distress. He has no wheezes. He has no rhonchi. He has no rales.   Abdominal: Soft. Bowel sounds are normal. He exhibits no distension. There is tenderness (mild/moderate) in the epigastric area.   Musculoskeletal: Normal range of motion. He exhibits no edema.   Neurological: He is alert and oriented to person, place, and time.   Skin: Skin is warm and dry. No rash noted.   Psychiatric: He has a normal mood and affect. His behavior is normal.         ED Course   Procedures  Labs Reviewed - No data to display       Imaging Results    None          Medical Decision " Making:   ED Management:  Pain and nausea controlled at present.  I see no indication of an emergent process beyond that addressed during our encounter but have duly counseled the patient/family regarding the need for prompt follow-up as well as the indications that should prompt immediate return to the emergency room should new or worrisome developments occur.  Patient has been strongly advised to obtain his medications and maintain compliance with his usual home meds also not taken today.  The patient/family communicates understanding of all this information and all remaining questions and concerns were addressed at this time.                          Clinical Impression:   The primary encounter diagnosis was Acute pancreatitis, unspecified complication status, unspecified pancreatitis type. Diagnoses of Noncompliance with medications and Hypertension, unspecified type were also pertinent to this visit.                             Bernabe Coleman MD  02/03/19 [id number]

## 2019-02-05 ENCOUNTER — PATIENT OUTREACH (OUTPATIENT)
Dept: ADMINISTRATIVE | Facility: HOSPITAL | Age: 54
End: 2019-02-05

## 2019-02-05 DIAGNOSIS — E10.65 HYPERGLYCEMIA DUE TO TYPE 1 DIABETES MELLITUS: Primary | ICD-10-CM

## 2019-02-06 ENCOUNTER — HOSPITAL ENCOUNTER (OUTPATIENT)
Dept: RADIOLOGY | Facility: HOSPITAL | Age: 54
Discharge: HOME | End: 2019-02-06
Attending: FAMILY MEDICINE
Payer: MEDICAID

## 2019-02-06 ENCOUNTER — OFFICE VISIT (OUTPATIENT)
Dept: INTERNAL MEDICINE | Facility: CLINIC | Age: 54
End: 2019-02-06
Payer: MEDICAID

## 2019-02-06 VITALS
SYSTOLIC BLOOD PRESSURE: 158 MMHG | BODY MASS INDEX: 34.05 KG/M2 | DIASTOLIC BLOOD PRESSURE: 92 MMHG | OXYGEN SATURATION: 96 % | RESPIRATION RATE: 16 BRPM | HEIGHT: 66 IN | TEMPERATURE: 96 F | HEART RATE: 98 BPM | WEIGHT: 211.88 LBS

## 2019-02-06 DIAGNOSIS — R74.8 ELEVATED LIPASE: ICD-10-CM

## 2019-02-06 DIAGNOSIS — E11.59 HYPERTENSION ASSOCIATED WITH DIABETES: ICD-10-CM

## 2019-02-06 DIAGNOSIS — R74.01 TRANSAMINITIS: ICD-10-CM

## 2019-02-06 DIAGNOSIS — I15.2 HYPERTENSION ASSOCIATED WITH DIABETES: ICD-10-CM

## 2019-02-06 PROBLEM — L73.9 FOLLICULITIS: Status: RESOLVED | Noted: 2017-09-15 | Resolved: 2019-02-06

## 2019-02-06 PROBLEM — B35.6 TINEA CRURIS: Status: RESOLVED | Noted: 2017-09-15 | Resolved: 2019-02-06

## 2019-02-06 PROCEDURE — 99999 PR PBB SHADOW E&M-EST. PATIENT-LVL III: ICD-10-PCS | Mod: PBBFAC,,, | Performed by: FAMILY MEDICINE

## 2019-02-06 PROCEDURE — 99213 OFFICE O/P EST LOW 20 MIN: CPT | Mod: PBBFAC,PO | Performed by: FAMILY MEDICINE

## 2019-02-06 PROCEDURE — 99215 PR OFFICE/OUTPT VISIT, EST, LEVL V, 40-54 MIN: ICD-10-PCS | Mod: S$PBB,,, | Performed by: FAMILY MEDICINE

## 2019-02-06 PROCEDURE — 99215 OFFICE O/P EST HI 40 MIN: CPT | Mod: S$PBB,,, | Performed by: FAMILY MEDICINE

## 2019-02-06 PROCEDURE — 99999 PR PBB SHADOW E&M-EST. PATIENT-LVL III: CPT | Mod: PBBFAC,,, | Performed by: FAMILY MEDICINE

## 2019-02-06 RX ORDER — LOSARTAN POTASSIUM AND HYDROCHLOROTHIAZIDE 25; 100 MG/1; MG/1
1 TABLET ORAL DAILY
Qty: 30 TABLET | Refills: 0 | Status: SHIPPED | OUTPATIENT
Start: 2019-02-06 | End: 2019-03-01 | Stop reason: SDUPTHER

## 2019-02-06 RX ORDER — INSULIN LISPRO 100 [IU]/ML
INJECTION, SOLUTION INTRAVENOUS; SUBCUTANEOUS
COMMUNITY
Start: 2018-12-20 | End: 2019-03-01 | Stop reason: ALTCHOICE

## 2019-02-06 NOTE — ASSESSMENT & PLAN NOTE
New problem, will rechk labs, if stil elevated will chk hep labs later on.  Personally reviewed imaging of u/s from 1/30/19.  Imaging showed no abnormality of liver.    No needed further imaging given recent u/s.

## 2019-02-06 NOTE — PROGRESS NOTES
Subjective:       Patient ID: Jorgito Arreaga is a 53 y.o. male.    Chief Complaint: Follow-up (ER)    Reviewed notes from Dr. Coleman.  Pt seen on 1/30/19 with epigastric pain, lipase elevated to 140.  Pt also had nausea at this visit.  He was treated, sx resolved and d/c to home,  Also there was noted transaminitis at this ED visit.      Review of Systems   Constitutional: Negative for activity change.   HENT: Negative for ear pain.    Eyes: Negative for pain.   Respiratory: Negative for shortness of breath.    Cardiovascular: Negative for chest pain.   Gastrointestinal: Negative for abdominal pain.   Genitourinary: Negative for dysuria.   Musculoskeletal: Negative for neck pain.   Skin: Negative for rash.   Neurological: Negative for headaches.       Objective:      Physical Exam   Constitutional: He appears well-developed and well-nourished. No distress.   HENT:   Head: Normocephalic and atraumatic.   Cardiovascular: Normal rate and regular rhythm.   Pulmonary/Chest: Effort normal and breath sounds normal. No respiratory distress. He has no wheezes.   Abdominal: Soft. Bowel sounds are normal. There is no tenderness.   Musculoskeletal: He exhibits no edema.   Neurological: He is alert.   Skin: Skin is warm and dry. No rash noted. He is not diaphoretic. No erythema.   Nursing note and vitals reviewed.      Assessment:       1. Uncontrolled secondary diabetes with peripheral neuropathy    2. Recurrent pancreatitis    3. Transaminitis    4. Hypertension associated with diabetes    5. Elevated lipase        Plan:     Problem List Items Addressed This Visit        Cardiac/Vascular    Hypertension associated with diabetes    Relevant Medications    HUMALOG U-100 INSULIN 100 unit/mL injection    losartan-hydrochlorothiazide 100-25 mg (HYZAAR) 100-25 mg per tablet    Other Relevant Orders    Comprehensive metabolic panel (Completed)       Endocrine    Uncontrolled secondary diabetes with peripheral neuropathy - Primary     Relevant Medications    HUMALOG U-100 INSULIN 100 unit/mL injection    Other Relevant Orders    Ambulatory referral to Optometry       GI    Recurrent pancreatitis    Relevant Orders    Lipase (Completed)    Transaminitis    Current Assessment & Plan     New problem, will rechk labs, if stil elevated will chk hep labs later on.  Personally reviewed imaging of u/s from 1/30/19.  Imaging showed no abnormality of liver.    No needed further imaging given recent u/s.           Relevant Orders    US Abdomen Limited_Liver       Other    Elevated lipase    Relevant Orders    Lipase (Completed)

## 2019-02-07 ENCOUNTER — OFFICE VISIT (OUTPATIENT)
Dept: DIABETES | Facility: CLINIC | Age: 54
End: 2019-02-07
Payer: MEDICAID

## 2019-02-07 VITALS
DIASTOLIC BLOOD PRESSURE: 96 MMHG | BODY MASS INDEX: 33.08 KG/M2 | WEIGHT: 210.75 LBS | SYSTOLIC BLOOD PRESSURE: 154 MMHG | HEIGHT: 67 IN

## 2019-02-07 DIAGNOSIS — E11.59 HYPERTENSION ASSOCIATED WITH DIABETES: Chronic | ICD-10-CM

## 2019-02-07 DIAGNOSIS — I15.2 HYPERTENSION ASSOCIATED WITH DIABETES: Chronic | ICD-10-CM

## 2019-02-07 DIAGNOSIS — E10.65 HYPERGLYCEMIA DUE TO TYPE 1 DIABETES MELLITUS: ICD-10-CM

## 2019-02-07 LAB — GLUCOSE SERPL-MCNC: 90 MG/DL (ref 70–110)

## 2019-02-07 PROCEDURE — 99213 OFFICE O/P EST LOW 20 MIN: CPT | Mod: PBBFAC,PN | Performed by: PHYSICIAN ASSISTANT

## 2019-02-07 PROCEDURE — 99214 OFFICE O/P EST MOD 30 MIN: CPT | Mod: S$PBB,,, | Performed by: PHYSICIAN ASSISTANT

## 2019-02-07 PROCEDURE — 99214 PR OFFICE/OUTPT VISIT, EST, LEVL IV, 30-39 MIN: ICD-10-PCS | Mod: S$PBB,,, | Performed by: PHYSICIAN ASSISTANT

## 2019-02-07 PROCEDURE — 82962 GLUCOSE BLOOD TEST: CPT | Mod: PBBFAC,PN | Performed by: PHYSICIAN ASSISTANT

## 2019-02-07 PROCEDURE — 99999 PR PBB SHADOW E&M-EST. PATIENT-LVL III: ICD-10-PCS | Mod: PBBFAC,,, | Performed by: PHYSICIAN ASSISTANT

## 2019-02-07 PROCEDURE — 99999 PR PBB SHADOW E&M-EST. PATIENT-LVL III: CPT | Mod: PBBFAC,,, | Performed by: PHYSICIAN ASSISTANT

## 2019-02-07 NOTE — PROGRESS NOTES
Subjective:      Patient ID: Jorgito Arreaga is a 53 y.o. male.    PCP: Otf Roblero MD      Jorgito Arreaga is a pleasant 53 y.o. male presenting to follow up on diabetes mellitus. Also, pertinent to this visit in decision making is Chronic pancreatitis; alcoholism; hypertension, hyperlipidemia, and compliance. He has had diabetes for 2 or more years. His last visit in Diabetes Management was 8/2/2018. Since that time he has been struggling with his lifestyle modifications particularly trying to avoid alcohol.  He did have admission to the emergency room for acute pancreatitis and was treated and released to home.  Since that time he has been working with his primary care to assist with his sobriety and control his comorbid conditions as well.  He is in good spirits today and states that he feels well.  He denies any nausea vomiting or abdominal pain today, his bowel movements are normal. He has been monitoring his blood sugars sporadically and has been trying to take his medication on time.    He denies any hospital admissions, has had emergency room visits on 2 occasions secondary to alcohol abuse and acute pancreatitis, hypoglycemia, syncope, diaphoresis, chest pain, or dyspnea.    He has lost 1 pounds since last visit. His BMI is 33.51 kg/m².    His blood sugar in the clinic today was:   Lab Results   Component Value Date    POCGLU 90 02/07/2019       Lab Results   Component Value Date    POCGLU 84  09/06/2018       We discussed the American diabetes Association recommendations:  hemoglobin A1c below 7.0%; all diabetics should be on statins unless contraindicated; one aspirin daily unless contraindicated; fasting blood sugar between 80 and 130 mg/dL; postprandial blood sugar below 180 mg/dl; prevention of hypoglycemia, may adjust goals to higher levels if persistent; ACE or ARB therapy if not contraindicated; and maintain in an ideal body weight with BMI below 25.    Jorgito is compliant most of the time  "with DM medications.     Jorgito is noncompliant some of the time with lifestyle modifications to include activity and meal planning.       Current Outpatient Medications:     aspirin 325 MG tablet, Take 1 tablet (325 mg total) by mouth once daily., Disp: 360 tablet, Rfl: 1    atorvastatin (LIPITOR) 10 MG tablet, Take 1 tablet (10 mg total) by mouth once daily., Disp: 90 tablet, Rfl: 1    blood sugar diagnostic (ONETOUCH ULTRA TEST) Strp, USE TO CHECK SUGAR BEFORE MEALS AND AT BEDTIME, Disp: 100 strip, Rfl: 0    HUMALOG U-100 INSULIN 100 unit/mL injection, , Disp: , Rfl:     hydrALAZINE (APRESOLINE) 50 MG tablet, Take 1 tablet (50 mg total) by mouth 3 (three) times daily., Disp: 180 tablet, Rfl: 1    HYDROcodone-acetaminophen (NORCO)  mg per tablet, Take 1 tablet by mouth every 6 (six) hours as needed for Pain., Disp: 18 tablet, Rfl: 0    insulin syringe-needle,dispos. 1 mL 28 gauge x 1/2" Syrg, 1 each by Misc.(Non-Drug; Combo Route) route 2 (two) times daily., Disp: 60 Syringe, Rfl: 0    isosorbide dinitrate (ISORDIL) 20 MG tablet, Take 1 tablet (20 mg total) by mouth 3 (three) times daily., Disp: 90 tablet, Rfl: 0    lancets (ONETOUCH DELICA LANCETS) 33 gauge Misc, Use to check sugar 4 (four) times daily before meals and nightly., Disp: 100 each, Rfl: 0    losartan-hydrochlorothiazide 100-25 mg (HYZAAR) 100-25 mg per tablet, Take 1 tablet by mouth once daily., Disp: 30 tablet, Rfl: 0    naproxen (NAPROSYN) 500 MG tablet, Take 1 tablet (500 mg total) by mouth 2 (two) times daily with meals., Disp: 30 tablet, Rfl: 0    ondansetron (ZOFRAN-ODT) 4 MG TbDL, Take 1 tablet (4 mg total) by mouth every 6 (six) hours as needed (NAUSEA/VOMITING)., Disp: 20 tablet, Rfl: 0    pantoprazole (PROTONIX) 40 MG tablet, Take 1 tablet (40 mg total) by mouth once daily., Disp: 90 tablet, Rfl: 1    polyethylene glycol (GLYCOLAX) 17 gram PwPk, Take 17 g by mouth once daily., Disp: 30 packet, Rfl: 0    verapamil " (CALAN-SR) 240 MG CR tablet, Take 1 tablet (240 mg total) by mouth once daily., Disp: 90 tablet, Rfl: 1    BASAGLAR KWIKPEN U-100 INSULIN glargine 100 units/mL (3mL) SubQ pen, INJECT 50 UNITS SUBCUTANEOUSLY IN THE EVENING, Disp: 15 mL, Rfl: 0    STANDARDS OF CARE:  Eye doctor:  [facility], last exam 2016.  Dental exam: Recommend regular exams; denies gums bleeding.  Podiatry doctor:  ACE/ARB: Yes  Statin: Yes     ACTIVITY LEVEL: He exercises rarely.  BLOOD GLUCOSE TESTING: Self-monitoring with   SOCIAL HISTORY: . Lives with spouse. 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    Health Maintenance   Topic Date Due    Eye Exam  05/10/2018    Lipid Panel  03/02/2019    Foot Exam  08/02/2019    Hemoglobin A1c  08/06/2019    Urine Microalbumin  10/03/2019    Low Dose Statin  02/07/2020    Colonoscopy  06/15/2023    TETANUS VACCINE  02/14/2027    Pneumococcal Vaccine (Medium Risk)  Completed    Influenza Vaccine  Completed       Diabetes Status:   Diabetes Management Status    Statin: Taking  ACE/ARB: Taking    Screening or Prevention Patient's value Goal Complete/Controlled?   HgA1C Testing and Control   Lab Results   Component Value Date    HGBA1C 7.5 (H) 02/06/2019      Annually/Less than 8% No   Lipid profile : 03/02/2018 Annually Yes   LDL control Lab Results   Component Value Date    LDLCALC 117.2 03/02/2018    Annually/Less than 100 mg/dl  No   Nephropathy screening Lab Results   Component Value Date    LABMICR 8.0 10/03/2018     Lab Results   Component Value Date    PROTEINUA Negative 01/30/2019    Annually Yes   Blood pressure BP Readings from Last 1 Encounters:   02/07/19 (!) 154/96    Less than 140/90 No   Dilated retinal exam : 05/10/2017 Annually No   Foot exam   : 08/02/2018 Annually Yes     The following results were reviewed with patient.    Lab Results   Component Value Date    WBC 8.27 01/30/2019    HGB 13.2 (L) 01/30/2019    HCT 40.3 01/30/2019     01/30/2019     CHOL 237 (H) 03/02/2018    TRIG 284 (H) 03/02/2018    HDL 63 03/02/2018    LDLCALC 117.2 03/02/2018    ALT 87 (H) 02/06/2019     (H) 02/06/2019     02/06/2019    K 4.2 02/06/2019     02/06/2019    ANIONGAP 12 02/06/2019    CREATININE 0.8 02/06/2019    ESTGFRAFRICA >60.0 02/06/2019    EGFRNONAA >60.0 02/06/2019    BUN 6 02/06/2019    CO2 26 02/06/2019    TSH 1.621 07/19/2018    INR 1.2 02/04/2017    GLU 52 (L) 02/06/2019       Lab Results   Component Value Date    HGBA1C 7.5 (H) 02/06/2019    HGBA1C 10.5 (H) 12/06/2018    HGBA1C 8.3 (H) 09/06/2018     Lab Results   Component Value Date    TSH 1.621 07/19/2018     Lab Results   Component Value Date    IRON 69 08/01/2016    TIBC 300 08/01/2016    FERRITIN 727 (H) 08/01/2016     Lab Results   Component Value Date    NIEGCVUR97 1010 (H) 08/01/2016     Lab Results   Component Value Date    CALCIUM 9.8 02/06/2019    PHOS 3.7 12/07/2018    PHOS 3.7 12/07/2018       Review of patient's allergies indicates:  No Known Allergies    Past Medical History:   Diagnosis Date    Cataracts, bilateral     Dehydration     Diabetes mellitus, type 2     Diagnosed 7/2016    Folliculitis 9/15/2017    GERD (gastroesophageal reflux disease)     Hypertension     Inflammatory polyps of colon     Pancreatitis, alcoholic, acute     Port catheter in place     TIA (transient ischemic attack)     Tinea cruris 9/15/2017       Review of Systems   Constitutional: Negative.  Negative for activity change, appetite change, chills, diaphoresis, fatigue, fever and unexpected weight change.   HENT: Negative.  Negative for dental problem, facial swelling, hearing loss, nosebleeds, trouble swallowing and voice change.    Eyes: Negative.  Negative for photophobia, pain, discharge, redness, itching and visual disturbance.   Respiratory: Negative.  Negative for cough, choking, chest tightness, shortness of breath and wheezing.    Cardiovascular: Negative.  Negative for chest pain,  "palpitations and leg swelling.   Gastrointestinal: Negative.  Negative for abdominal distention, abdominal pain, blood in stool, constipation, diarrhea, nausea and vomiting.   Endocrine: Negative.  Negative for cold intolerance, heat intolerance, polydipsia, polyphagia and polyuria.   Genitourinary: Negative.  Negative for decreased urine volume, difficulty urinating, dysuria, frequency, scrotal swelling, testicular pain and urgency.   Musculoskeletal: Negative.  Negative for arthralgias, back pain, gait problem, joint swelling, myalgias, neck pain and neck stiffness.   Skin: Negative.  Negative for color change, pallor, rash and wound.   Allergic/Immunologic: Negative.  Negative for environmental allergies, food allergies and immunocompromised state.   Neurological: Negative.  Negative for dizziness, tremors, seizures, syncope, facial asymmetry, speech difficulty, weakness, light-headedness, numbness and headaches.   Hematological: Negative.  Negative for adenopathy. Does not bruise/bleed easily.   Psychiatric/Behavioral: Negative.  Negative for agitation, behavioral problems, confusion, decreased concentration, dysphoric mood, hallucinations, self-injury, sleep disturbance and suicidal ideas. The patient is not nervous/anxious and is not hyperactive.       Objective:     Vitals - 1 value per visit 1/31/2019 2/6/2019 2/7/2019   SYSTOLIC 178 158 154   DIASTOLIC 97 92 96   PULSE 84 98 -   TEMPERATURE 98.4 96.1 -   RESPIRATIONS 20 16 -   SPO2 98 96 -   Weight (lb) 209.44 211.86 210.76   Weight (kg) 95 96.1 95.6   HEIGHT 5' 6" 5' 6" 5' 6.5"   BODY MASS INDEX 33.8 34.2 33.51   VISIT REPORT - - -   Pain Score  - 0 0   Some recent data might be hidden     Physical Exam   Constitutional: He is oriented to person, place, and time. He appears well-developed and well-nourished. No distress.   Neurological: He is alert and oriented to person, place, and time.   Skin: Skin is warm and dry. He is not diaphoretic.   Psychiatric: " He has a normal mood and affect.     Assessment:     1. Uncontrolled secondary diabetes with peripheral neuropathy    2. Hyperglycemia due to type 1 diabetes mellitus    3. Hypertension associated with diabetes       Plan:   Jorgito Arreaga is seen today for   1. Uncontrolled secondary diabetes with peripheral neuropathy    2. Hyperglycemia due to type 1 diabetes mellitus    3. Hypertension associated with diabetes      We have discussed the etiology and treatment options associated with the diagnosis as well as alternatives.       He has elected the following treatments.     Jorgito Arreaga is seen today for   1. Uncontrolled secondary diabetes with peripheral neuropathy    2. Hyperglycemia due to type 1 diabetes mellitus    3. Hypertension associated with diabetes      We have discussed the etiology and treatment options associated with the diagnosis as well as alternatives. He has elected the following treatments.     Uncontrolled secondary diabetes with peripheral neuropathy  -     POCT Glucose, Hand-Held Device    Hyperglycemia due to type 1 diabetes mellitus  -     POCT Glucose, Hand-Held Device    Hypertension associated with diabetes  -     POCT Glucose, Hand-Held Device    Uncontrolled secondary diabetes with peripheral neuropathy  -     POCT glucose  - explained to patient that he has poor healing with uncontrolled diabetes. Also, diabetes mellitus is an immune compromised state.  - Continue with D&E, reduce portion size, and restrict carbohydrates (no more that 45 grams ) per meal.  - We discussed the need for more intensive insulin therapy, I advised him that his diabetes was more of a conditions associated with insulin deficiency.  - C-peptide;  JHON-AB;  And A1c today    1.) Patient was instructed to continue to monitor blood glucose 4 times daily. Reminded to bring BG meter or record to each visit for review.  2.) Reviewed pathophysiology of diabetes, complications related to the disease, importance of  "annual dilated eye exam and self daily foot examination.  3.) Continue medications as prescribed MDI with Basaglar and Humalog; Metformin; Glipizide. [facility] MyChart or Phone review in 1 week with BG records for adjustment of medication.  4.) Advised patient to continue to follow up with Dietician/CDE as directed.  5.) Discussed activity, benefits, methods, and precautions. Recommended patient start/continue some form of exercise and increase as tolerated to 60 minutes per day to facilitate weight loss and aid in control of BGs. Also reminded patient of WHO recommendation of 10,000 steps daily as a goal.   6.) A1C, TSH, Lipid Panel, CMP/renal panel with eGFR and Micro/Creatinine prior to next visit, if not already done.  7.) Return to clinic in 12 weeks for follow up. Advised patient to call clinic with any questions or concerns.     I have reviewed your results and they are still quite high. I would like you to start "Treating to Target". The treatment will be Insulin and your target will be the Fasting and 2 hour post meal blood sugar. It will work in this manner;     1. Goal for Fasting blood sugar is  mg/dl. I realize that you will need time to adjust to the new levels and presently you may feel too low if you are too aggressive now. So go slow and aim to lower your blood sugar to below 200 then 150 then 100 over several months.     2. Goal for 2 hour post meal blood sugar is below 180 mg/dl, here the same rules apply as in #1.     3. You will check your fasting blood sugar daily, if not where we would like it to be over a 3 day period then that evening we will increase the Basaglar dose by 5 units. Then repeat the process over the next 3 days. Remember this is a slow process and take our time getting to goal. But, each week should be better than prior weeks. Blood sugars below 70 are unacceptable and should raise a "RED FLAG" where we may have to reduce our dose of insulin.     4. You will check your post " "meal glucose daily as well. However, each day you will check a different meal, (ie. Monday-breakfast; Tuesday- lunch; Wednesday- supper, then repeat). If your post meal glucose is not where we would like, increase pre-meal insulin by 2 units next time. A word of CAUTION: mealtime insulin is dependant on the size and concentration of your meal content. If not consuming a large meal do not take large dose of insulin. Use the reasonable person rule.      5. If you have any questions please do not hesitate to call.     Intensive insulin Therapy with correction factor:     You are on Intensive insulin therapy with Basal and Bolus insulin. Basaglar is your Basal insulin and will help maintain your fasting and between meal sugar. Your fast acting or rescue insulin is Humalog insulin and will control your post meal sugar.      You will Take 45 units of basaglar at 9 PM daily. This will be adjusted up or down depending on your fasting blood sugar before breakfast.     Humalog will follow this pre meal schedule; Correction factor of "2 units per 50 mg/dl" and is based on 15-45 grams of carbohydrates per meal.     If blood sugar is below 70 eat first then check your blood sugar 2 hours later and make correction.  If blood pre-meal sugar is  70 -150 take 6 units of Humalog;  If blood pre-meal sugar is 151-200 take +2 units of Humalog;  If blood pre-meal sugar is 201-250 take +4 units of Humalog;  If blood pre-meal sugar is 251-300 take +6 units of Humalog;  If blood pre-meal sugar is 301-350 take +8 units of Humalog;  If blood pre-meal sugar is 351-400+ take +10 units of Humalog;  Also increase water intake and call for appointment.    A total of 30 minutes was spent in face to face time, of which 50 % was spent in counseling patient on disease process, complications, treatment, and side effects of medications.    The patient was explained the above plan and given opportunity to ask questions.  He understands, chooses and " consents to this plan and accepts all the risks, which include but are not limited to the risks mentioned above.   He understands the alternative of having no testing, interventions or treatments at this time. He left content and without further questions.     Disclaimer:  This note is prepared using voice recognition software and as such is likely to have errors and has not been proof read. Please contact me for questions.

## 2019-02-07 NOTE — LETTER
February 12, 2019      Otf Roblero MD  [address]  [place] [address]           [facility] Diabetes [facility]  [address]  [place] [address]  Phone: [phone]  Fax: [phone]          Patient: Jorgito Arreaga   MR Number: [id number]   YOB: 1965   Date of Visit: 2/7/2019       Dear Dr. Otf Roblero:    Thank you for referring Jorgito Arreaga to me for evaluation. Attached you will find relevant portions of my assessment and plan of care.    If you have questions, please do not hesitate to call me. I look forward to following Jorgito Arreaga along with you.    Sincerely,    Timi Montana  CC:  No Recipients    If you would like to receive this communication electronically, please contact [email or web address] or [phone] to request more information on [employer] access.    For providers and/or their staff who would like to refer a patient to [facility], please contact us through our one-stop-shop provider referral line, [facility], at [phone].    If you feel you have received this communication in error or would no longer like to receive these types of communications, please e-mail [email or web address]

## 2019-02-15 DIAGNOSIS — E11.65 TYPE 2 DIABETES MELLITUS WITH HYPERGLYCEMIA, WITH LONG-TERM CURRENT USE OF INSULIN: ICD-10-CM

## 2019-02-15 DIAGNOSIS — Z79.4 TYPE 2 DIABETES MELLITUS WITH HYPERGLYCEMIA, WITH LONG-TERM CURRENT USE OF INSULIN: ICD-10-CM

## 2019-02-15 RX ORDER — INSULIN GLARGINE 100 [IU]/ML
INJECTION, SOLUTION SUBCUTANEOUS
Qty: 15 ML | Refills: 0 | Status: SHIPPED | OUTPATIENT
Start: 2019-02-15 | End: 2019-03-01 | Stop reason: SDUPTHER

## 2019-02-19 ENCOUNTER — TELEPHONE (OUTPATIENT)
Dept: INTERNAL MEDICINE | Facility: CLINIC | Age: 54
End: 2019-02-19

## 2019-02-19 NOTE — TELEPHONE ENCOUNTER
----- Message from Otf Roblero MD sent at 2/19/2019  4:06 PM CST -----  Pt needs to come in for htn

## 2019-02-19 NOTE — PATIENT INSTRUCTIONS
" I have reviewed your results and they are still quite high. I would like you to start "Treating to Target". The treatment will be Insulin and your target will be the Fasting and 2 hour post meal blood sugar. It will work in this manner;     1. Goal for Fasting blood sugar is  mg/dl. I realize that you will need time to adjust to the new levels and presently you may feel too low if you are too aggressive now. So go slow and aim to lower your blood sugar to below 200 then 150 then 100 over several months.     2. Goal for 2 hour post meal blood sugar is below 180 mg/dl, here the same rules apply as in #1.     3. You will check your fasting blood sugar daily, if not where we would like it to be over a 3 day period then that evening we will increase the Basaglar dose by 5 units. Then repeat the process over the next 3 days. Remember this is a slow process and take our time getting to goal. But, each week should be better than prior weeks. Blood sugars below 70 are unacceptable and should raise a "RED FLAG" where we may have to reduce our dose of insulin.     4. You will check your post meal glucose daily as well. However, each day you will check a different meal, (ie. Monday-breakfast; Tuesday- lunch; Wednesday- supper, then repeat). If your post meal glucose is not where we would like, increase pre-meal insulin by 2 units next time. A word of CAUTION: mealtime insulin is dependant on the size and concentration of your meal content. If not consuming a large meal do not take large dose of insulin. Use the reasonable person rule.      5. If you have any questions please do not hesitate to call.     Intensive insulin Therapy with correction factor:     You are on Intensive insulin therapy with Basal and Bolus insulin. Basaglar is your Basal insulin and will help maintain your fasting and between meal sugar. Your fast acting or rescue insulin is Humalog insulin and will control your post meal sugar.      You will Take " "45 units of basaglar at 9 PM daily. This will be adjusted up or down depending on your fasting blood sugar before breakfast.     Humalog will follow this pre meal schedule; Correction factor of "2 units per 50 mg/dl" and is based on 15-45 grams of carbohydrates per meal.     If blood sugar is below 70 eat first then check your blood sugar 2 hours later and make correction.  If blood pre-meal sugar is  70 -150 take 6 units of Humalog;  If blood pre-meal sugar is 151-200 take +2 units of Humalog;  If blood pre-meal sugar is 201-250 take +4 units of Humalog;  If blood pre-meal sugar is 251-300 take +6 units of Humalog;  If blood pre-meal sugar is 301-350 take +8 units of Humalog;  If blood pre-meal sugar is 351-400+ take +10 units of Humalog;  Also increase water intake and call for appointment.  "

## 2019-02-22 ENCOUNTER — TELEPHONE (OUTPATIENT)
Dept: INTERNAL MEDICINE | Facility: CLINIC | Age: 54
End: 2019-02-22

## 2019-02-22 NOTE — TELEPHONE ENCOUNTER
Pt stated that he just started on the 1st pen. He has 4 more in the box. Moved pt appt from 3/1 to 2/26 at 9:20 am

## 2019-02-22 NOTE — TELEPHONE ENCOUNTER
Patient medication for Humalog 100 unit/ml has been denied. [employer] would the patient to try Admelog 100 unit/ml (vial formulation) before taking the medication. They will not pay for Humalog until this is tried first. Please advise

## 2019-02-22 NOTE — TELEPHONE ENCOUNTER
Spoke wit pt, he stated that he is out of Humalog insulin. Stated that his BS has been about 500's. He just got the basaglar on Tuesday but it was in the 200's this morning. Offered pt appt on Monday 2/22 to discuss medication changed with Dr. Roblero. Pt stated that he will keep appt Friday 3/1 and use basaglar. Stated that he will take more of basaglar if BS goes to high. Please advised

## 2019-02-25 ENCOUNTER — TELEPHONE (OUTPATIENT)
Dept: INTERNAL MEDICINE | Facility: CLINIC | Age: 54
End: 2019-02-25

## 2019-02-25 NOTE — TELEPHONE ENCOUNTER
----- Message from Susan Felix sent at 2/25/2019 11:24 AM CST -----  Contact: pt  Pt has moved his appt to Friday because he couldn't make the Tues appt.  Pt then states the office moved his appt to Tues.  Pt is requesting a Wednesday appt.  Please call pt

## 2019-03-01 ENCOUNTER — OFFICE VISIT (OUTPATIENT)
Dept: INTERNAL MEDICINE | Facility: CLINIC | Age: 54
End: 2019-03-01
Payer: MEDICAID

## 2019-03-01 VITALS
HEIGHT: 67 IN | SYSTOLIC BLOOD PRESSURE: 139 MMHG | RESPIRATION RATE: 18 BRPM | WEIGHT: 210.75 LBS | HEART RATE: 91 BPM | TEMPERATURE: 97 F | BODY MASS INDEX: 33.08 KG/M2 | DIASTOLIC BLOOD PRESSURE: 89 MMHG

## 2019-03-01 DIAGNOSIS — E11.9 TYPE 2 DIABETES MELLITUS WITHOUT COMPLICATION, UNSPECIFIED WHETHER LONG TERM INSULIN USE: ICD-10-CM

## 2019-03-01 DIAGNOSIS — Z79.4 TYPE 2 DIABETES MELLITUS WITH HYPERGLYCEMIA, WITH LONG-TERM CURRENT USE OF INSULIN: ICD-10-CM

## 2019-03-01 DIAGNOSIS — I15.2 HYPERTENSION ASSOCIATED WITH DIABETES: Primary | ICD-10-CM

## 2019-03-01 DIAGNOSIS — E11.59 HYPERTENSION ASSOCIATED WITH DIABETES: Primary | ICD-10-CM

## 2019-03-01 DIAGNOSIS — E11.65 TYPE 2 DIABETES MELLITUS WITH HYPERGLYCEMIA, WITH LONG-TERM CURRENT USE OF INSULIN: ICD-10-CM

## 2019-03-01 PROCEDURE — 99214 PR OFFICE/OUTPT VISIT, EST, LEVL IV, 30-39 MIN: ICD-10-PCS | Mod: S$PBB,,, | Performed by: FAMILY MEDICINE

## 2019-03-01 PROCEDURE — 99214 OFFICE O/P EST MOD 30 MIN: CPT | Mod: S$PBB,,, | Performed by: FAMILY MEDICINE

## 2019-03-01 PROCEDURE — 99999 PR PBB SHADOW E&M-EST. PATIENT-LVL III: ICD-10-PCS | Mod: PBBFAC,,, | Performed by: FAMILY MEDICINE

## 2019-03-01 PROCEDURE — 99213 OFFICE O/P EST LOW 20 MIN: CPT | Mod: PBBFAC,PO | Performed by: FAMILY MEDICINE

## 2019-03-01 PROCEDURE — 99999 PR PBB SHADOW E&M-EST. PATIENT-LVL III: CPT | Mod: PBBFAC,,, | Performed by: FAMILY MEDICINE

## 2019-03-01 RX ORDER — INSULIN GLARGINE 100 [IU]/ML
INJECTION, SOLUTION SUBCUTANEOUS
Qty: 15 ML | Refills: 3 | Status: SHIPPED | OUTPATIENT
Start: 2019-03-01 | End: 2019-06-24

## 2019-03-01 RX ORDER — HYDRALAZINE HYDROCHLORIDE 50 MG/1
50 TABLET, FILM COATED ORAL 3 TIMES DAILY
Qty: 180 TABLET | Refills: 3 | Status: ON HOLD | OUTPATIENT
Start: 2019-03-01 | End: 2019-03-31 | Stop reason: HOSPADM

## 2019-03-01 RX ORDER — LOSARTAN POTASSIUM AND HYDROCHLOROTHIAZIDE 25; 100 MG/1; MG/1
1 TABLET ORAL DAILY
Qty: 90 TABLET | Refills: 0 | Status: ON HOLD | OUTPATIENT
Start: 2019-03-01 | End: 2019-03-31 | Stop reason: HOSPADM

## 2019-03-01 RX ORDER — ISOSORBIDE DINITRATE 20 MG/1
20 TABLET ORAL 3 TIMES DAILY
Qty: 270 TABLET | Refills: 0 | Status: ON HOLD | OUTPATIENT
Start: 2019-03-01 | End: 2019-03-31 | Stop reason: HOSPADM

## 2019-03-01 RX ORDER — INSULIN LISPRO 100 [IU]/ML
INJECTION, SOLUTION INTRAVENOUS; SUBCUTANEOUS
Qty: 10 ML | Refills: 1 | Status: SHIPPED | OUTPATIENT
Start: 2019-03-01 | End: 2020-03-19 | Stop reason: SDUPTHER

## 2019-03-01 NOTE — PROGRESS NOTES
Subjective:       Patient ID: Jorgito Arreaga is a 53 y.o. male.    Chief Complaint: Follow-up    Hypertension   This is a chronic problem. The current episode started more than 1 year ago. The problem has been gradually improving since onset. The problem is controlled. Pertinent negatives include no anxiety, blurred vision, chest pain, headaches or shortness of breath. There are no associated agents to hypertension. Risk factors for coronary artery disease include diabetes mellitus, obesity and male gender. Past treatments include calcium channel blockers, angiotensin blockers, diuretics and direct vasodilators. The current treatment provides moderate improvement. There are no compliance problems.      Review of Systems   Eyes: Negative for blurred vision.   Respiratory: Negative for shortness of breath.    Cardiovascular: Negative for chest pain.   Gastrointestinal: Negative for abdominal pain.   Neurological: Negative for headaches.       Objective:      Physical Exam   Constitutional: He appears well-developed and well-nourished. No distress.   HENT:   Head: Normocephalic and atraumatic.   Pulmonary/Chest: Effort normal and breath sounds normal. No respiratory distress. He has no wheezes.   Abdominal: Soft. He exhibits no distension. There is no tenderness. There is no guarding.   Skin: Skin is warm and dry. No rash noted. He is not diaphoretic. No erythema.   Nursing note and vitals reviewed.      Assessment:       1. Hypertension associated with diabetes    2. Type 2 diabetes mellitus with hyperglycemia, with long-term current use of insulin        Plan:     Problem List Items Addressed This Visit        Cardiac/Vascular    Hypertension associated with diabetes - Primary    Relevant Medications    losartan-hydrochlorothiazide 100-25 mg (HYZAAR) 100-25 mg per tablet    isosorbide dinitrate (ISORDIL) 20 MG tablet    hydrALAZINE (APRESOLINE) 50 MG tablet    insulin lispro (ADMELOG U-100 INSULIN LISPRO) 100  unit/mL injection    insulin (BASAGLAR KWIKPEN U-100 INSULIN) glargine 100 units/mL (3mL) SubQ pen      Other Visit Diagnoses     Type 2 diabetes mellitus with hyperglycemia, with long-term current use of insulin        Relevant Medications    insulin lispro (ADMELOG U-100 INSULIN LISPRO) 100 unit/mL injection    insulin (BASAGLAR KWIKPEN U-100 INSULIN) glargine 100 units/mL (3mL) SubQ pen

## 2019-03-08 DIAGNOSIS — E11.9 TYPE 2 DIABETES MELLITUS WITHOUT COMPLICATION: ICD-10-CM

## 2019-03-30 ENCOUNTER — HOSPITAL ENCOUNTER (OUTPATIENT)
Facility: HOSPITAL | Age: 54
Discharge: HOME | End: 2019-03-31
Attending: EMERGENCY MEDICINE | Admitting: INTERNAL MEDICINE
Payer: MEDICAID

## 2019-03-30 DIAGNOSIS — E87.20 LACTIC ACIDOSIS: ICD-10-CM

## 2019-03-30 DIAGNOSIS — F10.20 ALCOHOLISM: ICD-10-CM

## 2019-03-30 DIAGNOSIS — I95.9 HYPOTENSION, UNSPECIFIED HYPOTENSION TYPE: ICD-10-CM

## 2019-03-30 DIAGNOSIS — R55 SYNCOPE: Primary | ICD-10-CM

## 2019-03-30 DIAGNOSIS — E78.1 HYPERTRIGLYCERIDEMIA: ICD-10-CM

## 2019-03-30 DIAGNOSIS — R55 SYNCOPE, UNSPECIFIED SYNCOPE TYPE: ICD-10-CM

## 2019-03-30 DIAGNOSIS — R73.9 HYPERGLYCEMIA: ICD-10-CM

## 2019-03-30 DIAGNOSIS — F10.929 ALCOHOLIC INTOXICATION WITH COMPLICATION: ICD-10-CM

## 2019-03-30 DIAGNOSIS — N28.9 ACUTE RENAL INSUFFICIENCY: ICD-10-CM

## 2019-03-30 DIAGNOSIS — D64.9 ANEMIA, UNSPECIFIED TYPE: ICD-10-CM

## 2019-03-30 DIAGNOSIS — K70.10 ALCOHOLIC HEPATITIS, UNSPECIFIED WHETHER ASCITES PRESENT: ICD-10-CM

## 2019-03-30 PROBLEM — E78.5 HYPERLIPIDEMIA: Status: ACTIVE | Noted: 2019-03-30

## 2019-03-30 PROBLEM — I10 ESSENTIAL HYPERTENSION: Status: ACTIVE | Noted: 2019-03-30

## 2019-03-30 LAB
ALBUMIN SERPL BCP-MCNC: 3.2 G/DL (ref 3.5–5.2)
ALP SERPL-CCNC: 166 U/L (ref 55–135)
ALT SERPL W/O P-5'-P-CCNC: 308 U/L (ref 10–44)
AMMONIA PLAS-SCNC: 69 UMOL/L (ref 10–50)
AMPHET+METHAMPHET UR QL: NEGATIVE
AMYLASE SERPL-CCNC: 23 U/L (ref 20–110)
ANION GAP SERPL CALC-SCNC: 22 MMOL/L (ref 8–16)
ANION GAP SERPL CALC-SCNC: 30 MMOL/L (ref 8–16)
APTT BLDCRRT: 22.2 SEC (ref 21–32)
AST SERPL-CCNC: 725 U/L (ref 10–40)
B-OH-BUTYR BLD STRIP-SCNC: 0.1 MMOL/L (ref 0–0.5)
BARBITURATES UR QL SCN>200 NG/ML: NEGATIVE
BASOPHILS # BLD AUTO: 0.04 K/UL (ref 0–0.2)
BASOPHILS NFR BLD: 1.1 % (ref 0–1.9)
BENZODIAZ UR QL SCN>200 NG/ML: NEGATIVE
BILIRUB SERPL-MCNC: 1.3 MG/DL (ref 0.1–1)
BILIRUB UR QL STRIP: NEGATIVE
BUN SERPL-MCNC: 13 MG/DL (ref 6–20)
BUN SERPL-MCNC: 14 MG/DL (ref 6–20)
BZE UR QL SCN: NEGATIVE
CALCIUM SERPL-MCNC: 8.1 MG/DL (ref 8.7–10.5)
CALCIUM SERPL-MCNC: 8.2 MG/DL (ref 8.7–10.5)
CANNABINOIDS UR QL SCN: NEGATIVE
CHLORIDE SERPL-SCNC: 100 MMOL/L (ref 95–110)
CHLORIDE SERPL-SCNC: 100 MMOL/L (ref 95–110)
CHOLEST SERPL-MCNC: 307 MG/DL (ref 120–199)
CHOLEST/HDLC SERPL: 9.3 {RATIO} (ref 2–5)
CK MB SERPL-MCNC: 2.6 NG/ML (ref 0.1–6.5)
CK MB SERPL-RTO: 2.1 % (ref 0–5)
CK SERPL-CCNC: 126 U/L (ref 20–200)
CK SERPL-CCNC: 126 U/L (ref 20–200)
CLARITY UR REFRACT.AUTO: CLEAR
CO2 SERPL-SCNC: 16 MMOL/L (ref 23–29)
CO2 SERPL-SCNC: 9 MMOL/L (ref 23–29)
COLOR UR AUTO: YELLOW
CREAT SERPL-MCNC: 1.3 MG/DL (ref 0.5–1.4)
CREAT SERPL-MCNC: 1.5 MG/DL (ref 0.5–1.4)
CREAT UR-MCNC: 27 MG/DL (ref 23–375)
DIFFERENTIAL METHOD: ABNORMAL
EOSINOPHIL # BLD AUTO: 0 K/UL (ref 0–0.5)
EOSINOPHIL NFR BLD: 0.3 % (ref 0–8)
ERYTHROCYTE [DISTWIDTH] IN BLOOD BY AUTOMATED COUNT: 13.2 % (ref 11.5–14.5)
EST. GFR  (AFRICAN AMERICAN): >60 ML/MIN/1.73 M^2
EST. GFR  (AFRICAN AMERICAN): >60 ML/MIN/1.73 M^2
EST. GFR  (NON AFRICAN AMERICAN): 52.8 ML/MIN/1.73 M^2
EST. GFR  (NON AFRICAN AMERICAN): >60 ML/MIN/1.73 M^2
ETHANOL SERPL-MCNC: 79 MG/DL
GLUCOSE SERPL-MCNC: 117 MG/DL (ref 70–110)
GLUCOSE SERPL-MCNC: 249 MG/DL (ref 70–110)
GLUCOSE UR QL STRIP: ABNORMAL
HCT VFR BLD AUTO: 32 % (ref 40–54)
HDLC SERPL-MCNC: 33 MG/DL (ref 40–75)
HDLC SERPL: 10.7 % (ref 20–50)
HGB BLD-MCNC: 10.6 G/DL (ref 14–18)
HGB UR QL STRIP: NEGATIVE
INR PPP: 1.3 (ref 0.8–1.2)
KETONES UR QL STRIP: NEGATIVE
LACTATE SERPL-SCNC: 10.9 MMOL/L (ref 0.5–2.2)
LACTATE SERPL-SCNC: 5 MMOL/L (ref 0.5–2.2)
LDLC SERPL CALC-MCNC: ABNORMAL MG/DL (ref 63–159)
LEUKOCYTE ESTERASE UR QL STRIP: NEGATIVE
LIPASE SERPL-CCNC: 35 U/L (ref 4–60)
LYMPHOCYTES # BLD AUTO: 1.3 K/UL (ref 1–4.8)
LYMPHOCYTES NFR BLD: 35.3 % (ref 18–48)
MCH RBC QN AUTO: 30.3 PG (ref 27–31)
MCHC RBC AUTO-ENTMCNC: 33.1 G/DL (ref 32–36)
MCV RBC AUTO: 91 FL (ref 82–98)
METHADONE UR QL SCN>300 NG/ML: NEGATIVE
MONOCYTES # BLD AUTO: 0.4 K/UL (ref 0.3–1)
MONOCYTES NFR BLD: 10.3 % (ref 4–15)
NEUTROPHILS # BLD AUTO: 2 K/UL (ref 1.8–7.7)
NEUTROPHILS NFR BLD: 52.7 % (ref 38–73)
NITRITE UR QL STRIP: NEGATIVE
NONHDLC SERPL-MCNC: 274 MG/DL
OPIATES UR QL SCN: NEGATIVE
PCP UR QL SCN>25 NG/ML: NEGATIVE
PH UR STRIP: 6 [PH] (ref 5–8)
PLATELET # BLD AUTO: 210 K/UL (ref 150–350)
PMV BLD AUTO: 10.5 FL (ref 9.2–12.9)
POCT GLUCOSE: 128 MG/DL (ref 70–110)
POCT GLUCOSE: 210 MG/DL (ref 70–110)
POCT GLUCOSE: 275 MG/DL (ref 70–110)
POTASSIUM SERPL-SCNC: 3.6 MMOL/L (ref 3.5–5.1)
POTASSIUM SERPL-SCNC: 4.2 MMOL/L (ref 3.5–5.1)
PROT SERPL-MCNC: 6.6 G/DL (ref 6–8.4)
PROT UR QL STRIP: NEGATIVE
PROTHROMBIN TIME: 13.2 SEC (ref 9–12.5)
RBC # BLD AUTO: 3.5 M/UL (ref 4.6–6.2)
SODIUM SERPL-SCNC: 138 MMOL/L (ref 136–145)
SODIUM SERPL-SCNC: 139 MMOL/L (ref 136–145)
SP GR UR STRIP: 1.01 (ref 1–1.03)
TOXICOLOGY INFORMATION: NORMAL
TRIGL SERPL-MCNC: 2295 MG/DL (ref 30–150)
TROPONIN I SERPL DL<=0.01 NG/ML-MCNC: 0.01 NG/ML (ref 0–0.03)
TROPONIN I SERPL DL<=0.01 NG/ML-MCNC: <0.006 NG/ML (ref 0–0.03)
URN SPEC COLLECT METH UR: ABNORMAL
UROBILINOGEN UR STRIP-ACNC: NEGATIVE EU/DL
WBC # BLD AUTO: 3.77 K/UL (ref 3.9–12.7)

## 2019-03-30 PROCEDURE — 82550 ASSAY OF CK (CPK): CPT | Mod: ER

## 2019-03-30 PROCEDURE — 80307 DRUG TEST PRSMV CHEM ANLYZR: CPT | Mod: ER

## 2019-03-30 PROCEDURE — 82962 GLUCOSE BLOOD TEST: CPT | Mod: ER

## 2019-03-30 PROCEDURE — 93010 EKG 12-LEAD: ICD-10-PCS | Mod: ,,, | Performed by: NUCLEAR MEDICINE

## 2019-03-30 PROCEDURE — 82150 ASSAY OF AMYLASE: CPT | Mod: ER

## 2019-03-30 PROCEDURE — 93005 ELECTROCARDIOGRAM TRACING: CPT | Mod: ER

## 2019-03-30 PROCEDURE — 93010 ELECTROCARDIOGRAM REPORT: CPT | Mod: ,,, | Performed by: NUCLEAR MEDICINE

## 2019-03-30 PROCEDURE — 80320 DRUG SCREEN QUANTALCOHOLS: CPT | Mod: ER

## 2019-03-30 PROCEDURE — 63600175 PHARM REV CODE 636 W HCPCS: Performed by: NURSE PRACTITIONER

## 2019-03-30 PROCEDURE — 21400001 HC TELEMETRY ROOM

## 2019-03-30 PROCEDURE — S5571 INSULIN DISPOS PEN 3 ML: HCPCS | Performed by: NURSE PRACTITIONER

## 2019-03-30 PROCEDURE — 96375 TX/PRO/DX INJ NEW DRUG ADDON: CPT | Mod: ER

## 2019-03-30 PROCEDURE — 63600175 PHARM REV CODE 636 W HCPCS: Mod: ER | Performed by: EMERGENCY MEDICINE

## 2019-03-30 PROCEDURE — 85730 THROMBOPLASTIN TIME PARTIAL: CPT

## 2019-03-30 PROCEDURE — 83605 ASSAY OF LACTIC ACID: CPT | Mod: ER

## 2019-03-30 PROCEDURE — 84484 ASSAY OF TROPONIN QUANT: CPT | Mod: ER

## 2019-03-30 PROCEDURE — 87040 BLOOD CULTURE FOR BACTERIA: CPT | Mod: 59

## 2019-03-30 PROCEDURE — 82140 ASSAY OF AMMONIA: CPT

## 2019-03-30 PROCEDURE — 83690 ASSAY OF LIPASE: CPT | Mod: ER

## 2019-03-30 PROCEDURE — 96367 TX/PROPH/DG ADDL SEQ IV INF: CPT | Mod: ER

## 2019-03-30 PROCEDURE — 85025 COMPLETE CBC W/AUTO DIFF WBC: CPT | Mod: ER

## 2019-03-30 PROCEDURE — 96372 THER/PROPH/DIAG INJ SC/IM: CPT | Mod: 59,ER

## 2019-03-30 PROCEDURE — 99285 EMERGENCY DEPT VISIT HI MDM: CPT | Mod: 25,ER

## 2019-03-30 PROCEDURE — 85610 PROTHROMBIN TIME: CPT

## 2019-03-30 PROCEDURE — 25000003 PHARM REV CODE 250: Performed by: NURSE PRACTITIONER

## 2019-03-30 PROCEDURE — 80053 COMPREHEN METABOLIC PANEL: CPT | Mod: ER

## 2019-03-30 PROCEDURE — G0378 HOSPITAL OBSERVATION PER HR: HCPCS | Mod: ER

## 2019-03-30 PROCEDURE — 82010 KETONE BODYS QUAN: CPT | Mod: ER

## 2019-03-30 PROCEDURE — 80061 LIPID PANEL: CPT

## 2019-03-30 PROCEDURE — 80074 ACUTE HEPATITIS PANEL: CPT

## 2019-03-30 PROCEDURE — 25000003 PHARM REV CODE 250: Mod: ER | Performed by: EMERGENCY MEDICINE

## 2019-03-30 PROCEDURE — 36415 COLL VENOUS BLD VENIPUNCTURE: CPT

## 2019-03-30 PROCEDURE — 82553 CREATINE MB FRACTION: CPT | Mod: ER

## 2019-03-30 PROCEDURE — 99900035 HC TECH TIME PER 15 MIN (STAT): Mod: ER

## 2019-03-30 PROCEDURE — G0378 HOSPITAL OBSERVATION PER HR: HCPCS

## 2019-03-30 PROCEDURE — 96365 THER/PROPH/DIAG IV INF INIT: CPT | Mod: 59,ER

## 2019-03-30 PROCEDURE — 81003 URINALYSIS AUTO W/O SCOPE: CPT | Mod: ER

## 2019-03-30 PROCEDURE — 84484 ASSAY OF TROPONIN QUANT: CPT | Mod: 91

## 2019-03-30 PROCEDURE — 80048 BASIC METABOLIC PNL TOTAL CA: CPT

## 2019-03-30 PROCEDURE — 96366 THER/PROPH/DIAG IV INF ADDON: CPT | Mod: ER

## 2019-03-30 PROCEDURE — 96372 THER/PROPH/DIAG INJ SC/IM: CPT | Mod: 59

## 2019-03-30 RX ORDER — VANCOMYCIN HCL IN 5 % DEXTROSE 1G/250ML
1000 PLASTIC BAG, INJECTION (ML) INTRAVENOUS
Status: DISCONTINUED | OUTPATIENT
Start: 2019-04-10 | End: 2019-03-31

## 2019-03-30 RX ORDER — IBUPROFEN 200 MG
16 TABLET ORAL
Status: DISCONTINUED | OUTPATIENT
Start: 2019-03-30 | End: 2019-03-31 | Stop reason: HOSPADM

## 2019-03-30 RX ORDER — PANTOPRAZOLE SODIUM 40 MG/1
40 TABLET, DELAYED RELEASE ORAL DAILY
Status: DISCONTINUED | OUTPATIENT
Start: 2019-03-30 | End: 2019-03-31 | Stop reason: HOSPADM

## 2019-03-30 RX ORDER — ATORVASTATIN CALCIUM 40 MG/1
40 TABLET, FILM COATED ORAL DAILY
Status: DISCONTINUED | OUTPATIENT
Start: 2019-03-31 | End: 2019-03-30

## 2019-03-30 RX ORDER — VANCOMYCIN HCL IN 5 % DEXTROSE 1G/250ML
1000 PLASTIC BAG, INJECTION (ML) INTRAVENOUS
Status: DISCONTINUED | OUTPATIENT
Start: 2019-03-30 | End: 2019-03-30 | Stop reason: DRUGHIGH

## 2019-03-30 RX ORDER — THIAMINE HCL 100 MG
100 TABLET ORAL DAILY
Status: DISCONTINUED | OUTPATIENT
Start: 2019-03-30 | End: 2019-03-30

## 2019-03-30 RX ORDER — ONDANSETRON 2 MG/ML
4 INJECTION INTRAMUSCULAR; INTRAVENOUS EVERY 8 HOURS PRN
Status: DISCONTINUED | OUTPATIENT
Start: 2019-03-30 | End: 2019-03-31 | Stop reason: HOSPADM

## 2019-03-30 RX ORDER — ONDANSETRON HYDROCHLORIDE 4 MG/5ML
4 SOLUTION ORAL ONCE
Status: DISCONTINUED | OUTPATIENT
Start: 2019-03-30 | End: 2019-03-30

## 2019-03-30 RX ORDER — ONDANSETRON 2 MG/ML
4 INJECTION INTRAMUSCULAR; INTRAVENOUS
Status: COMPLETED | OUTPATIENT
Start: 2019-03-30 | End: 2019-03-30

## 2019-03-30 RX ORDER — FOLIC ACID 1 MG/1
1 TABLET ORAL DAILY
Status: DISCONTINUED | OUTPATIENT
Start: 2019-03-30 | End: 2019-03-31 | Stop reason: HOSPADM

## 2019-03-30 RX ORDER — GLUCAGON 1 MG
1 KIT INJECTION
Status: DISCONTINUED | OUTPATIENT
Start: 2019-03-30 | End: 2019-03-31 | Stop reason: HOSPADM

## 2019-03-30 RX ORDER — VANCOMYCIN HCL IN 5 % DEXTROSE 1G/250ML
1000 PLASTIC BAG, INJECTION (ML) INTRAVENOUS
Status: COMPLETED | OUTPATIENT
Start: 2019-03-30 | End: 2019-03-30

## 2019-03-30 RX ORDER — HEPARIN SODIUM 5000 [USP'U]/ML
5000 INJECTION, SOLUTION INTRAVENOUS; SUBCUTANEOUS EVERY 8 HOURS
Status: DISCONTINUED | OUTPATIENT
Start: 2019-03-30 | End: 2019-03-31 | Stop reason: HOSPADM

## 2019-03-30 RX ORDER — THIAMINE HCL 100 MG
100 TABLET ORAL DAILY
Status: DISCONTINUED | OUTPATIENT
Start: 2019-03-31 | End: 2019-03-31 | Stop reason: HOSPADM

## 2019-03-30 RX ORDER — CIPROFLOXACIN 2 MG/ML
400 INJECTION, SOLUTION INTRAVENOUS
Status: COMPLETED | OUTPATIENT
Start: 2019-03-30 | End: 2019-03-30

## 2019-03-30 RX ORDER — FOLIC ACID 1 MG/1
1 TABLET ORAL DAILY
Status: DISCONTINUED | OUTPATIENT
Start: 2019-03-31 | End: 2019-03-30

## 2019-03-30 RX ORDER — INSULIN ASPART 100 [IU]/ML
1-10 INJECTION, SOLUTION INTRAVENOUS; SUBCUTANEOUS
Status: DISCONTINUED | OUTPATIENT
Start: 2019-03-30 | End: 2019-03-31 | Stop reason: HOSPADM

## 2019-03-30 RX ORDER — ASPIRIN 325 MG
325 TABLET ORAL DAILY
Status: DISCONTINUED | OUTPATIENT
Start: 2019-03-31 | End: 2019-03-31 | Stop reason: HOSPADM

## 2019-03-30 RX ORDER — ENOXAPARIN SODIUM 100 MG/ML
30 INJECTION SUBCUTANEOUS EVERY 24 HOURS
Status: CANCELLED | OUTPATIENT
Start: 2019-03-30

## 2019-03-30 RX ORDER — IBUPROFEN 200 MG
24 TABLET ORAL
Status: DISCONTINUED | OUTPATIENT
Start: 2019-03-30 | End: 2019-03-31 | Stop reason: HOSPADM

## 2019-03-30 RX ORDER — CHLORDIAZEPOXIDE HYDROCHLORIDE 10 MG/1
10 CAPSULE, GELATIN COATED ORAL 4 TIMES DAILY PRN
Status: DISCONTINUED | OUTPATIENT
Start: 2019-03-30 | End: 2019-03-31 | Stop reason: HOSPADM

## 2019-03-30 RX ORDER — SODIUM CHLORIDE 9 MG/ML
125 INJECTION, SOLUTION INTRAVENOUS ONCE
Status: COMPLETED | OUTPATIENT
Start: 2019-03-30 | End: 2019-03-30

## 2019-03-30 RX ORDER — SODIUM CHLORIDE 9 MG/ML
INJECTION, SOLUTION INTRAVENOUS CONTINUOUS
Status: DISCONTINUED | OUTPATIENT
Start: 2019-03-30 | End: 2019-03-31 | Stop reason: HOSPADM

## 2019-03-30 RX ORDER — SODIUM CHLORIDE 0.9 % (FLUSH) 0.9 %
10 SYRINGE (ML) INJECTION
Status: CANCELLED | OUTPATIENT
Start: 2019-03-30

## 2019-03-30 RX ADMIN — ONDANSETRON 4 MG: 2 INJECTION INTRAMUSCULAR; INTRAVENOUS at 12:03

## 2019-03-30 RX ADMIN — SODIUM CHLORIDE 125 ML/HR: 0.9 INJECTION, SOLUTION INTRAVENOUS at 11:03

## 2019-03-30 RX ADMIN — INSULIN ASPART 2 UNITS: 100 INJECTION, SOLUTION INTRAVENOUS; SUBCUTANEOUS at 10:03

## 2019-03-30 RX ADMIN — INSULIN HUMAN 6 UNITS: 100 INJECTION, SOLUTION PARENTERAL at 12:03

## 2019-03-30 RX ADMIN — SODIUM CHLORIDE 1000 ML: 0.9 INJECTION, SOLUTION INTRAVENOUS at 01:03

## 2019-03-30 RX ADMIN — HEPARIN SODIUM 5000 UNITS: 5000 INJECTION, SOLUTION INTRAVENOUS; SUBCUTANEOUS at 09:03

## 2019-03-30 RX ADMIN — VANCOMYCIN HYDROCHLORIDE 500 MG: 500 INJECTION, POWDER, LYOPHILIZED, FOR SOLUTION INTRAVENOUS at 02:03

## 2019-03-30 RX ADMIN — PIPERACILLIN SODIUM AND TAZOBACTAM SODIUM 4.5 G: 4; .5 INJECTION, POWDER, LYOPHILIZED, FOR SOLUTION INTRAVENOUS at 01:03

## 2019-03-30 RX ADMIN — PANTOPRAZOLE SODIUM 40 MG: 40 TABLET, DELAYED RELEASE ORAL at 07:03

## 2019-03-30 RX ADMIN — SODIUM CHLORIDE 1000 ML: 0.9 INJECTION, SOLUTION INTRAVENOUS at 11:03

## 2019-03-30 RX ADMIN — VANCOMYCIN HYDROCHLORIDE 1000 MG: 1 INJECTION, POWDER, FOR SOLUTION INTRAVENOUS at 02:03

## 2019-03-30 RX ADMIN — FOLIC ACID 1 MG: 1 TABLET ORAL at 06:03

## 2019-03-30 RX ADMIN — INSULIN DETEMIR 40 UNITS: 100 INJECTION, SOLUTION SUBCUTANEOUS at 09:03

## 2019-03-30 RX ADMIN — SODIUM CHLORIDE: 0.9 INJECTION, SOLUTION INTRAVENOUS at 07:03

## 2019-03-30 RX ADMIN — CIPROFLOXACIN 400 MG: 2 INJECTION, SOLUTION INTRAVENOUS at 01:03

## 2019-03-30 NOTE — ASSESSMENT & PLAN NOTE
Was taking an ARB, dehydrated  Avoid any nephrotoxic meds  Continuous IV fluids  Repeat labs in AM

## 2019-03-30 NOTE — H&P
"[facility] Hospital Medicine  History & Physical    Patient Name: Jorgito Arreaga  MRN: [id number]  Admission Date: 3/30/2019  Attending Physician: Jarrett Rowland MD   Primary Care Provider: Otf Roblero MD         Patient information was obtained from patient, past medical records and ER records.     Subjective:     Principal Problem:Syncope    Chief Complaint:   Chief Complaint   Patient presents with    Loss of Consciousness     Pt states he "passed out" twice this morning. EMS reports  on arrival.         HPI: Jorgito Arreaga is a 54 yo male with PMhx of ETOH Pancreatitis, IDDM, HTN and hx of TIA who was brought to [facility] ED after 2 brief episodes of syncope this AM. Pt admits to drinking beer last night while watching the game. He states that every since he was prescribed Losartan approx 2 months ago he gets weakened after taking. Approx 1 hour after taking losartan, the patient noted weakness with bright light visual disturbances then lost consciousness briefly. Associated symptoms included diaphoresis with palpitations and had a severe pain between his shoulder blades that he has felt in the past "when I am having kidney troubles." The patient has a hx of KOREY requiring short term HD. On arrival to the ED, B/P is low at 80/46, temp 97.5, pulse 80 and resp 18. CXR notes a normal cardiac/mediastinal silhouettes and other negative findings. Labs find a leukopenia (3.77), normocytic anemia, INR 1.3, CO2 = 9, anion gap 30, creatinine 1.5, glucose 249, /, triglycerides 2,295, chol 307, initial lactate 10.9.  Two liters of saline were given and pt received Cipro and Vanco. EKG = NSR and 1st troponin < 0.006 and blood cultures pending. Pt is placed on Observation to evaluate for syncope and elevated lactic acidosis in the setting of diabetes and ETOH intake.     Past Medical History:   Diagnosis Date    Cataracts, bilateral     Dehydration     Diabetes mellitus, " "type 2     Diagnosed 7/2016    Folliculitis 9/15/2017    GERD (gastroesophageal reflux disease)     Hypertension     Inflammatory polyps of colon     Pancreatitis, alcoholic, acute     Port catheter in place     TIA (transient ischemic attack)     Tinea cruris 9/15/2017       Past Surgical History:   Procedure Laterality Date    COLONOSCOPY      COLONOSCOPY N/A 6/15/2018    Per[facility] by [facility] at [facility]    ESOPHAGOGASTRODUODENOSCOPY (EGD) N/A 6/15/2018    Performed by [facility] at [facility]       Review of patient's allergies indicates:  No Known Allergies    No current facility-administered medications on file prior to encounter.      Current Outpatient Medications on File Prior to Encounter   Medication Sig    aspirin 325 MG tablet Take 1 tablet (325 mg total) by mouth once daily.    atorvastatin (LIPITOR) 10 MG tablet Take 1 tablet (10 mg total) by mouth once daily.    hydrALAZINE (APRESOLINE) 50 MG tablet Take 1 tablet (50 mg total) by mouth 3 (three) times daily.    insulin (BASAGLAR KWIKPEN U-100 INSULIN) glargine 100 units/mL (3mL) SubQ pen INJECT 50 UNITS SUBCUTANEOUSLY IN THE EVENING    insulin lispro (ADMELOG U-100 INSULIN LISPRO) 100 unit/mL injection Use sliding scale.    isosorbide dinitrate (ISORDIL) 20 MG tablet Take 1 tablet (20 mg total) by mouth 3 (three) times daily.    losartan-hydrochlorothiazide 100-25 mg (HYZAAR) 100-25 mg per tablet Take 1 tablet by mouth once daily.    verapamil (CALAN-SR) 240 MG CR tablet Take 1 tablet (240 mg total) by mouth once daily.    blood sugar diagnostic (ONETOUCH ULTRA TEST) Strp USE TO CHECK SUGAR BEFORE MEALS AND AT BEDTIME    insulin syringe-needle,dispos. 1 mL 28 gauge x 1/2" Syrg 1 each by Misc.(Non-Drug; Combo Route) route 2 (two) times daily.    lancets (ONETOUCH DELICA LANCETS) 33 gauge Misc Use to check sugar 4 (four) times daily before meals and nightly.    naproxen (NAPROSYN) 500 MG tablet Take 1 tablet (500 " mg total) by mouth 2 (two) times daily with meals.    ondansetron (ZOFRAN-ODT) 4 MG TbDL Take 1 tablet (4 mg total) by mouth every 6 (six) hours as needed (NAUSEA/VOMITING).    pantoprazole (PROTONIX) 40 MG tablet Take 1 tablet (40 mg total) by mouth once daily.     Family History     Problem Relation (Age of Onset)    Hypertension Mother, Father        Tobacco Use    Smoking status: Former Smoker     Types: Cigarettes    Smokeless tobacco: Never Used   Substance and Sexual Activity    Alcohol use: Yes     Comment:  Daily - States that he drinks 1/2 pint of vodka and about 1-2 cans of beer daily    Drug use: No    Sexual activity: Yes     Partners: Female     Review of Systems  Objective:     Vital Signs (Most Recent):  Temp: 98.5 °F (36.9 °C) (03/30/19 1722)  Pulse: 82 (03/30/19 1722)  Resp: 16 (03/30/19 1722)  BP: 136/86 (03/30/19 1722)  SpO2: 97 % (03/30/19 1722) Vital Signs (24h Range):  Temp:  [97.5 °F (36.4 °C)-98.6 °F (37 °C)] 98.5 °F (36.9 °C)  Pulse:  [75-90] 82  Resp:  [12-30] 16  SpO2:  [97 %-100 %] 97 %  BP: ()/(46-86) 136/86     Weight: 95.3 kg (210 lb)(Pt unable to stand)  Body mass index is 33.89 kg/m².    Physical Exam   Constitutional: He is oriented to person, place, and time. He appears well-developed and well-nourished.   HENT:   Head: Normocephalic and atraumatic.   Nose: Nose normal.   Mouth/Throat: Oropharynx is clear and moist.   Eyes: Pupils are equal, round, and reactive to light. Conjunctivae are normal. No scleral icterus.   Neck: Normal range of motion. Neck supple.   Cardiovascular: Normal rate, regular rhythm and normal heart sounds. Exam reveals no gallop and no friction rub.   No murmur heard.  Pulmonary/Chest: Effort normal and breath sounds normal.   Abdominal: Soft. Bowel sounds are normal.   obese   Musculoskeletal: Normal range of motion. He exhibits no edema or tenderness.   Neurological: He is alert and oriented to person, place, and time.   Skin: Skin is warm and  dry.   Psychiatric: He has a normal mood and affect. His behavior is normal.   Nursing note and vitals reviewed.        CRANIAL NERVES     CN III, IV, VI   Pupils are equal, round, and reactive to light.       Significant Labs:   CBC:   Recent Labs   Lab 03/30/19  1153   WBC 3.77*   HGB 10.6*   HCT 32.0*        CMP:   Recent Labs   Lab 03/30/19  1153 03/30/19  1727    138   K 3.6 4.2    100   CO2 9* 16*   * 117*   BUN 14 13   CREATININE 1.5* 1.3   CALCIUM 8.2* 8.1*   PROT 6.6  --    ALBUMIN 3.2*  --    BILITOT 1.3*  --    ALKPHOS 166*  --    *  --    *  --    ANIONGAP 30* 22*   EGFRNONAA 52.8* >60     Lipase:   Recent Labs   Lab 03/30/19  1153   LIPASE 35     Lipid Panel:   Recent Labs   Lab 03/30/19  1153   CHOL 307*   HDL 33*   LDLCALC Invalid, Trig>400.0   TRIG 2,295*   CHOLHDL 10.7*     Troponin:   Recent Labs   Lab 03/30/19  1153 03/30/19  1727   TROPONINI <0.006 0.013     All pertinent labs within the past 24 hours have been reviewed.    Significant Imaging: I have reviewed all pertinent imaging results/findings within the past 24 hours.    Assessment/Plan:     * Syncope  Place on Observation  Telemetry monitoring  No gross neuro deficits  orthostatic VS every shift, neuro checks every 4 hours, serial troponin levels, 2 D ECHO pending  Carotid US          Hypertriglyceridemia        Essential hypertension  B/P low - hold antihypertensives      Hyperlipidemia  Hold STATIN due to transaminitis      Metabolic acidosis  Secondary KOREY, ETOH and DM  IV hydration  Repeat labs in AM      Lactic acidosis  Does not exhibit symptoms of infection  In setting of ETOH and Diabetes, KOREY  Trending downward  Continue IV fluids  Repeat level in Am      Hypotension  Has intravascular volume depletion  Continue IV fluids  Hold antihypertensives      Intravascular volume depletion  KOREY per labs  Was drinking ETOH last night  Orthostatic vital signs   IV hydration      KOREY (acute kidney  injury)  Was taking an ARB, dehydrated  Avoid any nephrotoxic meds  Continuous IV fluids  Repeat labs in AM      Alcohol abuse  Encouraged cessation  Monitor for alcohol withdrawals  Folic acid, thiamine, librium prn        VTE Risk Mitigation (From admission, onward)        Ordered     heparin (porcine) injection 5,000 Units  Every 8 hours      03/30/19 1818             Tiffanie Osborne NP  Department of Hospital Medicine   [facility]

## 2019-03-30 NOTE — HPI
"Jorgito Arreaga is a 54 yo male with PMhx of ETOH Pancreatitis, IDDM, HTN and hx of TIA who was brought to [facility] ED after 2 brief episodes of syncope this AM. Pt admits to drinking beer last night while watching the game. He states that every since he was prescribed Losartan approx 2 months ago he gets weakened after taking. Approx 1 hour after taking losartan, the patient noted weakness with bright light visual disturbances then lost consciousness briefly. Associated symptoms included diaphoresis with palpitations and had a severe pain between his shoulder blades that he has felt in the past "when I am having kidney troubles." The patient has a hx of KOREY requiring short term HD. On arrival to the ED, B/P is low at 80/46, temp 97.5, pulse 80 and resp 18. CXR notes a normal cardiac/mediastinal silhouettes and other negative findings. Labs find a leukopenia (3.77), normocytic anemia, INR 1.3, CO2 = 9, anion gap 30, creatinine 1.5, glucose 249, /, triglycerides 2,295, chol 307, initial lactate 10.9.  Two liters of saline were given and pt received Cipro and Vanco. EKG = NSR and 1st troponin < 0.006 and blood cultures pending. Pt is placed on Observation to evaluate for syncope and elevated lactic acidosis in the setting of diabetes and ETOH intake.   "

## 2019-03-30 NOTE — CLINICAL REVIEW
Jorgito Arreaga [id number] is a 53 y.o. male who has been consulted for vancomycin dosing.    Dx: Sepsis   Goal trough: 15-20    The patient has the following labs:     Date Creatinine (mg/dl)    BUN WBC Count   3/30/2019 Estimated Creatinine Clearance: 62.3 mL/min (A) (based on SCr of 1.5 mg/dL (H)). Lab Results   Component Value Date    BUN 14 03/30/2019     Lab Results   Component Value Date    WBC 3.77 (L) 03/30/2019        Current weight is 95.3 kg (210 lb)    The patient will be started on vancomycin with a dose of 1,500 mg (19 mg/kg x adj BW). Patient will be PULSE DOSED due to KOREY. Placeholder dose of 1,000 mg every 72 hours to be given/adjusted pending random level 3/31 at 0430.  Patient will be followed by pharmacy for changes in renal function, toxicity, and efficacy.      Thank you for allowing us to participate in this patient's care.     Glo Perla

## 2019-03-30 NOTE — ED PROVIDER NOTES
"Encounter Date: 3/30/2019       History     Chief Complaint   Patient presents with    Loss of Consciousness     Pt states he "passed out" twice this morning. EMS reports  on arrival.      Underlying history of diabetes, previous alcohol abuse, pancreatitis, chronic renal insufficiency, hypertension.  Not presently following with Nephrology.  Reports he has been compliant with his medications recently including diabetes control and insulin monitoring, but blood sugar this morning was 350.  He has been feeling bad this morning, nonspecific generalized complaints.  Reports he passed out twice while at home without chest pain, nausea, vomiting, head injury, or other localizing complaints. Call the ambulance, blood sugars 250-350 prior to arrival, noted hypotensive on arrival here, corrected quickly with IV fluids in repositioning.  Mild upper abdominal discomfort.  No chest pain. No other specific complaints.    The history is provided by the patient and the EMS personnel. No  was used.     Review of patient's allergies indicates:  No Known Allergies  Past Medical History:   Diagnosis Date    Cataracts, bilateral     Dehydration     Diabetes mellitus, type 2     Diagnosed 7/2016    Folliculitis 9/15/2017    GERD (gastroesophageal reflux disease)     Hypertension     Inflammatory polyps of colon     Pancreatitis, alcoholic, acute     Port catheter in place     TIA (transient ischemic attack)     Tinea cruris 9/15/2017     Past Surgical History:   Procedure Laterality Date    COLONOSCOPY      COLONOSCOPY N/A 6/15/2018    Performed by [facility] at [facility]    ESOPHAGOGASTRODUODENOSCOPY (EGD) N/A 6/15/2018    Performed by Jim Hicks MD at [facility]     Family History   Problem Relation Age of Onset    Hypertension Mother     Hypertension Father      Social History     Tobacco Use    Smoking status: Former Smoker     Types: Cigarettes    Smokeless tobacco: Never " Used   Substance Use Topics    Alcohol use: Yes     Comment:  Daily - States that he drinks 1/2 pint of vodka and about 1-2 cans of beer daily    Drug use: No     Review of Systems   Constitutional: Negative for chills and fever.   HENT: Negative for congestion, facial swelling, nosebleeds and sinus pressure.    Eyes: Negative for pain and redness.   Respiratory: Negative for chest tightness, shortness of breath and wheezing.    Cardiovascular: Negative for chest pain, palpitations and leg swelling.   Gastrointestinal: Positive for abdominal pain. Negative for abdominal distention, diarrhea, nausea and vomiting.   Endocrine: Negative for cold intolerance, polydipsia and polyphagia.   Genitourinary: Negative for difficulty urinating, dysuria, frequency and hematuria.   Musculoskeletal: Negative for arthralgias, back pain, myalgias and neck pain.   Skin: Negative for color change and rash.   Neurological: Positive for syncope and weakness. Negative for dizziness, numbness and headaches.   Hematological: Negative for adenopathy. Does not bruise/bleed easily.   Psychiatric/Behavioral: Negative for agitation and behavioral problems.   All other systems reviewed and are negative.      Physical Exam     Initial Vitals [03/30/19 1138]   BP Pulse Resp Temp SpO2   (!) 80/46 80 18 97.5 °F (36.4 °C) 97 %      MAP       --         Physical Exam    Nursing note and vitals reviewed.  Constitutional: He appears well-developed and well-nourished. He is not diaphoretic. No distress.   Hypotensive on arrival, corrected quickly with position and IV fluids   HENT:   Head: Normocephalic and atraumatic.   Mouth/Throat: Oropharynx is clear and moist. No oropharyngeal exudate.   Eyes: Conjunctivae and EOM are normal. Pupils are equal, round, and reactive to light. Right eye exhibits no discharge. Left eye exhibits no discharge. No scleral icterus.   Neck: Normal range of motion. Neck supple. No thyromegaly present. No tracheal deviation  present. No JVD present.   Cardiovascular: Normal rate, regular rhythm and normal heart sounds. Exam reveals no gallop and no friction rub.    No murmur heard.  Pulmonary/Chest: Breath sounds normal. No respiratory distress. He has no wheezes. He has no rhonchi. He has no rales. He exhibits no tenderness.   Abdominal: Soft. Bowel sounds are normal. He exhibits no distension and no mass. There is no tenderness. There is no rebound and no guarding.   Musculoskeletal: Normal range of motion. He exhibits no edema or tenderness.   Lymphadenopathy:     He has no cervical adenopathy.   Neurological: He is alert and oriented to person, place, and time. He has normal strength. No cranial nerve deficit.   Skin: Skin is warm and dry. No rash noted. No erythema.   Psychiatric: He has a normal mood and affect. His behavior is normal. Judgment and thought content normal.         ED Course   Procedures  Labs Reviewed   CBC W/ AUTO DIFFERENTIAL - Abnormal; Notable for the following components:       Result Value    WBC 3.77 (*)     RBC 3.50 (*)     Hemoglobin 10.6 (*)     Hematocrit 32.0 (*)     All other components within normal limits   COMPREHENSIVE METABOLIC PANEL - Abnormal; Notable for the following components:    CO2 9 (*)     Glucose 249 (*)     Creatinine 1.5 (*)     Calcium 8.2 (*)     Albumin 3.2 (*)     Total Bilirubin 1.3 (*)     Alkaline Phosphatase 166 (*)      (*)      (*)     Anion Gap 30 (*)     eGFR if non  52.8 (*)     All other components within normal limits    Narrative:       Carbon dioxide critical result(s) called and verbal readback   obtained from Kirsten Andrew, 03/30/2019 12:35   URINALYSIS, REFLEX TO URINE CULTURE - Abnormal; Notable for the following components:    Glucose, UA 1+ (*)     All other components within normal limits    Narrative:     Preferred Collection Type->Urine, Clean Catch   ALCOHOL,MEDICAL (ETHANOL) - Abnormal; Notable for the following  components:    Alcohol, Medical, Serum 79 (*)     All other components within normal limits   LACTIC ACID, PLASMA - Abnormal; Notable for the following components:    Lactate (Lactic Acid) 10.9 (*)     All other components within normal limits    Narrative:      Lactic Acid  critical result(s) called and verbal readback obtained   from Romelia Hoovert, 03/30/2019 12:29   POCT GLUCOSE - Abnormal; Notable for the following components:    POCT Glucose 275 (*)     All other components within normal limits   POCT GLUCOSE - Abnormal; Notable for the following components:    POCT Glucose 128 (*)     All other components within normal limits   CULTURE, BLOOD   CULTURE, BLOOD   DRUG SCREEN PANEL, URINE EMERGENCY    Narrative:     Preferred Collection Type->Urine, Clean Catch   AMYLASE   LIPASE   TROPONIN I   CK   CK-MB   BETA - HYDROXYBUTYRATE, SERUM   LIPID PANEL   AMMONIA   PROTIME-INR   APTT   LACTIC ACID, PLASMA   LIPID PANEL   BASIC METABOLIC PANEL   TROPONIN I   POCT GLUCOSE MONITORING CONTINUOUS     EKG Readings: (Independently Interpreted)   Initial Reading: No STEMI. Rhythm: Normal Sinus Rhythm. Heart Rate: 73. Ectopy: No Ectopy. Conduction: Normal. ST Segments: Normal ST Segments. T Waves: Normal. Axis: Normal. Clinical Impression: Normal Sinus Rhythm       Imaging Results          X-Ray Chest AP Portable (Final result)  Result time 03/30/19 12:22:40    Final result by Price Hutchison MD (03/30/19 12:22:40)                 Impression:      No acute findings.      Electronically signed by: Price Hutchison MD  Date:    03/30/2019  Time:    12:22             Narrative:    EXAMINATION:  XR CHEST AP PORTABLE    CLINICAL HISTORY:  Chest Pain;    TECHNIQUE:  AP view of the chest was performed.    COMPARISON:  07/23/2018    FINDINGS:  The cardiac and mediastinal silhouettes appear within normal limits. Subsegmental right basilar discoid atelectasis.  The lungs are otherwise clear bilaterally.  No acute osseous findings  demonstrated.                                1:18 PM Elevated ammonia level reported and difficulty with coagulation studies due to report of lipemic blood.  Lipid profile added.  Patient clinically stable, urine just now sent.  Repeat Accu-Chek in the 120s.      1:45 PM Multiple re-evaluations.  Remains clinically unchanged.  Still feels bad and a nonspecific sense.  Vital signs stable. Reviewed data available so far, no specific source of infection identified but treating aggressively for the possibility of occult sepsis.  Have counseled the patient that I believe his primary problem is alcohol abuse, now showing signs of acute alcoholic hepatitis and other decompensations.  Multifactorial metabolic acidosis.  Will admit.      2:31 PM Discussed with the patient in detail regarding the need for transfer to a higher level of care for admission, specifically [facility], inpatient, Hospital Medicine, Dr. Rowland for inpatient hospital services not available at this freestanding ER facility.  He has verbalized understanding.  Reason for transfer is multiple presenting medical illnesses as outlined below, in need of further evaluation and management.  The risk of transfer includes motor vehicle accident, worsening of condition, and death.  He will be transported by [facility] with continued IV fluids, IV antibiotics, and cardiopulmonary monitoring in route.  He is stable for transfer.    Critical care time 45 minutes.                            Clinical Impression:     1. Hypotension, unspecified hypotension type    2. Alcoholic hepatitis, unspecified whether ascites present    3. Lactic acidosis    4. Alcoholism    5. Acute renal insufficiency    6. Alcoholic intoxication with complication    7. Syncope, unspecified syncope type    8. Hypertriglyceridemia    9. Hyperglycemia    10. Anemia, unspecified type          Disposition:   Disposition: Admitted  Condition: Serious  [facility]/ Telemetry/ Inpatient/ Dr. Janett Stephens MD  03/30/19 2338

## 2019-03-30 NOTE — ASSESSMENT & PLAN NOTE
Does not exhibit symptoms of infection  In setting of ETOH and Diabetes, KOREY  Trending downward  Continue IV fluids  Repeat level in Am

## 2019-03-30 NOTE — SUBJECTIVE & OBJECTIVE
"Past Medical History:   Diagnosis Date    Cataracts, bilateral     Dehydration     Diabetes mellitus, type 2     Diagnosed 7/2016    Folliculitis 9/15/2017    GERD (gastroesophageal reflux disease)     Hypertension     Inflammatory polyps of colon     Pancreatitis, alcoholic, acute     Port catheter in place     TIA (transient ischemic attack)     Tinea cruris 9/15/2017       Past Surgical History:   Procedure Laterality Date    COLONOSCOPY      COLONOSCOPY N/A 6/15/2018    Performed by Jim Hicks MD at [facility] ENDO    ESOPHAGOGASTRODUODENOSCOPY (EGD) N/A 6/15/2018    Performed by Jim Hicks MD at [facility] ENDO       Review of patient's allergies indicates:  No Known Allergies    No current facility-administered medications on file prior to encounter.      Current Outpatient Medications on File Prior to Encounter   Medication Sig    aspirin 325 MG tablet Take 1 tablet (325 mg total) by mouth once daily.    atorvastatin (LIPITOR) 10 MG tablet Take 1 tablet (10 mg total) by mouth once daily.    hydrALAZINE (APRESOLINE) 50 MG tablet Take 1 tablet (50 mg total) by mouth 3 (three) times daily.    insulin (BASAGLAR KWIKPEN U-100 INSULIN) glargine 100 units/mL (3mL) SubQ pen INJECT 50 UNITS SUBCUTANEOUSLY IN THE EVENING    insulin lispro (ADMELOG U-100 INSULIN LISPRO) 100 unit/mL injection Use sliding scale.    isosorbide dinitrate (ISORDIL) 20 MG tablet Take 1 tablet (20 mg total) by mouth 3 (three) times daily.    losartan-hydrochlorothiazide 100-25 mg (HYZAAR) 100-25 mg per tablet Take 1 tablet by mouth once daily.    verapamil (CALAN-SR) 240 MG CR tablet Take 1 tablet (240 mg total) by mouth once daily.    blood sugar diagnostic (ONETOUCH ULTRA TEST) Strp USE TO CHECK SUGAR BEFORE MEALS AND AT BEDTIME    insulin syringe-needle,dispos. 1 mL 28 gauge x 1/2" Syrg 1 each by Misc.(Non-Drug; Combo Route) route 2 (two) times daily.    lancets (ONETOUCH DELICA LANCETS) 33 gauge Misc Use to check sugar " 4 (four) times daily before meals and nightly.    naproxen (NAPROSYN) 500 MG tablet Take 1 tablet (500 mg total) by mouth 2 (two) times daily with meals.    ondansetron (ZOFRAN-ODT) 4 MG TbDL Take 1 tablet (4 mg total) by mouth every 6 (six) hours as needed (NAUSEA/VOMITING).    pantoprazole (PROTONIX) 40 MG tablet Take 1 tablet (40 mg total) by mouth once daily.     Family History     Problem Relation (Age of Onset)    Hypertension Mother, Father        Tobacco Use    Smoking status: Former Smoker     Types: Cigarettes    Smokeless tobacco: Never Used   Substance and Sexual Activity    Alcohol use: Yes     Comment:  Daily - States that he drinks 1/2 pint of vodka and about 1-2 cans of beer daily    Drug use: No    Sexual activity: Yes     Partners: Female     Review of Systems  Objective:     Vital Signs (Most Recent):  Temp: 98.5 °F (36.9 °C) (03/30/19 1722)  Pulse: 82 (03/30/19 1722)  Resp: 16 (03/30/19 1722)  BP: 136/86 (03/30/19 1722)  SpO2: 97 % (03/30/19 1722) Vital Signs (24h Range):  Temp:  [97.5 °F (36.4 °C)-98.6 °F (37 °C)] 98.5 °F (36.9 °C)  Pulse:  [75-90] 82  Resp:  [12-30] 16  SpO2:  [97 %-100 %] 97 %  BP: ()/(46-86) 136/86     Weight: 95.3 kg (210 lb)(Pt unable to stand)  Body mass index is 33.89 kg/m².    Physical Exam   Constitutional: He is oriented to person, place, and time. He appears well-developed and well-nourished.   HENT:   Head: Normocephalic and atraumatic.   Nose: Nose normal.   Mouth/Throat: Oropharynx is clear and moist.   Eyes: Pupils are equal, round, and reactive to light. Conjunctivae are normal. No scleral icterus.   Neck: Normal range of motion. Neck supple.   Cardiovascular: Normal rate, regular rhythm and normal heart sounds. Exam reveals no gallop and no friction rub.   No murmur heard.  Pulmonary/Chest: Effort normal and breath sounds normal.   Abdominal: Soft. Bowel sounds are normal.   obese   Musculoskeletal: Normal range of motion. He exhibits no edema or  tenderness.   Neurological: He is alert and oriented to person, place, and time.   Skin: Skin is warm and dry.   Psychiatric: He has a normal mood and affect. His behavior is normal.   Nursing note and vitals reviewed.        CRANIAL NERVES     CN III, IV, VI   Pupils are equal, round, and reactive to light.       Significant Labs:   CBC:   Recent Labs   Lab 03/30/19  1153   WBC 3.77*   HGB 10.6*   HCT 32.0*        CMP:   Recent Labs   Lab 03/30/19  1153 03/30/19  1727    138   K 3.6 4.2    100   CO2 9* 16*   * 117*   BUN 14 13   CREATININE 1.5* 1.3   CALCIUM 8.2* 8.1*   PROT 6.6  --    ALBUMIN 3.2*  --    BILITOT 1.3*  --    ALKPHOS 166*  --    *  --    *  --    ANIONGAP 30* 22*   EGFRNONAA 52.8* >60     Lipase:   Recent Labs   Lab 03/30/19  1153   LIPASE 35     Lipid Panel:   Recent Labs   Lab 03/30/19  1153   CHOL 307*   HDL 33*   LDLCALC Invalid, Trig>400.0   TRIG 2,295*   CHOLHDL 10.7*     Troponin:   Recent Labs   Lab 03/30/19  1153 03/30/19  1727   TROPONINI <0.006 0.013     All pertinent labs within the past 24 hours have been reviewed.    Significant Imaging: I have reviewed all pertinent imaging results/findings within the past 24 hours.

## 2019-03-30 NOTE — ED NOTES
Erin Modi [facility] spoke to  Paget at [facility] Telemetry unit. Gave patient report. She verbalized understanding. Pt will be in Room 243

## 2019-03-30 NOTE — ASSESSMENT & PLAN NOTE
Place on Observation  Telemetry monitoring  No gross neuro deficits  orthostatic VS every shift, neuro checks every 4 hours, serial troponin levels, 2 D ECHO pending  Carotid US

## 2019-03-30 NOTE — ED NOTES
Adriana in lab requesting recollect green top. Spoke with Paget on tele, she is aware. Will put orders in.

## 2019-03-31 VITALS
TEMPERATURE: 99 F | HEIGHT: 66 IN | RESPIRATION RATE: 18 BRPM | SYSTOLIC BLOOD PRESSURE: 136 MMHG | WEIGHT: 194 LBS | HEART RATE: 87 BPM | BODY MASS INDEX: 31.18 KG/M2 | OXYGEN SATURATION: 95 % | DIASTOLIC BLOOD PRESSURE: 76 MMHG

## 2019-03-31 PROBLEM — I95.9 HYPOTENSION: Status: RESOLVED | Noted: 2019-03-30 | Resolved: 2019-03-31

## 2019-03-31 PROBLEM — R55 SYNCOPE: Status: RESOLVED | Noted: 2019-03-30 | Resolved: 2019-03-31

## 2019-03-31 PROBLEM — N17.9 AKI (ACUTE KIDNEY INJURY): Status: RESOLVED | Noted: 2018-07-19 | Resolved: 2019-03-31

## 2019-03-31 PROBLEM — E87.20 LACTIC ACIDOSIS: Status: RESOLVED | Noted: 2019-03-30 | Resolved: 2019-03-31

## 2019-03-31 PROBLEM — E86.1 INTRAVASCULAR VOLUME DEPLETION: Status: RESOLVED | Noted: 2018-12-07 | Resolved: 2019-03-31

## 2019-03-31 LAB
ALBUMIN SERPL BCP-MCNC: 3.1 G/DL (ref 3.5–5.2)
ALP SERPL-CCNC: 138 U/L (ref 55–135)
ALT SERPL W/O P-5'-P-CCNC: 222 U/L (ref 10–44)
ANION GAP SERPL CALC-SCNC: 17 MMOL/L (ref 8–16)
AST SERPL-CCNC: 314 U/L (ref 10–40)
BASOPHILS # BLD AUTO: 0.02 K/UL (ref 0–0.2)
BASOPHILS NFR BLD: 0.4 % (ref 0–1.9)
BILIRUB SERPL-MCNC: 2.2 MG/DL (ref 0.1–1)
BUN SERPL-MCNC: 9 MG/DL (ref 6–20)
CALCIUM SERPL-MCNC: 7.9 MG/DL (ref 8.7–10.5)
CHLORIDE SERPL-SCNC: 103 MMOL/L (ref 95–110)
CO2 SERPL-SCNC: 19 MMOL/L (ref 23–29)
CREAT SERPL-MCNC: 1.2 MG/DL (ref 0.5–1.4)
DIFFERENTIAL METHOD: ABNORMAL
EOSINOPHIL # BLD AUTO: 0 K/UL (ref 0–0.5)
EOSINOPHIL NFR BLD: 0.9 % (ref 0–8)
ERYTHROCYTE [DISTWIDTH] IN BLOOD BY AUTOMATED COUNT: 13.8 % (ref 11.5–14.5)
EST. GFR  (AFRICAN AMERICAN): >60 ML/MIN/1.73 M^2
EST. GFR  (NON AFRICAN AMERICAN): >60 ML/MIN/1.73 M^2
GLUCOSE SERPL-MCNC: 158 MG/DL (ref 70–110)
HCT VFR BLD AUTO: 28.6 % (ref 40–54)
HGB BLD-MCNC: 9.5 G/DL (ref 14–18)
LACTATE SERPL-SCNC: 2 MMOL/L (ref 0.5–2.2)
LYMPHOCYTES # BLD AUTO: 1.7 K/UL (ref 1–4.8)
LYMPHOCYTES NFR BLD: 36.5 % (ref 18–48)
MAGNESIUM SERPL-MCNC: 1.1 MG/DL (ref 1.6–2.6)
MCH RBC QN AUTO: 29.8 PG (ref 27–31)
MCHC RBC AUTO-ENTMCNC: 33.2 G/DL (ref 32–36)
MCV RBC AUTO: 90 FL (ref 82–98)
MONOCYTES # BLD AUTO: 0.4 K/UL (ref 0.3–1)
MONOCYTES NFR BLD: 9.4 % (ref 4–15)
NEUTROPHILS # BLD AUTO: 2.5 K/UL (ref 1.8–7.7)
NEUTROPHILS NFR BLD: 52.8 % (ref 38–73)
PHOSPHATE SERPL-MCNC: 1.7 MG/DL (ref 2.7–4.5)
PLATELET # BLD AUTO: 176 K/UL (ref 150–350)
PMV BLD AUTO: 10.2 FL (ref 9.2–12.9)
POCT GLUCOSE: 135 MG/DL (ref 70–110)
POCT GLUCOSE: 84 MG/DL (ref 70–110)
POTASSIUM SERPL-SCNC: 3 MMOL/L (ref 3.5–5.1)
PROT SERPL-MCNC: 5.8 G/DL (ref 6–8.4)
RBC # BLD AUTO: 3.19 M/UL (ref 4.6–6.2)
SODIUM SERPL-SCNC: 139 MMOL/L (ref 136–145)
TRIGL SERPL-MCNC: 1019 MG/DL (ref 30–150)
VANCOMYCIN SERPL-MCNC: 7.9 UG/ML
WBC # BLD AUTO: 4.69 K/UL (ref 3.9–12.7)

## 2019-03-31 PROCEDURE — 25000003 PHARM REV CODE 250: Performed by: NURSE PRACTITIONER

## 2019-03-31 PROCEDURE — 84478 ASSAY OF TRIGLYCERIDES: CPT

## 2019-03-31 PROCEDURE — G0378 HOSPITAL OBSERVATION PER HR: HCPCS

## 2019-03-31 PROCEDURE — 80202 ASSAY OF VANCOMYCIN: CPT

## 2019-03-31 PROCEDURE — 96375 TX/PRO/DX INJ NEW DRUG ADDON: CPT

## 2019-03-31 PROCEDURE — 36415 COLL VENOUS BLD VENIPUNCTURE: CPT

## 2019-03-31 PROCEDURE — 80053 COMPREHEN METABOLIC PANEL: CPT

## 2019-03-31 PROCEDURE — 84100 ASSAY OF PHOSPHORUS: CPT

## 2019-03-31 PROCEDURE — 83605 ASSAY OF LACTIC ACID: CPT

## 2019-03-31 PROCEDURE — 63600175 PHARM REV CODE 636 W HCPCS: Performed by: NURSE PRACTITIONER

## 2019-03-31 PROCEDURE — 83735 ASSAY OF MAGNESIUM: CPT

## 2019-03-31 PROCEDURE — 96361 HYDRATE IV INFUSION ADD-ON: CPT

## 2019-03-31 PROCEDURE — 85025 COMPLETE CBC W/AUTO DIFF WBC: CPT

## 2019-03-31 RX ORDER — ISOSORBIDE DINITRATE 10 MG/1
20 TABLET ORAL 3 TIMES DAILY
Status: DISCONTINUED | OUTPATIENT
Start: 2019-03-31 | End: 2019-03-31 | Stop reason: HOSPADM

## 2019-03-31 RX ORDER — LOSARTAN POTASSIUM 100 MG/1
100 TABLET ORAL DAILY
Qty: 30 TABLET | Refills: 0 | Status: SHIPPED | OUTPATIENT
Start: 2019-04-01 | End: 2019-05-24 | Stop reason: SDUPTHER

## 2019-03-31 RX ORDER — VERAPAMIL HYDROCHLORIDE 240 MG/1
240 TABLET, FILM COATED, EXTENDED RELEASE ORAL DAILY
Status: DISCONTINUED | OUTPATIENT
Start: 2019-03-31 | End: 2019-03-31 | Stop reason: HOSPADM

## 2019-03-31 RX ORDER — SODIUM,POTASSIUM PHOSPHATES 280-250MG
1 POWDER IN PACKET (EA) ORAL
Refills: 0 | COMMUNITY
Start: 2019-03-31

## 2019-03-31 RX ORDER — LANOLIN ALCOHOL/MO/W.PET/CERES
100 CREAM (GRAM) TOPICAL DAILY
COMMUNITY
Start: 2019-04-01 | End: 2020-09-29 | Stop reason: ALTCHOICE

## 2019-03-31 RX ORDER — SODIUM,POTASSIUM PHOSPHATES 280-250MG
1 POWDER IN PACKET (EA) ORAL
Status: DISCONTINUED | OUTPATIENT
Start: 2019-03-31 | End: 2019-03-31 | Stop reason: HOSPADM

## 2019-03-31 RX ORDER — LOSARTAN POTASSIUM 50 MG/1
100 TABLET ORAL DAILY
Status: DISCONTINUED | OUTPATIENT
Start: 2019-03-31 | End: 2019-03-31 | Stop reason: HOSPADM

## 2019-03-31 RX ORDER — FOLIC ACID 1 MG/1
1 TABLET ORAL DAILY
Qty: 30 TABLET | Refills: 0 | Status: SHIPPED | OUTPATIENT
Start: 2019-04-01 | End: 2019-09-04

## 2019-03-31 RX ORDER — ACETAMINOPHEN 325 MG/1
650 TABLET ORAL EVERY 8 HOURS PRN
Status: DISCONTINUED | OUTPATIENT
Start: 2019-03-31 | End: 2019-03-31 | Stop reason: HOSPADM

## 2019-03-31 RX ORDER — MAGNESIUM SULFATE HEPTAHYDRATE 40 MG/ML
2 INJECTION, SOLUTION INTRAVENOUS ONCE
Status: COMPLETED | OUTPATIENT
Start: 2019-03-31 | End: 2019-03-31

## 2019-03-31 RX ORDER — POTASSIUM CHLORIDE 20 MEQ/1
20 TABLET, EXTENDED RELEASE ORAL ONCE
Status: COMPLETED | OUTPATIENT
Start: 2019-03-31 | End: 2019-03-31

## 2019-03-31 RX ORDER — HYDRALAZINE HYDROCHLORIDE 50 MG/1
50 TABLET, FILM COATED ORAL 3 TIMES DAILY
Status: DISCONTINUED | OUTPATIENT
Start: 2019-03-31 | End: 2019-03-31 | Stop reason: HOSPADM

## 2019-03-31 RX ADMIN — Medication 100 MG: at 09:03

## 2019-03-31 RX ADMIN — SODIUM CHLORIDE: 0.9 INJECTION, SOLUTION INTRAVENOUS at 12:03

## 2019-03-31 RX ADMIN — PANTOPRAZOLE SODIUM 40 MG: 40 TABLET, DELAYED RELEASE ORAL at 09:03

## 2019-03-31 RX ADMIN — LOSARTAN POTASSIUM 100 MG: 50 TABLET, FILM COATED ORAL at 09:03

## 2019-03-31 RX ADMIN — HEPARIN SODIUM 5000 UNITS: 5000 INJECTION, SOLUTION INTRAVENOUS; SUBCUTANEOUS at 05:03

## 2019-03-31 RX ADMIN — POTASSIUM & SODIUM PHOSPHATES POWDER PACK 280-160-250 MG 1 PACKET: 280-160-250 PACK at 11:03

## 2019-03-31 RX ADMIN — VERAPAMIL HYDROCHLORIDE 240 MG: 240 TABLET, FILM COATED, EXTENDED RELEASE ORAL at 09:03

## 2019-03-31 RX ADMIN — FOLIC ACID 1 MG: 1 TABLET ORAL at 09:03

## 2019-03-31 RX ADMIN — ASPIRIN 325 MG ORAL TABLET 325 MG: 325 PILL ORAL at 09:03

## 2019-03-31 RX ADMIN — ISOSORBIDE DINITRATE 20 MG: 10 TABLET ORAL at 09:03

## 2019-03-31 RX ADMIN — HYDRALAZINE HYDROCHLORIDE 50 MG: 50 TABLET ORAL at 09:03

## 2019-03-31 RX ADMIN — MAGNESIUM SULFATE IN WATER 2 G: 40 INJECTION, SOLUTION INTRAVENOUS at 10:03

## 2019-03-31 RX ADMIN — POTASSIUM CHLORIDE 20 MEQ: 1500 TABLET, EXTENDED RELEASE ORAL at 10:03

## 2019-03-31 RX ADMIN — ACETAMINOPHEN 650 MG: 325 TABLET ORAL at 05:03

## 2019-03-31 RX ADMIN — SODIUM CHLORIDE: 0.9 INJECTION, SOLUTION INTRAVENOUS at 03:03

## 2019-03-31 NOTE — HOSPITAL COURSE
"On 3/30 patient was placed in OBS secondary to Syncope, Lactic acidosis, Hypotension, IVVD, Hypertriglyceridemia, and Metabolic Acidosis.  UA and CXR negative for infectious process.  Lactic acid elevated to 5.0 and was thought to be r/t ETOH abuse and Diabetes as patient did not exhibit any s/s of infection. TG level noted to be 2295, AST/ALT elevated to 725/308 respectively.  Lipase WNL.  Patient reported "drinking heavy" on the weekends "depending on what kind of game is on", but cannot quantify.  He reports use of beer and hard liquor and with last drink on Friday night.  ETOH level in the Er elevated to 79.  UDS negative.  Patient was started on IVFs and home BP meds were held.  Troponin negative.  ECHO and Carotid US pending.    As of 3/31 patient is AAO x 3 and has no complaints.  Lactic acidosis has resolved with repeat level 2.0.  He has remained afebrile with no leukocytosis.  ECHO interpretation is pending, but per d/w technician and Dr. Sheehan today no gross abnormalities were noted during exam.  Carotid US negative.  No episodes of syncope noted since admission.  Neuro exam is benign and patient has no neuro complaints.  Hypotension present on admit has resolved, and BP is now 136/76.  Will plan to continue home Losartan and Verapamil on DC and hold Isordil, HCTZ, and Hydralazine until re-evaluated by PCP.  TG level decreased to 1019.  AST and ALT also trending down.  Patient is tolerating a regular diet without difficultly and has no abdominal complaints.  KCL 3.0, Mag 1.1 for which patient was given KCL 20 meq po and started on Kphos QID, which he will continue upon DC, and given 2g of Mag IVPB.  Case d/w Dr. Rowland who recommended discharging home today and patient can f/u with his PCP as an outpatient next week for repeat TG level and CMP.  Given elevation in liver enzymes, will defer increasing Statin dose to PCP once enzymes have normalized.  Patient was educated extensively, for >15 minutes on " the need to stop all alcohol use.  He is aware of the need to f/u with his PCP within 3 days for repeat TG level and CMP and for adjustment of medications.  Medications reconciled for DC home.

## 2019-03-31 NOTE — PLAN OF CARE
Problem: Adult Inpatient Plan of Care  Goal: Plan of Care Review  Outcome: Ongoing (interventions implemented as appropriate)  POC discussed with patient, verbalized understanding.   NSR on monitor.  Blood glucose 135 this am. No coverage required.  Patient complained of a headache. Tylenol 650mg given for complaints of an headache.   VSS. AAO X 4.    Patient ambulatory and voids per commode.   Patient given urinal and encouraged to use.  Understanding verbalized.  Turns independently in bed.   Fall precautions in place.   Side rails up X2.    Call bell on, working and within reach.   Bed locked in low position.   Remains free from falls/injuries.   Denies needs at this time.   Will continue to monitor.

## 2019-03-31 NOTE — DISCHARGE SUMMARY
"[facility] Hospital Medicine  Discharge Summary      Patient Name: Jorgito Arreaga  MRN: [id number]  Admission Date: 3/30/2019  Hospital Length of Stay: 1 days  Discharge Date and Time:  03/31/2019 1:03 PM  Attending Physician: Jarrett Rowland MD   Discharging Provider: Deyanira Walters NP  Primary Care Provider: Otf Roblero MD      HPI:   Jorgito Arreaga is a 52 yo male with PMhx of ETOH Pancreatitis, IDDM, HTN and hx of TIA who was brought to [facility] ED after 2 brief episodes of syncope this AM. Pt admits to drinking beer last night while watching the game. He states that every since he was prescribed Losartan approx 2 months ago he gets weakened after taking. Approx 1 hour after taking losartan, the patient noted weakness with bright light visual disturbances then lost consciousness briefly. Associated symptoms included diaphoresis with palpitations and had a severe pain between his shoulder blades that he has felt in the past "when I am having kidney troubles." The patient has a hx of KOREY requiring short term HD. On arrival to the ED, B/P is low at 80/46, temp 97.5, pulse 80 and resp 18. CXR notes a normal cardiac/mediastinal silhouettes and other negative findings. Labs find a leukopenia (3.77), normocytic anemia, INR 1.3, CO2 = 9, anion gap 30, creatinine 1.5, glucose 249, /, triglycerides 2,295, chol 307, initial lactate 10.9.  Two liters of saline were given and pt received Cipro and Vanco. EKG = NSR and 1st troponin < 0.006 and blood cultures pending. Pt is placed on Observation to evaluate for syncope and elevated lactic acidosis in the setting of diabetes and ETOH intake.     * No surgery found *      Hospital Course:   On 3/30 patient was placed in OBS secondary to Syncope, Lactic acidosis, Hypotension, IVVD, Hypertriglyceridemia, and Metabolic Acidosis.  UA and CXR negative for infectious process.  Lactic acid elevated to 5.0 and was thought to be r/t ETOH " "abuse and Diabetes as patient did not exhibit any s/s of infection. TG level noted to be 2295, AST/ALT elevated to 725/308 respectively.  Lipase WNL.  Patient reported "drinking heavy" on the weekends "depending on what kind of game is on", but cannot quantify.  He reports use of beer and hard liquor and with last drink on Friday night.  ETOH level in the Er elevated to 79.  UDS negative.  Patient was started on IVFs and home BP meds were held.  Troponin negative.  ECHO and Carotid US pending.    As of 3/31 patient is AAO x 3 and has no complaints.  Lactic acidosis has resolved with repeat level 2.0.  He has remained afebrile with no leukocytosis.  ECHO interpretation is pending, but per d/w technician and Dr. Sheehan today no gross abnormalities were noted during exam.  Carotid US negative.  No episodes of syncope noted since admission.  Neuro exam is benign and patient has no neuro complaints.  Hypotension present on admit has resolved, and BP is now 136/76.  Will plan to continue home Losartan and Verapamil on DC and hold Isordil, HCTZ, and Hydralazine until re-evaluated by PCP.  TG level decreased to 1019.  AST and ALT also trending down.  Patient is tolerating a regular diet without difficultly and has no abdominal complaints.  KCL 3.0, Mag 1.1 for which patient was given KCL 20 meq po and started on Kphos QID, which he will continue upon DC, and given 2g of Mag IVPB.  Case d/w Dr. Rowland who recommended discharging home today and patient can f/u with his PCP as an outpatient next week for repeat TG level and CMP.  Given elevation in liver enzymes, will defer increasing Statin dose to PCP once enzymes have normalized.  Patient was educated extensively, for >15 minutes on the need to stop all alcohol use.  He is aware of the need to f/u with his PCP within 3 days for repeat TG level and CMP and for adjustment of medications.  Medications reconciled for DC home.       Consults: None      Final Active Diagnoses: "    Diagnosis Date Noted POA    Metabolic acidosis [E87.2] 03/30/2019 Yes    Hyperlipidemia [E78.5] 03/30/2019 Yes    Essential hypertension [I10] 03/30/2019 Yes    Hypertriglyceridemia [E78.1] 03/30/2019 Unknown    Alcohol abuse [F10.10] 06/06/2016 Yes     Chronic      Problems Resolved During this Admission:    Diagnosis Date Noted Date Resolved POA    PRINCIPAL PROBLEM:  Syncope [R55] 03/30/2019 03/31/2019 Yes    Hypotension [I95.9] 03/30/2019 03/31/2019 Yes    Lactic acidosis [E87.2] 03/30/2019 03/31/2019 Yes    Intravascular volume depletion [E86.1] 12/07/2018 03/31/2019 Yes    KOREY (acute kidney injury) [N17.9] 07/19/2018 03/31/2019 Yes       Discharged Condition: good    Disposition: Home or Self Care    Follow Up:  Follow-up Information     Otf Roblero MD In 3 days.    Specialty:  Family Medicine  Why:  Follow up with PCP within 3 days for repeat CMP and TG level and adjustment of medications as indicated.   Contact information:  [address] [address] [address]  [phone]                 Patient Instructions:      Diet Cardiac     Notify your health care provider if you experience any of the following:  temperature >100.4     Notify your health care provider if you experience any of the following:  severe uncontrolled pain     Notify your health care provider if you experience any of the following:  persistent nausea and vomiting or diarrhea     Notify your health care provider if you experience any of the following:  difficulty breathing or increased cough     Notify your health care provider if you experience any of the following:  severe persistent headache     Notify your health care provider if you experience any of the following:  persistent dizziness, light-headedness, or visual disturbances     Notify your health care provider if you experience any of the following:  increased confusion or weakness     Activity as tolerated       Significant Diagnostic Studies: Labs:   BMP:    Recent Labs   Lab 03/30/19  1153 03/30/19  1727 03/31/19  0439   * 117* 158*    138 139   K 3.6 4.2 3.0*    100 103   CO2 9* 16* 19*   BUN 14 13 9   CREATININE 1.5* 1.3 1.2   CALCIUM 8.2* 8.1* 7.9*   MG  --   --  1.1*   , CMP   Recent Labs   Lab 03/30/19  1153 03/30/19  1727 03/31/19  0439    138 139   K 3.6 4.2 3.0*    100 103   CO2 9* 16* 19*   * 117* 158*   BUN 14 13 9   CREATININE 1.5* 1.3 1.2   CALCIUM 8.2* 8.1* 7.9*   PROT 6.6  --  5.8*   ALBUMIN 3.2*  --  3.1*   BILITOT 1.3*  --  2.2*   ALKPHOS 166*  --  138*   *  --  314*   *  --  222*   ANIONGAP 30* 22* 17*   ESTGFRAFRICA >60.0 >60 >60   EGFRNONAA 52.8* >60 >60   , CBC   Recent Labs   Lab 03/30/19  1153 03/31/19  0439   WBC 3.77* 4.69   HGB 10.6* 9.5*   HCT 32.0* 28.6*    176   , Lipid Panel   Lab Results   Component Value Date    CHOL 307 (H) 03/30/2019    HDL 33 (L) 03/30/2019    LDLCALC Invalid, Trig>400.0 03/30/2019    TRIG 1,019 (H) 03/31/2019    CHOLHDL 10.7 (L) 03/30/2019   , Troponin   Recent Labs   Lab 03/30/19  1727   TROPONINI 0.013    and A1C:   Recent Labs   Lab 12/06/18  2159 02/06/19  1159   HGBA1C 10.5* 7.5*       Pending Diagnostic Studies:     Procedure Component Value Units Date/Time    Hepatitis panel, acute [[id number]] Collected:  03/30/19 1958    Order Status:  Sent Lab Status:  In process Updated:  03/31/19 0157    Specimen:  Blood          Imaging Results          X-Ray Chest AP Portable (Final result)  Result time 03/30/19 12:22:40    Final result by Price Hutchison MD (03/30/19 12:22:40)                 Impression:      No acute findings.      Electronically signed by: Price Hutchison MD  Date:    03/30/2019  Time:    12:22             Narrative:    EXAMINATION:  XR CHEST AP PORTABLE    CLINICAL HISTORY:  Chest Pain;    TECHNIQUE:  AP view of the chest was performed.    COMPARISON:  07/23/2018    FINDINGS:  The cardiac and mediastinal silhouettes appear within normal limits.  "Subsegmental right basilar discoid atelectasis.  The lungs are otherwise clear bilaterally.  No acute osseous findings demonstrated.                                Medications:  Reconciled Home Medications:      Medication List      START taking these medications    folic acid 1 MG tablet  Commonly known as:  FOLVITE  Take 1 tablet (1 mg total) by mouth once daily.  Start taking on:  4/1/2019     losartan 100 MG tablet  Commonly known as:  COZAAR  Take 1 tablet (100 mg total) by mouth once daily.  Start taking on:  4/1/2019     potassium, sodium phosphates 280-160-250 mg Pwpk  Commonly known as:  PHOS-NAK  Take 1 packet by mouth 4 (four) times daily before meals and nightly.     thiamine 100 MG tablet  Take 1 tablet (100 mg total) by mouth once daily.  Start taking on:  4/1/2019        CONTINUE taking these medications    aspirin 325 MG tablet  Take 1 tablet (325 mg total) by mouth once daily.     atorvastatin 10 MG tablet  Commonly known as:  LIPITOR  Take 1 tablet (10 mg total) by mouth once daily.     blood sugar diagnostic Strp  Commonly known as:  ONETOUCH ULTRA TEST  USE TO CHECK SUGAR BEFORE MEALS AND AT BEDTIME     insulin glargine 100 units/mL (3mL) SubQ pen  Commonly known as:  BASAGLAR KWIKPEN U-100 INSULIN  INJECT 50 UNITS SUBCUTANEOUSLY IN THE EVENING     insulin lispro 100 unit/mL injection  Commonly known as:  ADMELOG U-100 INSULIN LISPRO  Use sliding scale.     insulin syringe-needle,dispos. 1 mL 28 gauge x 1/2" Syrg  1 each by Misc.(Non-Drug; Combo Route) route 2 (two) times daily.     lancets 33 gauge Misc  Commonly known as:  ONETOUCH DELICA LANCETS  Use to check sugar 4 (four) times daily before meals and nightly.     verapamil 240 MG CR tablet  Commonly known as:  CALAN-SR  Take 1 tablet (240 mg total) by mouth once daily.        STOP taking these medications    hydrALAZINE 50 MG tablet  Commonly known as:  APRESOLINE     isosorbide dinitrate 20 MG tablet  Commonly known as:  ISORDIL   "   losartan-hydrochlorothiazide 100-25 mg 100-25 mg per tablet  Commonly known as:  HYZAAR     naproxen 500 MG tablet  Commonly known as:  NAPROSYN     ondansetron 4 MG Tbdl  Commonly known as:  ZOFRAN-ODT     pantoprazole 40 MG tablet  Commonly known as:  PROTONIX            Indwelling Lines/Drains at time of discharge:   Lines/Drains/Airways          None          Time spent on the discharge of patient: 45 minutes  Patient was seen and examined on the date of discharge and determined to be suitable for discharge.         Deyanira Walters DNP, ACNP-BC  Department of Hospital Medicine  [facility]

## 2019-04-01 LAB
HAV IGM SERPL QL IA: NEGATIVE
HBV CORE IGM SERPL QL IA: NEGATIVE
HBV SURFACE AG SERPL QL IA: NEGATIVE
HCV AB SERPL QL IA: NEGATIVE

## 2019-04-05 LAB
BACTERIA BLD CULT: NORMAL
BACTERIA BLD CULT: NORMAL

## 2019-05-16 ENCOUNTER — OFFICE VISIT (OUTPATIENT)
Dept: DIABETES | Facility: CLINIC | Age: 54
End: 2019-05-16
Payer: MEDICAID

## 2019-05-16 ENCOUNTER — LAB VISIT (OUTPATIENT)
Dept: LAB | Facility: HOSPITAL | Age: 54
End: 2019-05-16
Attending: PHYSICIAN ASSISTANT
Payer: MEDICAID

## 2019-05-16 VITALS
SYSTOLIC BLOOD PRESSURE: 156 MMHG | WEIGHT: 205.5 LBS | DIASTOLIC BLOOD PRESSURE: 92 MMHG | HEIGHT: 66 IN | BODY MASS INDEX: 33.03 KG/M2

## 2019-05-16 DIAGNOSIS — I15.2 HYPERTENSION ASSOCIATED WITH DIABETES: ICD-10-CM

## 2019-05-16 DIAGNOSIS — E11.59 HYPERTENSION ASSOCIATED WITH DIABETES: ICD-10-CM

## 2019-05-16 LAB
ESTIMATED AVG GLUCOSE: 163 MG/DL (ref 68–131)
GLUCOSE SERPL-MCNC: 73 MG/DL (ref 70–110)
HBA1C MFR BLD HPLC: 7.3 % (ref 4–5.6)

## 2019-05-16 PROCEDURE — 82962 GLUCOSE BLOOD TEST: CPT | Mod: PBBFAC,PN | Performed by: PHYSICIAN ASSISTANT

## 2019-05-16 PROCEDURE — 99213 OFFICE O/P EST LOW 20 MIN: CPT | Mod: PBBFAC,PN | Performed by: PHYSICIAN ASSISTANT

## 2019-05-16 PROCEDURE — 99999 PR PBB SHADOW E&M-EST. PATIENT-LVL III: CPT | Mod: PBBFAC,,, | Performed by: PHYSICIAN ASSISTANT

## 2019-05-16 PROCEDURE — 36415 COLL VENOUS BLD VENIPUNCTURE: CPT | Mod: PO

## 2019-05-16 PROCEDURE — 83036 HEMOGLOBIN GLYCOSYLATED A1C: CPT

## 2019-05-16 PROCEDURE — 99999 PR PBB SHADOW E&M-EST. PATIENT-LVL III: ICD-10-PCS | Mod: PBBFAC,,, | Performed by: PHYSICIAN ASSISTANT

## 2019-05-16 PROCEDURE — 99214 OFFICE O/P EST MOD 30 MIN: CPT | Mod: S$PBB,,, | Performed by: PHYSICIAN ASSISTANT

## 2019-05-16 PROCEDURE — 99214 PR OFFICE/OUTPT VISIT, EST, LEVL IV, 30-39 MIN: ICD-10-PCS | Mod: S$PBB,,, | Performed by: PHYSICIAN ASSISTANT

## 2019-05-16 RX ORDER — VERAPAMIL HYDROCHLORIDE 240 MG/1
240 TABLET, FILM COATED, EXTENDED RELEASE ORAL DAILY
Qty: 30 TABLET | Refills: 0 | Status: SHIPPED | OUTPATIENT
Start: 2019-05-16 | End: 2019-05-24 | Stop reason: SDUPTHER

## 2019-05-16 NOTE — PATIENT INSTRUCTIONS
"Intensive insulin Therapy with correction factor:    You are on Intensive insulin therapy with Basal and Bolus insulin. Basaglar is your Basal insulin and will help maintain your fasting and between meal sugar. Your fast acting or rescue insulin is either Admelog insulin and will control your post meal sugar.     You will Take 55 units of Basaglar at 9 pm each night. This will be adjusted up or down depending on your fasting blood sugar before breakfast.    Humalog will follow this pre meal schedule; Correction factor of "2 units per 50 mg/dl" and is based on 30-60 grams of carbohydrates per meal.    If blood sugar is below 70 eat first then check your blood sugar 2 hours later and make correction.  If blood pre-meal sugar is  70 -150 take 10 units of Admelog;  If blood pre-meal sugar is 151-200 take +2 units of Admelog;  If blood pre-meal sugar is 201-250 take +4 units of Admelog  If blood pre-meal sugar is 251-300 take +6 units of Admelog;  If blood pre-meal sugar is 301-350 take +8 units of Admelog  If blood pre-meal sugar is 351-400+ take +10 units of Admelogg;  Also increase water intake and call for appointment.  "

## 2019-05-16 NOTE — PROGRESS NOTES
Subjective:      Patient ID: Jorgito Arreaga is a 53 y.o. male.    PCP: Otf Roblero MD      Jorgito Arreaga is a pleasant 53 y.o. male presenting to follow up on diabetes mellitus. Also, pertinent to this visit in decision making is Chronic pancreatitis; alcoholism; hypertension, hyperlipidemia, and compliance. He has had diabetes for 2 or more years. His last visit in Diabetes Management was 8/2/2018. Since that time he has been struggling with his lifestyle modifications particularly trying to avoid alcohol.  He did have admission to the emergency room for metabolic acidosis and was treated and released to home.  Since that time he has been working with his primary care to assist with his sobriety and control his comorbid conditions as well.  He is in good spirits today and states that he feels well.  He denies any nausea vomiting or abdominal pain today, his bowel movements are normal. He has been monitoring his blood sugars sporadically and has been trying to take his medication on time. Jorgito is recognized as a very high risk diabetic patient.    He denies any hospital admissions, has had emergency room visits secondary to alcohol abuse and metabolic acidosis, syncope with bowel incontinence, he denies diaphoresis, chest pain, or dyspnea.    He has gained 11 pounds since last visit. His BMI is 33.16 kg/m².    His blood sugar in the clinic today was:   Lab Results   Component Value Date    POCGLU 73 05/16/2019       Jorgito is noncompliant most of the time with DM medications.     Jorgito is noncompliant some of the time with lifestyle modifications to include activity and meal planning.       Diabetes Management Status    Statin: Taking  ACE/ARB: Taking    Screening or Prevention Patient's value Goal Complete/Controlled?   HgA1C Testing and Control   Lab Results   Component Value Date    HGBA1C 7.5 (H) 02/06/2019      Annually/Less than 8% No   Lipid profile : 03/31/2019 Annually Yes   LDL control Lab  "Results   Component Value Date    LDLCALC Invalid, Trig>400.0 03/30/2019    Annually/Less than 100 mg/dl  No   Nephropathy screening Lab Results   Component Value Date    LABMICR 8.0 10/03/2018     Lab Results   Component Value Date    PROTEINUA Negative 03/30/2019    Annually Yes   Blood pressure BP Readings from Last 1 Encounters:   05/16/19 (!) 156/92    Less than 140/90 No   Dilated retinal exam : 05/10/2017 Annually No   Foot exam   : 08/02/2018 Annually Yes       Lab Results   Component Value Date    HGBA1C 7.5 (H) 02/06/2019    HGBA1C 10.5 (H) 12/06/2018    HGBA1C 8.3 (H) 09/06/2018         Review of Systems   Constitutional: Negative for activity change and unexpected weight change.   Eyes: Negative for visual disturbance.   Respiratory: Negative for chest tightness and shortness of breath.    Cardiovascular: Negative for chest pain and leg swelling.   Gastrointestinal: Negative for constipation and diarrhea.   Endocrine: Negative for cold intolerance, heat intolerance, polydipsia, polyphagia and polyuria.   Genitourinary: Negative for frequency.   Skin: Negative for wound.   Neurological: Negative for numbness.   Psychiatric/Behavioral: Negative for confusion.      Objective:     Vitals - 1 value per visit 3/30/2019 3/31/2019 5/16/2019   SYSTOLIC - 136 156   DIASTOLIC - 76 92   PULSE - 87 -   TEMPERATURE - 98.7 -   RESPIRATIONS - 18 -   SPO2 - 95 -   Weight (lb) 194.01 - 205.47   Weight (kg) 88 - 93.2   HEIGHT 5' 6" - 5' 6"   BODY MASS INDEX 31.31 - 33.16   VISIT REPORT - - -   Pain Score  - - 0   Some recent data might be hidden     Physical Exam   Constitutional: He is oriented to person, place, and time. He appears well-developed and well-nourished. No distress.   Neck: Normal range of motion. Neck supple. No tracheal deviation present. No thyromegaly present.   Cardiovascular: Normal rate, regular rhythm and normal heart sounds.   Pulmonary/Chest: Effort normal and breath sounds normal. " "  Musculoskeletal: He exhibits no edema.   Lymphadenopathy:     He has no cervical adenopathy.   Neurological: He is alert and oriented to person, place, and time.   Skin: Skin is warm and dry. He is not diaphoretic.   Psychiatric: He has a normal mood and affect.     Assessment:     1. Uncontrolled secondary diabetes with peripheral neuropathy       Plan:   Jorgito Arreaga is seen today for   1. Uncontrolled secondary diabetes with peripheral neuropathy        Uncontrolled secondary diabetes with peripheral neuropathy  -     POCT Glucose, Hand-Held Device    Uncontrolled secondary diabetes with peripheral neuropathy  -     POCT glucose  - explained to patient that he has poor healing with uncontrolled diabetes. Also, diabetes mellitus is an immune compromised state.  - Continue with D&E, reduce portion size, and restrict carbohydrates (no more that 45 grams ) per meal.  - We discussed the need for more intensive insulin therapy, I advised him that his diabetes was more of a conditions associated with insulin deficiency.       I have reviewed your results and they are still quite high. I would like you to start "Treating to Target". The treatment will be Insulin and your target will be the Fasting and 2 hour post meal blood sugar. It will work in this manner;     1. Goal for Fasting blood sugar is  mg/dl. I realize that you will need time to adjust to the new levels and presently you may feel too low if you are too aggressive now. So go slow and aim to lower your blood sugar to below 200 then 150 then 100 over several months.     2. Goal for 2 hour post meal blood sugar is below 180 mg/dl, here the same rules apply as in #1.     3. You will check your fasting blood sugar daily, if not where we would like it to be over a 3 day period then that evening we will increase the Basaglar dose by 5 units. Then repeat the process over the next 3 days. Remember this is a slow process and take our time getting to goal. " "But, each week should be better than prior weeks. Blood sugars below 70 are unacceptable and should raise a "RED FLAG" where we may have to reduce our dose of insulin.     4. You will check your post meal glucose daily as well. However, each day you will check a different meal, (ie. Monday-breakfast; Tuesday- lunch; Wednesday- supper, then repeat). If your post meal glucose is not where we would like, increase pre-meal insulin by 2 units next time. A word of CAUTION: mealtime insulin is dependant on the size and concentration of your meal content. If not consuming a large meal do not take large dose of insulin. Use the reasonable person rule.      5. If you have any questions please do not hesitate to call.     Intensive insulin Therapy with correction factor:    You are on Intensive insulin therapy with Basal and Bolus insulin. Basaglar is your Basal insulin and will help maintain your fasting and between meal sugar. Your fast acting or rescue insulin is either Admelog insulin and will control your post meal sugar.     You will Take 55 units of Basaglar at 9 pm each night. This will be adjusted up or down depending on your fasting blood sugar before breakfast.    Admelog will follow this pre meal schedule; Correction factor of "2 units per 50 mg/dl" and is based on 30-60 grams of carbohydrates per meal.    If blood sugar is below 70 eat first then check your blood sugar 2 hours later and make correction.  If blood pre-meal sugar is  70 -150 take 10 units of Admelog;  If blood pre-meal sugar is 151-200 take +2 units of Admelog;  If blood pre-meal sugar is 201-250 take +4 units of Admelog  If blood pre-meal sugar is 251-300 take +6 units of Admelog;  If blood pre-meal sugar is 301-350 take +8 units of Admelog  If blood pre-meal sugar is 351-400+ take +10 units of Admelogg;  Also increase water intake and call for appointment.      A total of 30 minutes was spent in face to face time, of which 50 % was spent in " counseling patient on disease process, complications, treatment, and side effects of medications.    The patient was explained the above plan and given opportunity to ask questions.  He understands, chooses and consents to this plan and accepts all the risks, which include but are not limited to the risks mentioned above.   He understands the alternative of having no testing, interventions or treatments at this time. He left content and without further questions.     Disclaimer:  This note is prepared using voice recognition software and as such is likely to have errors and has not been proof read. Please contact me for questions.    no

## 2019-05-24 ENCOUNTER — OFFICE VISIT (OUTPATIENT)
Dept: INTERNAL MEDICINE | Facility: CLINIC | Age: 54
End: 2019-05-24
Payer: MEDICAID

## 2019-05-24 VITALS
SYSTOLIC BLOOD PRESSURE: 134 MMHG | DIASTOLIC BLOOD PRESSURE: 80 MMHG | HEIGHT: 66 IN | OXYGEN SATURATION: 98 % | TEMPERATURE: 97 F | WEIGHT: 193.56 LBS | HEART RATE: 99 BPM | RESPIRATION RATE: 17 BRPM | BODY MASS INDEX: 31.11 KG/M2

## 2019-05-24 DIAGNOSIS — E11.59 HYPERTENSION ASSOCIATED WITH DIABETES: Primary | ICD-10-CM

## 2019-05-24 DIAGNOSIS — I15.2 HYPERTENSION ASSOCIATED WITH DIABETES: Primary | ICD-10-CM

## 2019-05-24 PROCEDURE — 99214 PR OFFICE/OUTPT VISIT, EST, LEVL IV, 30-39 MIN: ICD-10-PCS | Mod: S$PBB,,, | Performed by: FAMILY MEDICINE

## 2019-05-24 PROCEDURE — 99213 OFFICE O/P EST LOW 20 MIN: CPT | Mod: PBBFAC,PO | Performed by: FAMILY MEDICINE

## 2019-05-24 PROCEDURE — 99214 OFFICE O/P EST MOD 30 MIN: CPT | Mod: S$PBB,,, | Performed by: FAMILY MEDICINE

## 2019-05-24 PROCEDURE — 99999 PR PBB SHADOW E&M-EST. PATIENT-LVL III: CPT | Mod: PBBFAC,,, | Performed by: FAMILY MEDICINE

## 2019-05-24 PROCEDURE — 99999 PR PBB SHADOW E&M-EST. PATIENT-LVL III: ICD-10-PCS | Mod: PBBFAC,,, | Performed by: FAMILY MEDICINE

## 2019-05-24 RX ORDER — FUROSEMIDE 20 MG/1
1 TABLET ORAL DAILY
Refills: 0 | COMMUNITY
Start: 2019-03-26 | End: 2019-05-24 | Stop reason: SDUPTHER

## 2019-05-24 RX ORDER — FUROSEMIDE 20 MG/1
TABLET ORAL
Qty: 30 TABLET | Refills: 0 | Status: SHIPPED | OUTPATIENT
Start: 2019-05-24 | End: 2019-09-04 | Stop reason: SDUPTHER

## 2019-05-24 RX ORDER — PANTOPRAZOLE SODIUM 40 MG/1
1 TABLET, DELAYED RELEASE ORAL DAILY
COMMUNITY
Start: 2019-05-06 | End: 2019-09-04 | Stop reason: SDUPTHER

## 2019-05-24 RX ORDER — VERAPAMIL HYDROCHLORIDE 240 MG/1
240 TABLET, FILM COATED, EXTENDED RELEASE ORAL DAILY
Qty: 90 TABLET | Refills: 0 | Status: SHIPPED | OUTPATIENT
Start: 2019-05-24 | End: 2019-09-04 | Stop reason: SDUPTHER

## 2019-05-24 RX ORDER — LOSARTAN POTASSIUM 100 MG/1
100 TABLET ORAL DAILY
Qty: 90 TABLET | Refills: 0 | Status: SHIPPED | OUTPATIENT
Start: 2019-05-24 | End: 2019-09-04 | Stop reason: SDUPTHER

## 2019-05-24 NOTE — PROGRESS NOTES
Subjective:       Patient ID: Jorgito Arreaga is a 53 y.o. male.    Chief Complaint: Follow-up (3 month)    Hypertension   This is a chronic problem. The current episode started more than 1 year ago. The problem is unchanged. The problem is controlled. Pertinent negatives include no anxiety, blurred vision, chest pain, headaches or shortness of breath. There are no associated agents to hypertension. Risk factors for coronary artery disease include diabetes mellitus, male gender and obesity. Past treatments include angiotensin blockers. The current treatment provides no improvement. There are no compliance problems.      Review of Systems   Eyes: Negative for blurred vision.   Respiratory: Negative for shortness of breath.    Cardiovascular: Negative for chest pain.   Gastrointestinal: Negative for abdominal pain.   Neurological: Negative for headaches.       Objective:      Physical Exam   Constitutional: He appears well-developed and well-nourished. No distress.   HENT:   Head: Normocephalic and atraumatic.   Cardiovascular: Normal rate, regular rhythm, normal heart sounds and intact distal pulses.   Pulmonary/Chest: Effort normal and breath sounds normal. No respiratory distress. He has no wheezes.   Skin: Skin is warm and dry. No rash noted. He is not diaphoretic. No erythema.   Nursing note and vitals reviewed.      Assessment:       1. Hypertension associated with diabetes        Plan:     Problem List Items Addressed This Visit        Cardiac/Vascular    Hypertension associated with diabetes - Primary    Relevant Medications    losartan (COZAAR) 100 MG tablet    verapamil (CALAN-SR) 240 MG CR tablet    furosemide (LASIX) 20 MG tablet

## 2019-06-03 ENCOUNTER — PATIENT OUTREACH (OUTPATIENT)
Dept: ADMINISTRATIVE | Facility: HOSPITAL | Age: 54
End: 2019-06-03

## 2019-06-24 RX ORDER — INSULIN GLARGINE 100 [IU]/ML
50 INJECTION, SOLUTION SUBCUTANEOUS NIGHTLY
Qty: 36 ML | Refills: 0 | Status: SHIPPED | OUTPATIENT
Start: 2019-06-24 | End: 2019-10-16 | Stop reason: SDUPTHER

## 2019-06-28 ENCOUNTER — PATIENT OUTREACH (OUTPATIENT)
Dept: ADMINISTRATIVE | Facility: HOSPITAL | Age: 54
End: 2019-06-28

## 2019-06-28 NOTE — PROGRESS NOTES
I have contacted patient to schedule dm eye exam. Patient stated that he will be scheduling his appointment this week at Dr. Garber's office

## 2019-07-17 ENCOUNTER — PATIENT OUTREACH (OUTPATIENT)
Dept: ADMINISTRATIVE | Facility: HOSPITAL | Age: 54
End: 2019-07-17

## 2019-07-17 NOTE — PROGRESS NOTES
I have contacted patient to schedule dm eye exam. Patient stated that he will be scheduling his appointment with in the next month at Dr. Isaura Garber's office.

## 2019-08-26 ENCOUNTER — PATIENT OUTREACH (OUTPATIENT)
Dept: ADMINISTRATIVE | Facility: HOSPITAL | Age: 54
End: 2019-08-26

## 2019-08-26 NOTE — PROGRESS NOTES
I have contacted patient to schedule dm eye exam. Patient stated that he has an eye exam scheduled 8-28-19 at [facility].

## 2019-08-28 ENCOUNTER — PATIENT OUTREACH (OUTPATIENT)
Dept: ADMINISTRATIVE | Facility: HOSPITAL | Age: 54
End: 2019-08-28

## 2019-09-04 ENCOUNTER — LAB VISIT (OUTPATIENT)
Dept: LAB | Facility: HOSPITAL | Age: 54
End: 2019-09-04
Attending: FAMILY MEDICINE
Payer: MEDICAID

## 2019-09-04 ENCOUNTER — OFFICE VISIT (OUTPATIENT)
Dept: INTERNAL MEDICINE | Facility: CLINIC | Age: 54
End: 2019-09-04
Payer: MEDICAID

## 2019-09-04 VITALS
HEART RATE: 87 BPM | WEIGHT: 206.81 LBS | DIASTOLIC BLOOD PRESSURE: 85 MMHG | SYSTOLIC BLOOD PRESSURE: 126 MMHG | RESPIRATION RATE: 18 BRPM | TEMPERATURE: 97 F | OXYGEN SATURATION: 97 % | BODY MASS INDEX: 33.24 KG/M2 | HEIGHT: 66 IN

## 2019-09-04 DIAGNOSIS — E11.69 HYPERLIPIDEMIA ASSOCIATED WITH TYPE 2 DIABETES MELLITUS: ICD-10-CM

## 2019-09-04 DIAGNOSIS — Z28.9 DELAYED IMMUNIZATIONS: ICD-10-CM

## 2019-09-04 DIAGNOSIS — E78.5 HYPERLIPIDEMIA ASSOCIATED WITH TYPE 2 DIABETES MELLITUS: ICD-10-CM

## 2019-09-04 DIAGNOSIS — E11.59 HYPERTENSION ASSOCIATED WITH DIABETES: ICD-10-CM

## 2019-09-04 DIAGNOSIS — K21.9 GASTROESOPHAGEAL REFLUX DISEASE WITHOUT ESOPHAGITIS: Chronic | ICD-10-CM

## 2019-09-04 DIAGNOSIS — K85.21 ALCOHOL-INDUCED ACUTE PANCREATITIS WITH UNINFECTED NECROSIS: ICD-10-CM

## 2019-09-04 DIAGNOSIS — I15.2 HYPERTENSION ASSOCIATED WITH DIABETES: Primary | ICD-10-CM

## 2019-09-04 DIAGNOSIS — E11.59 HYPERTENSION ASSOCIATED WITH DIABETES: Primary | ICD-10-CM

## 2019-09-04 DIAGNOSIS — I15.2 HYPERTENSION ASSOCIATED WITH DIABETES: ICD-10-CM

## 2019-09-04 PROBLEM — L02.91 ABSCESS: Status: RESOLVED | Noted: 2018-09-05 | Resolved: 2019-09-04

## 2019-09-04 PROBLEM — R07.9 CHEST PAIN: Status: RESOLVED | Noted: 2018-12-06 | Resolved: 2019-09-04

## 2019-09-04 LAB
ALBUMIN SERPL BCP-MCNC: 4.5 G/DL (ref 3.5–5.2)
ALP SERPL-CCNC: 98 U/L (ref 55–135)
ALT SERPL W/O P-5'-P-CCNC: 76 U/L (ref 10–44)
ANION GAP SERPL CALC-SCNC: 10 MMOL/L (ref 8–16)
AST SERPL-CCNC: 47 U/L (ref 10–40)
BILIRUB SERPL-MCNC: 0.7 MG/DL (ref 0.1–1)
BUN SERPL-MCNC: 10 MG/DL (ref 6–20)
CALCIUM SERPL-MCNC: 10.2 MG/DL (ref 8.7–10.5)
CHLORIDE SERPL-SCNC: 106 MMOL/L (ref 95–110)
CO2 SERPL-SCNC: 27 MMOL/L (ref 23–29)
CREAT SERPL-MCNC: 1 MG/DL (ref 0.5–1.4)
EST. GFR  (AFRICAN AMERICAN): >60 ML/MIN/1.73 M^2
EST. GFR  (NON AFRICAN AMERICAN): >60 ML/MIN/1.73 M^2
GLUCOSE SERPL-MCNC: 81 MG/DL (ref 70–110)
LIPASE SERPL-CCNC: <3 U/L (ref 4–60)
POTASSIUM SERPL-SCNC: 3.5 MMOL/L (ref 3.5–5.1)
PROT SERPL-MCNC: 8.1 G/DL (ref 6–8.4)
SODIUM SERPL-SCNC: 143 MMOL/L (ref 136–145)

## 2019-09-04 PROCEDURE — 80053 COMPREHEN METABOLIC PANEL: CPT | Mod: PO

## 2019-09-04 PROCEDURE — 99999 PR PBB SHADOW E&M-EST. PATIENT-LVL V: ICD-10-PCS | Mod: PBBFAC,,, | Performed by: FAMILY MEDICINE

## 2019-09-04 PROCEDURE — 99214 OFFICE O/P EST MOD 30 MIN: CPT | Mod: S$PBB,,, | Performed by: FAMILY MEDICINE

## 2019-09-04 PROCEDURE — 99999 PR PBB SHADOW E&M-EST. PATIENT-LVL V: CPT | Mod: PBBFAC,,, | Performed by: FAMILY MEDICINE

## 2019-09-04 PROCEDURE — 90686 IIV4 VACC NO PRSV 0.5 ML IM: CPT | Mod: PBBFAC,PO

## 2019-09-04 PROCEDURE — 83690 ASSAY OF LIPASE: CPT | Mod: PO

## 2019-09-04 PROCEDURE — 36415 COLL VENOUS BLD VENIPUNCTURE: CPT | Mod: PO

## 2019-09-04 PROCEDURE — 99214 PR OFFICE/OUTPT VISIT, EST, LEVL IV, 30-39 MIN: ICD-10-PCS | Mod: S$PBB,,, | Performed by: FAMILY MEDICINE

## 2019-09-04 PROCEDURE — 99215 OFFICE O/P EST HI 40 MIN: CPT | Mod: PBBFAC,PO | Performed by: FAMILY MEDICINE

## 2019-09-04 PROCEDURE — 80061 LIPID PANEL: CPT

## 2019-09-04 RX ORDER — PANTOPRAZOLE SODIUM 40 MG/1
40 TABLET, DELAYED RELEASE ORAL DAILY
Qty: 90 TABLET | Refills: 0 | Status: SHIPPED | OUTPATIENT
Start: 2019-09-04 | End: 2020-04-21

## 2019-09-04 RX ORDER — ATORVASTATIN CALCIUM 10 MG/1
10 TABLET, FILM COATED ORAL DAILY
Qty: 90 TABLET | Refills: 0 | Status: SHIPPED | OUTPATIENT
Start: 2019-09-04 | End: 2019-12-31

## 2019-09-04 RX ORDER — LOSARTAN POTASSIUM 100 MG/1
100 TABLET ORAL DAILY
Qty: 90 TABLET | Refills: 0 | Status: SHIPPED | OUTPATIENT
Start: 2019-09-04 | End: 2019-11-15

## 2019-09-04 RX ORDER — FUROSEMIDE 20 MG/1
TABLET ORAL
Qty: 30 TABLET | Refills: 0 | Status: SHIPPED | OUTPATIENT
Start: 2019-09-04 | End: 2019-12-31 | Stop reason: SDUPTHER

## 2019-09-04 RX ORDER — VERAPAMIL HYDROCHLORIDE 240 MG/1
240 TABLET, FILM COATED, EXTENDED RELEASE ORAL DAILY
Qty: 90 TABLET | Refills: 0 | Status: SHIPPED | OUTPATIENT
Start: 2019-09-04 | End: 2019-12-31 | Stop reason: SDUPTHER

## 2019-09-04 NOTE — PROGRESS NOTES
Subjective:       Patient ID: Jorgito Arreaga is a 54 y.o. male.    Chief Complaint: Follow-up (6 month )    PT. Presents for 6 m. Follow up.     Diabetes   He presents for his follow-up diabetic visit. He has type 2 diabetes mellitus. His disease course has been improving. Pertinent negatives for hypoglycemia include no confusion, dizziness, headaches, hunger, mood changes, nervousness/anxiousness, pallor, seizures, sleepiness, speech difficulty, sweats or tremors. Associated symptoms include blurred vision. Pertinent negatives for diabetes include no chest pain, no fatigue, no foot paresthesias, no foot ulcerations, no polydipsia, no polyphagia, no polyuria, no visual change, no weakness and no weight loss. Hypoglycemia complications include hospitalization. Pertinent negatives for hypoglycemia complications include no blackouts. Symptoms are improving. Pertinent negatives for diabetic complications include no peripheral neuropathy. Current diabetic treatment includes insulin injections and diet. He is compliant with treatment all of the time. His weight is increasing steadily. He is following a diabetic diet. Meal planning includes avoidance of concentrated sweets. He has not had a previous visit with a dietitian. He participates in exercise intermittently. His home blood glucose trend is fluctuating minimally. His breakfast blood glucose is taken between 8-9 am. His breakfast blood glucose range is generally  mg/dl. He does not see a podiatrist.Eye exam is current.     Review of Systems   Constitutional: Negative.  Negative for fatigue and weight loss.   HENT: Negative.    Eyes: Positive for blurred vision. Negative for photophobia, pain, discharge, redness, itching and visual disturbance.   Respiratory: Negative.    Cardiovascular: Negative.  Negative for chest pain.   Gastrointestinal: Negative.    Endocrine: Negative.  Negative for polydipsia, polyphagia and polyuria.   Genitourinary: Negative.     Musculoskeletal: Negative.    Skin: Negative.  Negative for pallor.   Neurological: Negative.  Negative for dizziness, tremors, seizures, speech difficulty, weakness and headaches.   Hematological: Negative.    Psychiatric/Behavioral: Negative.  Negative for confusion. The patient is not nervous/anxious.        Objective:      Physical Exam   Constitutional: He is oriented to person, place, and time. He appears well-developed and well-nourished.   HENT:   Head: Normocephalic and atraumatic.   Eyes: Pupils are equal, round, and reactive to light. EOM are normal.   Neck: Normal range of motion. Neck supple.   Cardiovascular: Normal rate, regular rhythm, normal heart sounds and intact distal pulses. Exam reveals no gallop and no friction rub.   No murmur heard.  Pulmonary/Chest: Effort normal and breath sounds normal.   Abdominal: Soft. Bowel sounds are normal.   Musculoskeletal: Normal range of motion.   Neurological: He is alert and oriented to person, place, and time.   Skin: Skin is warm and dry.   Psychiatric: He has a normal mood and affect.       Assessment:       1. Hypertension associated with diabetes    2. Hyperlipidemia associated with type 2 diabetes mellitus    3. Delayed immunizations    4. Alcohol-induced acute pancreatitis with uninfected necrosis    5. Gastroesophageal reflux disease without esophagitis        Plan:     Problem List Items Addressed This Visit        Cardiac/Vascular    Hypertension associated with diabetes - Primary    Relevant Medications    furosemide (LASIX) 20 MG tablet    losartan (COZAAR) 100 MG tablet    verapamil (CALAN-SR) 240 MG CR tablet    Other Relevant Orders    Comprehensive metabolic panel       ID    Delayed immunizations    Relevant Orders    Influenza - Quadrivalent (3 years & older) (PF)       GI    Gastroesophageal reflux disease (Chronic)    Relevant Medications    pantoprazole (PROTONIX) 40 MG tablet    Alcohol-induced acute pancreatitis    Relevant Orders     Lipase      Other Visit Diagnoses     Hyperlipidemia associated with type 2 diabetes mellitus        Relevant Medications    atorvastatin (LIPITOR) 10 MG tablet    Other Relevant Orders    Lipid panel

## 2019-09-04 NOTE — PROGRESS NOTES
Subjective:      Patient ID: Jorgito Arreaga is a 54 y.o. male.    PCP: Otf Roblero MD      Jorgito Arreaga is a pleasant 54 y.o. male presenting to follow up on Type 2 diabetes mellitus.  Also, pertinent to this visit in decision making is alcoholism, pancreatitis, hypertension, hyperlipidemia, and adherence.  He has had diabetes for 2 or more years.  His last visit in Diabetes Management was 5/16/2019 .   Since that time he has been in his usual state of health without significant hyperglycemia or hypoglycemia. He has been checking his blood glucose regularly four times daily, before meals and HS.  Since that time he has had moderate improvement in his glycemia with A1c of 7.3%.   He is currently on MDI with Lantus and Admelog.  His current concerns are glycemic control.    He denies any hospital admissions, emergency room visits, hypoglycemia, syncope, diaphoresis, chest pain, or dyspnea.    He has gained 1 pound since last visit. His BMI is 33.48 kg/m².    His blood sugar in the clinic today was:   Lab Results   Component Value Date    POCGLU 134 (A) 09/05/2019       Jorgito is noncompliant most of the time with DM medications.     Jorgito is noncompliant some of the time with lifestyle modifications to include activity and meal planning.       Diabetes Management Status    Statin: Taking  ACE/ARB: Taking    Screening or Prevention Patient's value Goal Complete/Controlled?   HgA1C Testing and Control   Lab Results   Component Value Date    HGBA1C 7.3 (H) 05/16/2019      Annually/Less than 8% No   Lipid profile : 09/04/2019 Annually Yes   LDL control Lab Results   Component Value Date    LDLCALC 99.2 09/04/2019    Annually/Less than 100 mg/dl  No   Nephropathy screening Lab Results   Component Value Date    LABMICR 8.0 10/03/2018     Lab Results   Component Value Date    PROTEINUA Negative 03/30/2019    Annually Yes   Blood pressure BP Readings from Last 1 Encounters:   09/05/19 (!) 156/89    Less than 140/90  "No   Dilated retinal exam : 08/28/2019 Annually No   Foot exam   : 08/02/2018 Annually Yes       Lab Results   Component Value Date    HGBA1C 7.3 (H) 05/16/2019    HGBA1C 7.5 (H) 02/06/2019    HGBA1C 10.5 (H) 12/06/2018     Lab Results   Component Value Date    CPEPTIDE <0.08 (L) 09/06/2018     Review of Systems   Constitutional: Negative for activity change and unexpected weight change.   Eyes: Negative for visual disturbance.   Respiratory: Negative for chest tightness and shortness of breath.    Cardiovascular: Negative for chest pain and leg swelling.   Gastrointestinal: Negative for constipation and diarrhea.   Endocrine: Negative for cold intolerance, heat intolerance, polydipsia, polyphagia and polyuria.   Genitourinary: Negative for frequency.   Skin: Negative for wound.   Neurological: Negative for numbness.   Psychiatric/Behavioral: Negative for confusion.      Objective:   BP (!) 156/89 (BP Location: Left arm, Patient Position: Sitting, BP Method: X-Large (Automatic))   Pulse 81   Temp 97.6 °F (36.4 °C) (Tympanic)   Resp 18   Ht 5' 6" (1.676 m)   Wt 94.1 kg (207 lb 7.3 oz)   BMI 33.48 kg/m²       Physical Exam   Constitutional: He is oriented to person, place, and time. He appears well-developed and well-nourished. No distress.   Neck: Normal range of motion. Neck supple. No tracheal deviation present. No thyromegaly present.   Cardiovascular: Normal rate, regular rhythm and normal heart sounds.   Pulses:       Dorsalis pedis pulses are 2+ on the right side, and 2+ on the left side.        Posterior tibial pulses are 1+ on the right side, and 1+ on the left side.   Pulmonary/Chest: Effort normal and breath sounds normal.   Musculoskeletal: He exhibits no edema.        Right foot: There is normal range of motion and no deformity.        Left foot: There is normal range of motion and no deformity.   Feet:   Right Foot:   Protective Sensation: 6 sites tested. 6 sites sensed.   Skin Integrity: Negative " "for ulcer, blister, skin breakdown, erythema, warmth, callus or dry skin.   Left Foot:   Protective Sensation: 6 sites tested. 6 sites sensed.   Skin Integrity: Negative for ulcer, blister, skin breakdown, erythema, warmth, callus or dry skin.   Lymphadenopathy:     He has no cervical adenopathy.   Neurological: He is alert and oriented to person, place, and time.   Skin: Skin is warm and dry. He is not diaphoretic.   Psychiatric: He has a normal mood and affect.     Assessment:     1. Uncontrolled secondary diabetes with peripheral neuropathy       Plan:   Jorgito Arreaga is seen today for   1. Uncontrolled secondary diabetes with peripheral neuropathy        Uncontrolled secondary diabetes with peripheral neuropathy  -     POCT Glucose, Hand-Held Device  -     Hemoglobin A1c; Future; Expected date: 09/05/2019  -     Protein / creatinine ratio, urine; Future    Uncontrolled secondary diabetes with peripheral neuropathy  -     POCT glucose  - A1c, Protein/creatinine ratio today  - Continue with D&E, reduce portion size, and restrict carbohydrates (no more that 45 grams ) per meal.  - We discussed the need for more intensive insulin therapy, I advised him that his diabetes was more of a conditions associated with insulin deficiency.       I have reviewed your results and they are still quite high. I would like you to start "Treating to Target". The treatment will be Insulin and your target will be the Fasting and 2 hour post meal blood sugar. It will work in this manner;     1. Goal for Fasting blood sugar is  mg/dl. I realize that you will need time to adjust to the new levels and presently you may feel too low if you are too aggressive now. So go slow and aim to lower your blood sugar to below 200 then 150 then 100 over several months.     2. Goal for 2 hour post meal blood sugar is below 180 mg/dl, here the same rules apply as in #1.     3. You will check your fasting blood sugar daily, if not where we would " "like it to be over a 3 day period then that evening we will increase the Basaglar dose by 5 units. Then repeat the process over the next 3 days. Remember this is a slow process and take our time getting to goal. But, each week should be better than prior weeks. Blood sugars below 70 are unacceptable and should raise a "RED FLAG" where we may have to reduce our dose of insulin.     4. You will check your post meal glucose daily as well. However, each day you will check a different meal, (ie. Monday-breakfast; Tuesday- lunch; Wednesday- supper, then repeat). If your post meal glucose is not where we would like, increase pre-meal insulin by 2 units next time. A word of CAUTION: mealtime insulin is dependant on the size and concentration of your meal content. If not consuming a large meal do not take large dose of insulin. Use the reasonable person rule.      5. If you have any questions please do not hesitate to call.     Intensive insulin Therapy with correction factor:    You are on Intensive insulin therapy with Basal and Bolus insulin. Basaglar is your Basal insulin and will help maintain your fasting and between meal sugar. Your fast acting or rescue insulin is either Admelog insulin and will control your post meal sugar.     You will Take 55 units of Basaglar at 9 pm each night. This will be adjusted up or down depending on your fasting blood sugar before breakfast.    Admelog will follow this pre meal schedule; Correction factor of "2 units per 50 mg/dl" and is based on 30-60 grams of carbohydrates per meal.    If blood sugar is below 70 eat first then check your blood sugar 2 hours later and make correction.  If blood pre-meal sugar is  70 -150 take 10 units of Admelog;  If blood pre-meal sugar is 151-200 take +2 units of Admelog;  If blood pre-meal sugar is 201-250 take +4 units of Admelog  If blood pre-meal sugar is 251-300 take +6 units of Admelog;  If blood pre-meal sugar is 301-350 take +8 units of " Admelog  If blood pre-meal sugar is 351-400+ take +10 units of Admelogg;  Also increase water intake and call for appointment.      A total of 30 minutes was spent in face to face time, of which 50 % was spent in counseling patient on disease process, complications, treatment, and side effects of medications.    The patient was explained the above plan and given opportunity to ask questions.  He understands, chooses and consents to this plan and accepts all the risks, which include but are not limited to the risks mentioned above.   He understands the alternative of having no testing, interventions or treatments at this time. He left content and without further questions.     Disclaimer:  This note is prepared using voice recognition software and as such is likely to have errors and has not been proof read. Please contact me for questions.

## 2019-09-04 NOTE — PROGRESS NOTES
Results have been reviewed . All labs are within normal range.   If you have any questions please feel free to contact me.  Best lab work he has had in a while. Awaiting the a1c results now

## 2019-09-05 ENCOUNTER — OFFICE VISIT (OUTPATIENT)
Dept: DIABETES | Facility: CLINIC | Age: 54
End: 2019-09-05
Payer: MEDICAID

## 2019-09-05 ENCOUNTER — TELEPHONE (OUTPATIENT)
Dept: DIABETES | Facility: CLINIC | Age: 54
End: 2019-09-05

## 2019-09-05 ENCOUNTER — LAB VISIT (OUTPATIENT)
Dept: LAB | Facility: HOSPITAL | Age: 54
End: 2019-09-05
Attending: PHYSICIAN ASSISTANT
Payer: MEDICAID

## 2019-09-05 VITALS
HEART RATE: 81 BPM | RESPIRATION RATE: 18 BRPM | TEMPERATURE: 98 F | DIASTOLIC BLOOD PRESSURE: 89 MMHG | SYSTOLIC BLOOD PRESSURE: 156 MMHG | WEIGHT: 207.44 LBS | BODY MASS INDEX: 33.34 KG/M2 | HEIGHT: 66 IN

## 2019-09-05 DIAGNOSIS — E10.65 HYPERGLYCEMIA DUE TO TYPE 1 DIABETES MELLITUS: ICD-10-CM

## 2019-09-05 LAB
CHOLEST SERPL-MCNC: 225 MG/DL (ref 120–199)
CHOLEST/HDLC SERPL: 2.6 {RATIO} (ref 2–5)
CREAT UR-MCNC: 193.8 MG/DL (ref 23–375)
ESTIMATED AVG GLUCOSE: 146 MG/DL (ref 68–131)
GLUCOSE SERPL-MCNC: 134 MG/DL (ref 70–110)
HBA1C MFR BLD HPLC: 6.7 % (ref 4–5.6)
HDLC SERPL-MCNC: 86 MG/DL (ref 40–75)
HDLC SERPL: 38.2 % (ref 20–50)
LDLC SERPL CALC-MCNC: 99.2 MG/DL (ref 63–159)
NONHDLC SERPL-MCNC: 139 MG/DL
PROT UR-MCNC: 10 MG/DL (ref 0–15)
PROT/CREAT UR: 0.05 MG/G{CREAT} (ref 0–0.2)
TRIGL SERPL-MCNC: 199 MG/DL (ref 30–150)

## 2019-09-05 PROCEDURE — 36415 COLL VENOUS BLD VENIPUNCTURE: CPT | Mod: PO

## 2019-09-05 PROCEDURE — 83036 HEMOGLOBIN GLYCOSYLATED A1C: CPT

## 2019-09-05 PROCEDURE — 99999 PR PBB SHADOW E&M-EST. PATIENT-LVL IV: CPT | Mod: PBBFAC,,, | Performed by: PHYSICIAN ASSISTANT

## 2019-09-05 PROCEDURE — 99999 PR PBB SHADOW E&M-EST. PATIENT-LVL IV: ICD-10-PCS | Mod: PBBFAC,,, | Performed by: PHYSICIAN ASSISTANT

## 2019-09-05 PROCEDURE — 99214 PR OFFICE/OUTPT VISIT, EST, LEVL IV, 30-39 MIN: ICD-10-PCS | Mod: S$PBB,,, | Performed by: PHYSICIAN ASSISTANT

## 2019-09-05 PROCEDURE — 99214 OFFICE O/P EST MOD 30 MIN: CPT | Mod: S$PBB,,, | Performed by: PHYSICIAN ASSISTANT

## 2019-09-05 PROCEDURE — 99214 OFFICE O/P EST MOD 30 MIN: CPT | Mod: PBBFAC,PN | Performed by: PHYSICIAN ASSISTANT

## 2019-09-05 PROCEDURE — 82962 GLUCOSE BLOOD TEST: CPT | Mod: PBBFAC,PN | Performed by: PHYSICIAN ASSISTANT

## 2019-09-05 PROCEDURE — 82570 ASSAY OF URINE CREATININE: CPT | Mod: PO

## 2019-09-05 NOTE — PROGRESS NOTES
Please call pt with abnormal results. Pt does not need appt at this time, unless they have questions or wish to further discuss.  Much improved labwork. Keep up the good work.  Tell him I apologize that I did not put in his a1c, but Ory may order for him today.  He now seems like he is on the right track, unless his A1c comes back elevated

## 2019-09-06 NOTE — TELEPHONE ENCOUNTER
----- Message from Stacie Marie Jr., PA-C sent at 9/6/2019 11:28 AM CDT -----  Jorgito Arreaga Your HgbA1c lab has significantly improved  (6.7%)  however is at goal (<7.0%). Continue current treatment plan as previously prescribed. If you have trouble getting your medication [facility] may be able to help.

## 2019-09-10 ENCOUNTER — PATIENT OUTREACH (OUTPATIENT)
Dept: ADMINISTRATIVE | Facility: HOSPITAL | Age: 54
End: 2019-09-10

## 2019-10-16 RX ORDER — INSULIN GLARGINE 100 [IU]/ML
INJECTION, SOLUTION SUBCUTANEOUS
Qty: 15 ML | Refills: 1 | Status: SHIPPED | OUTPATIENT
Start: 2019-10-16 | End: 2019-12-31 | Stop reason: SDUPTHER

## 2019-10-25 DIAGNOSIS — K21.9 GASTROESOPHAGEAL REFLUX DISEASE WITHOUT ESOPHAGITIS: Chronic | ICD-10-CM

## 2019-10-25 NOTE — TELEPHONE ENCOUNTER
----- Message from Otf Roblero MD sent at 10/25/2019  3:52 PM CDT -----  Is he actually taking lispro?  Was supposed to be d/c'd at discharge

## 2019-10-28 RX ORDER — INSULIN LISPRO 100 [IU]/ML
INJECTION, SOLUTION INTRAVENOUS; SUBCUTANEOUS
Qty: 10 ML | Refills: 3 | Status: SHIPPED | OUTPATIENT
Start: 2019-10-28 | End: 2020-03-19

## 2019-11-07 ENCOUNTER — TELEPHONE (OUTPATIENT)
Dept: INTERNAL MEDICINE | Facility: CLINIC | Age: 54
End: 2019-11-07

## 2019-11-07 NOTE — TELEPHONE ENCOUNTER
Spoke with pt about medication. [facility], [facility] will fill pt medication in 15 min. Advised pt of medication will be ready today

## 2019-11-07 NOTE — TELEPHONE ENCOUNTER
----- Message from Allyn Mary sent at 11/7/2019  9:49 AM CST -----  Contact: self/[phone]  Would like to consult with nurse regarding refill on medication(Inslin),   Patient has been with out his insulin for 12 days and has been trying to reach out to staff and no respond. Please call back at [phone]. Thanks/ar

## 2019-11-13 DIAGNOSIS — Z79.4 TYPE 2 DIABETES MELLITUS WITH HYPERGLYCEMIA, WITH LONG-TERM CURRENT USE OF INSULIN: ICD-10-CM

## 2019-11-13 DIAGNOSIS — E11.65 TYPE 2 DIABETES MELLITUS WITH HYPERGLYCEMIA, WITH LONG-TERM CURRENT USE OF INSULIN: ICD-10-CM

## 2019-11-14 ENCOUNTER — TELEPHONE (OUTPATIENT)
Dept: INTERNAL MEDICINE | Facility: CLINIC | Age: 54
End: 2019-11-14

## 2019-11-14 NOTE — TELEPHONE ENCOUNTER
----- Message from Robin Hill sent at 11/14/2019  9:53 AM CST -----  Contact: Pt  Please give pt a call at .[phone] (home) he is requesting a refill on all his diabetic supplies.         .  [facility] [address] [place] [address] [address]  [address] [facility] [address]  Phone: [phone] Fax: [phone]

## 2019-11-15 DIAGNOSIS — E11.59 HYPERTENSION ASSOCIATED WITH DIABETES: ICD-10-CM

## 2019-11-15 DIAGNOSIS — I15.2 HYPERTENSION ASSOCIATED WITH DIABETES: ICD-10-CM

## 2019-11-15 RX ORDER — LOSARTAN POTASSIUM 100 MG/1
TABLET ORAL
Qty: 90 TABLET | Refills: 4 | Status: SHIPPED | OUTPATIENT
Start: 2019-11-15 | End: 2019-12-31 | Stop reason: SDUPTHER

## 2019-11-21 RX ORDER — CLOTRIMAZOLE AND BETAMETHASONE DIPROPIONATE 10; .64 MG/G; MG/G
CREAM TOPICAL 2 TIMES DAILY
OUTPATIENT
Start: 2019-11-21

## 2019-11-27 ENCOUNTER — TELEPHONE (OUTPATIENT)
Dept: INTERNAL MEDICINE | Facility: CLINIC | Age: 54
End: 2019-11-27

## 2019-11-27 NOTE — TELEPHONE ENCOUNTER
----- Message from Didi Goodman sent at 11/27/2019 10:56 AM CST -----  Contact: Patient  Type:  RX Refill Request    Who Called:  Patient  Refill or New Rx:  refill  RX Name and Strength:  pantoprazole (PROTONIX) 40 MG tablet  How is the patient currently taking it? (ex. 1XDay):  1x day  Is this a 30 day or 90 day RX:  30 day  Preferred Pharmacy with phone number:    [facility] [address] [place], [place] - [address] [address]  [address] [facility] [facility] [address]  Phone: [phone] Fax: [phone]     Local or Mail Order:  local  Ordering Provider:  Dr. Annika Romero Call Back Number:  [phone] (home)    Additional Information:  States that Medicaid is not refilling his medication. Was advised by pharmacy to reach out to physician's office

## 2019-11-27 NOTE — TELEPHONE ENCOUNTER
Patient was informed that he will need an appointment to discuss his medication issues. Patient informed and declined scheduling. Patient verbally understood the information given.

## 2019-11-27 NOTE — TELEPHONE ENCOUNTER
Patient called in regards to his medicaid insurance not covering his Protonix. He is requesting to have something else called in. Also, he is requesting to have betamethasone (cream) called in to the pharmacy as well. LOV: 09/04/19.    Please Advise

## 2019-12-17 DIAGNOSIS — I15.2 HYPERTENSION ASSOCIATED WITH DIABETES: ICD-10-CM

## 2019-12-17 DIAGNOSIS — E11.69 HYPERLIPIDEMIA ASSOCIATED WITH TYPE 2 DIABETES MELLITUS: ICD-10-CM

## 2019-12-17 DIAGNOSIS — E11.59 HYPERTENSION ASSOCIATED WITH DIABETES: ICD-10-CM

## 2019-12-17 DIAGNOSIS — E78.5 HYPERLIPIDEMIA ASSOCIATED WITH TYPE 2 DIABETES MELLITUS: ICD-10-CM

## 2019-12-17 RX ORDER — ATORVASTATIN CALCIUM 10 MG/1
TABLET, FILM COATED ORAL
Refills: 0 | OUTPATIENT
Start: 2019-12-17

## 2019-12-17 RX ORDER — LOSARTAN POTASSIUM 100 MG/1
TABLET ORAL
Refills: 0 | OUTPATIENT
Start: 2019-12-17

## 2019-12-18 ENCOUNTER — TELEPHONE (OUTPATIENT)
Dept: INTERNAL MEDICINE | Facility: CLINIC | Age: 54
End: 2019-12-18

## 2019-12-18 DIAGNOSIS — I15.2 HYPERTENSION ASSOCIATED WITH DIABETES: ICD-10-CM

## 2019-12-18 DIAGNOSIS — E11.59 HYPERTENSION ASSOCIATED WITH DIABETES: ICD-10-CM

## 2019-12-18 NOTE — TELEPHONE ENCOUNTER
Tried to contact patient to reschedule, was unable to schedule for Monday. Advise patient will have Diane contact him to schedule   detailed exam color normal/warm and dry

## 2019-12-19 RX ORDER — LOSARTAN POTASSIUM 100 MG/1
TABLET ORAL
Refills: 0 | OUTPATIENT
Start: 2019-12-19

## 2019-12-31 ENCOUNTER — TELEPHONE (OUTPATIENT)
Dept: INTERNAL MEDICINE | Facility: CLINIC | Age: 54
End: 2019-12-31

## 2019-12-31 ENCOUNTER — OFFICE VISIT (OUTPATIENT)
Dept: INTERNAL MEDICINE | Facility: CLINIC | Age: 54
End: 2019-12-31
Payer: MEDICAID

## 2019-12-31 ENCOUNTER — LAB VISIT (OUTPATIENT)
Dept: LAB | Facility: HOSPITAL | Age: 54
End: 2019-12-31
Attending: FAMILY MEDICINE
Payer: MEDICAID

## 2019-12-31 VITALS
RESPIRATION RATE: 18 BRPM | SYSTOLIC BLOOD PRESSURE: 139 MMHG | WEIGHT: 206.56 LBS | OXYGEN SATURATION: 98 % | TEMPERATURE: 97 F | HEIGHT: 66 IN | BODY MASS INDEX: 33.2 KG/M2 | HEART RATE: 95 BPM | DIASTOLIC BLOOD PRESSURE: 86 MMHG

## 2019-12-31 DIAGNOSIS — E11.59 HYPERTENSION ASSOCIATED WITH DIABETES: Primary | ICD-10-CM

## 2019-12-31 DIAGNOSIS — Z79.899 ENCOUNTER FOR LONG-TERM (CURRENT) USE OF MEDICATIONS: ICD-10-CM

## 2019-12-31 DIAGNOSIS — Z28.9 DELAYED IMMUNIZATIONS: ICD-10-CM

## 2019-12-31 DIAGNOSIS — E78.5 HYPERLIPIDEMIA ASSOCIATED WITH TYPE 2 DIABETES MELLITUS: ICD-10-CM

## 2019-12-31 DIAGNOSIS — K21.9 GASTROESOPHAGEAL REFLUX DISEASE WITHOUT ESOPHAGITIS: Chronic | ICD-10-CM

## 2019-12-31 DIAGNOSIS — E11.69 HYPERLIPIDEMIA ASSOCIATED WITH TYPE 2 DIABETES MELLITUS: ICD-10-CM

## 2019-12-31 DIAGNOSIS — F10.10 ALCOHOL ABUSE: Chronic | ICD-10-CM

## 2019-12-31 DIAGNOSIS — I15.2 HYPERTENSION ASSOCIATED WITH DIABETES: Primary | ICD-10-CM

## 2019-12-31 LAB
ALBUMIN SERPL BCP-MCNC: 4.2 G/DL (ref 3.5–5.2)
ALP SERPL-CCNC: 91 U/L (ref 55–135)
ALT SERPL W/O P-5'-P-CCNC: 62 U/L (ref 10–44)
ANION GAP SERPL CALC-SCNC: 11 MMOL/L (ref 8–16)
AST SERPL-CCNC: 51 U/L (ref 10–40)
BILIRUB SERPL-MCNC: 1.3 MG/DL (ref 0.1–1)
BUN SERPL-MCNC: 10 MG/DL (ref 6–20)
CALCIUM SERPL-MCNC: 9.6 MG/DL (ref 8.7–10.5)
CHLORIDE SERPL-SCNC: 100 MMOL/L (ref 95–110)
CO2 SERPL-SCNC: 26 MMOL/L (ref 23–29)
CREAT SERPL-MCNC: 1.1 MG/DL (ref 0.5–1.4)
EST. GFR  (AFRICAN AMERICAN): >60 ML/MIN/1.73 M^2
EST. GFR  (NON AFRICAN AMERICAN): >60 ML/MIN/1.73 M^2
GLUCOSE SERPL-MCNC: 189 MG/DL (ref 70–110)
POTASSIUM SERPL-SCNC: 4.3 MMOL/L (ref 3.5–5.1)
PROT SERPL-MCNC: 7.7 G/DL (ref 6–8.4)
SODIUM SERPL-SCNC: 137 MMOL/L (ref 136–145)

## 2019-12-31 PROCEDURE — 83036 HEMOGLOBIN GLYCOSYLATED A1C: CPT

## 2019-12-31 PROCEDURE — 99999 PR PBB SHADOW E&M-EST. PATIENT-LVL V: CPT | Mod: PBBFAC,,, | Performed by: FAMILY MEDICINE

## 2019-12-31 PROCEDURE — 80053 COMPREHEN METABOLIC PANEL: CPT | Mod: PO

## 2019-12-31 PROCEDURE — 99999 PR PBB SHADOW E&M-EST. PATIENT-LVL V: ICD-10-PCS | Mod: PBBFAC,,, | Performed by: FAMILY MEDICINE

## 2019-12-31 PROCEDURE — 99214 PR OFFICE/OUTPT VISIT, EST, LEVL IV, 30-39 MIN: ICD-10-PCS | Mod: S$PBB,,, | Performed by: FAMILY MEDICINE

## 2019-12-31 PROCEDURE — 99214 OFFICE O/P EST MOD 30 MIN: CPT | Mod: S$PBB,,, | Performed by: FAMILY MEDICINE

## 2019-12-31 PROCEDURE — 99215 OFFICE O/P EST HI 40 MIN: CPT | Mod: PBBFAC,PO | Performed by: FAMILY MEDICINE

## 2019-12-31 PROCEDURE — 36415 COLL VENOUS BLD VENIPUNCTURE: CPT | Mod: PO

## 2019-12-31 PROCEDURE — 90471 IMMUNIZATION ADMIN: CPT | Mod: PBBFAC,PO

## 2019-12-31 RX ORDER — INSULIN GLARGINE 100 [IU]/ML
INJECTION, SOLUTION SUBCUTANEOUS
Qty: 30 ML | Refills: 5 | Status: SHIPPED | OUTPATIENT
Start: 2019-12-31 | End: 2020-03-26 | Stop reason: SDUPTHER

## 2019-12-31 RX ORDER — LANOLIN ALCOHOL/MO/W.PET/CERES
100 CREAM (GRAM) TOPICAL DAILY
Status: CANCELLED | COMMUNITY
Start: 2019-12-31

## 2019-12-31 RX ORDER — ASPIRIN 325 MG
325 TABLET ORAL DAILY
Qty: 360 TABLET | Refills: 1 | Status: SHIPPED | OUTPATIENT
Start: 2019-12-31 | End: 2020-03-26 | Stop reason: SDUPTHER

## 2019-12-31 RX ORDER — CYANOCOBALAMIN 1000 UG/ML
1000 INJECTION, SOLUTION INTRAMUSCULAR; SUBCUTANEOUS
Status: COMPLETED | OUTPATIENT
Start: 2019-12-31 | End: 2019-12-31

## 2019-12-31 RX ORDER — FUROSEMIDE 20 MG/1
TABLET ORAL
Qty: 90 TABLET | Refills: 0 | Status: SHIPPED | OUTPATIENT
Start: 2019-12-31 | End: 2020-03-26 | Stop reason: SDUPTHER

## 2019-12-31 RX ORDER — VERAPAMIL HYDROCHLORIDE 240 MG/1
240 TABLET, FILM COATED, EXTENDED RELEASE ORAL DAILY
Qty: 90 TABLET | Refills: 4 | Status: SHIPPED | OUTPATIENT
Start: 2019-12-31 | End: 2021-02-10

## 2019-12-31 RX ORDER — PANTOPRAZOLE SODIUM 40 MG/1
40 TABLET, DELAYED RELEASE ORAL DAILY
Qty: 90 TABLET | Refills: 4 | Status: CANCELLED | OUTPATIENT
Start: 2019-12-31

## 2019-12-31 RX ORDER — ATORVASTATIN CALCIUM 10 MG/1
10 TABLET, FILM COATED ORAL DAILY
Qty: 90 TABLET | Refills: 4 | Status: CANCELLED | OUTPATIENT
Start: 2019-12-31

## 2019-12-31 RX ORDER — ATORVASTATIN CALCIUM 40 MG/1
40 TABLET, FILM COATED ORAL DAILY
Qty: 90 TABLET | Refills: 4 | Status: SHIPPED | OUTPATIENT
Start: 2019-12-31 | End: 2020-03-26 | Stop reason: SDUPTHER

## 2019-12-31 RX ORDER — LOSARTAN POTASSIUM 100 MG/1
100 TABLET ORAL DAILY
Qty: 90 TABLET | Refills: 4 | Status: SHIPPED | OUTPATIENT
Start: 2019-12-31 | End: 2021-01-13

## 2019-12-31 RX ADMIN — CYANOCOBALAMIN 1000 MCG: 1000 INJECTION, SOLUTION INTRAMUSCULAR at 12:12

## 2019-12-31 NOTE — TELEPHONE ENCOUNTER
----- Message from Rory Florian sent at 12/31/2019 10:04 AM CST -----  Contact: Pt  Pt is calling the staff regarding refills RX on all pt medications    Pt is completely out of medicines/ Pt stated that the Pt has been  out of blood pressure medication for a week and a half and is now out of insulin and all other medication.    Pt call back [phone] [facility] [address] [place] [address] [address]  [address] [facility] [address]  Phone: [phone] Fax: [phone]        Thanks

## 2019-12-31 NOTE — PROGRESS NOTES
Subjective:       Patient ID: Jorgito Arreaga is a 54 y.o. male.    Chief Complaint: Diabetes    Diabetes   He presents for his follow-up diabetic visit. He has type 2 diabetes mellitus. His disease course has been stable. Pertinent negatives for hypoglycemia include no confusion, dizziness, headaches or hunger. Pertinent negatives for diabetes include no blurred vision, no chest pain, no fatigue and no weakness. Pertinent negatives for hypoglycemia complications include no blackouts and no hospitalization. Symptoms are stable. Risk factors for coronary artery disease include diabetes mellitus, hypertension, male sex and obesity. Current diabetic treatment includes insulin injections and oral agent (monotherapy). He is compliant with treatment all of the time. His weight is stable. He is following a diabetic diet. He participates in exercise daily. An ACE inhibitor/angiotensin II receptor blocker is being taken.     Review of Systems   Constitutional: Negative for fatigue.   Eyes: Negative for blurred vision.   Respiratory: Negative for shortness of breath.    Cardiovascular: Negative for chest pain.   Gastrointestinal: Negative for abdominal pain.   Neurological: Negative for dizziness, weakness and headaches.   Psychiatric/Behavioral: Negative for confusion.       Objective:      Physical Exam   Constitutional: He appears well-developed and well-nourished. No distress.   HENT:   Head: Normocephalic and atraumatic.   Nose: Nose normal.   Mouth/Throat: Oropharynx is clear and moist.   Pulmonary/Chest: Effort normal and breath sounds normal. No respiratory distress. He has no wheezes.   Abdominal: Soft. He exhibits no distension. There is no tenderness. There is no guarding.   Skin: Skin is warm and dry. No rash noted. He is not diaphoretic. No erythema.   Nursing note and vitals reviewed.      Assessment:       1. Hypertension associated with diabetes    2. Encounter for long-term (current) use of medications    3.  Alcohol abuse    4. Hyperlipidemia associated with type 2 diabetes mellitus    5. Gastroesophageal reflux disease without esophagitis    6. Delayed immunizations    7. Uncontrolled secondary diabetes with peripheral neuropathy        Plan:     Problem List Items Addressed This Visit        Psychiatric    Alcohol abuse (Chronic)    Encounter for long-term (current) use of medications       Cardiac/Vascular    Hypertension associated with diabetes - Primary    Relevant Medications    aspirin 325 MG tablet    furosemide (LASIX) 20 MG tablet    insulin (LANTUS SOLOSTAR U-100 INSULIN) glargine 100 units/mL (3mL) SubQ pen    losartan (COZAAR) 100 MG tablet    verapamil (CALAN-SR) 240 MG CR tablet    Other Relevant Orders    Ambulatory referral to Cardiology       ID    Delayed immunizations    Relevant Orders    (In Office Administered) Zoster Vaccine - Live (Completed)       Endocrine    Uncontrolled secondary diabetes with peripheral neuropathy    Relevant Medications    insulin (LANTUS SOLOSTAR U-100 INSULIN) glargine 100 units/mL (3mL) SubQ pen    Other Relevant Orders    Hemoglobin A1c    Comprehensive metabolic panel       GI    Gastroesophageal reflux disease (Chronic)    Relevant Medications    cyanocobalamin injection 1,000 mcg      Other Visit Diagnoses     Hyperlipidemia associated with type 2 diabetes mellitus        Relevant Medications    atorvastatin (LIPITOR) 40 MG tablet    insulin (LANTUS SOLOSTAR U-100 INSULIN) glargine 100 units/mL (3mL) SubQ pen    Other Relevant Orders    Ambulatory referral to Cardiology

## 2020-01-01 LAB
ESTIMATED AVG GLUCOSE: 192 MG/DL (ref 68–131)
HBA1C MFR BLD HPLC: 8.3 % (ref 4–5.6)

## 2020-01-02 NOTE — PROGRESS NOTES
Please call pt with abnormal results. Pt does not need appt at this time, unless they have questions or wish to further discuss.\  Pt needs to f/u with Diabetic education.

## 2020-01-09 DIAGNOSIS — E11.9 TYPE 2 DIABETES MELLITUS WITHOUT COMPLICATION, WITH LONG-TERM CURRENT USE OF INSULIN: ICD-10-CM

## 2020-01-09 DIAGNOSIS — Z79.4 TYPE 2 DIABETES MELLITUS WITHOUT COMPLICATION, WITH LONG-TERM CURRENT USE OF INSULIN: ICD-10-CM

## 2020-01-09 RX ORDER — PEN NEEDLE, DIABETIC 30 GX3/16"
NEEDLE, DISPOSABLE MISCELLANEOUS
Qty: 100 EACH | Refills: 11 | Status: SHIPPED | OUTPATIENT
Start: 2020-01-09 | End: 2020-09-29 | Stop reason: SDUPTHER

## 2020-02-14 ENCOUNTER — PATIENT OUTREACH (OUTPATIENT)
Dept: ADMINISTRATIVE | Facility: HOSPITAL | Age: 55
End: 2020-02-14

## 2020-02-14 NOTE — PROGRESS NOTES
Patient on noncompliant A1C report. Called to schedule repeat A1C lab 3/31 w/kia appt.  Noted pt has scheduled pcp visit with Dr. Rodriguez 3/31, also called to see if switching PCP's.  **KDL**

## 2020-03-09 ENCOUNTER — TELEPHONE (OUTPATIENT)
Dept: INTERNAL MEDICINE | Facility: CLINIC | Age: 55
End: 2020-03-09

## 2020-03-19 RX ORDER — INSULIN LISPRO 100 [IU]/ML
INJECTION, SOLUTION INTRAVENOUS; SUBCUTANEOUS
Qty: 10 ML | Refills: 0 | Status: SHIPPED | OUTPATIENT
Start: 2020-03-19 | End: 2020-03-24

## 2020-03-24 RX ORDER — INSULIN LISPRO 100 [IU]/ML
INJECTION, SOLUTION INTRAVENOUS; SUBCUTANEOUS
Qty: 10 ML | Refills: 0 | Status: SHIPPED | OUTPATIENT
Start: 2020-03-24 | End: 2020-05-13

## 2020-03-26 DIAGNOSIS — E11.65 TYPE 2 DIABETES MELLITUS WITH HYPERGLYCEMIA, WITH LONG-TERM CURRENT USE OF INSULIN: ICD-10-CM

## 2020-03-26 DIAGNOSIS — Z79.4 TYPE 2 DIABETES MELLITUS WITH HYPERGLYCEMIA, WITH LONG-TERM CURRENT USE OF INSULIN: ICD-10-CM

## 2020-03-26 DIAGNOSIS — E11.59 HYPERTENSION ASSOCIATED WITH DIABETES: ICD-10-CM

## 2020-03-26 DIAGNOSIS — E78.5 HYPERLIPIDEMIA ASSOCIATED WITH TYPE 2 DIABETES MELLITUS: ICD-10-CM

## 2020-03-26 DIAGNOSIS — I15.2 HYPERTENSION ASSOCIATED WITH DIABETES: ICD-10-CM

## 2020-03-26 DIAGNOSIS — E11.69 HYPERLIPIDEMIA ASSOCIATED WITH TYPE 2 DIABETES MELLITUS: ICD-10-CM

## 2020-03-26 RX ORDER — ASPIRIN 325 MG
325 TABLET ORAL DAILY
Qty: 180 TABLET | Refills: 1 | Status: SHIPPED | OUTPATIENT
Start: 2020-03-26 | End: 2021-09-27 | Stop reason: SDUPTHER

## 2020-03-26 RX ORDER — FUROSEMIDE 20 MG/1
TABLET ORAL
Qty: 90 TABLET | Refills: 0 | Status: SHIPPED | OUTPATIENT
Start: 2020-03-26 | End: 2021-07-13 | Stop reason: SDUPTHER

## 2020-03-26 RX ORDER — INSULIN GLARGINE 100 [IU]/ML
INJECTION, SOLUTION SUBCUTANEOUS
Qty: 30 ML | Refills: 5 | Status: SHIPPED | OUTPATIENT
Start: 2020-03-26 | End: 2021-07-13 | Stop reason: SDUPTHER

## 2020-03-26 RX ORDER — ATORVASTATIN CALCIUM 40 MG/1
40 TABLET, FILM COATED ORAL DAILY
Qty: 90 TABLET | Refills: 4 | Status: SHIPPED | OUTPATIENT
Start: 2020-03-26 | End: 2021-07-13 | Stop reason: SDUPTHER

## 2020-03-26 NOTE — TELEPHONE ENCOUNTER
Called pt to change him to video visit. He states cannot do video visit and will r/s to be seen a later date when the virus is over.     His appt was to get established with your care he is needing RF's prior Dr. Ortez patient.     RF requesting: Verapamil, aspirin, furosemide (prn), lantus

## 2020-03-31 ENCOUNTER — TELEPHONE (OUTPATIENT)
Dept: INTERNAL MEDICINE | Facility: CLINIC | Age: 55
End: 2020-03-31

## 2020-03-31 DIAGNOSIS — N50.89 PAIN OF MALE GENITALIA: Primary | ICD-10-CM

## 2020-03-31 RX ORDER — CLOTRIMAZOLE AND BETAMETHASONE DIPROPIONATE 10; .64 MG/G; MG/G
CREAM TOPICAL 2 TIMES DAILY
Qty: 15 G | Refills: 0 | Status: SHIPPED | OUTPATIENT
Start: 2020-03-31

## 2020-03-31 RX ORDER — MUPIROCIN 20 MG/G
OINTMENT TOPICAL 3 TIMES DAILY
Qty: 15 G | Refills: 0 | Status: SHIPPED | OUTPATIENT
Start: 2020-03-31 | End: 2020-09-29

## 2020-03-31 NOTE — TELEPHONE ENCOUNTER
----- Message from Chapis Bernal sent at 3/31/2020  1:29 PM CDT -----  Contact: pt  Patient needs to get refill on clotrim beta 1.05% and mupirocin ont 2%. Patient [facility] in [place].

## 2020-03-31 NOTE — TELEPHONE ENCOUNTER
Informed patient the 2 medications have been sent to pharmacy. Will need an appt visit if doesn't resolve. Patient verbalized understanding.

## 2020-03-31 NOTE — TELEPHONE ENCOUNTER
Returned call to pt to see who prescribed it last and what is he using it for? Left voicemail, no answer.

## 2020-03-31 NOTE — TELEPHONE ENCOUNTER
Patient states he is having discomfort in his private area. He states was given Clotrimazole betamethasone and mupirocin ointment in the ER awhile ago and wanting a refill.     [facility]    Please advise

## 2020-04-21 ENCOUNTER — HOSPITAL ENCOUNTER (EMERGENCY)
Facility: HOSPITAL | Age: 55
Discharge: HOME | End: 2020-04-21
Attending: EMERGENCY MEDICINE
Payer: MEDICAID

## 2020-04-21 VITALS
TEMPERATURE: 99 F | WEIGHT: 193.81 LBS | SYSTOLIC BLOOD PRESSURE: 138 MMHG | HEIGHT: 66 IN | HEART RATE: 84 BPM | RESPIRATION RATE: 20 BRPM | OXYGEN SATURATION: 98 % | DIASTOLIC BLOOD PRESSURE: 95 MMHG | BODY MASS INDEX: 31.15 KG/M2

## 2020-04-21 DIAGNOSIS — R10.13 EPIGASTRIC PAIN: Primary | ICD-10-CM

## 2020-04-21 LAB
ALBUMIN SERPL BCP-MCNC: 3 G/DL (ref 3.5–5.2)
ALP SERPL-CCNC: 132 U/L (ref 55–135)
ALT SERPL W/O P-5'-P-CCNC: 133 U/L (ref 10–44)
ANION GAP SERPL CALC-SCNC: 12 MMOL/L (ref 8–16)
AST SERPL-CCNC: 74 U/L (ref 10–40)
BASOPHILS # BLD AUTO: 0.02 K/UL (ref 0–0.2)
BASOPHILS NFR BLD: 0.3 % (ref 0–1.9)
BILIRUB SERPL-MCNC: 1.1 MG/DL (ref 0.1–1)
BNP SERPL-MCNC: 45 PG/ML (ref 0–99)
BUN SERPL-MCNC: 10 MG/DL (ref 6–20)
CALCIUM SERPL-MCNC: 8.2 MG/DL (ref 8.7–10.5)
CHLORIDE SERPL-SCNC: 98 MMOL/L (ref 95–110)
CK MB SERPL-MCNC: 3 NG/ML (ref 0.1–6.5)
CK MB SERPL-RTO: 2.5 % (ref 0–5)
CK SERPL-CCNC: 121 U/L (ref 20–200)
CK SERPL-CCNC: 121 U/L (ref 20–200)
CO2 SERPL-SCNC: 26 MMOL/L (ref 23–29)
CREAT SERPL-MCNC: 1.2 MG/DL (ref 0.5–1.4)
DIFFERENTIAL METHOD: ABNORMAL
EOSINOPHIL # BLD AUTO: 0 K/UL (ref 0–0.5)
EOSINOPHIL NFR BLD: 0.2 % (ref 0–8)
ERYTHROCYTE [DISTWIDTH] IN BLOOD BY AUTOMATED COUNT: 13.2 % (ref 11.5–14.5)
EST. GFR  (AFRICAN AMERICAN): >60 ML/MIN/1.73 M^2
EST. GFR  (NON AFRICAN AMERICAN): >60 ML/MIN/1.73 M^2
GLUCOSE SERPL-MCNC: 335 MG/DL (ref 70–110)
HCT VFR BLD AUTO: 32.7 % (ref 40–54)
HGB BLD-MCNC: 11.2 G/DL (ref 14–18)
IMM GRANULOCYTES # BLD AUTO: 0.01 K/UL (ref 0–0.04)
IMM GRANULOCYTES NFR BLD AUTO: 0.2 % (ref 0–0.5)
LIPASE SERPL-CCNC: 18 U/L (ref 4–60)
LYMPHOCYTES # BLD AUTO: 1.6 K/UL (ref 1–4.8)
LYMPHOCYTES NFR BLD: 23.9 % (ref 18–48)
MCH RBC QN AUTO: 29.8 PG (ref 27–31)
MCHC RBC AUTO-ENTMCNC: 34.3 G/DL (ref 32–36)
MCV RBC AUTO: 87 FL (ref 82–98)
MONOCYTES # BLD AUTO: 0.6 K/UL (ref 0.3–1)
MONOCYTES NFR BLD: 8.5 % (ref 4–15)
NEUTROPHILS # BLD AUTO: 4.4 K/UL (ref 1.8–7.7)
NEUTROPHILS NFR BLD: 66.9 % (ref 38–73)
NRBC BLD-RTO: 1 /100 WBC
PLATELET # BLD AUTO: 176 K/UL (ref 150–350)
PMV BLD AUTO: 11 FL (ref 9.2–12.9)
POTASSIUM SERPL-SCNC: 3.4 MMOL/L (ref 3.5–5.1)
PROT SERPL-MCNC: 5.9 G/DL (ref 6–8.4)
RBC # BLD AUTO: 3.76 M/UL (ref 4.6–6.2)
SODIUM SERPL-SCNC: 136 MMOL/L (ref 136–145)
TROPONIN I SERPL DL<=0.01 NG/ML-MCNC: 0.01 NG/ML (ref 0–0.03)
WBC # BLD AUTO: 6.58 K/UL (ref 3.9–12.7)

## 2020-04-21 PROCEDURE — 82550 ASSAY OF CK (CPK): CPT | Mod: ER

## 2020-04-21 PROCEDURE — 83880 ASSAY OF NATRIURETIC PEPTIDE: CPT | Mod: ER

## 2020-04-21 PROCEDURE — 63600175 PHARM REV CODE 636 W HCPCS: Mod: ER | Performed by: EMERGENCY MEDICINE

## 2020-04-21 PROCEDURE — 99285 EMERGENCY DEPT VISIT HI MDM: CPT | Mod: 25,ER

## 2020-04-21 PROCEDURE — 93005 ELECTROCARDIOGRAM TRACING: CPT | Mod: ER

## 2020-04-21 PROCEDURE — 83690 ASSAY OF LIPASE: CPT | Mod: ER

## 2020-04-21 PROCEDURE — 96375 TX/PRO/DX INJ NEW DRUG ADDON: CPT | Mod: ER

## 2020-04-21 PROCEDURE — 96374 THER/PROPH/DIAG INJ IV PUSH: CPT | Mod: ER

## 2020-04-21 PROCEDURE — 93010 ELECTROCARDIOGRAM REPORT: CPT | Mod: ,,, | Performed by: INTERNAL MEDICINE

## 2020-04-21 PROCEDURE — 82553 CREATINE MB FRACTION: CPT | Mod: ER

## 2020-04-21 PROCEDURE — 84484 ASSAY OF TROPONIN QUANT: CPT | Mod: ER

## 2020-04-21 PROCEDURE — 93010 EKG 12-LEAD: ICD-10-PCS | Mod: ,,, | Performed by: INTERNAL MEDICINE

## 2020-04-21 PROCEDURE — 85025 COMPLETE CBC W/AUTO DIFF WBC: CPT | Mod: ER

## 2020-04-21 PROCEDURE — 80053 COMPREHEN METABOLIC PANEL: CPT | Mod: ER

## 2020-04-21 RX ORDER — HYDROCODONE BITARTRATE AND ACETAMINOPHEN 5; 325 MG/1; MG/1
1 TABLET ORAL EVERY 4 HOURS PRN
Qty: 11 TABLET | Refills: 0 | Status: SHIPPED | OUTPATIENT
Start: 2020-04-21 | End: 2020-04-26

## 2020-04-21 RX ORDER — PROMETHAZINE HYDROCHLORIDE 25 MG/1
25 TABLET ORAL EVERY 6 HOURS PRN
Qty: 15 TABLET | Refills: 0 | Status: SHIPPED | OUTPATIENT
Start: 2020-04-21 | End: 2020-09-29 | Stop reason: ALTCHOICE

## 2020-04-21 RX ORDER — PROCHLORPERAZINE EDISYLATE 5 MG/ML
10 INJECTION INTRAMUSCULAR; INTRAVENOUS
Status: COMPLETED | OUTPATIENT
Start: 2020-04-21 | End: 2020-04-21

## 2020-04-21 RX ORDER — MORPHINE SULFATE 4 MG/ML
4 INJECTION, SOLUTION INTRAMUSCULAR; INTRAVENOUS
Status: COMPLETED | OUTPATIENT
Start: 2020-04-21 | End: 2020-04-21

## 2020-04-21 RX ADMIN — MORPHINE SULFATE 4 MG: 4 INJECTION INTRAVENOUS at 01:04

## 2020-04-21 RX ADMIN — PROCHLORPERAZINE EDISYLATE 10 MG: 5 INJECTION INTRAMUSCULAR; INTRAVENOUS at 11:04

## 2020-04-21 NOTE — ED PROVIDER NOTES
Encounter Date: 4/21/2020       History     Chief Complaint   Patient presents with    Abdominal Pain     abd pain since sun. ate hot sauce and some alcohol . naproxen approx. 1 hr ago     The history is provided by the patient.   Abdominal Pain   The current episode started several days ago. The onset of the illness was gradual. The abdominal pain is located in the epigastric region. The abdominal pain does not radiate. The abdominal pain is relieved by nothing. The other symptoms of the illness include nausea. The other symptoms of the illness do not include fever, shortness of breath, vomiting, diarrhea or dysuria.   Nausea began 3 to 5 days ago. The nausea is associated with eating. The nausea is exacerbated by food.   Symptoms associated with the illness do not include back pain.     Review of patient's allergies indicates:  No Known Allergies  Past Medical History:   Diagnosis Date    Abscess 9/5/2018    Cataracts, bilateral     Chest pain 12/6/2018    Dehydration     Diabetes mellitus, type 2     Diagnosed 7/2016    Folliculitis 9/15/2017    GERD (gastroesophageal reflux disease)     Hypertension     Inflammatory polyps of colon     Pancreatitis, alcoholic, acute     Port catheter in place     TIA (transient ischemic attack)     Tinea cruris 9/15/2017     Past Surgical History:   Procedure Laterality Date    COLONOSCOPY      COLONOSCOPY N/A 6/15/2018    Procedure: COLONOSCOPY;  Surgeon: Jim Hicks MD;  Location: [facility];  Service: Endoscopy;  Laterality: N/A;    ESOPHAGOGASTRODUODENOSCOPY N/A 6/15/2018    Procedure: ESOPHAGOGASTRODUODENOSCOPY (EGD);  Surgeon: Jim Hicks MD;  Location: [facility];  Service: Endoscopy;  Laterality: N/A;     Family History   Problem Relation Age of Onset    Hypertension Mother     Hypertension Father      Social History     Tobacco Use    Smoking status: Former Smoker     Types: Cigarettes    Smokeless tobacco: Never Used   Substance Use Topics     Alcohol use: Yes     Comment:  Daily - States that he drinks 1/2 pint of vodka and about 1-2 cans of beer daily    Drug use: No     Review of Systems   Constitutional: Negative for fever.   HENT: Negative for sore throat.    Respiratory: Negative for shortness of breath.    Cardiovascular: Negative for chest pain.   Gastrointestinal: Positive for abdominal pain and nausea. Negative for diarrhea and vomiting.   Genitourinary: Negative for dysuria.   Musculoskeletal: Negative for back pain.   Skin: Negative for rash.   Neurological: Negative for weakness.   Hematological: Does not bruise/bleed easily.       Physical Exam     Initial Vitals [04/21/20 1118]   BP Pulse Resp Temp SpO2   (!) 151/95 109 20 98.9 °F (37.2 °C) 98 %      MAP       --         Physical Exam    Nursing note and vitals reviewed.  Constitutional: He appears well-developed and well-nourished. No distress.   HENT:   Head: Normocephalic and atraumatic.   Mouth/Throat: Oropharynx is clear and moist.   Eyes: Conjunctivae and EOM are normal. Pupils are equal, round, and reactive to light.   Neck: Normal range of motion.   Cardiovascular: Normal rate. Exam reveals no gallop and no friction rub.    No murmur heard.  Pulmonary/Chest: No respiratory distress.   Abdominal: He exhibits no distension. There is tenderness in the epigastric area.   Musculoskeletal: Normal range of motion. He exhibits no edema.   Neurological: He is alert and oriented to person, place, and time. No cranial nerve deficit.   Skin: Skin is warm and dry. No rash noted.   Psychiatric: He has a normal mood and affect. Thought content normal.         ED Course   Procedures  Labs Reviewed   CBC W/ AUTO DIFFERENTIAL - Abnormal; Notable for the following components:       Result Value    RBC 3.76 (*)     Hemoglobin 11.2 (*)     Hematocrit 32.7 (*)     nRBC 1 (*)     All other components within normal limits   COMPREHENSIVE METABOLIC PANEL - Abnormal; Notable for the following components:     Potassium 3.4 (*)     Glucose 335 (*)     Calcium 8.2 (*)     Total Protein 5.9 (*)     Albumin 3.0 (*)     Total Bilirubin 1.1 (*)     AST 74 (*)      (*)     All other components within normal limits   B-TYPE NATRIURETIC PEPTIDE   CK   CK-MB   TROPONIN I   LIPASE   URINALYSIS, REFLEX TO URINE CULTURE        ECG Results          EKG 12-lead (In process)  Result time 04/21/20 13:37:13    In process by Interface, Lab In [facility] (04/21/20 13:37:13)                 Narrative:    Test Reason : R10.13,    Vent. Rate : 094 BPM     Atrial Rate : 094 BPM     P-R Int : 158 ms          QRS Dur : 088 ms      QT Int : 374 ms       P-R-T Axes : 030 -10 033 degrees     QTc Int : 467 ms    Normal sinus rhythm  Normal ECG  When compared with ECG of 30-MAR-2019 11:50,  No significant change was found    Referred By: [email or web address]   SELF           Confirmed By:                             Imaging Results          X-Ray Abdomen Flat And Erect (In process)  Result time 04/21/20 13:57:46               X-Ray Chest AP Portable (Final result)  Result time 04/21/20 11:58:17    Final result by Srinivasa Dubose MD (04/21/20 11:58:17)                 Impression:      No acute process seen.      Electronically signed by: Srinivasa Dubose MD  Date:    04/21/2020  Time:    11:58             Narrative:    EXAMINATION:  XR CHEST AP PORTABLE    CLINICAL HISTORY:  epigastric pain;    FINDINGS:  Single view of the chest.  Comparison 03/30/2019    Cardiac silhouette is normal.  Aorta demonstrates atherosclerotic disease. The lungs demonstrate no evidence of active disease.  No evidence of pleural effusion or pneumothorax.  Bones demonstrate mild degenerative changes.                                              ED Vital Signs:  Vitals:    04/21/20 1118 04/21/20 1128 04/21/20 1131 04/21/20 1146   BP: (!) 151/95  (!) 152/93 (!) 161/98   Pulse: 109 93 93 88   Resp: 20 13 19   Temp: 98.9 °F (37.2 °C)      TempSrc: Oral      SpO2: 98%  99% 99%   Weight:  "87.9 kg (193 lb 12.6 oz)      Height: 5' 6" (1.676 m)       04/21/20 1202 04/21/20 1231 04/21/20 1302 04/21/20 1331   BP: (!) 140/82 (!) 155/92 (!) 152/95 (!) 144/86   Pulse: 87 82 80 81   Resp: 16 16 19 19   Temp:       TempSrc:       SpO2: 97% 97% 97% 97%   Weight:       Height:             Abnormal Lab Results:  Labs Reviewed   CBC W/ AUTO DIFFERENTIAL - Abnormal; Notable for the following components:       Result Value    RBC 3.76 (*)     Hemoglobin 11.2 (*)     Hematocrit 32.7 (*)     nRBC 1 (*)     All other components within normal limits   COMPREHENSIVE METABOLIC PANEL - Abnormal; Notable for the following components:    Potassium 3.4 (*)     Glucose 335 (*)     Calcium 8.2 (*)     Total Protein 5.9 (*)     Albumin 3.0 (*)     Total Bilirubin 1.1 (*)     AST 74 (*)      (*)     All other components within normal limits   B-TYPE NATRIURETIC PEPTIDE   CK   CK-MB   TROPONIN I   LIPASE   URINALYSIS, REFLEX TO URINE CULTURE          All Lab Results:  Results for orders placed or performed during the hospital encounter of 04/21/20   CBC auto differential   Result Value Ref Range    WBC 6.58 3.90 - 12.70 K/uL    RBC 3.76 (L) 4.60 - 6.20 M/uL    Hemoglobin 11.2 (L) 14.0 - 18.0 g/dL    Hematocrit 32.7 (L) 40.0 - 54.0 %    Mean Corpuscular Volume 87 82 - 98 fL    Mean Corpuscular Hemoglobin 29.8 27.0 - 31.0 pg    Mean Corpuscular Hemoglobin Conc 34.3 32.0 - 36.0 g/dL    RDW 13.2 11.5 - 14.5 %    Platelets 176 150 - 350 K/uL    MPV 11.0 9.2 - 12.9 fL    Immature Granulocytes 0.2 0.0 - 0.5 %    Gran # (ANC) 4.4 1.8 - 7.7 K/uL    Immature Grans (Abs) 0.01 0.00 - 0.04 K/uL    Lymph # 1.6 1.0 - 4.8 K/uL    Mono # 0.6 0.3 - 1.0 K/uL    Eos # 0.0 0.0 - 0.5 K/uL    Baso # 0.02 0.00 - 0.20 K/uL    nRBC 1 (A) 0 /100 WBC    Gran% 66.9 38.0 - 73.0 %    Lymph% 23.9 18.0 - 48.0 %    Mono% 8.5 4.0 - 15.0 %    Eosinophil% 0.2 0.0 - 8.0 %    Basophil% 0.3 0.0 - 1.9 %    Differential Method Automated    Brain Natriuretic Peptide "   Result Value Ref Range    BNP 45 0 - 99 pg/mL   CPK   Result Value Ref Range     20 - 200 U/L   CK-MB   Result Value Ref Range     20 - 200 U/L    CPK MB 3.0 0.1 - 6.5 ng/mL    MB% 2.5 0.0 - 5.0 %   Comprehensive metabolic panel   Result Value Ref Range    Sodium 136 136 - 145 mmol/L    Potassium 3.4 (L) 3.5 - 5.1 mmol/L    Chloride 98 95 - 110 mmol/L    CO2 26 23 - 29 mmol/L    Glucose 335 (H) 70 - 110 mg/dL    BUN, Bld 10 6 - 20 mg/dL    Creatinine 1.2 0.5 - 1.4 mg/dL    Calcium 8.2 (L) 8.7 - 10.5 mg/dL    Total Protein 5.9 (L) 6.0 - 8.4 g/dL    Albumin 3.0 (L) 3.5 - 5.2 g/dL    Total Bilirubin 1.1 (H) 0.1 - 1.0 mg/dL    Alkaline Phosphatase 132 55 - 135 U/L    AST 74 (H) 10 - 40 U/L     (H) 10 - 44 U/L    Anion Gap 12 8 - 16 mmol/L    eGFR if African American >60.0 >60 mL/min/1.73 m^2    eGFR if non African American >60.0 >60 mL/min/1.73 m^2   Troponin I   Result Value Ref Range    Troponin I 0.010 0.000 - 0.026 ng/mL   Lipase   Result Value Ref Range    Lipase 18 4 - 60 U/L           Imaging Results:  Imaging Results          X-Ray Abdomen Flat And Erect (In process)  Result time 04/21/20 13:57:46               X-Ray Chest AP Portable (Final result)  Result time 04/21/20 11:58:17    Final result by Srinivasa Dubose MD (04/21/20 11:58:17)                 Impression:      No acute process seen.      Electronically signed by: Srinivasa Dubose MD  Date:    04/21/2020  Time:    11:58             Narrative:    EXAMINATION:  XR CHEST AP PORTABLE    CLINICAL HISTORY:  epigastric pain;    FINDINGS:  Single view of the chest.  Comparison 03/30/2019    Cardiac silhouette is normal.  Aorta demonstrates atherosclerotic disease. The lungs demonstrate no evidence of active disease.  No evidence of pleural effusion or pneumothorax.  Bones demonstrate mild degenerative changes.                                   The Emergency Provider reviewed the vital signs and test results, which are outlined above.    ED  Discussions:  1:58 PM: Reassessed pt at this time.  Pt states his condition has improved at this time. Discussed with pt all pertinent ED information and results. Discussed pt dx of Epigastric pain and plan of tx. Gave pt all f/u and return to the ED instructions. All questions and concerns were addressed at this time. Pt expresses understanding of information and instructions, and is comfortable with plan to discharge. Pt is stable for discharge.                                Clinical Impression:       ICD-10-CM ICD-9-CM   1. Epigastric pain R10.13 789.06               ED Disposition Condition    Discharge Stable        ED Prescriptions     Medication Sig Dispense Start Date End Date Auth. Provider    HYDROcodone-acetaminophen (NORCO) 5-325 mg per tablet Take 1 tablet by mouth every 4 (four) hours as needed for Pain. 11 tablet 4/21/2020 4/26/2020 Job Gould MD    promethazine (PHENERGAN) 25 MG tablet Take 1 tablet (25 mg total) by mouth every 6 (six) hours as needed for Nausea. 15 tablet 4/21/2020  Job Gould MD        Follow-up Information     Follow up With Specialties Details Why Contact Info    Cadence Rodriguez MD Family Medicine   [address] [address] [address]  [phone]                                       Job Gould MD  04/21/20 6582

## 2020-05-13 RX ORDER — INSULIN LISPRO 100 [IU]/ML
INJECTION, SOLUTION INTRAVENOUS; SUBCUTANEOUS
Qty: 10 ML | Refills: 0 | Status: SHIPPED | OUTPATIENT
Start: 2020-05-13 | End: 2020-06-15

## 2020-05-21 ENCOUNTER — TELEPHONE (OUTPATIENT)
Dept: INTERNAL MEDICINE | Facility: CLINIC | Age: 55
End: 2020-05-21

## 2020-06-22 NOTE — ASSESSMENT & PLAN NOTE
51 y/o male with KOREY:         * KOREY (acute kidney injury)     Renal: KOREY: unsure what causes the KOREY, but pt was severely nauseated on presentation  Prerenal azotemia due to dehydration is the possible etiology  Will send urine Na and Cr to calculate FENa  K normal  s Cr has now improved  KOREY resolving  Hemodynamically stable             Alcohol abuse     Despite having a h/o of chronic pancreatitis, pt uses alcohol in clusters  Advised pt to avoid alcohol all together  Amylase and lipase are normal, likely because pt has chronic pancreatitis, not acute          Plans and recommendations:  As discussed above  Agree with current mgmt  Continue IV hydration with NS  Total time spent 70 minutes including time needed to review the records, the   patient evaluation, documentation, face-to-face discussion with the patient,   more than 50% of the time was spent on coordination of care and counseling.    Level V visit.'     Patient Education        Sore Throat: Care Instructions  Your Care Instructions     Infection by bacteria or a virus causes most sore throats. Cigarette smoke, dry air, air pollution, allergies, and yelling can also cause a sore throat. Sore throats can be painful and annoying. Fortunately, most sore throats go away on their own. If you have a bacterial infection, your doctor may prescribe antibiotics. Follow-up care is a key part of your treatment and safety. Be sure to make and go to all appointments, and call your doctor if you are having problems. It's also a good idea to know your test results and keep a list of the medicines you take. How can you care for yourself at home? · If your doctor prescribed antibiotics, take them as directed. Do not stop taking them just because you feel better. You need to take the full course of antibiotics. · Gargle with warm salt water once an hour to help reduce swelling and relieve discomfort. Use 1 teaspoon of salt mixed in 1 cup of warm water. · Take an over-the-counter pain medicine, such as acetaminophen (Tylenol), ibuprofen (Advil, Motrin), or naproxen (Aleve). Read and follow all instructions on the label. · Be careful when taking over-the-counter cold or flu medicines and Tylenol at the same time. Many of these medicines have acetaminophen, which is Tylenol. Read the labels to make sure that you are not taking more than the recommended dose. Too much acetaminophen (Tylenol) can be harmful. · Drink plenty of fluids. Fluids may help soothe an irritated throat. Hot fluids, such as tea or soup, may help decrease throat pain. · Use over-the-counter throat lozenges to soothe pain. Regular cough drops or hard candy may also help. These should not be given to young children because of the risk of choking. · Do not smoke or allow others to smoke around you. If you need help quitting, talk to your doctor about stop-smoking programs and medicines.  These can increase your chances of quitting for good. · Use a vaporizer or humidifier to add moisture to your bedroom. Follow the directions for cleaning the machine. When should you call for help? Call your doctor now or seek immediate medical care if:  · You have new or worse trouble swallowing. · Your sore throat gets much worse on one side. Watch closely for changes in your health, and be sure to contact your doctor if you do not get better as expected. Where can you learn more? Go to http://diane-tyler.info/  Enter U420 in the search box to learn more about \"Sore Throat: Care Instructions. \"  Current as of: July 29, 2019               Content Version: 12.5  © 4997-7977 [employer]. Care instructions adapted under license by [employer] (which disclaims liability or warranty for this information). If you have questions about a medical condition or this instruction, always ask your healthcare professional. [address] any warranty or liability for your use of this information.

## 2020-09-15 ENCOUNTER — PATIENT OUTREACH (OUTPATIENT)
Dept: ADMINISTRATIVE | Facility: HOSPITAL | Age: 55
End: 2020-09-15

## 2020-09-15 NOTE — PROGRESS NOTES
Patient due for f/u and labs. Called to assist with scheduling, no answer. Unable to leave message.

## 2020-09-28 ENCOUNTER — TELEPHONE (OUTPATIENT)
Dept: INTERNAL MEDICINE | Facility: CLINIC | Age: 55
End: 2020-09-28

## 2020-09-28 NOTE — TELEPHONE ENCOUNTER
----- Message from Estefani Dinero sent at 9/28/2020 10:30 AM CDT -----  Type:  Sooner Apoointment Request    Caller is requesting a sooner appointment.  Caller declined first available appointment listed below.  Caller will not accept being placed on the waitlist and is requesting a message be sent to doctor.  Name of Caller:wife/Lucia  When is the first available appointment?11/10  Symptoms:finger/foot  Would the patient rather a call back or a response via [facility]? Call back  Best Call Back Number:[phone]  Additional Information: na

## 2020-09-29 ENCOUNTER — LAB VISIT (OUTPATIENT)
Dept: LAB | Facility: HOSPITAL | Age: 55
End: 2020-09-29
Attending: NURSE PRACTITIONER
Payer: MEDICAID

## 2020-09-29 ENCOUNTER — OFFICE VISIT (OUTPATIENT)
Dept: INTERNAL MEDICINE | Facility: CLINIC | Age: 55
End: 2020-09-29
Payer: MEDICAID

## 2020-09-29 VITALS
SYSTOLIC BLOOD PRESSURE: 130 MMHG | BODY MASS INDEX: 33.2 KG/M2 | OXYGEN SATURATION: 95 % | DIASTOLIC BLOOD PRESSURE: 84 MMHG | WEIGHT: 206.56 LBS | TEMPERATURE: 99 F | HEIGHT: 66 IN | HEART RATE: 78 BPM

## 2020-09-29 DIAGNOSIS — Z79.4 TYPE 2 DIABETES MELLITUS WITH HYPERGLYCEMIA, WITH LONG-TERM CURRENT USE OF INSULIN: ICD-10-CM

## 2020-09-29 DIAGNOSIS — M17.0 PRIMARY OSTEOARTHRITIS OF BOTH KNEES: ICD-10-CM

## 2020-09-29 DIAGNOSIS — E78.5 HYPERLIPIDEMIA, UNSPECIFIED HYPERLIPIDEMIA TYPE: ICD-10-CM

## 2020-09-29 DIAGNOSIS — N48.9 PENILE LESION: ICD-10-CM

## 2020-09-29 DIAGNOSIS — E11.59 HYPERTENSION ASSOCIATED WITH DIABETES: ICD-10-CM

## 2020-09-29 DIAGNOSIS — E11.65 TYPE 2 DIABETES MELLITUS WITH HYPERGLYCEMIA, WITH LONG-TERM CURRENT USE OF INSULIN: ICD-10-CM

## 2020-09-29 DIAGNOSIS — N50.89 PAIN OF MALE GENITALIA: ICD-10-CM

## 2020-09-29 DIAGNOSIS — L03.012 PARONYCHIA OF LEFT LITTLE FINGER: Primary | ICD-10-CM

## 2020-09-29 DIAGNOSIS — I15.2 HYPERTENSION ASSOCIATED WITH DIABETES: ICD-10-CM

## 2020-09-29 PROCEDURE — 36415 COLL VENOUS BLD VENIPUNCTURE: CPT | Mod: PO

## 2020-09-29 PROCEDURE — 80061 LIPID PANEL: CPT

## 2020-09-29 PROCEDURE — 99999 PR PBB SHADOW E&M-EST. PATIENT-LVL V: ICD-10-PCS | Mod: PBBFAC,,, | Performed by: NURSE PRACTITIONER

## 2020-09-29 PROCEDURE — 99214 PR OFFICE/OUTPT VISIT, EST, LEVL IV, 30-39 MIN: ICD-10-PCS | Mod: S$PBB,,, | Performed by: NURSE PRACTITIONER

## 2020-09-29 PROCEDURE — 83036 HEMOGLOBIN GLYCOSYLATED A1C: CPT

## 2020-09-29 PROCEDURE — 99214 OFFICE O/P EST MOD 30 MIN: CPT | Mod: S$PBB,,, | Performed by: NURSE PRACTITIONER

## 2020-09-29 PROCEDURE — 82043 UR ALBUMIN QUANTITATIVE: CPT

## 2020-09-29 PROCEDURE — 99999 PR PBB SHADOW E&M-EST. PATIENT-LVL V: CPT | Mod: PBBFAC,,, | Performed by: NURSE PRACTITIONER

## 2020-09-29 PROCEDURE — 99215 OFFICE O/P EST HI 40 MIN: CPT | Mod: PBBFAC,PO | Performed by: NURSE PRACTITIONER

## 2020-09-29 RX ORDER — MUPIROCIN 20 MG/G
OINTMENT TOPICAL 3 TIMES DAILY
Qty: 22 G | Refills: 0 | Status: SHIPPED | OUTPATIENT
Start: 2020-09-29

## 2020-09-29 RX ORDER — MELOXICAM 15 MG/1
15 TABLET ORAL DAILY
Qty: 30 TABLET | Refills: 0 | Status: SHIPPED | OUTPATIENT
Start: 2020-09-29 | End: 2021-07-13

## 2020-09-29 RX ORDER — CEPHALEXIN 500 MG/1
500 CAPSULE ORAL EVERY 12 HOURS
Qty: 14 CAPSULE | Refills: 0 | Status: SHIPPED | OUTPATIENT
Start: 2020-09-29 | End: 2020-10-06

## 2020-09-29 RX ORDER — PEN NEEDLE, DIABETIC 32GX 5/32"
NEEDLE, DISPOSABLE MISCELLANEOUS
COMMUNITY
Start: 2020-07-11 | End: 2021-05-21

## 2020-09-29 NOTE — PROGRESS NOTES
Subjective:       Patient ID: Jorgito Arreaga is a 55 y.o. male.    Chief Complaint: Hand Pain (finger) and Knee Pain    Mr. Arreaga presents to clinic with concern for infected L pinky finger that has been going on for approx one week. He tried to drain it himself at home, but swelling and pain is still present. He is also complaining of bilateral knee pain that has been going on for approx one week. He has hx of chronic knee pain from arthritis. Has had injections in past.     He would also like referral to urology for further investigation of recurrent itchy rash to penis. He deferred exam today, and would prefer to wait until he sees urology. He was previously treated for balanoposthitis by Dr hernández with betamethasone and clobetisol.     Hand Pain   The incident occurred more than 1 week ago. The incident occurred at work. There was no injury mechanism. The pain is present in the left fingers. The quality of the pain is described as burning and stabbing. The pain does not radiate. The pain is moderate. The pain has been fluctuating since the incident. Associated symptoms include numbness (bilateral peripheral neuropathy). Pertinent negatives include no chest pain, muscle weakness or tingling. The treatment provided mild relief.   Knee Pain   The incident occurred more than 1 week ago. The incident occurred at work. There was no injury mechanism. The pain is present in the left knee and right knee. The quality of the pain is described as aching. The pain has been intermittent since onset. Associated symptoms include a loss of motion and numbness (bilateral peripheral neuropathy). Pertinent negatives include no inability to bear weight, loss of sensation, muscle weakness or tingling. He reports no foreign bodies present. The symptoms are aggravated by movement, palpation and weight bearing. The treatment provided mild relief.       Patient Active Problem List   Diagnosis    Alcohol-induced acute pancreatitis     Gastroesophageal reflux disease    Alcohol abuse    Hypertension associated with diabetes    Abdominal pain    Acute renal failure    Hyperkalemia    Edema    Hyperglycemia due to type 1 diabetes mellitus    Recurrent pancreatitis    Polyp of colon    Screen for colon cancer    Delayed immunizations    Uncontrolled secondary diabetes with peripheral neuropathy    Primary osteoarthritis of both knees    Urinary frequency    Encounter for long-term (current) use of medications    Melena    History of colon polyps    GERD (gastroesophageal reflux disease)    Benign neoplasm of transverse colon    Renal dysfunction    Elevated lipase    Transaminitis    Metabolic acidosis    Hyperlipidemia    Essential hypertension    Hypertriglyceridemia       Family History   Problem Relation Age of Onset    Hypertension Mother     Hypertension Father      Past Surgical History:   Procedure Laterality Date    COLONOSCOPY      COLONOSCOPY N/A 6/15/2018    Procedure: COLONOSCOPY;  Surgeon: Jim Hicks MD;  Location: [facility];  Service: Endoscopy;  Laterality: N/A;    ESOPHAGOGASTRODUODENOSCOPY N/A 6/15/2018    Procedure: ESOPHAGOGASTRODUODENOSCOPY (EGD);  Surgeon: Jim Hicks MD;  Location: [facility];  Service: Endoscopy;  Laterality: N/A;         Current Outpatient Medications:     aspirin 325 MG tablet, Take 1 tablet (325 mg total) by mouth once daily., Disp: 180 tablet, Rfl: 1    atorvastatin (LIPITOR) 40 MG tablet, Take 1 tablet (40 mg total) by mouth once daily., Disp: 90 tablet, Rfl: 4    blood sugar diagnostic (ONETOUCH ULTRA TEST) Strp, USE TO CHECK SUGAR BEFORE MEALS AND AT BEDTIME, Disp: 200 strip, Rfl: 1    clotrimazole-betamethasone 1-0.05% (LOTRISONE) cream, Apply topically 2 (two) times daily., Disp: 15 g, Rfl: 0    furosemide (LASIX) 20 MG tablet, Prn for swelling., Disp: 90 tablet, Rfl: 0    HUMALOG U-100 INSULIN 100 unit/mL injection, INJECT 8 UNITS INTO THE SKIN WITH  "LARGEST MEAL OF THE DAY. MAY INJECT 4 MORE UNITS AT A TIME, IF GLUCOSE IS ELEVATED, Disp: 10 mL, Rfl: 0    insulin (LANTUS SOLOSTAR U-100 INSULIN) glargine 100 units/mL (3mL) SubQ pen, INJECT 50 UNITS INTO SKIN EVERY EVENING., Disp: 30 mL, Rfl: 5    insulin syringe-needle,dispos. 1 mL 28 gauge x 1/2" Syrg, 1 each by Misc.(Non-Drug; Combo Route) route 2 (two) times daily., Disp: 60 Syringe, Rfl: 0    losartan (COZAAR) 100 MG tablet, Take 1 tablet (100 mg total) by mouth once daily., Disp: 90 tablet, Rfl: 4    ONETOUCH DELICA LANCETS 33 gauge Misc, USE 1  TO CHECK GLUCOSE 4 TIMES DAILY BEFORE  MEALS  AND  NIGHTLY, Disp: 100 each, Rfl: 11    ONETOUCH ULTRA BLUE TEST STRIP Strp, USE TO CHECK SUGAR BEFORE MEALS AND AT BEDTIME, Disp: 100 strip, Rfl: 11    potassium, sodium phosphates (PHOS-NAK) 280-160-250 mg PwPk, Take 1 packet by mouth 4 (four) times daily before meals and nightly., Disp: , Rfl: 0    verapamil (CALAN-SR) 240 MG CR tablet, Take 1 tablet (240 mg total) by mouth once daily., Disp: 90 tablet, Rfl: 4    BD JOEL 2ND GEN PEN NEEDLE 32 gauge x 5/32" Ndle, USE 1 ONCE NIGHTLY, Disp: , Rfl:     meloxicam (MOBIC) 15 MG tablet, Take 1 tablet (15 mg total) by mouth once daily., Disp: 30 tablet, Rfl: 0    mupirocin (BACTROBAN) 2 % ointment, Apply topically 3 (three) times daily., Disp: 22 g, Rfl: 0    Review of Systems   Constitutional: Negative for activity change, appetite change, chills, fatigue and fever.   Eyes: Negative for photophobia and visual disturbance.   Respiratory: Negative for cough and shortness of breath.    Cardiovascular: Negative for chest pain and palpitations.   Endocrine: Positive for polydipsia and polyuria. Negative for polyphagia.   Genitourinary: Negative for dysuria, frequency, hematuria and urgency.   Musculoskeletal: Positive for arthralgias. Negative for back pain, gait problem and myalgias.   Skin: Positive for wound.   Neurological: Positive for numbness (bilateral " "peripheral neuropathy). Negative for dizziness, tingling, tremors, syncope, light-headedness and headaches.       Objective:   /84   Pulse 78   Temp 99 °F (37.2 °C) (Temporal)   Ht 5' 6" (1.676 m)   Wt 93.7 kg (206 lb 9.1 oz)   SpO2 95%   BMI 33.34 kg/m²      Physical Exam  Constitutional:       General: He is not in acute distress.     Appearance: Normal appearance. He is not ill-appearing.   HENT:      Head: Normocephalic and atraumatic.   Cardiovascular:      Rate and Rhythm: Normal rate and regular rhythm.      Pulses: Normal pulses.      Heart sounds: Normal heart sounds. No murmur. No friction rub. No gallop.    Pulmonary:      Effort: Pulmonary effort is normal. No respiratory distress.      Breath sounds: Normal breath sounds.   Musculoskeletal: Normal range of motion.      Right lower leg: No edema.      Left lower leg: No edema.   Skin:     General: Skin is warm and dry.      Coloration: Skin is not pale.      Findings: No erythema.   Neurological:      General: No focal deficit present.      Mental Status: He is alert and oriented to person, place, and time.         Assessment & Plan     Problem List Items Addressed This Visit        Cardiac/Vascular    Hypertension associated with diabetes    Current Assessment & Plan     BP well controlled. Continue current medications.          Relevant Orders    Hemoglobin A1C (Completed)    Hyperlipidemia    Relevant Orders    Lipid Panel (Completed)       Endocrine    Uncontrolled secondary diabetes with peripheral neuropathy    Current Assessment & Plan     Rechecking A1C today. Will assess if it is appropriate for pt to get knee injections.          Relevant Orders    Hemoglobin A1C (Completed)       Orthopedic    Primary osteoarthritis of both knees    Current Assessment & Plan     Discussed option for knee injections in past. Previous xrays reviewed.          Relevant Medications    meloxicam (MOBIC) 15 MG tablet      Other Visit Diagnoses     " "Paronychia of left little finger    -  Primary    Relevant Medications    mupirocin (BACTROBAN) 2 % ointment    Type 2 diabetes mellitus with hyperglycemia, with long-term current use of insulin        Relevant Orders    Hemoglobin A1C (Completed)    MICROALBUMIN / CREATININE RATIO URINE (Completed)    Penile lesion        Relevant Orders    Ambulatory referral/consult to Urology    Pain of male genitalia        Relevant Orders    Ambulatory referral/consult to Urology           Follow up in about 1 week (around 10/6/2020) for knee injections if A1C is within acceptable range..          Portions of this note may have been created with voice recognition software. Occasional "wrong-word" or "sound-a-like" substitutions may have occurred due to the inherent limitations of voice recognition software. Please, read the note carefully and recognize, using context, where substitutions have occurred.       "

## 2020-09-30 LAB
ALBUMIN/CREAT UR: 36.5 UG/MG (ref 0–30)
CHOLEST SERPL-MCNC: 162 MG/DL (ref 120–199)
CHOLEST/HDLC SERPL: 1.9 {RATIO} (ref 2–5)
CREAT UR-MCNC: 96 MG/DL (ref 23–375)
ESTIMATED AVG GLUCOSE: 177 MG/DL (ref 68–131)
HBA1C MFR BLD HPLC: 7.8 % (ref 4–5.6)
HDLC SERPL-MCNC: 84 MG/DL (ref 40–75)
HDLC SERPL: 51.9 % (ref 20–50)
LDLC SERPL CALC-MCNC: 57.6 MG/DL (ref 63–159)
MICROALBUMIN UR DL<=1MG/L-MCNC: 35 UG/ML
NONHDLC SERPL-MCNC: 78 MG/DL
TRIGL SERPL-MCNC: 102 MG/DL (ref 30–150)

## 2020-11-06 ENCOUNTER — TELEPHONE (OUTPATIENT)
Dept: INTERNAL MEDICINE | Facility: CLINIC | Age: 55
End: 2020-11-06

## 2020-11-06 NOTE — TELEPHONE ENCOUNTER
----- Message from Rafia Bai sent at 11/6/2020  4:19 PM CST -----  Type:  Sooner Apoointment Request    Caller is requesting a sooner appointment.  Caller declined first available appointment listed below.  Caller will not accept being placed on the waitlist and is requesting a message be sent to doctor.  Name of Caller: Pt wife (Lucia)  When is the first available appointment?  Pt has Medicaid   Symptoms:  Pt needing injection in knee  Would the patient rather a call back or a response via [facility]?   Call back   Best Call Back Number:  [phone]  Additional Information:  Pt would like to know if possible to have an appt on Monday afternoon 11/9/20 after 3:00pm//please call//angelia/isabella

## 2020-11-10 ENCOUNTER — OFFICE VISIT (OUTPATIENT)
Dept: INTERNAL MEDICINE | Facility: CLINIC | Age: 55
End: 2020-11-10
Payer: MEDICAID

## 2020-11-10 VITALS
WEIGHT: 188.5 LBS | DIASTOLIC BLOOD PRESSURE: 82 MMHG | TEMPERATURE: 99 F | BODY MASS INDEX: 30.29 KG/M2 | HEART RATE: 76 BPM | HEIGHT: 66 IN | OXYGEN SATURATION: 96 % | SYSTOLIC BLOOD PRESSURE: 140 MMHG

## 2020-11-10 DIAGNOSIS — M17.0 PRIMARY OSTEOARTHRITIS OF BOTH KNEES: Primary | ICD-10-CM

## 2020-11-10 DIAGNOSIS — I10 ESSENTIAL HYPERTENSION: ICD-10-CM

## 2020-11-10 PROCEDURE — 20610 LARGE JOINT ASPIRATION/INJECTION: BILATERAL KNEE: ICD-10-PCS | Mod: 50,S$PBB,, | Performed by: NURSE PRACTITIONER

## 2020-11-10 PROCEDURE — 99215 OFFICE O/P EST HI 40 MIN: CPT | Mod: PBBFAC,PO,25 | Performed by: NURSE PRACTITIONER

## 2020-11-10 PROCEDURE — 99214 PR OFFICE/OUTPT VISIT, EST, LEVL IV, 30-39 MIN: ICD-10-PCS | Mod: S$PBB,25,, | Performed by: NURSE PRACTITIONER

## 2020-11-10 PROCEDURE — 99999 PR PBB SHADOW E&M-EST. PATIENT-LVL V: CPT | Mod: PBBFAC,,, | Performed by: NURSE PRACTITIONER

## 2020-11-10 PROCEDURE — 99214 OFFICE O/P EST MOD 30 MIN: CPT | Mod: S$PBB,25,, | Performed by: NURSE PRACTITIONER

## 2020-11-10 PROCEDURE — 99999 PR PBB SHADOW E&M-EST. PATIENT-LVL V: ICD-10-PCS | Mod: PBBFAC,,, | Performed by: NURSE PRACTITIONER

## 2020-11-10 PROCEDURE — 20610 DRAIN/INJ JOINT/BURSA W/O US: CPT | Mod: 50,S$PBB,, | Performed by: NURSE PRACTITIONER

## 2020-11-10 PROCEDURE — 20610 DRAIN/INJ JOINT/BURSA W/O US: CPT | Mod: 50,PBBFAC,PO | Performed by: NURSE PRACTITIONER

## 2020-11-10 PROCEDURE — 90686 IIV4 VACC NO PRSV 0.5 ML IM: CPT | Mod: PBBFAC,PO

## 2020-11-10 RX ADMIN — METHYLPREDNISOLONE ACETATE 40 MG: 80 INJECTION, SUSPENSION INTRALESIONAL; INTRAMUSCULAR; INTRASYNOVIAL; SOFT TISSUE at 04:11

## 2020-11-10 NOTE — PROCEDURES
Large Joint Aspiration/Injection: bilateral knee    Date/Time: 11/10/2020 3:40 PM  Performed by: Kirsten Queen NP  Authorized by: Kirsten Queen NP     Consent Done?:  Yes (Verbal)  Indications:  Pain and arthritis  Site marked: the procedure site was marked    Timeout: prior to procedure the correct patient, procedure, and site was verified      Local anesthesia used?: Yes    Anesthesia:  Local infiltration  Local anesthetic:  Lidocaine 1% without epinephrine  Anesthetic total (ml):  1      Details:  Needle Size:  25 G  Approach:  Anteromedial  Location:  Knee  Laterality:  Bilateral  Site:  Bilateral knee  Medications (Right) comment:  SEE MAR  Medications (Left) comment:  SEE MAR  Patient tolerance:  Patient tolerated the procedure well with no immediate complications

## 2020-11-10 NOTE — PATIENT INSTRUCTIONS
Osteoarthritis: Injections and Surgery     Talk with your healthcare provider about your treatment options.     Injections or surgery may help if you have pain or movement problems that severely limit your activities. Your healthcare provider can tell you more about these treatment choices and their risks and complications.  Injections  Medicine can be injected directly into the affected joint. These shots take a few minutes and are done in your healthcare providers office:  · Corticosteroid or steroid injections may ease swelling and pain. The medicine is injected into the joint--for example, the knee or hip. Steroid injections do have risks, so healthcare providers limit the number of injections used in any one joint.   · Lubricant supplementation injections use hyaluronic acid, a substance similar to one found naturally in the joint. It may help the joint work more smoothly. These injections are only for osteoarthritis in the knees.  Surgery  Choices for surgery include:  · Arthroscopy. The surgeon looks at and works inside the joint using special instruments put through very small incisions. The cartilage is smoothed. Any pieces of cartilage that have broken off are removed.  · Total joint replacement. The entire joint is taken out and replaced with a manmade joint using metal, ceramic, and/or plastic. This is most often done with the knee or hip joint.  · Other surgery. There are other surgical procedures specific to certain joints. For example, joint resurfacing may be done on the hip joint.  Date Last Reviewed: 2/14/2016 © 2000-2017 [employer]. [address] [place] [address], [place] [address]. All rights reserved. This information is not intended as a substitute for professional medical care. Always follow your healthcare professional's instructions.

## 2020-11-10 NOTE — PROGRESS NOTES
Subjective:       Patient ID: Jorgito Arreaga is a 55 y.o. male.    Chief Complaint: Knee Pain    Mr Arreaga presents to clinic with complaints of bilateral knee pain and for bilateral knee injections. Previous x rays show significant arthritis bilaterally. Has had knee injections in the past which helped with pain. He is currently working long hours at the local [employer], and is on his feet 12-14 hours a day.     Knee Pain   The incident occurred more than 1 week ago. There was no injury mechanism. The pain is present in the left knee and right knee. The quality of the pain is described as aching. The pain is at a severity of 10/10. The pain is severe. The pain has been fluctuating since onset. Pertinent negatives include no inability to bear weight, loss of motion, loss of sensation, muscle weakness, numbness or tingling. He reports no foreign bodies present. The symptoms are aggravated by weight bearing, movement and palpation. He has tried ice, heat, NSAIDs, rest and acetaminophen for the symptoms. The treatment provided mild relief.       Patient Active Problem List   Diagnosis    Alcohol-induced acute pancreatitis    Gastroesophageal reflux disease    Alcohol abuse    Hypertension associated with diabetes    Abdominal pain    Acute renal failure    Hyperkalemia    Edema    Hyperglycemia due to type 1 diabetes mellitus    Recurrent pancreatitis    Polyp of colon    Screen for colon cancer    Delayed immunizations    Uncontrolled secondary diabetes with peripheral neuropathy    Primary osteoarthritis of both knees    Urinary frequency    Encounter for long-term (current) use of medications    Melena    History of colon polyps    GERD (gastroesophageal reflux disease)    Benign neoplasm of transverse colon    Renal dysfunction    Elevated lipase    Transaminitis    Metabolic acidosis    Hyperlipidemia    Essential hypertension    Hypertriglyceridemia       Family History   Problem  "Relation Age of Onset    Hypertension Mother     Hypertension Father      Past Surgical History:   Procedure Laterality Date    COLONOSCOPY      COLONOSCOPY N/A 6/15/2018    Procedure: COLONOSCOPY;  Surgeon: Jim Hicks MD;  Location: [facility];  Service: Endoscopy;  Laterality: N/A;    ESOPHAGOGASTRODUODENOSCOPY N/A 6/15/2018    Procedure: ESOPHAGOGASTRODUODENOSCOPY (EGD);  Surgeon: Jim Hicks MD;  Location: [facility];  Service: Endoscopy;  Laterality: N/A;         Current Outpatient Medications:     aspirin 325 MG tablet, Take 1 tablet (325 mg total) by mouth once daily., Disp: 180 tablet, Rfl: 1    atorvastatin (LIPITOR) 40 MG tablet, Take 1 tablet (40 mg total) by mouth once daily., Disp: 90 tablet, Rfl: 4    BD JOEL 2ND GEN PEN NEEDLE 32 gauge x 5/32" Ndle, USE 1 ONCE NIGHTLY, Disp: , Rfl:     blood sugar diagnostic (ONETOUCH ULTRA TEST) Strp, USE TO CHECK SUGAR BEFORE MEALS AND AT BEDTIME, Disp: 200 strip, Rfl: 1    clotrimazole-betamethasone 1-0.05% (LOTRISONE) cream, Apply topically 2 (two) times daily., Disp: 15 g, Rfl: 0    furosemide (LASIX) 20 MG tablet, Prn for swelling., Disp: 90 tablet, Rfl: 0    HUMALOG U-100 INSULIN 100 unit/mL injection, INJECT 8 UNITS INTO THE SKIN WITH LARGEST MEAL OF THE DAY. MAY INJECT 4 MORE UNITS AT A TIME, IF GLUCOSE IS ELEVATED, Disp: 10 mL, Rfl: 0    insulin (LANTUS SOLOSTAR U-100 INSULIN) glargine 100 units/mL (3mL) SubQ pen, INJECT 50 UNITS INTO SKIN EVERY EVENING., Disp: 30 mL, Rfl: 5    insulin syringe-needle,dispos. 1 mL 28 gauge x 1/2" Syrg, 1 each by Misc.(Non-Drug; Combo Route) route 2 (two) times daily., Disp: 60 Syringe, Rfl: 0    losartan (COZAAR) 100 MG tablet, Take 1 tablet (100 mg total) by mouth once daily., Disp: 90 tablet, Rfl: 4    meloxicam (MOBIC) 15 MG tablet, Take 1 tablet (15 mg total) by mouth once daily., Disp: 30 tablet, Rfl: 0    mupirocin (BACTROBAN) 2 % ointment, Apply topically 3 (three) times daily., Disp: 22 g, Rfl: " "0    ONETOUCH DELICA LANCETS 33 gauge Misc, USE 1  TO CHECK GLUCOSE 4 TIMES DAILY BEFORE  MEALS  AND  NIGHTLY, Disp: 100 each, Rfl: 11    ONETOUCH ULTRA BLUE TEST STRIP Strp, USE TO CHECK SUGAR BEFORE MEALS AND AT BEDTIME, Disp: 100 strip, Rfl: 11    potassium, sodium phosphates (PHOS-NAK) 280-160-250 mg PwPk, Take 1 packet by mouth 4 (four) times daily before meals and nightly., Disp: , Rfl: 0    verapamil (CALAN-SR) 240 MG CR tablet, Take 1 tablet (240 mg total) by mouth once daily., Disp: 90 tablet, Rfl: 4    Review of Systems   Constitutional: Negative for chills, fatigue and fever.   Respiratory: Negative for shortness of breath.    Cardiovascular: Negative for chest pain and palpitations.   Musculoskeletal: Positive for arthralgias and joint swelling (intermittent).   Neurological: Negative for dizziness, tingling, light-headedness, numbness and headaches.       Objective:   BP (!) 140/82 (BP Location: Left arm, Patient Position: Sitting, BP Method: Medium (Manual))   Pulse 76   Temp 98.6 °F (37 °C) (Temporal)   Ht 5' 6" (1.676 m)   Wt 85.5 kg (188 lb 7.9 oz)   SpO2 96%   BMI 30.42 kg/m²      Physical Exam  Constitutional:       General: He is not in acute distress.     Appearance: Normal appearance. He is not ill-appearing.   HENT:      Head: Normocephalic and atraumatic.   Cardiovascular:      Rate and Rhythm: Normal rate.   Pulmonary:      Effort: Pulmonary effort is normal. No respiratory distress.   Musculoskeletal:      Right knee: He exhibits decreased range of motion. He exhibits no swelling and no effusion. Tenderness found. Medial joint line and lateral joint line tenderness noted.      Left knee: He exhibits decreased range of motion. He exhibits no swelling and no effusion. Tenderness found. Medial joint line and lateral joint line tenderness noted.   Skin:     General: Skin is warm and dry.      Coloration: Skin is not pale.      Findings: No erythema.   Neurological:      General: No " "focal deficit present.      Mental Status: He is alert and oriented to person, place, and time.         Assessment & Plan     Problem List Items Addressed This Visit        Cardiac/Vascular    Essential hypertension    Current Assessment & Plan     BP elevated today. May be contributed to pain in knees. Will have pt follow up in two weeks for nurse visit.             Orthopedic    Primary osteoarthritis of both knees - Primary    Relevant Orders    Large Joint Aspiration/Injection: bilateral knee (Completed)           Follow up if symptoms worsen or fail to improve.          Portions of this note may have been created with voice recognition software. Occasional "wrong-word" or "sound-a-like" substitutions may have occurred due to the inherent limitations of voice recognition software. Please, read the note carefully and recognize, using context, where substitutions have occurred.       "

## 2020-11-13 RX ORDER — METHYLPREDNISOLONE ACETATE 80 MG/ML
40 INJECTION, SUSPENSION INTRA-ARTICULAR; INTRALESIONAL; INTRAMUSCULAR; SOFT TISSUE
Status: COMPLETED | OUTPATIENT
Start: 2020-11-10 | End: 2020-11-10

## 2020-11-13 RX ORDER — METHYLPREDNISOLONE ACETATE 80 MG/ML
40 INJECTION, SUSPENSION INTRA-ARTICULAR; INTRALESIONAL; INTRAMUSCULAR; SOFT TISSUE
Status: DISCONTINUED | OUTPATIENT
Start: 2020-11-13 | End: 2020-11-13

## 2020-11-17 NOTE — ASSESSMENT & PLAN NOTE
BP elevated today. May be contributed to pain in knees. Will have pt follow up in two weeks for nurse visit.

## 2020-11-23 DIAGNOSIS — Z79.4 TYPE 2 DIABETES MELLITUS WITH HYPERGLYCEMIA, WITH LONG-TERM CURRENT USE OF INSULIN: ICD-10-CM

## 2020-11-23 DIAGNOSIS — E11.65 TYPE 2 DIABETES MELLITUS WITH HYPERGLYCEMIA, WITH LONG-TERM CURRENT USE OF INSULIN: ICD-10-CM

## 2020-11-24 RX ORDER — LANCETS 33 GAUGE
EACH MISCELLANEOUS
Qty: 100 EACH | Refills: 0 | OUTPATIENT
Start: 2020-11-24

## 2020-11-25 RX ORDER — INSULIN LISPRO 100 [IU]/ML
INJECTION, SOLUTION INTRAVENOUS; SUBCUTANEOUS
Qty: 10 ML | Refills: 0 | Status: SHIPPED | OUTPATIENT
Start: 2020-11-25 | End: 2021-02-01

## 2021-01-14 RX ORDER — LANCETS 33 GAUGE
EACH MISCELLANEOUS
Qty: 100 EACH | Refills: 0 | OUTPATIENT
Start: 2021-01-14

## 2021-05-17 DIAGNOSIS — I15.2 HYPERTENSION ASSOCIATED WITH DIABETES: ICD-10-CM

## 2021-05-17 DIAGNOSIS — E11.59 HYPERTENSION ASSOCIATED WITH DIABETES: ICD-10-CM

## 2021-05-19 RX ORDER — ASPIRIN 325 MG
325 TABLET ORAL DAILY
Qty: 90 TABLET | Refills: 1 | OUTPATIENT
Start: 2021-05-19

## 2021-05-21 RX ORDER — LANCETS 33 GAUGE
EACH MISCELLANEOUS
Qty: 100 EACH | Refills: 0 | Status: SHIPPED | OUTPATIENT
Start: 2021-05-21 | End: 2021-09-17

## 2021-06-01 DIAGNOSIS — E11.65 TYPE 2 DIABETES MELLITUS WITH HYPERGLYCEMIA, WITH LONG-TERM CURRENT USE OF INSULIN: ICD-10-CM

## 2021-06-01 DIAGNOSIS — Z79.4 TYPE 2 DIABETES MELLITUS WITH HYPERGLYCEMIA, WITH LONG-TERM CURRENT USE OF INSULIN: ICD-10-CM

## 2021-07-13 ENCOUNTER — OFFICE VISIT (OUTPATIENT)
Dept: INTERNAL MEDICINE | Facility: CLINIC | Age: 56
End: 2021-07-13
Payer: MEDICAID

## 2021-07-13 ENCOUNTER — LAB VISIT (OUTPATIENT)
Dept: LAB | Facility: HOSPITAL | Age: 56
End: 2021-07-13
Attending: FAMILY MEDICINE
Payer: MEDICAID

## 2021-07-13 ENCOUNTER — TELEPHONE (OUTPATIENT)
Dept: INTERNAL MEDICINE | Facility: CLINIC | Age: 56
End: 2021-07-13

## 2021-07-13 VITALS
HEIGHT: 66 IN | HEART RATE: 80 BPM | SYSTOLIC BLOOD PRESSURE: 138 MMHG | BODY MASS INDEX: 27.99 KG/M2 | TEMPERATURE: 97 F | DIASTOLIC BLOOD PRESSURE: 72 MMHG | WEIGHT: 174.19 LBS | RESPIRATION RATE: 16 BRPM | OXYGEN SATURATION: 96 %

## 2021-07-13 DIAGNOSIS — E11.59 HYPERTENSION ASSOCIATED WITH DIABETES: ICD-10-CM

## 2021-07-13 DIAGNOSIS — Z11.4 ENCOUNTER FOR SCREENING FOR HIV: ICD-10-CM

## 2021-07-13 DIAGNOSIS — I15.2 HYPERTENSION ASSOCIATED WITH DIABETES: ICD-10-CM

## 2021-07-13 DIAGNOSIS — E78.5 HYPERLIPIDEMIA ASSOCIATED WITH TYPE 2 DIABETES MELLITUS: ICD-10-CM

## 2021-07-13 DIAGNOSIS — Z23 HIGH PRIORITY FOR COVID-19 VIRUS VACCINATION: ICD-10-CM

## 2021-07-13 DIAGNOSIS — I15.2 HYPERTENSION ASSOCIATED WITH DIABETES: Primary | ICD-10-CM

## 2021-07-13 DIAGNOSIS — E11.59 HYPERTENSION ASSOCIATED WITH DIABETES: Primary | ICD-10-CM

## 2021-07-13 DIAGNOSIS — E11.69 HYPERLIPIDEMIA ASSOCIATED WITH TYPE 2 DIABETES MELLITUS: ICD-10-CM

## 2021-07-13 LAB
ALBUMIN SERPL BCP-MCNC: 4.1 G/DL (ref 3.5–5.2)
ALP SERPL-CCNC: 89 U/L (ref 55–135)
ALT SERPL W/O P-5'-P-CCNC: 41 U/L (ref 10–44)
ANION GAP SERPL CALC-SCNC: 13 MMOL/L (ref 8–16)
AST SERPL-CCNC: 51 U/L (ref 10–40)
BILIRUB SERPL-MCNC: 1 MG/DL (ref 0.1–1)
BUN SERPL-MCNC: 14 MG/DL (ref 6–20)
CALCIUM SERPL-MCNC: 9.2 MG/DL (ref 8.7–10.5)
CHLORIDE SERPL-SCNC: 104 MMOL/L (ref 95–110)
CO2 SERPL-SCNC: 27 MMOL/L (ref 23–29)
CREAT SERPL-MCNC: 0.8 MG/DL (ref 0.5–1.4)
EST. GFR  (AFRICAN AMERICAN): >60 ML/MIN/1.73 M^2
EST. GFR  (NON AFRICAN AMERICAN): >60 ML/MIN/1.73 M^2
GLUCOSE SERPL-MCNC: 50 MG/DL (ref 70–110)
POTASSIUM SERPL-SCNC: 4.4 MMOL/L (ref 3.5–5.1)
PROT SERPL-MCNC: 7.6 G/DL (ref 6–8.4)
SODIUM SERPL-SCNC: 144 MMOL/L (ref 136–145)

## 2021-07-13 PROCEDURE — 99214 OFFICE O/P EST MOD 30 MIN: CPT | Mod: S$PBB,,, | Performed by: FAMILY MEDICINE

## 2021-07-13 PROCEDURE — 99215 OFFICE O/P EST HI 40 MIN: CPT | Mod: PBBFAC,PO | Performed by: FAMILY MEDICINE

## 2021-07-13 PROCEDURE — 99999 PR PBB SHADOW E&M-EST. PATIENT-LVL V: ICD-10-PCS | Mod: PBBFAC,,, | Performed by: FAMILY MEDICINE

## 2021-07-13 PROCEDURE — 83036 HEMOGLOBIN GLYCOSYLATED A1C: CPT | Performed by: FAMILY MEDICINE

## 2021-07-13 PROCEDURE — 80053 COMPREHEN METABOLIC PANEL: CPT | Mod: PO | Performed by: FAMILY MEDICINE

## 2021-07-13 PROCEDURE — 87389 HIV-1 AG W/HIV-1&-2 AB AG IA: CPT | Performed by: FAMILY MEDICINE

## 2021-07-13 PROCEDURE — 36415 COLL VENOUS BLD VENIPUNCTURE: CPT | Mod: PO | Performed by: FAMILY MEDICINE

## 2021-07-13 PROCEDURE — 99999 PR PBB SHADOW E&M-EST. PATIENT-LVL V: CPT | Mod: PBBFAC,,, | Performed by: FAMILY MEDICINE

## 2021-07-13 PROCEDURE — 99214 PR OFFICE/OUTPT VISIT, EST, LEVL IV, 30-39 MIN: ICD-10-PCS | Mod: S$PBB,,, | Performed by: FAMILY MEDICINE

## 2021-07-13 RX ORDER — INSULIN GLARGINE 100 [IU]/ML
INJECTION, SOLUTION SUBCUTANEOUS
Qty: 30 ML | Refills: 5 | Status: SHIPPED | OUTPATIENT
Start: 2021-07-13 | End: 2023-01-05 | Stop reason: SDUPTHER

## 2021-07-13 RX ORDER — LOSARTAN POTASSIUM 100 MG/1
100 TABLET ORAL DAILY
Qty: 90 TABLET | Refills: 4 | Status: SHIPPED | OUTPATIENT
Start: 2021-07-13 | End: 2021-08-26

## 2021-07-13 RX ORDER — ATORVASTATIN CALCIUM 40 MG/1
40 TABLET, FILM COATED ORAL DAILY
Qty: 90 TABLET | Refills: 4 | Status: SHIPPED | OUTPATIENT
Start: 2021-07-13 | End: 2023-01-05 | Stop reason: SDUPTHER

## 2021-07-13 RX ORDER — FUROSEMIDE 20 MG/1
TABLET ORAL
Qty: 90 TABLET | Refills: 0 | Status: SHIPPED | OUTPATIENT
Start: 2021-07-13

## 2021-07-13 RX ORDER — VERAPAMIL HYDROCHLORIDE 240 MG/1
240 TABLET, FILM COATED, EXTENDED RELEASE ORAL DAILY
Qty: 90 TABLET | Refills: 4 | Status: SHIPPED | OUTPATIENT
Start: 2021-07-13 | End: 2022-10-14

## 2021-07-13 RX ORDER — INSULIN LISPRO 100 [IU]/ML
INJECTION, SOLUTION INTRAVENOUS; SUBCUTANEOUS
Qty: 10 ML | Refills: 3 | Status: SHIPPED | OUTPATIENT
Start: 2021-07-13 | End: 2021-09-17

## 2021-07-14 LAB
ESTIMATED AVG GLUCOSE: 206 MG/DL (ref 68–131)
HBA1C MFR BLD: 8.8 % (ref 4–5.6)

## 2021-07-15 LAB — HIV 1+2 AB+HIV1 P24 AG SERPL QL IA: NEGATIVE

## 2021-09-17 RX ORDER — LANCETS 33 GAUGE
EACH MISCELLANEOUS
Qty: 100 EACH | Refills: 0 | Status: SHIPPED | OUTPATIENT
Start: 2021-09-17 | End: 2023-01-05 | Stop reason: SDUPTHER

## 2021-09-17 RX ORDER — INSULIN LISPRO 100 [IU]/ML
INJECTION, SOLUTION INTRAVENOUS; SUBCUTANEOUS
Qty: 10 ML | Refills: 0 | Status: SHIPPED | OUTPATIENT
Start: 2021-09-17 | End: 2022-01-24

## 2021-09-27 DIAGNOSIS — E11.59 HYPERTENSION ASSOCIATED WITH DIABETES: ICD-10-CM

## 2021-09-27 DIAGNOSIS — I15.2 HYPERTENSION ASSOCIATED WITH DIABETES: ICD-10-CM

## 2021-09-27 RX ORDER — ASPIRIN 325 MG
325 TABLET ORAL DAILY
Qty: 180 TABLET | Refills: 1 | Status: SHIPPED | OUTPATIENT
Start: 2021-09-27

## 2021-10-06 DIAGNOSIS — E11.9 TYPE 2 DIABETES MELLITUS WITHOUT COMPLICATION: ICD-10-CM

## 2021-10-22 ENCOUNTER — PATIENT OUTREACH (OUTPATIENT)
Dept: ADMINISTRATIVE | Facility: HOSPITAL | Age: 56
End: 2021-10-22

## 2021-10-25 ENCOUNTER — PATIENT OUTREACH (OUTPATIENT)
Dept: ADMINISTRATIVE | Facility: HOSPITAL | Age: 56
End: 2021-10-25
Payer: MEDICAID

## 2021-10-27 DIAGNOSIS — E11.9 TYPE 2 DIABETES MELLITUS WITHOUT COMPLICATION: ICD-10-CM

## 2021-12-22 DIAGNOSIS — E11.9 TYPE 2 DIABETES MELLITUS WITHOUT COMPLICATION: ICD-10-CM

## 2022-01-04 ENCOUNTER — PATIENT OUTREACH (OUTPATIENT)
Dept: ADMINISTRATIVE | Facility: HOSPITAL | Age: 57
End: 2022-01-04
Payer: MEDICAID

## 2022-01-20 NOTE — TELEPHONE ENCOUNTER
Care Due:                  Date            Visit Type   Department     Provider  --------------------------------------------------------------------------------                                             [facility]  Last Visit: 07-      None         MEDICINE       Derrek Donaldson  Next Visit: None Scheduled  None         None Found                                                            Last  Test          Frequency    Reason                     Performed    Due Date  --------------------------------------------------------------------------------    HBA1C.......  6 months...  HUMALOG, insulin.........  07-   01-    Lipid Panel.  12 months..  atorvastatin.............  Not Found    Overdue    Powered by [employer]. Reference number: [id number].   1/20/2022 9:42:56 AM CST

## 2022-01-20 NOTE — TELEPHONE ENCOUNTER
No new care gaps identified.  Powered by "[employer] by [employer]. Reference number: [id number].   1/20/2022 9:43:30 AM CST

## 2022-01-24 RX ORDER — INSULIN LISPRO 100 [IU]/ML
INJECTION, SOLUTION INTRAVENOUS; SUBCUTANEOUS
Qty: 10 ML | Refills: 0 | Status: SHIPPED | OUTPATIENT
Start: 2022-01-24 | End: 2022-01-24

## 2022-01-24 RX ORDER — INSULIN LISPRO 100 [IU]/ML
INJECTION, SOLUTION INTRAVENOUS; SUBCUTANEOUS
Qty: 10 ML | Refills: 0 | Status: SHIPPED | OUTPATIENT
Start: 2022-01-24 | End: 2022-01-25 | Stop reason: SDUPTHER

## 2022-01-24 RX ORDER — PEN NEEDLE, DIABETIC 32GX 5/32"
NEEDLE, DISPOSABLE MISCELLANEOUS
Qty: 100 EACH | Refills: 3 | Status: SHIPPED | OUTPATIENT
Start: 2022-01-24 | End: 2022-02-04

## 2022-01-24 NOTE — TELEPHONE ENCOUNTER
No new care gaps identified.  Powered by [employer] by [employer]. Reference number: [id number].   1/24/2022 9:45:36 AM CST

## 2022-01-25 RX ORDER — INSULIN LISPRO 100 [IU]/ML
INJECTION, SOLUTION INTRAVENOUS; SUBCUTANEOUS
Qty: 10 ML | Refills: 5 | Status: SHIPPED | OUTPATIENT
Start: 2022-01-25 | End: 2023-01-05 | Stop reason: SDUPTHER

## 2022-01-25 NOTE — TELEPHONE ENCOUNTER
"Refill Authorization Note   Jorgito Arreaga  is requesting a refill authorization.  Brief Assessment and Rationale for Refill:  Approve     Medication Therapy Plan:       Medication Reconciliation Completed: No   Comments:   --->Care Gap information included below if applicable.   Orders Placed This Encounter    BD JOEL 2ND GEN PEN NEEDLE 32 gauge x 5/32" Ndle      Requested Prescriptions   Signed Prescriptions Disp Refills    BD JOEL 2ND GEN PEN NEEDLE 32 gauge x 5/32" Ndle 100 each 3     Sig: FOR USE ONCE NIGHTLY, AS DIRECTED       Endocrinology: Diabetes - Supplies Passed - 1/20/2022  9:42 AM        Passed - Patient is at least 18 years old        Passed - Valid encounter within last 15 months     Recent Visits  Date Type Provider Dept   07/13/21 Office Visit Derrek Donaldson DO [facility]   Showing recent visits within past 720 days and meeting all other requirements  Future Appointments  No visits were found meeting these conditions.  Showing future appointments within next 150 days and meeting all other requirements                Passed - HBA1C within 1080 days     Lab Results   Component Value Date    HGBA1C 8.8 (H) 07/13/2021    HGBA1C 7.8 (H) 09/29/2020    HGBA1C 8.3 (H) 12/31/2019                  Appointments  past 12m or future 3m with PCP    Date Provider   Last Visit   7/13/2021 Derrek Donaldson DO   Next Visit   Visit date not found Derrek Donaldson DO   ED visits in past 90 days: 0     Note composed:10:21 PM 01/24/2022         "

## 2022-01-26 RX ORDER — INSULIN LISPRO 100 [IU]/ML
INJECTION, SOLUTION INTRAVENOUS; SUBCUTANEOUS
Qty: 10 ML | Refills: 0 | OUTPATIENT
Start: 2022-01-26

## 2022-01-26 NOTE — TELEPHONE ENCOUNTER
No new care gaps identified.  Powered by [employer] by [employer]. Reference number: [id number].   1/26/2022 9:50:43 AM CST

## 2022-03-01 ENCOUNTER — PATIENT OUTREACH (OUTPATIENT)
Dept: ADMINISTRATIVE | Facility: HOSPITAL | Age: 57
End: 2022-03-01
Payer: MEDICAID

## 2022-03-01 NOTE — PROGRESS NOTES
Working eye exam report; I called pt's wife, Lucia, to schedule overdue eye exam; LVM for Lucia to call back with a phone number for the patient. No phone number on file.    JENNY faxed to Dr. Isaura Garber 1x to request dm eye exam. Remind me set 1 week.

## 2022-03-01 NOTE — LETTER
AUTHORIZATION FOR RELEASE OF   CONFIDENTIAL INFORMATION        We are seeing Jorgito Arreaga, date of birth 1965, in the clinic at [facility]. Derrek Donaldson DO is the patient's PCP. Jorgito Arreaga has an outstanding lab/procedure at the time we reviewed his chart. In order to help keep his health information updated, he has authorized us to request the following medical record(s):        (  )  MAMMOGRAM                                      (  )  COLONOSCOPY      (  )  PAP SMEAR                                          (  )  OUTSIDE LAB RESULTS     (  )  DEXA SCAN                                          ( x ) DM EYE EXAM            (  )  FOOT EXAM                                          (  )  ENTIRE RECORD     (  )  OUTSIDE IMMUNIZATIONS                 (  )  _______________         Please fax records to [facility], Brandon M Weeks, DO, [phone]   If you have any questions, please contact   JU Glass at [phone]        Patient Name: Jorgito Arreaga  : 1965  Patient Phone #: There are no phone numbers on file.

## 2022-03-01 NOTE — LETTER
AUTHORIZATION FOR RELEASE OF   CONFIDENTIAL INFORMATION        We are seeing Jorgito Arreaga, date of birth 1965, in the clinic at [facility]. Derrek Donaldson DO is the patient's PCP. Jorgito Arreaga has an outstanding lab/procedure at the time we reviewed his chart. In order to help keep his health information updated, he has authorized us to request the following medical record(s):        (  )  MAMMOGRAM                                      (  )  COLONOSCOPY      (  )  PAP SMEAR                                          (  )  OUTSIDE LAB RESULTS     (  )  DEXA SCAN                                          ( x ) DM EYE EXAM            (  )  FOOT EXAM                                          (  )  ENTIRE RECORD     (  )  OUTSIDE IMMUNIZATIONS                 (  )  _______________         Please fax records to [facility], Brandon M Weeks, DO, [phone]   If you have any questions, please contact    JU Glass at [phone]        Patient Name: Jorgito Arreaga  : 1965  Patient Phone #: There are no phone numbers on file.

## 2022-03-07 ENCOUNTER — TELEPHONE (OUTPATIENT)
Dept: INTERNAL MEDICINE | Facility: CLINIC | Age: 57
End: 2022-03-07
Payer: MEDICAID

## 2022-03-07 NOTE — TELEPHONE ENCOUNTER
----- Message from Suyapa Mackenzie sent at 3/7/2022 10:19 AM CST -----  Contact: Jorgito  Type:  Sooner Apoointment Request    Caller is requesting a sooner appointment.  Caller declined first available appointment listed below.  Caller will not accept being placed on the waitlist and is requesting a message be sent to doctor.  Name of Caller: Jorgito  When is the first available appointment? 5/2022  Symptoms: Stomach issues  Would the patient rather a call back or a response via [facility]? Call Back  Best Call Back Number: [phone]  Additional Information: Patient is needing a work excuse for having to miss work for today and would like to come in on Friday

## 2022-03-08 ENCOUNTER — TELEPHONE (OUTPATIENT)
Dept: INTERNAL MEDICINE | Facility: CLINIC | Age: 57
End: 2022-03-08
Payer: MEDICAID

## 2022-03-08 NOTE — TELEPHONE ENCOUNTER
----- Message from Allyn Mary sent at 3/8/2022  1:01 PM CST -----  Contact: self/[phone]  Type:  Patient Returning Call    Who Called:Jorgito Arreaag  Who Left Message for Patient:Tanisha  Does the patient know what this is regarding?:appt  Would the patient rather a call back or a response via [facility]? Call back  Best Call Back Number:[phone]  Additional Information:

## 2022-03-08 NOTE — TELEPHONE ENCOUNTER
----- Message from Muriel Webster sent at 3/8/2022  1:33 PM CST -----  Contact: [phone] @ Patient  Patient is returning a phone call.  Who left a message for the patient: Tanisha Moran MA   Does patient know what this is regarding:    Would you like a call back, or a response through your [facility] portal?:   call   Comments:

## 2022-03-08 NOTE — TELEPHONE ENCOUNTER
----- Message from Khushi Davey sent at 3/8/2022 11:59 AM CST -----  Contact: ROSS YANG [[id number]]  .Type:  Sooner Apoointment Request    Caller is requesting a sooner appointment.  Caller declined first available appointment listed below.  Caller will not accept being placed on the waitlist and is requesting a message be sent to doctor.  Name of Caller: ROSS YANG [[id number]]  When is the first available appointment? 06/13/2022  Symptoms: blood in pt stool and stomach  problems  pt needs an appt on a Friday   Would the patient rather a call back or a response via [facility]?  Call   Best Call Back Number: [phone] (home)    Additional Information:  pt is requesting  a call back from the nurse in regards to the pt needing  a Dr excuse for 03/07/2022 and 03/08/2022 to return work on 03/09/2022 please

## 2022-03-08 NOTE — TELEPHONE ENCOUNTER
Call and was in the middle of the conversation with patient, when telephone went dead. Tried call patient back and went to voice mail

## 2022-04-21 ENCOUNTER — PATIENT OUTREACH (OUTPATIENT)
Dept: ADMINISTRATIVE | Facility: HOSPITAL | Age: 57
End: 2022-04-21
Payer: MEDICAID

## 2022-04-21 NOTE — PROGRESS NOTES
DIABETES REPORT: Attempting to contact pt. Unable to reach patient at this time. Left voicemail. Needs lab and f.u.

## 2022-06-23 ENCOUNTER — PATIENT OUTREACH (OUTPATIENT)
Dept: ADMINISTRATIVE | Facility: HOSPITAL | Age: 57
End: 2022-06-23
Payer: MEDICAID

## 2022-06-28 ENCOUNTER — PATIENT OUTREACH (OUTPATIENT)
Dept: ADMINISTRATIVE | Facility: HOSPITAL | Age: 57
End: 2022-06-28
Payer: MEDICAID

## 2022-06-28 NOTE — PROGRESS NOTES
DM Report: Attempting to contact pt to schedule overdue annual exam and diabetic labs. Unable to reach patient at this time. Left voicemail.

## 2022-07-08 ENCOUNTER — HOSPITAL ENCOUNTER (EMERGENCY)
Facility: HOSPITAL | Age: 57
Discharge: HOME | End: 2022-07-08
Attending: EMERGENCY MEDICINE
Payer: MEDICAID

## 2022-07-08 VITALS
HEIGHT: 66 IN | OXYGEN SATURATION: 97 % | RESPIRATION RATE: 16 BRPM | TEMPERATURE: 99 F | SYSTOLIC BLOOD PRESSURE: 144 MMHG | DIASTOLIC BLOOD PRESSURE: 86 MMHG | HEART RATE: 81 BPM | BODY MASS INDEX: 25.37 KG/M2 | WEIGHT: 157.88 LBS

## 2022-07-08 DIAGNOSIS — Z20.822 LAB TEST NEGATIVE FOR COVID-19 VIRUS: Primary | ICD-10-CM

## 2022-07-08 LAB
CTP QC/QA: YES
SARS-COV-2 RDRP RESP QL NAA+PROBE: NEGATIVE

## 2022-07-08 PROCEDURE — U0002 COVID-19 LAB TEST NON-CDC: HCPCS | Mod: ER | Performed by: NURSE PRACTITIONER

## 2022-07-08 PROCEDURE — 99282 EMERGENCY DEPT VISIT SF MDM: CPT | Mod: 25,ER

## 2022-07-08 NOTE — Clinical Note
"Jorgito"Rj Arreaga was seen and treated in our emergency department on 7/8/2022.     COVID-19 is present in our communities across the state. There is limited testing for COVID at this time, so not all patients can be tested. In this situation, your employee meets the following criteria:    Jorgito Arreaga has met the criteria for COVID-19 testing and has a NEGATIVE result. The employee can return to work once they are asymptomatic for 24 hours without the use of fever reducing medications (Tylenol, Motrin, etc).     If the employee is not fully vaccinated and had a close contact:  · Retest at 5 to 7 days post-exposure  · If possible, it is recommended that they quarantine for 5 days from the time of contact regardless of their test status.  · A mask should be worn post quarantine for 5 days.    If you have any questions or concerns, or if I can be of further assistance, please do not hesitate to contact me.    Sincerely,             Juancarlos Francois NP"

## 2022-07-09 NOTE — ED PROVIDER NOTES
Encounter Date: 7/8/2022       History     Chief Complaint   Patient presents with    COVID-19 Concerns     Pt present to ED with concerns of Covid, pt states that he was having s/s a few days ago but no s/s today, known exposure      Asymptomatic patient was exposed to COVID wants a test        Review of patient's allergies indicates:  No Known Allergies  Past Medical History:   Diagnosis Date    Abscess 9/5/2018    Cataracts, bilateral     Chest pain 12/6/2018    Dehydration     Diabetes mellitus, type 2     Diagnosed 7/2016    Folliculitis 9/15/2017    GERD (gastroesophageal reflux disease)     Hypertension     Inflammatory polyps of colon     Pancreatitis, alcoholic, acute     Port catheter in place     TIA (transient ischemic attack)     Tinea cruris 9/15/2017     Past Surgical History:   Procedure Laterality Date    COLONOSCOPY      COLONOSCOPY N/A 6/15/2018    Procedure: COLONOSCOPY;  Surgeon: Jim Hicks MD;  Location: [facility];  Service: Endoscopy;  Laterality: N/A;    ESOPHAGOGASTRODUODENOSCOPY N/A 6/15/2018    Procedure: ESOPHAGOGASTRODUODENOSCOPY (EGD);  Surgeon: Jim Hicks MD;  Location: [facility];  Service: Endoscopy;  Laterality: N/A;     Family History   Problem Relation Age of Onset    Hypertension Mother     Hypertension Father      Social History     Tobacco Use    Smoking status: Former Smoker     Types: Cigarettes    Smokeless tobacco: Never Used   Substance Use Topics    Alcohol use: Yes     Comment:  Daily - States that he drinks 1/2 pint of vodka and about 1-2 cans of beer daily    Drug use: No     Review of Systems   Constitutional: Negative for fever.   HENT: Negative for sore throat.    Respiratory: Negative for shortness of breath.    Cardiovascular: Negative for chest pain.   Gastrointestinal: Negative for nausea.   Genitourinary: Negative for dysuria.   Musculoskeletal: Negative for back pain.   Skin: Negative for rash.   Neurological: Negative for  weakness.   Hematological: Does not bruise/bleed easily.       Physical Exam     Initial Vitals [07/08/22 1213]   BP Pulse Resp Temp SpO2   (!) 144/86 81 16 98.7 °F (37.1 °C) 97 %      MAP       --         Physical Exam    Nursing note and vitals reviewed.  Constitutional: He appears well-developed and well-nourished.   HENT:   Head: Normocephalic and atraumatic.   Eyes: Conjunctivae are normal. Pupils are equal, round, and reactive to light.   Neck: Neck supple.   Normal range of motion.  Cardiovascular: Normal rate, regular rhythm, normal heart sounds and intact distal pulses.   Pulmonary/Chest: Breath sounds normal.   Abdominal: Abdomen is soft. There is no rebound and no guarding.   Musculoskeletal:         General: Normal range of motion.      Cervical back: Normal range of motion and neck supple.     Neurological: He is alert.   Skin: Skin is warm and dry.   Psychiatric: He has a normal mood and affect. His behavior is normal. Thought content normal.         ED Course   Procedures  Labs Reviewed   SARS-COV-2 RDRP GENE    Narrative:     .This test utilizes isothermal nucleic acid amplification   technology to detect the SARS-CoV-2 RdRp nucleic acid segment.   The analytical sensitivity (limit of detection) is 125 genome   equivalents/mL.   A POSITIVE result implies infection with the SARS-CoV-2 virus;   the patient is presumed to be contagious.     A NEGATIVE result means that SARS-CoV-2 nucleic acids are not   present above the limit of detection. A NEGATIVE result should be   treated as presumptive. It does not rule out the possibility of   COVID-19 and should not be the sole basis for treatment decisions.   If COVID-19 is strongly suspected based on clinical and exposure   history, re-testing using an alternate molecular assay should be   considered.   This test is only for use under the Food and Drug   Administration s Emergency Use Authorization (EUA).   Commercial kits are provided by [employer].    Performance characteristics of the EUA have been independently   verified by [facility] Department of   Pathology and Laboratory Medicine.   _________________________________________________________________   The authorized Fact Sheet for Healthcare Providers and the authorized Fact   Sheet for Patients of the ID NOW COVID-19 are available on the FDA   website:     https://www.fda.gov/media/492194/download  https://www.fda.gov/media/504513/download                  Imaging Results    None          Medications - No data to display                       Clinical Impression:   Final diagnoses:  [Z20.822] Lab test negative for COVID-19 virus (Primary)          ED Disposition Condition    Discharge Stable        ED Prescriptions     None        Follow-up Information     Follow up With Specialties Details Why Contact Info    Kirsten Queen NP Family Medicine Schedule an appointment as soon as possible for a visit  As needed [address] [address] [address]  [phone]             Juancarlos Francois NP  07/09/22 0804

## 2022-08-05 ENCOUNTER — PATIENT OUTREACH (OUTPATIENT)
Dept: ADMINISTRATIVE | Facility: HOSPITAL | Age: 57
End: 2022-08-05
Payer: MEDICAID

## 2022-08-05 NOTE — PROGRESS NOTES
Blood Pressure Report: Called Pt to obtain home blood pressure & schedule PCP Visit. Pt did not answer,  left voicemail with callback number.

## 2022-08-25 NOTE — PLAN OF CARE
8/25/2022         RE: Lora Vergara  [address]  [facility] [address]        Dear Colleague,    Thank you for referring your patient, Lora Vergara, to the [facility]. Please see a copy of my visit note below.    Surgical Office Location:  [facility]  [address] [place] [address]      Loar Vergara is an extremely pleasant 89 year old year old female patient here today for evaluation and managment of basal cell carcinoma on left forehead.  Patient has no other skin complaints today.  Remainder of the HPI, Meds, PMH, Allergies, FH, and SH was reviewed in chart.      Past Medical History:   Diagnosis Date     Basal cell carcinoma      Benign essential hypertension      Bronchiectasis (H)      CAD (coronary artery disease)     mild to moderate disease     Hypothyroidism      Microvascular angina (H)     chronic     Obesity (BMI 30-39.9)      Peripheral neuropathy      Pure hypercholesterolemia      Type 2 diabetes mellitus (H)     diet-controlled       Past Surgical History:   Procedure Laterality Date     APPENDECTOMY  01/01/1974     BLADDER SURGERY  01/01/1990    bladder suspension     CHOLECYSTECTOMY  01/01/1975     CORONARY ANGIOGRAPHY ADULT ORDER  02/01/2008    Mild CAD. LAD w/20% stenosis, RCA with 30-40% stenosis. No flow limiting obstructions.     HYSTERECTOMY  01/01/1970     OOPHORECTOMY  01/01/1974    bilateral     PHACOEMULSIFICATION CLEAR CORNEA WITH STANDARD INTRAOCULAR LENS IMPLANT Right 07/21/2015    Procedure: PHACOEMULSIFICATION CLEAR CORNEA WITH STANDARD INTRAOCULAR LENS IMPLANT;  Surgeon: Schuyler Chapa MD;  Location: [facility]     PHACOEMULSIFICATION CLEAR CORNEA WITH STANDARD INTRAOCULAR LENS IMPLANT Left 08/18/2015    Procedure: PHACOEMULSIFICATION CLEAR CORNEA WITH STANDARD INTRAOCULAR LENS IMPLANT;  Surgeon: Schuyler Chapa MD;  Location: [facility]        Family History   Problem Relation Age of Onset      Problem: Patient Care Overview  Goal: Plan of Care Review  Outcome: Ongoing (interventions implemented as appropriate)  Plan of care discussed with patient.  Pt was just transferred from [facility] with chest pains and hyperglycemia.  Pt is currently on IV fluids.  Vs obtained and telemonitor applied.   Oriented pt to room, unit routine, call light and TV remote.  Instructed pt to call for assistance with transfers due to medical equipment attached.  Hob elevated.  Siderails up x 2.  Call light within reach.       Cerebrovascular Disease Mother      Neurologic Disorder Mother         PD     Pancreatic Cancer Father      Lung Cancer Brother         smoker     Diabetes Type 2  Son      Throat cancer Son         smoker     Diabetes No family hx of      Myocardial Infarction No family hx of      Coronary Artery Disease Early Onset No family hx of      Breast Cancer No family hx of      Colon Cancer No family hx of      Ovarian Cancer No family hx of        Social History     Socioeconomic History     Marital status:      Spouse name: Not on file     Number of children: Not on file     Years of education: Not on file     Highest education level: Not on file   Occupational History     Occupation: Retired - book-keeper   Tobacco Use     Smoking status: Never Smoker     Smokeless tobacco: Never Used   Substance and Sexual Activity     Alcohol use: Yes     Comment: rare     Drug use: No     Sexual activity: Not Currently   Other Topics Concern     Parent/sibling w/ CABG, MI or angioplasty before 65F 55M? No      Service Not Asked     Blood Transfusions Not Asked     Caffeine Concern Yes     Comment: 4 cups caffeine per day     Occupational Exposure Not Asked     Hobby Hazards Not Asked     Sleep Concern No     Stress Concern No     Weight Concern No     Special Diet No     Back Care Not Asked     Exercise Yes     Comment: exercises 30 minutes, 4 days week     Bike Helmet Not Asked     Seat Belt Not Asked     Self-Exams Not Asked   Social History Narrative     x 2.    Two sons; one has since passed away.    1 grandchildren.    4 great grandchildren.    2 great great grandchildren.     Social Determinants of Health     Financial Resource Strain: Not on file   Food Insecurity: Not on file   Transportation Needs: Not on file   Physical Activity: Not on file   Stress: Not on file   Social Connections: Not on file   Intimate Partner Violence: Not on file   Housing Stability: Not on file       Outpatient Encounter  Medications as of 8/25/2022   Medication Sig Dispense Refill     acetaminophen (TYLENOL) 500 MG tablet Take 500-1,000 mg by mouth every 6 hours as needed       aspirin 81 MG EC tablet Take 81 mg by mouth daily       atorvastatin (LIPITOR) 40 MG tablet Take 1 tablet (40 mg) by mouth daily 90 tablet 3     fluticasone (FLONASE) 50 MCG/ACT nasal spray Spray 2 sprays into both nostrils daily 16 g 3     furosemide (LASIX) 20 MG tablet Take 0.5 tablets (10 mg) by mouth daily 45 tablet 3     gabapentin (NEURONTIN) 300 MG capsule Take 1 capsule (300 mg) by mouth 3 times daily 60 capsule 3     isosorbide mononitrate (IMDUR) 30 MG 24 hr tablet Take 1 tablet (30 mg) by mouth daily ( take in addition to Isosorbide 60 mg for a total of 90 mg daily) 90 tablet 3     isosorbide mononitrate (IMDUR) 60 MG 24 hr tablet Take 1 tablet (60 mg) by mouth daily 90 tablet 2     latanoprost (XALATAN) 0.005 % ophthalmic solution Place 1 drop into both eyes 3 times daily        levothyroxine (SYNTHROID/LEVOTHROID) 25 MCG tablet TAKE 1 TABLET BY MOUTH EVERY DAY 90 tablet 2     metoprolol tartrate (LOPRESSOR) 100 MG tablet TAKE 1/2 TABLET BY MOUTH EVERY MORNING AND TAKE 1 TABLET IN THE EVENING. 135 tablet 0     nitroGLYcerin (NITROSTAT) 0.4 MG sublingual tablet PLACE 1 TABLET UNDER THE TONGUE EVERY 5 MINUTES AS NEEDED FOR CHEST PAIN 150 tablet 3     order for DME Equipment being ordered: wheeled walker with brakes and a seat 1 Device 0     polyethylene glycol (MIRALAX/GLYCOLAX) packet Take 1 packet by mouth daily as needed for constipation       pregabalin (LYRICA) 150 MG capsule Take 1 capsule by mouth twice a day as directed by physician. 180 capsule 3     ranolazine (RANEXA) 500 MG 12 hr tablet Take 1 tablet (500 mg) by mouth 2 times daily 180 tablet 2     Respiratory Therapy Supplies (AEROBIKA) RUT        vitamin D3 (CHOLECALCIFEROL) 2000 units tablet Take 4,000 Units by mouth daily       No facility-administered encounter medications on file  as of 8/25/2022.             O:   NAD, WDWN, Alert & Oriented, Mood & Affect wnl, Vitals stable   Here today alone   /69   Pulse 63   LMP  (LMP Unknown)   SpO2 96%   Breastfeeding No    General appearance normal   Vitals stable   Alert, oriented and in no acute distress     L forearm 1.4cm pink pearly papule       Eyes: Conjunctivae/lids:Normal     ENT: Lips, buccal mucosa, tongue: normal    MSK:Normal    Cardiovascular: peripheral edema none    Pulm: Breathing Normal    Neuro/Psych: Orientation:Alert and Orientedx3 ; Mood/Affect:normal       A/P:  1. L forehead basal cell carcinoma   MOHS:   Location    The rationale for Mohs surgery was discussed with the patient and consent was obtained.  The risks and benefits as well as alternatives to therapy were discussed, in detail.  Specifically, the risks of infection, scarring, bleeding, prolonged wound healing, incomplete removal, allergy to anesthesia, nerve injury and recurrence were addressed.  Indication for Mohs was Location. Prior to the procedure, the treatment site was clearly identified and, if available, confirmed with previous photos and confirmed by the patient   All components of the Universal Protocol/PAUSE rule were completed.  The Mohs surgeon operated in two distinct and integrated capacities as the surgeon and pathologist.      The area was prepped with Betasept.  A rim of normal appearing skin was marked circumferentially around the lesion.  The area was infiltrated with local anesthesia.  The tumor was first debulked to remove all clinically apparent tumor.  An incision following the standard Mohs approach was done and the specimen was oriented,mapped and placed in 2 block(s).  Each specimen was then chromacoded and processed in the Mohs laboratory using standard Mohs technique and submitted for frozen section histology.  Frozen section analysis showed  residual tumor but CLEAR MARGINS.    First stage:Orthokeratosis of epidermis with a  proliferation of nests of basaloid cells, with peripheral palisading and a haphazard arrangement in the center extending into the dermis, forming nodules.  The tumor cells have hyperchromatic nuclei. Poor cytoplasm and intercellular bridging.      The tumor was excised using standard Mohs technique in 2 stages(s).  CLEAR MARGINS OBTAINED and Final defect size was 2.5 cm.     We discussed the options for wound management in full with the patient including risks/benefits/ possible outcomes.        REPAIR COMPLEX: Because of the tightness of the surrounding skin and Because of the size and full thickness nature of the defect, Because of the tightness of the surrounding skin, In order to avoid distortion and Because of the proximity to the brow, a complex closure was planned. After LE anesthesia and prep, Burow's triangles were excised in the relaxed skin tension lines. The wound edges were widely undermined greater than width of the defect on both sides by dissection in the subcutaneous plane until adequate tissue mobility was obtained. Hemostasis was obtained. The wound edges were closed in a layered fashion using Vicryl and Fast Absorbing Plain Gut sutures. Postoperative length was 5.3 cm.   EBL minimal; complications none; wound care routine.  The patient was discharged in good condition and will return in one week for wound evaluation.  It was a pleasure speaking to Lora Vergara today.  Previous clinic notes and pertinent laboratory tests were reviewed prior to Lora Vergara's visit.  Signs and Symptoms of skin cancer discussed with patient.  Patient encouraged to perform monthly skin exams.  UV precautions reviewed with patient.  Risks of non-melanoma skin cancer discussed with patient   Return to clinic 6 months        Again, thank you for allowing me to participate in the care of your patient.        Sincerely,        Srinivas Rosado MD

## 2022-08-26 ENCOUNTER — PATIENT OUTREACH (OUTPATIENT)
Dept: ADMINISTRATIVE | Facility: HOSPITAL | Age: 57
End: 2022-08-26
Payer: MEDICAID

## 2022-08-26 NOTE — PROGRESS NOTES
Working eye exam report; Called and spoke with patient who states that he has not had the chance to complete his eye exam due to his work schedule - Call kept dropping. Pt to schedule when he can.

## 2022-09-22 ENCOUNTER — PATIENT OUTREACH (OUTPATIENT)
Dept: ADMINISTRATIVE | Facility: HOSPITAL | Age: 57
End: 2022-09-22
Payer: MEDICAID

## 2022-09-22 NOTE — PROGRESS NOTES
eGFR Report: No recent CMP or BMP found, called Pt to schedule PCP visit & labs, No answer, LVM.

## 2022-09-28 ENCOUNTER — HOSPITAL ENCOUNTER (EMERGENCY)
Facility: HOSPITAL | Age: 57
Discharge: HOME | End: 2022-09-28
Attending: EMERGENCY MEDICINE
Payer: MEDICAID

## 2022-09-28 VITALS
RESPIRATION RATE: 16 BRPM | HEART RATE: 82 BPM | WEIGHT: 148.81 LBS | OXYGEN SATURATION: 97 % | DIASTOLIC BLOOD PRESSURE: 112 MMHG | SYSTOLIC BLOOD PRESSURE: 176 MMHG | BODY MASS INDEX: 24.02 KG/M2 | TEMPERATURE: 99 F

## 2022-09-28 DIAGNOSIS — M25.519 SHOULDER PAIN: Primary | ICD-10-CM

## 2022-09-28 PROCEDURE — 99283 EMERGENCY DEPT VISIT LOW MDM: CPT | Mod: ER

## 2022-09-28 RX ORDER — NAPROXEN 375 MG/1
375 TABLET ORAL 2 TIMES DAILY WITH MEALS
Qty: 30 TABLET | Refills: 0 | Status: SHIPPED | OUTPATIENT
Start: 2022-09-28

## 2022-09-28 NOTE — Clinical Note
"Jorgito Carey" Whitley was seen and treated in our emergency department on 9/28/2022.  He may return to work on 09/29/2022.       If you have any questions or concerns, please don't hesitate to call.       RN    "
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
## 2022-10-31 DIAGNOSIS — E11.59 HYPERTENSION ASSOCIATED WITH DIABETES: ICD-10-CM

## 2022-10-31 DIAGNOSIS — I15.2 HYPERTENSION ASSOCIATED WITH DIABETES: ICD-10-CM

## 2022-11-01 RX ORDER — LOSARTAN POTASSIUM 100 MG/1
TABLET ORAL
Qty: 14 TABLET | Refills: 0 | Status: SHIPPED | OUTPATIENT
Start: 2022-11-01 | End: 2022-12-22

## 2022-11-05 ENCOUNTER — HOSPITAL ENCOUNTER (EMERGENCY)
Facility: HOSPITAL | Age: 57
Discharge: LEFT BEFORE DISCHARGE | End: 2022-11-05
Attending: EMERGENCY MEDICINE
Payer: MEDICAID

## 2022-11-05 VITALS
RESPIRATION RATE: 19 BRPM | TEMPERATURE: 98 F | WEIGHT: 162.69 LBS | DIASTOLIC BLOOD PRESSURE: 116 MMHG | OXYGEN SATURATION: 99 % | SYSTOLIC BLOOD PRESSURE: 205 MMHG | HEART RATE: 73 BPM | BODY MASS INDEX: 26.26 KG/M2

## 2022-11-05 DIAGNOSIS — Z53.29 LEFT AGAINST MEDICAL ADVICE: ICD-10-CM

## 2022-11-05 DIAGNOSIS — K80.20 CALCULUS OF GALLBLADDER WITHOUT CHOLECYSTITIS WITHOUT OBSTRUCTION: ICD-10-CM

## 2022-11-05 DIAGNOSIS — R10.32 LEFT GROIN PAIN: Primary | ICD-10-CM

## 2022-11-05 DIAGNOSIS — I16.0 HYPERTENSIVE URGENCY: ICD-10-CM

## 2022-11-05 DIAGNOSIS — K40.30 IRREDUCIBLE RIGHT INGUINAL HERNIA: ICD-10-CM

## 2022-11-05 LAB
ALBUMIN SERPL BCP-MCNC: 3.8 G/DL (ref 3.5–5.2)
ALP SERPL-CCNC: 130 U/L (ref 55–135)
ALT SERPL W/O P-5'-P-CCNC: 56 U/L (ref 10–44)
ANION GAP SERPL CALC-SCNC: 10 MMOL/L (ref 8–16)
AST SERPL-CCNC: 47 U/L (ref 10–40)
BASOPHILS # BLD AUTO: 0.02 K/UL (ref 0–0.2)
BASOPHILS NFR BLD: 0.4 % (ref 0–1.9)
BILIRUB SERPL-MCNC: 0.9 MG/DL (ref 0.1–1)
BILIRUB UR QL STRIP: NEGATIVE
BUN SERPL-MCNC: 12 MG/DL (ref 6–20)
CALCIUM SERPL-MCNC: 9.1 MG/DL (ref 8.7–10.5)
CHLORIDE SERPL-SCNC: 103 MMOL/L (ref 95–110)
CLARITY UR REFRACT.AUTO: CLEAR
CO2 SERPL-SCNC: 25 MMOL/L (ref 23–29)
COLOR UR AUTO: YELLOW
CREAT SERPL-MCNC: 1 MG/DL (ref 0.5–1.4)
DIFFERENTIAL METHOD: ABNORMAL
EOSINOPHIL # BLD AUTO: 0.1 K/UL (ref 0–0.5)
EOSINOPHIL NFR BLD: 1.3 % (ref 0–8)
ERYTHROCYTE [DISTWIDTH] IN BLOOD BY AUTOMATED COUNT: 11.9 % (ref 11.5–14.5)
EST. GFR  (NO RACE VARIABLE): >60 ML/MIN/1.73 M^2
GLUCOSE SERPL-MCNC: 319 MG/DL (ref 70–110)
GLUCOSE UR QL STRIP: ABNORMAL
HCT VFR BLD AUTO: 35.2 % (ref 40–54)
HCV AB SERPL QL IA: NEGATIVE
HEP C VIRUS HOLD SPECIMEN: NORMAL
HGB BLD-MCNC: 11.5 G/DL (ref 14–18)
HGB UR QL STRIP: NEGATIVE
HIV 1+2 AB+HIV1 P24 AG SERPL QL IA: NEGATIVE
IMM GRANULOCYTES # BLD AUTO: 0.01 K/UL (ref 0–0.04)
IMM GRANULOCYTES NFR BLD AUTO: 0.2 % (ref 0–0.5)
KETONES UR QL STRIP: NEGATIVE
LACTATE SERPL-SCNC: 0.8 MMOL/L (ref 0.5–2.2)
LEUKOCYTE ESTERASE UR QL STRIP: NEGATIVE
LIPASE SERPL-CCNC: 4 U/L (ref 4–60)
LYMPHOCYTES # BLD AUTO: 1.3 K/UL (ref 1–4.8)
LYMPHOCYTES NFR BLD: 28.2 % (ref 18–48)
MCH RBC QN AUTO: 30.4 PG (ref 27–31)
MCHC RBC AUTO-ENTMCNC: 32.7 G/DL (ref 32–36)
MCV RBC AUTO: 93 FL (ref 82–98)
MONOCYTES # BLD AUTO: 0.4 K/UL (ref 0.3–1)
MONOCYTES NFR BLD: 9.3 % (ref 4–15)
NEUTROPHILS # BLD AUTO: 2.8 K/UL (ref 1.8–7.7)
NEUTROPHILS NFR BLD: 60.6 % (ref 38–73)
NITRITE UR QL STRIP: NEGATIVE
NRBC BLD-RTO: 0 /100 WBC
PH UR STRIP: 6 [PH] (ref 5–8)
PLATELET # BLD AUTO: 191 K/UL (ref 150–450)
PMV BLD AUTO: 10.9 FL (ref 9.2–12.9)
POTASSIUM SERPL-SCNC: 3.6 MMOL/L (ref 3.5–5.1)
PROT SERPL-MCNC: 6.5 G/DL (ref 6–8.4)
PROT UR QL STRIP: NEGATIVE
RBC # BLD AUTO: 3.78 M/UL (ref 4.6–6.2)
SODIUM SERPL-SCNC: 138 MMOL/L (ref 136–145)
SP GR UR STRIP: 1.02 (ref 1–1.03)
URN SPEC COLLECT METH UR: ABNORMAL
UROBILINOGEN UR STRIP-ACNC: NEGATIVE EU/DL
WBC # BLD AUTO: 4.54 K/UL (ref 3.9–12.7)

## 2022-11-05 PROCEDURE — 83690 ASSAY OF LIPASE: CPT | Mod: ER | Performed by: EMERGENCY MEDICINE

## 2022-11-05 PROCEDURE — 87389 HIV-1 AG W/HIV-1&-2 AB AG IA: CPT | Performed by: EMERGENCY MEDICINE

## 2022-11-05 PROCEDURE — 81003 URINALYSIS AUTO W/O SCOPE: CPT | Mod: ER | Performed by: EMERGENCY MEDICINE

## 2022-11-05 PROCEDURE — 96374 THER/PROPH/DIAG INJ IV PUSH: CPT | Mod: 59,ER

## 2022-11-05 PROCEDURE — 96376 TX/PRO/DX INJ SAME DRUG ADON: CPT | Mod: ER

## 2022-11-05 PROCEDURE — 83605 ASSAY OF LACTIC ACID: CPT | Mod: ER | Performed by: EMERGENCY MEDICINE

## 2022-11-05 PROCEDURE — 96375 TX/PRO/DX INJ NEW DRUG ADDON: CPT | Mod: ER

## 2022-11-05 PROCEDURE — 80053 COMPREHEN METABOLIC PANEL: CPT | Mod: ER | Performed by: EMERGENCY MEDICINE

## 2022-11-05 PROCEDURE — 63600175 PHARM REV CODE 636 W HCPCS: Mod: ER | Performed by: EMERGENCY MEDICINE

## 2022-11-05 PROCEDURE — 85025 COMPLETE CBC W/AUTO DIFF WBC: CPT | Mod: ER | Performed by: EMERGENCY MEDICINE

## 2022-11-05 PROCEDURE — 99291 CRITICAL CARE FIRST HOUR: CPT | Mod: 25,ER

## 2022-11-05 PROCEDURE — 96361 HYDRATE IV INFUSION ADD-ON: CPT | Mod: ER

## 2022-11-05 PROCEDURE — 86803 HEPATITIS C AB TEST: CPT | Performed by: EMERGENCY MEDICINE

## 2022-11-05 PROCEDURE — 25500020 PHARM REV CODE 255: Mod: ER | Performed by: EMERGENCY MEDICINE

## 2022-11-05 PROCEDURE — 25000003 PHARM REV CODE 250: Mod: ER | Performed by: EMERGENCY MEDICINE

## 2022-11-05 RX ORDER — LABETALOL HYDROCHLORIDE 5 MG/ML
10 INJECTION, SOLUTION INTRAVENOUS
Status: COMPLETED | OUTPATIENT
Start: 2022-11-05 | End: 2022-11-05

## 2022-11-05 RX ORDER — HYDRALAZINE HYDROCHLORIDE 20 MG/ML
10 INJECTION INTRAMUSCULAR; INTRAVENOUS
Status: COMPLETED | OUTPATIENT
Start: 2022-11-05 | End: 2022-11-05

## 2022-11-05 RX ORDER — IBUPROFEN 600 MG/1
600 TABLET ORAL EVERY 6 HOURS PRN
Qty: 28 TABLET | Refills: 0 | Status: SHIPPED | OUTPATIENT
Start: 2022-11-05 | End: 2022-12-03

## 2022-11-05 RX ORDER — HYDROCODONE BITARTRATE AND ACETAMINOPHEN 5; 325 MG/1; MG/1
1 TABLET ORAL EVERY 6 HOURS PRN
Qty: 6 TABLET | Refills: 0 | Status: SHIPPED | OUTPATIENT
Start: 2022-11-05 | End: 2022-11-17

## 2022-11-05 RX ADMIN — SODIUM CHLORIDE 1000 ML: 0.9 INJECTION, SOLUTION INTRAVENOUS at 03:11

## 2022-11-05 RX ADMIN — IOHEXOL 100 ML: 350 INJECTION, SOLUTION INTRAVENOUS at 03:11

## 2022-11-05 RX ADMIN — HYDRALAZINE HYDROCHLORIDE 10 MG: 20 INJECTION, SOLUTION INTRAMUSCULAR; INTRAVENOUS at 03:11

## 2022-11-05 RX ADMIN — LABETALOL HYDROCHLORIDE 10 MG: 5 INJECTION INTRAVENOUS at 04:11

## 2022-11-05 NOTE — Clinical Note
"Jorgito Carey" Whitley was seen and treated in our emergency department on 11/5/2022.  He may return to work on 11/06/2022.       If you have any questions or concerns, please don't hesitate to call.      Phyllis Gonzalez, DO"

## 2022-11-05 NOTE — LETTER
Patient: Jorgito Arreaga  YOB: 1965  Date: 11/5/2022 Time: 5:25 PM  Location: [facility]    Leaving the [facility] Against Medical Advice    Chart #:[id number]    This will certify that I, the undersigned,    ______________________________________________________________________    A patient in the above named medical center, having requested discharge and removal from the medical [place] against the advice of my attending physician(s), hereby release [facility], its physicians, officers and employees, severally and individually, from any and all liability of any nature whatsoever for any injury or harm or complication of any kind that may result directly or indirectly, by reason of my terminating my stay as a patient at [facility] and my departure from [facility], and hereby waive any and all rights of action I may now have or later acquire as a result of my voluntary departure from [facility] and the termination of my stay as a patient therein.    This release is made with the full knowledge of the danger that may result from the action which I am taking.      Date:_______________________                         ___________________________                                                                                    Patient/Legal Representative    Witness:        ____________________________                          ___________________________  Nurse                                                                        Physician

## 2022-11-05 NOTE — LETTER
Patient: Jorgito Arreaga  YOB: 1965  Date: 11/5/2022 Time: 5:24 PM  Location: [facility]    Leaving the [facility] Against Medical Advice    Chart #:[id number]    This will certify that I, the undersigned,    ______________________________________________________________________    A patient in the above named medical center, having requested discharge and removal from the medical [place] against the advice of my attending physician(s), hereby release [facility], its physicians, officers and employees, severally and individually, from any and all liability of any nature whatsoever for any injury or harm or complication of any kind that may result directly or indirectly, by reason of my terminating my stay as a patient at [facility] and my departure from [facility], and hereby waive any and all rights of action I may now have or later acquire as a result of my voluntary departure from [facility] and the termination of my stay as a patient therein.    This release is made with the full knowledge of the danger that may result from the action which I am taking.      Date:_______________________                         ___________________________                                                                                    Patient/Legal Representative    Witness:        ____________________________                          ___________________________  Nurse                                                                        Physician

## 2022-11-05 NOTE — LETTER
In him when I do is I just placed a  Patient: Jorgito Arreaga  YOB: 1965  Date: 11/5/2022 Time: 5:27 PM  Location: [facility]    Leaving the [facility] Against Medical Advice    Chart #:[id number]    This will certify that I, the undersigned,    ______________________________________________________________________    A patient in the above named medical center, having requested discharge and removal from the medical center against the advice of my attending physician(s), hereby release [facility], its physicians, officers and employees, severally and individually, from any and all liability of any nature whatsoever for any injury or harm or complication of any kind that may result directly or indirectly, by reason of my terminating my stay as a patient at [facility] and my departure from [facility], and hereby waive any and all rights of action I may now have or later acquire as a result of my voluntary departure from [facility] and the termination of my stay as a patient therein.    He or his authorized caregiver has been informed and understands the inherent risks, including death, stroke, permanent disability, loss of current lifestyle, heart attack.      This release is made with the full knowledge of the danger that may result from the action which I am taking.      Date:_______________________                         ___________________________                                                                                    Patient/Legal Representative    Witness:        ____________________________                          ___________________________  Nurse                                                                        Physician

## 2022-11-05 NOTE — ED PROVIDER NOTES
History     Chief Complaint   Patient presents with    Groin Pain     Groin/ lower abdominal pain x 2 week. Pain occurs after eating. Denies constipation. Hx of polyps. Has blood in stool every other day       Review of patient's allergies indicates:  No Known Allergies    History of Present Illness   HPI    11/5/2022, 13:30 PM  The history is provided by the patient    Jorgito Arreaga is a 57 y.o. male presenting to the ED for left-sided groin pain.  Patient reports that he started having a sensation in his left groin this started about 2 weeks ago.  Is been progressively getting worse.  Is located in the inguinal region.  It does not radiate.  It is described as a pushing sensation or tearing sensation.  It appears to be aggravated by eating.  The pain can last several seconds to several minutes.  When the pain does occur, patient reports that he is not able to have a bowel movement nor is he able to pass any gas.  Reports that when the pain subsides, he is able to fart a lot.  The pain was severe prior to arrival.  Currently is rated 3/10.  It is not associated with any fever, nausea, vomiting, paresthesias of the lower extremity, dysuria, urgency, frequency.    Of note patient does have a history of a right inguinal hernia with a bulge on the right side.  This is been present for a year.  He is not having any pain. The right sided hernia has been gradual in onset over the last year.       Patient's blood pressure was elevated on arrival.  Patient had interruption in his blood pressure medication.  He is now back on them.  Patient denies any chest pain, chest pressure, shortness of breath, difficulty breathing, vision loss, difficulty swallowing, numbness or weakness to 1 side.    Arrival mode:  Personal Vehicle    PCP: Kirsten Queen NP     Allergies:  Review of patient's allergies indicates:  No Known Allergies    Past Medical History:  Past Medical History:   Diagnosis Date    Abscess 9/5/2018     Cataracts, bilateral     Chest pain 12/6/2018    Dehydration     Diabetes mellitus, type 2     Diagnosed 7/2016    Folliculitis 9/15/2017    GERD (gastroesophageal reflux disease)     Hypertension     Inflammatory polyps of colon     Pancreatitis, alcoholic, acute     Port catheter in place     TIA (transient ischemic attack)     Tinea cruris 9/15/2017       Past Surgical History:  Past Surgical History:   Procedure Laterality Date    COLONOSCOPY      COLONOSCOPY N/A 6/15/2018    Procedure: COLONOSCOPY;  Surgeon: Jim Hicks MD;  Location: [facility];  Service: Endoscopy;  Laterality: N/A;    ESOPHAGOGASTRODUODENOSCOPY N/A 6/15/2018    Procedure: ESOPHAGOGASTRODUODENOSCOPY (EGD);  Surgeon: Jim Hicks MD;  Location: [facility];  Service: Endoscopy;  Laterality: N/A;         Family History:  Family History   Problem Relation Age of Onset    Hypertension Mother     Hypertension Father        Social History:  Social History     Tobacco Use    Smoking status: Former     Types: Cigarettes    Smokeless tobacco: Never   Substance and Sexual Activity    Alcohol use: Yes     Comment:  Daily - States that he drinks 1/2 pint of vodka and about 1-2 cans of beer daily    Drug use: No    Sexual activity: Yes     Partners: Female        Review of Systems   Review of Systems   Constitutional:  Negative for fever.   HENT:  Negative for sore throat.    Eyes:  Negative for visual disturbance.   Respiratory:  Negative for shortness of breath.    Cardiovascular:  Negative for chest pain.   Gastrointestinal:  Negative for abdominal pain, nausea and vomiting.        (+) Unable to pass gas when pain occurs   Genitourinary:  Negative for dysuria.        (+) Bulge in right inguinal region (-) Pain in right inguinal region (+) Pain left inguinal region.   Musculoskeletal:  Negative for back pain.   Skin:  Negative for rash.   Neurological:  Negative for facial asymmetry, speech difficulty, weakness, light-headedness, numbness and  headaches.   Hematological:  Does not bruise/bleed easily.        Physical Exam     Initial Vitals [11/05/22 1326]   BP Pulse Resp Temp SpO2   (!) 216/110 82 16 98.3 °F (36.8 °C) 98 %      MAP       --          Physical Exam    Nursing Notes and Vital Signs Reviewed.  Constitutional: Patient is in no apparent distress. Well-developed and well-nourished.  Head: Atraumatic. Normocephalic.  Eyes: PERRL. EOM intact. Conjunctivae are not pale. No scleral icterus.  ENT: Mucous membranes are moist. Oropharynx is clear and symmetric.    Neck: Supple. Full ROM. No lymphadenopathy.  Cardiovascular: Regular rate. Regular rhythm. No murmurs, rubs, or gallops. Distal pulses are 2+ and symmetric.  Pulmonary/Chest: No respiratory distress. Clear to auscultation bilaterally. No wheezing or rales.  Abdominal: Soft and non-distended.  There is no tenderness.  No rebound, guarding, or rigidity. Good bowel sounds.  Genitourinary:  Chaperone exam.  No CVA tenderness  There is approximately 4 cm area of prominence to the right inguinal region.  It is soft. Non-reducible. There is tenderness palpation of the left and inguinal region.  No bulging noted.  No skin changes noted.  There is normal testicular lie.  Positive cremasteric reflex.  Testicles are nontender.  Epididymis is nontender.  No changes to the skin of the scrotum.  Patient is uncircumcised.  Musculoskeletal: Moves all extremities. No obvious deformities. No edema. No calf tenderness.  Skin: Warm and dry.  Neurological:  Alert, awake, and appropriate.  Normal speech.  No acute focal neurological deficits are appreciated.  Cranial nerves 2-12 intact.  NIH Stroke Scale equals 0.  GCS 15.   Psychiatric: Normal affect. Good eye contact. Appropriate in content.     ED Course     ED Procedures:  Critical Care    Date/Time: 11/5/2022 1:30 PM  Performed by: Phyllis Gonzalez DO  Authorized by: Phyllis Gonzalez DO   Direct patient critical care time: 20 minutes  Additional history  critical care time: 0 minutes  Ordering / reviewing critical care time: 2 minutes  Documentation critical care time: 5 minutes  Consulting other physicians critical care time: 5 minutes  Total critical care time (exclusive of procedural time) : 32 minutes  Critical care time was exclusive of separately billable procedures and treating other patients.  Critical care was necessary to treat or prevent imminent or life-threatening deterioration of the following conditions: circulatory failure.  Critical care was time spent personally by me on the following activities: blood draw for specimens, discussions with consultants, development of treatment plan with patient or surrogate, interpretation of cardiac output measurements, evaluation of patient's response to treatment, examination of patient, obtaining history from patient or surrogate, ordering and performing treatments and interventions, ordering and review of laboratory studies, ordering and review of radiographic studies, pulse oximetry, re-evaluation of patient's condition and vascular access procedures.        ED Vital Signs:  Vitals:    11/05/22 1325 11/05/22 1326 11/05/22 1400 11/05/22 1401   BP:  (!) 216/110  (!) 189/93   Pulse:  82 70 70   Resp:  16     Temp:  98.3 °F (36.8 °C)     TempSrc: Oral Oral     SpO2:  98%  99%   Weight:  73.8 kg (162 lb 11.2 oz)      11/05/22 1432 11/05/22 1516 11/05/22 1528 11/05/22 1553   BP: (!) 203/100 (!) 201/119 (!) 222/126 (!) 212/107   Pulse: 61 75  72   Resp: 16 19  19   Temp:       TempSrc:       SpO2: 98% 98%  99%   Weight:        11/05/22 1604 11/05/22 1631 11/05/22 1645 11/05/22 1721   BP: (!) 244/114 (!) 221/102 (!) 213/117 (!) 205/108   Pulse:  67 84 79   Resp:  19 20 20   Temp:       TempSrc:       SpO2:  100% 100% 98%   Weight:           Abnormal Lab Results:  Labs Reviewed   CBC W/ AUTO DIFFERENTIAL - Abnormal; Notable for the following components:       Result Value    RBC 3.78 (*)     Hemoglobin 11.5 (*)      Hematocrit 35.2 (*)     All other components within normal limits   COMPREHENSIVE METABOLIC PANEL - Abnormal; Notable for the following components:    Glucose 319 (*)     AST 47 (*)     ALT 56 (*)     All other components within normal limits   URINALYSIS, REFLEX TO URINE CULTURE - Abnormal; Notable for the following components:    Glucose, UA 2+ (*)     All other components within normal limits    Narrative:     Specimen Source->Urine   LIPASE   LACTIC ACID, PLASMA   HIV 1 / 2 ANTIBODY   HEPATITIS C ANTIBODY   HEP C VIRUS HOLD SPECIMEN        All Lab Results:  Results for orders placed or performed during the hospital encounter of 11/05/22   CBC W/ AUTO DIFFERENTIAL   Result Value Ref Range    WBC 4.54 3.90 - 12.70 K/uL    RBC 3.78 (L) 4.60 - 6.20 M/uL    Hemoglobin 11.5 (L) 14.0 - 18.0 g/dL    Hematocrit 35.2 (L) 40.0 - 54.0 %    MCV 93 82 - 98 fL    MCH 30.4 27.0 - 31.0 pg    MCHC 32.7 32.0 - 36.0 g/dL    RDW 11.9 11.5 - 14.5 %    Platelets 191 150 - 450 K/uL    MPV 10.9 9.2 - 12.9 fL    Immature Granulocytes 0.2 0.0 - 0.5 %    Gran # (ANC) 2.8 1.8 - 7.7 K/uL    Immature Grans (Abs) 0.01 0.00 - 0.04 K/uL    Lymph # 1.3 1.0 - 4.8 K/uL    Mono # 0.4 0.3 - 1.0 K/uL    Eos # 0.1 0.0 - 0.5 K/uL    Baso # 0.02 0.00 - 0.20 K/uL    nRBC 0 0 /100 WBC    Gran % 60.6 38.0 - 73.0 %    Lymph % 28.2 18.0 - 48.0 %    Mono % 9.3 4.0 - 15.0 %    Eosinophil % 1.3 0.0 - 8.0 %    Basophil % 0.4 0.0 - 1.9 %    Differential Method Automated    Comp. Metabolic Panel   Result Value Ref Range    Sodium 138 136 - 145 mmol/L    Potassium 3.6 3.5 - 5.1 mmol/L    Chloride 103 95 - 110 mmol/L    CO2 25 23 - 29 mmol/L    Glucose 319 (H) 70 - 110 mg/dL    BUN 12 6 - 20 mg/dL    Creatinine 1.0 0.5 - 1.4 mg/dL    Calcium 9.1 8.7 - 10.5 mg/dL    Total Protein 6.5 6.0 - 8.4 g/dL    Albumin 3.8 3.5 - 5.2 g/dL    Total Bilirubin 0.9 0.1 - 1.0 mg/dL    Alkaline Phosphatase 130 55 - 135 U/L    AST 47 (H) 10 - 40 U/L    ALT 56 (H) 10 - 44 U/L    Anion  Gap 10 8 - 16 mmol/L    eGFR >60.0 >60 mL/min/1.73 m^2   Lipase   Result Value Ref Range    Lipase 4 4 - 60 U/L   Urinalysis, Reflex to Urine Culture Urine, Clean Catch    Specimen: Urine   Result Value Ref Range    Specimen UA Urine, Clean Catch     Color, UA Yellow Yellow, Straw, Brittany    Appearance, UA Clear Clear    pH, UA 6.0 5.0 - 8.0    Specific Gravity, UA 1.020 1.005 - 1.030    Protein, UA Negative Negative    Glucose, UA 2+ (A) Negative    Ketones, UA Negative Negative    Bilirubin (UA) Negative Negative    Occult Blood UA Negative Negative    Nitrite, UA Negative Negative    Urobilinogen, UA Negative <2.0 EU/dL    Leukocytes, UA Negative Negative   Lactic acid, plasma   Result Value Ref Range    Lactate (Lactic Acid) 0.8 0.5 - 2.2 mmol/L             Imaging Results:  Imaging Results              CT Abdomen Pelvis With Contrast (Final result)  Result time 11/05/22 15:48:25      Final result by SAMMY Anderson Sr., MD (11/05/22 15:48:25)                   Impression:      1. There is a moderate size right inguinal hernia with loops of small bowel extending down the right inguinal canal. The largest loop of small bowel within this hernia has a diameter of 21 mm. There is no evidence of obstruction or gastrointestinal ischemia associated with this hernia.  2. There is a 7 mm stone in the dependent portion of the gallbladder.  3. There is marked generalized atrophy of the pancreas. There are calcifications scattered throughout the pancreas.  This is characteristic of chronic pancreatitis.  All CT scans at this facility use dose modulation, iterative reconstruction, and/or weight base dosing when appropriate to reduce radiation dose when appropriate to reduce radiation dose to as low as reasonably achievable.      Electronically signed by: Mayo Anderson MD  Date:    11/05/2022  Time:    15:48               Narrative:    EXAMINATION:  CT ABDOMEN PELVIS WITH CONTRAST    CLINICAL HISTORY:  LLQ abdominal  pain;inguinal pain - concerned about incarcerated hernia.  Clinically, patient has hernia on right side.;    TECHNIQUE:  Standard abdomen and pelvis CT protocol with IV contrast was performed.  100 mL of Omnipaque 350 contrast material was used for this examination.  There was no oral contrast administered.    COMPARISON:  01/30/2019    FINDINGS:  Finding: The size of the heart is within normal limits. The lungs are clear. There is no pneumothorax or pleural effusion.    There is a 7 mm stone in the dependent portion of the gallbladder.  There is marked generalized atrophy of the pancreas.  There are calcifications scattered throughout the pancreas.  The liver, spleen, adrenals, and kidneys are normal in appearance.  The ureters and the urinary bladder are normal in appearance.  The prostate is normal in appearance.  The appendix is normal in appearance.  There is a moderate size right inguinal hernia with loops of small bowel extending down the right inguinal canal.  The largest loop of small bowel within this hernia has a diameter of 21 mm.  There is no evidence of obstruction or gastrointestinal ischemia associated with this hernia.  There is no free fluid within the abdomen or pelvis. There is no pneumoperitoneum.  There is a mild amount of atherosclerosis.                                            The Emergency Provider reviewed the vital signs and test results, which are outlined above.     ED Discussion     ED Course as of 11/05/22 1732   Sat Nov 05, 2022   1441 Glucose(!): 319 [LB]   1441 Glucose, UA(!): 2+ [LB]   1558 Spoke with Dr. Anderson:   No hernia on the left side. [LB]   1626 Patient does not appear to be any acute distress.  Cranial nerves 2-12 intact.  Pain is rated 3/10.  We discussed findings of incarcerated hernia.  Laboratory examination does not appear to be consistent was strangulated hernia. [LB]   1642 Discussed with patient findings of incarcerated hernia.  Discussed signs and symptoms of  strangulation including nausea, vomiting, worsening pain, intolerable pain,.  We discussed in detail indications to return to the emergency department including vomiting, intolerable pain, unable to have bowel movement.  We discussed things that aggravate the hernia including Valsalva moon over and lifting.  We discussed risk benefits of pain medications. [LB]   1724 Patient remains awake, alert, oriented.  Patient has been counseled on the importance blood pressure compliance.  Patient voiced wish to sign out against medical advice.  He has someone out waiting in the lobby to bring him home.  He also states that he has to be to work later tonight. [LB]      ED Course User Index  [LB] Phyllis Gonzalez, DO       AMA    This patient is choosing to leave against medical advice.  Dr. Gonzalez has personally explained to him that choosing to do so may result in permanent bodily harm or death.  The EP discussed at great length that without further evaluation and monitoring there may be unforeseen circumstances and/or deterioration causing permanent bodily harm or death as a result of his choice.  He verbalized these risks back to the physician in laymans terms.  He is alert, oriented, exhibits no evidence of an altered level of consciousness, and and shows the mental cpacity to make clear decisions regarding his health care at this time. He continues to wish to leave against medical advice.     In light of his decision to leave AMA, follow-up has been arranged and he is aware of the importance of following up as instructed.  He has been advised that he should return to the ED immediately if he changes his mind at any time, or if his condition begins to change or worsen in any way.        I discussed with patient and/or family/caretaker that evaluation in the ED does not suggest any emergent or life threatening medical conditions requiring immediate intervention beyond what was provided in the ED, and I believe patient  "is safe for discharge.  Regardless, an unremarkable evaluation in the ED does not preclude the development or presence of a serious of life threatening condition. As such, patient was instructed to return immediately for any worsening or change in current symptoms.      ED Medication(s):  Medications   sodium chloride 0.9% bolus 1,000 mL (0 mLs Intravenous Stopped 11/5/22 1638)   hydrALAZINE injection 10 mg (10 mg Intravenous Given 11/5/22 1528)   iohexoL (OMNIPAQUE 350) injection 100 mL (100 mLs Intravenous Given 11/5/22 1511)   labetaloL injection 10 mg (10 mg Intravenous Given 11/5/22 1604)   labetaloL injection 10 mg (10 mg Intravenous Given 11/5/22 1652)             MIPS Measures     Smoker? No     Hypertension: History of Hypertension: The patient has elevated blood pressure (higher than 120/80) while being treated in the ED but has a history of hypertension.       Medical Decision Making           Additional MDM:     NIH Stroke Scale:   Interval = baseline (upon arrival/admit)  Level of consciousness = 0 - alert  LOC questions = 0 - answers both correctly  LOC commands = 0 - performs both correctly  Best gaze = 0 - normal  Visual = 0 - no visual loss  Facial palsy = 0 - normal  Motor left arm =  0 - no drift  Motor right arm =  0 - no drift  Motor left leg = 0 - no drift  Motor right leg =  0 - no drift  Limb ataxia = 0 - absent  Sensory = 0 - normal  Best language = 0 - no aphasia  Dysarthria = 0 - normal articulation  Extinction and inattention = 0 - no neglect  NIH Stroke Scale Total = 0     MDM  Reviewed: vitals and nursing note  Interpretation: labs and CT scan  Consults: general surgery        Portions of this note may have been created with voice recognition software. Occasional "wrong-word" or "sound-a-like" substitutions may have occurred due to the inherent limitations of voice recognition software. Please, read the note carefully and recognize, using context, where substitutions have occurred.   " "         Clinical Impression       ICD-10-CM ICD-9-CM   1. Left groin pain  R10.32 789.04   2. Irreducible right inguinal hernia  K40.30 550.10   3. Hypertensive urgency  I16.0 401.9   4. Calculus of gallbladder without cholecystitis without obstruction  K80.20 574.20   5. Left against medical advice  Z53.29 V64.2         ED Disposition       Disposition: AMA  Patient condition: Good    Medication List     Medication List        START taking these medications      HYDROcodone-acetaminophen 5-325 mg per tablet  Commonly known as: NORCO  Take 1 tablet by mouth every 6 (six) hours as needed for Pain. Narcotic warning     ibuprofen 600 MG tablet  Commonly known as: ADVIL,MOTRIN  Take 1 tablet (600 mg total) by mouth every 6 (six) hours as needed for Pain.            ASK your doctor about these medications      aspirin 325 MG tablet  Take 1 tablet (325 mg total) by mouth once daily.     atorvastatin 40 MG tablet  Commonly known as: LIPITOR  Take 1 tablet (40 mg total) by mouth once daily.     BD JOEL 2ND GEN PEN NEEDLE 32 gauge x 5/32" Ndle  Generic drug: pen needle, diabetic  FOR USE ONCE NIGHTLY, AS DIRECTED     blood sugar diagnostic Strp  Commonly known as: ONETOUCH ULTRA BLUE TEST STRIP  USE TO CHECK SUGAR BEFORE MEALS AND AT BEDTIME     clotrimazole-betamethasone 1-0.05% cream  Commonly known as: LOTRISONE  Apply topically 2 (two) times daily.     furosemide 20 MG tablet  Commonly known as: LASIX  Prn for swelling.     HumaLOG U-100 Insulin 100 unit/mL injection  Generic drug: insulin lispro  INJECT 8 UNITS SUBCUTANEOUSLY WITH LARGEST MEAL OF THE DAY, INJECT ADDITIONAL 4 UNITS AT A TIME IF GLUCOSE IS ELEVATED     insulin syringe-needle,dispos. 1 mL 28 gauge x 1/2" Syrg  1 each by Misc.(Non-Drug; Combo Route) route 2 (two) times daily.     LANTUS SOLOSTAR U-100 INSULIN glargine 100 units/mL SubQ pen  Generic drug: insulin  INJECT 50 UNITS INTO SKIN EVERY EVENING.     losartan 100 MG tablet  Commonly known as: " COZAAR  Take 1 tablet by mouth once daily     mupirocin 2 % ointment  Commonly known as: BACTROBAN  Apply topically 3 (three) times daily.     naproxen 375 MG tablet  Commonly known as: NAPROSYN  Take 1 tablet (375 mg total) by mouth 2 (two) times daily with meals.     ONETOUCH DELICA PLUS LANCET 33 gauge Misc  Generic drug: lancets  USE 1  TO CHECK GLUCOSE 4 TIMES DAILY BEFORE MEAL(S)     potassium, sodium phosphates 280-160-250 mg Pwpk  Commonly known as: PHOS-NAK  Take 1 packet by mouth 4 (four) times daily before meals and nightly.     verapamiL 240 MG CR tablet  Commonly known as: CALAN-SR  Take 1 tablet by mouth once daily.               Where to Get Your Medications        You can get these medications from any pharmacy    Bring a paper prescription for each of these medications  HYDROcodone-acetaminophen 5-325 mg per tablet  ibuprofen 600 MG tablet         ED Follow-up   Follow-up Information       Smooth Denny MD In 2 days.    Specialty: General Surgery  Why: Return to emergency department for:  Vomiting, worsening pain, excruciating pain, skin changes to the hernia, unable to pass gas, vomiting, or worsening condition.  Contact information:  [address]  [place] [address] [phone]               Kirsten Queen NP In 2 days.    Specialty: Family Medicine  Why: For blood pressure management.  Return to emergency department for chest pain, chest pressure, shortness of breath, difficulty breathing, numbness or weakness to 1 side, slurred speech, difficulty swallowing, or other concerns.  Contact information:  [address] [address] [address] [phone]                                      Phyllis Gonzalez DO  11/05/22 4477

## 2022-11-07 ENCOUNTER — TELEPHONE (OUTPATIENT)
Dept: SURGERY | Facility: CLINIC | Age: 57
End: 2022-11-07
Payer: MEDICAID

## 2022-11-07 NOTE — TELEPHONE ENCOUNTER
----- Message from Manuel Arrington MA sent at 11/7/2022  9:48 AM CST -----  Good morning,  Can you please schedule this patient, he has medicaid.  Thanks  ----- Message -----  From: Smooth Denny MD  Sent: 11/5/2022   6:15 PM CST  To: Manuel Arrington MA, Mynor Yu MA    Patient needs an appointment to see someone in general surgery about an incarcerated right inguinal hernia

## 2022-11-09 ENCOUNTER — TELEPHONE (OUTPATIENT)
Dept: INTERNAL MEDICINE | Facility: CLINIC | Age: 57
End: 2022-11-09
Payer: MEDICAID

## 2022-11-09 NOTE — TELEPHONE ENCOUNTER
----- Message from Kathy Rios sent at 11/9/2022  3:57 PM CST -----  Contact: Self/[phone]  Pt is calling in regards to a hospital f/u, he states he needs to be seen soon to get a referral due to a hernia in his scrotum. Please give him a call back at [phone]. Thank you s/g

## 2022-11-10 ENCOUNTER — TELEPHONE (OUTPATIENT)
Dept: SURGERY | Facility: CLINIC | Age: 57
End: 2022-11-10
Payer: MEDICAID

## 2022-11-10 ENCOUNTER — TELEPHONE (OUTPATIENT)
Dept: INTERNAL MEDICINE | Facility: CLINIC | Age: 57
End: 2022-11-10
Payer: MEDICAID

## 2022-11-10 NOTE — TELEPHONE ENCOUNTER
----- Message from Kathy Rios sent at 11/9/2022  3:57 PM CST -----  Contact: Self/[phone]  Pt is calling in regards to a hospital f/u, he states he needs to be seen soon to get a referral due to a hernia in his scrotum. Please give him a call back at [phone]. Thank you s/g     
----- Message from Nano Moss sent at 11/10/2022 12:52 PM CST -----  Type:  Patient Returning Call    Who Called:patient  Who Left Message for Patient:nurse  Does the patient know what this is regarding?:yes appointment today  Would the patient rather a call back or a response via [email or web address]? call  Best Call Back Number:[phone]  Additional Information:         
Patient was given information to contact general surgery to schedule. Next available that I could schedule was not until 12/13. Patient express understanding  
Tried to contact patient no answer, left a message to contact office  
Hpi Title: Evaluation of a Skin Lesion
How Severe Are Your Spot(S)?: mild
Have Your Spot(S) Been Treated In The Past?: has been treated
When Was It Treated?: 07/2018

## 2022-11-10 NOTE — TELEPHONE ENCOUNTER
----- Message from Racheal Klein sent at 11/10/2022  2:00 PM CST -----  Pt is returning Ainsley call. Pt can be reached at [phone] (nhqw)

## 2022-11-10 NOTE — TELEPHONE ENCOUNTER
Returned call to patient, he is aware of his appointment date and tome with Dr. Denny to discuss possible hernia surgery , the patient voice understanding.

## 2022-11-10 NOTE — TELEPHONE ENCOUNTER
----- Message from Manuel Arrington MA sent at 11/10/2022  2:24 PM CST -----  Regarding: Jorgito Hernández MRN [id number]  Good afternoon,  Can you please schedule this patient on Dr. Denny's schedule for 11/17 at 4:40 for an hospital follow up (Hernia). I have the time slot blocked.  Thanks  ----- Message -----  From: Racheal Klein  Sent: 11/10/2022   2:03 PM CST  To: Denisha Rebollar Staff    Pt is returning Ainsley call. Pt can be reached at [phone] (home)

## 2022-11-17 ENCOUNTER — HOSPITAL ENCOUNTER (OUTPATIENT)
Dept: CARDIOLOGY | Facility: HOSPITAL | Age: 57
Discharge: HOME | End: 2022-11-17
Attending: SURGERY
Payer: MEDICAID

## 2022-11-17 ENCOUNTER — OFFICE VISIT (OUTPATIENT)
Dept: SURGERY | Facility: CLINIC | Age: 57
End: 2022-11-17
Payer: MEDICAID

## 2022-11-17 VITALS
HEART RATE: 81 BPM | BODY MASS INDEX: 25.51 KG/M2 | WEIGHT: 158.06 LBS | SYSTOLIC BLOOD PRESSURE: 118 MMHG | DIASTOLIC BLOOD PRESSURE: 105 MMHG

## 2022-11-17 DIAGNOSIS — K40.30 IRREDUCIBLE RIGHT INGUINAL HERNIA: ICD-10-CM

## 2022-11-17 DIAGNOSIS — K40.90 RIGHT INGUINAL HERNIA: Primary | ICD-10-CM

## 2022-11-17 DIAGNOSIS — K40.90 RIGHT INGUINAL HERNIA: ICD-10-CM

## 2022-11-17 PROCEDURE — 99204 OFFICE O/P NEW MOD 45 MIN: CPT | Mod: S$PBB,,, | Performed by: SURGERY

## 2022-11-17 PROCEDURE — 1159F MED LIST DOCD IN RCRD: CPT | Mod: CPTII,,, | Performed by: SURGERY

## 2022-11-17 PROCEDURE — 99215 OFFICE O/P EST HI 40 MIN: CPT | Mod: PBBFAC | Performed by: SURGERY

## 2022-11-17 PROCEDURE — 1159F PR MEDICATION LIST DOCUMENTED IN MEDICAL RECORD: ICD-10-PCS | Mod: CPTII,,, | Performed by: SURGERY

## 2022-11-17 PROCEDURE — 3080F DIAST BP >= 90 MM HG: CPT | Mod: CPTII,,, | Performed by: SURGERY

## 2022-11-17 PROCEDURE — 4010F ACE/ARB THERAPY RXD/TAKEN: CPT | Mod: CPTII,,, | Performed by: SURGERY

## 2022-11-17 PROCEDURE — 93010 EKG 12-LEAD: ICD-10-PCS | Mod: ,,, | Performed by: INTERNAL MEDICINE

## 2022-11-17 PROCEDURE — 3008F BODY MASS INDEX DOCD: CPT | Mod: CPTII,,, | Performed by: SURGERY

## 2022-11-17 PROCEDURE — 99999 PR PBB SHADOW E&M-EST. PATIENT-LVL V: CPT | Mod: PBBFAC,,, | Performed by: SURGERY

## 2022-11-17 PROCEDURE — 4010F PR ACE/ARB THEARPY RXD/TAKEN: ICD-10-PCS | Mod: CPTII,,, | Performed by: SURGERY

## 2022-11-17 PROCEDURE — 99999 PR PBB SHADOW E&M-EST. PATIENT-LVL V: ICD-10-PCS | Mod: PBBFAC,,, | Performed by: SURGERY

## 2022-11-17 PROCEDURE — 3080F PR MOST RECENT DIASTOLIC BLOOD PRESSURE >= 90 MM HG: ICD-10-PCS | Mod: CPTII,,, | Performed by: SURGERY

## 2022-11-17 PROCEDURE — 99204 PR OFFICE/OUTPT VISIT, NEW, LEVL IV, 45-59 MIN: ICD-10-PCS | Mod: S$PBB,,, | Performed by: SURGERY

## 2022-11-17 PROCEDURE — 93005 ELECTROCARDIOGRAM TRACING: CPT

## 2022-11-17 PROCEDURE — 3074F PR MOST RECENT SYSTOLIC BLOOD PRESSURE < 130 MM HG: ICD-10-PCS | Mod: CPTII,,, | Performed by: SURGERY

## 2022-11-17 PROCEDURE — 3008F PR BODY MASS INDEX (BMI) DOCUMENTED: ICD-10-PCS | Mod: CPTII,,, | Performed by: SURGERY

## 2022-11-17 PROCEDURE — 93010 ELECTROCARDIOGRAM REPORT: CPT | Mod: ,,, | Performed by: INTERNAL MEDICINE

## 2022-11-17 PROCEDURE — 3074F SYST BP LT 130 MM HG: CPT | Mod: CPTII,,, | Performed by: SURGERY

## 2022-11-17 RX ORDER — LIDOCAINE HYDROCHLORIDE 10 MG/ML
1 INJECTION, SOLUTION EPIDURAL; INFILTRATION; INTRACAUDAL; PERINEURAL ONCE
Status: DISCONTINUED | OUTPATIENT
Start: 2022-11-17 | End: 2022-11-30 | Stop reason: HOSPADM

## 2022-11-17 NOTE — PROGRESS NOTES
"Patient ID: Jorgito Arreaga is a 57 y.o. male.        Chief Complaint: Consult (Hernia )      HPI:  Patient presented to the emergency room and ever with an enlarging right inguinal hernia.      He was seen and a CT scan was done.  He presents now to discuss hernia repair.  Patient feels that he has a slight bulge in his left groin    Review of Systems   Constitutional: Negative.    HENT: Negative.     Eyes: Negative.    Respiratory: Negative.     Cardiovascular: Negative.    Gastrointestinal: Negative.         Right groin bulge   Endocrine: Negative.    Genitourinary: Negative.    Musculoskeletal: Negative.    Skin: Negative.    Allergic/Immunologic: Negative.    Neurological: Negative.    Hematological: Negative.    Psychiatric/Behavioral: Negative.       Current Outpatient Medications   Medication Sig Dispense Refill    aspirin 325 MG tablet Take 1 tablet (325 mg total) by mouth once daily. 180 tablet 1    BD JOEL 2ND GEN PEN NEEDLE 32 gauge x 5/32" Ndle FOR USE ONCE NIGHTLY, AS DIRECTED 100 each 0    blood sugar diagnostic (ONETOUCH ULTRA BLUE TEST STRIP) Strp USE TO CHECK SUGAR BEFORE MEALS AND AT BEDTIME 100 strip 11    clotrimazole-betamethasone 1-0.05% (LOTRISONE) cream Apply topically 2 (two) times daily. 15 g 0    furosemide (LASIX) 20 MG tablet Prn for swelling. 90 tablet 0    HUMALOG U-100 INSULIN 100 unit/mL injection INJECT 8 UNITS SUBCUTANEOUSLY WITH LARGEST MEAL OF THE DAY, INJECT ADDITIONAL 4 UNITS AT A TIME IF GLUCOSE IS ELEVATED 10 mL 5    ibuprofen (ADVIL,MOTRIN) 600 MG tablet Take 1 tablet (600 mg total) by mouth every 6 (six) hours as needed for Pain. 28 tablet 0    insulin (LANTUS SOLOSTAR U-100 INSULIN) glargine 100 units/mL (3mL) SubQ pen INJECT 50 UNITS INTO SKIN EVERY EVENING. 30 mL 5    insulin syringe-needle,dispos. 1 mL 28 gauge x 1/2" Syrg 1 each by Misc.(Non-Drug; Combo Route) route 2 (two) times daily. 60 Syringe 0    losartan (COZAAR) 100 MG tablet Take 1 tablet by mouth once daily " 14 tablet 0    mupirocin (BACTROBAN) 2 % ointment Apply topically 3 (three) times daily. 22 g 0    naproxen (NAPROSYN) 375 MG tablet Take 1 tablet (375 mg total) by mouth 2 (two) times daily with meals. 30 tablet 0    ONETOUCH DELICA PLUS LANCET 33 gauge Misc USE 1  TO CHECK GLUCOSE 4 TIMES DAILY BEFORE MEAL(S) 100 each 0    potassium, sodium phosphates (PHOS-NAK) 280-160-250 mg PwPk Take 1 packet by mouth 4 (four) times daily before meals and nightly.  0    verapamiL (CALAN-SR) 240 MG CR tablet Take 1 tablet by mouth once daily. 30 tablet 0    atorvastatin (LIPITOR) 40 MG tablet Take 1 tablet (40 mg total) by mouth once daily. 90 tablet 4     Current Facility-Administered Medications   Medication Dose Route Frequency Provider Last Rate Last Admin    LIDOcaine (PF) 10 mg/ml (1%) injection 10 mg  1 mL Intradermal Once Smooth Denny MD           Review of patient's allergies indicates:  No Known Allergies    Past Medical History:   Diagnosis Date    Abscess 9/5/2018    Cataracts, bilateral     Chest pain 12/6/2018    Dehydration     Diabetes mellitus, type 2     Diagnosed 7/2016    Folliculitis 9/15/2017    GERD (gastroesophageal reflux disease)     Hypertension     Inflammatory polyps of colon     Pancreatitis, alcoholic, acute     Port catheter in place     TIA (transient ischemic attack)     Tinea cruris 9/15/2017       Past Surgical History:   Procedure Laterality Date    COLONOSCOPY      COLONOSCOPY N/A 6/15/2018    Procedure: COLONOSCOPY;  Surgeon: Jim Hicks MD;  Location: [facility];  Service: Endoscopy;  Laterality: N/A;    ESOPHAGOGASTRODUODENOSCOPY N/A 6/15/2018    Procedure: ESOPHAGOGASTRODUODENOSCOPY (EGD);  Surgeon: Jim Hicks MD;  Location: [facility];  Service: Endoscopy;  Laterality: N/A;       Family History   Problem Relation Age of Onset    Hypertension Mother     Hypertension Father        Social History     Socioeconomic History    Marital status:     Number of children: 3    Tobacco Use    Smoking status: Former     Types: Cigarettes    Smokeless tobacco: Never   Substance and Sexual Activity    Alcohol use: Yes     Comment:  Daily - States that he drinks 1/2 pint of vodka and about 1-2 cans of beer daily    Drug use: No    Sexual activity: Yes     Partners: Female       Vitals:    11/17/22 1435   BP: (!) 118/105   Pulse: 81       Physical Exam  Constitutional:       General: He is not in acute distress.     Appearance: He is well-developed.   HENT:      Head: Normocephalic and atraumatic.   Eyes:      General: No scleral icterus.     Pupils: Pupils are equal, round, and reactive to light.   Neck:      Thyroid: No thyromegaly.      Vascular: No JVD.      Trachea: No tracheal deviation.   Cardiovascular:      Rate and Rhythm: Normal rate and regular rhythm.      Heart sounds: Normal heart sounds.   Pulmonary:      Effort: Pulmonary effort is normal.      Breath sounds: Normal breath sounds.   Abdominal:      General: Bowel sounds are normal. There is no distension.      Palpations: Abdomen is soft. There is no mass.      Tenderness: There is no abdominal tenderness. There is no guarding or rebound.      Hernia: Hernia: reducible right.      Comments: No obvious left inguinal hernia   Musculoskeletal:         General: Normal range of motion.      Cervical back: Normal range of motion and neck supple.   Lymphadenopathy:      Cervical: No cervical adenopathy.   Skin:     General: Skin is warm and dry.   Neurological:      Mental Status: He is alert and oriented to person, place, and time.   Psychiatric:         Mood and Affect: Mood normal.         Behavior: Behavior normal.         Thought Content: Thought content normal.         Judgment: Judgment normal.     CT was reviewed   Labs were reviewed    Assessment & Plan:     Reducible right inguinal hernia   Possible left inguinal hernia based on history.      Robotic right inguinal hernia repair with mesh, left inguinal hernia will be  repaired if present.  Rationale for the robotic approach of the use of mesh was discussed.    Preoperative EKG    Risks benefits and complications were discussed    Risk of hernia repair includes infection, bleeding, mesh infection, testicular atrophy, and pain or numbness in the groin that may be long-lasting.    Multiple medical problems as listed below    Patient Active Problem List   Diagnosis    Alcohol-induced acute pancreatitis    Gastroesophageal reflux disease    Alcohol abuse    Hypertension associated with diabetes    Abdominal pain    Acute renal failure    Hyperkalemia    Edema    Hyperglycemia due to type 1 diabetes mellitus    Recurrent pancreatitis    Polyp of colon    Screen for colon cancer    Delayed immunizations    Uncontrolled secondary diabetes with peripheral neuropathy    Primary osteoarthritis of both knees    Urinary frequency    Encounter for long-term (current) use of medications    Melena    History of colon polyps    GERD (gastroesophageal reflux disease)    Benign neoplasm of transverse colon    Renal dysfunction    Elevated lipase    Transaminitis    Metabolic acidosis    Hyperlipidemia    Essential hypertension    Hypertriglyceridemia    High priority for COVID-19 virus vaccination

## 2022-11-17 NOTE — PATIENT INSTRUCTIONS
Surgery scheduled for Tuesday November 22nd at [facility] on [address] at approximately 1:00 p.m..  The hospital call you the night before with a time of arrival.      No solid food after midnight on November 21st but you may have clear liquids up to 3 hours before your arrival time in the hospital.      Do not take her diabetic medicines on the morning of surgery.      Do not take anymore Motrin or Naprosyn, these are also noticed Motrin, Advil and Aleve.      If her EKG is abnormal we will let you know    Our office phone numbers are  [phone] and

## 2022-11-17 NOTE — H&P (VIEW-ONLY)
"Patient ID: Jorgito Arreaga is a 57 y.o. male.        Chief Complaint: Consult (Hernia )      HPI:  Patient presented to the emergency room and ever with an enlarging right inguinal hernia.      He was seen and a CT scan was done.  He presents now to discuss hernia repair.  Patient feels that he has a slight bulge in his left groin    Review of Systems   Constitutional: Negative.    HENT: Negative.     Eyes: Negative.    Respiratory: Negative.     Cardiovascular: Negative.    Gastrointestinal: Negative.         Right groin bulge   Endocrine: Negative.    Genitourinary: Negative.    Musculoskeletal: Negative.    Skin: Negative.    Allergic/Immunologic: Negative.    Neurological: Negative.    Hematological: Negative.    Psychiatric/Behavioral: Negative.       Current Outpatient Medications   Medication Sig Dispense Refill    aspirin 325 MG tablet Take 1 tablet (325 mg total) by mouth once daily. 180 tablet 1    BD JOEL 2ND GEN PEN NEEDLE 32 gauge x 5/32" Ndle FOR USE ONCE NIGHTLY, AS DIRECTED 100 each 0    blood sugar diagnostic (ONETOUCH ULTRA BLUE TEST STRIP) Strp USE TO CHECK SUGAR BEFORE MEALS AND AT BEDTIME 100 strip 11    clotrimazole-betamethasone 1-0.05% (LOTRISONE) cream Apply topically 2 (two) times daily. 15 g 0    furosemide (LASIX) 20 MG tablet Prn for swelling. 90 tablet 0    HUMALOG U-100 INSULIN 100 unit/mL injection INJECT 8 UNITS SUBCUTANEOUSLY WITH LARGEST MEAL OF THE DAY, INJECT ADDITIONAL 4 UNITS AT A TIME IF GLUCOSE IS ELEVATED 10 mL 5    ibuprofen (ADVIL,MOTRIN) 600 MG tablet Take 1 tablet (600 mg total) by mouth every 6 (six) hours as needed for Pain. 28 tablet 0    insulin (LANTUS SOLOSTAR U-100 INSULIN) glargine 100 units/mL (3mL) SubQ pen INJECT 50 UNITS INTO SKIN EVERY EVENING. 30 mL 5    insulin syringe-needle,dispos. 1 mL 28 gauge x 1/2" Syrg 1 each by Misc.(Non-Drug; Combo Route) route 2 (two) times daily. 60 Syringe 0    losartan (COZAAR) 100 MG tablet Take 1 tablet by mouth once daily " 14 tablet 0    mupirocin (BACTROBAN) 2 % ointment Apply topically 3 (three) times daily. 22 g 0    naproxen (NAPROSYN) 375 MG tablet Take 1 tablet (375 mg total) by mouth 2 (two) times daily with meals. 30 tablet 0    ONETOUCH DELICA PLUS LANCET 33 gauge Misc USE 1  TO CHECK GLUCOSE 4 TIMES DAILY BEFORE MEAL(S) 100 each 0    potassium, sodium phosphates (PHOS-NAK) 280-160-250 mg PwPk Take 1 packet by mouth 4 (four) times daily before meals and nightly.  0    verapamiL (CALAN-SR) 240 MG CR tablet Take 1 tablet by mouth once daily. 30 tablet 0    atorvastatin (LIPITOR) 40 MG tablet Take 1 tablet (40 mg total) by mouth once daily. 90 tablet 4     Current Facility-Administered Medications   Medication Dose Route Frequency Provider Last Rate Last Admin    LIDOcaine (PF) 10 mg/ml (1%) injection 10 mg  1 mL Intradermal Once Smooth Denny MD           Review of patient's allergies indicates:  No Known Allergies    Past Medical History:   Diagnosis Date    Abscess 9/5/2018    Cataracts, bilateral     Chest pain 12/6/2018    Dehydration     Diabetes mellitus, type 2     Diagnosed 7/2016    Folliculitis 9/15/2017    GERD (gastroesophageal reflux disease)     Hypertension     Inflammatory polyps of colon     Pancreatitis, alcoholic, acute     Port catheter in place     TIA (transient ischemic attack)     Tinea cruris 9/15/2017       Past Surgical History:   Procedure Laterality Date    COLONOSCOPY      COLONOSCOPY N/A 6/15/2018    Procedure: COLONOSCOPY;  Surgeon: Jim Hicks MD;  Location: [facility];  Service: Endoscopy;  Laterality: N/A;    ESOPHAGOGASTRODUODENOSCOPY N/A 6/15/2018    Procedure: ESOPHAGOGASTRODUODENOSCOPY (EGD);  Surgeon: Jim Hicks MD;  Location: [facility];  Service: Endoscopy;  Laterality: N/A;       Family History   Problem Relation Age of Onset    Hypertension Mother     Hypertension Father        Social History     Socioeconomic History    Marital status:     Number of children: 3    Tobacco Use    Smoking status: Former     Types: Cigarettes    Smokeless tobacco: Never   Substance and Sexual Activity    Alcohol use: Yes     Comment:  Daily - States that he drinks 1/2 pint of vodka and about 1-2 cans of beer daily    Drug use: No    Sexual activity: Yes     Partners: Female       Vitals:    11/17/22 1435   BP: (!) 118/105   Pulse: 81       Physical Exam  Constitutional:       General: He is not in acute distress.     Appearance: He is well-developed.   HENT:      Head: Normocephalic and atraumatic.   Eyes:      General: No scleral icterus.     Pupils: Pupils are equal, round, and reactive to light.   Neck:      Thyroid: No thyromegaly.      Vascular: No JVD.      Trachea: No tracheal deviation.   Cardiovascular:      Rate and Rhythm: Normal rate and regular rhythm.      Heart sounds: Normal heart sounds.   Pulmonary:      Effort: Pulmonary effort is normal.      Breath sounds: Normal breath sounds.   Abdominal:      General: Bowel sounds are normal. There is no distension.      Palpations: Abdomen is soft. There is no mass.      Tenderness: There is no abdominal tenderness. There is no guarding or rebound.      Hernia: Hernia: reducible right.      Comments: No obvious left inguinal hernia   Musculoskeletal:         General: Normal range of motion.      Cervical back: Normal range of motion and neck supple.   Lymphadenopathy:      Cervical: No cervical adenopathy.   Skin:     General: Skin is warm and dry.   Neurological:      Mental Status: He is alert and oriented to person, place, and time.   Psychiatric:         Mood and Affect: Mood normal.         Behavior: Behavior normal.         Thought Content: Thought content normal.         Judgment: Judgment normal.     CT was reviewed   Labs were reviewed    Assessment & Plan:     Reducible right inguinal hernia   Possible left inguinal hernia based on history.      Robotic right inguinal hernia repair with mesh, left inguinal hernia will be  repaired if present.  Rationale for the robotic approach of the use of mesh was discussed.    Preoperative EKG    Risks benefits and complications were discussed    Risk of hernia repair includes infection, bleeding, mesh infection, testicular atrophy, and pain or numbness in the groin that may be long-lasting.    Multiple medical problems as listed below    Patient Active Problem List   Diagnosis    Alcohol-induced acute pancreatitis    Gastroesophageal reflux disease    Alcohol abuse    Hypertension associated with diabetes    Abdominal pain    Acute renal failure    Hyperkalemia    Edema    Hyperglycemia due to type 1 diabetes mellitus    Recurrent pancreatitis    Polyp of colon    Screen for colon cancer    Delayed immunizations    Uncontrolled secondary diabetes with peripheral neuropathy    Primary osteoarthritis of both knees    Urinary frequency    Encounter for long-term (current) use of medications    Melena    History of colon polyps    GERD (gastroesophageal reflux disease)    Benign neoplasm of transverse colon    Renal dysfunction    Elevated lipase    Transaminitis    Metabolic acidosis    Hyperlipidemia    Essential hypertension    Hypertriglyceridemia    High priority for COVID-19 virus vaccination

## 2022-11-18 ENCOUNTER — TELEPHONE (OUTPATIENT)
Dept: PREADMISSION TESTING | Facility: HOSPITAL | Age: 57
End: 2022-11-18
Payer: MEDICAID

## 2022-11-18 ENCOUNTER — TELEPHONE (OUTPATIENT)
Dept: SURGERY | Facility: HOSPITAL | Age: 57
End: 2022-11-18
Payer: MEDICAID

## 2022-11-18 NOTE — TELEPHONE ENCOUNTER
I called the patient and left a message asking him to stop taking his aspirin as of today      ----- Message from Ainsley Paz MA sent at 11/18/2022  2:59 PM CST -----  Regarding: FW: surgery 11/22  Per Pre Admit patient states that he was not told to d/c ASA prior to surgery. Neither your last clinic note nor AVS mentions ASA. Please confirm. Thanks  ----- Message -----  From: Eli Torres RN  Sent: 11/18/2022  11:59 AM CST  To: Denisha Rebollar Staff  Subject: surgery 11/22                                    Good morning, pt states that Dr Denny told him that he did not need to stop his aspirin 325mg prior to surgery, want to confirm that this is correct?       -Pre Admit Dept.  [phone]

## 2022-11-18 NOTE — TELEPHONE ENCOUNTER
Pre op instructions reviewed with pt per phone: Spoke about pre op process and surgery instructions, verbalized understanding.    To confirm, Surgery is scheduled on 11/22/22. We will call you the day before surgery with the arrival time (after 2:30 pm)!    Please report to the Main Hospital (1st Floor) at [facility] located off of [facility] (2nd Entrance/Building on the left, in front of the flag pole).  Address: [address] [place] [address] [place]. [address]        INSTRUCTIONS IMPORTANT!!!  Do Not Eat, Drink, or Smoke after 12 midnight unless instructed otherwise by your Surgeon. OK to brush teeth, no gum, candy or mints!      *Take Only these medications with a small sip of water Morning of Surgery:  verapamil    ____  HOLD all vitamins, herbal supplements, aspirin products and NSAIDS 7 days prior to surgery, as these can thin the blood too much.  ____  NO Acrylic/fake nails or nail polish worn day of surgery (specifically hand/arm & foot surgeries).  ____  NO powder, lotions, deodorants, oils or cream on body.  ____  Remove all jewelry & piercings prior to surgery.  ____  Dentures, Hearing Aids & Contact Lens will need to be removed prior to the       start of surgery.  ____  Bring photo ID and insurance information to hospital (Leave Valuables at Home).  ____  If going home the same day, arrange for a ride home. You will not be able to drive 24 hrs if Anesthesia was used.   ____  Females (ages 11-60) may need to give a urine sample the morning of surgery; please see Pre op Nurse prior to using the restroom.  ____  Wear clean, loose fitting clothing to allow for dressings/ bandages.            Diabetic Patients: If you take diabetic medication, do NOT take morning of surgery unless instructed by Doctor. Metformin to be stopped 24 hrs prior to surgery time. DO NOT take long-acting insulin the evening before surgery. Blood sugars will be checked in pre-op by Nurse.    Bathing Instructions:    -Shower with  anti-bacterial Soap (Hibiclens or Dial) the night before surgery and the morning of.   -Do not use Hibiclens on your face or genitals.   -Apply clean clothes after shower.  -Do not shave your face or body 2 days prior to surgery unless instructed otherwise by your Surgeon.  -Do not shave pubic hair 7 days prior to surgery (gyn pt's).    [facility] Visitor/Ride Policy:  Only 2 adults allowed (over the age of 18) to accompany you to the Hospital. You Must have a ride home from a responsible adult that you know and trust. Medical Transport, Uber or Lyft can only be used if patient has a responsible adult to accompany them during ride home.    Discharge Instructions: You will receive Post-op/Discharge instructions by your Discharge Nurse prior to going home. Please call your Surgeon's office with any post-surgery questions/concerns @ [phone].    *If you are running late or have questions the morning of surgery, please call the Surgery Dept @ [phone]  *Insurance/ Financial Questions, please call [phone]    Thank you,  -[facility] Pre Admit Testing Nurse  [phone] or [phone]  M-F 7:30 am-4 pm

## 2022-11-21 ENCOUNTER — TELEPHONE (OUTPATIENT)
Dept: PREADMISSION TESTING | Facility: HOSPITAL | Age: 57
End: 2022-11-21
Payer: MEDICAID

## 2022-11-21 NOTE — TELEPHONE ENCOUNTER
----- Message from Ainsley Paz MA sent at 11/18/2022  2:59 PM CST -----  Regarding: FW: surgery 11/22  Per Pre Admit patient states that he was not told to d/c ASA prior to surgery. Neither your last clinic note nor AVS mentions ASA. Please confirm. Thanks  ----- Message -----  From: Eli Torres RN  Sent: 11/18/2022  11:59 AM CST  To: [facility] Rebollar Staff  Subject: surgery 11/22                                    Good morning, pt states that Dr Denny told him that he did not need to stop his aspirin 325mg prior to surgery, want to confirm that this is correct?       -Pre Admit Dept.  [phone]

## 2022-11-22 ENCOUNTER — ANESTHESIA EVENT (OUTPATIENT)
Dept: SURGERY | Facility: HOSPITAL | Age: 57
End: 2022-11-22
Payer: MEDICAID

## 2022-11-22 ENCOUNTER — ANESTHESIA (OUTPATIENT)
Dept: SURGERY | Facility: HOSPITAL | Age: 57
End: 2022-11-22
Payer: MEDICAID

## 2022-11-23 ENCOUNTER — TELEPHONE (OUTPATIENT)
Dept: SURGERY | Facility: CLINIC | Age: 57
End: 2022-11-23
Payer: MEDICAID

## 2022-11-23 NOTE — TELEPHONE ENCOUNTER
----- Message from Gaby Galloway sent at 11/23/2022  9:59 AM CST -----  Contact: [phone]  Pt is calling regarding appt . Please call back at [phone] . Thanks/dj

## 2022-11-23 NOTE — TELEPHONE ENCOUNTER
Returned call to patient, he is aware of his new surgery date and time for 11/30/2022 with Dr. Denny for a right Inguinal hernia repair, the patient voiced understanding.

## 2022-11-28 DIAGNOSIS — K40.90 NON-RECURRENT UNILATERAL INGUINAL HERNIA WITHOUT OBSTRUCTION OR GANGRENE: Primary | ICD-10-CM

## 2022-11-28 DIAGNOSIS — K40.20 BILATERAL INGUINAL HERNIA, WITHOUT OBSTRUCTION OR GANGRENE, NOT SPECIFIED AS RECURRENT: ICD-10-CM

## 2022-11-28 RX ORDER — ONDANSETRON 4 MG/1
8 TABLET, ORALLY DISINTEGRATING ORAL EVERY 8 HOURS PRN
Status: CANCELLED | OUTPATIENT
Start: 2022-11-28

## 2022-11-28 RX ORDER — LIDOCAINE HYDROCHLORIDE 10 MG/ML
1 INJECTION, SOLUTION EPIDURAL; INFILTRATION; INTRACAUDAL; PERINEURAL ONCE
Status: DISCONTINUED | OUTPATIENT
Start: 2022-11-28 | End: 2022-11-28 | Stop reason: HOSPADM

## 2022-11-28 NOTE — PROGRESS NOTES
[facility] -   Nephrology  Progress Note    Patient Name: Jorgito Arreaga  MRN: [id number]  Admission Date: 7/19/2018  Hospital Length of Stay: 2 days  Attending Provider: Fan Jo MD   Primary Care Physician: Otf Roblero MD  Principal Problem:KOREY (acute kidney injury)    Subjective:     HPI: Pt was seen and examined. H/o was reviewed. Pt is a 53 y/o male with DM, HTN, chronic pancreatitis and intermittent alcohol use who presented with severe nausea without vomiting. s Cr was found to be elevated over baseline of 1.5 to 4.3 mg/dl. Pt was not eating or drinking much for about 1-2 days. He denies diarrhea, no NSAIds, no abx, no urinary issues. Pt now feels better, no new c/o's, no discomfort, no n/v, no diarrhea, no new issues since admit. s Cr has improved with IV hydration.    Interval History: Pt was seen and examined. Stable last pm, no new c/o's, no further n/v, no diarrhea, no abd pain, no fever, no discomfort.    Review of patient's allergies indicates:  No Known Allergies  Current Facility-Administered Medications   Medication Frequency    0.9%  NaCl infusion Continuous    acetaminophen tablet 650 mg Q6H PRN    aspirin tablet 325 mg Daily    atorvastatin tablet 10 mg Daily    dextrose 50% injection 12.5 g PRN    dextrose 50% injection 25 g PRN    folic acid tablet 1 mg Daily    glucagon (human recombinant) injection 1 mg PRN    glucose chewable tablet 16 g PRN    glucose chewable tablet 24 g PRN    heparin (porcine) injection 5,000 Units Q8H    HYDROcodone-acetaminophen 5-325 mg per tablet 1 tablet Q6H PRN    insulin aspart U-100 pen 0-5 Units QID (AC + HS) PRN    insulin detemir U-100 pen 45 Units QHS    lorazepam (ATIVAN) injection 1 mg Q4H PRN    multivitamin tablet 1 tablet Daily    ondansetron injection 4 mg Q8H PRN    pantoprazole EC tablet 40 mg Daily    promethazine (PHENERGAN) 6.25 mg in dextrose 5 % 50 mL IVPB Q6H PRN    ramelteon tablet 8 mg Nightly PRN     thiamine tablet 100 mg Daily      sodium chloride 0.9% 150 mL/hr at 07/21/18 0107       Objective:     Vital Signs (Most Recent):  Temp: 97.9 °F (36.6 °C) (07/21/18 0759)  Pulse: 83 (07/21/18 0759)  Resp: 16 (07/21/18 0759)  BP: 120/79 (07/21/18 0759)  SpO2: 97 % (07/21/18 0759)  O2 Device (Oxygen Therapy): room air (07/21/18 0759) Vital Signs (24h Range):  Temp:  [97.1 °F (36.2 °C)-98.8 °F (37.1 °C)] 97.9 °F (36.6 °C)  Pulse:  [58-86] 83  Resp:  [16-20] 16  SpO2:  [95 %-99 %] 97 %  BP: (120-140)/(78-94) 120/79     Weight: 94.6 kg (208 lb 8.9 oz) (07/21/18 0426)  Body mass index is 33.66 kg/m².  Body surface area is 2.1 meters squared.    I/O last 3 completed shifts:  In: 1466.7 [I.V.:1466.7]  Out: 2800 [Urine:2800]    Physical Exam   Constitutional: He is oriented to person, place, and time. He appears well-developed and well-nourished. No distress.   HENT:   Head: Normocephalic and atraumatic.   Neck: No JVD present.   Cardiovascular: Normal rate and regular rhythm.  Exam reveals no friction rub.    Pulmonary/Chest: Effort normal and breath sounds normal. No respiratory distress. He has no rales.   Abdominal: Soft. Bowel sounds are normal. He exhibits distension. There is no tenderness. There is no guarding.   Musculoskeletal: He exhibits no edema.   Neurological: He is alert and oriented to person, place, and time.   Skin: Skin is warm and dry.   Psychiatric: He has a normal mood and affect. His behavior is normal. Thought content normal.   Nursing note and vitals reviewed.      Significant Labs: reviewed  BMP  Lab Results   Component Value Date     07/21/2018    K 4.2 07/21/2018     07/21/2018    CO2 20 (L) 07/21/2018    BUN 25 (H) 07/21/2018    CREATININE 1.4 07/21/2018    CALCIUM 9.4 07/21/2018    ANIONGAP 10 07/21/2018    ESTGFRAFRICA >60 07/21/2018    EGFRNONAA 58 (A) 07/21/2018     Lab Results   Component Value Date    WBC 4.80 07/21/2018    HGB 11.1 (L) 07/21/2018    HCT 34.4 (L) 07/21/2018     MCV 93 07/21/2018     07/21/2018          Significant Imaging:  reviewed    Assessment/Plan:         53 y/o male with KOREY:         * KOREY (acute kidney injury)     Renal: KOREY: much improving and resolving now  s Cr much lower  KOREY likely due to prerenal azotemia from lack of intake due to severe nausea  Na and Cr reviewed, unhelpful, KOREY resolving anyway    K normal  Metabolic acidosis, stable, improved  BP controlled  Meds reviewed  Hemodynamically stable       Alcohol abuse     Despite having a h/o of chronic pancreatitis, pt uses alcohol in clusters  Advised pt again today to avoid alcohol all together  Amylase and lipase are normal, likely because pt has chronic pancreatitis, not acute          Plans and recommendations:  As discussed above  Agree with current mgmt  D/c IV hydration  Pt is eating and drinking on his own now          Delonte Santizo MD  Nephrology  [facility]   Staged Advancement Flap Text: The defect edges were debeveled with a #15 scalpel blade.  Given the location of the defect, shape of the defect and the proximity to free margins a staged advancement flap was deemed most appropriate.  Using a sterile surgical marker, an appropriate advancement flap was drawn incorporating the defect and placing the expected incisions within the relaxed skin tension lines where possible. The area thus outlined was incised deep to adipose tissue with a #15 scalpel blade.  The skin margins were undermined to an appropriate distance in all directions utilizing iris scissors.

## 2022-11-29 ENCOUNTER — TELEPHONE (OUTPATIENT)
Dept: PREADMISSION TESTING | Facility: HOSPITAL | Age: 57
End: 2022-11-29
Payer: MEDICAID

## 2022-11-29 NOTE — TELEPHONE ENCOUNTER
Called and spoke with Pt about the following:     Please arrive to [facility] main lobby at 9:30am on 11/30/222 for your scheduled procedure. NOTHING to eat or drink after midnight unless instructed otherwise by your Surgeon. Take only the medications instructed by your Pre Admit Nurse with small sip of water.

## 2022-11-30 ENCOUNTER — HOSPITAL ENCOUNTER (OUTPATIENT)
Facility: HOSPITAL | Age: 57
Discharge: HOME | End: 2022-11-30
Attending: SURGERY | Admitting: SURGERY
Payer: MEDICAID

## 2022-11-30 DIAGNOSIS — K40.20 BILATERAL INGUINAL HERNIA, WITHOUT OBSTRUCTION OR GANGRENE, NOT SPECIFIED AS RECURRENT: ICD-10-CM

## 2022-11-30 DIAGNOSIS — K40.20 NON-RECURRENT BILATERAL INGUINAL HERNIA WITHOUT OBSTRUCTION OR GANGRENE: Primary | ICD-10-CM

## 2022-11-30 LAB
POCT GLUCOSE: 105 MG/DL (ref 70–110)
POCT GLUCOSE: 155 MG/DL (ref 70–110)
POCT GLUCOSE: 242 MG/DL (ref 70–110)

## 2022-11-30 PROCEDURE — 37000008 HC ANESTHESIA 1ST 15 MINUTES: Performed by: SURGERY

## 2022-11-30 PROCEDURE — 49650 PR LAP,INGUINAL HERNIA REPR,INITIAL: ICD-10-PCS | Mod: 50,,, | Performed by: SURGERY

## 2022-11-30 PROCEDURE — 37000009 HC ANESTHESIA EA ADD 15 MINS: Performed by: SURGERY

## 2022-11-30 PROCEDURE — 00840 ANES IPER PX LOWER ABD NOS: CPT | Performed by: SURGERY

## 2022-11-30 PROCEDURE — C1781 MESH (IMPLANTABLE): HCPCS | Performed by: SURGERY

## 2022-11-30 PROCEDURE — 36000711: Performed by: SURGERY

## 2022-11-30 PROCEDURE — 49650 LAP ING HERNIA REPAIR INIT: CPT | Mod: 50,,, | Performed by: SURGERY

## 2022-11-30 PROCEDURE — 63600175 PHARM REV CODE 636 W HCPCS: Performed by: NURSE ANESTHETIST, CERTIFIED REGISTERED

## 2022-11-30 PROCEDURE — 63600175 PHARM REV CODE 636 W HCPCS: Performed by: ANESTHESIOLOGY

## 2022-11-30 PROCEDURE — 63600175 PHARM REV CODE 636 W HCPCS: Performed by: SURGERY

## 2022-11-30 PROCEDURE — 25000003 PHARM REV CODE 250: Performed by: NURSE ANESTHETIST, CERTIFIED REGISTERED

## 2022-11-30 PROCEDURE — 71000015 HC POSTOP RECOV 1ST HR: Performed by: SURGERY

## 2022-11-30 PROCEDURE — 27201423 OPTIME MED/SURG SUP & DEVICES STERILE SUPPLY: Performed by: SURGERY

## 2022-11-30 PROCEDURE — 25000003 PHARM REV CODE 250: Performed by: ANESTHESIOLOGY

## 2022-11-30 PROCEDURE — 36000710: Performed by: SURGERY

## 2022-11-30 PROCEDURE — 71000016 HC POSTOP RECOV ADDL HR: Performed by: SURGERY

## 2022-11-30 PROCEDURE — 25000003 PHARM REV CODE 250: Performed by: SURGERY

## 2022-11-30 PROCEDURE — 71000033 HC RECOVERY, INTIAL HOUR: Performed by: SURGERY

## 2022-11-30 DEVICE — 3DMAX LIGHT MESH, RIGHT, MEDIUM
Type: IMPLANTABLE DEVICE | Site: INGUINAL | Status: FUNCTIONAL
Brand: 3DMAX LIGHT MESH

## 2022-11-30 DEVICE — 3DMAX LIGHT MESH, LEFT MEDIUM
Type: IMPLANTABLE DEVICE | Site: INGUINAL | Status: FUNCTIONAL
Brand: 3DMAX LIGHT MESH

## 2022-11-30 RX ORDER — OXYCODONE HYDROCHLORIDE 5 MG/1
5 TABLET ORAL
Status: DISCONTINUED | OUTPATIENT
Start: 2022-11-30 | End: 2022-11-30 | Stop reason: HOSPADM

## 2022-11-30 RX ORDER — PROCHLORPERAZINE EDISYLATE 5 MG/ML
5 INJECTION INTRAMUSCULAR; INTRAVENOUS EVERY 30 MIN PRN
Status: DISCONTINUED | OUTPATIENT
Start: 2022-11-30 | End: 2022-11-30 | Stop reason: HOSPADM

## 2022-11-30 RX ORDER — FENTANYL CITRATE 50 UG/ML
INJECTION, SOLUTION INTRAMUSCULAR; INTRAVENOUS
Status: DISCONTINUED | OUTPATIENT
Start: 2022-11-30 | End: 2022-11-30

## 2022-11-30 RX ORDER — LIDOCAINE HYDROCHLORIDE 10 MG/ML
INJECTION, SOLUTION EPIDURAL; INFILTRATION; INTRACAUDAL; PERINEURAL
Status: DISCONTINUED | OUTPATIENT
Start: 2022-11-30 | End: 2022-11-30

## 2022-11-30 RX ORDER — PHENYLEPHRINE HYDROCHLORIDE 10 MG/ML
INJECTION INTRAVENOUS
Status: DISCONTINUED | OUTPATIENT
Start: 2022-11-30 | End: 2022-11-30

## 2022-11-30 RX ORDER — ONDANSETRON 8 MG/1
8 TABLET, ORALLY DISINTEGRATING ORAL EVERY 8 HOURS PRN
Status: DISCONTINUED | OUTPATIENT
Start: 2022-11-30 | End: 2022-11-30 | Stop reason: HOSPADM

## 2022-11-30 RX ORDER — HYDROCODONE BITARTRATE AND ACETAMINOPHEN 5; 325 MG/1; MG/1
1 TABLET ORAL EVERY 6 HOURS PRN
Qty: 20 TABLET | Refills: 0 | Status: SHIPPED | OUTPATIENT
Start: 2022-11-30 | End: 2022-12-15

## 2022-11-30 RX ORDER — BUPIVACAINE HYDROCHLORIDE 2.5 MG/ML
INJECTION, SOLUTION EPIDURAL; INFILTRATION; INTRACAUDAL
Status: DISCONTINUED | OUTPATIENT
Start: 2022-11-30 | End: 2022-11-30 | Stop reason: HOSPADM

## 2022-11-30 RX ORDER — CEFAZOLIN SODIUM 2 G/50ML
2 SOLUTION INTRAVENOUS
Status: COMPLETED | OUTPATIENT
Start: 2022-11-30 | End: 2022-11-30

## 2022-11-30 RX ORDER — DEXAMETHASONE SODIUM PHOSPHATE 4 MG/ML
INJECTION, SOLUTION INTRA-ARTICULAR; INTRALESIONAL; INTRAMUSCULAR; INTRAVENOUS; SOFT TISSUE
Status: DISCONTINUED | OUTPATIENT
Start: 2022-11-30 | End: 2022-11-30

## 2022-11-30 RX ORDER — PROPOFOL 10 MG/ML
VIAL (ML) INTRAVENOUS
Status: DISCONTINUED | OUTPATIENT
Start: 2022-11-30 | End: 2022-11-30

## 2022-11-30 RX ORDER — ALBUTEROL SULFATE 0.83 MG/ML
2.5 SOLUTION RESPIRATORY (INHALATION) EVERY 4 HOURS PRN
Status: DISCONTINUED | OUTPATIENT
Start: 2022-11-30 | End: 2022-11-30 | Stop reason: HOSPADM

## 2022-11-30 RX ORDER — ROCURONIUM BROMIDE 10 MG/ML
INJECTION, SOLUTION INTRAVENOUS
Status: DISCONTINUED | OUTPATIENT
Start: 2022-11-30 | End: 2022-11-30

## 2022-11-30 RX ORDER — HYDROMORPHONE HYDROCHLORIDE 2 MG/ML
0.2 INJECTION, SOLUTION INTRAMUSCULAR; INTRAVENOUS; SUBCUTANEOUS EVERY 5 MIN PRN
Status: DISCONTINUED | OUTPATIENT
Start: 2022-11-30 | End: 2022-11-30 | Stop reason: HOSPADM

## 2022-11-30 RX ORDER — MIDAZOLAM HYDROCHLORIDE 1 MG/ML
INJECTION, SOLUTION INTRAMUSCULAR; INTRAVENOUS
Status: DISCONTINUED | OUTPATIENT
Start: 2022-11-30 | End: 2022-11-30

## 2022-11-30 RX ORDER — SODIUM CHLORIDE 0.9 % (FLUSH) 0.9 %
3 SYRINGE (ML) INJECTION
Status: DISCONTINUED | OUTPATIENT
Start: 2022-11-30 | End: 2022-11-30 | Stop reason: HOSPADM

## 2022-11-30 RX ORDER — ONDANSETRON 2 MG/ML
INJECTION INTRAMUSCULAR; INTRAVENOUS
Status: DISCONTINUED | OUTPATIENT
Start: 2022-11-30 | End: 2022-11-30

## 2022-11-30 RX ORDER — KETOROLAC TROMETHAMINE 30 MG/ML
INJECTION, SOLUTION INTRAMUSCULAR; INTRAVENOUS
Status: DISCONTINUED | OUTPATIENT
Start: 2022-11-30 | End: 2022-11-30

## 2022-11-30 RX ORDER — NEOSTIGMINE METHYLSULFATE 1 MG/ML
INJECTION, SOLUTION INTRAVENOUS
Status: DISCONTINUED | OUTPATIENT
Start: 2022-11-30 | End: 2022-11-30

## 2022-11-30 RX ORDER — KETOROLAC TROMETHAMINE 30 MG/ML
15 INJECTION, SOLUTION INTRAMUSCULAR; INTRAVENOUS EVERY 8 HOURS PRN
Status: DISCONTINUED | OUTPATIENT
Start: 2022-11-30 | End: 2022-11-30 | Stop reason: HOSPADM

## 2022-11-30 RX ORDER — ONDANSETRON 2 MG/ML
4 INJECTION INTRAMUSCULAR; INTRAVENOUS DAILY PRN
Status: DISCONTINUED | OUTPATIENT
Start: 2022-11-30 | End: 2022-11-30 | Stop reason: HOSPADM

## 2022-11-30 RX ORDER — HYDROMORPHONE HYDROCHLORIDE 2 MG/ML
1 INJECTION, SOLUTION INTRAMUSCULAR; INTRAVENOUS; SUBCUTANEOUS EVERY 4 HOURS PRN
Status: DISCONTINUED | OUTPATIENT
Start: 2022-11-30 | End: 2022-11-30 | Stop reason: HOSPADM

## 2022-11-30 RX ORDER — DIPHENHYDRAMINE HYDROCHLORIDE 50 MG/ML
25 INJECTION INTRAMUSCULAR; INTRAVENOUS EVERY 6 HOURS PRN
Status: DISCONTINUED | OUTPATIENT
Start: 2022-11-30 | End: 2022-11-30 | Stop reason: HOSPADM

## 2022-11-30 RX ADMIN — CEFAZOLIN SODIUM 2 G: 2 SOLUTION INTRAVENOUS at 11:11

## 2022-11-30 RX ADMIN — PHENYLEPHRINE HYDROCHLORIDE 200 MCG: 10 INJECTION INTRAVENOUS at 12:11

## 2022-11-30 RX ADMIN — ROCURONIUM BROMIDE 10 MG: 10 INJECTION, SOLUTION INTRAVENOUS at 12:11

## 2022-11-30 RX ADMIN — HYDROMORPHONE HYDROCHLORIDE 0.2 MG: 2 INJECTION INTRAMUSCULAR; INTRAVENOUS; SUBCUTANEOUS at 01:11

## 2022-11-30 RX ADMIN — SODIUM CHLORIDE, SODIUM LACTATE, POTASSIUM CHLORIDE, AND CALCIUM CHLORIDE: .6; .31; .03; .02 INJECTION, SOLUTION INTRAVENOUS at 12:11

## 2022-11-30 RX ADMIN — ROCURONIUM BROMIDE 50 MG: 10 INJECTION, SOLUTION INTRAVENOUS at 11:11

## 2022-11-30 RX ADMIN — PROPOFOL 150 MG: 10 INJECTION, EMULSION INTRAVENOUS at 11:11

## 2022-11-30 RX ADMIN — PHENYLEPHRINE HYDROCHLORIDE 200 MCG: 10 INJECTION INTRAVENOUS at 11:11

## 2022-11-30 RX ADMIN — ONDANSETRON 4 MG: 2 INJECTION, SOLUTION INTRAMUSCULAR; INTRAVENOUS at 12:11

## 2022-11-30 RX ADMIN — MIDAZOLAM 2 MG: 1 INJECTION INTRAMUSCULAR; INTRAVENOUS at 11:11

## 2022-11-30 RX ADMIN — FENTANYL CITRATE 100 MCG: 50 INJECTION, SOLUTION INTRAMUSCULAR; INTRAVENOUS at 11:11

## 2022-11-30 RX ADMIN — KETOROLAC TROMETHAMINE 30 MG: 30 INJECTION, SOLUTION INTRAMUSCULAR at 12:11

## 2022-11-30 RX ADMIN — INSULIN HUMAN 6 UNITS: 100 INJECTION, SOLUTION PARENTERAL at 10:11

## 2022-11-30 RX ADMIN — DEXAMETHASONE SODIUM PHOSPHATE 4 MG: 4 INJECTION, SOLUTION INTRAMUSCULAR; INTRAVENOUS at 12:11

## 2022-11-30 RX ADMIN — OXYCODONE HYDROCHLORIDE 5 MG: 5 TABLET ORAL at 02:11

## 2022-11-30 RX ADMIN — NEOSTIGMINE METHYLSULFATE 5 MG: 1 INJECTION INTRAVENOUS at 01:11

## 2022-11-30 RX ADMIN — SODIUM CHLORIDE, SODIUM LACTATE, POTASSIUM CHLORIDE, AND CALCIUM CHLORIDE: .6; .31; .03; .02 INJECTION, SOLUTION INTRAVENOUS at 11:11

## 2022-11-30 RX ADMIN — GLYCOPYRROLATE 0.8 MG: 0.2 INJECTION, SOLUTION INTRAMUSCULAR; INTRAVENOUS at 01:11

## 2022-11-30 RX ADMIN — LIDOCAINE HYDROCHLORIDE 50 MG: 10 INJECTION, SOLUTION EPIDURAL; INFILTRATION; INTRACAUDAL; PERINEURAL at 11:11

## 2022-11-30 RX ADMIN — PROCHLORPERAZINE EDISYLATE 5 MG: 5 INJECTION INTRAMUSCULAR; INTRAVENOUS at 01:11

## 2022-11-30 NOTE — ANESTHESIA PROCEDURE NOTES
Intubation    Date/Time: 11/30/2022 11:41 AM  Performed by: Jen Rowan CRNA  Authorized by: Khang Lindsey MD     Intubation:     Induction:  Intravenous    Intubated:  Postinduction    Mask Ventilation:  Easy mask    Attempts:  1    Attempted By:  CRNA    Method of Intubation:  Direct    Blade:  Gabriel 3    Laryngeal View Grade: Grade I - full view of cords      Difficult Airway Encountered?: No      Complications:  None    Airway Device:  Oral endotracheal tube    Airway Device Size:  8.0    Style/Cuff Inflation:  Cuffed (inflated to minimal occlusive pressure)    Tube secured:  22    Secured at:  The lips    Placement Verified By:  Capnometry and Revisualization with laryngoscopy    Complicating Factors:  None    Findings Post-Intubation:  BS equal bilateral and atraumatic/condition of teeth unchanged

## 2022-11-30 NOTE — ANESTHESIA PREPROCEDURE EVALUATION
11/30/2022  Jorgito Arreaga is a 57 y.o., male.    Patient Active Problem List   Diagnosis    Alcohol-induced acute pancreatitis    Gastroesophageal reflux disease    Alcohol abuse    Hypertension associated with diabetes    Abdominal pain    Acute renal failure    Hyperkalemia    Edema    Hyperglycemia due to type 1 diabetes mellitus    Recurrent pancreatitis    Polyp of colon    Screen for colon cancer    Delayed immunizations    Uncontrolled secondary diabetes with peripheral neuropathy    Primary osteoarthritis of both knees    Urinary frequency    Encounter for long-term (current) use of medications    Melena    History of colon polyps    GERD (gastroesophageal reflux disease)    Benign neoplasm of transverse colon    Renal dysfunction    Elevated lipase    Transaminitis    Metabolic acidosis    Hyperlipidemia    Essential hypertension    Hypertriglyceridemia    High priority for COVID-19 virus vaccination     Pre-op Assessment    I have reviewed the Patient Summary Reports.    I have reviewed the NPO Status.   I have reviewed the Medications.     Review of Systems  Anesthesia Hx:  No problems with previous Anesthesia  Denies Family Hx of Anesthesia complications.   Denies Personal Hx of Anesthesia complications.   Social:  Alcohol Use    Hematology/Oncology:  Hematology Normal   Oncology Normal     EENT/Dental:EENT/Dental Normal   Cardiovascular:   Hypertension    Pulmonary:  Pulmonary Normal    Renal/:  Renal/ Normal     Hepatic/GI:   GERD Uncomplicated right inguinal hernia  Chronic pancreatitis due to alcohol abuse   Musculoskeletal:   Arthritis     Neurological:   TIA, Normal carotid US and ECHO   Endocrine:   Diabetes    Dermatological:  Skin Normal    Psych:  Psychiatric Normal           Physical Exam  General: Alert and Oriented    Airway:  Mallampati: II   Mouth  Opening: Normal  TM Distance: Normal  Tongue: Normal  Neck ROM: Normal ROM        Anesthesia Plan  Type of Anesthesia, risks & benefits discussed:    Anesthesia Type: Gen ETT  Intra-op Monitoring Plan: Standard ASA Monitors  Induction:  IV  Informed Consent: Informed consent signed with the Patient and all parties understand the risks and agree with anesthesia plan.  All questions answered.   ASA Score: 3  Day of Surgery Review of History & Physical: I have interviewed and examined the patient. I have reviewed the patient's H&P dated:     Ready For Surgery From Anesthesia Perspective.     .

## 2022-11-30 NOTE — ANESTHESIA POSTPROCEDURE EVALUATION
Anesthesia Post Evaluation    Patient: Jorgito Arreaga    Procedure(s) Performed: Procedure(s) (LRB):  XI ROBOTIC REPAIR, HERNIA, INGUINAL (Bilateral)    Final Anesthesia Type: general      Patient location during evaluation: PACU  Patient participation: Yes- Able to Participate  Level of consciousness: awake  Post-procedure vital signs: reviewed and stable  Pain management: adequate  Airway patency: patent    PONV status at discharge: No PONV  Anesthetic complications: no      Cardiovascular status: hemodynamically stable  Respiratory status: unassisted  Hydration status: euvolemic  Follow-up not needed.          Vitals Value Taken Time   /75 11/30/22 1530   Temp 36.7 °C (98.1 °F) 11/30/22 1530   Pulse 69 11/30/22 1530   Resp 17 11/30/22 1530   SpO2 100 % 11/30/22 1530         Event Time   Out of Recovery 14:26:45         Pain/Marely Score: Pain Rating Prior to Med Admin: 4 (11/30/2022  2:19 PM)  Marely Score: 10 (11/30/2022  2:23 PM)

## 2022-11-30 NOTE — PATIENT INSTRUCTIONS
Please call for any fever, increase in pain, nausea or vomiting or redness or drainage from incision(s).    No lifting more than 30 pounds for 2 weeks    No driving for taking the narcotics    May shower     No soaking in the tub or swimming in pools for 3 weeks    If you become constipated from the pain medication you can use over the counter laxatives,  Miralax or Glycolax,

## 2022-11-30 NOTE — TRANSFER OF CARE
"Anesthesia Transfer of Care Note    Patient: Jorgito Arreaga    Procedure(s) Performed: Procedure(s) (LRB):  XI ROBOTIC REPAIR, HERNIA, INGUINAL (Bilateral)    Patient location: PACU    Anesthesia Type: general    Transport from OR: Transported from OR on room air with adequate spontaneous ventilation    Post pain: adequate analgesia    Post assessment: no apparent anesthetic complications    Post vital signs: stable    Level of consciousness: sedated    Nausea/Vomiting: no nausea/vomiting    Complications: none    Transfer of care protocol was followed      Last vitals:   Visit Vitals  BP (!) 170/98   Pulse 87   Temp 36.8 °C (98.2 °F) (Temporal)   Resp 18   Ht 5' 6" (1.676 m)   Wt 68.7 kg (151 lb 7.3 oz)   SpO2 98%   BMI 24.45 kg/m²     "

## 2022-11-30 NOTE — OP NOTE
[facility] - Surgery (Hospital)  Operative Note      Date of Procedure: 11/30/2022     Procedure: Procedure(s) (LRB):  XI ROBOTIC REPAIR, HERNIA, INGUINAL (Bilateral)     Surgeon(s) and Role:     * Smooth Denny MD - Primary    Assisting Surgeon: None    Pre-Operative Diagnosis: Non-recurrent unilateral inguinal hernia without obstruction or gangrene [K40.90]    Post-Operative Diagnosis: Post-Op Diagnosis Codes:     * Non-recurrent unilateral inguinal hernia without obstruction or gangrene [K40.90]    Anesthesia: General    Operative Findings (including complications, if any):     Bilateral indirect inguinal hernias    Description of Technical Procedures:     Patient was brought in the operative room placed on the operative table supine position.  General endotracheal anesthesia was induced.  IV antibiotics were administered.  Pneumatic compressions on lower extremity.  A Melgoza was inserted.  Arms were tucked at his side.  The abdomen was clipped, prepped and draped in standard fashion.      A time-out was performed.      Transverse supraumbilical incision was made.  The fascia was identified and elevated with Kocher clamps.  A Veress needle was inserted and its position confirmed.  A 8 mm robotic trocar was inserted after pneumoperitoneum reached 15 mm of mercury.  There was no evidence of visceral or vascular injury.      The patient was then placed in Trendelenburg.  He had right and left mid abdominal trocars placed at the mid axillary line.  The patient was docked the operating robot.      Bilateral inguinal hernias were found greater on the right than on the left.      Attention was then turned to the right side where the preperitoneal space was entered by scoring the peritoneum and dissecting the loose areolar tissues.  Medially dissection was carried out for a cm or 2 below the pubic tubercle.  Laterally sufficient space was created to allow for mesh placement.  We then  the hernia sac from the  spermatic cord using a combination of blunt and sharp dissections.  Dissection of the hernia sac off the spermatic cord was carried out until the vas deferens can be seen diving into the pelvis.  Additional attachments of the spermatic cord to the peritoneum were also cleared.  A cord lipoma was reduced.      Attention was turned to the left side with the perineum was scored.  The preperitoneal space was entered.  Dissection was carried out medially to identify the pubic tubercle and then for 2 cm below it.  Dissection was carried out laterally to allow space for mesh placement.  I then  the hernia sac from the spermatic cord using blunt and sharp dissection until the vas deferens could be seen entering the pelvis.  There was a small cord lipoma that was dissected free.      The hernias were repaired by in laying 3DMax light mesh into the right and left preperitoneal space.  The mesh was brought against the abdominal wall.  The mesh did not move when the perineum was elevated.      The perineum was closed with 2 0 barbed Monocryl suture in a running fashion bilaterally.  The 2 needles were removed.  The lipoma was removed.  The patient was undocked from the operating robot.    Lateral trocars removed no bleeding was noted  Marcaine was infiltrated.  All skin incisions were closed with 4-0 Monocryl in a subcuticular manner.  Dermabond was placed.      Both testicles were found in the scrotum postoperatively.      Final sponge, instrument and needle counts were correct    Significant Surgical Tasks Conducted by the Assistant(s), if Applicable:  None    Estimated Blood Loss (EBL): 15 mL  IV fluids 900 mL   Marcaine 0.25% 30 mL           Implants:   Implant Name Type Inv. Item Serial No.  Lot No. LRB No. Used Action   MESH 3DMAX RG MED 7.9CMX13.4CM - [id number]  MESH 3DMAX RG MED 7.9CMX13.4CM  [employer] [id number] Right 1 Implanted   MESH 3DMAX LF MED 7.9CMX13.4CM - [id number]  MESH 3DMAX LF MED  [id number]  [employer] [place] [id number] Left 1 Implanted       Specimens:   Specimen (24h ago, onward)      None                    Condition: Stable    Disposition: PACU - hemodynamically stable.    Attestation: I performed the procedure.    Discharge Note    OUTCOME: Patient tolerated treatment/procedure well without complication and is now ready for discharge.    Robotic repair of bilateral hernias    DISPOSITION: Home or Self Care    FINAL DIAGNOSIS:  Non-recurrent bilateral inguinal hernia without obstruction or gangrene    FOLLOWUP: In clinic    DISCHARGE INSTRUCTIONS:    Discharge Procedure Orders   Diet general     Call MD for:  temperature >100.4     Call MD for:  persistent nausea and vomiting     Call MD for:  severe uncontrolled pain     Call MD for:  difficulty breathing, headache or visual disturbances     Call MD for:  redness, tenderness, or signs of infection (pain, swelling, redness, odor or green/yellow discharge around incision site)     Lifting restrictions   Order Comments: No greater than 20 for 2 weeks     No dressing needed

## 2022-12-05 VITALS
BODY MASS INDEX: 24.34 KG/M2 | SYSTOLIC BLOOD PRESSURE: 126 MMHG | RESPIRATION RATE: 17 BRPM | HEIGHT: 66 IN | OXYGEN SATURATION: 100 % | HEART RATE: 69 BPM | DIASTOLIC BLOOD PRESSURE: 75 MMHG | TEMPERATURE: 98 F | WEIGHT: 151.44 LBS

## 2022-12-07 ENCOUNTER — TELEPHONE (OUTPATIENT)
Dept: SURGERY | Facility: CLINIC | Age: 57
End: 2022-12-07
Payer: MEDICAID

## 2022-12-07 NOTE — TELEPHONE ENCOUNTER
----- Message from Nilam Ramirez sent at 12/7/2022  3:26 PM CST -----  Contact: Jorgito Bull is calling in regards to reschedule his appointment on 12/8. Patient states can he get a appointment for next week Please call him back at [phone]      Thanks  CF

## 2022-12-08 DIAGNOSIS — E11.59 HYPERTENSION ASSOCIATED WITH DIABETES: ICD-10-CM

## 2022-12-08 DIAGNOSIS — I15.2 HYPERTENSION ASSOCIATED WITH DIABETES: ICD-10-CM

## 2022-12-08 RX ORDER — VERAPAMIL HYDROCHLORIDE 240 MG/1
TABLET, FILM COATED, EXTENDED RELEASE ORAL
Qty: 14 TABLET | Refills: 0 | Status: SHIPPED | OUTPATIENT
Start: 2022-12-08 | End: 2023-01-05 | Stop reason: SDUPTHER

## 2022-12-08 NOTE — TELEPHONE ENCOUNTER
Pt call to inform that 2 week refill for verapamil was called in but he hasn't been seen and needs to call to set up appt. Message sent via portal informing him of the same.

## 2022-12-15 ENCOUNTER — OFFICE VISIT (OUTPATIENT)
Dept: SURGERY | Facility: CLINIC | Age: 57
End: 2022-12-15
Payer: MEDICAID

## 2022-12-15 VITALS
BODY MASS INDEX: 24.41 KG/M2 | HEART RATE: 89 BPM | WEIGHT: 151.25 LBS | SYSTOLIC BLOOD PRESSURE: 158 MMHG | TEMPERATURE: 99 F | DIASTOLIC BLOOD PRESSURE: 98 MMHG

## 2022-12-15 DIAGNOSIS — K40.20 BILATERAL INGUINAL HERNIA WITHOUT OBSTRUCTION OR GANGRENE, RECURRENCE NOT SPECIFIED: Primary | ICD-10-CM

## 2022-12-15 PROCEDURE — 99214 OFFICE O/P EST MOD 30 MIN: CPT | Mod: PBBFAC | Performed by: SURGERY

## 2022-12-15 PROCEDURE — 3080F PR MOST RECENT DIASTOLIC BLOOD PRESSURE >= 90 MM HG: ICD-10-PCS | Mod: CPTII,,, | Performed by: SURGERY

## 2022-12-15 PROCEDURE — 99999 PR PBB SHADOW E&M-EST. PATIENT-LVL IV: CPT | Mod: PBBFAC,,, | Performed by: SURGERY

## 2022-12-15 PROCEDURE — 1160F RVW MEDS BY RX/DR IN RCRD: CPT | Mod: CPTII,,, | Performed by: SURGERY

## 2022-12-15 PROCEDURE — 99024 POSTOP FOLLOW-UP VISIT: CPT | Mod: ,,, | Performed by: SURGERY

## 2022-12-15 PROCEDURE — 1159F PR MEDICATION LIST DOCUMENTED IN MEDICAL RECORD: ICD-10-PCS | Mod: CPTII,,, | Performed by: SURGERY

## 2022-12-15 PROCEDURE — 99999 PR PBB SHADOW E&M-EST. PATIENT-LVL IV: ICD-10-PCS | Mod: PBBFAC,,, | Performed by: SURGERY

## 2022-12-15 PROCEDURE — 4010F ACE/ARB THERAPY RXD/TAKEN: CPT | Mod: CPTII,,, | Performed by: SURGERY

## 2022-12-15 PROCEDURE — 1159F MED LIST DOCD IN RCRD: CPT | Mod: CPTII,,, | Performed by: SURGERY

## 2022-12-15 PROCEDURE — 4010F PR ACE/ARB THEARPY RXD/TAKEN: ICD-10-PCS | Mod: CPTII,,, | Performed by: SURGERY

## 2022-12-15 PROCEDURE — 3080F DIAST BP >= 90 MM HG: CPT | Mod: CPTII,,, | Performed by: SURGERY

## 2022-12-15 PROCEDURE — 3008F BODY MASS INDEX DOCD: CPT | Mod: CPTII,,, | Performed by: SURGERY

## 2022-12-15 PROCEDURE — 1160F PR REVIEW ALL MEDS BY PRESCRIBER/CLIN PHARMACIST DOCUMENTED: ICD-10-PCS | Mod: CPTII,,, | Performed by: SURGERY

## 2022-12-15 PROCEDURE — 3077F SYST BP >= 140 MM HG: CPT | Mod: CPTII,,, | Performed by: SURGERY

## 2022-12-15 PROCEDURE — 3077F PR MOST RECENT SYSTOLIC BLOOD PRESSURE >= 140 MM HG: ICD-10-PCS | Mod: CPTII,,, | Performed by: SURGERY

## 2022-12-15 PROCEDURE — 99024 PR POST-OP FOLLOW-UP VISIT: ICD-10-PCS | Mod: ,,, | Performed by: SURGERY

## 2022-12-15 PROCEDURE — 3008F PR BODY MASS INDEX (BMI) DOCUMENTED: ICD-10-PCS | Mod: CPTII,,, | Performed by: SURGERY

## 2022-12-15 NOTE — PROGRESS NOTES
Subjective:       Patient ID: Jorgito Arreaga is a 57 y.o. male.    Robotic repair of bilateral inguinal hernias    Chief Complaint:  Post bilateral inguinal hernia repair  Patient has some mild pain in the right and left groin.  He noticed a slight fullness in the right groin.  He has no abdominal pain  Review of Systems   Gastrointestinal:         Bilateral groin pain     Objective:      Physical Exam  Vitals reviewed.   Abdominal:      Comments: Incisions are healed.  In the groins is no sagar evidence of recurrence.  There is a bilateral fullness needs groin slightly bigger on the left than on the right   Neurological:      Mental Status: He is alert.       Assessment:    Patient is recovering well after bilateral inguinal hernias repaired robotically with mesh.  He still has some bilateral groin pain.  There is a slight fullness in each groin likely representing a seroma    Plan:       Activity as tolerated.  Monitor the seroma.  Follow-up in 2 months

## 2023-01-05 ENCOUNTER — OFFICE VISIT (OUTPATIENT)
Dept: INTERNAL MEDICINE | Facility: CLINIC | Age: 58
End: 2023-01-05
Payer: MEDICAID

## 2023-01-05 VITALS
TEMPERATURE: 98 F | DIASTOLIC BLOOD PRESSURE: 88 MMHG | HEIGHT: 66 IN | SYSTOLIC BLOOD PRESSURE: 146 MMHG | BODY MASS INDEX: 25.33 KG/M2 | OXYGEN SATURATION: 97 % | WEIGHT: 157.63 LBS | HEART RATE: 87 BPM

## 2023-01-05 DIAGNOSIS — E11.65 TYPE 2 DIABETES MELLITUS WITH HYPERGLYCEMIA, WITH LONG-TERM CURRENT USE OF INSULIN: Primary | ICD-10-CM

## 2023-01-05 DIAGNOSIS — Z12.5 PROSTATE CANCER SCREENING: ICD-10-CM

## 2023-01-05 DIAGNOSIS — I10 ESSENTIAL HYPERTENSION: ICD-10-CM

## 2023-01-05 DIAGNOSIS — E78.5 HYPERLIPIDEMIA, UNSPECIFIED HYPERLIPIDEMIA TYPE: ICD-10-CM

## 2023-01-05 DIAGNOSIS — Z79.4 TYPE 2 DIABETES MELLITUS WITH HYPERGLYCEMIA, WITH LONG-TERM CURRENT USE OF INSULIN: Primary | ICD-10-CM

## 2023-01-05 PROCEDURE — 1160F RVW MEDS BY RX/DR IN RCRD: CPT | Mod: CPTII,,, | Performed by: NURSE PRACTITIONER

## 2023-01-05 PROCEDURE — 3008F BODY MASS INDEX DOCD: CPT | Mod: CPTII,,, | Performed by: NURSE PRACTITIONER

## 2023-01-05 PROCEDURE — 99214 PR OFFICE/OUTPT VISIT, EST, LEVL IV, 30-39 MIN: ICD-10-PCS | Mod: S$PBB,,, | Performed by: NURSE PRACTITIONER

## 2023-01-05 PROCEDURE — 99214 OFFICE O/P EST MOD 30 MIN: CPT | Mod: S$PBB,,, | Performed by: NURSE PRACTITIONER

## 2023-01-05 PROCEDURE — 1159F MED LIST DOCD IN RCRD: CPT | Mod: CPTII,,, | Performed by: NURSE PRACTITIONER

## 2023-01-05 PROCEDURE — 99214 OFFICE O/P EST MOD 30 MIN: CPT | Mod: PBBFAC,PO | Performed by: NURSE PRACTITIONER

## 2023-01-05 PROCEDURE — 99999 PR PBB SHADOW E&M-EST. PATIENT-LVL IV: CPT | Mod: PBBFAC,,, | Performed by: NURSE PRACTITIONER

## 2023-01-05 PROCEDURE — 3077F SYST BP >= 140 MM HG: CPT | Mod: CPTII,,, | Performed by: NURSE PRACTITIONER

## 2023-01-05 PROCEDURE — 99999 PR PBB SHADOW E&M-EST. PATIENT-LVL IV: ICD-10-PCS | Mod: PBBFAC,,, | Performed by: NURSE PRACTITIONER

## 2023-01-05 PROCEDURE — 4010F PR ACE/ARB THEARPY RXD/TAKEN: ICD-10-PCS | Mod: CPTII,,, | Performed by: NURSE PRACTITIONER

## 2023-01-05 PROCEDURE — 1160F PR REVIEW ALL MEDS BY PRESCRIBER/CLIN PHARMACIST DOCUMENTED: ICD-10-PCS | Mod: CPTII,,, | Performed by: NURSE PRACTITIONER

## 2023-01-05 PROCEDURE — 3008F PR BODY MASS INDEX (BMI) DOCUMENTED: ICD-10-PCS | Mod: CPTII,,, | Performed by: NURSE PRACTITIONER

## 2023-01-05 PROCEDURE — 3079F DIAST BP 80-89 MM HG: CPT | Mod: CPTII,,, | Performed by: NURSE PRACTITIONER

## 2023-01-05 PROCEDURE — 4010F ACE/ARB THERAPY RXD/TAKEN: CPT | Mod: CPTII,,, | Performed by: NURSE PRACTITIONER

## 2023-01-05 PROCEDURE — 1159F PR MEDICATION LIST DOCUMENTED IN MEDICAL RECORD: ICD-10-PCS | Mod: CPTII,,, | Performed by: NURSE PRACTITIONER

## 2023-01-05 PROCEDURE — 3077F PR MOST RECENT SYSTOLIC BLOOD PRESSURE >= 140 MM HG: ICD-10-PCS | Mod: CPTII,,, | Performed by: NURSE PRACTITIONER

## 2023-01-05 PROCEDURE — 3079F PR MOST RECENT DIASTOLIC BLOOD PRESSURE 80-89 MM HG: ICD-10-PCS | Mod: CPTII,,, | Performed by: NURSE PRACTITIONER

## 2023-01-05 RX ORDER — ATORVASTATIN CALCIUM 40 MG/1
40 TABLET, FILM COATED ORAL DAILY
Qty: 90 TABLET | Refills: 1 | Status: SHIPPED | OUTPATIENT
Start: 2023-01-05 | End: 2024-03-30

## 2023-01-05 RX ORDER — INSULIN GLARGINE 100 [IU]/ML
INJECTION, SOLUTION SUBCUTANEOUS
Qty: 30 ML | Refills: 5 | Status: SHIPPED | OUTPATIENT
Start: 2023-01-05

## 2023-01-05 RX ORDER — PEN NEEDLE, DIABETIC 30 GX3/16"
1 NEEDLE, DISPOSABLE MISCELLANEOUS
Qty: 100 EACH | Refills: 5 | Status: SHIPPED | OUTPATIENT
Start: 2023-01-05

## 2023-01-05 RX ORDER — VERAPAMIL HYDROCHLORIDE 240 MG/1
240 TABLET, FILM COATED, EXTENDED RELEASE ORAL DAILY
Qty: 90 TABLET | Refills: 1 | Status: SHIPPED | OUTPATIENT
Start: 2023-01-05 | End: 2023-10-24 | Stop reason: SDUPTHER

## 2023-01-05 RX ORDER — LANCETS 33 GAUGE
1 EACH MISCELLANEOUS
Qty: 100 EACH | Refills: 5 | Status: SHIPPED | OUTPATIENT
Start: 2023-01-05 | End: 2023-04-05

## 2023-01-05 RX ORDER — INSULIN LISPRO 100 [IU]/ML
INJECTION, SOLUTION INTRAVENOUS; SUBCUTANEOUS
Qty: 10 ML | Refills: 5 | Status: SHIPPED | OUTPATIENT
Start: 2023-01-05

## 2023-01-05 RX ORDER — LOSARTAN POTASSIUM 100 MG/1
100 TABLET ORAL DAILY
Qty: 90 TABLET | Refills: 1 | Status: SHIPPED | OUTPATIENT
Start: 2023-01-05 | End: 2023-10-11

## 2023-01-05 NOTE — ASSESSMENT & PLAN NOTE
BP elevated today in clinic.  Out of losartan times 2-3 days.  Refills provided.  Continue current medications as prescribed.  Routine labs ordered.  Return to clinic in 2 weeks for nurse Visit for BP check

## 2023-01-05 NOTE — PROGRESS NOTES
Subjective:       Patient ID: Jorgito Arreaga is a 57 y.o. male.    Chief Complaint: Medication Refill    Mr. Arreaga presents to clinic for medication refill.  He has not been seen in clinic since July 13, 2021.  He has a past medical history type 2 diabetes, hypertension, and hyperlipidemia.  Last A1c was 8.8.  BP elevated today in clinic. He has been out of losartan x 2-3 days.  Reports he monitors blood glucose at home.  He is unable to give exact readings but states readings are usually low.  He is on Humalog and Lantus.  States he will sometimes decrease Lantus dosing if evening glucose is low to prevent hypoglycemia.  He is past due for eye exam and foot exam.  States he will schedule eye exam at [facility] in 2 weeks when he is off work.  Declines foot exam today in clinic, he is using his brother's car which he needs to return right away in order for him to make it to work.  He also declines completing labs today.  He will return to clinic on Monday.    Diabetes Management Status    Statin: Taking  ACE/ARB: Taking    Screening or Prevention Patient's value Goal Complete/Controlled?   HgA1C Testing and Control   Lab Results   Component Value Date    HGBA1C 8.8 (H) 07/13/2021      Annually/Less than 8% No     Lipid profile : 09/29/2020 Annually No     LDL control Lab Results   Component Value Date    LDLCALC 57.6 (L) 09/29/2020    Annually/Less than 100 mg/dl  No     Nephropathy screening Lab Results   Component Value Date    LABMICR 35.0 09/29/2020     Lab Results   Component Value Date    PROTEINUA Negative 11/05/2022     Lab Results   Component Value Date    UTPCR 0.05 09/05/2019      Annually Yes     Blood pressure BP Readings from Last 1 Encounters:   01/05/23 (!) 146/88    Less than 140/90 Yes     Dilated retinal exam : 08/28/2019 Annually No     Foot exam   : 07/13/2021 Annually No         Patient Active Problem List   Diagnosis    Alcohol-induced acute pancreatitis    Gastroesophageal reflux  disease    Alcohol abuse    Hypertension associated with diabetes    Abdominal pain    Hyperkalemia    Edema    Hyperglycemia due to type 1 diabetes mellitus    Recurrent pancreatitis    Polyp of colon    Screen for colon cancer    Delayed immunizations    Uncontrolled secondary diabetes with peripheral neuropathy    Primary osteoarthritis of both knees    Urinary frequency    Encounter for long-term (current) use of medications    Melena    History of colon polyps    GERD (gastroesophageal reflux disease)    Benign neoplasm of transverse colon    Renal dysfunction    Elevated lipase    Transaminitis    Metabolic acidosis    Hyperlipidemia    Essential hypertension    Hypertriglyceridemia    High priority for COVID-19 virus vaccination    Non-recurrent bilateral inguinal hernia without obstruction or gangrene    Type 2 diabetes mellitus with hyperglycemia, with long-term current use of insulin       Family History   Problem Relation Age of Onset    Hypertension Mother     Hypertension Father      Past Surgical History:   Procedure Laterality Date    COLONOSCOPY      COLONOSCOPY N/A 6/15/2018    Procedure: COLONOSCOPY;  Surgeon: Jim Hicks MD;  Location: [facility];  Service: Endoscopy;  Laterality: N/A;    ESOPHAGOGASTRODUODENOSCOPY N/A 6/15/2018    Procedure: ESOPHAGOGASTRODUODENOSCOPY (EGD);  Surgeon: Jim Hicks MD;  Location: [facility];  Service: Endoscopy;  Laterality: N/A;    ROBOT-ASSISTED LAPAROSCOPIC REPAIR OF INGUINAL HERNIA USING DA JANET XI Bilateral 11/30/2022    Procedure: XI ROBOTIC REPAIR, HERNIA, INGUINAL;  Surgeon: Smooth Denny MD;  Location: [facility];  Service: General;  Laterality: Bilateral;         Current Outpatient Medications:     aspirin 325 MG tablet, Take 1 tablet (325 mg total) by mouth once daily., Disp: 180 tablet, Rfl: 1    clotrimazole-betamethasone 1-0.05% (LOTRISONE) cream, Apply topically 2 (two) times daily., Disp: 15 g, Rfl: 0    furosemide (LASIX) 20 MG tablet, Prn  "for swelling. (Patient taking differently: daily as needed (swelling). Prn for swelling.), Disp: 90 tablet, Rfl: 0    insulin syringe-needle,dispos. 1 mL 28 gauge x 1/2" Syrg, 1 each by Misc.(Non-Drug; Combo Route) route 2 (two) times daily., Disp: 60 Syringe, Rfl: 0    mupirocin (BACTROBAN) 2 % ointment, Apply topically 3 (three) times daily., Disp: 22 g, Rfl: 0    naproxen (NAPROSYN) 375 MG tablet, Take 1 tablet (375 mg total) by mouth 2 (two) times daily with meals., Disp: 30 tablet, Rfl: 0    potassium, sodium phosphates (PHOS-NAK) 280-160-250 mg PwPk, Take 1 packet by mouth 4 (four) times daily before meals and nightly., Disp: , Rfl: 0    atorvastatin (LIPITOR) 40 MG tablet, Take 1 tablet (40 mg total) by mouth once daily., Disp: 90 tablet, Rfl: 1    blood sugar diagnostic (ONETOUCH ULTRA BLUE TEST STRIP) Strp, USE TO CHECK SUGAR BEFORE MEALS AND AT BEDTIME, Disp: 100 strip, Rfl: 11    HUMALOG U-100 INSULIN 100 unit/mL injection, INJECT 8 UNITS SUBCUTANEOUSLY WITH LARGEST MEAL OF THE DAY, INJECT ADDITIONAL 4 UNITS AT A TIME IF GLUCOSE IS ELEVATED, Disp: 10 mL, Rfl: 5    insulin (LANTUS SOLOSTAR U-100 INSULIN) glargine 100 units/mL SubQ pen, INJECT 50 UNITS INTO SKIN EVERY EVENING., Disp: 30 mL, Rfl: 5    lancets (ONETOUCH DELICA PLUS LANCET) 33 gauge Misc, Inject 1 lancet into the skin 4 (four) times daily before meals and nightly., Disp: 100 each, Rfl: 5    losartan (COZAAR) 100 MG tablet, Take 1 tablet (100 mg total) by mouth once daily., Disp: 90 tablet, Rfl: 1    pen needle, diabetic (BD JOEL 2ND GEN PEN NEEDLE) 32 gauge x 5/32" Ndle, Inject 1 each into the skin 4 (four) times daily before meals and nightly., Disp: 100 each, Rfl: 5    verapamiL (CALAN-SR) 240 MG CR tablet, Take 1 tablet (240 mg total) by mouth once daily., Disp: 90 tablet, Rfl: 1    Review of Systems   Constitutional:  Negative for chills and fever.   Eyes:  Negative for visual disturbance.   Respiratory:  Negative for cough and shortness " "of breath.    Cardiovascular:  Negative for chest pain.   Gastrointestinal:  Negative for abdominal pain.   Endocrine: Negative for polydipsia, polyphagia and polyuria.   Neurological:  Negative for dizziness.     Objective:   BP (!) 146/88 (BP Location: Left arm, Patient Position: Sitting, BP Method: Medium (Manual))   Pulse 87   Temp 98.1 °F (36.7 °C)   Ht 5' 6" (1.676 m)   Wt 71.5 kg (157 lb 10.1 oz)   SpO2 97%   BMI 25.44 kg/m²      Physical Exam  Vitals reviewed.   Constitutional:       General: He is not in acute distress.     Appearance: Normal appearance. He is well-developed. He is not ill-appearing, toxic-appearing or diaphoretic.   HENT:      Head: Normocephalic and atraumatic.   Eyes:      Conjunctiva/sclera: Conjunctivae normal.   Cardiovascular:      Rate and Rhythm: Normal rate and regular rhythm.      Heart sounds: Normal heart sounds. No murmur heard.  Pulmonary:      Effort: Pulmonary effort is normal. No respiratory distress.      Breath sounds: Normal breath sounds. No wheezing, rhonchi or rales.   Skin:     General: Skin is warm and dry.      Findings: No rash.   Neurological:      Mental Status: He is alert and oriented to person, place, and time.      Coordination: Coordination normal.   Psychiatric:         Mood and Affect: Mood normal.         Behavior: Behavior is cooperative.       Assessment & Plan     Problem List Items Addressed This Visit          Cardiac/Vascular    Hyperlipidemia    Current Assessment & Plan     Last LDL 57.  Repeat lipid panel.  Continue statin as prescribed.  Refills provided.          Relevant Medications    atorvastatin (LIPITOR) 40 MG tablet    Other Relevant Orders    LIPID PANEL    Essential hypertension    Current Assessment & Plan     BP elevated today in clinic.  Out of losartan times 2-3 days.  Refills provided.  Continue current medications as prescribed.  Routine labs ordered.  Return to clinic in 2 weeks for nurse Visit for BP check         " "Relevant Medications    verapamiL (CALAN-SR) 240 MG CR tablet    losartan (COZAAR) 100 MG tablet    Other Relevant Orders    COMPREHENSIVE METABOLIC PANEL       Endocrine    Type 2 diabetes mellitus with hyperglycemia, with long-term current use of insulin - Primary    Current Assessment & Plan     Lab Results   Component Value Date    HGBA1C 8.8 (H) 07/13/2021   Repeat A1c, urine microalbumin, and routine labs.  Continue Humalog and Lantus as prescribed.  Refills provided.  Schedule eye exam.  Declines foot exam today.  Discussed the importance of medication and medical appointment compliance.  Return to clinic in 2 weeks for follow-up.  Advised to keep a detailed blood glucose log to bring to follow-up appointment.         Relevant Medications    pen needle, diabetic (BD JOEL 2ND GEN PEN NEEDLE) 32 gauge x 5/32" Ndle    blood sugar diagnostic (ONETOUCH ULTRA BLUE TEST STRIP) Strp    HUMALOG U-100 INSULIN 100 unit/mL injection    insulin (LANTUS SOLOSTAR U-100 INSULIN) glargine 100 units/mL SubQ pen    lancets (ONETOUCH DELICA PLUS LANCET) 33 gauge Misc    Other Relevant Orders    HEMOGLOBIN A1C    Microalbumin/Creatinine Ratio, Urine     Other Visit Diagnoses       Prostate cancer screening        Relevant Orders    PSA, SCREENING              AMANDA El      Portions of this note may have been created with voice recognition software. Occasional "wrong-word" or "sound-a-like" substitutions may have occurred due to the inherent limitations of voice recognition software. Please, read the note carefully and recognize, using context, where substitutions have occurred.     "

## 2023-01-05 NOTE — ASSESSMENT & PLAN NOTE
Lab Results   Component Value Date    HGBA1C 8.8 (H) 07/13/2021     Repeat A1c, urine microalbumin, and routine labs.  Continue Humalog and Lantus as prescribed.  Refills provided.  Schedule eye exam.  Declines foot exam today.  Discussed the importance of medication and medical appointment compliance.  Return to clinic in 2 weeks for follow-up.  Advised to keep a detailed blood glucose log to bring to follow-up appointment.

## 2023-03-24 ENCOUNTER — PATIENT OUTREACH (OUTPATIENT)
Dept: ADMINISTRATIVE | Facility: HOSPITAL | Age: 58
End: 2023-03-24
Payer: MEDICAID

## 2023-03-24 NOTE — PROGRESS NOTES
DM Eye exam: Per chart review, pt overdue for DM eye exam, last exam on file 08/28/19 with [facility], attempted to call pt no ans, unable to lvm.

## 2023-04-03 PROBLEM — K85.90 ACUTE PANCREATITIS WITHOUT NECROSIS OR INFECTION, UNSPECIFIED: Status: ACTIVE | Noted: 2017-02-14

## 2023-06-07 ENCOUNTER — PATIENT OUTREACH (OUTPATIENT)
Dept: ADMINISTRATIVE | Facility: HOSPITAL | Age: 58
End: 2023-06-07
Payer: MEDICAID

## 2023-06-07 NOTE — PROGRESS NOTES
DM EYE EXAM REPORT: per chart review pt is overdue for dm eye exam, spoke to pt he requested appt with Kirsten Queen, no appts available this wk, or next, pt asked that I notify her office, I will send message. Pt declined offer for eye appt scheduling, states he will go to [facility] at some point.

## 2023-06-07 NOTE — Clinical Note
"Hello,  Pt is requesting ofc visit this wk, I was unable to schedule, for hx of Type 1 DM pt states his blood sugar is fluctuating he is also vomiting dark red blood, and "gray" matter, urinating blood, has been out of work for 2 wks, recommended ER pt states he does not want to go to ER this has happened before when he had pancreatitis, please call and offer recommendation/appt."

## 2023-07-05 DIAGNOSIS — E11.9 TYPE 2 DIABETES MELLITUS WITHOUT COMPLICATION: ICD-10-CM

## 2023-10-11 DIAGNOSIS — I10 ESSENTIAL HYPERTENSION: ICD-10-CM

## 2023-10-11 RX ORDER — LOSARTAN POTASSIUM 100 MG/1
100 TABLET ORAL
Qty: 30 TABLET | Refills: 0 | Status: SHIPPED | OUTPATIENT
Start: 2023-10-11 | End: 2023-12-13

## 2023-10-19 DIAGNOSIS — I10 ESSENTIAL HYPERTENSION: ICD-10-CM

## 2023-10-19 RX ORDER — VERAPAMIL HYDROCHLORIDE 240 MG/1
240 TABLET, FILM COATED, EXTENDED RELEASE ORAL DAILY
Qty: 90 TABLET | Refills: 1 | Status: CANCELLED | OUTPATIENT
Start: 2023-10-19 | End: 2024-04-16

## 2023-10-24 ENCOUNTER — TELEPHONE (OUTPATIENT)
Dept: INTERNAL MEDICINE | Facility: CLINIC | Age: 58
End: 2023-10-24
Payer: MEDICAID

## 2023-10-24 DIAGNOSIS — I10 ESSENTIAL HYPERTENSION: ICD-10-CM

## 2023-10-24 RX ORDER — VERAPAMIL HYDROCHLORIDE 240 MG/1
240 TABLET, FILM COATED, EXTENDED RELEASE ORAL DAILY
Qty: 90 TABLET | Refills: 1 | Status: SHIPPED | OUTPATIENT
Start: 2023-10-24 | End: 2024-04-21

## 2023-12-12 DIAGNOSIS — I10 ESSENTIAL HYPERTENSION: ICD-10-CM

## 2023-12-13 RX ORDER — LOSARTAN POTASSIUM 100 MG/1
TABLET ORAL
Qty: 30 TABLET | Refills: 0 | Status: SHIPPED | OUTPATIENT
Start: 2023-12-13

## 2024-04-10 DIAGNOSIS — Z79.4 TYPE 2 DIABETES MELLITUS WITH HYPERGLYCEMIA, WITH LONG-TERM CURRENT USE OF INSULIN: ICD-10-CM

## 2024-04-10 DIAGNOSIS — E11.65 TYPE 2 DIABETES MELLITUS WITH HYPERGLYCEMIA, WITH LONG-TERM CURRENT USE OF INSULIN: ICD-10-CM

## 2024-05-22 ENCOUNTER — HOSPITAL ENCOUNTER (OUTPATIENT)
Facility: HOSPITAL | Age: 59
Discharge: HOME | End: 2024-05-23
Attending: EMERGENCY MEDICINE | Admitting: INTERNAL MEDICINE
Payer: MEDICAID

## 2024-05-22 DIAGNOSIS — I10 ESSENTIAL HYPERTENSION: ICD-10-CM

## 2024-05-22 DIAGNOSIS — R29.898 RIGHT ARM WEAKNESS: Primary | ICD-10-CM

## 2024-05-22 DIAGNOSIS — E78.5 HYPERLIPIDEMIA, UNSPECIFIED HYPERLIPIDEMIA TYPE: ICD-10-CM

## 2024-05-22 DIAGNOSIS — F10.920 ALCOHOLIC INTOXICATION WITHOUT COMPLICATION: ICD-10-CM

## 2024-05-22 DIAGNOSIS — R29.818 ACUTE FOCAL NEUROLOGICAL DEFICIT: ICD-10-CM

## 2024-05-22 DIAGNOSIS — I10 HYPERTENSION, UNSPECIFIED TYPE: ICD-10-CM

## 2024-05-22 DIAGNOSIS — R07.9 CHEST PAIN: ICD-10-CM

## 2024-05-22 PROBLEM — I16.0 HYPERTENSIVE URGENCY: Status: ACTIVE | Noted: 2024-05-22

## 2024-05-22 LAB
ALBUMIN SERPL BCP-MCNC: 4.2 G/DL (ref 3.5–5.2)
ALP SERPL-CCNC: 136 U/L (ref 55–135)
ALT SERPL W/O P-5'-P-CCNC: 45 U/L (ref 10–44)
AMPHET+METHAMPHET UR QL: NEGATIVE
ANION GAP SERPL CALC-SCNC: 15 MMOL/L (ref 8–16)
ANION GAP SERPL CALC-SCNC: 20 MMOL/L (ref 8–16)
APTT PPP: 28.1 SEC (ref 21–32)
AST SERPL-CCNC: 104 U/L (ref 10–40)
BARBITURATES UR QL SCN>200 NG/ML: NEGATIVE
BASOPHILS # BLD AUTO: 0.04 K/UL (ref 0–0.2)
BASOPHILS NFR BLD: 1.1 % (ref 0–1.9)
BENZODIAZ UR QL SCN>200 NG/ML: NEGATIVE
BILIRUB SERPL-MCNC: 1.2 MG/DL (ref 0.1–1)
BNP SERPL-MCNC: <10 PG/ML (ref 0–99)
BUN SERPL-MCNC: 4 MG/DL (ref 6–20)
BUN SERPL-MCNC: 7 MG/DL (ref 6–20)
BZE UR QL SCN: NEGATIVE
CALCIUM SERPL-MCNC: 9.3 MG/DL (ref 8.7–10.5)
CALCIUM SERPL-MCNC: 9.5 MG/DL (ref 8.7–10.5)
CANNABINOIDS UR QL SCN: NEGATIVE
CHLORIDE SERPL-SCNC: 102 MMOL/L (ref 95–110)
CHLORIDE SERPL-SCNC: 93 MMOL/L (ref 95–110)
CHOLEST SERPL-MCNC: 347 MG/DL (ref 120–199)
CHOLEST/HDLC SERPL: ABNORMAL {RATIO} (ref 2–5)
CO2 SERPL-SCNC: 23 MMOL/L (ref 23–29)
CO2 SERPL-SCNC: 29 MMOL/L (ref 23–29)
CREAT SERPL-MCNC: 0.8 MG/DL (ref 0.5–1.4)
CREAT SERPL-MCNC: 1.1 MG/DL (ref 0.5–1.4)
CREAT UR-MCNC: 74 MG/DL (ref 23–375)
DIFFERENTIAL METHOD BLD: ABNORMAL
EOSINOPHIL # BLD AUTO: 0 K/UL (ref 0–0.5)
EOSINOPHIL NFR BLD: 0.6 % (ref 0–8)
ERYTHROCYTE [DISTWIDTH] IN BLOOD BY AUTOMATED COUNT: 12.4 % (ref 11.5–14.5)
EST. GFR  (NO RACE VARIABLE): >60 ML/MIN/1.73 M^2
EST. GFR  (NO RACE VARIABLE): >60 ML/MIN/1.73 M^2
ETHANOL SERPL-MCNC: 389 MG/DL
GLUCOSE SERPL-MCNC: 288 MG/DL (ref 70–110)
GLUCOSE SERPL-MCNC: 495 MG/DL (ref 70–110)
HCT VFR BLD AUTO: 39.7 % (ref 40–54)
HCV AB SERPL QL IA: NEGATIVE
HDLC SERPL-MCNC: >140 MG/DL (ref 40–75)
HDLC SERPL: ABNORMAL % (ref 20–50)
HEP C VIRUS HOLD SPECIMEN: NORMAL
HGB BLD-MCNC: 13.3 G/DL (ref 14–18)
HIV 1+2 AB+HIV1 P24 AG SERPL QL IA: NEGATIVE
IMM GRANULOCYTES # BLD AUTO: 0 K/UL (ref 0–0.04)
IMM GRANULOCYTES NFR BLD AUTO: 0 % (ref 0–0.5)
INR PPP: 1 (ref 0.8–1.2)
LDLC SERPL CALC-MCNC: ABNORMAL MG/DL (ref 63–159)
LYMPHOCYTES # BLD AUTO: 2.1 K/UL (ref 1–4.8)
LYMPHOCYTES NFR BLD: 59 % (ref 18–48)
MCH RBC QN AUTO: 31.4 PG (ref 27–31)
MCHC RBC AUTO-ENTMCNC: 33.5 G/DL (ref 32–36)
MCV RBC AUTO: 94 FL (ref 82–98)
METHADONE UR QL SCN>300 NG/ML: NEGATIVE
MONOCYTES # BLD AUTO: 0.2 K/UL (ref 0.3–1)
MONOCYTES NFR BLD: 5.8 % (ref 4–15)
NEUTROPHILS # BLD AUTO: 1.2 K/UL (ref 1.8–7.7)
NEUTROPHILS NFR BLD: 33.5 % (ref 38–73)
NONHDLC SERPL-MCNC: ABNORMAL MG/DL
NRBC BLD-RTO: 0 /100 WBC
OHS QRS DURATION: 84 MS
OHS QTC CALCULATION: 453 MS
OPIATES UR QL SCN: NEGATIVE
PCP UR QL SCN>25 NG/ML: NEGATIVE
PLATELET # BLD AUTO: 184 K/UL (ref 150–450)
PMV BLD AUTO: 11 FL (ref 9.2–12.9)
POCT GLUCOSE: 117 MG/DL (ref 70–110)
POCT GLUCOSE: 249 MG/DL (ref 70–110)
POCT GLUCOSE: 290 MG/DL (ref 70–110)
POCT GLUCOSE: >500 MG/DL (ref 70–110)
POTASSIUM SERPL-SCNC: 3.8 MMOL/L (ref 3.5–5.1)
POTASSIUM SERPL-SCNC: 3.9 MMOL/L (ref 3.5–5.1)
PROT SERPL-MCNC: 7.3 G/DL (ref 6–8.4)
PROTHROMBIN TIME: 11.5 SEC (ref 9–12.5)
RBC # BLD AUTO: 4.23 M/UL (ref 4.6–6.2)
SODIUM SERPL-SCNC: 137 MMOL/L (ref 136–145)
SODIUM SERPL-SCNC: 145 MMOL/L (ref 136–145)
TOXICOLOGY INFORMATION: NORMAL
TRIGL SERPL-MCNC: 128 MG/DL (ref 30–150)
TROPONIN I SERPL DL<=0.01 NG/ML-MCNC: 0.01 NG/ML (ref 0–0.03)
TSH SERPL DL<=0.005 MIU/L-ACNC: 3.59 UIU/ML (ref 0.4–4)
WBC # BLD AUTO: 3.63 K/UL (ref 3.9–12.7)

## 2024-05-22 PROCEDURE — 36415 COLL VENOUS BLD VENIPUNCTURE: CPT | Performed by: INTERNAL MEDICINE

## 2024-05-22 PROCEDURE — 85730 THROMBOPLASTIN TIME PARTIAL: CPT | Mod: ER | Performed by: EMERGENCY MEDICINE

## 2024-05-22 PROCEDURE — 93010 ELECTROCARDIOGRAM REPORT: CPT | Mod: ,,, | Performed by: INTERNAL MEDICINE

## 2024-05-22 PROCEDURE — 80048 BASIC METABOLIC PNL TOTAL CA: CPT | Mod: XB | Performed by: INTERNAL MEDICINE

## 2024-05-22 PROCEDURE — G0378 HOSPITAL OBSERVATION PER HR: HCPCS

## 2024-05-22 PROCEDURE — 99284 EMERGENCY DEPT VISIT MOD MDM: CPT | Mod: 95,,, | Performed by: STUDENT IN AN ORGANIZED HEALTH CARE EDUCATION/TRAINING PROGRAM

## 2024-05-22 PROCEDURE — 63600175 PHARM REV CODE 636 W HCPCS: Performed by: INTERNAL MEDICINE

## 2024-05-22 PROCEDURE — 25500020 PHARM REV CODE 255: Mod: ER | Performed by: EMERGENCY MEDICINE

## 2024-05-22 PROCEDURE — 83036 HEMOGLOBIN GLYCOSYLATED A1C: CPT | Performed by: INTERNAL MEDICINE

## 2024-05-22 PROCEDURE — 82077 ASSAY SPEC XCP UR&BREATH IA: CPT | Mod: ER | Performed by: EMERGENCY MEDICINE

## 2024-05-22 PROCEDURE — 96372 THER/PROPH/DIAG INJ SC/IM: CPT | Performed by: INTERNAL MEDICINE

## 2024-05-22 PROCEDURE — 80307 DRUG TEST PRSMV CHEM ANLYZR: CPT | Mod: ER | Performed by: EMERGENCY MEDICINE

## 2024-05-22 PROCEDURE — 63600175 PHARM REV CODE 636 W HCPCS: Mod: ER | Performed by: EMERGENCY MEDICINE

## 2024-05-22 PROCEDURE — 84484 ASSAY OF TROPONIN QUANT: CPT | Mod: ER | Performed by: EMERGENCY MEDICINE

## 2024-05-22 PROCEDURE — G0378 HOSPITAL OBSERVATION PER HR: HCPCS | Mod: ER

## 2024-05-22 PROCEDURE — 99285 EMERGENCY DEPT VISIT HI MDM: CPT | Mod: 25,ER

## 2024-05-22 PROCEDURE — 25000003 PHARM REV CODE 250: Performed by: INTERNAL MEDICINE

## 2024-05-22 PROCEDURE — 93005 ELECTROCARDIOGRAM TRACING: CPT | Mod: ER

## 2024-05-22 PROCEDURE — 85025 COMPLETE CBC W/AUTO DIFF WBC: CPT | Mod: ER | Performed by: EMERGENCY MEDICINE

## 2024-05-22 PROCEDURE — 96374 THER/PROPH/DIAG INJ IV PUSH: CPT | Mod: 59,ER

## 2024-05-22 PROCEDURE — 85610 PROTHROMBIN TIME: CPT | Mod: ER | Performed by: EMERGENCY MEDICINE

## 2024-05-22 PROCEDURE — 82962 GLUCOSE BLOOD TEST: CPT | Mod: ER

## 2024-05-22 PROCEDURE — 96375 TX/PRO/DX INJ NEW DRUG ADDON: CPT

## 2024-05-22 PROCEDURE — 86803 HEPATITIS C AB TEST: CPT | Performed by: EMERGENCY MEDICINE

## 2024-05-22 PROCEDURE — 84443 ASSAY THYROID STIM HORMONE: CPT | Mod: ER | Performed by: EMERGENCY MEDICINE

## 2024-05-22 PROCEDURE — 94761 N-INVAS EAR/PLS OXIMETRY MLT: CPT | Mod: ER

## 2024-05-22 PROCEDURE — 80061 LIPID PANEL: CPT | Performed by: EMERGENCY MEDICINE

## 2024-05-22 PROCEDURE — 80053 COMPREHEN METABOLIC PANEL: CPT | Mod: ER | Performed by: EMERGENCY MEDICINE

## 2024-05-22 PROCEDURE — 87389 HIV-1 AG W/HIV-1&-2 AB AG IA: CPT | Performed by: EMERGENCY MEDICINE

## 2024-05-22 PROCEDURE — 83880 ASSAY OF NATRIURETIC PEPTIDE: CPT | Mod: ER | Performed by: EMERGENCY MEDICINE

## 2024-05-22 RX ORDER — ACETAMINOPHEN 325 MG/1
650 TABLET ORAL EVERY 4 HOURS PRN
Status: DISCONTINUED | OUTPATIENT
Start: 2024-05-22 | End: 2024-05-23 | Stop reason: HOSPADM

## 2024-05-22 RX ORDER — LOSARTAN POTASSIUM 50 MG/1
100 TABLET ORAL DAILY
Status: DISCONTINUED | OUTPATIENT
Start: 2024-05-22 | End: 2024-05-23 | Stop reason: HOSPADM

## 2024-05-22 RX ORDER — SODIUM CHLORIDE 0.9 % (FLUSH) 0.9 %
10 SYRINGE (ML) INJECTION EVERY 12 HOURS PRN
Status: DISCONTINUED | OUTPATIENT
Start: 2024-05-22 | End: 2024-05-23 | Stop reason: HOSPADM

## 2024-05-22 RX ORDER — ONDANSETRON HYDROCHLORIDE 2 MG/ML
4 INJECTION, SOLUTION INTRAVENOUS EVERY 8 HOURS PRN
Status: DISCONTINUED | OUTPATIENT
Start: 2024-05-22 | End: 2024-05-23 | Stop reason: HOSPADM

## 2024-05-22 RX ORDER — ATORVASTATIN CALCIUM 40 MG/1
40 TABLET, FILM COATED ORAL DAILY
Status: DISCONTINUED | OUTPATIENT
Start: 2024-05-23 | End: 2024-05-22

## 2024-05-22 RX ORDER — PANTOPRAZOLE SODIUM 40 MG/1
40 TABLET, DELAYED RELEASE ORAL DAILY
Status: DISCONTINUED | OUTPATIENT
Start: 2024-05-23 | End: 2024-05-23 | Stop reason: HOSPADM

## 2024-05-22 RX ORDER — NALOXONE HCL 0.4 MG/ML
0.02 VIAL (ML) INJECTION
Status: DISCONTINUED | OUTPATIENT
Start: 2024-05-22 | End: 2024-05-23 | Stop reason: HOSPADM

## 2024-05-22 RX ORDER — ENOXAPARIN SODIUM 100 MG/ML
40 INJECTION SUBCUTANEOUS EVERY 24 HOURS
Status: DISCONTINUED | OUTPATIENT
Start: 2024-05-22 | End: 2024-05-23 | Stop reason: HOSPADM

## 2024-05-22 RX ORDER — GLUCAGON 1 MG
1 KIT INJECTION
Status: DISCONTINUED | OUTPATIENT
Start: 2024-05-22 | End: 2024-05-23 | Stop reason: HOSPADM

## 2024-05-22 RX ORDER — TALC
6 POWDER (GRAM) TOPICAL NIGHTLY PRN
Status: DISCONTINUED | OUTPATIENT
Start: 2024-05-22 | End: 2024-05-23 | Stop reason: HOSPADM

## 2024-05-22 RX ORDER — THIAMINE HCL 100 MG
100 TABLET ORAL DAILY
Status: DISCONTINUED | OUTPATIENT
Start: 2024-05-23 | End: 2024-05-23 | Stop reason: HOSPADM

## 2024-05-22 RX ORDER — CHLORDIAZEPOXIDE HYDROCHLORIDE 25 MG/1
25 CAPSULE, GELATIN COATED ORAL 3 TIMES DAILY
Status: DISCONTINUED | OUTPATIENT
Start: 2024-05-22 | End: 2024-05-23 | Stop reason: HOSPADM

## 2024-05-22 RX ORDER — VERAPAMIL HYDROCHLORIDE 240 MG/1
240 TABLET, FILM COATED, EXTENDED RELEASE ORAL DAILY
Status: DISCONTINUED | OUTPATIENT
Start: 2024-05-23 | End: 2024-05-22

## 2024-05-22 RX ORDER — IBUPROFEN 200 MG
24 TABLET ORAL
Status: DISCONTINUED | OUTPATIENT
Start: 2024-05-22 | End: 2024-05-22

## 2024-05-22 RX ORDER — HYDRALAZINE HYDROCHLORIDE 20 MG/ML
10 INJECTION INTRAMUSCULAR; INTRAVENOUS EVERY 6 HOURS PRN
Status: DISCONTINUED | OUTPATIENT
Start: 2024-05-22 | End: 2024-05-23 | Stop reason: HOSPADM

## 2024-05-22 RX ORDER — ACETAMINOPHEN 325 MG/1
650 TABLET ORAL EVERY 8 HOURS PRN
Status: DISCONTINUED | OUTPATIENT
Start: 2024-05-22 | End: 2024-05-22

## 2024-05-22 RX ORDER — VERAPAMIL HYDROCHLORIDE 240 MG/1
240 TABLET, FILM COATED, EXTENDED RELEASE ORAL DAILY
Status: DISCONTINUED | OUTPATIENT
Start: 2024-05-22 | End: 2024-05-23 | Stop reason: HOSPADM

## 2024-05-22 RX ORDER — INSULIN ASPART 100 [IU]/ML
0-15 INJECTION, SOLUTION INTRAVENOUS; SUBCUTANEOUS
Status: DISCONTINUED | OUTPATIENT
Start: 2024-05-22 | End: 2024-05-23 | Stop reason: HOSPADM

## 2024-05-22 RX ORDER — AMOXICILLIN 250 MG
1 CAPSULE ORAL 2 TIMES DAILY
Status: DISCONTINUED | OUTPATIENT
Start: 2024-05-22 | End: 2024-05-23 | Stop reason: HOSPADM

## 2024-05-22 RX ORDER — LOSARTAN POTASSIUM 50 MG/1
100 TABLET ORAL DAILY
Status: DISCONTINUED | OUTPATIENT
Start: 2024-05-23 | End: 2024-05-22

## 2024-05-22 RX ORDER — IBUPROFEN 200 MG
16 TABLET ORAL
Status: DISCONTINUED | OUTPATIENT
Start: 2024-05-22 | End: 2024-05-23 | Stop reason: HOSPADM

## 2024-05-22 RX ORDER — LORAZEPAM 2 MG/ML
1 INJECTION INTRAMUSCULAR EVERY 4 HOURS PRN
Status: DISCONTINUED | OUTPATIENT
Start: 2024-05-22 | End: 2024-05-23 | Stop reason: HOSPADM

## 2024-05-22 RX ORDER — INSULIN GLARGINE 100 [IU]/ML
20 INJECTION, SOLUTION SUBCUTANEOUS DAILY
Status: DISCONTINUED | OUTPATIENT
Start: 2024-05-23 | End: 2024-05-22

## 2024-05-22 RX ORDER — GLUCAGON 1 MG
1 KIT INJECTION
Status: DISCONTINUED | OUTPATIENT
Start: 2024-05-22 | End: 2024-05-22

## 2024-05-22 RX ORDER — IBUPROFEN 200 MG
24 TABLET ORAL
Status: DISCONTINUED | OUTPATIENT
Start: 2024-05-22 | End: 2024-05-23 | Stop reason: HOSPADM

## 2024-05-22 RX ORDER — FOLIC ACID 1 MG/1
1 TABLET ORAL DAILY
Status: DISCONTINUED | OUTPATIENT
Start: 2024-05-23 | End: 2024-05-23 | Stop reason: HOSPADM

## 2024-05-22 RX ORDER — IBUPROFEN 200 MG
16 TABLET ORAL
Status: DISCONTINUED | OUTPATIENT
Start: 2024-05-22 | End: 2024-05-22

## 2024-05-22 RX ORDER — CHLORDIAZEPOXIDE HYDROCHLORIDE 5 MG/1
10 CAPSULE, GELATIN COATED ORAL 3 TIMES DAILY
Status: DISCONTINUED | OUTPATIENT
Start: 2024-05-22 | End: 2024-05-22

## 2024-05-22 RX ORDER — ASPIRIN 325 MG
325 TABLET ORAL DAILY
Status: DISCONTINUED | OUTPATIENT
Start: 2024-05-23 | End: 2024-05-23 | Stop reason: HOSPADM

## 2024-05-22 RX ORDER — INSULIN ASPART 100 [IU]/ML
0-10 INJECTION, SOLUTION INTRAVENOUS; SUBCUTANEOUS
Status: DISCONTINUED | OUTPATIENT
Start: 2024-05-22 | End: 2024-05-22

## 2024-05-22 RX ORDER — POLYETHYLENE GLYCOL 3350 17 G/17G
17 POWDER, FOR SOLUTION ORAL DAILY PRN
Status: DISCONTINUED | OUTPATIENT
Start: 2024-05-22 | End: 2024-05-23 | Stop reason: HOSPADM

## 2024-05-22 RX ORDER — PROCHLORPERAZINE EDISYLATE 5 MG/ML
5 INJECTION INTRAMUSCULAR; INTRAVENOUS EVERY 6 HOURS PRN
Status: DISCONTINUED | OUTPATIENT
Start: 2024-05-22 | End: 2024-05-23 | Stop reason: HOSPADM

## 2024-05-22 RX ORDER — LABETALOL HYDROCHLORIDE 5 MG/ML
10 INJECTION, SOLUTION INTRAVENOUS
Status: COMPLETED | OUTPATIENT
Start: 2024-05-22 | End: 2024-05-22

## 2024-05-22 RX ADMIN — INSULIN HUMAN 10 UNITS: 100 INJECTION, SOLUTION PARENTERAL at 09:05

## 2024-05-22 RX ADMIN — IOHEXOL 100 ML: 350 INJECTION, SOLUTION INTRAVENOUS at 02:05

## 2024-05-22 RX ADMIN — LORAZEPAM 1 MG: 2 INJECTION INTRAMUSCULAR; INTRAVENOUS at 09:05

## 2024-05-22 RX ADMIN — LOSARTAN POTASSIUM 100 MG: 50 TABLET, FILM COATED ORAL at 07:05

## 2024-05-22 RX ADMIN — HYDRALAZINE HYDROCHLORIDE 10 MG: 20 INJECTION, SOLUTION INTRAMUSCULAR; INTRAVENOUS at 08:05

## 2024-05-22 RX ADMIN — ENOXAPARIN SODIUM 40 MG: 40 INJECTION SUBCUTANEOUS at 07:05

## 2024-05-22 RX ADMIN — INSULIN DETEMIR 25 UNITS: 100 INJECTION, SOLUTION SUBCUTANEOUS at 08:05

## 2024-05-22 RX ADMIN — CHLORDIAZEPOXIDE HYDROCHLORIDE 25 MG: 25 CAPSULE ORAL at 08:05

## 2024-05-22 RX ADMIN — VERAPAMIL HYDROCHLORIDE 240 MG: 240 TABLET ORAL at 07:05

## 2024-05-22 RX ADMIN — LABETALOL HYDROCHLORIDE 10 MG: 5 INJECTION INTRAVENOUS at 02:05

## 2024-05-22 RX ADMIN — INSULIN ASPART 5 UNITS: 100 INJECTION, SOLUTION INTRAVENOUS; SUBCUTANEOUS at 08:05

## 2024-05-22 NOTE — ASSESSMENT & PLAN NOTE
Patient has a current diagnosis of hypertensive urgency (without evidence of end organ damage) which is uncontrolled.  Latest blood pressure and vitals reviewed-   Temp:  [97.4 °F (36.3 °C)-98.2 °F (36.8 °C)]   Pulse:  []   Resp:  [17-20]   BP: (140-195)/()   SpO2:  [95 %-100 %] .   Patient currently off IV antihypertensives.   Home meds for hypertension were reviewed and noted below.   Hypertension Medications               verapamiL (CALAN-SR) 240 MG CR tablet Take 1 tablet (240 mg total) by mouth once daily.    furosemide (LASIX) 20 MG tablet Prn for swelling.    losartan (COZAAR) 100 MG tablet TAKE 1 TABLET BY MOUTH ONCE DAILY NEED  TO  MAKE  APPOINTMENT            Medication adjustment for hospital antihypertensives is as follows- started back on losartan and verapamil. Patient reports he has been out of his medication for a while.     Will aim for controlled BP reduction by medications noted above. Monitor and mitigate end organ damage as indicated.  PRN hydralazine for SBP > 180  -optimize pain control

## 2024-05-22 NOTE — HPI
"59 y/o male with PMH of HTN, HLD, DM II, GERD, bilateral cataracts, alcohol abuse, pancreatitis, colon polyps, and TIA presented to [facility] ER 5/21/24 with c/o right wrist/hand numbness since waking up that morning. He also complained of a "funny pain" in his head. ETOH 389, , ALT 45, glucose 288. CTA multiphase stroke no acute abnormality, no high grade stenosis or major vessel occlusion, no hemorrhage. Telestroke consulted in ER and recommended MRI brain and transfer to [facility] for higher level of care. MRI brain with no acute finding. EKG NSR.     On arrival to [facility], patient still having numbness to his right fingertips and weakness to his right wrist and hands. Patient reports drinking a 6 pack of beer in the evening after work. His blood pressure was elevated on admission. He has not been taking his blood pressure medication, he said he has been out of it for a while. Will monitor under observation for blood pressure control and have PT/OT evaluate prior to discharge.     "

## 2024-05-22 NOTE — ASSESSMENT & PLAN NOTE
-imaging negative for acute stroke, patient has h/o TIA in the past  -likely related to intoxication, will continue to monitor  -PT/OT evaluate/treat  -fall precautions  -keep bed alarm on at all times

## 2024-05-22 NOTE — TELEMEDICINE CONSULT
[facility]  Vascular Neurology  Comprehensive Stroke Center  TeleVascular Neurology Acute Consultation Note        Consult Information  Consult to Telemedicine - Acute Stroke  Consult performed by: Roseanne Keyes MD  Consult ordered by: Althea Kennedy MD          Consulting Provider: ALTHEA KENNEDY   Current Providers  No providers found    Patient Location:  [facility] EMERGENCY DEPARTMENT Emergency Department    Spoke hospital nurse at bedside with patient assisting consultant.  Patient information was obtained from patient and primary team.       Stroke Documentation  Acute Stroke Times   Last Known Normal Date: 05/21/24  Last Known Normal Time: 1600  Symptom Onset Date: 05/21/24  Unknown Symptom Onset Time: Unknown Time  Stroke Team Called Date: 05/22/24  Stroke Team Called Time: 0250  Stroke Team Arrival Date: 05/22/24  Stroke Team Arrival Time: 0255  CT Interpretation Time: 0255  Thrombolytic Therapy Recommended: No  Thrombectomy Recommended: No    NIH Scale:  1a. Level of Consciousness: 0-->Alert, keenly responsive  1b. LOC Questions: 0-->Answers both questions correctly  1c. LOC Commands: 0-->Performs both tasks correctly  2. Best Gaze: 0-->Normal  3. Visual: 0-->No visual loss  4. Facial Palsy: 0-->Normal symmetrical movements  5a. Motor Arm, Left: 0-->No drift, limb holds 90 (or 45) degrees for full 10 secs  5b. Motor Arm, Right: 1-->Drift, limb holds 90 (or 45) degrees, but drifts down before full 10 secs, does not hit bed or other support  6a. Motor Leg, Left: 0-->No drift, leg holds 30 degree position for full 5 secs  6b. Motor Leg, Right: 0-->No drift, leg holds 30 degree position for full 5 secs  7. Limb Ataxia: 0-->Absent  8. Sensory: 0-->Normal, no sensory loss  9. Best Language: 0-->No aphasia, normal  10. Dysarthria: 1-->Mild-to-moderate dysarthria, patient slurs at least some words and, at worst, can be understood with some difficulty  11. Extinction  "and Inattention (formerly Neglect): 0-->No abnormality  Total (NIH Stroke Scale): 2      Modified Minh:    Josephine Coma Scale: 15   ABCD2 Score:    [id number] Score:    HAS -BLED Score:    ICH Score:    Hunt & Uriostegui Classification:      Blood pressure (!) 190/119, pulse 91, temperature 97.4 °F (36.3 °C), temperature source Oral, resp. rate 18, height 5' 6" (1.676 m), weight 63.5 kg (140 lb), SpO2 100%.    Van Negative    Medical Decision Making  HPI:  58 y.o. male with medical history of HTN, DM, HLD, chronic alcoholism, TIA - with admission concerns of focal neurological deficits. And Stroke code / neurology called in for the same.      History from patient and medical records review. Symptoms of RUE weakness / > 4.5 hrs prior to ED arrival / no clarity on onset time - patient with alcohol levels 389.      No compressive neuropathy pattern of presentation. No associated complex headaches or clinical seizures. No other focal motor or sensory or cranial nerve deficits. No similar events in the past. No history of strokes, seizures or migraines.     Exam: awake, following commands, poor attention - concentration, intact comprehension, minor drift in RUE / however no obvious pronator drift / no cranial nerve deficits or RLE weakness      Images personally reviewed and interpreted:  Study: Head CT and CTA Head & Neck  Study Interpretation: CT head negative for acute findings / no evidence of early or subacute ischemic changes, intracerebral hemorrhage or hyperdense vessel signs  CTA negative for any LVO or MeVO in the pertinent vascular territory of interest - no targets identified for acute or urgent reperfusion therapy. Negative for correlating intracranial or extracranial LAYA pathology.       Assessment and plan:  Recs:    Low suspicion for focal cerebrovascular ischemic event or epileptic or neuro inflammatory etiology - however shall rule out. Suspect mimic - metabolic / toxic.     - MRI brain wo contrast    If " MRI brain positive for acute infarct follow the below   - DAPT X 21 day, ASA 81/Plavix 75, with asa thereafter  - target LDL < 70 / Continue atorvastatin  - euglycemic goals   - permissive HTN x 72 hrs post index event / long term < 140/90  - Echo f/u / Holter monitor at Dc   - PT/OT recs - discharge planning   - F/u PCP for risk factor modification monitoring  -  on DASH diet; exercise and lifestyle modifications when appropriate   - F/u tele vascular neurology     Encephalopathy evaluation and management  -- Correct lytes as needed / investigate and control infection if any / avoid hypoxia or hypercapnia / avoid hypoglycemia / control uremia - avoid rapid correction / avoid-correct metabolic-respiratory acidosis or alkalosis   -- Correct any nutritional deficiencies / correct anemia / provide nutritional support as appropriate / ambulate when appropriate   -- PT/OT evaluation and management   -- delirium precautions       Lytics recommendation: Thrombolytic therapy not recommended due to Outside of treatment window , Suspected stroke mimic , and Mild Non-Disabling Symptoms  Thrombectomy recommendation: No; No large vessel occlusion identified on imaging   Placement recommendation: do not transfer      Past Medical History:   Diagnosis Date    Abscess 9/5/2018    Cataracts, bilateral     Chest pain 12/6/2018    Dehydration     Diabetes mellitus, type 2     Diagnosed 7/2016    Folliculitis 9/15/2017    GERD (gastroesophageal reflux disease)     Hypertension     Inflammatory polyps of colon     Pancreatitis, alcoholic, acute     Port catheter in place     TIA (transient ischemic attack)     Tinea cruris 9/15/2017     Past Surgical History:   Procedure Laterality Date    COLONOSCOPY      COLONOSCOPY N/A 6/15/2018    Procedure: COLONOSCOPY;  Surgeon: Jim Hicks MD;  Location: [facility];  Service: Endoscopy;  Laterality: N/A;    ESOPHAGOGASTRODUODENOSCOPY N/A 6/15/2018    Procedure: ESOPHAGOGASTRODUODENOSCOPY  (EGD);  Surgeon: Jim Hicks MD;  Location: [facility]O;  Service: Endoscopy;  Laterality: N/A;    ROBOT-ASSISTED LAPAROSCOPIC REPAIR OF INGUINAL HERNIA USING DA JANET XI Bilateral 11/30/2022    Procedure: XI ROBOTIC REPAIR, HERNIA, INGUINAL;  Surgeon: Smooth Denny MD;  Location: [facility] OR;  Service: General;  Laterality: Bilateral;     Family History   Problem Relation Name Age of Onset    Hypertension Mother      Hypertension Father         Diagnoses  Problem Noted   Focal Neurological Deficit 5/22/2024       Roseanne Keyes MD      Emergent/Acute neurological consultation requested by spoke provider due to critical concerns for possible cerebrovascular event that could result in permanent loss of neurologic/bodily function, severe disability or death of this patient.  Immediate/timely evaluation by a highly prepared expert is paramount for optimal outcomes  High risk for neurological deterioration if not properly diagnosed  High risk for neurological deterioration if not treated promplty/as soon as possible  Complex diagnostic evaluation may be required (advanced imaging)  High risk treatment options (thrombolytics and/or thrombectomy)    Patient care was coordinated with spoke provider, including but not limted to    Discussing likely diagnosis/etiology of symptoms  Making recommendations for further diagnostic studies  Making recommendations for intravenous thrombolytics or other advanced therapies  Making recommendations for disposition (admission/transfer for higher level of care)

## 2024-05-22 NOTE — Clinical Note
Diagnosis: Acute focal neurological deficit [105976]   Future Attending Provider: DEVIN BELLA [[id number]]   Is the patient being admitted to ED TeleObservation?: Yes   Special Needs:: No Special Needs [1]

## 2024-05-22 NOTE — ED PROVIDER NOTES
Encounter Date: 5/22/2024       History     Chief Complaint   Patient presents with    Extremity Weakness     Right arm since 4 PM     The history is provided by the patient.   Extremity Weakness  This is a new problem. The current episode started 6 to 12 hours ago. The problem occurs constantly. The problem has not changed since onset.Pertinent negatives include no chest pain, no abdominal pain, no headaches and no shortness of breath. Nothing aggravates the symptoms. Nothing relieves the symptoms.   Pt reports right upper extremety weakness since 4 pm. Pt denies speech and vision changes, he admits to alcohol ingestion. Pt is non-compliant c BP meds.    Review of patient's allergies indicates:  No Known Allergies  Past Medical History:   Diagnosis Date    Abscess 9/5/2018    Cataracts, bilateral     Chest pain 12/6/2018    Dehydration     Diabetes mellitus, type 2     Diagnosed 7/2016    Folliculitis 9/15/2017    GERD (gastroesophageal reflux disease)     Hypertension     Inflammatory polyps of colon     Pancreatitis, alcoholic, acute     Port catheter in place     TIA (transient ischemic attack)     Tinea cruris 9/15/2017     Past Surgical History:   Procedure Laterality Date    COLONOSCOPY      COLONOSCOPY N/A 6/15/2018    Procedure: COLONOSCOPY;  Surgeon: Jim Hicks MD;  Location: [facility];  Service: Endoscopy;  Laterality: N/A;    ESOPHAGOGASTRODUODENOSCOPY N/A 6/15/2018    Procedure: ESOPHAGOGASTRODUODENOSCOPY (EGD);  Surgeon: Jim Hicks MD;  Location: [facility];  Service: Endoscopy;  Laterality: N/A;    ROBOT-ASSISTED LAPAROSCOPIC REPAIR OF INGUINAL HERNIA USING DA JANET XI Bilateral 11/30/2022    Procedure: XI ROBOTIC REPAIR, HERNIA, INGUINAL;  Surgeon: Smooth Denny MD;  Location: [facility] OR;  Service: General;  Laterality: Bilateral;     Family History   Problem Relation Name Age of Onset    Hypertension Mother      Hypertension Father       Social History     Tobacco Use    Smoking status:  Former     Types: Cigarettes    Smokeless tobacco: Never   Substance Use Topics    Alcohol use: Yes     Comment:  Daily - States that he drinks 1/2 pint of vodka and about 1-2 cans of beer daily    Drug use: No     Review of Systems   Eyes:  Negative for visual disturbance.   Respiratory:  Negative for shortness of breath.    Cardiovascular:  Negative for chest pain.   Gastrointestinal:  Negative for abdominal pain.   Musculoskeletal:  Positive for extremity weakness.   Neurological:  Positive for weakness. Negative for speech difficulty and headaches.       Physical Exam     Initial Vitals   BP Pulse Resp Temp SpO2   05/22/24 0151 05/22/24 0147 05/22/24 0151 05/22/24 0153 05/22/24 0151   (!) 191/116 93 18 97.4 °F (36.3 °C) 98 %      MAP       --                Physical Exam    Nursing note and vitals reviewed.  Constitutional: He appears well-developed and well-nourished.   HENT:   Head: Normocephalic and atraumatic.   Mouth/Throat: Oropharynx is clear and moist.   Eyes: Conjunctivae and EOM are normal. Pupils are equal, round, and reactive to light.   Neck: Neck supple.   Normal range of motion.  Cardiovascular:  Normal rate, regular rhythm and normal heart sounds.           Pulmonary/Chest: Breath sounds normal.   Abdominal: Abdomen is soft. Bowel sounds are normal.   Musculoskeletal:         General: Normal range of motion.      Cervical back: Normal range of motion and neck supple.     Neurological: He is alert and oriented to person, place, and time.   Right upper ext weakness    Skin: Skin is warm and dry.   Psychiatric: He has a normal mood and affect. Thought content normal.         ED Course   Procedures  Labs Reviewed   CBC W/ AUTO DIFFERENTIAL - Abnormal; Notable for the following components:       Result Value    WBC 3.63 (*)     RBC 4.23 (*)     Hemoglobin 13.3 (*)     Hematocrit 39.7 (*)     MCH 31.4 (*)     Gran # (ANC) 1.2 (*)     Mono # 0.2 (*)     Gran % 33.5 (*)     Lymph % 59.0 (*)     All other  components within normal limits    Narrative:     Release to patient->Immediate   COMPREHENSIVE METABOLIC PANEL - Abnormal; Notable for the following components:    Glucose 288 (*)     BUN 4 (*)     Total Bilirubin 1.2 (*)     Alkaline Phosphatase 136 (*)      (*)     ALT 45 (*)     Anion Gap 20 (*)     All other components within normal limits    Narrative:     Release to patient->Immediate   ALCOHOL,MEDICAL (ETHANOL) - Abnormal; Notable for the following components:    Alcohol, Serum 389 (*)     All other components within normal limits    Narrative:     Release to patient->Immediate  Alcohol critical result(s) called and verbal readback obtained from   Elizabeth Enriquez by CG4 05/22/2024 02:40   POCT GLUCOSE - Abnormal; Notable for the following components:    POCT Glucose 290 (*)     All other components within normal limits   PROTIME-INR    Narrative:     Release to patient->Immediate   TSH    Narrative:     Release to patient->Immediate   TROPONIN I    Narrative:     Release to patient->Immediate   B-TYPE NATRIURETIC PEPTIDE    Narrative:     Release to patient->Immediate   APTT    Narrative:     Release to patient->Immediate   HIV 1 / 2 ANTIBODY   HEPATITIS C ANTIBODY   HEP C VIRUS HOLD SPECIMEN   LIPID PANEL   DRUG SCREEN PANEL, URINE EMERGENCY   POCT GLUCOSE, HAND-HELD DEVICE     Results for orders placed or performed during the hospital encounter of 05/22/24   CBC W/ AUTO DIFFERENTIAL   Result Value Ref Range    WBC 3.63 (L) 3.90 - 12.70 K/uL    RBC 4.23 (L) 4.60 - 6.20 M/uL    Hemoglobin 13.3 (L) 14.0 - 18.0 g/dL    Hematocrit 39.7 (L) 40.0 - 54.0 %    MCV 94 82 - 98 fL    MCH 31.4 (H) 27.0 - 31.0 pg    MCHC 33.5 32.0 - 36.0 g/dL    RDW 12.4 11.5 - 14.5 %    Platelets 184 150 - 450 K/uL    MPV 11.0 9.2 - 12.9 fL    Immature Granulocytes 0.0 0.0 - 0.5 %    Gran # (ANC) 1.2 (L) 1.8 - 7.7 K/uL    Immature Grans (Abs) 0.00 0.00 - 0.04 K/uL    Lymph # 2.1 1.0 - 4.8 K/uL    Mono # 0.2 (L) 0.3 - 1.0 K/uL     Eos # 0.0 0.0 - 0.5 K/uL    Baso # 0.04 0.00 - 0.20 K/uL    nRBC 0 0 /100 WBC    Gran % 33.5 (L) 38.0 - 73.0 %    Lymph % 59.0 (H) 18.0 - 48.0 %    Mono % 5.8 4.0 - 15.0 %    Eosinophil % 0.6 0.0 - 8.0 %    Basophil % 1.1 0.0 - 1.9 %    Differential Method Automated    Comprehensive metabolic panel   Result Value Ref Range    Sodium 145 136 - 145 mmol/L    Potassium 3.8 3.5 - 5.1 mmol/L    Chloride 102 95 - 110 mmol/L    CO2 23 23 - 29 mmol/L    Glucose 288 (H) 70 - 110 mg/dL    BUN 4 (L) 6 - 20 mg/dL    Creatinine 0.8 0.5 - 1.4 mg/dL    Calcium 9.3 8.7 - 10.5 mg/dL    Total Protein 7.3 6.0 - 8.4 g/dL    Albumin 4.2 3.5 - 5.2 g/dL    Total Bilirubin 1.2 (H) 0.1 - 1.0 mg/dL    Alkaline Phosphatase 136 (H) 55 - 135 U/L     (H) 10 - 40 U/L    ALT 45 (H) 10 - 44 U/L    eGFR >60.0 >60 mL/min/1.73 m^2    Anion Gap 20 (H) 8 - 16 mmol/L   Protime-INR   Result Value Ref Range    Prothrombin Time 11.5 9.0 - 12.5 sec    INR 1.0 0.8 - 1.2   TSH   Result Value Ref Range    TSH 3.588 0.400 - 4.000 uIU/mL   Troponin I   Result Value Ref Range    Troponin I 0.010 0.000 - 0.026 ng/mL   B-Type natriuretic peptide   Result Value Ref Range    BNP <10 0 - 99 pg/mL   APTT   Result Value Ref Range    aPTT 28.1 21.0 - 32.0 sec   Ethanol   Result Value Ref Range    Alcohol, Serum 389 (HH) <10 mg/dL   POCT glucose   Result Value Ref Range    POCT Glucose 290 (H) 70 - 110 mg/dL       EKG Readings: (Independently Interpreted)   Rhythm: Normal Sinus Rhythm. Heart Rate: 81. Ectopy: No Ectopy. ST Segments: Normal ST Segments. T Waves: Normal. Axis: Normal. Clinical Impression: Normal Sinus Rhythm       Imaging Results              X-Ray Chest AP Portable (Preliminary result)  Result time 05/22/24 03:12:14      Wet Read by Ajay Ramirez MD (05/22/24 03:12:14, [facility] - Emergency Dept, Emergency Medicine)    NAF                                     CTA STROKE MULTI-PHASE (In process)    Procedure changed from CTA Head and Neck  (xpd)               3:13 AM Discussed lab/imaging studies with patient and the need for further evaluation/admission for CVA. Pt verbalized understanding that this is a stand alone ER and we are unable to admit at this facility. Pt will be transferred to [facility] via [facility] with care en route to include mt. I discussed this case with hs and care was accepted by Dr Mukherjee.       Medications   labetaloL injection 10 mg (10 mg Intravenous Given 5/22/24 0204)   iohexoL (OMNIPAQUE 350) injection 100 mL (100 mLs Intravenous Given 5/22/24 0214)     Medical Decision Making  DDX CVA, TIA, Alcohol intoxication    Problems Addressed:  Acute focal neurological deficit: acute illness or injury  Alcoholic intoxication without complication: acute illness or injury  Hypertension, unspecified type: chronic illness or injury with exacerbation, progression, or side effects of treatment  Right arm weakness: acute illness or injury    Amount and/or Complexity of Data Reviewed  Labs: ordered.  Radiology: ordered.  ECG/medicine tests: ordered and independent interpretation performed. Decision-making details documented in ED Course.  Discussion of management or test interpretation with external provider(s): Consult Vascular Neurology stroke consult- admit for MR    Risk  Prescription drug management.  Decision regarding hospitalization.      Additional MDM:     NIH Stroke Scale:   Interval = baseline (upon arrival/admit)  Level of consciousness = 0 - alert  LOC questions = 0 - answers both correctly  LOC commands = 0 - performs both correctly  Best gaze = 0 - normal  Visual = 0 - no visual loss  Facial palsy = 0 - normal  Motor left arm =  0 - no drift  Motor right arm =  1 - drift  Motor left leg = 0 - no drift  Motor right leg =  0 - no drift  Limb ataxia = 1 - present in one limb  Sensory = 1 - mild to moderate loss  Best language = 0 - no aphasia  Dysarthria = 0 - normal articulation  Extinction and inattention = 0 - no  neglect  NIH Stroke Scale Total = 3                                     Clinical Impression:  Final diagnoses:  [R29.818] Acute focal neurological deficit  [R29.898] Right arm weakness (Primary)  [F10.920] Alcoholic intoxication without complication  [I10] Hypertension, unspecified type          ED Disposition Condition    Observation Stable                Ajay Ramirez MD  05/22/24 5933

## 2024-05-22 NOTE — SUBJECTIVE & OBJECTIVE
HPI:  58 y.o. male with medical history of HTN, DM, HLD, chronic alcoholism, TIA - with admission concerns of focal neurological deficits. And Stroke code / neurology called in for the same.      History from patient and medical records review. Symptoms of RUE weakness / > 4.5 hrs prior to ED arrival / no clarity on onset time - patient with alcohol levels 389.      No compressive neuropathy pattern of presentation. No associated complex headaches or clinical seizures. No other focal motor or sensory or cranial nerve deficits. No similar events in the past. No history of strokes, seizures or migraines.     Exam: awake, following commands, intact comprehension, minor drift in RUE / however no obvious pronator drift / no cranial nerve deficits or RLE weakness      Images personally reviewed and interpreted:  Study: Head CT and CTA Head & Neck  Study Interpretation: CT head negative for acute findings / no evidence of early or subacute ischemic changes, intracerebral hemorrhage or hyperdense vessel signs  CTA negative for any LVO or MeVO in the pertinent vascular territory of interest - no targets identified for acute or urgent reperfusion therapy. Negative for correlating intracranial or extracranial LAYA pathology.       Assessment and plan:  Recs:    Low suspicion for focal cerebrovascular ischemic event or epileptic or neuro inflammatory etiology - however shall rule out. Suspect mimic - metabolic / toxic.     - MRI brain wo contrast    If MRI brain positive for acute infarct follow the below   - DAPT X 21 day, ASA 81/Plavix 75, with asa thereafter  - target LDL < 70 / Continue atorvastatin  - euglycemic goals   - permissive HTN x 72 hrs post index event / long term < 140/90  - Echo f/u / Holter monitor at Dc   - PT/OT recs - discharge planning   - F/u PCP for risk factor modification monitoring  -  on DASH diet; exercise and lifestyle modifications when appropriate   - F/u tele vascular neurology      Encephalopathy evaluation and management  -- Correct lytes as needed / investigate and control infection if any / avoid hypoxia or hypercapnia / avoid hypoglycemia / control uremia - avoid rapid correction / avoid-correct metabolic-respiratory acidosis or alkalosis   -- Correct any nutritional deficiencies / correct anemia / provide nutritional support as appropriate / ambulate when appropriate   -- PT/OT evaluation and management   -- delirium precautions       Lytics recommendation: Thrombolytic therapy not recommended due to Outside of treatment window , Suspected stroke mimic , and Mild Non-Disabling Symptoms  Thrombectomy recommendation: No; No large vessel occlusion identified on imaging   Placement recommendation: do not transfer

## 2024-05-22 NOTE — NURSING
Bedside swallow study performed with patient. Pt in upright position, pt offered applesauce, pudding, gram cracker, and grape juice. Pt tolerated intake with no difficulty swallowing.

## 2024-05-22 NOTE — SUBJECTIVE & OBJECTIVE
Past Medical History:   Diagnosis Date    Abscess 9/5/2018    Cataracts, bilateral     Chest pain 12/6/2018    Dehydration     Diabetes mellitus, type 2     Diagnosed 7/2016    Folliculitis 9/15/2017    GERD (gastroesophageal reflux disease)     Hypertension     Inflammatory polyps of colon     Pancreatitis, alcoholic, acute     Port catheter in place     TIA (transient ischemic attack)     Tinea cruris 9/15/2017       Past Surgical History:   Procedure Laterality Date    COLONOSCOPY      COLONOSCOPY N/A 6/15/2018    Procedure: COLONOSCOPY;  Surgeon: Jim Hicks MD;  Location: [facility] END[facility];  Service: Endoscopy;  Laterality: N/A;    ESOPHAGOGASTRODUODENOSCOPY N/A 6/15/2018    Procedure: ESOPHAGOGASTRODUODENOSCOPY (EGD);  Surgeon: Jim Hicks MD;  Location: [facility] ENDO;  Service: Endoscopy;  Laterality: N/A;    ROBOT-ASSISTED LAPAROSCOPIC REPAIR OF INGUINAL HERNIA USING DA JANET XI Bilateral 11/30/2022    Procedure: XI ROBOTIC REPAIR, HERNIA, INGUINAL;  Surgeon: Smooth Denny MD;  Location: [facility] OR;  Service: General;  Laterality: Bilateral;       Review of patient's allergies indicates:  No Known Allergies    No current facility-administered medications on file prior to encounter.     Current Outpatient Medications on File Prior to Encounter   Medication Sig    aspirin 325 MG tablet Take 1 tablet (325 mg total) by mouth once daily.    HUMALOG U-100 INSULIN 100 unit/mL injection INJECT 8 UNITS SUBCUTANEOUSLY WITH LARGEST MEAL OF THE DAY, INJECT ADDITIONAL 4 UNITS AT A TIME IF GLUCOSE IS ELEVATED    insulin (LANTUS SOLOSTAR U-100 INSULIN) glargine 100 units/mL SubQ pen INJECT 50 UNITS INTO SKIN EVERY EVENING.    verapamiL (CALAN-SR) 240 MG CR tablet Take 1 tablet (240 mg total) by mouth once daily.    atorvastatin (LIPITOR) 40 MG tablet Take 1 tablet (40 mg total) by mouth once daily.    blood sugar diagnostic (ONETOUCH ULTRA BLUE TEST STRIP) Strp USE TO CHECK SUGAR BEFORE MEALS AND AT BEDTIME     "clotrimazole-betamethasone 1-0.05% (LOTRISONE) cream Apply topically 2 (two) times daily.    furosemide (LASIX) 20 MG tablet Prn for swelling. (Patient taking differently: daily as needed (swelling). Prn for swelling.)    insulin syringe-needle,dispos. 1 mL 28 gauge x 1/2" Syrg 1 each by Misc.(Non-Drug; Combo Route) route 2 (two) times daily.    lancets (ONETOUCH DELICA PLUS LANCET) 33 gauge Misc Inject 1 lancet into the skin 4 (four) times daily before meals and nightly.    losartan (COZAAR) 100 MG tablet TAKE 1 TABLET BY MOUTH ONCE DAILY NEED  TO  MAKE  APPOINTMENT (Patient taking differently: Patient states he has not been on this in 3 months due to insurance. But states he does need it.)    mupirocin (BACTROBAN) 2 % ointment Apply topically 3 (three) times daily.    naproxen (NAPROSYN) 375 MG tablet Take 1 tablet (375 mg total) by mouth 2 (two) times daily with meals.    pen needle, diabetic (BD JOEL 2ND GEN PEN NEEDLE) 32 gauge x 5/32" Ndle Inject 1 each into the skin 4 (four) times daily before meals and nightly.    potassium, sodium phosphates (PHOS-NAK) 280-160-250 mg PwPk Take 1 packet by mouth 4 (four) times daily before meals and nightly.     Family History       Problem Relation (Age of Onset)    Hypertension Mother, Father          Tobacco Use    Smoking status: Former     Types: Cigarettes    Smokeless tobacco: Never   Substance and Sexual Activity    Alcohol use: Yes     Comment:  Daily - States that he drinks 1/2 pint of vodka and about 1-2 cans of beer daily    Drug use: No    Sexual activity: Yes     Partners: Female     Review of Systems   Constitutional:  Negative for chills, fatigue and fever.   HENT:  Negative for congestion.    Respiratory:  Negative for cough, shortness of breath and wheezing.    Cardiovascular:  Negative for chest pain and palpitations.   Gastrointestinal:  Negative for abdominal distention, abdominal pain, constipation, diarrhea and nausea.   Genitourinary:  Negative for " difficulty urinating.   Neurological:  Positive for tremors, weakness and numbness (right fingertips). Negative for dizziness.     Objective:     Vital Signs (Most Recent):  Temp: 98.2 °F (36.8 °C) (05/22/24 1814)  Pulse: 93 (05/22/24 1814)  Resp: 18 (05/22/24 1814)  BP: (!) 147/109 (05/22/24 1814)  SpO2: 98 % (05/22/24 1814) Vital Signs (24h Range):  Temp:  [97.4 °F (36.3 °C)-98.2 °F (36.8 °C)] 98.2 °F (36.8 °C)  Pulse:  [] 93  Resp:  [17-20] 18  SpO2:  [95 %-100 %] 98 %  BP: (140-195)/() 147/109     Weight: 63.5 kg (140 lb)  Body mass index is 22.6 kg/m².     Physical Exam  Vitals and nursing note reviewed.   Constitutional:       Appearance: Normal appearance.   HENT:      Head: Normocephalic.   Eyes:      Pupils: Pupils are equal, round, and reactive to light.   Cardiovascular:      Rate and Rhythm: Normal rate and regular rhythm.      Heart sounds: No murmur heard.     Comments: Telemetry monitoring  Pulmonary:      Effort: Pulmonary effort is normal.      Breath sounds: Normal breath sounds.   Abdominal:      General: Bowel sounds are normal.      Palpations: Abdomen is soft.   Musculoskeletal:      Right wrist: Decreased range of motion.   Skin:     General: Skin is warm and dry.   Neurological:      General: No focal deficit present.      Mental Status: He is alert and oriented to person, place, and time.   Psychiatric:         Mood and Affect: Mood normal.         Behavior: Behavior normal.              CRANIAL NERVES     CN III, IV, VI   Pupils are equal, round, and reactive to light.       Significant Labs: All pertinent labs within the past 24 hours have been reviewed.  CBC:   Recent Labs   Lab 05/22/24  0159   WBC 3.63*   HGB 13.3*   HCT 39.7*        CMP:   Recent Labs   Lab 05/22/24 0159      K 3.8      CO2 23   *   BUN 4*   CREATININE 0.8   CALCIUM 9.3   PROT 7.3   ALBUMIN 4.2   BILITOT 1.2*   ALKPHOS 136*   *   ALT 45*   ANIONGAP 20*       Significant  Imaging: I have reviewed all pertinent imaging results/findings within the past 24 hours.

## 2024-05-22 NOTE — H&P
"[facility] Medicine  History & Physical    Patient Name: Jorgito Arreaga  MRN: [id number]  Patient Class: OP- Observation  Admission Date: 5/22/2024  Attending Physician: aMgda Lerma MD   Primary Care Provider: Kirsten Queen NP         Patient information was obtained from patient and ER records.     Subjective:     Principal Problem:Hypertensive urgency    Chief Complaint:   Chief Complaint   Patient presents with    Extremity Weakness     Right arm since 4 PM        HPI: 57 y/o male with PMH of HTN, HLD, DM II, GERD, bilateral cataracts, alcohol abuse, pancreatitis, colon polyps, and TIA presented to [facility] ER 5/21/24 with c/o right wrist/hand numbness since waking up that morning. He also complained of a "funny pain" in his head. ETOH 389, , ALT 45, glucose 288. CTA multiphase stroke no acute abnormality, no high grade stenosis or major vessel occlusion, no hemorrhage. Telestroke consulted in ER and recommended MRI brain and transfer to [facility] for higher level of care. MRI brain with no acute finding. EKG NSR.     On arrival to [facility], patient still having numbness to his right fingertips and weakness to his right wrist and hands. Patient reports drinking a 6 pack of beer in the evening after work. His blood pressure was elevated on admission. He has not been taking his blood pressure medication, he said he has been out of it for a while. Will monitor under observation for blood pressure control and have PT/OT evaluate prior to discharge.       Past Medical History:   Diagnosis Date    Abscess 9/5/2018    Cataracts, bilateral     Chest pain 12/6/2018    Dehydration     Diabetes mellitus, type 2     Diagnosed 7/2016    Folliculitis 9/15/2017    GERD (gastroesophageal reflux disease)     Hypertension     Inflammatory polyps of colon     Pancreatitis, alcoholic, acute     Port catheter in place     TIA (transient ischemic attack)     Tinea cruris 9/15/2017 " "      Past Surgical History:   Procedure Laterality Date    COLONOSCOPY      COLONOSCOPY N/A 6/15/2018    Procedure: COLONOSCOPY;  Surgeon: Jim Hicks MD;  Location: [facility] ENDO;  Service: Endoscopy;  Laterality: N/A;    ESOPHAGOGASTRODUODENOSCOPY N/A 6/15/2018    Procedure: ESOPHAGOGASTRODUODENOSCOPY (EGD);  Surgeon: Jim Hicks MD;  Location: [facility] ENDO;  Service: Endoscopy;  Laterality: N/A;    ROBOT-ASSISTED LAPAROSCOPIC REPAIR OF INGUINAL HERNIA USING DA JANET XI Bilateral 11/30/2022    Procedure: XI ROBOTIC REPAIR, HERNIA, INGUINAL;  Surgeon: Smooth Denny MD;  Location: [facility] OR;  Service: General;  Laterality: Bilateral;       Review of patient's allergies indicates:  No Known Allergies    No current facility-administered medications on file prior to encounter.     Current Outpatient Medications on File Prior to Encounter   Medication Sig    aspirin 325 MG tablet Take 1 tablet (325 mg total) by mouth once daily.    HUMALOG U-100 INSULIN 100 unit/mL injection INJECT 8 UNITS SUBCUTANEOUSLY WITH LARGEST MEAL OF THE DAY, INJECT ADDITIONAL 4 UNITS AT A TIME IF GLUCOSE IS ELEVATED    insulin (LANTUS SOLOSTAR U-100 INSULIN) glargine 100 units/mL SubQ pen INJECT 50 UNITS INTO SKIN EVERY EVENING.    verapamiL (CALAN-SR) 240 MG CR tablet Take 1 tablet (240 mg total) by mouth once daily.    atorvastatin (LIPITOR) 40 MG tablet Take 1 tablet (40 mg total) by mouth once daily.    blood sugar diagnostic (ONETOUCH ULTRA BLUE TEST STRIP) Strp USE TO CHECK SUGAR BEFORE MEALS AND AT BEDTIME    clotrimazole-betamethasone 1-0.05% (LOTRISONE) cream Apply topically 2 (two) times daily.    furosemide (LASIX) 20 MG tablet Prn for swelling. (Patient taking differently: daily as needed (swelling). Prn for swelling.)    insulin syringe-needle,dispos. 1 mL 28 gauge x 1/2" Syrg 1 each by Misc.(Non-Drug; Combo Route) route 2 (two) times daily.    lancets (ONETOUCH DELICA PLUS LANCET) 33 gauge Misc Inject 1 lancet into the skin 4 " "(four) times daily before meals and nightly.    losartan (COZAAR) 100 MG tablet TAKE 1 TABLET BY MOUTH ONCE DAILY NEED  TO  MAKE  APPOINTMENT (Patient taking differently: Patient states he has not been on this in 3 months due to insurance. But states he does need it.)    mupirocin (BACTROBAN) 2 % ointment Apply topically 3 (three) times daily.    naproxen (NAPROSYN) 375 MG tablet Take 1 tablet (375 mg total) by mouth 2 (two) times daily with meals.    pen needle, diabetic (BD JOEL 2ND GEN PEN NEEDLE) 32 gauge x 5/32" Ndle Inject 1 each into the skin 4 (four) times daily before meals and nightly.    potassium, sodium phosphates (PHOS-NAK) 280-160-250 mg PwPk Take 1 packet by mouth 4 (four) times daily before meals and nightly.     Family History       Problem Relation (Age of Onset)    Hypertension Mother, Father          Tobacco Use    Smoking status: Former     Types: Cigarettes    Smokeless tobacco: Never   Substance and Sexual Activity    Alcohol use: Yes     Comment:  Daily - States that he drinks 1/2 pint of vodka and about 1-2 cans of beer daily    Drug use: No    Sexual activity: Yes     Partners: Female     Review of Systems   Constitutional:  Negative for chills, fatigue and fever.   HENT:  Negative for congestion.    Respiratory:  Negative for cough, shortness of breath and wheezing.    Cardiovascular:  Negative for chest pain and palpitations.   Gastrointestinal:  Negative for abdominal distention, abdominal pain, constipation, diarrhea and nausea.   Genitourinary:  Negative for difficulty urinating.   Neurological:  Positive for tremors, weakness and numbness (right fingertips). Negative for dizziness.     Objective:     Vital Signs (Most Recent):  Temp: 98.2 °F (36.8 °C) (05/22/24 1814)  Pulse: 93 (05/22/24 1814)  Resp: 18 (05/22/24 1814)  BP: (!) 147/109 (05/22/24 1814)  SpO2: 98 % (05/22/24 1814) Vital Signs (24h Range):  Temp:  [97.4 °F (36.3 °C)-98.2 °F (36.8 °C)] 98.2 °F (36.8 °C)  Pulse:  [] " 93  Resp:  [17-20] 18  SpO2:  [95 %-100 %] 98 %  BP: (140-195)/() 147/109     Weight: 63.5 kg (140 lb)  Body mass index is 22.6 kg/m².     Physical Exam  Vitals and nursing note reviewed.   Constitutional:       Appearance: Normal appearance.   HENT:      Head: Normocephalic.   Eyes:      Pupils: Pupils are equal, round, and reactive to light.   Cardiovascular:      Rate and Rhythm: Normal rate and regular rhythm.      Heart sounds: No murmur heard.     Comments: Telemetry monitoring  Pulmonary:      Effort: Pulmonary effort is normal.      Breath sounds: Normal breath sounds.   Abdominal:      General: Bowel sounds are normal.      Palpations: Abdomen is soft.   Musculoskeletal:      Right wrist: Decreased range of motion.   Skin:     General: Skin is warm and dry.   Neurological:      General: No focal deficit present.      Mental Status: He is alert and oriented to person, place, and time.   Psychiatric:         Mood and Affect: Mood normal.         Behavior: Behavior normal.              CRANIAL NERVES     CN III, IV, VI   Pupils are equal, round, and reactive to light.       Significant Labs: All pertinent labs within the past 24 hours have been reviewed.  CBC:   Recent Labs   Lab 05/22/24  0159   WBC 3.63*   HGB 13.3*   HCT 39.7*        CMP:   Recent Labs   Lab 05/22/24  0159      K 3.8      CO2 23   *   BUN 4*   CREATININE 0.8   CALCIUM 9.3   PROT 7.3   ALBUMIN 4.2   BILITOT 1.2*   ALKPHOS 136*   *   ALT 45*   ANIONGAP 20*       Significant Imaging: I have reviewed all pertinent imaging results/findings within the past 24 hours.  Assessment/Plan:     * Hypertensive urgency  Patient has a current diagnosis of hypertensive urgency (without evidence of end organ damage) which is uncontrolled.  Latest blood pressure and vitals reviewed-   Temp:  [97.4 °F (36.3 °C)-98.2 °F (36.8 °C)]   Pulse:  []   Resp:  [17-20]   BP: (140-195)/()   SpO2:  [95 %-100 %] .    Patient currently off IV antihypertensives.   Home meds for hypertension were reviewed and noted below.   Hypertension Medications               verapamiL (CALAN-SR) 240 MG CR tablet Take 1 tablet (240 mg total) by mouth once daily.    furosemide (LASIX) 20 MG tablet Prn for swelling.    losartan (COZAAR) 100 MG tablet TAKE 1 TABLET BY MOUTH ONCE DAILY NEED  TO  MAKE  APPOINTMENT            Medication adjustment for hospital antihypertensives is as follows- started back on losartan and verapamil. Patient reports he has been out of his medication for a while.     Will aim for controlled BP reduction by medications noted above. Monitor and mitigate end organ damage as indicated.  PRN hydralazine for SBP > 180  -optimize pain control     Focal neurological deficit  -imaging negative for acute stroke, patient has h/o TIA in the past  -likely related to intoxication, will continue to monitor  -PT/OT evaluate/treat  -fall precautions  -keep bed alarm on at all times    Transaminitis  -LFT elevated , ALT 45  -likely related to alcohol abuse   -will hold statin for now  -cont to trend LFTs      Alcohol abuse  -patient reports he drinks a 6 pack of beer per day  -ETOH level 389 on admission  -will monitor for withdrawal symptoms  -start Librium TID and ativan PRN    Gastroesophageal reflux disease  -PPI      VTE Risk Mitigation (From admission, onward)           Ordered     enoxaparin injection 40 mg  Daily         05/22/24 1755     IP VTE LOW RISK PATIENT  Once         05/22/24 1755     Place sequential compression device  Until discontinued         05/22/24 1755                         On 05/22/2024, patient should be placed in hospital observation services under my care in collaboration with Dr. Lerma.           Neeta Sosa NP  Department of Hospital Medicine  '[place] - Telemetry ([facility])

## 2024-05-22 NOTE — ASSESSMENT & PLAN NOTE
-patient reports he drinks a 6 pack of beer per day  -ETOH level 389 on admission  -will monitor for withdrawal symptoms  -start Librium TID and ativan PRN

## 2024-05-22 NOTE — ASSESSMENT & PLAN NOTE
-LFT elevated , ALT 45  -likely related to alcohol abuse   -will hold statin for now  -cont to trend LFTs

## 2024-05-23 VITALS
TEMPERATURE: 98 F | RESPIRATION RATE: 18 BRPM | HEIGHT: 66 IN | WEIGHT: 140 LBS | BODY MASS INDEX: 22.5 KG/M2 | SYSTOLIC BLOOD PRESSURE: 137 MMHG | HEART RATE: 95 BPM | DIASTOLIC BLOOD PRESSURE: 92 MMHG | OXYGEN SATURATION: 93 %

## 2024-05-23 LAB
ALBUMIN SERPL BCP-MCNC: 3.6 G/DL (ref 3.5–5.2)
ALP SERPL-CCNC: 122 U/L (ref 55–135)
ALT SERPL W/O P-5'-P-CCNC: 31 U/L (ref 10–44)
ANION GAP SERPL CALC-SCNC: 12 MMOL/L (ref 8–16)
AORTIC ROOT ANNULUS: 3.77 CM
ASCENDING AORTA: 3.34 CM
AST SERPL-CCNC: 60 U/L (ref 10–40)
AV INDEX (PROSTH): 0.66
AV MEAN GRADIENT: 9 MMHG
AV PEAK GRADIENT: 15 MMHG
AV VALVE AREA BY VELOCITY RATIO: 2.77 CM²
AV VALVE AREA: 2.44 CM²
AV VELOCITY RATIO: 0.75
BASOPHILS # BLD AUTO: 0.02 K/UL (ref 0–0.2)
BASOPHILS NFR BLD: 0.4 % (ref 0–1.9)
BILIRUB SERPL-MCNC: 2 MG/DL (ref 0.1–1)
BSA FOR ECHO PROCEDURE: 1.72 M2
BUN SERPL-MCNC: 8 MG/DL (ref 6–20)
CALCIUM SERPL-MCNC: 9.4 MG/DL (ref 8.7–10.5)
CHLORIDE SERPL-SCNC: 96 MMOL/L (ref 95–110)
CO2 SERPL-SCNC: 30 MMOL/L (ref 23–29)
CREAT SERPL-MCNC: 0.8 MG/DL (ref 0.5–1.4)
CV ECHO LV RWT: 0.8 CM
DIFFERENTIAL METHOD BLD: ABNORMAL
DOP CALC AO PEAK VEL: 1.95 M/S
DOP CALC AO VTI: 35.8 CM
DOP CALC LVOT AREA: 3.7 CM2
DOP CALC LVOT DIAMETER: 2.17 CM
DOP CALC LVOT PEAK VEL: 1.46 M/S
DOP CALC LVOT STROKE VOLUME: 87.24 CM3
DOP CALC RVOT PEAK VEL: 0.66 M/S
DOP CALC RVOT VTI: 12.2 CM
DOP CALCLVOT PEAK VEL VTI: 23.6 CM
E WAVE DECELERATION TIME: 400.91 MSEC
E/A RATIO: 0.54
E/E' RATIO: 4.87 M/S
ECHO LV POSTERIOR WALL: 1.39 CM (ref 0.6–1.1)
EJECTION FRACTION: 60 %
EOSINOPHIL # BLD AUTO: 0 K/UL (ref 0–0.5)
EOSINOPHIL NFR BLD: 0 % (ref 0–8)
ERYTHROCYTE [DISTWIDTH] IN BLOOD BY AUTOMATED COUNT: 11.9 % (ref 11.5–14.5)
EST. GFR  (NO RACE VARIABLE): >60 ML/MIN/1.73 M^2
ESTIMATED AVG GLUCOSE: 200 MG/DL (ref 68–131)
FRACTIONAL SHORTENING: 32 % (ref 28–44)
GLUCOSE SERPL-MCNC: 122 MG/DL (ref 70–110)
HBA1C MFR BLD: 8.6 % (ref 4–5.6)
HCT VFR BLD AUTO: 33 % (ref 40–54)
HGB BLD-MCNC: 11.3 G/DL (ref 14–18)
IMM GRANULOCYTES # BLD AUTO: 0.02 K/UL (ref 0–0.04)
IMM GRANULOCYTES NFR BLD AUTO: 0.4 % (ref 0–0.5)
INTERVENTRICULAR SEPTUM: 1.51 CM (ref 0.6–1.1)
IVC DIAMETER: 0.69 CM
IVRT: 59.94 MSEC
LA MAJOR: 6.16 CM
LA MINOR: 5.93 CM
LA WIDTH: 3.2 CM
LEFT ATRIUM SIZE: 3.6 CM
LEFT ATRIUM VOLUME INDEX: 34.4 ML/M2
LEFT ATRIUM VOLUME: 59.17 CM3
LEFT INTERNAL DIMENSION IN SYSTOLE: 2.36 CM (ref 2.1–4)
LEFT VENTRICLE DIASTOLIC VOLUME INDEX: 28.74 ML/M2
LEFT VENTRICLE DIASTOLIC VOLUME: 49.43 ML
LEFT VENTRICLE MASS INDEX: 105 G/M2
LEFT VENTRICLE SYSTOLIC VOLUME INDEX: 11.2 ML/M2
LEFT VENTRICLE SYSTOLIC VOLUME: 19.34 ML
LEFT VENTRICULAR INTERNAL DIMENSION IN DIASTOLE: 3.46 CM (ref 3.5–6)
LEFT VENTRICULAR MASS: 180.18 G
LV LATERAL E/E' RATIO: 4.31 M/S
LV SEPTAL E/E' RATIO: 5.6 M/S
LVOT MG: 5.32 MMHG
LVOT MV: 1.1 CM/S
LYMPHOCYTES # BLD AUTO: 0.9 K/UL (ref 1–4.8)
LYMPHOCYTES NFR BLD: 18.8 % (ref 18–48)
MCH RBC QN AUTO: 31.4 PG (ref 27–31)
MCHC RBC AUTO-ENTMCNC: 34.2 G/DL (ref 32–36)
MCV RBC AUTO: 92 FL (ref 82–98)
MONOCYTES # BLD AUTO: 0.5 K/UL (ref 0.3–1)
MONOCYTES NFR BLD: 11.1 % (ref 4–15)
MV PEAK A VEL: 1.04 M/S
MV PEAK E VEL: 0.56 M/S
MV STENOSIS PRESSURE HALF TIME: 116.26 MS
MV VALVE AREA P 1/2 METHOD: 1.89 CM2
NEUTROPHILS # BLD AUTO: 3.2 K/UL (ref 1.8–7.7)
NEUTROPHILS NFR BLD: 69.3 % (ref 38–73)
NRBC BLD-RTO: 0 /100 WBC
PISA TR MAX VEL: 1.92 M/S
PLATELET # BLD AUTO: 133 K/UL (ref 150–450)
PMV BLD AUTO: 11.2 FL (ref 9.2–12.9)
POCT GLUCOSE: 186 MG/DL (ref 70–110)
POCT GLUCOSE: 218 MG/DL (ref 70–110)
POCT GLUCOSE: 39 MG/DL (ref 70–110)
POTASSIUM SERPL-SCNC: 3.8 MMOL/L (ref 3.5–5.1)
PROT SERPL-MCNC: 6.2 G/DL (ref 6–8.4)
PV MEAN GRADIENT: 1 MMHG
RA MAJOR: 5.35 CM
RA PRESSURE ESTIMATED: 3 MMHG
RA WIDTH: 2.3 CM
RBC # BLD AUTO: 3.6 M/UL (ref 4.6–6.2)
RV TB RVSP: 5 MMHG
SODIUM SERPL-SCNC: 138 MMOL/L (ref 136–145)
STJ: 3.34 CM
TDI LATERAL: 0.13 M/S
TDI SEPTAL: 0.1 M/S
TDI: 0.12 M/S
TR MAX PG: 15 MMHG
TRICUSPID ANNULAR PLANE SYSTOLIC EXCURSION: 2.05 CM
TV REST PULMONARY ARTERY PRESSURE: 18 MMHG
WBC # BLD AUTO: 4.67 K/UL (ref 3.9–12.7)
Z-SCORE OF LEFT VENTRICULAR DIMENSION IN END DIASTOLE: -3.21
Z-SCORE OF LEFT VENTRICULAR DIMENSION IN END SYSTOLE: -1.77

## 2024-05-23 PROCEDURE — 25000003 PHARM REV CODE 250: Performed by: INTERNAL MEDICINE

## 2024-05-23 PROCEDURE — 97530 THERAPEUTIC ACTIVITIES: CPT

## 2024-05-23 PROCEDURE — 85025 COMPLETE CBC W/AUTO DIFF WBC: CPT | Performed by: INTERNAL MEDICINE

## 2024-05-23 PROCEDURE — 80053 COMPREHEN METABOLIC PANEL: CPT | Performed by: INTERNAL MEDICINE

## 2024-05-23 PROCEDURE — 96375 TX/PRO/DX INJ NEW DRUG ADDON: CPT | Mod: 59

## 2024-05-23 PROCEDURE — 36415 COLL VENOUS BLD VENIPUNCTURE: CPT | Performed by: INTERNAL MEDICINE

## 2024-05-23 PROCEDURE — 97162 PT EVAL MOD COMPLEX 30 MIN: CPT

## 2024-05-23 PROCEDURE — 97166 OT EVAL MOD COMPLEX 45 MIN: CPT

## 2024-05-23 PROCEDURE — 25000003 PHARM REV CODE 250: Performed by: NURSE PRACTITIONER

## 2024-05-23 PROCEDURE — G0378 HOSPITAL OBSERVATION PER HR: HCPCS

## 2024-05-23 RX ORDER — ATORVASTATIN CALCIUM 40 MG/1
40 TABLET, FILM COATED ORAL DAILY
Qty: 30 TABLET | Refills: 0 | Status: SHIPPED | OUTPATIENT
Start: 2024-05-23 | End: 2024-06-22

## 2024-05-23 RX ORDER — MULTIVITAMIN/IRON/FOLIC ACID 18MG-0.4MG
1 TABLET ORAL DAILY
Qty: 30 TABLET | Refills: 0 | Status: SHIPPED | OUTPATIENT
Start: 2024-05-23 | End: 2024-06-22

## 2024-05-23 RX ORDER — VERAPAMIL HYDROCHLORIDE 240 MG/1
240 TABLET, FILM COATED, EXTENDED RELEASE ORAL DAILY
Qty: 30 TABLET | Refills: 0 | Status: SHIPPED | OUTPATIENT
Start: 2024-05-23 | End: 2024-06-22

## 2024-05-23 RX ORDER — LOSARTAN POTASSIUM 100 MG/1
100 TABLET ORAL DAILY
Qty: 30 TABLET | Refills: 0 | Status: SHIPPED | OUTPATIENT
Start: 2024-05-23 | End: 2024-06-22

## 2024-05-23 RX ORDER — LANOLIN ALCOHOL/MO/W.PET/CERES
100 CREAM (GRAM) TOPICAL DAILY
Qty: 30 TABLET | Refills: 0 | Status: SHIPPED | OUTPATIENT
Start: 2024-05-24 | End: 2024-06-23

## 2024-05-23 RX ADMIN — THERA TABS 1 TABLET: TAB at 08:05

## 2024-05-23 RX ADMIN — PANTOPRAZOLE SODIUM 40 MG: 40 TABLET, DELAYED RELEASE ORAL at 08:05

## 2024-05-23 RX ADMIN — ASPIRIN 325 MG ORAL TABLET 325 MG: 325 PILL ORAL at 08:05

## 2024-05-23 RX ADMIN — CHLORDIAZEPOXIDE HYDROCHLORIDE 25 MG: 25 CAPSULE ORAL at 08:05

## 2024-05-23 RX ADMIN — FOLIC ACID 1 MG: 1 TABLET ORAL at 08:05

## 2024-05-23 RX ADMIN — Medication 100 MG: at 08:05

## 2024-05-23 RX ADMIN — DEXTROSE MONOHYDRATE 250 ML: 100 INJECTION, SOLUTION INTRAVENOUS at 04:05

## 2024-05-23 NOTE — PT/OT/SLP EVAL
Physical Therapy Evaluation and Treatment    Patient Name:  Jorgito Arreaga   MRN:  [id number]    Recommendations:     Discharge Recommendations: Low Intensity Therapy   Discharge Equipment Recommendations: none   Barriers to discharge: None    Assessment:     Jorgito Arreaga is a 58 y.o. male admitted with a medical diagnosis of Hypertensive urgency.  He presents with the following impairments/functional limitations: weakness, impaired endurance, impaired self care skills, impaired functional mobility, gait instability, impaired balance.    Rehab Prognosis: Good; patient would benefit from acute skilled PT services to address these deficits and reach maximum level of function.    Recent Surgery: * No surgery found *      Plan:     During this hospitalization, patient to be seen 3 x/week to address the identified rehab impairments via gait training, therapeutic activities, therapeutic exercises and progress toward the following goals:    Plan of Care Expires:  06/06/24    Subjective     Chief Complaint: severe weakness in R wrist and hand; generalized weakness throughout the body  Patient/Family Comments/goals: none stated  Pain/Comfort:  Pain Rating 1: 0/10    Patients cultural, spiritual, Evangelical conflicts given the current situation:      Living Environment:  Pt lives [place] with [place] steps. Pt lives with family.   Prior to admission, patients level of function was INDEP with ADLs; PT was a community ambulator and previously worked as an .  Equipment used at home: none.  DME owned (not currently used): none.  Upon discharge, patient will have assistance from family.    Objective:     Communicated with nurse Cruz prior to session.  Patient found supine with peripheral IV, telemetry  upon PT entry to room.    General Precautions: Standard, fall  Orthopedic Precautions:N/A   Braces: N/A  Respiratory Status: Room air    Exams:  Cognitive Exam:  Patient is oriented to Person, Place, Time, and  "Situation  Postural Exam:  Patient presented with the following abnormalities:    -       No postural abnormalities identified  RLE ROM: WFL  RLE Strength: grossly 3+/5; questionable effort  LLE ROM: WFL  LLE Strength: grossly 3+/5; questionable effort  Pt presents with wrist drop on R side.    Functional Mobility:  Bed Mobility:     Scooting: stand by assistance  Supine to Sit: stand by assistance  Transfers:     Sit to Stand:  stand by assistance with no AD  Gait: pt ambulated 110' CGA no AD; pt walked 110' CGA with RW due to instability/ increased lateral sway; Pt walked with narrow CHANTELLE and intermittent tandom gait; pt cued to increase CHANTELLE.  Balance: good: seated; fair: dynamic standing  Slow moving; required extended time for functional tasks due to wrist drop and generalized weakness    AM-PAC 6 CLICK MOBILITY  Total Score:20     Treatment & Education:  Pt educated on role of PT and POC in acute care setting.   Pt educated and performed therex x10: hip flex/ext, LAQ, AP.  Pt encouraged to increase OOB time in chair to tolerance.  Pt educated on use of RW and safety while ambulating.  Pt educated on "call don't fall" and encouraged to call for assistance when getting in and out of bed/chair.     Patient left up in chair with all lines intact, call button in reach, chair alarm on, and nurse notified.    GOALS:   Multidisciplinary Problems       Physical Therapy Goals          Problem: Physical Therapy    Goal Priority Disciplines Outcome Goal Variances Interventions   Physical Therapy Goal     PT, PT/OT      Description: LTG to be met in 14 days: 6/6/2024  Pt will complete bed mobility INDEP.  Pt will complete sit to stand INDEP.  Pt will ambulate 300' SBA with RW  Pt will increase AMPAC score by 2 to progress gross functional mobility.                       History:     Past Medical History:   Diagnosis Date    Abscess 9/5/2018    Cataracts, bilateral     Chest pain 12/6/2018    Dehydration     Diabetes " mellitus, type 2     Diagnosed 7/2016    Folliculitis 9/15/2017    GERD (gastroesophageal reflux disease)     Hypertension     Inflammatory polyps of colon     Pancreatitis, alcoholic, acute     Port catheter in place     TIA (transient ischemic attack)     Tinea cruris 9/15/2017       Past Surgical History:   Procedure Laterality Date    COLONOSCOPY      COLONOSCOPY N/A 6/15/2018    Procedure: COLONOSCOPY;  Surgeon: Jim Hicks MD;  Location: [facility];  Service: Endoscopy;  Laterality: N/A;    ESOPHAGOGASTRODUODENOSCOPY N/A 6/15/2018    Procedure: ESOPHAGOGASTRODUODENOSCOPY (EGD);  Surgeon: Jim Hicks MD;  Location: [facility];  Service: Endoscopy;  Laterality: N/A;    ROBOT-ASSISTED LAPAROSCOPIC REPAIR OF INGUINAL HERNIA USING DA JANET XI Bilateral 11/30/2022    Procedure: XI ROBOTIC REPAIR, HERNIA, INGUINAL;  Surgeon: Smooth Denny MD;  Location: [facility] OR;  Service: General;  Laterality: Bilateral;       Time Tracking:     PT Received On: 05/23/24  PT Start Time: 0710     PT Stop Time: 0740  PT Total Time (min): 30 min     Billable Minutes: eval 15, TA 15    05/23/2024

## 2024-05-23 NOTE — CONSULTS
Appointment obtained for PCP with [facility] and added to AVS.  Ambulatory referral to neurology in chart.  Patient is medicaid pending.

## 2024-05-23 NOTE — PLAN OF CARE
PT evaluation complete. Pt acceptant to PT. SBA for bed mobility; SBA for Tfs; Pt ambulated 110' CGA no AD and 110' CGA with RW. PT DC rec: low intensity therapy

## 2024-05-23 NOTE — PLAN OF CARE
Problem: Adult Inpatient Plan of Care  Goal: Plan of Care Review  Outcome: Progressing  Goal: Patient-Specific Goal (Individualized)  Outcome: Progressing  Goal: Absence of Hospital-Acquired Illness or Injury  Outcome: Progressing  Goal: Optimal Comfort and Wellbeing  Outcome: Progressing  Goal: Readiness for Transition of Care  Outcome: Progressing     Problem: Diabetes Comorbidity  Goal: Blood Glucose Level Within Targeted Range  Outcome: Progressing     Problem: Skin Injury Risk Increased  Goal: Skin Health and Integrity  Outcome: Progressing     Problem: Stroke, Ischemic (Includes Transient Ischemic Attack)  Goal: Optimal Coping  Outcome: Progressing  Goal: Effective Bowel Elimination  Outcome: Progressing  Goal: Optimal Cerebral Tissue Perfusion  Outcome: Progressing  Goal: Optimal Cognitive Function  Outcome: Progressing  Goal: Improved Communication Skills  Outcome: Progressing  Goal: Optimal Functional Ability  Outcome: Progressing  Goal: Optimal Nutrition Intake  Outcome: Progressing  Goal: Effective Oxygenation and Ventilation  Outcome: Progressing  Goal: Improved Sensorimotor Function  Outcome: Progressing  Goal: Safe and Effective Swallow  Outcome: Progressing  Goal: Effective Urinary Elimination  Outcome: Progressing

## 2024-05-23 NOTE — PLAN OF CARE
[facility] - Telemetry (Hospital)  Discharge Final Note    Primary Care Provider: Kirsten Queen NP    Expected Discharge Date: 5/23/2024    Final Discharge Note (most recent)       Final Note - 05/23/24 1339          Final Note    Anticipated Discharge Disposition Home or Self Care (P)      Hospital Resources/Appts/Education Provided Appointments scheduled and added to AVS (P)         Post-Acute Status    Discharge Delays None known at this time (P)                      Important Message from Medicare             Contact [address] [address]   Phone: [phone]       Next Steps: Go on 6/5/2024    Instructions: @ 2:00pm for hospital follow up with Vonda Elliott NP

## 2024-05-23 NOTE — PLAN OF CARE
OT eval completed. Pt completed sup>sit, sit>stand, and stand>sit with SBA. Pt ambulated 110 ft with CGA without any AD. Pt with one LOB with correction. Pt ambulated an additional 110 ft with RW with CGA. OT recommending low intensity intervention at d/c.

## 2024-05-23 NOTE — DISCHARGE SUMMARY
"[facility] - Tele[facility] ([facility] Medicine  Discharge Summary      Patient Name: Jorgito Arreaga  MRN: [id number]  [facility]: [id number]  Patient Class: OP- Observation  Admission Date: 5/22/2024  Hospital Length of Stay: 1 days  Discharge Date and Time:  05/23/2024 3:21 PM  Attending Physician: Magda Lerma MD   Discharging Provider: Magda Lerma MD  Primary Care Provider: Kirsten Queen NP    Primary Care Team: Networked reference to record PCT     HPI:   59 y/o male with PMH of HTN, HLD, DM II, GERD, bilateral cataracts, alcohol abuse, pancreatitis, colon polyps, and TIA presented to [facility] ER 5/21/24 with c/o right wrist/hand numbness since waking up that morning. He also complained of a "funny pain" in his head. ETOH 389, , ALT 45, glucose 288. CTA multiphase stroke no acute abnormality, no high grade stenosis or major vessel occlusion, no hemorrhage. Telestroke consulted in ER and recommended MRI brain and transfer to [facility] for higher level of care. MRI brain with no acute finding. EKG NSR.     On arrival to [facility], patient still having numbness to his right fingertips and weakness to his right wrist and hands. Patient reports drinking a 6 pack of beer in the evening after work. His blood pressure was elevated on admission. He has not been taking his blood pressure medication, he said he has been out of it for a while. Will monitor under observation for blood pressure control and have PT/OT evaluate prior to discharge.       * No surgery found *      Hospital Course:   MRI negative for acute stroke. BP improved after resumption of home BP regimen. TTE with bubble study showed normal EF, normal diastolic function, negative bubble study. On physical exam, pt has R wrist drop, concern for peripheral neuropathic process rather than central cause/TIA or CVA. Pt was evaluated by PT/OT who recommended outpatient PT/OT on discharge. Pt referred to neurology outpatient for outpatient " EMG/nerve conduction study to work up wrist drop, brace in place. Followup with PCP scheduled for 06/05/2024. Pt seen and examined on day of discharge and appears stable for discharge home today per my exam.      Goals of Care Treatment Preferences:  Code Status: Full Code      Consults:   Consults (From admission, onward)          Status Ordering Provider     Inpatient consult to Social Work  Once        Provider:  (Not yet assigned)    Completed CELESTINA LITTLEJOHN     Consult to Telemedicine - Acute Stroke  Once        Provider:  Roseanne Keyes MD    Completed ALTHEA KENNEDY            No new Assessment & Plan notes have been filed under this hospital service since the last note was generated.  Service: Hospital Medicine    Final Active Diagnoses:    Diagnosis Date Noted POA    PRINCIPAL PROBLEM:  Hypertensive urgency [I16.0] 05/22/2024 Yes    Focal neurological deficit [R29.818] 05/22/2024 Yes    Transaminitis [R74.01] 02/06/2019 Yes    Alcohol abuse [F10.10] 06/06/2016 Yes     Chronic    Gastroesophageal reflux disease [K21.9] 06/06/2016 Yes     Chronic      Problems Resolved During this Admission:       Discharged Condition: good    Disposition: Home or Self Care    Follow Up:   Follow-up Information       [facility] Go on [facility].    Why: @ 2:00pm for hospital follow up with Vonda Elliott NP  Contact information:  [address] [place] [address]  [place] [address] [phone]               Neurology, [facility] -. Schedule an appointment as soon as possible for a visit.    Specialty: Neurology  Contact information:  [address] [place] [address] [place] [address] [phone]                           Patient Instructions:      Ambulatory referral/consult to Neurology   Standing Status: Future   Referral Priority: Routine Referral Type: Consultation   Referral Reason: Specialty Services Required   Requested Specialty: Neurology   Number of Visits Requested: 1     Ambulatory  "referral/consult to Physical/Occupational Therapy   Standing Status: Future   Referral Priority: Routine Referral Type: Physical Medicine   Referral Reason: Specialty Services Required   Number of Visits Requested: 1     Diet Cardiac     Diet diabetic     Notify your health care provider if you experience any of the following:  increased confusion or weakness     Activity as tolerated       Significant Diagnostic Studies: See Hospital Course     Pending Diagnostic Studies:       None           Medications:  Reconciled Home Medications:      Medication List        START taking these medications      HIGH POTENCY MULTIVIT (W-IRON) Tab  Generic drug: multivitamin-iron-folic acid  Take 1 tablet by mouth once daily.     thiamine 100 MG tablet  Take 1 tablet (100 mg total) by mouth once daily.  Start taking on: May 24, 2024            CHANGE how you take these medications      losartan 100 MG tablet  Commonly known as: COZAAR  Take 1 tablet (100 mg total) by mouth once daily. TAKE 1 TABLET BY MOUTH ONCE DAILY  What changed: See the new instructions.            CONTINUE taking these medications      aspirin 325 MG tablet  Take 1 tablet (325 mg total) by mouth once daily.     atorvastatin 40 MG tablet  Commonly known as: LIPITOR  Take 1 tablet (40 mg total) by mouth once daily.     blood sugar diagnostic Strp  Commonly known as: ONETOUCH ULTRA BLUE TEST STRIP  USE TO CHECK SUGAR BEFORE MEALS AND AT BEDTIME     clotrimazole-betamethasone 1-0.05% cream  Commonly known as: LOTRISONE  Apply topically 2 (two) times daily.     HumaLOG U-100 Insulin 100 unit/mL injection  Generic drug: insulin lispro  INJECT 8 UNITS SUBCUTANEOUSLY WITH LARGEST MEAL OF THE DAY, INJECT ADDITIONAL 4 UNITS AT A TIME IF GLUCOSE IS ELEVATED     insulin glargine U-100 (Lantus) 100 unit/mL (3 mL) Inpn pen  Commonly known as: LANTUS SOLOSTAR U-100 INSULIN  INJECT 50 UNITS INTO SKIN EVERY EVENING.     insulin syringe-needle,dispos. 1 mL 28 gauge x 1/2" " "Syrg  1 each by Misc.(Non-Drug; Combo Route) route 2 (two) times daily.     lancets 33 gauge Misc  Commonly known as: ONETOUCH DELICA PLUS LANCET  Inject 1 lancet into the skin 4 (four) times daily before meals and nightly.     mupirocin 2 % ointment  Commonly known as: BACTROBAN  Apply topically 3 (three) times daily.     pen needle, diabetic 32 gauge x 5/32" Ndle  Commonly known as: BD JOEL 2ND GEN PEN NEEDLE  Inject 1 each into the skin 4 (four) times daily before meals and nightly.     potassium, sodium phosphates 280-160-250 mg Pwpk  Commonly known as: PHOS-NAK  Take 1 packet by mouth 4 (four) times daily before meals and nightly.     verapamiL 240 MG CR tablet  Commonly known as: CALAN-SR  Take 1 tablet (240 mg total) by mouth once daily.            STOP taking these medications      furosemide 20 MG tablet  Commonly known as: LASIX     naproxen 375 MG tablet  Commonly known as: NAPROSYN              Indwelling Lines/Drains at time of discharge:   Lines/Drains/Airways       None                   Time spent on the discharge of patient: 35 minutes         Magda Lerma MD  Department of Hospital Medicine  [facility] - Telemetry ([facility])  "

## 2024-05-23 NOTE — HOSPITAL COURSE
MRI negative for acute stroke. BP improved after resumption of home BP regimen. TTE with bubble study showed normal EF, normal diastolic function, negative bubble study. On physical exam, pt has R wrist drop, concern for peripheral neuropathic process rather than central cause/TIA or CVA. Pt was evaluated by PT/OT who recommended outpatient PT/OT on discharge. Pt referred to neurology outpatient for outpatient EMG/nerve conduction study to work up wrist drop, brace in place. Followup with PCP scheduled for 06/05/2024. Pt seen and examined on day of discharge and appears stable for discharge home today per my exam.

## 2024-05-23 NOTE — PLAN OF CARE
[facility] - Telemetry (Hospital)  Discharge Assessment    Primary Care Provider: Kirsten Queen NP     Discharge Assessment (most recent)       BRIEF DISCHARGE ASSESSMENT - 05/23/24 2842          Discharge Planning    Assessment Type Discharge Planning Brief Assessment (P)      Resource/Environmental Concerns none (P)      Support Systems Family members (P)      Equipment Currently Used at Home none (P)      Current Living Arrangements home (P)      Patient/Family Anticipates Transition to home (P)      Patient/Family Anticipated Services at Transition none (P)      DME Needed Upon Discharge  none (P)      Discharge Plan A Home with family (P)                    Patient lives with hos 4 brothers.  No needs

## 2024-05-23 NOTE — PT/OT/SLP EVAL
Occupational Therapy Evaluation and Treatment    Name: Jorgito Arreaga  MRN: [id number]  Admitting Diagnosis: Hypertensive urgency  Recent Surgery: * No surgery found *      Recommendations:     Discharge Recommendations: Low Intensity Therapy  Level of Assistance Recommended: Intermittent assistance  Discharge Equipment Recommendations: orthotic device and rolling walker  Barriers to discharge: None    Assessment:     Jorgito Arreaga is a 58 y.o. male with a medical diagnosis of Hypertensive urgency. He presents with the following performance deficits affecting function including weakness, impaired self care skills, impaired balance, decreased safety awareness, decreased ROM, impaired joint extensibility, impaired sensation, decreased upper extremity function, impaired fine motor.     Rehab Prognosis: Good; patient would benefit from acute OT services to address these deficits and reach maximum level of function.    Plan:     Patient to be seen 2 x/week to address the above listed problems via self-care/home management, therapeutic activities, therapeutic exercises  Plan of Care Expires: 06/06/24  Plan of Care Reviewed with: patient    Subjective     Chief Complaint: none reported  Patient Comments/Goals: none reported  Pain/Comfort:  Pain Rating 1: 0/10    Social History:  Living Environment: Patient lives with their family  in a single story home with tub-shower combo and no steps to enter.   Prior Level of Function: Prior to admission, patient was independent with all ADLs and community distance ambulation   Roles and Routines: Patient was driving and working as an  at [employer]  prior to admission.  Equipment Used at Home: none  DME owned (not currently used): none  Assistance Upon Discharge: family    Objective:     Communicated with his nurse, Anthony, prior to session. Patient found supine with peripheral IV, telemetry upon OT entry to room.    General Precautions: Standard, fall   Orthopedic  Precautions: N/A   Braces: N/A    Respiratory Status: Room air    Occupational Performance    Gait belt applied - Yes    Bed Mobility:   Scooting anteriorly to EOB to have both feet planted on floor: stand by assistance  Supine to sit from right side of bed with stand by assistance    Functional Mobility/Transfers:  Sit <> Stand Transfer with stand by assistance with no AD  Bed <> Chair Transfer using Step Transfer technique with stand by assistance with rolling walker  Functional Mobility: Pt ambulated 110 ft with CGA and no AD. Pt with 1 LOB with correction. After LOB pt ambulated an additional 110 ft with CGA while utilizing a RW. Pt completed this activity to improve functional mobility and dynamic standing balance required for ADL completion.     Activities of Daily Living:  Feeding: modified independence   Pt modified independent with feeding when using a utensil with a built up handle with fair quality performance.   Upper Body Dressing: minimum assistance  Pt able to don one sleeve of the hospital gown and required assistance to don the other sleeve.     Cognitive/Visual Perceptual:  Cognitive/Psychosocial Skills:    -     Oriented to: Person, Place, Time, Situation  Visual/Perceptual:    -     Intact    Physical Exam:  Balance:    -     Sitting: stand by assistance  -     Standing: contact guard assistance  Upper Extremity Range of Motion:     -       Right Upper Extremity: Deficits in wrist extension  -       Left Upper Extremity: WFL  Upper Extremity Strength:      Right Upper Extremity: Deficits: Pt with MM grade of 4+/5 in elbow flexion and shoulder flexion. Pt with MM grade of 4/5 in elbow extension. Pt with weak wrist extensors.   -       Left Upper Extremity: Deficits: Pt with MM grade of 4+/5 in elbow flexion and shoulder flexion. Pt with MM grade of 4/5 in elbow extension.    Strength:    -       Right Upper Extremity: Pt with weak  strength in right hand  -       Left Upper Extremity:  WFL    [facility] 6 Click ADL:  AMPAC Total Score: 18    Treatment & Education:  Therapist provided facilitation and instruction of proper body mechanics, energy conservation, and fall prevention strategies during tasks listed above  Patient educated on role of OT, POC, and goals for therapy  Patient educated on importance of OOB activities with staff member assistance and sitting OOB majority of the day  Pt given R wrist cock up splint to wear for functional use of his R hand. Pt educated on donning/doffing the brace and wear schedule. Pt also educated on performing skin checks to assess for signs of irritation including redness or swelling.        Patient left up in chair with all lines intact, call button in reach, and chair alarm on.    GOALS:   Multidisciplinary Problems       Occupational Therapy Goals          Problem: Occupational Therapy    Goal Priority Disciplines Outcome Interventions   Occupational Therapy Goal     OT, PT/OT Progressing    Description: Goals to be met by: 6/6/24     Patient will increase functional independence with ADLs by performing:    UE Dressing with Modified [place].  Supine to sit with Modified [place].  Increased functional strength to 4+/5 in elbow extension.                         History:     Past Medical History:   Diagnosis Date    Abscess 9/5/2018    Cataracts, bilateral     Chest pain 12/6/2018    Dehydration     Diabetes mellitus, type 2     Diagnosed 7/2016    Folliculitis 9/15/2017    GERD (gastroesophageal reflux disease)     Hypertension     Inflammatory polyps of colon     Pancreatitis, alcoholic, acute     Port catheter in place     TIA (transient ischemic attack)     Tinea cruris 9/15/2017         Past Surgical History:   Procedure Laterality Date    COLONOSCOPY      COLONOSCOPY N/A 6/15/2018    Procedure: COLONOSCOPY;  Surgeon: Jim Hicks MD;  Location: [facility];  Service: Endoscopy;  Laterality: N/A;    ESOPHAGOGASTRODUODENOSCOPY N/A 6/15/2018     Procedure: ESOPHAGOGASTRODUODENOSCOPY (EGD);  Surgeon: Jim Hicks MD;  Location: [facility];  Service: Endoscopy;  Laterality: N/A;    ROBOT-ASSISTED LAPAROSCOPIC REPAIR OF INGUINAL HERNIA USING DA JANET XI Bilateral 11/30/2022    Procedure: XI ROBOTIC REPAIR, HERNIA, INGUINAL;  Surgeon: Smooth Denny MD;  Location: [facility] OR;  Service: General;  Laterality: Bilateral;       Time Tracking:     OT Date of Treatment: 05/23/24  OT Start Time: 0725  OT Stop Time: 0750  OT Total Time (min): 25 min    Billable Minutes: Evaluation 5 and Therapeutic Activity 20    KERRY Edouard   5/23/2024

## 2024-06-24 DIAGNOSIS — I10 ESSENTIAL HYPERTENSION: ICD-10-CM

## 2024-06-24 RX ORDER — LOSARTAN POTASSIUM 100 MG/1
100 TABLET ORAL
Qty: 30 TABLET | Refills: 0 | OUTPATIENT
Start: 2024-06-24

## 2024-06-29 ENCOUNTER — HOSPITAL ENCOUNTER (INPATIENT)
Facility: HOSPITAL | Age: 59
LOS: 4 days | Discharge: HOME | DRG: 637 | End: 2024-07-03
Attending: EMERGENCY MEDICINE | Admitting: EMERGENCY MEDICINE
Payer: MEDICAID

## 2024-06-29 DIAGNOSIS — T68.XXXA HYPOTHERMIA, INITIAL ENCOUNTER: ICD-10-CM

## 2024-06-29 DIAGNOSIS — F10.10 ALCOHOL ABUSE: ICD-10-CM

## 2024-06-29 DIAGNOSIS — Z79.4 TYPE 2 DIABETES MELLITUS WITH HYPERGLYCEMIA, WITH LONG-TERM CURRENT USE OF INSULIN: ICD-10-CM

## 2024-06-29 DIAGNOSIS — E11.65 TYPE 2 DIABETES MELLITUS WITH HYPERGLYCEMIA, WITH LONG-TERM CURRENT USE OF INSULIN: ICD-10-CM

## 2024-06-29 DIAGNOSIS — R53.1 WEAKNESS: ICD-10-CM

## 2024-06-29 DIAGNOSIS — E16.2 HYPOGLYCEMIA: Primary | ICD-10-CM

## 2024-06-29 DIAGNOSIS — J18.9 COMMUNITY ACQUIRED PNEUMONIA, UNSPECIFIED LATERALITY: ICD-10-CM

## 2024-06-29 DIAGNOSIS — F10.929 ALCOHOLIC INTOXICATION WITH COMPLICATION: ICD-10-CM

## 2024-06-29 PROBLEM — G93.41 ACUTE METABOLIC ENCEPHALOPATHY: Status: ACTIVE | Noted: 2024-06-29

## 2024-06-29 LAB
ALBUMIN SERPL BCP-MCNC: 3.8 G/DL (ref 3.5–5.2)
ALP SERPL-CCNC: 66 U/L (ref 55–135)
ALT SERPL W/O P-5'-P-CCNC: 20 U/L (ref 10–44)
AMMONIA PLAS-SCNC: 33 UMOL/L (ref 10–50)
AMPHET+METHAMPHET UR QL: NEGATIVE
ANION GAP SERPL CALC-SCNC: 10 MMOL/L (ref 8–16)
APTT PPP: 29.3 SEC (ref 21–32)
AST SERPL-CCNC: 26 U/L (ref 10–40)
BARBITURATES UR QL SCN>200 NG/ML: NEGATIVE
BASOPHILS # BLD AUTO: 0.03 K/UL (ref 0–0.2)
BASOPHILS NFR BLD: 1 % (ref 0–1.9)
BENZODIAZ UR QL SCN>200 NG/ML: NEGATIVE
BILIRUB SERPL-MCNC: 0.6 MG/DL (ref 0.1–1)
BILIRUB UR QL STRIP: NEGATIVE
BUN SERPL-MCNC: 11 MG/DL (ref 6–20)
BZE UR QL SCN: NEGATIVE
CALCIUM SERPL-MCNC: 8.7 MG/DL (ref 8.7–10.5)
CANNABINOIDS UR QL SCN: NEGATIVE
CHLORIDE SERPL-SCNC: 107 MMOL/L (ref 95–110)
CLARITY UR REFRACT.AUTO: CLEAR
CO2 SERPL-SCNC: 26 MMOL/L (ref 23–29)
COLOR UR AUTO: YELLOW
CREAT SERPL-MCNC: 0.9 MG/DL (ref 0.5–1.4)
CREAT UR-MCNC: 132.6 MG/DL (ref 23–375)
DIFFERENTIAL METHOD BLD: ABNORMAL
EOSINOPHIL # BLD AUTO: 0 K/UL (ref 0–0.5)
EOSINOPHIL NFR BLD: 1 % (ref 0–8)
ERYTHROCYTE [DISTWIDTH] IN BLOOD BY AUTOMATED COUNT: 11.9 % (ref 11.5–14.5)
EST. GFR  (NO RACE VARIABLE): >60 ML/MIN/1.73 M^2
ESTIMATED AVG GLUCOSE: 163 MG/DL (ref 68–131)
ETHANOL SERPL-MCNC: 174 MG/DL
GLUCOSE SERPL-MCNC: 83 MG/DL (ref 70–110)
GLUCOSE UR QL STRIP: ABNORMAL
HBA1C MFR BLD: 7.3 % (ref 4–5.6)
HCT VFR BLD AUTO: 34.2 % (ref 40–54)
HGB BLD-MCNC: 11.1 G/DL (ref 14–18)
HGB UR QL STRIP: NEGATIVE
IMM GRANULOCYTES # BLD AUTO: 0 K/UL (ref 0–0.04)
IMM GRANULOCYTES NFR BLD AUTO: 0 % (ref 0–0.5)
INR PPP: 1.1 (ref 0.8–1.2)
KETONES UR QL STRIP: NEGATIVE
LACTATE SERPL-SCNC: 1.3 MMOL/L (ref 0.5–2.2)
LEUKOCYTE ESTERASE UR QL STRIP: NEGATIVE
LIPASE SERPL-CCNC: 4 U/L (ref 4–60)
LYMPHOCYTES # BLD AUTO: 1.1 K/UL (ref 1–4.8)
LYMPHOCYTES NFR BLD: 37.4 % (ref 18–48)
MAGNESIUM SERPL-MCNC: 2.1 MG/DL (ref 1.6–2.6)
MCH RBC QN AUTO: 30.6 PG (ref 27–31)
MCHC RBC AUTO-ENTMCNC: 32.5 G/DL (ref 32–36)
MCV RBC AUTO: 94 FL (ref 82–98)
METHADONE UR QL SCN>300 NG/ML: NEGATIVE
MONOCYTES # BLD AUTO: 0.2 K/UL (ref 0.3–1)
MONOCYTES NFR BLD: 7.3 % (ref 4–15)
NEUTROPHILS # BLD AUTO: 1.5 K/UL (ref 1.8–7.7)
NEUTROPHILS NFR BLD: 53.3 % (ref 38–73)
NITRITE UR QL STRIP: NEGATIVE
NRBC BLD-RTO: 0 /100 WBC
OPIATES UR QL SCN: NEGATIVE
PCP UR QL SCN>25 NG/ML: NEGATIVE
PH UR STRIP: 5 [PH] (ref 5–8)
PLATELET # BLD AUTO: 188 K/UL (ref 150–450)
PMV BLD AUTO: 10 FL (ref 9.2–12.9)
POCT GLUCOSE: 107 MG/DL (ref 70–110)
POCT GLUCOSE: 111 MG/DL (ref 70–110)
POCT GLUCOSE: 117 MG/DL (ref 70–110)
POCT GLUCOSE: 139 MG/DL (ref 70–110)
POCT GLUCOSE: 160 MG/DL (ref 70–110)
POCT GLUCOSE: 160 MG/DL (ref 70–110)
POCT GLUCOSE: 163 MG/DL (ref 70–110)
POCT GLUCOSE: 190 MG/DL (ref 70–110)
POCT GLUCOSE: 198 MG/DL (ref 70–110)
POCT GLUCOSE: 53 MG/DL (ref 70–110)
POCT GLUCOSE: 56 MG/DL (ref 70–110)
POCT GLUCOSE: 58 MG/DL (ref 70–110)
POCT GLUCOSE: 94 MG/DL (ref 70–110)
POCT GLUCOSE: <20 MG/DL (ref 70–110)
POTASSIUM SERPL-SCNC: 3.4 MMOL/L (ref 3.5–5.1)
PROT SERPL-MCNC: 6.7 G/DL (ref 6–8.4)
PROT UR QL STRIP: NEGATIVE
PROTHROMBIN TIME: 12.1 SEC (ref 9–12.5)
RBC # BLD AUTO: 3.63 M/UL (ref 4.6–6.2)
SODIUM SERPL-SCNC: 143 MMOL/L (ref 136–145)
SP GR UR STRIP: >=1.03 (ref 1–1.03)
TOXICOLOGY INFORMATION: NORMAL
TROPONIN I SERPL DL<=0.01 NG/ML-MCNC: 0.01 NG/ML (ref 0–0.03)
URN SPEC COLLECT METH UR: ABNORMAL
UROBILINOGEN UR STRIP-ACNC: NEGATIVE EU/DL
WBC # BLD AUTO: 2.89 K/UL (ref 3.9–12.7)

## 2024-06-29 PROCEDURE — S5010 5% DEXTROSE AND 0.45% SALINE: HCPCS | Mod: ER | Performed by: EMERGENCY MEDICINE

## 2024-06-29 PROCEDURE — 80053 COMPREHEN METABOLIC PANEL: CPT | Mod: ER | Performed by: EMERGENCY MEDICINE

## 2024-06-29 PROCEDURE — 63700000 PHARM REV CODE 250 ALT 637 W/O HCPCS: Mod: ER | Performed by: EMERGENCY MEDICINE

## 2024-06-29 PROCEDURE — 83690 ASSAY OF LIPASE: CPT | Mod: ER | Performed by: EMERGENCY MEDICINE

## 2024-06-29 PROCEDURE — 83605 ASSAY OF LACTIC ACID: CPT | Mod: ER | Performed by: EMERGENCY MEDICINE

## 2024-06-29 PROCEDURE — 82077 ASSAY SPEC XCP UR&BREATH IA: CPT | Mod: ER | Performed by: EMERGENCY MEDICINE

## 2024-06-29 PROCEDURE — 25000003 PHARM REV CODE 250: Mod: ER

## 2024-06-29 PROCEDURE — 84484 ASSAY OF TROPONIN QUANT: CPT | Mod: ER | Performed by: EMERGENCY MEDICINE

## 2024-06-29 PROCEDURE — 63600175 PHARM REV CODE 636 W HCPCS: Mod: ER | Performed by: EMERGENCY MEDICINE

## 2024-06-29 PROCEDURE — 80307 DRUG TEST PRSMV CHEM ANLYZR: CPT | Mod: ER | Performed by: EMERGENCY MEDICINE

## 2024-06-29 PROCEDURE — 83735 ASSAY OF MAGNESIUM: CPT | Mod: ER | Performed by: EMERGENCY MEDICINE

## 2024-06-29 PROCEDURE — 85025 COMPLETE CBC W/AUTO DIFF WBC: CPT | Mod: ER | Performed by: EMERGENCY MEDICINE

## 2024-06-29 PROCEDURE — 25000003 PHARM REV CODE 250: Performed by: NURSE PRACTITIONER

## 2024-06-29 PROCEDURE — 85730 THROMBOPLASTIN TIME PARTIAL: CPT | Mod: ER | Performed by: EMERGENCY MEDICINE

## 2024-06-29 PROCEDURE — 25000003 PHARM REV CODE 250: Mod: ER | Performed by: EMERGENCY MEDICINE

## 2024-06-29 PROCEDURE — 96374 THER/PROPH/DIAG INJ IV PUSH: CPT | Mod: ER

## 2024-06-29 PROCEDURE — 82962 GLUCOSE BLOOD TEST: CPT | Mod: ER

## 2024-06-29 PROCEDURE — 81003 URINALYSIS AUTO W/O SCOPE: CPT | Mod: ER,59 | Performed by: EMERGENCY MEDICINE

## 2024-06-29 PROCEDURE — 85610 PROTHROMBIN TIME: CPT | Mod: ER | Performed by: EMERGENCY MEDICINE

## 2024-06-29 PROCEDURE — 82140 ASSAY OF AMMONIA: CPT | Mod: ER | Performed by: EMERGENCY MEDICINE

## 2024-06-29 PROCEDURE — 87040 BLOOD CULTURE FOR BACTERIA: CPT | Performed by: EMERGENCY MEDICINE

## 2024-06-29 PROCEDURE — 93010 ELECTROCARDIOGRAM REPORT: CPT | Mod: ,,, | Performed by: STUDENT IN AN ORGANIZED HEALTH CARE EDUCATION/TRAINING PROGRAM

## 2024-06-29 PROCEDURE — 99285 EMERGENCY DEPT VISIT HI MDM: CPT | Mod: 25,ER

## 2024-06-29 PROCEDURE — 25000003 PHARM REV CODE 250: Performed by: INTERNAL MEDICINE

## 2024-06-29 PROCEDURE — 20000000 HC ICU ROOM

## 2024-06-29 PROCEDURE — 25000003 PHARM REV CODE 250: Performed by: EMERGENCY MEDICINE

## 2024-06-29 PROCEDURE — 83036 HEMOGLOBIN GLYCOSYLATED A1C: CPT | Performed by: EMERGENCY MEDICINE

## 2024-06-29 PROCEDURE — 93005 ELECTROCARDIOGRAM TRACING: CPT | Mod: ER

## 2024-06-29 RX ORDER — IBUPROFEN 200 MG
24 TABLET ORAL
Status: DISCONTINUED | OUTPATIENT
Start: 2024-06-29 | End: 2024-07-03 | Stop reason: HOSPADM

## 2024-06-29 RX ORDER — DEXTROSE MONOHYDRATE 100 MG/ML
INJECTION, SOLUTION INTRAVENOUS CONTINUOUS
Status: DISCONTINUED | OUTPATIENT
Start: 2024-06-29 | End: 2024-06-30

## 2024-06-29 RX ORDER — AZITHROMYCIN 250 MG/1
500 TABLET, FILM COATED ORAL
Status: COMPLETED | OUTPATIENT
Start: 2024-06-29 | End: 2024-06-29

## 2024-06-29 RX ORDER — VERAPAMIL HYDROCHLORIDE 240 MG/1
240 TABLET, FILM COATED, EXTENDED RELEASE ORAL DAILY
Status: DISCONTINUED | OUTPATIENT
Start: 2024-06-29 | End: 2024-07-03 | Stop reason: HOSPADM

## 2024-06-29 RX ORDER — DEXTROSE MONOHYDRATE 100 MG/ML
INJECTION, SOLUTION INTRAVENOUS CONTINUOUS
Status: DISCONTINUED | OUTPATIENT
Start: 2024-06-29 | End: 2024-06-29

## 2024-06-29 RX ORDER — HYDROCODONE BITARTRATE AND ACETAMINOPHEN 5; 325 MG/1; MG/1
1 TABLET ORAL ONCE
Status: COMPLETED | OUTPATIENT
Start: 2024-06-29 | End: 2024-06-29

## 2024-06-29 RX ORDER — GLUCAGON 1 MG
1 KIT INJECTION
Status: DISCONTINUED | OUTPATIENT
Start: 2024-06-29 | End: 2024-07-03 | Stop reason: HOSPADM

## 2024-06-29 RX ORDER — CHLORDIAZEPOXIDE HYDROCHLORIDE 5 MG/1
10 CAPSULE, GELATIN COATED ORAL
Status: DISCONTINUED | OUTPATIENT
Start: 2024-06-29 | End: 2024-07-03 | Stop reason: HOSPADM

## 2024-06-29 RX ORDER — IBUPROFEN 200 MG
16 TABLET ORAL
Status: DISCONTINUED | OUTPATIENT
Start: 2024-06-29 | End: 2024-07-03 | Stop reason: HOSPADM

## 2024-06-29 RX ORDER — MUPIROCIN 20 MG/G
OINTMENT TOPICAL 2 TIMES DAILY
Status: DISCONTINUED | OUTPATIENT
Start: 2024-06-29 | End: 2024-07-03 | Stop reason: HOSPADM

## 2024-06-29 RX ORDER — SODIUM CHLORIDE 0.9 % (FLUSH) 0.9 %
10 SYRINGE (ML) INJECTION
Status: DISCONTINUED | OUTPATIENT
Start: 2024-06-29 | End: 2024-07-03 | Stop reason: HOSPADM

## 2024-06-29 RX ORDER — LOSARTAN POTASSIUM 50 MG/1
100 TABLET ORAL DAILY
Status: DISCONTINUED | OUTPATIENT
Start: 2024-06-29 | End: 2024-07-03 | Stop reason: HOSPADM

## 2024-06-29 RX ORDER — DEXTROSE MONOHYDRATE AND SODIUM CHLORIDE 5; .45 G/100ML; G/100ML
1000 INJECTION, SOLUTION INTRAVENOUS
Status: COMPLETED | OUTPATIENT
Start: 2024-06-29 | End: 2024-06-29

## 2024-06-29 RX ORDER — PREGABALIN 25 MG/1
75 CAPSULE ORAL 2 TIMES DAILY
Status: DISCONTINUED | OUTPATIENT
Start: 2024-06-29 | End: 2024-07-03 | Stop reason: HOSPADM

## 2024-06-29 RX ADMIN — LOSARTAN POTASSIUM 100 MG: 50 TABLET, FILM COATED ORAL at 05:06

## 2024-06-29 RX ADMIN — VERAPAMIL HYDROCHLORIDE 240 MG: 240 TABLET ORAL at 05:06

## 2024-06-29 RX ADMIN — MUPIROCIN: 20 OINTMENT TOPICAL at 10:06

## 2024-06-29 RX ADMIN — PREGABALIN 75 MG: 75 CAPSULE ORAL at 05:06

## 2024-06-29 RX ADMIN — Medication 16 G: at 10:06

## 2024-06-29 RX ADMIN — CEFTRIAXONE 1 G: 1 INJECTION, POWDER, FOR SOLUTION INTRAMUSCULAR; INTRAVENOUS at 08:06

## 2024-06-29 RX ADMIN — CHLORDIAZEPOXIDE HYDROCHLORIDE 10 MG: 5 CAPSULE ORAL at 10:06

## 2024-06-29 RX ADMIN — DEXTROSE AND SODIUM CHLORIDE 1000 ML: 5; 450 INJECTION, SOLUTION INTRAVENOUS at 06:06

## 2024-06-29 RX ADMIN — DEXTROSE MONOHYDRATE: 100 INJECTION, SOLUTION INTRAVENOUS at 05:06

## 2024-06-29 RX ADMIN — DEXTROSE MONOHYDRATE 25 G: 25 INJECTION, SOLUTION INTRAVENOUS at 08:06

## 2024-06-29 RX ADMIN — DEXTROSE MONOHYDRATE 25 G: 25 INJECTION, SOLUTION INTRAVENOUS at 01:06

## 2024-06-29 RX ADMIN — HYDROCODONE BITARTRATE AND ACETAMINOPHEN 1 TABLET: 5; 325 TABLET ORAL at 10:06

## 2024-06-29 RX ADMIN — AZITHROMYCIN 500 MG: 250 TABLET, FILM COATED ORAL at 08:06

## 2024-06-29 RX ADMIN — DEXTROSE MONOHYDRATE: 100 INJECTION, SOLUTION INTRAVENOUS at 01:06

## 2024-06-29 NOTE — ED PROVIDER NOTES
Encounter Date: 6/29/2024       History     Chief Complaint   Patient presents with    Hypoglycemia     Found unresponsive by brother with CBG 24. EMS gave 25g of Dextrose and 250mL bag D 10. Pt now AAO x4. Pt states he took 10 units of insulin at 10PM last night.      Complex past medical history including alcohol abuse, pancreatitis, diabetes, TIA, multiple others as listed.  He has apparently been drinking recently, details unknown.  He apparently was found at home unresponsive on the floor after his brother heard a thump with a presumed fall, no immediate evidence of trauma.  His blood sugar on scene was found to be in the 20s, responded to the low 100s with IV dextrose and improvement mental status.  On arrival he is in no distress, slightly confused, gives some degree of history but can not be judged fully reliable, noted hypothermic and hypertensive.  He denies headache, injury, chest pain, vomiting, fever, dysuria, or other specific complaints.  It is not entirely clear how much alcohol he has been drinking, how much food he has been eating, or what he has been taking with respect to medication.  He denies illicit drugs.  He does report some degree of nausea and diarrhea, denies head pain, neck pain, abdominal pain, back pain, pelvic pain, or other specific discomfort.  He does not report any awareness recent infectious symptoms.  Aggressive evaluation, resuscitation, and workup initiated immediately on arrival.    The history is provided by the EMS personnel and the patient. The history is limited by the condition of the patient. No  was used.     Review of patient's allergies indicates:  No Known Allergies  Past Medical History:   Diagnosis Date    Abscess 9/5/2018    Cataracts, bilateral     Chest pain 12/6/2018    Dehydration     Diabetes mellitus, type 2     Diagnosed 7/2016    Folliculitis 9/15/2017    GERD (gastroesophageal reflux disease)     Hypertension     Inflammatory polyps  of colon     Pancreatitis, alcoholic, acute     Port catheter in place     TIA (transient ischemic attack)     Tinea cruris 9/15/2017     Past Surgical History:   Procedure Laterality Date    COLONOSCOPY      COLONOSCOPY N/A 6/15/2018    Procedure: COLONOSCOPY;  Surgeon: Jim Hicks MD;  Location: [facility] ENDO;  Service: Endoscopy;  Laterality: N/A;    ESOPHAGOGASTRODUODENOSCOPY N/A 6/15/2018    Procedure: ESOPHAGOGASTRODUODENOSCOPY (EGD);  Surgeon: Jim Hicks MD;  Location: [facility] ENDO;  Service: Endoscopy;  Laterality: N/A;    ROBOT-ASSISTED LAPAROSCOPIC REPAIR OF INGUINAL HERNIA USING DA JANTE XI Bilateral 11/30/2022    Procedure: XI ROBOTIC REPAIR, HERNIA, INGUINAL;  Surgeon: Smooth Denny MD;  Location: [facility] OR;  Service: General;  Laterality: Bilateral;     Family History   Problem Relation Name Age of Onset    Hypertension Mother      Hypertension Father       Social History     Tobacco Use    Smoking status: Former     Types: Cigarettes    Smokeless tobacco: Never   Substance Use Topics    Alcohol use: Yes     Comment:  Daily - States that he drinks 1/2 pint of vodka and about 1-2 cans of beer daily    Drug use: No     Review of Systems   Constitutional:  Negative for fever.   Respiratory:  Negative for cough and shortness of breath.    Cardiovascular:  Negative for chest pain.   Gastrointestinal:  Positive for diarrhea and nausea. Negative for vomiting.   Genitourinary:  Negative for dysuria and urgency.   Neurological:  Positive for weakness. Negative for headaches.       Physical Exam     Initial Vitals   BP Pulse Resp Temp SpO2   06/29/24 0609 06/29/24 0609 06/29/24 0609 06/29/24 0611 06/29/24 0609   (!) 151/97 67 18 (!) 93.6 °F (34.2 °C) 99 %      MAP       --                Physical Exam    Nursing note and vitals reviewed.  Constitutional: He appears well-developed and well-nourished. He is not diaphoretic. No distress.   Hypothermic by core temperature, no distress, mildly hypertensive, vital  signs otherwise unremarkable.  Skin normal to touch in terms of temperature, perfusion, turgor.  No diaphoresis.   HENT:   Head: Normocephalic and atraumatic.   Mouth/Throat: Oropharynx is clear and moist. No oropharyngeal exudate.   Eyes: Conjunctivae and EOM are normal. Pupils are equal, round, and reactive to light. Right eye exhibits no discharge. Left eye exhibits no discharge. No scleral icterus.   Neck: Neck supple. No thyromegaly present. No tracheal deviation present. No JVD present.   Normal range of motion.  Cardiovascular:  Normal rate, regular rhythm and normal heart sounds.     Exam reveals no gallop and no friction rub.       No murmur heard.  Pulmonary/Chest: Breath sounds normal. No respiratory distress. He has no wheezes. He has no rhonchi. He has no rales. He exhibits no tenderness.   Abdominal: Abdomen is soft. Bowel sounds are normal. He exhibits no distension and no mass. There is no abdominal tenderness. There is no rebound and no guarding.   Musculoskeletal:         General: No tenderness or edema. Normal range of motion.      Cervical back: Normal range of motion and neck supple.     Lymphadenopathy:     He has no cervical adenopathy.   Neurological: He is alert and oriented to person, place, and time. He has normal strength. No cranial nerve deficit.   Slightly disoriented at times but no lateralizing findings   Skin: Skin is warm and dry. No rash noted. No erythema.   Psychiatric: He has a normal mood and affect. His behavior is normal. Thought content normal.   Poor insight and judgment with respect to alcohol use but no acute findings         ED Course   Critical Care    Date/Time: 6/29/2024 6:39 AM    Performed by: Nilesh Stephens MD  Authorized by: Nilesh Stephens MD  Direct patient critical care time: 10 minutes  Additional history critical care time: 10 minutes  Ordering / reviewing critical care time: 10 minutes  Documentation critical care time: 10 minutes  Consulting other  physicians critical care time: 5 minutes  Total critical care time (exclusive of procedural time) : 45 minutes  Critical care time was exclusive of separately billable procedures and treating other patients and teaching time.  Critical care was necessary to treat or prevent imminent or life-threatening deterioration of the following conditions: endocrine crisis and metabolic crisis.  Critical care was time spent personally by me on the following activities: development of treatment plan with patient or surrogate, evaluation of patient's response to treatment, examination of patient, obtaining history from patient or surrogate, ordering and review of laboratory studies, re-evaluation of patient's condition, review of old charts, pulse oximetry and ordering and review of radiographic studies.        Labs Reviewed   CBC W/ AUTO DIFFERENTIAL - Abnormal; Notable for the following components:       Result Value    WBC 2.89 (*)     RBC 3.63 (*)     Hemoglobin 11.1 (*)     Hematocrit 34.2 (*)     Gran # (ANC) 1.5 (*)     Mono # 0.2 (*)     All other components within normal limits   COMPREHENSIVE METABOLIC PANEL - Abnormal; Notable for the following components:    Potassium 3.4 (*)     All other components within normal limits   ALCOHOL,MEDICAL (ETHANOL) - Abnormal; Notable for the following components:    Alcohol, Serum 174 (*)     All other components within normal limits   POCT GLUCOSE - Abnormal; Notable for the following components:    POCT Glucose 53 (*)     All other components within normal limits   POCT GLUCOSE - Abnormal; Notable for the following components:    POCT Glucose 160 (*)     All other components within normal limits   POCT GLUCOSE - Abnormal; Notable for the following components:    POCT Glucose <20 (*)     All other components within normal limits   CULTURE, BLOOD   CULTURE, BLOOD   LACTIC ACID, PLASMA   PROTIME-INR   APTT   TROPONIN I   LIPASE   AMMONIA   MAGNESIUM   URINALYSIS, REFLEX TO URINE  CULTURE   DRUG SCREEN PANEL, URINE EMERGENCY   HEMOGLOBIN A1C   POCT GLUCOSE   POCT GLUCOSE MONITORING CONTINUOUS     EKG Readings: (Independently Interpreted)   Initial Reading: No STEMI. Rhythm: Normal Sinus Rhythm. Ectopy: No Ectopy.   Normal sinus rhythm at 65 beats per minute, slow anterior R-wave progression suggesting possible old anteroseptal infarction.  Overall minimal interval change from previous.  No acute concerning change.       Imaging Results              X-Ray Chest AP Portable (Final result)  Result time 06/29/24 08:07:39      Final result by Keenan Dee MD (06/29/24 08:07:39)                   Impression:      1.  Low lung volumes with vascular crowding or atelectasis in the lung bases.  Mild interstitial pulmonary edema or interstitial infectious process difficult to exclude in the right clinical setting.  Lungs are otherwise clear.    2.  Stable findings as noted above.      Electronically signed by: Keenan Dee MD  Date:    06/29/2024  Time:    08:07               Narrative:    EXAMINATION:  XR CHEST AP PORTABLE    CLINICAL HISTORY:  Chest Pain;    COMPARISON:  May 22, 2024, as well as April 21, 2020    FINDINGS:  EKG leads overlie the chest, which is lordotic in position.  Low lung volumes with vascular crowding or atelectasis throughout the right greater than left lung bases.  The upper lungs are clear. The cardiac silhouette size is normal. The trachea is midline and the mediastinal width is normal. Negative for effusion or pneumothorax.  Pulmonary vasculature is normal. Negative for osseous abnormalities. Ectatic and tortuous aorta.  Degenerative changes of the spine and both shoulder girdles with convex-left curvature of the midthoracic spine.                                       CT CERVICAL SPINE WITHOUT CONTRAST (Final result)  Result time 06/29/24 07:58:03      Final result by Keenan Dee MD (06/29/24 07:58:03)                   Impression:      1.  Negative CT scan of the  cervical spine. No evidence of fracture or subluxation.    2.  Nonemergent findings as described above.    All CT scans at this facility are performed  using dose modulation techniques as appropriate to performed exam including the following:  automated exposure control; adjustment of mA and/or kV according to the patients size (this includes techniques or standardized protocols for targeted exams where dose is matched to indication/reason for exam: i.e. extremities or head);  iterative reconstruction technique.      Electronically signed by: Keenan Dee MD  Date:    06/29/2024  Time:    07:58               Narrative:    EXAMINATION:  CT CERVICAL SPINE WITHOUT CONTRAST    CLINICAL HISTORY:  Neck trauma, intoxicated or obtunded (Age >= 16y);    TECHNIQUE:  Axial noncontrast CT scan of the cervical spine with sagittal and coronal reconstructions.    COMPARISON:  No comparison studies are available.    FINDINGS:  The cervical vertebrae reveal normal alignment, shape and bone density. The cervical vertebra are intact without evidence of fracture or dislocation.  Mild degenerative changes between the anterior arch of C1 and the dens.  Multilevel marginal spondylosis.  The thecal sac and nerve roots are patent.    Bilateral carotid artery calcifications.  Bruit?  The surrounding neck soft tissues are otherwise normal.    Right upper lobe scarring.  The lung apices are otherwise clear.  The thyroid gland is normal.  The visualized portions of the brain and posterior fossa is normal.                                       CT Head Without Contrast (Final result)  Result time 06/29/24 07:51:02      Final result by Keenan Dee MD (06/29/24 07:51:02)                   Impression:      1.  Negative for acute intracranial process, considering there is motion artifact on a number of images and subtle abnormalities could be overlooked.  Negative for hemorrhage, or skull fracture.    2.  Cerebral atrophy noted.  Intracranial  atherosclerotic disease noted.  Small vessel ischemic changes noted.    3.  Patchy ethmoid air cell disease.    All CT scans at this facility are performed  using dose modulation techniques as appropriate to performed exam including the following:  automated exposure control; adjustment of mA and/or kV according to the patients size (this includes techniques or standardized protocols for targeted exams where dose is matched to indication/reason for exam: i.e. extremities or head);  iterative reconstruction technique.      Electronically signed by: Keenan Dee MD  Date:    06/29/2024  Time:    07:51               Narrative:    EXAMINATION:  CT HEAD WITHOUT CONTRAST    CLINICAL HISTORY:  Head trauma, abnormal mental status (Age 19-64y);    TECHNIQUE:  Axial images through the brain and posterior fossa were obtained without the use of IV contrast.  Sagittal and coronal reconstructions are provided for review.  Motion artifact is present on a number of images, despite repeat imaging.  Subtle abnormalities could be overlooked.    COMPARISON:  Studies dating back to July 19, 2018    FINDINGS:  The ventricles are midline and the CSF spaces are trauma.  The gray-white matter junction is well preserved. Negative for intracranial vascular abnormalities. Negative for mass, mass effect, cerebral edema, hemorrhage or abnormal fluid collections.  Intracranial atherosclerotic disease noted.  Small vessel ischemic changes noted.    The skull and scalp are  intact.  Minimal patchy ethmoid air cell disease.  The rest of the paranasal sinuses, mastoid air cells, middle ears and ear canals are clear. The globes are intact.                                       Medications   dextrose 50% injection 25 g (has no administration in time range)   dextrose 10 % infusion (has no administration in time range)   dextrose 50% injection (has no administration in time range)   dextrose 5 % and 0.45 % NaCl infusion (1,000 mLs Intravenous New Bag  6/29/24 0646)   dextrose 50% injection 25 g (25 g Intravenous Given 6/29/24 0822)   cefTRIAXone (Rocephin) 1 g in D5W 100 mL IVPB (MB+) (0 g Intravenous Stopped 6/29/24 0928)   azithromycin tablet 500 mg (500 mg Oral Given 6/29/24 0855)       8:05 AM Resting comfortably, conversant, vital signs stable.  Data so far are reassuring.    8:08 AM 's --> 50's associated with interruption of D5 1/2NS for CT. Resuming infusion, giving additional IV dextrose.    8:21 AM Given hypothermia and borderline chest film findings, begin antibiotics.  Consulting Hospital Medicine.    1:16 PM Hypothermia resolved.  Has generally done well, up and about in the emergency department, but even with D5 1/2NS running he had symptomatic hypoglycemia recurrence; increasing rate of IV dextrose.    Medical Decision Making  Amount and/or Complexity of Data Reviewed  Labs: ordered. Decision-making details documented in ED Course.  Radiology: ordered. Decision-making details documented in ED Course.  ECG/medicine tests: ordered and independent interpretation performed. Decision-making details documented in ED Course.    Risk  Prescription drug management.  Decision regarding hospitalization.      Additional MDM:   Differential Diagnosis:   Insulin reaction, alcohol abuse, medication noncompliance among many others                                    Clinical Impression:  Final diagnoses:  [R53.1] Weakness  [E16.2] Hypoglycemia (Primary)  [T68.XXXA] Hypothermia, initial encounter  [F10.10] Alcohol abuse  [F10.929] Alcoholic intoxication with complication  [J18.9] Community acquired pneumonia, unspecified laterality          ED Disposition Condition    Admit Stable                Nilesh Stephens MD  06/29/24 0853       Nilesh Stephens MD  06/29/24 3116

## 2024-06-29 NOTE — HPI
58-year-old male with a known past medical history of alcohol abuse, pancreatitis, diabetes, TIA, hypertension, and hyperlipidemia that presented to the [facility] ER 6/29 after he was found at home unresponsive on the floor shortly after his brother heard a thump after a presumed fall.  Patient was blood glucose on scene was found to be in the 20s which he responded to dextrose with improved mental status.  In the ER patient was noted to be hypothermic and hypotensive.  Workup in the ER revealed alcohol level of 174 with rest of lab work essentially unremarkable.  Imaging unremarkable.  Patient was started on D5 infusion for persistent hypoglycemia, but eventually required D10 infusion prompting consultation to the ICU team admission to the ICU and [place].

## 2024-06-29 NOTE — H&P
[facility] - Intensive Care ([facility])  Critical Care - Medicine  History & Physical    Patient Name: Jorgito Arreaga  MRN: [id number]  Admission Date: 6/29/2024  Hospital Length of Stay: 0 days  Code Status: Prior  Attending Physician: Parker Gallagher  Primary Care Provider: Kirsten Queen NP   Principal Problem: Hypoglycemia    Subjective:     HPI:  58-year-old male with a known past medical history of alcohol abuse, pancreatitis, diabetes, TIA, hypertension, and hyperlipidemia that presented to the [facility] ER 6/29 after he was found at home unresponsive on the floor shortly after his brother heard a thump after a presumed fall.  Patient was blood glucose on scene was found to be in the 20s which he responded to dextrose with improved mental status.  In the ER patient was noted to be hypothermic and hypotensive.  Workup in the ER revealed alcohol level of 174 with rest of lab work essentially unremarkable.  Imaging unremarkable.  Patient was started on D5 infusion for persistent hypoglycemia, but eventually required D10 infusion prompting consultation to the ICU team admission to the ICU and [place].     On arrival to ICU he is wide awake and complains of being hungry and his longstanding bilateral foot neuropathy bothering him. Denies dyspnea, chest pain, fever, chills and nausea at present. Denies abdominal pain.       Past Medical History:   Diagnosis Date    Abscess 9/5/2018    Cataracts, bilateral     Chest pain 12/6/2018    Dehydration     Diabetes mellitus, type 2     Diagnosed 7/2016    Folliculitis 9/15/2017    GERD (gastroesophageal reflux disease)     Hypertension     Inflammatory polyps of colon     Pancreatitis, alcoholic, acute     Port catheter in place     TIA (transient ischemic attack)     Tinea cruris 9/15/2017       Past Surgical History:   Procedure Laterality Date    COLONOSCOPY      COLONOSCOPY N/A 6/15/2018    Procedure: COLONOSCOPY;  Surgeon: Jim Hicks MD;  Location: [facility];   Service: Endoscopy;  Laterality: N/A;    ESOPHAGOGASTRODUODENOSCOPY N/A 6/15/2018    Procedure: ESOPHAGOGASTRODUODENOSCOPY (EGD);  Surgeon: Jim Hicks MD;  Location: [facility];  Service: Endoscopy;  Laterality: N/A;    ROBOT-ASSISTED LAPAROSCOPIC REPAIR OF INGUINAL HERNIA USING DA JANET XI Bilateral 11/30/2022    Procedure: XI ROBOTIC REPAIR, HERNIA, INGUINAL;  Surgeon: Smooth Denny MD;  Location: [facility] OR;  Service: General;  Laterality: Bilateral;       Review of patient's allergies indicates:  No Known Allergies    Family History       Problem Relation (Age of Onset)    Hypertension Mother, Father          Tobacco Use    Smoking status: Former     Types: Cigarettes    Smokeless tobacco: Never   Substance and Sexual Activity    Alcohol use: Yes     Comment:  Daily - States that he drinks 1/2 pint of vodka and about 1-2 cans of beer daily    Drug use: No    Sexual activity: Yes     Partners: Female      Review of Systems as per hPI otherwise negative    Objective:     Vital Signs (Most Recent):  Temp: 98.4 °F (36.9 °C) (06/29/24 1705)  Pulse: 91 (06/29/24 1705)  Resp: (!) 33 (06/29/24 1705)  BP: (!) 156/118 (06/29/24 1705)  SpO2: 100 % (06/29/24 1705) Vital Signs (24h Range):  Temp:  [93.6 °F (34.2 °C)-98.4 °F (36.9 °C)] 98.4 °F (36.9 °C)  Pulse:  [65-96] 91  Resp:  [12-33] 33  SpO2:  [95 %-100 %] 100 %  BP: (132-169)/() 156/118     Weight: 66.7 kg (147 lb)  Body mass index is 23.73 kg/m².      Intake/Output Summary (Last 24 hours) at 6/29/2024 1719  Last data filed at 6/29/2024 1705  Gross per 24 hour   Intake 512.5 ml   Output --   Net 512.5 ml       Physical Exam  Vitals and nursing note reviewed.   Constitutional:       General: He is not in acute distress.  HENT:      Head: Normocephalic.      Mouth/Throat:      Mouth: Mucous membranes are moist.   Cardiovascular:      Rate and Rhythm: Normal rate and regular rhythm.   Pulmonary:      Effort: Pulmonary effort is normal.      Breath sounds: Normal  breath sounds.   Abdominal:      General: Abdomen is flat. Bowel sounds are normal.      Palpations: Abdomen is soft.      Tenderness: There is no abdominal tenderness.   Neurological:      General: No focal deficit present.      Mental Status: He is alert and oriented to person, place, and time.   Psychiatric:         Mood and Affect: Mood normal.           Vents:       Lines/Drains/Airways       Peripheral Intravenous Line  Duration                  Peripheral IV - Single Lumen 06/29/24 0616 20 G Right Antecubital <1 day         Peripheral IV - Single Lumen 06/29/24 0631 16 G Anterior;Distal;Left Upper Arm <1 day                    Significant Labs:    CBC/Anemia Profile:  Recent Labs   Lab 06/29/24  0635   WBC 2.89*   HGB 11.1*   HCT 34.2*      MCV 94   RDW 11.9        Chemistries:  Recent Labs   Lab 06/29/24  0635      K 3.4*      CO2 26   BUN 11   CREATININE 0.9   CALCIUM 8.7   ALBUMIN 3.8   PROT 6.7   BILITOT 0.6   ALKPHOS 66   ALT 20   AST 26   MG 2.1         Assessment/Plan:     Active Diagnoses:    Diagnosis Date Noted POA    PRINCIPAL PROBLEM:  Hypoglycemia [E16.2] 06/29/2024 Yes    Acute metabolic encephalopathy [G93.41] 06/29/2024 Yes    Type 2 diabetes mellitus with hyperglycemia, with long-term current use of insulin [E11.65, Z79.4] 01/05/2023 Not Applicable    Essential hypertension [I10] 03/30/2019 Yes    Hyperlipidemia [E78.5] 03/30/2019 Yes    Alcohol abuse [F10.10] 06/06/2016 Yes     Chronic      Problems Resolved During this Admission:     - IV Fluids  - Monitor glucose hourly  - PO diet  - D/C D10 once glucose stable  - Restart home meds  - Add lyrica for neuropathy  - No evidence for pancreatitis at this time  - monitor UOP  - Supportive care       Critical care was time spent personally by me on the following activities: development of treatment plan with patient or surrogate and bedside caregivers, discussions with consultants, evaluation of patient's response to treatment,  examination of patient, ordering and performing treatments and interventions, ordering and review of laboratory studies, ordering and review of radiographic studies, pulse oximetry, re-evaluation of patient's condition.  This critical care time did not overlap with that of any other provider or involve time for any procedures.     Parker Gallagher MD  Critical Care - Medicine  Pending sale to [facility] - Intensive [facility])

## 2024-06-29 NOTE — PLAN OF CARE
Pt Aaox4. NSR on monitor. BP elevated - home meds reordered by MD. Afebrile. Room air. Tolerating regular diet. Voids without difficulty. PIVs intact. D10 @150ml/hr. Accuchecks Q1H. Bed low, wheels locked, alarms audible. POC reviewed.

## 2024-06-30 LAB
ALBUMIN SERPL BCP-MCNC: 3.5 G/DL (ref 3.5–5.2)
ALP SERPL-CCNC: 75 U/L (ref 55–135)
ALT SERPL W/O P-5'-P-CCNC: 26 U/L (ref 10–44)
ANION GAP SERPL CALC-SCNC: 7 MMOL/L (ref 8–16)
AST SERPL-CCNC: 70 U/L (ref 10–40)
BILIRUB SERPL-MCNC: 1 MG/DL (ref 0.1–1)
BUN SERPL-MCNC: 7 MG/DL (ref 6–20)
CALCIUM SERPL-MCNC: 8.9 MG/DL (ref 8.7–10.5)
CHLORIDE SERPL-SCNC: 104 MMOL/L (ref 95–110)
CO2 SERPL-SCNC: 25 MMOL/L (ref 23–29)
CREAT SERPL-MCNC: 0.8 MG/DL (ref 0.5–1.4)
EST. GFR  (NO RACE VARIABLE): >60 ML/MIN/1.73 M^2
GLUCOSE SERPL-MCNC: 188 MG/DL (ref 70–110)
MAGNESIUM SERPL-MCNC: 1.7 MG/DL (ref 1.6–2.6)
OHS QRS DURATION: 94 MS
OHS QTC CALCULATION: 497 MS
PHOSPHATE SERPL-MCNC: 3.3 MG/DL (ref 2.7–4.5)
POCT GLUCOSE: 104 MG/DL (ref 70–110)
POCT GLUCOSE: 116 MG/DL (ref 70–110)
POCT GLUCOSE: 139 MG/DL (ref 70–110)
POCT GLUCOSE: 158 MG/DL (ref 70–110)
POCT GLUCOSE: 164 MG/DL (ref 70–110)
POCT GLUCOSE: 172 MG/DL (ref 70–110)
POCT GLUCOSE: 181 MG/DL (ref 70–110)
POCT GLUCOSE: 188 MG/DL (ref 70–110)
POCT GLUCOSE: 192 MG/DL (ref 70–110)
POCT GLUCOSE: 193 MG/DL (ref 70–110)
POCT GLUCOSE: 21 MG/DL (ref 70–110)
POCT GLUCOSE: 351 MG/DL (ref 70–110)
POCT GLUCOSE: 46 MG/DL (ref 70–110)
POCT GLUCOSE: 66 MG/DL (ref 70–110)
POCT GLUCOSE: 66 MG/DL (ref 70–110)
POCT GLUCOSE: 68 MG/DL (ref 70–110)
POTASSIUM SERPL-SCNC: 4.1 MMOL/L (ref 3.5–5.1)
PROT SERPL-MCNC: 6.2 G/DL (ref 6–8.4)
SODIUM SERPL-SCNC: 136 MMOL/L (ref 136–145)

## 2024-06-30 PROCEDURE — 63600175 PHARM REV CODE 636 W HCPCS: Performed by: INTERNAL MEDICINE

## 2024-06-30 PROCEDURE — 80053 COMPREHEN METABOLIC PANEL: CPT | Performed by: NURSE PRACTITIONER

## 2024-06-30 PROCEDURE — 25000003 PHARM REV CODE 250: Performed by: INTERNAL MEDICINE

## 2024-06-30 PROCEDURE — 36415 COLL VENOUS BLD VENIPUNCTURE: CPT | Performed by: NURSE PRACTITIONER

## 2024-06-30 PROCEDURE — 83735 ASSAY OF MAGNESIUM: CPT | Performed by: NURSE PRACTITIONER

## 2024-06-30 PROCEDURE — 25000003 PHARM REV CODE 250: Performed by: NURSE PRACTITIONER

## 2024-06-30 PROCEDURE — 11000001 HC ACUTE MED/SURG PRIVATE ROOM

## 2024-06-30 PROCEDURE — 63600175 PHARM REV CODE 636 W HCPCS: Performed by: NURSE PRACTITIONER

## 2024-06-30 PROCEDURE — 84100 ASSAY OF PHOSPHORUS: CPT | Performed by: NURSE PRACTITIONER

## 2024-06-30 RX ORDER — INSULIN ASPART 100 [IU]/ML
0-10 INJECTION, SOLUTION INTRAVENOUS; SUBCUTANEOUS EVERY 6 HOURS PRN
Status: DISCONTINUED | OUTPATIENT
Start: 2024-06-30 | End: 2024-06-30

## 2024-06-30 RX ORDER — FOLIC ACID 1 MG/1
1 TABLET ORAL DAILY
Status: DISCONTINUED | OUTPATIENT
Start: 2024-06-30 | End: 2024-07-03 | Stop reason: HOSPADM

## 2024-06-30 RX ORDER — HYDRALAZINE HYDROCHLORIDE 20 MG/ML
10 INJECTION INTRAMUSCULAR; INTRAVENOUS EVERY 6 HOURS PRN
Status: DISCONTINUED | OUTPATIENT
Start: 2024-06-30 | End: 2024-07-03 | Stop reason: HOSPADM

## 2024-06-30 RX ORDER — THIAMINE HCL 100 MG
100 TABLET ORAL DAILY
Status: DISCONTINUED | OUTPATIENT
Start: 2024-06-30 | End: 2024-07-03 | Stop reason: HOSPADM

## 2024-06-30 RX ORDER — DEXTROSE MONOHYDRATE 50 MG/ML
INJECTION, SOLUTION INTRAVENOUS CONTINUOUS
Status: DISCONTINUED | OUTPATIENT
Start: 2024-06-30 | End: 2024-07-01

## 2024-06-30 RX ORDER — HYDROCODONE BITARTRATE AND ACETAMINOPHEN 5; 325 MG/1; MG/1
1 TABLET ORAL EVERY 4 HOURS PRN
Status: DISCONTINUED | OUTPATIENT
Start: 2024-06-30 | End: 2024-07-03 | Stop reason: HOSPADM

## 2024-06-30 RX ADMIN — DEXTROSE MONOHYDRATE: 50 INJECTION, SOLUTION INTRAVENOUS at 08:06

## 2024-06-30 RX ADMIN — PREGABALIN 75 MG: 75 CAPSULE ORAL at 08:06

## 2024-06-30 RX ADMIN — INSULIN ASPART 10 UNITS: 100 INJECTION, SOLUTION INTRAVENOUS; SUBCUTANEOUS at 05:06

## 2024-06-30 RX ADMIN — HYDROCODONE BITARTRATE AND ACETAMINOPHEN 1 TABLET: 5; 325 TABLET ORAL at 09:06

## 2024-06-30 RX ADMIN — LOSARTAN POTASSIUM 100 MG: 50 TABLET, FILM COATED ORAL at 08:06

## 2024-06-30 RX ADMIN — CHLORDIAZEPOXIDE HYDROCHLORIDE 10 MG: 5 CAPSULE ORAL at 09:06

## 2024-06-30 RX ADMIN — Medication 100 MG: at 08:06

## 2024-06-30 RX ADMIN — VERAPAMIL HYDROCHLORIDE 240 MG: 240 TABLET ORAL at 08:06

## 2024-06-30 RX ADMIN — HYDROCODONE BITARTRATE AND ACETAMINOPHEN 1 TABLET: 5; 325 TABLET ORAL at 05:06

## 2024-06-30 RX ADMIN — FOLIC ACID 1 MG: 1 TABLET ORAL at 08:06

## 2024-06-30 RX ADMIN — Medication 16 G: at 08:06

## 2024-06-30 RX ADMIN — Medication 24 G: at 07:06

## 2024-06-30 RX ADMIN — CHLORDIAZEPOXIDE HYDROCHLORIDE 10 MG: 5 CAPSULE ORAL at 03:06

## 2024-06-30 RX ADMIN — HYDROCODONE BITARTRATE AND ACETAMINOPHEN 1 TABLET: 5; 325 TABLET ORAL at 11:06

## 2024-06-30 RX ADMIN — Medication 16 G: at 09:06

## 2024-06-30 RX ADMIN — MUPIROCIN: 20 OINTMENT TOPICAL at 08:06

## 2024-06-30 RX ADMIN — CHLORDIAZEPOXIDE HYDROCHLORIDE 10 MG: 5 CAPSULE ORAL at 05:06

## 2024-06-30 RX ADMIN — HYDRALAZINE HYDROCHLORIDE 10 MG: 20 INJECTION, SOLUTION INTRAMUSCULAR; INTRAVENOUS at 03:06

## 2024-06-30 RX ADMIN — CHLORDIAZEPOXIDE HYDROCHLORIDE 10 MG: 5 CAPSULE ORAL at 11:06

## 2024-06-30 NOTE — HPI
"The patient is a 59 yo male with past medical hsitory of alcohol abuse, diabetes, hypertension, pancreatitis and TIA who presented to [facility] ED after being found down. His brother heard a thud and found him unresponsive on the floor. EMS was called. His blood sugar was in the 20s. His mentation improved once he was administered dextrose. Workup in the ED revealed elevated alcohol level and hypoglycemia. He was noted to be hypothermic and hypotensive. He was started on D5 but hypoglycemia persisted. He was transitioned to D10 with improvement in his blood sugars. He was admitted to the ICU. He was given diet and continued on D10 overnight. He was able to maintain blood sugar off of D10. He was deemed stable for transfer to the floor. Hospital medicine was consulted. Patient reports his neuropathy is 'acting up". He is having pain from the knees down. Pins and needle pricks as well as shooting pain. He was on gabapentin but that has not helped much.  "

## 2024-06-30 NOTE — SUBJECTIVE & OBJECTIVE
Past Medical History:   Diagnosis Date    Abscess 9/5/2018    Cataracts, bilateral     Chest pain 12/6/2018    Dehydration     Diabetes mellitus, type 2     Diagnosed 7/2016    Folliculitis 9/15/2017    GERD (gastroesophageal reflux disease)     Hypertension     Inflammatory polyps of colon     Pancreatitis, alcoholic, acute     Port catheter in place     TIA (transient ischemic attack)     Tinea cruris 9/15/2017       Past Surgical History:   Procedure Laterality Date    COLONOSCOPY      COLONOSCOPY N/A 6/15/2018    Procedure: COLONOSCOPY;  Surgeon: Jim Hicks MD;  Location: [facility] ENDO;  Service: Endoscopy;  Laterality: N/A;    ESOPHAGOGASTRODUODENOSCOPY N/A 6/15/2018    Procedure: ESOPHAGOGASTRODUODENOSCOPY (EGD);  Surgeon: Jim Hicks MD;  Location: [facility] ENDO;  Service: Endoscopy;  Laterality: N/A;    ROBOT-ASSISTED LAPAROSCOPIC REPAIR OF INGUINAL HERNIA USING DA JANET XI Bilateral 11/30/2022    Procedure: XI ROBOTIC REPAIR, HERNIA, INGUINAL;  Surgeon: Smooth Denny MD;  Location: [facility] OR;  Service: General;  Laterality: Bilateral;       Review of patient's allergies indicates:  No Known Allergies    No current facility-administered medications on file prior to encounter.     Current Outpatient Medications on File Prior to Encounter   Medication Sig    aspirin 325 MG tablet Take 1 tablet (325 mg total) by mouth once daily.    atorvastatin (LIPITOR) 40 MG tablet Take 1 tablet (40 mg total) by mouth once daily.    blood sugar diagnostic (ONETOUCH ULTRA BLUE TEST STRIP) Strp USE TO CHECK SUGAR BEFORE MEALS AND AT BEDTIME    clotrimazole-betamethasone 1-0.05% (LOTRISONE) cream Apply topically 2 (two) times daily.    HUMALOG U-100 INSULIN 100 unit/mL injection INJECT 8 UNITS SUBCUTANEOUSLY WITH LARGEST MEAL OF THE DAY, INJECT ADDITIONAL 4 UNITS AT A TIME IF GLUCOSE IS ELEVATED    insulin (LANTUS SOLOSTAR U-100 INSULIN) glargine 100 units/mL SubQ pen INJECT 50 UNITS INTO SKIN EVERY EVENING.    insulin  "syringe-needle,dispos. 1 mL 28 gauge x 1/2" Syrg 1 each by Misc.(Non-Drug; Combo Route) route 2 (two) times daily.    lancets (ONETOUCH DELICA PLUS LANCET) 33 gauge Misc Inject 1 lancet into the skin 4 (four) times daily before meals and nightly.    losartan (COZAAR) 100 MG tablet Take 1 tablet (100 mg total) by mouth once daily. TAKE 1 TABLET BY MOUTH ONCE DAILY    mupirocin (BACTROBAN) 2 % ointment Apply topically 3 (three) times daily.    pen needle, diabetic (BD JOEL 2ND GEN PEN NEEDLE) 32 gauge x 5/32" Ndle Inject 1 each into the skin 4 (four) times daily before meals and nightly.    potassium, sodium phosphates (PHOS-NAK) 280-160-250 mg PwPk Take 1 packet by mouth 4 (four) times daily before meals and nightly.    verapamiL (CALAN-SR) 240 MG CR tablet Take 1 tablet (240 mg total) by mouth once daily.     Family History       Problem Relation (Age of Onset)    Hypertension Mother, Father          Tobacco Use    Smoking status: Former     Types: Cigarettes    Smokeless tobacco: Never   Substance and Sexual Activity    Alcohol use: Yes     Comment:  Daily - States that he drinks 1/2 pint of vodka and about 1-2 cans of beer daily    Drug use: No    Sexual activity: Yes     Partners: Female     Review of Systems   Constitutional:  Positive for activity change.   Neurological:  Positive for numbness.   Psychiatric/Behavioral:  Positive for sleep disturbance.    All other systems reviewed and are negative.    Objective:     Vital Signs (Most Recent):  Temp: 98.4 °F (36.9 °C) (06/30/24 0705)  Pulse: 90 (06/30/24 0927)  Resp: (!) 27 (06/30/24 0942)  BP: (!) 158/95 (06/30/24 0906)  SpO2: 99 % (06/30/24 0927) Vital Signs (24h Range):  Temp:  [97.6 °F (36.4 °C)-98.4 °F (36.9 °C)] 98.4 °F (36.9 °C)  Pulse:  [] 90  Resp:  [13-41] 27  SpO2:  [96 %-100 %] 99 %  BP: (113-217)/() 158/95     Weight: 70 kg (154 lb 5.2 oz)  Body mass index is 24.91 kg/m².     Physical Exam  HENT:      Head: Normocephalic and atraumatic. "   Cardiovascular:      Rate and Rhythm: Normal rate and regular rhythm.      Heart sounds: No murmur heard.  Pulmonary:      Effort: Pulmonary effort is normal. No respiratory distress.      Breath sounds: Normal breath sounds. No wheezing.   Abdominal:      General: Bowel sounds are normal. There is no distension.      Palpations: Abdomen is soft.      Tenderness: There is no abdominal tenderness.   Musculoskeletal:         General: No swelling.   Skin:     General: Skin is warm and dry.   Neurological:      Mental Status: He is alert and oriented to person, place, and time. Mental status is at baseline.          Significant Labs: All pertinent labs within the past 24 hours have been reviewed.  Recent Lab Results  (Last 5 results in the past 24 hours)        06/30/24  0727   06/30/24  0723   06/30/24  0607   06/30/24  0524   06/30/24  0354        Albumin 3.5               ALP 75               ALT 26               Anion Gap 7               AST 70               BILIRUBIN TOTAL 1.0  Comment: For infants and newborns, interpretation of results should be based  on gestational age, weight and in agreement with clinical  observations.    Premature Infant recommended reference ranges:  Up to 24 hours.............<8.0 mg/dL  Up to 48 hours............<12.0 mg/dL  3-5 days..................<15.0 mg/dL  6-29 days.................<15.0 mg/dL                 BUN 7               Calcium 8.9               Chloride 104               CO2 25               Creatinine 0.8               eGFR >60               Glucose 188               Magnesium  1.7               Phosphorus Level 3.3               POCT Glucose   181   104   116   188       Potassium 4.1               PROTEIN TOTAL 6.2               Sodium 136                                      Significant Imaging: I have reviewed all pertinent imaging results/findings within the past 24 hours.

## 2024-06-30 NOTE — PLAN OF CARE
Pt AAOx4. NSR on monitor. BP stable. Afebrile. Room air. Tolerating diet. BG monitored. Voids without difficulty. Pain controlled with PRN medication. PIVs intact. Bed low, wheels locked, alarms audible. POC reviewed.

## 2024-06-30 NOTE — ASSESSMENT & PLAN NOTE
Chronic, . Latest blood pressure and vitals reviewed-     Temp:  [97.6 °F (36.4 °C)-98.4 °F (36.9 °C)]   Pulse:  []   Resp:  [13-41]   BP: (113-217)/()   SpO2:  [96 %-100 %] .   Home meds for hypertension were reviewed and noted below.   Hypertension Medications               losartan (COZAAR) 100 MG tablet Take 1 tablet (100 mg total) by mouth once daily. TAKE 1 TABLET BY MOUTH ONCE DAILY    verapamiL (CALAN-SR) 240 MG CR tablet Take 1 tablet (240 mg total) by mouth once daily.            While in the hospital, will manage blood pressure as follows; Continue home antihypertensive regimen    Will utilize p.r.n. blood pressure medication only if patient's blood pressure greater than SBP > 170 and he develops symptoms such as worsening chest pain or shortness of breath.

## 2024-06-30 NOTE — PLAN OF CARE
AAOX4/VSS. NSR with HTN, prn hydralazine given for SBP>170 with no relief. PRN glucose tablet given for bg< 60 and is now stable. Pt is on room air. 1x dose of norco given for pain relief in legs. Pt voids per urinal/up to toilet independently. Call light is in reach with the bed in the lowest position, with the wheels locked.

## 2024-06-30 NOTE — ASSESSMENT & PLAN NOTE
- off D10 gtt since 4am  - tolerating PO diet  - monitor glucose trends, and resume home DM agents as indicated

## 2024-06-30 NOTE — ASSESSMENT & PLAN NOTE
Patient's FSGs are uncontrolled due to hypoglycemia on current medication regimen.  Last A1c reviewed-   Lab Results   Component Value Date    HGBA1C 7.3 (H) 06/29/2024     Most recent fingerstick glucose reviewed-   Recent Labs   Lab 06/30/24  0354 06/30/24  0524 06/30/24  0607 06/30/24  0723   POCTGLUCOSE 188* 116* 104 181*     Current correctional scale   none  Continue  anti-hyperglycemic dose as follows-   Antihyperglycemics (From admission, onward)      None          Hold Oral hypoglycemics while patient is in the hospital.  Holding insulin as well, plan resume once patient has blood sugar over 200

## 2024-06-30 NOTE — PROGRESS NOTES
[facility])  Critical Care Medicine  Progress Note    Patient Name: Jorgito Arreaga  MRN: [id number]  Admission Date: 6/29/2024  Hospital Length of Stay: 1 days  Code Status: Full Code  Attending Provider: Parker Gallagher MD  Primary Care Provider: Kirsten Queen NP   Principal Problem: Hypoglycemia    Subjective:     HPI:  58-year-old male with a known past medical history of alcohol abuse, pancreatitis, diabetes, TIA, hypertension, and hyperlipidemia that presented to the [facility] ER 6/29 after he was found at home unresponsive on the floor shortly after his brother heard a thump after a presumed fall.  Patient was blood glucose on scene was found to be in the 20s which he responded to dextrose with improved mental status.  In the ER patient was noted to be hypothermic and hypotensive.  Workup in the ER revealed alcohol level of 174 with rest of lab work essentially unremarkable.  Imaging unremarkable.  Patient was started on D5 infusion for persistent hypoglycemia, but eventually required D10 infusion prompting consultation to the ICU team admission to the ICU and [place].    Hospital/ICU Course:  6/30: no acute events overnight, pt continues to c/o BLE neuropathy, tolerating PO, D10 off and glucose trends stable, transfer to the floor today     ROS complete and negative unless stated in the interval HPI     Objective:     Vital Signs (Most Recent):  Temp: 98.4 °F (36.9 °C) (06/30/24 0705)  Pulse: 97 (06/30/24 0735)  Resp: (!) 41 (06/30/24 0735)  BP: (!) 175/98 (06/30/24 0705)  SpO2: 98 % (06/30/24 0735) Vital Signs (24h Range):  Temp:  [95.3 °F (35.2 °C)-98.4 °F (36.9 °C)] 98.4 °F (36.9 °C)  Pulse:  [] 97  Resp:  [13-41] 41  SpO2:  [96 %-100 %] 98 %  BP: (113-217)/() 175/98     Weight: 70 kg (154 lb 5.2 oz)  Body mass index is 24.91 kg/m².      Intake/Output Summary (Last 24 hours) at 6/30/2024 0831  Last data filed at 6/30/2024 0400  Gross per 24 hour   Intake  "1956.79 ml   Output --   Net 1956.79 ml        Physical Exam  Vitals reviewed.   Constitutional:       General: He is awake. He is not in acute distress.     Appearance: He is well-developed. He is not ill-appearing.   Cardiovascular:      Rate and Rhythm: Normal rate.      Pulses:           Radial pulses are 2+ on the right side and 2+ on the left side.   Pulmonary:      Effort: Pulmonary effort is normal.      Breath sounds: Normal breath sounds.      Comments: On RA  Abdominal:      General: There is no distension.      Palpations: Abdomen is soft.   Musculoskeletal:      Right lower leg: No edema.      Left lower leg: No edema.   Skin:     General: Skin is warm and dry.   Neurological:      General: No focal deficit present.      Mental Status: He is alert.   Psychiatric:         Behavior: Behavior is cooperative.              Lines/Drains/Airways       Peripheral Intravenous Line  Duration                  Peripheral IV - Single Lumen 06/29/24 0616 20 G Right Antecubital 1 day         Peripheral IV - Single Lumen 06/29/24 0631 16 G Anterior;Distal;Left Upper Arm 1 day                    Significant Labs:    CBC/Anemia Profile:  Recent Labs   Lab 06/29/24  0635   WBC 2.89*   HGB 11.1*   HCT 34.2*      MCV 94   RDW 11.9        Chemistries:  Recent Labs   Lab 06/29/24  0635 06/30/24  0727    136   K 3.4* 4.1    104   CO2 26 25   BUN 11 7   CREATININE 0.9 0.8   CALCIUM 8.7 8.9   ALBUMIN 3.8 3.5   PROT 6.7 6.2   BILITOT 0.6 1.0   ALKPHOS 66 75   ALT 20 26   AST 26 70*   MG 2.1 1.7   PHOS  --  3.3       All pertinent labs within the past 24 hours have been reviewed.    Significant Imaging:  I have reviewed all pertinent imaging results/findings within the past 24 hours.    ABG  No results for input(s): "PH", "PO2", "PCO2", "HCO3", "BE" in the last 168 hours.  Assessment/Plan:     Neuro  Acute metabolic encephalopathy  - s/t hypoglycemia  - resolved     Psychiatric  Alcohol abuse  - librium on " board  - thiamine and folic acid ordered  - monitor for s/s w/d    Cardiac/Vascular  Essential hypertension  - resume home meds  - monitor trends and adjust as needed    Hyperlipidemia  - statin       Endocrine  * Hypoglycemia  - off D10 gtt since 4am  - tolerating PO diet  - monitor glucose trends, and resume home DM agents as indicated    Type 2 diabetes mellitus with hyperglycemia, with long-term current use of insulin  - see plan for hypoglycemia     Prophylaxis Measures:  GI ppx: Oral Diet  VTE ppx: SCDs  Glucose control: Monitor blood glucose    Code Status: Full Code    Patient stable for care outside of the ICU setting. [facility] [place]. Critical Care team will sign off at this time as patient's critical care issues have resolved and patient is appropriate for ongoing management outside of the ICU setting. Please call if the patient's condition should change and warrant reevaluation.       Jan Nelson NP  Critical Care Medicine  [facility] ([facility])

## 2024-06-30 NOTE — HOSPITAL COURSE
6/30: no acute events overnight, pt continues to c/o BLE neuropathy, tolerating PO, D10 off and glucose trends stable, transfer to the floor today

## 2024-06-30 NOTE — CONSULTS
"[facility] ([facility])  Hospital Medicine  Consult Note    Patient Name: Jorgito Arreaga  MRN: [id number]  Admission Date: 6/29/2024  Hospital Length of Stay: 1 days  Attending Physician: Parker Gallagher MD   Primary Care Provider: Kirsten Queen NP           Patient information was obtained from patient, past medical records, and ER records.     Inpatient consult to Hospitalist  Consult performed by: Nette Davis MD  Consult ordered by: Jan Nelson NP        Subjective:     Principal Problem: Hypoglycemia    Chief Complaint:   Chief Complaint   Patient presents with    Hypoglycemia     Found unresponsive by brother with CBG 24. EMS gave 25g of Dextrose and 250mL bag D 10. Pt now AAO x4. Pt states he took 10 units of insulin at 10PM last night.         HPI: The patient is a 59 yo male with past medical hsitory of alcohol abuse, diabetes, hypertension, pancreatitis and TIA who presented to [facility] ED after being found down. His brother heard a thud and found him unresponsive on the floor. EMS was called. His blood sugar was in the 20s. His mentation improved once he was administered dextrose. Workup in the ED revealed elevated alcohol level and hypoglycemia. He was noted to be hypothermic and hypotensive. He was started on D5 but hypoglycemia persisted. He was transitioned to D10 with improvement in his blood sugars. He was admitted to the ICU. He was given diet and continued on D10 overnight. He was able to maintain blood sugar off of D10. He was deemed stable for transfer to the floor. Hospital medicine was consulted. Patient reports his neuropathy is 'acting up". He is having pain from the knees down. Pins and needle pricks as well as shooting pain. He was on gabapentin but that has not helped much.    Past Medical History:   Diagnosis Date    Abscess 9/5/2018    Cataracts, bilateral     Chest pain 12/6/2018    Dehydration     Diabetes mellitus, type 2     Diagnosed 7/2016    " "Folliculitis 9/15/2017    GERD (gastroesophageal reflux disease)     Hypertension     Inflammatory polyps of colon     Pancreatitis, alcoholic, acute     Port catheter in place     TIA (transient ischemic attack)     Tinea cruris 9/15/2017       Past Surgical History:   Procedure Laterality Date    COLONOSCOPY      COLONOSCOPY N/A 6/15/2018    Procedure: COLONOSCOPY;  Surgeon: Jim Hicks MD;  Location: [facility] ENDO;  Service: Endoscopy;  Laterality: N/A;    ESOPHAGOGASTRODUODENOSCOPY N/A 6/15/2018    Procedure: ESOPHAGOGASTRODUODENOSCOPY (EGD);  Surgeon: Jim Hicks MD;  Location: [facility] ENDO;  Service: Endoscopy;  Laterality: N/A;    ROBOT-ASSISTED LAPAROSCOPIC REPAIR OF INGUINAL HERNIA USING DA JANET XI Bilateral 11/30/2022    Procedure: XI ROBOTIC REPAIR, HERNIA, INGUINAL;  Surgeon: Smooth Denny MD;  Location: [facility] OR;  Service: General;  Laterality: Bilateral;       Review of patient's allergies indicates:  No Known Allergies    No current facility-administered medications on file prior to encounter.     Current Outpatient Medications on File Prior to Encounter   Medication Sig    aspirin 325 MG tablet Take 1 tablet (325 mg total) by mouth once daily.    atorvastatin (LIPITOR) 40 MG tablet Take 1 tablet (40 mg total) by mouth once daily.    blood sugar diagnostic (ONETOUCH ULTRA BLUE TEST STRIP) Strp USE TO CHECK SUGAR BEFORE MEALS AND AT BEDTIME    clotrimazole-betamethasone 1-0.05% (LOTRISONE) cream Apply topically 2 (two) times daily.    HUMALOG U-100 INSULIN 100 unit/mL injection INJECT 8 UNITS SUBCUTANEOUSLY WITH LARGEST MEAL OF THE DAY, INJECT ADDITIONAL 4 UNITS AT A TIME IF GLUCOSE IS ELEVATED    insulin (LANTUS SOLOSTAR U-100 INSULIN) glargine 100 units/mL SubQ pen INJECT 50 UNITS INTO SKIN EVERY EVENING.    insulin syringe-needle,dispos. 1 mL 28 gauge x 1/2" Syrg 1 each by Misc.(Non-Drug; Combo Route) route 2 (two) times daily.    lancets (ONETOUCH DELICA PLUS LANCET) 33 gauge Misc Inject 1 " "lancet into the skin 4 (four) times daily before meals and nightly.    losartan (COZAAR) 100 MG tablet Take 1 tablet (100 mg total) by mouth once daily. TAKE 1 TABLET BY MOUTH ONCE DAILY    mupirocin (BACTROBAN) 2 % ointment Apply topically 3 (three) times daily.    pen needle, diabetic (BD JOEL 2ND GEN PEN NEEDLE) 32 gauge x 5/32" Ndle Inject 1 each into the skin 4 (four) times daily before meals and nightly.    potassium, sodium phosphates (PHOS-NAK) 280-160-250 mg PwPk Take 1 packet by mouth 4 (four) times daily before meals and nightly.    verapamiL (CALAN-SR) 240 MG CR tablet Take 1 tablet (240 mg total) by mouth once daily.     Family History       Problem Relation (Age of Onset)    Hypertension Mother, Father          Tobacco Use    Smoking status: Former     Types: Cigarettes    Smokeless tobacco: Never   Substance and Sexual Activity    Alcohol use: Yes     Comment:  Daily - States that he drinks 1/2 pint of vodka and about 1-2 cans of beer daily    Drug use: No    Sexual activity: Yes     Partners: Female     Review of Systems   Constitutional:  Positive for activity change.   Neurological:  Positive for numbness.   Psychiatric/Behavioral:  Positive for sleep disturbance.    All other systems reviewed and are negative.    Objective:     Vital Signs (Most Recent):  Temp: 98.4 °F (36.9 °C) (06/30/24 0705)  Pulse: 90 (06/30/24 0927)  Resp: (!) 27 (06/30/24 0942)  BP: (!) 158/95 (06/30/24 0906)  SpO2: 99 % (06/30/24 0927) Vital Signs (24h Range):  Temp:  [97.6 °F (36.4 °C)-98.4 °F (36.9 °C)] 98.4 °F (36.9 °C)  Pulse:  [] 90  Resp:  [13-41] 27  SpO2:  [96 %-100 %] 99 %  BP: (113-217)/() 158/95     Weight: 70 kg (154 lb 5.2 oz)  Body mass index is 24.91 kg/m².     Physical Exam  HENT:      Head: Normocephalic and atraumatic.   Cardiovascular:      Rate and Rhythm: Normal rate and regular rhythm.      Heart sounds: No murmur heard.  Pulmonary:      Effort: Pulmonary effort is normal. No respiratory " distress.      Breath sounds: Normal breath sounds. No wheezing.   Abdominal:      General: Bowel sounds are normal. There is no distension.      Palpations: Abdomen is soft.      Tenderness: There is no abdominal tenderness.   Musculoskeletal:         General: No swelling.   Skin:     General: Skin is warm and dry.   Neurological:      Mental Status: He is alert and oriented to person, place, and time. Mental status is at baseline.          Significant Labs: All pertinent labs within the past 24 hours have been reviewed.  Recent Lab Results  (Last 5 results in the past 24 hours)        06/30/24  0727   06/30/24  0723   06/30/24  0607   06/30/24  0524   06/30/24  0354        Albumin 3.5               ALP 75               ALT 26               Anion Gap 7               AST 70               BILIRUBIN TOTAL 1.0  Comment: For infants and newborns, interpretation of results should be based  on gestational age, weight and in agreement with clinical  observations.    Premature Infant recommended reference ranges:  Up to 24 hours.............<8.0 mg/dL  Up to 48 hours............<12.0 mg/dL  3-5 days..................<15.0 mg/dL  6-29 days.................<15.0 mg/dL                 BUN 7               Calcium 8.9               Chloride 104               CO2 25               Creatinine 0.8               eGFR >60               Glucose 188               Magnesium  1.7               Phosphorus Level 3.3               POCT Glucose   181   104   116   188       Potassium 4.1               PROTEIN TOTAL 6.2               Sodium 136                                      Significant Imaging: I have reviewed all pertinent imaging results/findings within the past 24 hours.  Assessment/Plan:     * Hypoglycemia  -hypoglycemic protocol  -monitor off of fluids      Acute metabolic encephalopathy  -likely due to hypoglycemia  -resolved        Type 2 diabetes mellitus with hyperglycemia, with long-term current use of insulin  Patient's FSGs  are uncontrolled due to hypoglycemia on current medication regimen.  Last A1c reviewed-   Lab Results   Component Value Date    HGBA1C 7.3 (H) 06/29/2024     Most recent fingerstick glucose reviewed-   Recent Labs   Lab 06/30/24  0354 06/30/24  0524 06/30/24  0607 06/30/24  0723   POCTGLUCOSE 188* 116* 104 181*     Current correctional scale   none  Continue  anti-hyperglycemic dose as follows-   Antihyperglycemics (From admission, onward)      None          Hold Oral hypoglycemics while patient is in the hospital.  Holding insulin as well, plan resume once patient has blood sugar over 200        Essential hypertension  Chronic, . Latest blood pressure and vitals reviewed-     Temp:  [97.6 °F (36.4 °C)-98.4 °F (36.9 °C)]   Pulse:  []   Resp:  [13-41]   BP: (113-217)/()   SpO2:  [96 %-100 %] .   Home meds for hypertension were reviewed and noted below.   Hypertension Medications               losartan (COZAAR) 100 MG tablet Take 1 tablet (100 mg total) by mouth once daily. TAKE 1 TABLET BY MOUTH ONCE DAILY    verapamiL (CALAN-SR) 240 MG CR tablet Take 1 tablet (240 mg total) by mouth once daily.            While in the hospital, will manage blood pressure as follows; Continue home antihypertensive regimen    Will utilize p.r.n. blood pressure medication only if patient's blood pressure greater than SBP > 170 and he develops symptoms such as worsening chest pain or shortness of breath.    Alcohol abuse  -continue scheduled librium  -folic acid and thiamine        VTE Risk Mitigation (From admission, onward)           Ordered     Place sequential compression device  Until discontinued         06/30/24 0835     IP VTE LOW RISK PATIENT  Once         06/29/24 1724     Place SASKIA hose  Until discontinued         06/29/24 1724                        Thank you for your consult. I will assume care of patient.    Nette Davis MD  Department of Hospital Medicine   [facility] ([facility])

## 2024-06-30 NOTE — SUBJECTIVE & OBJECTIVE
ROS complete and negative unless stated in the interval HPI     Objective:     Vital Signs (Most Recent):  Temp: 98.4 °F (36.9 °C) (06/30/24 0705)  Pulse: 97 (06/30/24 0735)  Resp: (!) 41 (06/30/24 0735)  BP: (!) 175/98 (06/30/24 0705)  SpO2: 98 % (06/30/24 0735) Vital Signs (24h Range):  Temp:  [95.3 °F (35.2 °C)-98.4 °F (36.9 °C)] 98.4 °F (36.9 °C)  Pulse:  [] 97  Resp:  [13-41] 41  SpO2:  [96 %-100 %] 98 %  BP: (113-217)/() 175/98     Weight: 70 kg (154 lb 5.2 oz)  Body mass index is 24.91 kg/m².      Intake/Output Summary (Last 24 hours) at 6/30/2024 0831  Last data filed at 6/30/2024 0400  Gross per 24 hour   Intake 1956.79 ml   Output --   Net 1956.79 ml        Physical Exam  Vitals reviewed.   Constitutional:       General: He is awake. He is not in acute distress.     Appearance: He is well-developed. He is not ill-appearing.   Cardiovascular:      Rate and Rhythm: Normal rate.      Pulses:           Radial pulses are 2+ on the right side and 2+ on the left side.   Pulmonary:      Effort: Pulmonary effort is normal.      Breath sounds: Normal breath sounds.      Comments: On RA  Abdominal:      General: There is no distension.      Palpations: Abdomen is soft.   Musculoskeletal:      Right lower leg: No edema.      Left lower leg: No edema.   Skin:     General: Skin is warm and dry.   Neurological:      General: No focal deficit present.      Mental Status: He is alert.   Psychiatric:         Behavior: Behavior is cooperative.              Lines/Drains/Airways       Peripheral Intravenous Line  Duration                  Peripheral IV - Single Lumen 06/29/24 0616 20 G Right Antecubital 1 day         Peripheral IV - Single Lumen 06/29/24 0631 16 G Anterior;Distal;Left Upper Arm 1 day                    Significant Labs:    CBC/Anemia Profile:  Recent Labs   Lab 06/29/24 0635   WBC 2.89*   HGB 11.1*   HCT 34.2*      MCV 94   RDW 11.9        Chemistries:  Recent Labs   Lab 06/29/24  0635  06/30/24  0727    136   K 3.4* 4.1    104   CO2 26 25   BUN 11 7   CREATININE 0.9 0.8   CALCIUM 8.7 8.9   ALBUMIN 3.8 3.5   PROT 6.7 6.2   BILITOT 0.6 1.0   ALKPHOS 66 75   ALT 20 26   AST 26 70*   MG 2.1 1.7   PHOS  --  3.3       All pertinent labs within the past 24 hours have been reviewed.    Significant Imaging:  I have reviewed all pertinent imaging results/findings within the past 24 hours.

## 2024-07-01 LAB
POCT GLUCOSE: 138 MG/DL (ref 70–110)
POCT GLUCOSE: 169 MG/DL (ref 70–110)
POCT GLUCOSE: 21 MG/DL (ref 70–110)
POCT GLUCOSE: 261 MG/DL (ref 70–110)
POCT GLUCOSE: 281 MG/DL (ref 70–110)
POCT GLUCOSE: 328 MG/DL (ref 70–110)
POCT GLUCOSE: 375 MG/DL (ref 70–110)
POCT GLUCOSE: 380 MG/DL (ref 70–110)
POCT GLUCOSE: 444 MG/DL (ref 70–110)
POCT GLUCOSE: 464 MG/DL (ref 70–110)
POCT GLUCOSE: 98 MG/DL (ref 70–110)

## 2024-07-01 PROCEDURE — 63600175 PHARM REV CODE 636 W HCPCS: Performed by: INTERNAL MEDICINE

## 2024-07-01 PROCEDURE — 63600175 PHARM REV CODE 636 W HCPCS: Performed by: NURSE PRACTITIONER

## 2024-07-01 PROCEDURE — 25000003 PHARM REV CODE 250: Performed by: NURSE PRACTITIONER

## 2024-07-01 PROCEDURE — 25000003 PHARM REV CODE 250: Performed by: INTERNAL MEDICINE

## 2024-07-01 PROCEDURE — 11000001 HC ACUTE MED/SURG PRIVATE ROOM

## 2024-07-01 RX ORDER — HEPARIN SODIUM 5000 [USP'U]/ML
5000 INJECTION, SOLUTION INTRAVENOUS; SUBCUTANEOUS EVERY 8 HOURS
Status: DISCONTINUED | OUTPATIENT
Start: 2024-07-01 | End: 2024-07-03 | Stop reason: HOSPADM

## 2024-07-01 RX ORDER — INSULIN GLARGINE 100 [IU]/ML
20 INJECTION, SOLUTION SUBCUTANEOUS DAILY
Status: DISCONTINUED | OUTPATIENT
Start: 2024-07-01 | End: 2024-07-01

## 2024-07-01 RX ORDER — INSULIN ASPART 100 [IU]/ML
0-10 INJECTION, SOLUTION INTRAVENOUS; SUBCUTANEOUS
Status: DISCONTINUED | OUTPATIENT
Start: 2024-07-01 | End: 2024-07-03 | Stop reason: HOSPADM

## 2024-07-01 RX ADMIN — INSULIN GLARGINE 20 UNITS: 100 INJECTION, SOLUTION SUBCUTANEOUS at 11:07

## 2024-07-01 RX ADMIN — HYDRALAZINE HYDROCHLORIDE 10 MG: 20 INJECTION, SOLUTION INTRAMUSCULAR; INTRAVENOUS at 12:07

## 2024-07-01 RX ADMIN — INSULIN ASPART 3 UNITS: 100 INJECTION, SOLUTION INTRAVENOUS; SUBCUTANEOUS at 09:07

## 2024-07-01 RX ADMIN — MUPIROCIN: 20 OINTMENT TOPICAL at 09:07

## 2024-07-01 RX ADMIN — PREGABALIN 75 MG: 75 CAPSULE ORAL at 08:07

## 2024-07-01 RX ADMIN — LOSARTAN POTASSIUM 100 MG: 50 TABLET, FILM COATED ORAL at 08:07

## 2024-07-01 RX ADMIN — CHLORDIAZEPOXIDE HYDROCHLORIDE 10 MG: 5 CAPSULE ORAL at 09:07

## 2024-07-01 RX ADMIN — Medication 100 MG: at 08:07

## 2024-07-01 RX ADMIN — Medication 24 G: at 01:07

## 2024-07-01 RX ADMIN — INSULIN ASPART 10 UNITS: 100 INJECTION, SOLUTION INTRAVENOUS; SUBCUTANEOUS at 11:07

## 2024-07-01 RX ADMIN — VERAPAMIL HYDROCHLORIDE 240 MG: 240 TABLET ORAL at 08:07

## 2024-07-01 RX ADMIN — HYDROCODONE BITARTRATE AND ACETAMINOPHEN 1 TABLET: 5; 325 TABLET ORAL at 09:07

## 2024-07-01 RX ADMIN — CHLORDIAZEPOXIDE HYDROCHLORIDE 10 MG: 5 CAPSULE ORAL at 10:07

## 2024-07-01 RX ADMIN — HYDROCODONE BITARTRATE AND ACETAMINOPHEN 1 TABLET: 5; 325 TABLET ORAL at 03:07

## 2024-07-01 RX ADMIN — HYDROCODONE BITARTRATE AND ACETAMINOPHEN 1 TABLET: 5; 325 TABLET ORAL at 04:07

## 2024-07-01 RX ADMIN — CHLORDIAZEPOXIDE HYDROCHLORIDE 10 MG: 5 CAPSULE ORAL at 04:07

## 2024-07-01 RX ADMIN — DEXTROSE MONOHYDRATE 250 ML: 100 INJECTION, SOLUTION INTRAVENOUS at 01:07

## 2024-07-01 RX ADMIN — MUPIROCIN: 20 OINTMENT TOPICAL at 08:07

## 2024-07-01 RX ADMIN — CHLORDIAZEPOXIDE HYDROCHLORIDE 10 MG: 5 CAPSULE ORAL at 03:07

## 2024-07-01 RX ADMIN — PREGABALIN 75 MG: 75 CAPSULE ORAL at 09:07

## 2024-07-01 RX ADMIN — FOLIC ACID 1 MG: 1 TABLET ORAL at 08:07

## 2024-07-01 RX ADMIN — HYDRALAZINE HYDROCHLORIDE 10 MG: 20 INJECTION, SOLUTION INTRAMUSCULAR; INTRAVENOUS at 09:07

## 2024-07-01 RX ADMIN — HEPARIN SODIUM 5000 UNITS: 5000 INJECTION INTRAVENOUS; SUBCUTANEOUS at 09:07

## 2024-07-01 RX ADMIN — HEPARIN SODIUM 5000 UNITS: 5000 INJECTION INTRAVENOUS; SUBCUTANEOUS at 03:07

## 2024-07-01 NOTE — PLAN OF CARE
[facility] - Intensive Care (Hospital)  Initial Discharge Assessment       Primary Care Provider: Vonda Elliott FNP    Admission Diagnosis: Alcohol abuse [F10.10]  Weakness [R53.1]  Hypoglycemia [E16.2]  Hypothermia, initial encounter [T68.XXXA]  Alcoholic intoxication with complication [F10.929]  Community acquired pneumonia, unspecified laterality [J18.9]    Admission Date: 6/29/2024  Expected Discharge Date:     Transition of Care Barriers: Transportation, Substance Abuse    Payor: MEDICAID / Plan: [email or web address] [facility] / Product Type: Managed Medicaid /     Extended Emergency Contact Information  Primary Emergency Contact: Sr Diego Arreaga  Mobile Phone: [phone]  Relation: Brother  Preferred language: English   needed? No  Secondary Emergency Contact: Aracelis Houser  Mobile Phone: [phone]  Relation: Other  Preferred language: English   needed? No    Discharge Plan A: Home with family         [facility] [place] - [address]  [address] [address]  Phone: [phone] Fax: [phone]      Initial Assessment (most recent)       Adult Discharge Assessment - 07/01/24 1215          Discharge Assessment    Assessment Type Discharge Planning Assessment     Confirmed/corrected address, phone number and insurance Yes     Confirmed Demographics Correct on Facesheet     Source of Information patient     When was your last doctors appointment? --   a few weeks ago    Communicated ARON with patient/caregiver Yes     Reason For Admission hypoglycemia     People in Home sibling(s)     Facility Arrived From: [facility] ED     Do you expect to return to your current living situation? Yes     Do you have help at home or someone to help you manage your care at home? Yes     Who are your caregiver(s) and their phone number(s)? Brothers Diego and Apolinar     Prior to hospitilization cognitive status: Alert/Oriented     Current cognitive status:  Alert/Oriented     Walking or Climbing Stairs Difficulty no     Dressing/Bathing Difficulty no     Home Accessibility wheelchair accessible     Equipment Currently Used at Home glucometer     Readmission within 30 days? No     Patient currently being followed by outpatient case management? No     Do you currently have service(s) that help you manage your care at home? No     Do you take prescription medications? Yes     Do you have prescription coverage? Yes     Do you have any problems affording any of your prescribed medications? No     Is the patient taking medications as prescribed? yes     Who is going to help you get home at discharge? will need transport     How do you get to doctors appointments? health plan transportation     Are you on dialysis? No     Do you take coumadin? No     Discharge Plan A Home with family     DME Needed Upon Discharge  none     Discharge Plan discussed with: Patient     Transition of Care Barriers Transportation;Substance Abuse        Physical Activity    On average, how many days per week do you engage in moderate to strenuous exercise (like a brisk walk)? 0 days     On average, how many minutes do you engage in exercise at this level? 0 min        Financial Resource Strain    How hard is it for you to pay for the very basics like food, housing, medical care, and heating? Somewhat hard        Housing Stability    In the last 12 months, was there a time when you were not able to pay the mortgage or rent on time? No     At any time in the past 12 months, were you homeless or living in a shelter (including now)? No        Transportation Needs    Has the lack of transportation kept you from medical appointments, meetings, work or from getting things needed for daily living? Yes, it has kept me from medical appointments or from getting my medications.                   Anticipated DC dispo: home with family   Prior Level of Function: independent with ADLs   People in home: brother,  Diego     Comments:  CM met with patient at bedside to introduce role and discuss discharge planning. Gordon, Diego and Apolinar will be help at home and can provide transport at time of discharge. Confirmed demographics, insurance, and emergency contacts. CM discharge needs depends on hospital progress. CM will continue following to assist with other needs. Discharge plan has been determined by review of patient's clinical status, future medical and therapeutic needs, and coverage/benefits for post-acute care in coordination with multidisciplinary team members.      Patient will need transportation home at discharge.

## 2024-07-01 NOTE — HOSPITAL COURSE
D5 was weaned off approx 2am on 07/01 as pt with hyperglycemia, started on mod dose SSI and glargine at decreased dose as BG trended in the 400s but pt subsequently had recurrent hypoglycemia to the 20s requiring D10 bolus.     Overnight 7/2 patients BG dropped to 35 requiring administration of glucose tabs. Glargine held and continued only sliding scale. Patient will be given a snack before bed and evaluating response of BG. Possible d/c in a.m.      Overnight patient ate a snack at bedtime and no nighttime hypoglycemia was noted. Patient reports overall he is feeling well, denies any complaints, vitals and labs overall stable for discharge. Patient educated on importance of holding lantus until he follows up with PCP within a week. Patient educated on only using sliding scale insulin until he follows up with the pcp.Patient educated on importance of always checking blood glucose prior to administering any insulin. Patient advised to eat a snack before bed to prevent night time hypoglycemia.Patient educated on importance of following a diabetic diet. Patient verbalized understanding.      Patient seen and examined on the day of discharge.  All questions and concerns were addressed prior to discharge.    Face to face encounter with patient: 48 min

## 2024-07-01 NOTE — ASSESSMENT & PLAN NOTE
Chronic, . Latest blood pressure and vitals reviewed-     Temp:  [97 °F (36.1 °C)-98.2 °F (36.8 °C)]   Pulse:  [69-96]   Resp:  [10-47]   BP: (108-194)/()   SpO2:  [96 %-100 %] .   Home meds for hypertension were reviewed and noted below.   Hypertension Medications               losartan (COZAAR) 100 MG tablet Take 1 tablet (100 mg total) by mouth once daily. TAKE 1 TABLET BY MOUTH ONCE DAILY    verapamiL (CALAN-SR) 240 MG CR tablet Take 1 tablet (240 mg total) by mouth once daily.            While in the hospital, will manage blood pressure as follows; Continue home antihypertensive regimen    Will utilize p.r.n. blood pressure medication only if patient's blood pressure greater than SBP > 170 and he develops symptoms such as worsening chest pain or shortness of breath.

## 2024-07-01 NOTE — SIGNIFICANT EVENT
58-year-old man currently admitted for hypoglycemia, insulin-dependent diabetes mellitus type 2, acute metabolic encephalopathy, hypertension, alcohol abuse.  Consults on this admission include Pulmonary critical Care.  Patien had orders placed for transferred to the floor earlier today Hospital Medicine has a seemed care of this patient.    Nursing staff called as patient with recurrent hypoglycemia with blood glucose 20 and repeat 46 after receiving general hypoglycemia protocol.  Patient is asymptomatic.    Start D5W at 50 mL/hr.  Check blood glucose q.2 hours x3 occurrences.  Encourage oral intake.  General hypoglycemia protocol remains in place.  Patient is currently on no basal insulin or oral agents for diabetes.

## 2024-07-01 NOTE — NURSING
RN called to bedside, pt feeling weak and nauseous, glucose 21. Oral and IV glucose administered as well as peanut butter cracker and orange juice. No loss of consciousness. MD notified.

## 2024-07-01 NOTE — PLAN OF CARE
Plan of care reviewed with pt.  Pt continues to have labile blood glucose. Required additional glucose once this shift after reintroduction of insulin dosing. Pt tolerating diet, eating nearly all of every meal.  Accuchecks q4hr.  Ambulates independently to toilet.   Awaiting available staffed bed for transfer to medsur unit.

## 2024-07-01 NOTE — SUBJECTIVE & OBJECTIVE
Interval History: D5 was weaned off approx 2am on 07/01 as pt with hyperglycemia, started on mod dose SSI and glargine at decreased dose as BG trended in the 400s but pt subsequently had recurrent hypoglycemia to the 20s requiring D10 bolus.      Pt reports that he usually takes 50 units of long acting insulin at bedtime + sliding scale humalog at home but sometimes reports inconsistent PO intake due to work schedule, etc. Reports most episodes of hypoglycemia are occurring at nighttime     Review of Systems  Objective:     Vital Signs (Most Recent):  Temp: 97.9 °F (36.6 °C) (07/01/24 1119)  Pulse: 85 (07/01/24 1119)  Resp: 16 (07/01/24 1119)  BP: (!) 142/97 (07/01/24 1119)  SpO2: 97 % (07/01/24 1119) Vital Signs (24h Range):  Temp:  [97 °F (36.1 °C)-98.2 °F (36.8 °C)] 97.9 °F (36.6 °C)  Pulse:  [69-96] 85  Resp:  [10-47] 16  SpO2:  [96 %-100 %] 97 %  BP: (108-194)/() 142/97     Weight: 70 kg (154 lb 5.2 oz)  Body mass index is 24.91 kg/m².    Intake/Output Summary (Last 24 hours) at 7/1/2024 1441  Last data filed at 7/1/2024 1026  Gross per 24 hour   Intake 872.41 ml   Output --   Net 872.41 ml         Physical Exam  Vitals and nursing note reviewed.   Constitutional:       General: He is not in acute distress.     Appearance: Ill appearance: chronic.   Cardiovascular:      Rate and Rhythm: Regular rhythm. Tachycardia present.      Heart sounds: No murmur heard.     No friction rub. No gallop.   Pulmonary:      Effort: Pulmonary effort is normal.      Breath sounds: Normal breath sounds. No wheezing, rhonchi or rales.   Abdominal:      General: Bowel sounds are normal. There is no distension.      Palpations: Abdomen is soft.      Tenderness: There is no abdominal tenderness.   Musculoskeletal:      Right lower leg: No edema.      Left lower leg: No edema.   Neurological:      Mental Status: He is alert and oriented to person, place, and time.             Significant Labs: All pertinent labs within the past  Records received from [facility] .    Records reviewed and placed on Dr. Urias's desk for review.     Once reviewed documents will be sent to scanning.    24 hours have been reviewed.    Significant Imaging: I have reviewed all pertinent imaging results/findings within the past 24 hours.

## 2024-07-01 NOTE — PLAN OF CARE
AAOX4/VSS. NSR with HTN, prn hydralazine given for SBP>170 with full relief. BG dropped to 24 and Hospital Medicine was made aware. PRN glucose tablet given; D5 infusion initiated and discontinued during shift and bg is now stable. Pt is on room air. PRN norco given for pain with full relief. Pt voids per urinal/up to toilet independently. Call light is in reach with the bed in the lowest position, with the wheels locked.

## 2024-07-01 NOTE — ASSESSMENT & PLAN NOTE
Patient's FSGs are uncontrolled due to hypoglycemia on current medication regimen.  Last A1c reviewed-   Lab Results   Component Value Date    HGBA1C 7.3 (H) 06/29/2024     Most recent fingerstick glucose reviewed-   Recent Labs   Lab 07/01/24  1013 07/01/24  1115 07/01/24  1347 07/01/24  1427   POCTGLUCOSE 464* 444* 21* 98       Current correctional scale   none  Continue  anti-hyperglycemic dose as follows-   Antihyperglycemics (From admission, onward)      Start     Stop Route Frequency Ordered    07/01/24 1127  insulin aspart U-100 pen 0-10 Units         -- SubQ Before meals & nightly PRN 07/01/24 1028          Hold Oral hypoglycemics while patient is in the hospital.  Pt was restarted on long acting insulin at decreased dose as his sugars were in the 400s but subsequently had hypoglycemia so long acting insulin discontinue   Will continue mod dose SSI for now and continue to monitor

## 2024-07-01 NOTE — ASSESSMENT & PLAN NOTE
- Pt with very labile blood glucose, ranging from 20s to mid 400s   - monitor BG q4h   - continue hypoglycemia protocol   - discussed with bedside RN, pt has had good PO Intake

## 2024-07-01 NOTE — PROGRESS NOTES
"[facility]    [facility]  Progress Note    Patient Name: Jorgito Arreaga  MRN: [id number]  Patient Class: IP- Inpatient   Admission Date: 6/29/2024  Length of Stay: 2 days  Attending Physician: Magda Lerma MD  Primary Care Provider: Vonda Elliott FNP        Subjective:     Principal Problem:Hypoglycemia        HPI:  The patient is a 59 yo male with past medical hsitory of alcohol abuse, diabetes, hypertension, pancreatitis and TIA who presented to [facility] ED after being found down. His brother heard a thud and found him unresponsive on the floor. EMS was called. His blood sugar was in the 20s. His mentation improved once he was administered dextrose. Workup in the ED revealed elevated alcohol level and hypoglycemia. He was noted to be hypothermic and hypotensive. He was started on D5 but hypoglycemia persisted. He was transitioned to D10 with improvement in his blood sugars. He was admitted to the ICU. He was given diet and continued on D10 overnight. He was able to maintain blood sugar off of D10. He was deemed stable for transfer to the floor. Hospital medicine was consulted. Patient reports his neuropathy is 'acting up". He is having pain from the knees down. Pins and needle pricks as well as shooting pain. He was on gabapentin but that has not helped much.    Overview/Hospital Course:  D5 was weaned off approx 2am on 07/01 as pt with hyperglycemia, started on mod dose SSI and glargine at decreased dose as BG trended in the 400s but pt subsequently had recurrent hypoglycemia to the 20s requiring D10 bolus.     Interval History: D5 was weaned off approx 2am on 07/01 as pt with hyperglycemia, started on mod dose SSI and glargine at decreased dose as BG trended in the 400s but pt subsequently had recurrent hypoglycemia to the 20s requiring D10 bolus.      Pt reports that he usually takes 50 units of long acting insulin at bedtime + sliding scale humalog at home but sometimes reports inconsistent PO " intake due to work schedule, etc. Reports most episodes of hypoglycemia are occurring at nighttime     Review of Systems  Objective:     Vital Signs (Most Recent):  Temp: 97.9 °F (36.6 °C) (07/01/24 1119)  Pulse: 85 (07/01/24 1119)  Resp: 16 (07/01/24 1119)  BP: (!) 142/97 (07/01/24 1119)  SpO2: 97 % (07/01/24 1119) Vital Signs (24h Range):  Temp:  [97 °F (36.1 °C)-98.2 °F (36.8 °C)] 97.9 °F (36.6 °C)  Pulse:  [69-96] 85  Resp:  [10-47] 16  SpO2:  [96 %-100 %] 97 %  BP: (108-194)/() 142/97     Weight: 70 kg (154 lb 5.2 oz)  Body mass index is 24.91 kg/m².    Intake/Output Summary (Last 24 hours) at 7/1/2024 1441  Last data filed at 7/1/2024 1026  Gross per 24 hour   Intake 872.41 ml   Output --   Net 872.41 ml         Physical Exam  Vitals and nursing note reviewed.   Constitutional:       General: He is not in acute distress.     Appearance: Ill appearance: chronic.   Cardiovascular:      Rate and Rhythm: Regular rhythm. Tachycardia present.      Heart sounds: No murmur heard.     No friction rub. No gallop.   Pulmonary:      Effort: Pulmonary effort is normal.      Breath sounds: Normal breath sounds. No wheezing, rhonchi or rales.   Abdominal:      General: Bowel sounds are normal. There is no distension.      Palpations: Abdomen is soft.      Tenderness: There is no abdominal tenderness.   Musculoskeletal:      Right lower leg: No edema.      Left lower leg: No edema.   Neurological:      Mental Status: He is alert and oriented to person, place, and time.             Significant Labs: All pertinent labs within the past 24 hours have been reviewed.    Significant Imaging: I have reviewed all pertinent imaging results/findings within the past 24 hours.    Assessment/Plan:      * Hypoglycemia  - Pt with very labile blood glucose, ranging from 20s to mid 400s   - monitor BG q4h   - continue hypoglycemia protocol   - discussed with bedside RN, pt has had good PO Intake       Type 2 diabetes mellitus with  hyperglycemia, with long-term current use of insulin  Patient's FSGs are uncontrolled due to hypoglycemia on current medication regimen.  Last A1c reviewed-   Lab Results   Component Value Date    HGBA1C 7.3 (H) 06/29/2024     Most recent fingerstick glucose reviewed-   Recent Labs   Lab 07/01/24  1013 07/01/24  1115 07/01/24  1347 07/01/24  1427   POCTGLUCOSE 464* 444* 21* 98       Current correctional scale   none  Continue  anti-hyperglycemic dose as follows-   Antihyperglycemics (From admission, onward)      Start     Stop Route Frequency Ordered    07/01/24 1127  insulin aspart U-100 pen 0-10 Units         -- SubQ Before meals & nightly PRN 07/01/24 1028          Hold Oral hypoglycemics while patient is in the hospital.  Pt was restarted on long acting insulin at decreased dose as his sugars were in the 400s but subsequently had hypoglycemia so long acting insulin discontinue   Will continue mod dose SSI for now and continue to monitor         Acute metabolic encephalopathy  -likely due to hypoglycemia  -resolved        Essential hypertension  Chronic, . Latest blood pressure and vitals reviewed-     Temp:  [97 °F (36.1 °C)-98.2 °F (36.8 °C)]   Pulse:  [69-96]   Resp:  [10-47]   BP: (108-194)/()   SpO2:  [96 %-100 %] .   Home meds for hypertension were reviewed and noted below.   Hypertension Medications               losartan (COZAAR) 100 MG tablet Take 1 tablet (100 mg total) by mouth once daily. TAKE 1 TABLET BY MOUTH ONCE DAILY    verapamiL (CALAN-SR) 240 MG CR tablet Take 1 tablet (240 mg total) by mouth once daily.            While in the hospital, will manage blood pressure as follows; Continue home antihypertensive regimen    Will utilize p.r.n. blood pressure medication only if patient's blood pressure greater than SBP > 170 and he develops symptoms such as worsening chest pain or shortness of breath.    Hyperlipidemia        Alcohol abuse  -continue scheduled librium  -folic acid and  thiamine        VTE Risk Mitigation (From admission, onward)           Ordered     heparin (porcine) injection 5,000 Units  Every 8 hours         07/01/24 1447     Place sequential compression device  Until discontinued         06/30/24 0835     IP VTE LOW RISK PATIENT  Once         06/29/24 1724                    Discharge Planning   ARON:      Code Status: Full Code   Is the patient medically ready for discharge?:     Reason for patient still in hospital (select all that apply): Patient trending condition and Treatment  Discharge Plan A: Home with family           Magda Lerma MD  Department of Hospital Medicine   [facility]

## 2024-07-01 NOTE — PLAN OF CARE
Pt transferred to room 312 via wheelchair. NAD. Report called to nurse Natalie prior to transfer. Pt's bag of clothes brought with him to new room. Pt ambulated independently to and from wheelchair. FLORESITA Estrada at bedside.

## 2024-07-02 LAB
ANION GAP SERPL CALC-SCNC: 8 MMOL/L (ref 8–16)
BASOPHILS # BLD AUTO: 0.03 K/UL (ref 0–0.2)
BASOPHILS NFR BLD: 0.8 % (ref 0–1.9)
BUN SERPL-MCNC: 11 MG/DL (ref 6–20)
CALCIUM SERPL-MCNC: 9.2 MG/DL (ref 8.7–10.5)
CHLORIDE SERPL-SCNC: 105 MMOL/L (ref 95–110)
CO2 SERPL-SCNC: 24 MMOL/L (ref 23–29)
CREAT SERPL-MCNC: 0.8 MG/DL (ref 0.5–1.4)
DIFFERENTIAL METHOD BLD: ABNORMAL
EOSINOPHIL # BLD AUTO: 0.1 K/UL (ref 0–0.5)
EOSINOPHIL NFR BLD: 2.1 % (ref 0–8)
ERYTHROCYTE [DISTWIDTH] IN BLOOD BY AUTOMATED COUNT: 11.6 % (ref 11.5–14.5)
EST. GFR  (NO RACE VARIABLE): >60 ML/MIN/1.73 M^2
GLUCOSE SERPL-MCNC: 253 MG/DL (ref 70–110)
HCT VFR BLD AUTO: 33.5 % (ref 40–54)
HGB BLD-MCNC: 10.8 G/DL (ref 14–18)
IMM GRANULOCYTES # BLD AUTO: 0.01 K/UL (ref 0–0.04)
IMM GRANULOCYTES NFR BLD AUTO: 0.3 % (ref 0–0.5)
LYMPHOCYTES # BLD AUTO: 1.3 K/UL (ref 1–4.8)
LYMPHOCYTES NFR BLD: 32.8 % (ref 18–48)
MCH RBC QN AUTO: 30.4 PG (ref 27–31)
MCHC RBC AUTO-ENTMCNC: 32.2 G/DL (ref 32–36)
MCV RBC AUTO: 94 FL (ref 82–98)
MONOCYTES # BLD AUTO: 0.4 K/UL (ref 0.3–1)
MONOCYTES NFR BLD: 10.5 % (ref 4–15)
NEUTROPHILS # BLD AUTO: 2.1 K/UL (ref 1.8–7.7)
NEUTROPHILS NFR BLD: 53.5 % (ref 38–73)
NRBC BLD-RTO: 0 /100 WBC
PLATELET # BLD AUTO: 199 K/UL (ref 150–450)
PMV BLD AUTO: 10.4 FL (ref 9.2–12.9)
POCT GLUCOSE: 110 MG/DL (ref 70–110)
POCT GLUCOSE: 148 MG/DL (ref 70–110)
POCT GLUCOSE: 188 MG/DL (ref 70–110)
POCT GLUCOSE: 296 MG/DL (ref 70–110)
POCT GLUCOSE: 299 MG/DL (ref 70–110)
POCT GLUCOSE: 35 MG/DL (ref 70–110)
POTASSIUM SERPL-SCNC: 4.1 MMOL/L (ref 3.5–5.1)
RBC # BLD AUTO: 3.55 M/UL (ref 4.6–6.2)
SODIUM SERPL-SCNC: 137 MMOL/L (ref 136–145)
WBC # BLD AUTO: 3.9 K/UL (ref 3.9–12.7)

## 2024-07-02 PROCEDURE — 80048 BASIC METABOLIC PNL TOTAL CA: CPT | Performed by: INTERNAL MEDICINE

## 2024-07-02 PROCEDURE — 25000003 PHARM REV CODE 250: Performed by: NURSE PRACTITIONER

## 2024-07-02 PROCEDURE — 11000001 HC ACUTE MED/SURG PRIVATE ROOM

## 2024-07-02 PROCEDURE — 85025 COMPLETE CBC W/AUTO DIFF WBC: CPT | Performed by: INTERNAL MEDICINE

## 2024-07-02 PROCEDURE — 63600175 PHARM REV CODE 636 W HCPCS: Performed by: INTERNAL MEDICINE

## 2024-07-02 PROCEDURE — 36415 COLL VENOUS BLD VENIPUNCTURE: CPT | Performed by: INTERNAL MEDICINE

## 2024-07-02 PROCEDURE — 25000003 PHARM REV CODE 250: Performed by: INTERNAL MEDICINE

## 2024-07-02 RX ADMIN — LOSARTAN POTASSIUM 100 MG: 50 TABLET, FILM COATED ORAL at 09:07

## 2024-07-02 RX ADMIN — HEPARIN SODIUM 5000 UNITS: 5000 INJECTION INTRAVENOUS; SUBCUTANEOUS at 09:07

## 2024-07-02 RX ADMIN — CHLORDIAZEPOXIDE HYDROCHLORIDE 10 MG: 5 CAPSULE ORAL at 09:07

## 2024-07-02 RX ADMIN — HYDROCODONE BITARTRATE AND ACETAMINOPHEN 1 TABLET: 5; 325 TABLET ORAL at 09:07

## 2024-07-02 RX ADMIN — HYDROCODONE BITARTRATE AND ACETAMINOPHEN 1 TABLET: 5; 325 TABLET ORAL at 03:07

## 2024-07-02 RX ADMIN — PREGABALIN 75 MG: 75 CAPSULE ORAL at 09:07

## 2024-07-02 RX ADMIN — HYDROCODONE BITARTRATE AND ACETAMINOPHEN 1 TABLET: 5; 325 TABLET ORAL at 12:07

## 2024-07-02 RX ADMIN — MUPIROCIN: 20 OINTMENT TOPICAL at 09:07

## 2024-07-02 RX ADMIN — Medication 24 G: at 02:07

## 2024-07-02 RX ADMIN — VERAPAMIL HYDROCHLORIDE 240 MG: 240 TABLET ORAL at 09:07

## 2024-07-02 RX ADMIN — CHLORDIAZEPOXIDE HYDROCHLORIDE 10 MG: 5 CAPSULE ORAL at 03:07

## 2024-07-02 RX ADMIN — INSULIN ASPART 6 UNITS: 100 INJECTION, SOLUTION INTRAVENOUS; SUBCUTANEOUS at 12:07

## 2024-07-02 RX ADMIN — HEPARIN SODIUM 5000 UNITS: 5000 INJECTION INTRAVENOUS; SUBCUTANEOUS at 06:07

## 2024-07-02 RX ADMIN — HEPARIN SODIUM 5000 UNITS: 5000 INJECTION INTRAVENOUS; SUBCUTANEOUS at 02:07

## 2024-07-02 RX ADMIN — FOLIC ACID 1 MG: 1 TABLET ORAL at 09:07

## 2024-07-02 RX ADMIN — CHLORDIAZEPOXIDE HYDROCHLORIDE 10 MG: 5 CAPSULE ORAL at 04:07

## 2024-07-02 RX ADMIN — HYDROCODONE BITARTRATE AND ACETAMINOPHEN 1 TABLET: 5; 325 TABLET ORAL at 04:07

## 2024-07-02 RX ADMIN — Medication 100 MG: at 09:07

## 2024-07-02 NOTE — PROGRESS NOTES
"[facility]  Hospital Medicine  Progress Note    Patient Name: Jorgito Arreaga  MRN: [id number]  Patient Class: IP- Inpatient   Admission Date: 6/29/2024  Length of Stay: 3 days  Attending Physician: Magda Lerma MD  Primary Care Provider: Vonda Elliott FNP        Subjective:     Principal Problem:Hypoglycemia        HPI:  The patient is a 57 yo male with past medical hsitory of alcohol abuse, diabetes, hypertension, pancreatitis and TIA who presented to [facility] ED after being found down. His brother heard a thud and found him unresponsive on the floor. EMS was called. His blood sugar was in the 20s. His mentation improved once he was administered dextrose. Workup in the ED revealed elevated alcohol level and hypoglycemia. He was noted to be hypothermic and hypotensive. He was started on D5 but hypoglycemia persisted. He was transitioned to D10 with improvement in his blood sugars. He was admitted to the ICU. He was given diet and continued on D10 overnight. He was able to maintain blood sugar off of D10. He was deemed stable for transfer to the floor. Hospital medicine was consulted. Patient reports his neuropathy is 'acting up". He is having pain from the knees down. Pins and needle pricks as well as shooting pain. He was on gabapentin but that has not helped much.    Overview/Hospital Course:  D5 was weaned off approx 2am on 07/01 as pt with hyperglycemia, started on mod dose SSI and glargine at decreased dose as BG trended in the 400s but pt subsequently had recurrent hypoglycemia to the 20s requiring D10 bolus.     Overnight 7/2 patients BG dropped to 35 requiring administration of glucose tabs. Glargine held and continued only sliding scale. Patient will be given a snack before bed and evaluating response of BG. Possible d/c in a.m. only on sliding scale and will need close follow up with PCP     Interval History: f/u hypoglycemia. Patient awake and alert. NAD. Patient reports overnight he felt " clammy and states he could tell his BG was low. Overnight it dropped to 35, requiring him to be given glucose tabs. Denies any CP, Nausea, vomiting and SOB. Glargine held, will only continue sliding scale. Patient reports due to work his eating habits outpatient have been very inconsistent and until recently he did not have a PCP he regularly follow up with. Patient will eat a snack before bed to evaluate BG response. Will continue to trend BG.     Review of Systems  Objective:     Vital Signs (Most Recent):  Temp: 98.3 °F (36.8 °C) (07/02/24 1218)  Pulse: 104 (07/02/24 1218)  Resp: 18 (07/02/24 1222)  BP: 134/89 (07/02/24 1218)  SpO2: 98 % (07/02/24 1218) Vital Signs (24h Range):  Temp:  [97.8 °F (36.6 °C)-98.9 °F (37.2 °C)] 98.3 °F (36.8 °C)  Pulse:  [] 104  Resp:  [16-22] 18  SpO2:  [94 %-99 %] 98 %  BP: (115-179)/() 134/89     Weight: 70 kg (154 lb 5.2 oz)  Body mass index is 24.91 kg/m².    Intake/Output Summary (Last 24 hours) at 7/2/2024 1456  Last data filed at 7/1/2024 1700  Gross per 24 hour   Intake 360 ml   Output --   Net 360 ml         Physical Exam  Vitals and nursing note reviewed.   Constitutional:       General: He is not in acute distress.     Appearance: He is ill-appearing (chronically).   HENT:      Mouth/Throat:      Mouth: Mucous membranes are moist.      Pharynx: Oropharynx is clear.   Eyes:      Pupils: Pupils are equal, round, and reactive to light.   Cardiovascular:      Rate and Rhythm: Regular rhythm. Tachycardia present.      Heart sounds: No murmur heard.  Pulmonary:      Effort: Pulmonary effort is normal.      Breath sounds: Normal breath sounds. No wheezing.   Abdominal:      General: Bowel sounds are normal. There is no distension.      Palpations: Abdomen is soft.      Tenderness: There is no abdominal tenderness.   Musculoskeletal:         General: Normal range of motion.   Skin:     General: Skin is warm and dry.   Neurological:      General: No focal deficit  present.      Mental Status: He is alert and oriented to person, place, and time.             Significant Labs: All pertinent labs within the past 24 hours have been reviewed.  Recent Lab Results  (Last 5 results in the past 24 hours)        07/02/24  1110   07/02/24  0455   07/02/24  0316   07/02/24  0219   07/02/24  0000        Anion Gap   8             Baso #   0.03             Basophil %   0.8             BUN   11             Calcium   9.2             Chloride   105             CO2   24             Creatinine   0.8             Differential Method   Automated             eGFR   >60             Eos #   0.1             Eos %   2.1             Glucose   253             Gran # (ANC)   2.1             Gran %   53.5             Hematocrit   33.5             Hemoglobin   10.8             Immature Grans (Abs)   0.01  Comment: Mild elevation in immature granulocytes is non specific and   can be seen in a variety of conditions including stress response,   acute inflammation, trauma and pregnancy. Correlation with other   laboratory and clinical findings is essential.               Immature Granulocytes   0.3             Lymph #   1.3             Lymph %   32.8             MCH   30.4             MCHC   32.2             MCV   94             Mono #   0.4             Mono %   10.5             MPV   10.4             nRBC   0             Platelet Count   199             POCT Glucose 299     188   35   110       Potassium   4.1             RBC   3.55             RDW   11.6             Sodium   137             WBC   3.90                                    Significant Imaging: I have reviewed all pertinent imaging results/findings within the past 24 hours.    Assessment/Plan:      * Hypoglycemia  - Pt with very labile blood glucose, ranging from 20s to mid 400s   - monitor BG q4h   - continue hypoglycemia protocol   - discussed with bedside RN, pt has had good PO Intake   -glargine held, only on SSI       Acute metabolic  encephalopathy  -likely due to hypoglycemia  -resolved        Type 2 diabetes mellitus with hyperglycemia, with long-term current use of insulin  Patient's FSGs are uncontrolled due to hypoglycemia on current medication regimen.  Last A1c reviewed-   Lab Results   Component Value Date    HGBA1C 7.3 (H) 06/29/2024     Most recent fingerstick glucose reviewed-   Recent Labs   Lab 07/02/24  0000 07/02/24  0219 07/02/24  0316 07/02/24  1110   POCTGLUCOSE 110 35* 188* 299*       Current correctional scale   none  Continue  anti-hyperglycemic dose as follows-   Antihyperglycemics (From admission, onward)      Start     Stop Route Frequency Ordered    07/01/24 1127  insulin aspart U-100 pen 0-10 Units         -- SubQ Before meals & nightly PRN 07/01/24 1028          Hold Oral hypoglycemics while patient is in the hospital.  Pt was restarted on long acting insulin at decreased dose as his sugars were in the 400s but subsequently had hypoglycemia so long acting insulin discontinued   Will continue mod dose SSI for now and continue to monitor         Essential hypertension  Chronic, . Latest blood pressure and vitals reviewed-     Temp:  [97.8 °F (36.6 °C)-98.9 °F (37.2 °C)]   Pulse:  []   Resp:  [16-22]   BP: (115-179)/()   SpO2:  [94 %-99 %] .   Home meds for hypertension were reviewed and noted below.   Hypertension Medications               losartan (COZAAR) 100 MG tablet Take 1 tablet (100 mg total) by mouth once daily. TAKE 1 TABLET BY MOUTH ONCE DAILY    verapamiL (CALAN-SR) 240 MG CR tablet Take 1 tablet (240 mg total) by mouth once daily.            While in the hospital, will manage blood pressure as follows; Continue home antihypertensive regimen    Will utilize p.r.n. blood pressure medication only if patient's blood pressure greater than SBP > 170 and he develops symptoms such as worsening chest pain or shortness of breath.    Hyperlipidemia  -not currently take any medications to treat     Alcohol  abuse  -continue scheduled librium  -folic acid and thiamine        VTE Risk Mitigation (From admission, onward)           Ordered     heparin (porcine) injection 5,000 Units  Every 8 hours         07/01/24 1447     Place sequential compression device  Until discontinued         06/30/24 0835     IP VTE LOW RISK PATIENT  Once         06/29/24 1724                    Discharge Planning   ARON:      Code Status: Full Code   Is the patient medically ready for discharge?:     Reason for patient still in hospital (select all that apply): Patient trending condition  Discharge Plan A: Home with family        The patient's case has been reviewed by my supervising physician.   Supervising physician will provide additional orders and recommendations at his/her discretion.  Please see supervising physicians documentation and/or orders for further details.             Devorah Nava NP  Department of Hospital Medicine   [facility]

## 2024-07-02 NOTE — SUBJECTIVE & OBJECTIVE
Interval History: f/u hypoglycemia. Patient awake and alert. NAD. Patient reports overnight he felt clammy and states he could tell his BG was low. Overnight it dropped to 35, requiring him to be given glucose tabs. Denies any CP, Nausea, vomiting and SOB. Glargine held, will only continue sliding scale. Patient will eat a snack before bed to evaluate BG response. Will continue to trend BG.     Review of Systems  Objective:     Vital Signs (Most Recent):  Temp: 98.3 °F (36.8 °C) (07/02/24 1218)  Pulse: 104 (07/02/24 1218)  Resp: 18 (07/02/24 1222)  BP: 134/89 (07/02/24 1218)  SpO2: 98 % (07/02/24 1218) Vital Signs (24h Range):  Temp:  [97.8 °F (36.6 °C)-98.9 °F (37.2 °C)] 98.3 °F (36.8 °C)  Pulse:  [] 104  Resp:  [16-22] 18  SpO2:  [94 %-99 %] 98 %  BP: (115-179)/() 134/89     Weight: 70 kg (154 lb 5.2 oz)  Body mass index is 24.91 kg/m².    Intake/Output Summary (Last 24 hours) at 7/2/2024 1456  Last data filed at 7/1/2024 1700  Gross per 24 hour   Intake 360 ml   Output --   Net 360 ml         Physical Exam  Vitals and nursing note reviewed.   Constitutional:       General: He is not in acute distress.     Appearance: He is ill-appearing (chronically).   HENT:      Mouth/Throat:      Mouth: Mucous membranes are moist.      Pharynx: Oropharynx is clear.   Eyes:      Pupils: Pupils are equal, round, and reactive to light.   Cardiovascular:      Rate and Rhythm: Regular rhythm. Tachycardia present.      Heart sounds: No murmur heard.  Pulmonary:      Effort: Pulmonary effort is normal.      Breath sounds: Normal breath sounds. No wheezing.   Abdominal:      General: Bowel sounds are normal. There is no distension.      Palpations: Abdomen is soft.      Tenderness: There is no abdominal tenderness.   Musculoskeletal:         General: Normal range of motion.   Skin:     General: Skin is warm and dry.   Neurological:      General: No focal deficit present.      Mental Status: He is alert and oriented to  person, place, and time.             Significant Labs: All pertinent labs within the past 24 hours have been reviewed.  Recent Lab Results  (Last 5 results in the past 24 hours)        07/02/24  1110   07/02/24  0455   07/02/24  0316   07/02/24  0219   07/02/24  0000        Anion Gap   8             Baso #   0.03             Basophil %   0.8             BUN   11             Calcium   9.2             Chloride   105             CO2   24             Creatinine   0.8             Differential Method   Automated             eGFR   >60             Eos #   0.1             Eos %   2.1             Glucose   253             Gran # (ANC)   2.1             Gran %   53.5             Hematocrit   33.5             Hemoglobin   10.8             Immature Grans (Abs)   0.01  Comment: Mild elevation in immature granulocytes is non specific and   can be seen in a variety of conditions including stress response,   acute inflammation, trauma and pregnancy. Correlation with other   laboratory and clinical findings is essential.               Immature Granulocytes   0.3             Lymph #   1.3             Lymph %   32.8             MCH   30.4             MCHC   32.2             MCV   94             Mono #   0.4             Mono %   10.5             MPV   10.4             nRBC   0             Platelet Count   199             POCT Glucose 299     188   35   110       Potassium   4.1             RBC   3.55             RDW   11.6             Sodium   137             WBC   3.90                                    Significant Imaging: I have reviewed all pertinent imaging results/findings within the past 24 hours.

## 2024-07-02 NOTE — ASSESSMENT & PLAN NOTE
Patient's FSGs are uncontrolled due to hypoglycemia on current medication regimen.  Last A1c reviewed-   Lab Results   Component Value Date    HGBA1C 7.3 (H) 06/29/2024     Most recent fingerstick glucose reviewed-   Recent Labs   Lab 07/02/24  0000 07/02/24  0219 07/02/24  0316 07/02/24  1110   POCTGLUCOSE 110 35* 188* 299*       Current correctional scale   none  Continue  anti-hyperglycemic dose as follows-   Antihyperglycemics (From admission, onward)      Start     Stop Route Frequency Ordered    07/01/24 1127  insulin aspart U-100 pen 0-10 Units         -- SubQ Before meals & nightly PRN 07/01/24 1028          Hold Oral hypoglycemics while patient is in the hospital.  Pt was restarted on long acting insulin at decreased dose as his sugars were in the 400s but subsequently had hypoglycemia so long acting insulin discontinued   Will continue mod dose SSI for now and continue to monitor

## 2024-07-02 NOTE — NURSING
With trends of sudden changes in patient's blood glucose after administration of  regula insulin. Patient requested that insulin be given after completing lunch meal. Patient educated on importance of maintaining blood sugars and proper administration of regular insulin. Patient verbalized an understanding.

## 2024-07-02 NOTE — ASSESSMENT & PLAN NOTE
- Pt with very labile blood glucose, ranging from 20s to mid 400s   - monitor BG q4h   - continue hypoglycemia protocol   - discussed with bedside RN, pt has had good PO Intake   -glargine held, only on SSI

## 2024-07-02 NOTE — ASSESSMENT & PLAN NOTE
Chronic, . Latest blood pressure and vitals reviewed-     Temp:  [97.8 °F (36.6 °C)-98.9 °F (37.2 °C)]   Pulse:  []   Resp:  [16-22]   BP: (115-179)/()   SpO2:  [94 %-99 %] .   Home meds for hypertension were reviewed and noted below.   Hypertension Medications               losartan (COZAAR) 100 MG tablet Take 1 tablet (100 mg total) by mouth once daily. TAKE 1 TABLET BY MOUTH ONCE DAILY    verapamiL (CALAN-SR) 240 MG CR tablet Take 1 tablet (240 mg total) by mouth once daily.            While in the hospital, will manage blood pressure as follows; Continue home antihypertensive regimen    Will utilize p.r.n. blood pressure medication only if patient's blood pressure greater than SBP > 170 and he develops symptoms such as worsening chest pain or shortness of breath.

## 2024-07-03 VITALS
BODY MASS INDEX: 24.8 KG/M2 | SYSTOLIC BLOOD PRESSURE: 112 MMHG | HEART RATE: 100 BPM | DIASTOLIC BLOOD PRESSURE: 75 MMHG | OXYGEN SATURATION: 97 % | TEMPERATURE: 99 F | HEIGHT: 66 IN | WEIGHT: 154.31 LBS | RESPIRATION RATE: 16 BRPM

## 2024-07-03 LAB
POCT GLUCOSE: 158 MG/DL (ref 70–110)
POCT GLUCOSE: 317 MG/DL (ref 70–110)
POCT GLUCOSE: 323 MG/DL (ref 70–110)

## 2024-07-03 PROCEDURE — 25000003 PHARM REV CODE 250: Performed by: INTERNAL MEDICINE

## 2024-07-03 PROCEDURE — 63600175 PHARM REV CODE 636 W HCPCS: Performed by: NURSE PRACTITIONER

## 2024-07-03 PROCEDURE — 25000003 PHARM REV CODE 250: Performed by: NURSE PRACTITIONER

## 2024-07-03 PROCEDURE — 63600175 PHARM REV CODE 636 W HCPCS: Performed by: INTERNAL MEDICINE

## 2024-07-03 RX ORDER — LANOLIN ALCOHOL/MO/W.PET/CERES
100 CREAM (GRAM) TOPICAL DAILY
Qty: 30 TABLET | Refills: 0 | Status: SHIPPED | OUTPATIENT
Start: 2024-07-04 | End: 2024-08-03

## 2024-07-03 RX ORDER — INSULIN GLARGINE 100 [IU]/ML
INJECTION, SOLUTION SUBCUTANEOUS
Start: 2024-07-03

## 2024-07-03 RX ORDER — FOLIC ACID 1 MG/1
1 TABLET ORAL DAILY
Qty: 30 TABLET | Refills: 0 | Status: SHIPPED | OUTPATIENT
Start: 2024-07-04 | End: 2024-08-03

## 2024-07-03 RX ADMIN — CHLORDIAZEPOXIDE HYDROCHLORIDE 10 MG: 5 CAPSULE ORAL at 04:07

## 2024-07-03 RX ADMIN — LOSARTAN POTASSIUM 100 MG: 50 TABLET, FILM COATED ORAL at 08:07

## 2024-07-03 RX ADMIN — HEPARIN SODIUM 5000 UNITS: 5000 INJECTION INTRAVENOUS; SUBCUTANEOUS at 05:07

## 2024-07-03 RX ADMIN — HYDRALAZINE HYDROCHLORIDE 10 MG: 20 INJECTION, SOLUTION INTRAMUSCULAR; INTRAVENOUS at 12:07

## 2024-07-03 RX ADMIN — CHLORDIAZEPOXIDE HYDROCHLORIDE 10 MG: 5 CAPSULE ORAL at 09:07

## 2024-07-03 RX ADMIN — INSULIN ASPART 4 UNITS: 100 INJECTION, SOLUTION INTRAVENOUS; SUBCUTANEOUS at 12:07

## 2024-07-03 RX ADMIN — Medication 100 MG: at 08:07

## 2024-07-03 RX ADMIN — PREGABALIN 75 MG: 75 CAPSULE ORAL at 08:07

## 2024-07-03 RX ADMIN — INSULIN ASPART 8 UNITS: 100 INJECTION, SOLUTION INTRAVENOUS; SUBCUTANEOUS at 12:07

## 2024-07-03 RX ADMIN — HYDROCODONE BITARTRATE AND ACETAMINOPHEN 1 TABLET: 5; 325 TABLET ORAL at 05:07

## 2024-07-03 RX ADMIN — MUPIROCIN: 20 OINTMENT TOPICAL at 09:07

## 2024-07-03 RX ADMIN — VERAPAMIL HYDROCHLORIDE 240 MG: 240 TABLET ORAL at 08:07

## 2024-07-03 RX ADMIN — HYDROCODONE BITARTRATE AND ACETAMINOPHEN 1 TABLET: 5; 325 TABLET ORAL at 09:07

## 2024-07-03 RX ADMIN — FOLIC ACID 1 MG: 1 TABLET ORAL at 08:07

## 2024-07-03 NOTE — ASSESSMENT & PLAN NOTE
Chronic, . Latest blood pressure and vitals reviewed-     Temp:  [98.3 °F (36.8 °C)-99 °F (37.2 °C)]   Pulse:  []   Resp:  [16-19]   BP: (112-182)/()   SpO2:  [94 %-97 %] .   Home meds for hypertension were reviewed and noted below.   Hypertension Medications               losartan (COZAAR) 100 MG tablet Take 1 tablet (100 mg total) by mouth once daily. TAKE 1 TABLET BY MOUTH ONCE DAILY    verapamiL (CALAN-SR) 240 MG CR tablet Take 1 tablet (240 mg total) by mouth once daily.            While in the hospital, will manage blood pressure as follows; Continue home antihypertensive regimen    Will utilize p.r.n. blood pressure medication only if patient's blood pressure greater than SBP > 170 and he develops symptoms such as worsening chest pain or shortness of breath.

## 2024-07-03 NOTE — NURSING
Patient is ready for discharge. Patient stable alert and oriented. IVs removed. No complaints of pain. Discussed discharge plan. Reviewed medications and side effects, appointments, and answered questions with patient and family. RX sent to patient preferred pharmacy.

## 2024-07-03 NOTE — DISCHARGE SUMMARY
"[facility]  Hospital Medicine  Discharge Summary      Patient Name: Jorgito Arreaga  MRN: [id number]  MARTY: [id number]  Patient Class: IP- Inpatient  Admission Date: 6/29/2024  Hospital Length of Stay: 4 days  Discharge Date and Time:  07/03/2024 12:27 PM  Attending Physician: Magda Lerma MD   Discharging Provider: Devorah Nava NP  Primary Care Provider: Vonda Elliott FNP    Primary Care Team: Networked reference to record PCT     HPI:   The patient is a 59 yo male with past medical hsitory of alcohol abuse, diabetes, hypertension, pancreatitis and TIA who presented to [facility] ED after being found down. His brother heard a thud and found him unresponsive on the floor. EMS was called. His blood sugar was in the 20s. His mentation improved once he was administered dextrose. Workup in the ED revealed elevated alcohol level and hypoglycemia. He was noted to be hypothermic and hypotensive. He was started on D5 but hypoglycemia persisted. He was transitioned to D10 with improvement in his blood sugars. He was admitted to the ICU. He was given diet and continued on D10 overnight. He was able to maintain blood sugar off of D10. He was deemed stable for transfer to the floor. Hospital medicine was consulted. Patient reports his neuropathy is 'acting up". He is having pain from the knees down. Pins and needle pricks as well as shooting pain. He was on gabapentin but that has not helped much.    * No surgery found *      Hospital Course:   D5 was weaned off approx 2am on 07/01 as pt with hyperglycemia, started on mod dose SSI and glargine at decreased dose as BG trended in the 400s but pt subsequently had recurrent hypoglycemia to the 20s requiring D10 bolus.     Overnight 7/2 patients BG dropped to 35 requiring administration of glucose tabs. Glargine held and continued only sliding scale. Patient will be given a snack before bed and evaluating response of BG. Possible d/c in a.m.      Overnight " patient ate a snack at bedtime and no nighttime hypoglycemia was noted. Patient reports overall he is feeling well, denies any complaints, vitals and labs overall stable for discharge. Patient educated on importance of holding lantus until he follows up with PCP within a week. Patient educated on only using sliding scale insulin until he follows up with the pcp.Patient educated on importance of always checking blood glucose prior to administering any insulin. Patient advised to eat a snack before bed to prevent night time hypoglycemia.Patient educated on importance of following a diabetic diet. Patient verbalized understanding.      Patient seen and examined on the day of discharge.  All questions and concerns were addressed prior to discharge.    Face to face encounter with patient: 48 min      Goals of Care Treatment Preferences:  Code Status: Full Code      Consults:   Consults (From admission, onward)          Status Ordering Provider     Inpatient consult to Diabetes educator  Once        Provider:  (Not yet assigned)    CELESTINA Valentine     Inpatient consult to Hospitalist  Once        Provider:  Nette Davis MD    Completed FUENTES PINEDA            Neuro  Acute metabolic encephalopathy  -likely due to hypoglycemia  -resolved        Cardiac/Vascular  Essential hypertension  Chronic, . Latest blood pressure and vitals reviewed-     Temp:  [98.3 °F (36.8 °C)-99 °F (37.2 °C)]   Pulse:  []   Resp:  [16-19]   BP: (112-182)/()   SpO2:  [94 %-97 %] .   Home meds for hypertension were reviewed and noted below.   Hypertension Medications               losartan (COZAAR) 100 MG tablet Take 1 tablet (100 mg total) by mouth once daily. TAKE 1 TABLET BY MOUTH ONCE DAILY    verapamiL (CALAN-SR) 240 MG CR tablet Take 1 tablet (240 mg total) by mouth once daily.            While in the hospital, will manage blood pressure as follows; Continue home antihypertensive regimen    Will utilize  p.r.n. blood pressure medication only if patient's blood pressure greater than SBP > 170 and he develops symptoms such as worsening chest pain or shortness of breath.    Hyperlipidemia  -not currently take any medications to treat     Endocrine  * Hypoglycemia  - Pt with very labile blood glucose, ranging from 20s to mid 400s   - monitor BG q4h   - continue hypoglycemia protocol   - discussed with bedside RN, pt has had good PO Intake   -glargine held, only on SSI     -overnight no episodes of hypoglycemia noted, patient advised to hold lantus until follow up with pcp. Patient educated on importance of eating a nighttime snack to prevent night time hypoglycemia. Patient to use SSI only until follow up with pcp within a week   -patient educated on importance of checking blood glucose prior to administering any insulin   -patient to follow up with pcp within a week     Type 2 diabetes mellitus with hyperglycemia, with long-term current use of insulin  Patient's FSGs are uncontrolled due to hypoglycemia on current medication regimen.  Last A1c reviewed-   Lab Results   Component Value Date    HGBA1C 7.3 (H) 06/29/2024     Most recent fingerstick glucose reviewed-   Recent Labs   Lab 07/02/24 2002 07/03/24  0025 07/03/24  0409 07/03/24  1109   POCTGLUCOSE 296* 317* 158* 323*       Current correctional scale   none  Continue  anti-hyperglycemic dose as follows-   Antihyperglycemics (From admission, onward)      Start     Stop Route Frequency Ordered    07/01/24 1127  insulin aspart U-100 pen 0-10 Units         -- SubQ Before meals & nightly PRN 07/01/24 1028          Hold Oral hypoglycemics while patient is in the hospital.  Pt was restarted on long acting insulin at decreased dose as his sugars were in the 400s but subsequently had hypoglycemia so long acting insulin discontinued   Will continue mod dose SSI for now and continue to monitor         Other  Alcohol abuse  -continue scheduled librium while in- patient    -folic acid and thiamine, discharged with prescription   -educated on importance of cessation         Final Active Diagnoses:    Diagnosis Date Noted POA    PRINCIPAL PROBLEM:  Hypoglycemia [E16.2] 06/29/2024 Yes    Acute metabolic encephalopathy [G93.41] 06/29/2024 Yes    Type 2 diabetes mellitus with hyperglycemia, with long-term current use of insulin [E11.65, Z79.4] 01/05/2023 Not Applicable    Essential hypertension [I10] 03/30/2019 Yes    Hyperlipidemia [E78.5] 03/30/2019 Yes    Alcohol abuse [F10.10] 06/06/2016 Yes     Chronic      Problems Resolved During this Admission:       Discharged Condition: good    Disposition: Home or Self Care    Follow Up:   Follow-up Information       Vonda Elliott FNP. Schedule an appointment as soon as possible for a visit.    Specialty: Family Medicine  Why: FOLLOW UP WITHIN A WEEK  DO NOT TAKE LANTUS UNTIL YOU FOLLOW UP WITH PCP  Contact information:  [address] [place] [id number] [phone]                           Patient Instructions:      Diet diabetic     Diet Cardiac     Activity as tolerated       Significant Diagnostic Studies: Labs: BMP:   Recent Labs   Lab 07/02/24  0455   *      K 4.1      CO2 24   BUN 11   CREATININE 0.8   CALCIUM 9.2   , CMP   Recent Labs   Lab 07/02/24  0455      K 4.1      CO2 24   *   BUN 11   CREATININE 0.8   CALCIUM 9.2   ANIONGAP 8   , CBC   Recent Labs   Lab 07/02/24  0455   WBC 3.90   HGB 10.8*   HCT 33.5*      , INR   Lab Results   Component Value Date    INR 1.1 06/29/2024    INR 1.0 05/22/2024    INR 1.3 (H) 03/30/2019   , Lipid Panel   Lab Results   Component Value Date    CHOL 347 (H) 05/22/2024    HDL >140 (H) 05/22/2024    LDLCALC Unable to calculate 05/22/2024    TRIG 128 05/22/2024    CHOLHDL Unable to calculate 05/22/2024   , Troponin   Recent Labs   Lab 06/29/24  0635   TROPONINI 0.006   , A1C:   Recent Labs   Lab 05/22/24  0150 06/29/24  0635   HGBA1C 8.6* 7.3*   ,  "and All labs within the past 24 hours have been reviewed  Radiology: X-Ray: CXR: X-Ray Chest 1 View (CXR): No results found for this visit on 06/29/24. and X-Ray Chest PA and Lateral (CXR): No results found for this visit on 06/29/24. and KUB: X-Ray Abdomen AP 1 View (KUB): No results found for this visit on 06/29/24.  CT scan: CT ABDOMEN PELVIS WITH CONTRAST: No results found for this visit on 06/29/24. and CT ABDOMEN PELVIS WITHOUT CONTRAST: No results found for this visit on 06/29/24.    Pending Diagnostic Studies:       None           Medications:  Reconciled Home Medications:      Medication List        START taking these medications      folic acid 1 MG tablet  Commonly known as: FOLVITE  Take 1 tablet (1 mg total) by mouth once daily.  Start taking on: July 4, 2024     thiamine 100 MG tablet  Take 1 tablet (100 mg total) by mouth once daily.  Start taking on: July 4, 2024            CHANGE how you take these medications      LANTUS SOLOSTAR U-100 INSULIN 100 unit/mL (3 mL) Inpn pen  Generic drug: insulin glargine U-100 (Lantus)  DO NOT TAKE UNTIL YOU FOLLOW UP WITH PCP  What changed: additional instructions            CONTINUE taking these medications      aspirin 325 MG tablet  Take 1 tablet (325 mg total) by mouth once daily.     atorvastatin 40 MG tablet  Commonly known as: LIPITOR  Take 1 tablet (40 mg total) by mouth once daily.     blood sugar diagnostic Strp  Commonly known as: ONETOUCH ULTRA BLUE TEST STRIP  USE TO CHECK SUGAR BEFORE MEALS AND AT BEDTIME     clotrimazole-betamethasone 1-0.05% cream  Commonly known as: LOTRISONE  Apply topically 2 (two) times daily.     HumaLOG U-100 Insulin 100 unit/mL injection  Generic drug: insulin lispro  INJECT 8 UNITS SUBCUTANEOUSLY WITH LARGEST MEAL OF THE DAY, INJECT ADDITIONAL 4 UNITS AT A TIME IF GLUCOSE IS ELEVATED     insulin syringe-needle,dispos. 1 mL 28 gauge x 1/2" Syrg  1 each by Misc.(Non-Drug; Combo Route) route 2 (two) times daily.     lancets 33 " "gauge Misc  Commonly known as: ONETOUCH DELICA PLUS LANCET  Inject 1 lancet into the skin 4 (four) times daily before meals and nightly.     losartan 100 MG tablet  Commonly known as: COZAAR  Take 1 tablet (100 mg total) by mouth once daily. TAKE 1 TABLET BY MOUTH ONCE DAILY     mupirocin 2 % ointment  Commonly known as: BACTROBAN  Apply topically 3 (three) times daily.     pen needle, diabetic 32 gauge x 5/32" Ndle  Commonly known as: BD JOEL 2ND GEN PEN NEEDLE  Inject 1 each into the skin 4 (four) times daily before meals and nightly.     potassium, sodium phosphates 280-160-250 mg Pwpk  Commonly known as: PHOS-NAK  Take 1 packet by mouth 4 (four) times daily before meals and nightly.     verapamiL 240 MG CR tablet  Commonly known as: CALAN-SR  Take 1 tablet (240 mg total) by mouth once daily.              Indwelling Lines/Drains at time of discharge:   Lines/Drains/Airways       None                   Time spent on the discharge of patient: 56 minutes     The patient's case has been reviewed by my supervising physician.   Supervising physician will provide additional orders and recommendations at his/her discretion.  Please see supervising physicians documentation and/or orders for further details.       Devorah Nava NP  Department of Hospital Medicine  [facility]  "

## 2024-07-03 NOTE — ASSESSMENT & PLAN NOTE
-continue scheduled librium while in- patient   -folic acid and thiamine, discharged with prescription   -educated on importance of cessation

## 2024-07-03 NOTE — ASSESSMENT & PLAN NOTE
- Pt with very labile blood glucose, ranging from 20s to mid 400s   - monitor BG q4h   - continue hypoglycemia protocol   - discussed with bedside RN, pt has had good PO Intake   -glargine held, only on SSI     -overnight no episodes of hypoglycemia noted, patient advised to hold lantus until follow up with pcp. Patient educated on importance of eating a nighttime snack to prevent night time hypoglycemia. Patient to use SSI only until follow up with pcp within a week   -patient educated on importance of checking blood glucose prior to administering any insulin   -patient to follow up with pcp within a week

## 2024-07-03 NOTE — CONSULTS
Consult received. The medical records were reviewed.     Current admission diagnosis: Hypoglycemia    Past Medical History:   Diagnosis Date    Abscess 9/5/2018    Cataracts, bilateral     Chest pain 12/6/2018    Dehydration     Diabetes mellitus, type 2     Diagnosed 7/2016    Folliculitis 9/15/2017    GERD (gastroesophageal reflux disease)     Hypertension     Inflammatory polyps of colon     Pancreatitis, alcoholic, acute     Port catheter in place     TIA (transient ischemic attack)     Tinea cruris 9/15/2017       Lab Results   Component Value Date    HGBA1C 7.3 (H) 06/29/2024        Lab Results   Component Value Date    POCTGLUCOSE 323 (H) 07/03/2024    POCTGLUCOSE 158 (H) 07/03/2024    POCTGLUCOSE 317 (H) 07/03/2024    POCTGLUCOSE 296 (H) 07/02/2024    POCTGLUCOSE 148 (H) 07/02/2024       Met with pt  for diabetes management practices. Reviewed current A1C and target. Reviewed disease process and medical complications associated with uncontrolled diabetes. Encouraged lifestyle management to include weight loss, increased activity, and appropriate diet as a part of diabetes management.     Reviewed medications, how they work and possible S/E. Discussed insulin: including types of insulin & onset, peak and duration of action, proper insulin storage, proper injection technique, importance of site rotation, and disposal of sharps. Reviewed insulin injections technique and patient meets all protocols . Patient states the provider has adjusted his insulin dose and time of dose. Patient will also follow up with PCP within a week to inform of adjustments and results. Also recommended patient inquire about a CGM when seen by provider.     Pt reports having  meter and supplies at home. Pt checks blood sugars 3 times per day. Discussed S/S of hypoglycemia. Patient was admitted with hypoglycemia and states he has had episodes of hypoglycemia but can normally treat it when symptoms start. However states this last  episode is when he was sleeping. Reviewed appropriate treatment options. Reviewed S/S of hyperglycemia. Discussed when to test for ketones: BS>250, as well as S/S of DKA. Reviewed ketone results and when to seek medical help.     Discussed carbohydrate containing foods, 35-60 carbohydrate choice meal plan using pictures; serving sizes, Plate Method, label reading, and Calorie Ahsan Samra. Encouraged to avoid obvious sources of sugar.     Reviewed the diabetes care checklist and the importance of regular follow up with physician. Left diabetes education packet for reference and contact information. Encouraged pt to contact diabetes education team with any questions or concerns.

## 2024-07-03 NOTE — ASSESSMENT & PLAN NOTE
Patient's FSGs are uncontrolled due to hypoglycemia on current medication regimen.  Last A1c reviewed-   Lab Results   Component Value Date    HGBA1C 7.3 (H) 06/29/2024     Most recent fingerstick glucose reviewed-   Recent Labs   Lab 07/02/24 2002 07/03/24  0025 07/03/24  0409 07/03/24  1109   POCTGLUCOSE 296* 317* 158* 323*       Current correctional scale   none  Continue  anti-hyperglycemic dose as follows-   Antihyperglycemics (From admission, onward)    Start     Stop Route Frequency Ordered    07/01/24 1127  insulin aspart U-100 pen 0-10 Units         -- SubQ Before meals & nightly PRN 07/01/24 1028        Hold Oral hypoglycemics while patient is in the hospital.  Pt was restarted on long acting insulin at decreased dose as his sugars were in the 400s but subsequently had hypoglycemia so long acting insulin discontinued   Will continue mod dose SSI for now and continue to monitor

## 2024-07-03 NOTE — PLAN OF CARE
[facility]  Discharge Final Note    Primary Care Provider: Vonda Elliott FNP    Expected Discharge Date: 7/3/2024    Final Discharge Note (most recent)       Final Note - 07/03/24 1054          Final Note    Assessment Type Final Discharge Note     Anticipated Discharge Disposition Home or Self Care        Post-Acute Status    Discharge Delays None known at this time                   Pt to discharge home today. Nursing staff assisting with transportation home.   No CM needs/consults for discharge.     Important Message from Medicare

## 2024-07-03 NOTE — DISCHARGE INSTRUCTIONS
DO NOT TAKE LANTUS (LONG-ACTING INSULIN) UNTIL YOU FOLLOW UP WITH PCP  FOLLOW UP WITH PCP WITHIN A WEEK FOR RE-EVALUATION   EAT A SNACK  BEFORE BED TO HELP PREVENT NIGHT TIME HYPOGLYCEMIA   DO NOT TAKE INSULIN WITHOUT CHECKING BLOOD GLUCOSE FIRST

## 2024-07-04 LAB
BACTERIA BLD CULT: NORMAL
BACTERIA BLD CULT: NORMAL

## 2024-07-05 ENCOUNTER — HOSPITAL ENCOUNTER (EMERGENCY)
Facility: HOSPITAL | Age: 59
Discharge: HOME | End: 2024-07-05
Attending: EMERGENCY MEDICINE
Payer: MEDICAID

## 2024-07-05 VITALS
RESPIRATION RATE: 16 BRPM | WEIGHT: 154 LBS | DIASTOLIC BLOOD PRESSURE: 89 MMHG | SYSTOLIC BLOOD PRESSURE: 164 MMHG | BODY MASS INDEX: 24.86 KG/M2 | OXYGEN SATURATION: 100 % | HEART RATE: 68 BPM | TEMPERATURE: 98 F

## 2024-07-05 DIAGNOSIS — E16.2 HYPOGLYCEMIA: Primary | ICD-10-CM

## 2024-07-05 LAB
ALBUMIN SERPL BCP-MCNC: 3.7 G/DL (ref 3.5–5.2)
ALP SERPL-CCNC: 66 U/L (ref 55–135)
ALT SERPL W/O P-5'-P-CCNC: 26 U/L (ref 10–44)
ANION GAP SERPL CALC-SCNC: 10 MMOL/L (ref 8–16)
AST SERPL-CCNC: 38 U/L (ref 10–40)
BASOPHILS # BLD AUTO: 0.03 K/UL (ref 0–0.2)
BASOPHILS NFR BLD: 0.7 % (ref 0–1.9)
BILIRUB SERPL-MCNC: 0.5 MG/DL (ref 0.1–1)
BILIRUB UR QL STRIP: NEGATIVE
BUN SERPL-MCNC: 11 MG/DL (ref 6–20)
CALCIUM SERPL-MCNC: 9.8 MG/DL (ref 8.7–10.5)
CHLORIDE SERPL-SCNC: 107 MMOL/L (ref 95–110)
CLARITY UR REFRACT.AUTO: CLEAR
CO2 SERPL-SCNC: 26 MMOL/L (ref 23–29)
COLOR UR AUTO: YELLOW
CREAT SERPL-MCNC: 0.8 MG/DL (ref 0.5–1.4)
DIFFERENTIAL METHOD BLD: ABNORMAL
EOSINOPHIL # BLD AUTO: 0 K/UL (ref 0–0.5)
EOSINOPHIL NFR BLD: 0.5 % (ref 0–8)
ERYTHROCYTE [DISTWIDTH] IN BLOOD BY AUTOMATED COUNT: 11.9 % (ref 11.5–14.5)
EST. GFR  (NO RACE VARIABLE): >60 ML/MIN/1.73 M^2
ETHANOL SERPL-MCNC: <10 MG/DL
GLUCOSE SERPL-MCNC: 99 MG/DL (ref 70–110)
GLUCOSE UR QL STRIP: NEGATIVE
HCT VFR BLD AUTO: 35.4 % (ref 40–54)
HGB BLD-MCNC: 11.8 G/DL (ref 14–18)
HGB UR QL STRIP: NEGATIVE
IMM GRANULOCYTES # BLD AUTO: 0.01 K/UL (ref 0–0.04)
IMM GRANULOCYTES NFR BLD AUTO: 0.2 % (ref 0–0.5)
KETONES UR QL STRIP: NEGATIVE
LEUKOCYTE ESTERASE UR QL STRIP: NEGATIVE
LYMPHOCYTES # BLD AUTO: 1.2 K/UL (ref 1–4.8)
LYMPHOCYTES NFR BLD: 27.4 % (ref 18–48)
MCH RBC QN AUTO: 30.8 PG (ref 27–31)
MCHC RBC AUTO-ENTMCNC: 33.3 G/DL (ref 32–36)
MCV RBC AUTO: 92 FL (ref 82–98)
MONOCYTES # BLD AUTO: 0.6 K/UL (ref 0.3–1)
MONOCYTES NFR BLD: 12.5 % (ref 4–15)
NEUTROPHILS # BLD AUTO: 2.6 K/UL (ref 1.8–7.7)
NEUTROPHILS NFR BLD: 58.7 % (ref 38–73)
NITRITE UR QL STRIP: NEGATIVE
NRBC BLD-RTO: 0 /100 WBC
PH UR STRIP: 6 [PH] (ref 5–8)
PLATELET # BLD AUTO: 215 K/UL (ref 150–450)
PMV BLD AUTO: 10.1 FL (ref 9.2–12.9)
POCT GLUCOSE: 129 MG/DL (ref 70–110)
POCT GLUCOSE: 133 MG/DL (ref 70–110)
POCT GLUCOSE: 173 MG/DL (ref 70–110)
POCT GLUCOSE: 258 MG/DL (ref 70–110)
POTASSIUM SERPL-SCNC: 3.8 MMOL/L (ref 3.5–5.1)
PROT SERPL-MCNC: 6.7 G/DL (ref 6–8.4)
PROT UR QL STRIP: NEGATIVE
RBC # BLD AUTO: 3.83 M/UL (ref 4.6–6.2)
SODIUM SERPL-SCNC: 143 MMOL/L (ref 136–145)
SP GR UR STRIP: 1.02 (ref 1–1.03)
URN SPEC COLLECT METH UR: NORMAL
UROBILINOGEN UR STRIP-ACNC: <2 EU/DL
WBC # BLD AUTO: 4.41 K/UL (ref 3.9–12.7)

## 2024-07-05 PROCEDURE — 81003 URINALYSIS AUTO W/O SCOPE: CPT | Mod: 59,ER | Performed by: EMERGENCY MEDICINE

## 2024-07-05 PROCEDURE — 80053 COMPREHEN METABOLIC PANEL: CPT | Mod: ER | Performed by: EMERGENCY MEDICINE

## 2024-07-05 PROCEDURE — 25000003 PHARM REV CODE 250: Mod: ER | Performed by: EMERGENCY MEDICINE

## 2024-07-05 PROCEDURE — 82077 ASSAY SPEC XCP UR&BREATH IA: CPT | Mod: ER | Performed by: EMERGENCY MEDICINE

## 2024-07-05 PROCEDURE — 82962 GLUCOSE BLOOD TEST: CPT | Mod: ER

## 2024-07-05 PROCEDURE — 85025 COMPLETE CBC W/AUTO DIFF WBC: CPT | Mod: ER | Performed by: EMERGENCY MEDICINE

## 2024-07-05 PROCEDURE — 99283 EMERGENCY DEPT VISIT LOW MDM: CPT | Mod: ER

## 2024-07-05 RX ORDER — FERROUS SULFATE TAB 325 MG (65 MG ELEMENTAL FE) 325 (65 FE) MG
TAB ORAL
COMMUNITY
Start: 2024-06-21

## 2024-07-05 RX ORDER — GABAPENTIN 800 MG/1
800 TABLET ORAL 3 TIMES DAILY
COMMUNITY
Start: 2024-06-21

## 2024-07-05 RX ORDER — GABAPENTIN 300 MG/1
300 CAPSULE ORAL 3 TIMES DAILY
COMMUNITY
Start: 2024-06-05 | End: 2024-07-12

## 2024-07-05 RX ORDER — LOSARTAN POTASSIUM 25 MG/1
100 TABLET ORAL
Status: COMPLETED | OUTPATIENT
Start: 2024-07-05 | End: 2024-07-05

## 2024-07-05 RX ADMIN — LOSARTAN POTASSIUM 100 MG: 25 TABLET, FILM COATED ORAL at 11:07

## 2024-07-05 NOTE — DISCHARGE INSTRUCTIONS
Do not take your long-acting insulin.  Do not take Lantus.  Do do your short-acting Humalog purse sliding scale.

## 2024-07-05 NOTE — ED PROVIDER NOTES
Emergency Medicine Provider Note - 7/5/2024       History     Chief Complaint   Patient presents with    Hypoglycemia     BGL dropping at night, recent hospital stay, unable to wake this am, [facility] gave 25g D5 en route, arrives A&O without compliants       Allergies:  Review of patient's allergies indicates:  No Known Allergies     History of Present Illness   HPI    7/5/2024, 9:55 AM  The history is provided by the patient [facility] and old chart    Jorgito Arreaga is a 58 y.o. male presenting to the ED for low blood sugar.    History per [facility], called to the home because of low blood sugar.  On arrival at the home, patient's blood sugar was 33.  He was given 25 g of D 50.  Blood sugar improved to 113.  Then rechecked was 82.    Patient had been hospitalized for 4 days for repeated hypoglycemia..  Patient was discharged on July 3rd.  Patient was instructed to hold Lantus, continue sliding scale insulin, hold all oral hypoglycemics, and the importance of eating a nighttime snack.  Patient was also counseled to decrease alcohol intake.  Patient educated on importance of eating a nighttime snack to prevent nighttime hypoglycemia and checking his glucose levels before administering the sliding scale..  Patient  he had taken his Lantus 50 units subQ yesterday morning.  He did not eat during the whole day until last night at around 9-10 o'clock last night.  (Beans and barbecue.)  Next thing he remembers waking up with paramedics around him.  He denied any preceding chest pain, chest pressure, shortness of breath, abdominal pain, nausea, vomiting, diarrhea.  Symptoms had improved.    Reviewed old Chart:  Patient Name: Jorgito Arreaga  MRN: [id number]  [facility]: [id number]  Patient Class: IP- Inpatient  Admission Date: 6/29/2024  Hospital Length of Stay: 4 days  Discharge Date and Time:  07/03/2024 12:27 PM  Attending Physician: Magda Lerma MD   Discharging Provider: Devorah Nava,  "NP  Primary Care Provider: Vonda Elliott FNP     Primary Care Team: Networked reference to record PCT      HPI:   The patient is a 59 yo male with past medical hsitory of alcohol abuse, diabetes, hypertension, pancreatitis and TIA who presented to [facility] ED after being found down. His brother heard a thud and found him unresponsive on the floor. EMS was called. His blood sugar was in the 20s. His mentation improved once he was administered dextrose. Workup in the ED revealed elevated alcohol level and hypoglycemia. He was noted to be hypothermic and hypotensive. He was started on D5 but hypoglycemia persisted. He was transitioned to D10 with improvement in his blood sugars. He was admitted to the ICU. He was given diet and continued on D10 overnight. He was able to maintain blood sugar off of D10. He was deemed stable for transfer to the floor. Hospital medicine was consulted. Patient reports his neuropathy is 'acting up". He is having pain from the knees down. Pins and needle pricks as well as shooting pain. He was on gabapentin but that has not helped much.     * No surgery found *       Hospital Course:   D5 was weaned off approx 2am on 07/01 as pt with hyperglycemia, started on mod dose SSI and glargine at decreased dose as BG trended in the 400s but pt subsequently had recurrent hypoglycemia to the 20s requiring D10 bolus.      Overnight 7/2 patients BG dropped to 35 requiring administration of glucose tabs. Glargine held and continued only sliding scale. Patient will be given a snack before bed and evaluating response of BG. Possible d/c in a.m.        Overnight patient ate a snack at bedtime and no nighttime hypoglycemia was noted. Patient reports overall he is feeling well, denies any complaints, vitals and labs overall stable for discharge. Patient educated on importance of holding lantus until he follows up with PCP within a week. Patient educated on only using sliding scale insulin until he follows up " with the pcp.Patient educated on importance of always checking blood glucose prior to administering any insulin. Patient advised to eat a snack before bed to prevent night time hypoglycemia.Patient educated on importance of following a diabetic diet. Patient verbalized understanding.       Patient seen and examined on the day of discharge.  All questions and concerns were addressed prior to discharge.     Face to face encounter with patient: 48 min      Endocrine  * Hypoglycemia  - Pt with very labile blood glucose, ranging from 20s to mid 400s   - monitor BG q4h   - continue hypoglycemia protocol   - discussed with bedside RN, pt has had good PO Intake   -glargine held, only on SSI      -overnight no episodes of hypoglycemia noted, patient advised to hold lantus until follow up with pcp. Patient educated on importance of eating a nighttime snack to prevent night time hypoglycemia. Patient to use SSI only until follow up with pcp within a week   -patient educated on importance of checking blood glucose prior to administering any insulin   -patient to follow up with pcp within a week      Type 2 diabetes mellitus with hyperglycemia, with long-term current use of insulin  Patient's FSGs are uncontrolled due to hypoglycemia on current medication regimen.  Last A1c reviewed-       Arrival mode: [facility]     PCP: Vonda Elliott FNP     Past Medical History:  Past Medical History:   Diagnosis Date    Abscess 9/5/2018    Cataracts, bilateral     Chest pain 12/6/2018    Dehydration     Diabetes mellitus, type 2     Diagnosed 7/2016    Folliculitis 9/15/2017    GERD (gastroesophageal reflux disease)     Hypertension     Inflammatory polyps of colon     Pancreatitis, alcoholic, acute     Port catheter in place     TIA (transient ischemic attack)     Tinea cruris 9/15/2017       Past Surgical History:  Past Surgical History:   Procedure Laterality Date    COLONOSCOPY      COLONOSCOPY N/A 6/15/2018     Procedure: COLONOSCOPY;  Surgeon: Jim Hicks MD;  Location: [facility] ENDO;  Service: Endoscopy;  Laterality: N/A;    ESOPHAGOGASTRODUODENOSCOPY N/A 6/15/2018    Procedure: ESOPHAGOGASTRODUODENOSCOPY (EGD);  Surgeon: Jim Hicks MD;  Location: [facility] ENDO;  Service: Endoscopy;  Laterality: N/A;    ROBOT-ASSISTED LAPAROSCOPIC REPAIR OF INGUINAL HERNIA USING DA JANET XI Bilateral 11/30/2022    Procedure: XI ROBOTIC REPAIR, HERNIA, INGUINAL;  Surgeon: Smooth Denny MD;  Location: [facility] OR;  Service: General;  Laterality: Bilateral;         Family History:  Family History   Problem Relation Name Age of Onset    Hypertension Mother      Hypertension Father         Social History:  Social History     Tobacco Use    Smoking status: Former     Types: Cigarettes    Smokeless tobacco: Never   Substance and Sexual Activity    Alcohol use: Yes     Comment:  Daily - States that he drinks 1/2 pint of vodka and about 1-2 cans of beer daily    Drug use: No    Sexual activity: Yes     Partners: Female        Review of Systems   Review of Systems   Constitutional:  Negative for fever.   HENT:  Negative for sore throat.    Respiratory:  Negative for shortness of breath.    Cardiovascular:  Negative for chest pain.   Gastrointestinal:  Negative for nausea.   Genitourinary:  Negative for dysuria.   Musculoskeletal:  Negative for back pain.   Skin:  Negative for rash.   Neurological:  Negative for weakness.        (+) altered level of consciousness-resolved.   Hematological:  Does not bruise/bleed easily.      Physical Exam     Initial Vitals   BP Pulse Resp Temp SpO2   07/05/24 1102 07/05/24 0956 07/05/24 0952 07/05/24 0952 07/05/24 1102   (!) 179/103 64 16 97.5 °F (36.4 °C) 100 %      MAP       --                 Physical Exam    Nursing Notes and Vital Signs Reviewed.  Constitutional: Patient is in no apparent distress. Well-developed and well-nourished.  Head: Atraumatic. Normocephalic.  Eyes: PERRL. EOM intact. Conjunctivae are  not pale. No scleral icterus.  ENT: Mucous membranes are moist. Oropharynx is clear and symmetric.    Neck: Supple. Full ROM. No lymphadenopathy.  Cardiovascular: Regular rate. Regular rhythm. No murmurs, rubs, or gallops. Distal pulses are 2+ and symmetric.  Pulmonary/Chest: No respiratory distress. Clear to auscultation bilaterally. No wheezing or rales.  Abdominal: Soft and non-distended.  There is no tenderness.  No rebound, guarding, or rigidity. Good bowel sounds.  Genitourinary: No CVA tenderness  Musculoskeletal: Moves all extremities. No obvious deformities. No edema. No calf tenderness.  Skin: Warm and dry.  Neurological:  Alert, awake, and appropriate.  Normal speech.  No acute focal neurological deficits are appreciated.  Psychiatric: Normal affect. Good eye contact. Appropriate in content.     ED Course   ED Procedures:  Procedures    ED Vital Signs:  Vitals:    07/05/24 0952 07/05/24 0956 07/05/24 1102 07/05/24 1142   BP:   (!) 179/103 (!) 159/101   Pulse:  64 69    Resp: 16  15    Temp: 97.5 °F (36.4 °C)      TempSrc: Oral      SpO2:   100%    Weight: 69.9 kg (154 lb)       07/05/24 1303 07/05/24 1350   BP: (!) 175/99 (!) 164/89   Pulse: 68    Resp:  16   Temp:     TempSrc:     SpO2: 100%    Weight:         Abnormal Lab Results:  Labs Reviewed   CBC W/ AUTO DIFFERENTIAL - Abnormal; Notable for the following components:       Result Value    RBC 3.83 (*)     Hemoglobin 11.8 (*)     Hematocrit 35.4 (*)     All other components within normal limits   POCT GLUCOSE - Abnormal; Notable for the following components:    POCT Glucose 129 (*)     All other components within normal limits   POCT GLUCOSE - Abnormal; Notable for the following components:    POCT Glucose 173 (*)     All other components within normal limits   POCT GLUCOSE - Abnormal; Notable for the following components:    POCT Glucose 133 (*)     All other components within normal limits   POCT GLUCOSE - Abnormal; Notable for the following  components:    POCT Glucose 258 (*)     All other components within normal limits   COMPREHENSIVE METABOLIC PANEL   URINALYSIS, REFLEX TO URINE CULTURE    Narrative:     Specimen Source->Urine   ALCOHOL,MEDICAL (ETHANOL)   ALCOHOL,MEDICAL (ETHANOL)        All Lab Results:  Results for orders placed or performed during the hospital encounter of 07/05/24   CBC auto differential   Result Value Ref Range    WBC 4.41 3.90 - 12.70 K/uL    RBC 3.83 (L) 4.60 - 6.20 M/uL    Hemoglobin 11.8 (L) 14.0 - 18.0 g/dL    Hematocrit 35.4 (L) 40.0 - 54.0 %    MCV 92 82 - 98 fL    MCH 30.8 27.0 - 31.0 pg    MCHC 33.3 32.0 - 36.0 g/dL    RDW 11.9 11.5 - 14.5 %    Platelets 215 150 - 450 K/uL    MPV 10.1 9.2 - 12.9 fL    Immature Granulocytes 0.2 0.0 - 0.5 %    Gran # (ANC) 2.6 1.8 - 7.7 K/uL    Immature Grans (Abs) 0.01 0.00 - 0.04 K/uL    Lymph # 1.2 1.0 - 4.8 K/uL    Mono # 0.6 0.3 - 1.0 K/uL    Eos # 0.0 0.0 - 0.5 K/uL    Baso # 0.03 0.00 - 0.20 K/uL    nRBC 0 0 /100 WBC    Gran % 58.7 38.0 - 73.0 %    Lymph % 27.4 18.0 - 48.0 %    Mono % 12.5 4.0 - 15.0 %    Eosinophil % 0.5 0.0 - 8.0 %    Basophil % 0.7 0.0 - 1.9 %    Differential Method Automated    Comprehensive metabolic panel   Result Value Ref Range    Sodium 143 136 - 145 mmol/L    Potassium 3.8 3.5 - 5.1 mmol/L    Chloride 107 95 - 110 mmol/L    CO2 26 23 - 29 mmol/L    Glucose 99 70 - 110 mg/dL    BUN 11 6 - 20 mg/dL    Creatinine 0.8 0.5 - 1.4 mg/dL    Calcium 9.8 8.7 - 10.5 mg/dL    Total Protein 6.7 6.0 - 8.4 g/dL    Albumin 3.7 3.5 - 5.2 g/dL    Total Bilirubin 0.5 0.1 - 1.0 mg/dL    Alkaline Phosphatase 66 55 - 135 U/L    AST 38 10 - 40 U/L    ALT 26 10 - 44 U/L    eGFR >60.0 >60 mL/min/1.73 m^2    Anion Gap 10 8 - 16 mmol/L   Urinalysis, Reflex to Urine Culture Urine, Clean Catch    Specimen: Urine   Result Value Ref Range    Specimen UA Urine, Clean Catch     Color, UA Yellow Yellow, Straw, Brittany    Appearance, UA Clear Clear    pH, UA 6.0 5.0 - 8.0    Specific  Gravity, UA 1.025 1.005 - 1.030    Protein, UA Negative Negative    Glucose, UA Negative Negative    Ketones, UA Negative Negative    Bilirubin (UA) Negative Negative    Occult Blood UA Negative Negative    Nitrite, UA Negative Negative    Urobilinogen, UA <2.0 <2.0 EU/dL    Leukocytes, UA Negative Negative   Ethanol   Result Value Ref Range    Alcohol, Serum <10 <10 mg/dL   POCT glucose   Result Value Ref Range    POCT Glucose 129 (H) 70 - 110 mg/dL   POCT glucose   Result Value Ref Range    POCT Glucose 173 (H) 70 - 110 mg/dL   POCT glucose   Result Value Ref Range    POCT Glucose 133 (H) 70 - 110 mg/dL   POCT glucose   Result Value Ref Range    POCT Glucose 258 (H) 70 - 110 mg/dL             Imaging Results:  Imaging Results    None               The Emergency Provider reviewed the vital signs and test results, which are outlined above.     ED Discussion   ED Medication(s):  Medications   losartan tablet 100 mg (100 mg Oral Given 7/5/24 1142)       ED Course as of 07/06/24 0648   Fri Jul 05, 2024   1159 Alcohol, Serum: <10 [LB]   1159 POCT Glucose(!): 133 [LB]      ED Course User Index  [LB] Phyllis Gonzalez,       Latest Reference Range & Units 07/05/24 09:51 07/05/24 10:56 07/05/24 11:54 07/05/24 13:04   POCT Glucose 70 - 110 mg/dL 129 (H) 173 (H) 133 (H) 258 (H)   (H): Data is abnormally high       1:19 PM  Reassessment: Dr. Gonzalez reassessed the pt.  The pt is resting comfortably and is NAD.  Pt states their sx have improved. Discussed test results, shared treatment plan, specific conditions for return, and the need for f/u.  Answered their questions at this time.  Pt understands and agrees to the plan.  The pt has remained hemodynamically stable through ED course and is stable for discharge.    I discussed with patient and/or family/caretaker that evaluation in the ED does not suggest any emergent or life threatening medical conditions requiring immediate intervention beyond what was provided in the  ED, and I believe patient is safe for discharge.  Regardless, an unremarkable evaluation in the ED does not preclude the development or presence of a serious of life threatening condition. As such, patient was instructed to return immediately for any worsening or change in current symptoms.     MIPS Measures     Smoker? No     Hypertension: History of Hypertension: The patient has elevated blood pressure (higher than 120/80) while being treated in the ED but has a history of hypertension.       Medical Decision Making                 Medical Decision Making  Differential diagnosis: Medical noncompliance, hypoglycemia, infection, accidental overdose    ED course:  Patient was provided food.  Repeat Accu-Chek in the emergency department was 129.  Patient underwent serial repeat Accu-Cheks while in the emergency department.  Patient did not have any episodes of hypoglycemia.  WBC normal.  Hemoglobin/hematocrit 11.8/35.4.  Previous 10.8/33.5.  CMP normal.  Alcohol less than 10, urinalysis negative for infection.  Patient was educated avoiding alcohol.  No signs of alcohol withdrawal.  Encouraged to eat regular meals.  Patient again was educated to stop his Lantus and oral hypoglycemics.  Patient educated to use sliding scale insulin.  Patient verbalized understanding.  All questions answered.    Amount and/or Complexity of Data Reviewed  Independent Historian: EMS     Details: See HPI.  External Data Reviewed: labs and notes.     Details: See HPI.  Labs: ordered. Decision-making details documented in ED Course.    Risk  Prescription drug management.        Coding    Prescription Management: I performed a review of the patient's current Rx medication list as input by nursing staff.    Discharge Medication List as of 7/5/2024  1:21 PM        CONTINUE these medications which have NOT CHANGED    Details   aspirin 325 MG tablet Take 1 tablet (325 mg total) by mouth once daily., Starting Mon 9/27/2021, Normal      FEROSUL 325  "mg (65 mg iron) Tab tablet Take by mouth., Starting Fri 6/21/2024, Historical Med      folic acid (FOLVITE) 1 MG tablet Take 1 tablet (1 mg total) by mouth once daily., Starting Thu 7/4/2024, Until Sat 8/3/2024, Normal      gabapentin (NEURONTIN) 300 MG capsule Take 300 mg by mouth 3 (three) times daily., Starting Wed 6/5/2024, Historical Med      gabapentin (NEURONTIN) 800 MG tablet Take 800 mg by mouth 3 (three) times daily., Starting Fri 6/21/2024, Historical Med      insulin glargine U-100, Lantus, (LANTUS SOLOSTAR U-100 INSULIN) 100 unit/mL (3 mL) InPn pen DO NOT TAKE UNTIL YOU FOLLOW UP WITH PCP, No Print      losartan (COZAAR) 100 MG tablet Take 1 tablet (100 mg total) by mouth once daily. TAKE 1 TABLET BY MOUTH ONCE DAILY, Starting Thu 5/23/2024, Until Fri 7/5/2024, Normal      thiamine 100 MG tablet Take 1 tablet (100 mg total) by mouth once daily., Starting Thu 7/4/2024, Until Sat 8/3/2024, Normal      verapamiL (CALAN-SR) 240 MG CR tablet Take 1 tablet (240 mg total) by mouth once daily., Starting Thu 5/23/2024, Until Fri 7/5/2024, Normal      atorvastatin (LIPITOR) 40 MG tablet Take 1 tablet (40 mg total) by mouth once daily., Starting Thu 5/23/2024, Until Sat 6/22/2024, Normal      blood sugar diagnostic (ONETOUCH ULTRA BLUE TEST STRIP) Strp USE TO CHECK SUGAR BEFORE MEALS AND AT BEDTIME, Normal      clotrimazole-betamethasone 1-0.05% (LOTRISONE) cream Apply topically 2 (two) times daily., Starting Tue 3/31/2020, Normal      HUMALOG U-100 INSULIN 100 unit/mL injection INJECT 8 UNITS SUBCUTANEOUSLY WITH LARGEST MEAL OF THE DAY, INJECT ADDITIONAL 4 UNITS AT A TIME IF GLUCOSE IS ELEVATED, Normal      insulin syringe-needle,dispos. 1 mL 28 gauge x 1/2" Syrg 1 each by Misc.(Non-Drug; Combo Route) route 2 (two) times daily., Starting Fri 12/7/2018, OTC      lancets (ONETOUCH DELICA PLUS LANCET) 33 gauge Misc Inject 1 lancet into the skin 4 (four) times daily before meals and nightly., Starting u 1/5/2023, " "Until Wed 4/5/2023, Normal      mupirocin (BACTROBAN) 2 % ointment Apply topically 3 (three) times daily., Starting Tue 9/29/2020, Normal      pen needle, diabetic (BD JOEL 2ND GEN PEN NEEDLE) 32 gauge x 5/32" Ndle Inject 1 each into the skin 4 (four) times daily before meals and nightly., Starting Thu 1/5/2023, Normal      potassium, sodium phosphates (PHOS-NAK) 280-160-250 mg PwPk Take 1 packet by mouth 4 (four) times daily before meals and nightly., Starting Sun 3/31/2019, OTC              Discussed case with:N/A      Portions of this note may have been created with voice recognition software. Occasional "wrong-word" or "sound-a-like" substitutions may have occurred due to the inherent limitations of voice recognition software. Please, read the note carefully and recognize, using context, where substitutions have occurred.          Clinical Impression       ICD-10-CM ICD-9-CM   1. Hypoglycemia  E16.2 251.2        Disposition        Disposition: Discharge to home  Patient condition: Stable      ED Follow-up      Follow-up Information       Vonda Elliott FNP In 2 days.    Specialty: Family Medicine  Why: Return to emergency department for chest pain, chest pressure, confusion, low blood sugar, difficulty breathing, or other concerns.  Contact information:  [address] [place] [id number] [phone]                                      Phyllis Gonzalez,   07/06/24 0648    "

## 2024-07-12 ENCOUNTER — HOSPITAL ENCOUNTER (EMERGENCY)
Facility: HOSPITAL | Age: 59
Discharge: LEFT BEFORE DISCHARGE | End: 2024-07-12
Attending: EMERGENCY MEDICINE
Payer: MEDICAID

## 2024-07-12 VITALS
RESPIRATION RATE: 19 BRPM | SYSTOLIC BLOOD PRESSURE: 192 MMHG | DIASTOLIC BLOOD PRESSURE: 121 MMHG | WEIGHT: 154 LBS | BODY MASS INDEX: 24.86 KG/M2 | HEART RATE: 89 BPM | OXYGEN SATURATION: 99 % | TEMPERATURE: 97 F

## 2024-07-12 DIAGNOSIS — D72.819 LEUKOPENIA, UNSPECIFIED TYPE: ICD-10-CM

## 2024-07-12 DIAGNOSIS — T68.XXXA HYPOTHERMIA, INITIAL ENCOUNTER: ICD-10-CM

## 2024-07-12 DIAGNOSIS — E11.649 TYPE 2 DIABETES MELLITUS WITH HYPOGLYCEMIA WITHOUT COMA, WITH LONG-TERM CURRENT USE OF INSULIN: ICD-10-CM

## 2024-07-12 DIAGNOSIS — E16.2 HYPOGLYCEMIA: Primary | ICD-10-CM

## 2024-07-12 DIAGNOSIS — E11.65 HYPERGLYCEMIA DUE TO DIABETES MELLITUS: ICD-10-CM

## 2024-07-12 DIAGNOSIS — D64.9 NORMOCYTIC ANEMIA: ICD-10-CM

## 2024-07-12 DIAGNOSIS — I10 PRIMARY HYPERTENSION: ICD-10-CM

## 2024-07-12 DIAGNOSIS — E87.6 HYPOKALEMIA: ICD-10-CM

## 2024-07-12 DIAGNOSIS — Z79.4 TYPE 2 DIABETES MELLITUS WITH HYPOGLYCEMIA WITHOUT COMA, WITH LONG-TERM CURRENT USE OF INSULIN: ICD-10-CM

## 2024-07-12 LAB
ALBUMIN SERPL BCP-MCNC: 3.7 G/DL (ref 3.5–5.2)
ALLENS TEST: ABNORMAL
ALP SERPL-CCNC: 77 U/L (ref 55–135)
ALT SERPL W/O P-5'-P-CCNC: 32 U/L (ref 10–44)
AMPHET+METHAMPHET UR QL: NEGATIVE
ANION GAP SERPL CALC-SCNC: 13 MMOL/L (ref 8–16)
AST SERPL-CCNC: 35 U/L (ref 10–40)
B-OH-BUTYR BLD STRIP-SCNC: 0.3 MMOL/L (ref 0–0.5)
BARBITURATES UR QL SCN>200 NG/ML: NEGATIVE
BASOPHILS # BLD AUTO: 0.02 K/UL (ref 0–0.2)
BASOPHILS NFR BLD: 0.5 % (ref 0–1.9)
BENZODIAZ UR QL SCN>200 NG/ML: ABNORMAL
BILIRUB SERPL-MCNC: 0.8 MG/DL (ref 0.1–1)
BILIRUB UR QL STRIP: NEGATIVE
BUN SERPL-MCNC: 10 MG/DL (ref 6–20)
BZE UR QL SCN: NEGATIVE
CALCIUM SERPL-MCNC: 9.7 MG/DL (ref 8.7–10.5)
CANNABINOIDS UR QL SCN: NEGATIVE
CHLORIDE SERPL-SCNC: 104 MMOL/L (ref 95–110)
CLARITY UR REFRACT.AUTO: CLEAR
CO2 SERPL-SCNC: 26 MMOL/L (ref 23–29)
COLOR UR AUTO: ABNORMAL
CREAT SERPL-MCNC: 0.8 MG/DL (ref 0.5–1.4)
CREAT UR-MCNC: 217.3 MG/DL (ref 23–375)
DELSYS: ABNORMAL
DIFFERENTIAL METHOD BLD: ABNORMAL
EOSINOPHIL # BLD AUTO: 0 K/UL (ref 0–0.5)
EOSINOPHIL NFR BLD: 1.1 % (ref 0–8)
ERYTHROCYTE [DISTWIDTH] IN BLOOD BY AUTOMATED COUNT: 11.8 % (ref 11.5–14.5)
EST. GFR  (NO RACE VARIABLE): >60 ML/MIN/1.73 M^2
ETHANOL SERPL-MCNC: <10 MG/DL
GLUCOSE SERPL-MCNC: 127 MG/DL (ref 70–110)
GLUCOSE UR QL STRIP: NEGATIVE
HCO3 UR-SCNC: 33.6 MMOL/L (ref 24–28)
HCT VFR BLD AUTO: 35.8 % (ref 40–54)
HGB BLD-MCNC: 11.7 G/DL (ref 14–18)
HGB UR QL STRIP: NEGATIVE
IMM GRANULOCYTES # BLD AUTO: 0.01 K/UL (ref 0–0.04)
IMM GRANULOCYTES NFR BLD AUTO: 0.3 % (ref 0–0.5)
KETONES UR QL STRIP: NEGATIVE
LEUKOCYTE ESTERASE UR QL STRIP: NEGATIVE
LYMPHOCYTES # BLD AUTO: 1 K/UL (ref 1–4.8)
LYMPHOCYTES NFR BLD: 25.1 % (ref 18–48)
MAGNESIUM SERPL-MCNC: 1.7 MG/DL (ref 1.6–2.6)
MCH RBC QN AUTO: 30.1 PG (ref 27–31)
MCHC RBC AUTO-ENTMCNC: 32.7 G/DL (ref 32–36)
MCV RBC AUTO: 92 FL (ref 82–98)
METHADONE UR QL SCN>300 NG/ML: NEGATIVE
MODE: ABNORMAL
MONOCYTES # BLD AUTO: 0.4 K/UL (ref 0.3–1)
MONOCYTES NFR BLD: 9.5 % (ref 4–15)
NEUTROPHILS # BLD AUTO: 2.4 K/UL (ref 1.8–7.7)
NEUTROPHILS NFR BLD: 63.5 % (ref 38–73)
NITRITE UR QL STRIP: NEGATIVE
NRBC BLD-RTO: 0 /100 WBC
OHS QRS DURATION: 84 MS
OHS QTC CALCULATION: 434 MS
OPIATES UR QL SCN: NEGATIVE
PCO2 BLDA: 58.1 MMHG (ref 35–45)
PCP UR QL SCN>25 NG/ML: NEGATIVE
PH SMN: 7.37 [PH] (ref 7.35–7.45)
PH UR STRIP: 6 [PH] (ref 5–8)
PLATELET # BLD AUTO: 226 K/UL (ref 150–450)
PMV BLD AUTO: 10.8 FL (ref 9.2–12.9)
PO2 BLDA: 23 MMHG (ref 40–60)
POC BE: 8 MMOL/L
POC SATURATED O2: 38 % (ref 95–100)
POCT GLUCOSE: 141 MG/DL (ref 70–110)
POCT GLUCOSE: 226 MG/DL (ref 70–110)
POCT GLUCOSE: 487 MG/DL (ref 70–110)
POCT GLUCOSE: >500 MG/DL (ref 70–110)
POTASSIUM SERPL-SCNC: 3.2 MMOL/L (ref 3.5–5.1)
PROT SERPL-MCNC: 6.7 G/DL (ref 6–8.4)
PROT UR QL STRIP: NEGATIVE
RBC # BLD AUTO: 3.89 M/UL (ref 4.6–6.2)
SAMPLE: ABNORMAL
SITE: ABNORMAL
SODIUM SERPL-SCNC: 143 MMOL/L (ref 136–145)
SP GR UR STRIP: >=1.03 (ref 1–1.03)
TOXICOLOGY INFORMATION: ABNORMAL
TROPONIN I SERPL DL<=0.01 NG/ML-MCNC: 0.01 NG/ML (ref 0–0.03)
URN SPEC COLLECT METH UR: ABNORMAL
UROBILINOGEN UR STRIP-ACNC: <2 EU/DL
WBC # BLD AUTO: 3.79 K/UL (ref 3.9–12.7)

## 2024-07-12 PROCEDURE — 99900035 HC TECH TIME PER 15 MIN (STAT): Mod: ER

## 2024-07-12 PROCEDURE — 81003 URINALYSIS AUTO W/O SCOPE: CPT | Mod: ER,59 | Performed by: EMERGENCY MEDICINE

## 2024-07-12 PROCEDURE — 82077 ASSAY SPEC XCP UR&BREATH IA: CPT | Mod: ER | Performed by: EMERGENCY MEDICINE

## 2024-07-12 PROCEDURE — 80307 DRUG TEST PRSMV CHEM ANLYZR: CPT | Mod: ER | Performed by: EMERGENCY MEDICINE

## 2024-07-12 PROCEDURE — 93005 ELECTROCARDIOGRAM TRACING: CPT | Mod: ER

## 2024-07-12 PROCEDURE — 96361 HYDRATE IV INFUSION ADD-ON: CPT | Mod: ER

## 2024-07-12 PROCEDURE — 25000003 PHARM REV CODE 250: Mod: ER | Performed by: EMERGENCY MEDICINE

## 2024-07-12 PROCEDURE — 83735 ASSAY OF MAGNESIUM: CPT | Mod: ER | Performed by: EMERGENCY MEDICINE

## 2024-07-12 PROCEDURE — 85025 COMPLETE CBC W/AUTO DIFF WBC: CPT | Mod: ER | Performed by: EMERGENCY MEDICINE

## 2024-07-12 PROCEDURE — 84484 ASSAY OF TROPONIN QUANT: CPT | Mod: ER | Performed by: EMERGENCY MEDICINE

## 2024-07-12 PROCEDURE — 99291 CRITICAL CARE FIRST HOUR: CPT | Mod: 25,ER

## 2024-07-12 PROCEDURE — 82962 GLUCOSE BLOOD TEST: CPT | Mod: ER

## 2024-07-12 PROCEDURE — 94761 N-INVAS EAR/PLS OXIMETRY MLT: CPT | Mod: ER

## 2024-07-12 PROCEDURE — 80053 COMPREHEN METABOLIC PANEL: CPT | Mod: ER | Performed by: EMERGENCY MEDICINE

## 2024-07-12 PROCEDURE — 82010 KETONE BODYS QUAN: CPT | Mod: ER | Performed by: EMERGENCY MEDICINE

## 2024-07-12 PROCEDURE — 96375 TX/PRO/DX INJ NEW DRUG ADDON: CPT | Mod: ER

## 2024-07-12 PROCEDURE — 96365 THER/PROPH/DIAG IV INF INIT: CPT | Mod: ER

## 2024-07-12 PROCEDURE — 63600175 PHARM REV CODE 636 W HCPCS: Mod: ER | Performed by: EMERGENCY MEDICINE

## 2024-07-12 PROCEDURE — 82803 BLOOD GASES ANY COMBINATION: CPT | Mod: ER

## 2024-07-12 PROCEDURE — 93010 ELECTROCARDIOGRAM REPORT: CPT | Mod: ,,, | Performed by: INTERNAL MEDICINE

## 2024-07-12 RX ORDER — LOSARTAN POTASSIUM 25 MG/1
100 TABLET ORAL
Status: COMPLETED | OUTPATIENT
Start: 2024-07-12 | End: 2024-07-12

## 2024-07-12 RX ORDER — LABETALOL HYDROCHLORIDE 5 MG/ML
10 INJECTION, SOLUTION INTRAVENOUS
Status: COMPLETED | OUTPATIENT
Start: 2024-07-12 | End: 2024-07-12

## 2024-07-12 RX ORDER — HYDRALAZINE HYDROCHLORIDE 20 MG/ML
10 INJECTION INTRAMUSCULAR; INTRAVENOUS
Status: COMPLETED | OUTPATIENT
Start: 2024-07-12 | End: 2024-07-12

## 2024-07-12 RX ORDER — MAGNESIUM SULFATE 1 G/100ML
1 INJECTION INTRAVENOUS
Status: COMPLETED | OUTPATIENT
Start: 2024-07-12 | End: 2024-07-12

## 2024-07-12 RX ADMIN — HYDRALAZINE HYDROCHLORIDE 10 MG: 20 INJECTION, SOLUTION INTRAMUSCULAR; INTRAVENOUS at 11:07

## 2024-07-12 RX ADMIN — SODIUM CHLORIDE 1000 ML: 9 INJECTION, SOLUTION INTRAVENOUS at 12:07

## 2024-07-12 RX ADMIN — LOSARTAN POTASSIUM 100 MG: 25 TABLET, FILM COATED ORAL at 09:07

## 2024-07-12 RX ADMIN — LABETALOL HYDROCHLORIDE 10 MG: 5 INJECTION INTRAVENOUS at 12:07

## 2024-07-12 RX ADMIN — MAGNESIUM SULFATE IN DEXTROSE 1 G: 10 INJECTION, SOLUTION INTRAVENOUS at 10:07

## 2024-07-12 RX ADMIN — POTASSIUM BICARBONATE 40 MEQ: 391 TABLET, EFFERVESCENT ORAL at 10:07

## 2024-07-12 NOTE — ED PROVIDER NOTES
Encounter Date: 7/12/2024       History     Chief Complaint   Patient presents with    Hypoglycemia     BGL 25 this am, D10 250ml given en route, Humalog taken last night     58-year-old male with a history of type 2 diabetes, TIA, hypertension, and alcohol abuse who presents to the emergency department with hypoglycemia.  Last night patient states he ate in his glucose was around 300 and he took Humalog.  Patient states he took 5 units of Humalog around 3:30 a.m. for a glucose of 180, but did not eat.  He states he drank alcohol yesterday afternoon.  He was found to have a glucose of 25 and received 250 cc of D10 infusion.  Glucose now 141.  Patient does not take any other medication for his diabetes in his on a sliding scale only.    The history is provided by the patient, the EMS personnel and medical records.     Review of patient's allergies indicates:  No Known Allergies  Past Medical History:   Diagnosis Date    Abscess 9/5/2018    Cataracts, bilateral     Chest pain 12/6/2018    Dehydration     Diabetes mellitus, type 2     Diagnosed 7/2016    Folliculitis 9/15/2017    GERD (gastroesophageal reflux disease)     Hypertension     Inflammatory polyps of colon     Pancreatitis, alcoholic, acute     Port catheter in place     TIA (transient ischemic attack)     Tinea cruris 9/15/2017     Past Surgical History:   Procedure Laterality Date    COLONOSCOPY      COLONOSCOPY N/A 6/15/2018    Procedure: COLONOSCOPY;  Surgeon: Jim Hicks MD;  Location: [facility];  Service: Endoscopy;  Laterality: N/A;    ESOPHAGOGASTRODUODENOSCOPY N/A 6/15/2018    Procedure: ESOPHAGOGASTRODUODENOSCOPY (EGD);  Surgeon: Jim Hicks MD;  Location: [facility];  Service: Endoscopy;  Laterality: N/A;    ROBOT-ASSISTED LAPAROSCOPIC REPAIR OF INGUINAL HERNIA USING DA JANET XI Bilateral 11/30/2022    Procedure: XI ROBOTIC REPAIR, HERNIA, INGUINAL;  Surgeon: Smooth Denny MD;  Location: [facility];  Service: General;  Laterality: Bilateral;      Family History   Problem Relation Name Age of Onset    Hypertension Mother      Hypertension Father       Social History     Tobacco Use    Smoking status: Former     Types: Cigarettes    Smokeless tobacco: Never   Substance Use Topics    Alcohol use: Yes     Comment:  Daily - States that he drinks 1/2 pint of vodka and about 1-2 cans of beer daily    Drug use: No     Review of Systems   Constitutional:  Positive for chills and diaphoresis. Negative for fever.   Respiratory:  Negative for shortness of breath.    Cardiovascular:  Negative for chest pain.   Gastrointestinal:  Negative for abdominal pain, constipation, diarrhea, nausea and vomiting.   Genitourinary:  Negative for dysuria.   Skin:  Negative for rash.   Neurological:  Negative for dizziness, light-headedness and headaches.       Physical Exam     Initial Vitals [07/12/24 0842]   BP Pulse Resp Temp SpO2   (!) 177/115 68 16 (!) 94.3 °F (34.6 °C) 100 %      MAP       --         Physical Exam    Vitals reviewed.  Constitutional: He appears well-developed and well-nourished. He is diaphoretic. No distress.   HENT:   Head: Normocephalic and atraumatic.   Right Ear: External ear normal.   Left Ear: External ear normal.   Nose: Nose normal.   Mouth/Throat: Oropharynx is clear and moist. No oropharyngeal exudate.   Eyes: Conjunctivae and EOM are normal. Pupils are equal, round, and reactive to light. Right eye exhibits no discharge. Left eye exhibits no discharge. No scleral icterus.   Neck: Neck supple.   Normal range of motion.  Cardiovascular:  Normal rate, regular rhythm and normal heart sounds.     Exam reveals no gallop and no friction rub.       No murmur heard.  Pulmonary/Chest: Breath sounds normal. No respiratory distress. He has no wheezes. He has no rhonchi. He has no rales.   Abdominal: Abdomen is soft. Bowel sounds are normal. He exhibits no distension. There is no abdominal tenderness. There is no rebound and no guarding.   Musculoskeletal:          General: No tenderness or edema. Normal range of motion.      Cervical back: Normal range of motion and neck supple.     Neurological: He is alert and oriented to person, place, and time.   Skin: Skin is warm.   Psychiatric: He expresses no suicidal ideation.         ED Course   Critical Care    Date/Time: 7/12/2024 9:22 AM    Performed by: Mare Zhu DO  Authorized by: Mare Zhu DO  Direct patient critical care time: 15 minutes  Additional history critical care time: 10 minutes  Ordering / reviewing critical care time: 15 minutes  Documentation critical care time: 10 minutes  Total critical care time (exclusive of procedural time) : 50 minutes  Critical care time was exclusive of separately billable procedures and treating other patients and teaching time.  Critical care was necessary to treat or prevent imminent or life-threatening deterioration of the following conditions: endocrine crisis.  Critical care was time spent personally by me on the following activities: development of treatment plan with patient or surrogate, evaluation of patient's response to treatment, examination of patient, obtaining history from patient or surrogate, ordering and performing treatments and interventions, ordering and review of laboratory studies, ordering and review of radiographic studies, pulse oximetry, review of old charts and re-evaluation of patient's condition.        Labs Reviewed   CBC W/ AUTO DIFFERENTIAL - Abnormal; Notable for the following components:       Result Value    WBC 3.79 (*)     RBC 3.89 (*)     Hemoglobin 11.7 (*)     Hematocrit 35.8 (*)     All other components within normal limits   COMPREHENSIVE METABOLIC PANEL - Abnormal; Notable for the following components:    Potassium 3.2 (*)     Glucose 127 (*)     All other components within normal limits   URINALYSIS, REFLEX TO URINE CULTURE - Abnormal; Notable for the following components:    Color, UA Other (*)     Specific Gravity, UA  >=1.030 (*)     All other components within normal limits    Narrative:     Specimen Source->Urine   DRUG SCREEN PANEL, URINE EMERGENCY - Abnormal; Notable for the following components:    Benzodiazepines Presumptive Positive (*)     All other components within normal limits    Narrative:     Specimen Source->Urine   POCT GLUCOSE - Abnormal; Notable for the following components:    POCT Glucose 141 (*)     All other components within normal limits   POCT GLUCOSE - Abnormal; Notable for the following components:    POCT Glucose 226 (*)     All other components within normal limits   POCT GLUCOSE - Abnormal; Notable for the following components:    POCT Glucose 487 (*)     All other components within normal limits   ISTAT PROCEDURE - Abnormal; Notable for the following components:    POC PCO2 58.1 (*)     POC PO2 23 (*)     POC HCO3 33.6 (*)     POC BE 8 (*)     All other components within normal limits   POCT GLUCOSE - Abnormal; Notable for the following components:    POCT Glucose >500 (*)     All other components within normal limits   MAGNESIUM   TROPONIN I   ALCOHOL,MEDICAL (ETHANOL)   BETA - HYDROXYBUTYRATE, SERUM   POCT GLUCOSE MONITORING CONTINUOUS   POCT GLUCOSE MONITORING CONTINUOUS     EKG Readings: (Independently Interpreted)   Normal sinus rhythm with sinus arrhythmia.  Rate 60.  TN interval 1 70.  QRS 84.  .  Q-waves in the septal leads.  No ST or T-wave changes.  Normal axis.     ECG Results              EKG 12-lead (Final result)        Collection Time Result Time QRS Duration OHS QTC Calculation    07/12/24 09:17:19 07/12/24 13:27:31 84 434                     Final result by Interface, Lab In [facility] (07/12/24 13:27:36)                   Narrative:    Test Reason : E16.2,    Vent. Rate : 060 BPM     Atrial Rate : 060 BPM     P-R Int : 170 ms          QRS Dur : 084 ms      QT Int : 434 ms       P-R-T Axes : 045 -12 -01 degrees     QTc Int : 434 ms    Normal sinus rhythm with sinus  arrhythmia  Cannot exclude Septal infarct (cited on or before 29-JUN-2024)  When compared with ECG of 29-JUN-2024 06:42,  Questionable change in initial forces of Septal leads  QT has shortened  Confirmed by STACEY BEGUM, ALYSHA HAYS ([id number]) on 7/12/2024 1:27:30 PM    Referred By:             Confirmed By:ALYSHA IBARRA MD                                  Imaging Results              X-Ray Chest AP Portable (Final result)  Result time 07/12/24 09:07:19      Final result by Keenan Dee MD (07/12/24 09:07:19)                   Impression:      1.  Negative for acute process involving the chest.    2.  Stable findings as noted above.      Electronically signed by: Keenan Dee MD  Date:    07/12/2024  Time:    09:07               Narrative:    EXAMINATION:  XR CHEST AP PORTABLE    CLINICAL HISTORY:  hypoglycemia;    COMPARISON:  Studies dating back to April 21, 2020    FINDINGS:  EKG leads overlie the chest.  Stable scarring in the right greater than left lung bases.  The lungs are otherwise clear.  The cardiac silhouette size is normal. The trachea is midline and the mediastinal width is normal. Negative for focal infiltrate, effusion or pneumothorax. Pulmonary vasculature is normal. Negative for osseous abnormalities. Ectatic and tortuous aorta with aortic arch calcifications as well as degenerative changes of the spine and both shoulder girdles again seen.                                    X-Rays:   Independently Interpreted Readings:   Chest X-Ray: Normal heart size.  No infiltrates.  No acute abnormalities.     Medications   losartan tablet 100 mg (100 mg Oral Given 7/12/24 0949)   magnesium sulfate in dextrose IVPB (premix) 1 g (0 g Intravenous Stopped 7/12/24 1137)   potassium bicarbonate disintegrating tablet 40 mEq (40 mEq Oral Given 7/12/24 1028)   hydrALAZINE injection 10 mg (10 mg Intravenous Given 7/12/24 1117)   sodium chloride 0.9% bolus 1,000 mL 1,000 mL (0 mLs Intravenous Stopped 7/12/24 1325)  "  labetaloL injection 10 mg (10 mg Intravenous Given 7/12/24 3429)     Medical Decision Making  58-year-old male with a history of diabetes type 2 and hypertension who presents with labile glucose.  Patient initially severely hypoglycemic and then becomes severely hyperglycemic.  Patient also hypothermic that normalized with active rewarming.  ABG shows no acidosis and BHB negative for ketosis therefore DKA is unlikely.  Additionally patient has no change in mental status making HHS unlikely.  He has no ketones in his urine and no signs of infection in urine or on chest x-ray.  Additionally his blood pressure is elevated despite treatment but he has no signs of end-organ dysfunction on CMP including KOREY.  He does have mild hypokalemia with a magnesium less than 2.0 therefore electrolyte replacement was initiated in the ER.  EKG and troponin negative for ACS.  Drug screen positive for benzodiazepines.  Unable to pull up LA  in epic to confirm prescription.  Otherwise urine drug screen negative.  Alcohol level less than 10.  CBC shows chronic leukopenia and anemia with normal platelet count.  Chest x-ray negative for pneumonia.  Given his labile blood pressure and glucose I recommended transfer to the hospital of his choice for at least observation.  He refused stating that he would like to "figure it out at home. "He understands risks of death and permanent disability among others, see scanned AMA form.      Amount and/or Complexity of Data Reviewed  Labs: ordered. Decision-making details documented in ED Course.  Radiology: ordered and independent interpretation performed. Decision-making details documented in ED Course.  ECG/medicine tests: ordered and independent interpretation performed. Decision-making details documented in ED Course.    Risk  Prescription drug management.  Decision regarding hospitalization.      Additional MDM:   Differential Diagnosis:   Includes but is not limited to alcohol abuse, " accidental insulin overdose, anorexia, ACS, infection, drug abuse.              ED Course as of 07/12/24 1446   Fri Jul 12, 2024 0924 WBC(!): 3.79 [NF]   0924 Hemoglobin(!): 11.7 [NF]   0924 Hematocrit(!): 35.8 [NF]   0924 Platelet Count: 226 [NF]   0925 CBC is at baseline.  Patient is hypothermic and hypertensive.  Chest x-ray does not show any evidence of pneumonia. [NF]   0925 POCT Glucose(!): 141  Given diet [NF]   0955 Potassium(!): 3.2 [NF]   0955 Magnesium : 1.7 [NF]   1033 Troponin I: 0.014 [NF]   1033 Alcohol, Serum: <10 [NF]   1033 POCT Glucose(!): 226 [NF]   1122 Benzodiazepines(!): Presumptive Positive  No signs of urinary tract infection [NF]   1159 POCT Glucose(!!): 487 [NF]   1230 POC PH: 7.370 [NF]   1238 Beta-Hydroxybutyrate: 0.3 [NF]      ED Course User Index  [NF] Mare Zhu DO                             Clinical Impression:  Final diagnoses:  [E16.2] Hypoglycemia (Primary)  [T68.XXXA] Hypothermia, initial encounter  [D72.819] Leukopenia, unspecified type  [D64.9] Normocytic anemia  [E11.649, Z79.4] Type 2 diabetes mellitus with hypoglycemia without coma, with long-term current use of insulin  [E87.6] Hypokalemia  [I10] Primary hypertension  [E11.65] Hyperglycemia due to diabetes mellitus          ED Disposition Condition    AMA Stable                Mare Zhu DO  07/12/24 1447

## 2024-08-01 ENCOUNTER — HOSPITAL ENCOUNTER (EMERGENCY)
Facility: HOSPITAL | Age: 59
Discharge: HOME | End: 2024-08-01
Attending: STUDENT IN AN ORGANIZED HEALTH CARE EDUCATION/TRAINING PROGRAM
Payer: MEDICAID

## 2024-08-01 VITALS
SYSTOLIC BLOOD PRESSURE: 151 MMHG | BODY MASS INDEX: 24.87 KG/M2 | OXYGEN SATURATION: 100 % | DIASTOLIC BLOOD PRESSURE: 94 MMHG | TEMPERATURE: 98 F | HEART RATE: 74 BPM | RESPIRATION RATE: 18 BRPM | WEIGHT: 154.13 LBS

## 2024-08-01 DIAGNOSIS — E87.6 HYPOKALEMIA: Primary | ICD-10-CM

## 2024-08-01 DIAGNOSIS — I95.9 HYPOTENSION: ICD-10-CM

## 2024-08-01 DIAGNOSIS — R55 VASOVAGAL EPISODE: ICD-10-CM

## 2024-08-01 LAB
ALBUMIN SERPL BCP-MCNC: 3.4 G/DL (ref 3.5–5.2)
ALP SERPL-CCNC: 49 U/L (ref 55–135)
ALT SERPL W/O P-5'-P-CCNC: 22 U/L (ref 10–44)
AMPHET+METHAMPHET UR QL: NEGATIVE
ANION GAP SERPL CALC-SCNC: 13 MMOL/L (ref 8–16)
AST SERPL-CCNC: 24 U/L (ref 10–40)
BARBITURATES UR QL SCN>200 NG/ML: NEGATIVE
BASOPHILS # BLD AUTO: 0.02 K/UL (ref 0–0.2)
BASOPHILS NFR BLD: 0.6 % (ref 0–1.9)
BENZODIAZ UR QL SCN>200 NG/ML: ABNORMAL
BILIRUB SERPL-MCNC: 1.7 MG/DL (ref 0.1–1)
BILIRUB UR QL STRIP: NEGATIVE
BUN SERPL-MCNC: 10 MG/DL (ref 6–20)
BZE UR QL SCN: NEGATIVE
CALCIUM SERPL-MCNC: 9.3 MG/DL (ref 8.7–10.5)
CANNABINOIDS UR QL SCN: NEGATIVE
CHLORIDE SERPL-SCNC: 104 MMOL/L (ref 95–110)
CLARITY UR REFRACT.AUTO: CLEAR
CO2 SERPL-SCNC: 23 MMOL/L (ref 23–29)
COLOR UR AUTO: YELLOW
CREAT SERPL-MCNC: 1.2 MG/DL (ref 0.5–1.4)
CREAT UR-MCNC: 53.4 MG/DL (ref 23–375)
DIFFERENTIAL METHOD BLD: ABNORMAL
EOSINOPHIL # BLD AUTO: 0 K/UL (ref 0–0.5)
EOSINOPHIL NFR BLD: 0.8 % (ref 0–8)
ERYTHROCYTE [DISTWIDTH] IN BLOOD BY AUTOMATED COUNT: 12.2 % (ref 11.5–14.5)
EST. GFR  (NO RACE VARIABLE): >60 ML/MIN/1.73 M^2
GLUCOSE SERPL-MCNC: 146 MG/DL (ref 70–110)
GLUCOSE UR QL STRIP: ABNORMAL
HCT VFR BLD AUTO: 32.9 % (ref 40–54)
HGB BLD-MCNC: 10.9 G/DL (ref 14–18)
HGB UR QL STRIP: ABNORMAL
IMM GRANULOCYTES # BLD AUTO: 0.01 K/UL (ref 0–0.04)
IMM GRANULOCYTES NFR BLD AUTO: 0.3 % (ref 0–0.5)
KETONES UR QL STRIP: NEGATIVE
LEUKOCYTE ESTERASE UR QL STRIP: NEGATIVE
LYMPHOCYTES # BLD AUTO: 1.3 K/UL (ref 1–4.8)
LYMPHOCYTES NFR BLD: 35.7 % (ref 18–48)
MAGNESIUM SERPL-MCNC: 1.6 MG/DL (ref 1.6–2.6)
MCH RBC QN AUTO: 30 PG (ref 27–31)
MCHC RBC AUTO-ENTMCNC: 33.1 G/DL (ref 32–36)
MCV RBC AUTO: 91 FL (ref 82–98)
METHADONE UR QL SCN>300 NG/ML: NEGATIVE
MICROSCOPIC COMMENT: NORMAL
MONOCYTES # BLD AUTO: 0.6 K/UL (ref 0.3–1)
MONOCYTES NFR BLD: 16.3 % (ref 4–15)
NEUTROPHILS # BLD AUTO: 1.7 K/UL (ref 1.8–7.7)
NEUTROPHILS NFR BLD: 46.3 % (ref 38–73)
NITRITE UR QL STRIP: NEGATIVE
NRBC BLD-RTO: 0 /100 WBC
OPIATES UR QL SCN: NEGATIVE
PCP UR QL SCN>25 NG/ML: NEGATIVE
PH UR STRIP: 6 [PH] (ref 5–8)
PLATELET # BLD AUTO: 154 K/UL (ref 150–450)
PMV BLD AUTO: 10.6 FL (ref 9.2–12.9)
POCT GLUCOSE: 165 MG/DL (ref 70–110)
POTASSIUM SERPL-SCNC: 2.9 MMOL/L (ref 3.5–5.1)
PROT SERPL-MCNC: 5.9 G/DL (ref 6–8.4)
PROT UR QL STRIP: NEGATIVE
RBC # BLD AUTO: 3.63 M/UL (ref 4.6–6.2)
RBC #/AREA URNS AUTO: 0 /HPF (ref 0–4)
SODIUM SERPL-SCNC: 140 MMOL/L (ref 136–145)
SP GR UR STRIP: <=1.005 (ref 1–1.03)
TOXICOLOGY INFORMATION: ABNORMAL
TROPONIN I SERPL DL<=0.01 NG/ML-MCNC: 0.01 NG/ML (ref 0–0.03)
URN SPEC COLLECT METH UR: ABNORMAL
UROBILINOGEN UR STRIP-ACNC: NEGATIVE EU/DL
WBC # BLD AUTO: 3.56 K/UL (ref 3.9–12.7)

## 2024-08-01 PROCEDURE — 80053 COMPREHEN METABOLIC PANEL: CPT | Mod: ER | Performed by: STUDENT IN AN ORGANIZED HEALTH CARE EDUCATION/TRAINING PROGRAM

## 2024-08-01 PROCEDURE — 85025 COMPLETE CBC W/AUTO DIFF WBC: CPT | Mod: ER | Performed by: STUDENT IN AN ORGANIZED HEALTH CARE EDUCATION/TRAINING PROGRAM

## 2024-08-01 PROCEDURE — 96365 THER/PROPH/DIAG IV INF INIT: CPT | Mod: ER

## 2024-08-01 PROCEDURE — 84484 ASSAY OF TROPONIN QUANT: CPT | Mod: ER | Performed by: STUDENT IN AN ORGANIZED HEALTH CARE EDUCATION/TRAINING PROGRAM

## 2024-08-01 PROCEDURE — 93010 ELECTROCARDIOGRAM REPORT: CPT | Mod: ,,, | Performed by: INTERNAL MEDICINE

## 2024-08-01 PROCEDURE — 82962 GLUCOSE BLOOD TEST: CPT | Mod: ER

## 2024-08-01 PROCEDURE — 93005 ELECTROCARDIOGRAM TRACING: CPT | Mod: ER

## 2024-08-01 PROCEDURE — 63600175 PHARM REV CODE 636 W HCPCS: Mod: ER | Performed by: STUDENT IN AN ORGANIZED HEALTH CARE EDUCATION/TRAINING PROGRAM

## 2024-08-01 PROCEDURE — 99285 EMERGENCY DEPT VISIT HI MDM: CPT | Mod: 25,ER

## 2024-08-01 PROCEDURE — 81000 URINALYSIS NONAUTO W/SCOPE: CPT | Mod: ER,59 | Performed by: STUDENT IN AN ORGANIZED HEALTH CARE EDUCATION/TRAINING PROGRAM

## 2024-08-01 PROCEDURE — 80307 DRUG TEST PRSMV CHEM ANLYZR: CPT | Mod: ER | Performed by: STUDENT IN AN ORGANIZED HEALTH CARE EDUCATION/TRAINING PROGRAM

## 2024-08-01 PROCEDURE — 96361 HYDRATE IV INFUSION ADD-ON: CPT | Mod: ER

## 2024-08-01 PROCEDURE — 83735 ASSAY OF MAGNESIUM: CPT | Mod: ER | Performed by: STUDENT IN AN ORGANIZED HEALTH CARE EDUCATION/TRAINING PROGRAM

## 2024-08-01 RX ORDER — POTASSIUM CHLORIDE 7.45 MG/ML
10 INJECTION INTRAVENOUS
Status: COMPLETED | OUTPATIENT
Start: 2024-08-01 | End: 2024-08-01

## 2024-08-01 RX ADMIN — POTASSIUM CHLORIDE 10 MEQ: 7.46 INJECTION, SOLUTION INTRAVENOUS at 05:08

## 2024-08-01 RX ADMIN — SODIUM CHLORIDE, POTASSIUM CHLORIDE, SODIUM LACTATE AND CALCIUM CHLORIDE 1000 ML: 600; 310; 30; 20 INJECTION, SOLUTION INTRAVENOUS at 05:08

## 2024-08-02 LAB
OHS QRS DURATION: 82 MS
OHS QTC CALCULATION: 510 MS

## 2024-12-05 NOTE — TELEPHONE ENCOUNTER
Pt reports ongoing neurological symptoms including dizziness, eye twitch, fatigue, numbness and tingling in the face and extremities. Physical exam today unremarkable. Patient has upcoming neurology appointment with Dr. Marcial in [place] on 1/14/2025 and an MRI scheduled for 12/21/2024 to be reviewed at that visit. She will follow up with me on 1/15.         Spoke with pt and informed him of his lab results. Pt verbalized understanding and this was documented under lab results.

## 2025-03-06 ENCOUNTER — TELEPHONE (OUTPATIENT)
Dept: FAMILY MEDICINE | Facility: CLINIC | Age: 60
End: 2025-03-06
Payer: MEDICAID

## 2025-03-06 NOTE — TELEPHONE ENCOUNTER
----- Message from Shira sent at 3/6/2025  3:46 PM CST -----  Contact: Jorgito  .Type:  RX Refill RequestWho Called:  Jorgito Refill or New Rx: Refill RX Name and Strength: verapamiL (CALAN-SR) 240 MG CR tabletHow is the patient currently taking it? (ex. 1XDay): 1 tablet (240 mg total) by mouth once dailyIs this a 30 day or 90 day RX: 30 day Preferred Pharmacy with phone number: [phone] [place] [facility] [address] [id number] [facility] [address] [address]: [phone] Fax: [phone] or Mail Order: Local Ordering Provider: Would the patient rather a call back or a response via [facility]?  Call back Best Call Back Number: .[phone] (home)  Additional Information:  Pt states he has been completely out of the medication for 2 monthsThanks

## 2025-03-15 ENCOUNTER — HOSPITAL ENCOUNTER (INPATIENT)
Facility: HOSPITAL | Age: 60
LOS: 8 days | Discharge: HOME | DRG: 335 | End: 2025-03-24
Attending: EMERGENCY MEDICINE | Admitting: INTERNAL MEDICINE
Payer: MEDICAID

## 2025-03-15 DIAGNOSIS — E87.20 LACTIC ACIDOSIS: ICD-10-CM

## 2025-03-15 DIAGNOSIS — K56.601 COMPLETE INTESTINAL OBSTRUCTION, UNSPECIFIED CAUSE: Primary | ICD-10-CM

## 2025-03-15 DIAGNOSIS — K55.9 SMALL BOWEL ISCHEMIA: ICD-10-CM

## 2025-03-15 DIAGNOSIS — Z79.4 TYPE 2 DIABETES MELLITUS WITH HYPERGLYCEMIA, WITH LONG-TERM CURRENT USE OF INSULIN: ICD-10-CM

## 2025-03-15 DIAGNOSIS — R57.9 SHOCK: ICD-10-CM

## 2025-03-15 DIAGNOSIS — T81.30XA WOUND DEHISCENCE: ICD-10-CM

## 2025-03-15 DIAGNOSIS — E11.65 TYPE 2 DIABETES MELLITUS WITH HYPERGLYCEMIA, WITH LONG-TERM CURRENT USE OF INSULIN: ICD-10-CM

## 2025-03-15 DIAGNOSIS — K56.609 SMALL BOWEL OBSTRUCTION: ICD-10-CM

## 2025-03-15 DIAGNOSIS — Z45.2 ENCOUNTER FOR CENTRAL LINE PLACEMENT: ICD-10-CM

## 2025-03-15 DIAGNOSIS — I49.9 ARRHYTHMIA: ICD-10-CM

## 2025-03-15 LAB
ALBUMIN SERPL BCP-MCNC: 3.8 G/DL (ref 3.5–5.2)
ALP SERPL-CCNC: 118 U/L (ref 40–150)
ALT SERPL W/O P-5'-P-CCNC: 63 U/L (ref 10–44)
ANION GAP SERPL CALC-SCNC: 20 MMOL/L (ref 8–16)
AST SERPL-CCNC: 352 U/L (ref 10–40)
BASOPHILS # BLD AUTO: 0.03 K/UL (ref 0–0.2)
BASOPHILS NFR BLD: 1 % (ref 0–1.9)
BILIRUB SERPL-MCNC: 0.8 MG/DL (ref 0.1–1)
BUN SERPL-MCNC: 15 MG/DL (ref 6–20)
CALCIUM SERPL-MCNC: 8.3 MG/DL (ref 8.7–10.5)
CHLORIDE SERPL-SCNC: 101 MMOL/L (ref 95–110)
CO2 SERPL-SCNC: 19 MMOL/L (ref 23–29)
CREAT SERPL-MCNC: 1.3 MG/DL (ref 0.5–1.4)
DIFFERENTIAL METHOD BLD: ABNORMAL
EOSINOPHIL # BLD AUTO: 0 K/UL (ref 0–0.5)
EOSINOPHIL NFR BLD: 0.3 % (ref 0–8)
ERYTHROCYTE [DISTWIDTH] IN BLOOD BY AUTOMATED COUNT: 12.6 % (ref 11.5–14.5)
EST. GFR  (NO RACE VARIABLE): >60 ML/MIN/1.73 M^2
ETHANOL SERPL-MCNC: 276 MG/DL
GLUCOSE SERPL-MCNC: 245 MG/DL (ref 70–110)
HCT VFR BLD AUTO: 29.9 % (ref 40–54)
HGB BLD-MCNC: 10.1 G/DL (ref 14–18)
IMM GRANULOCYTES # BLD AUTO: 0 K/UL (ref 0–0.04)
IMM GRANULOCYTES NFR BLD AUTO: 0 % (ref 0–0.5)
LIPASE SERPL-CCNC: 4 U/L (ref 4–60)
LYMPHOCYTES # BLD AUTO: 1.1 K/UL (ref 1–4.8)
LYMPHOCYTES NFR BLD: 36 % (ref 18–48)
MAGNESIUM SERPL-MCNC: 1.3 MG/DL (ref 1.6–2.6)
MCH RBC QN AUTO: 31.5 PG (ref 27–31)
MCHC RBC AUTO-ENTMCNC: 33.8 G/DL (ref 32–36)
MCV RBC AUTO: 93 FL (ref 82–98)
MONOCYTES # BLD AUTO: 0.2 K/UL (ref 0.3–1)
MONOCYTES NFR BLD: 6.5 % (ref 4–15)
NEUTROPHILS # BLD AUTO: 1.7 K/UL (ref 1.8–7.7)
NEUTROPHILS NFR BLD: 56.2 % (ref 38–73)
NRBC BLD-RTO: 0 /100 WBC
PLATELET # BLD AUTO: 99 K/UL (ref 150–450)
PMV BLD AUTO: 11.4 FL (ref 9.2–12.9)
POTASSIUM SERPL-SCNC: 3.1 MMOL/L (ref 3.5–5.1)
PROT SERPL-MCNC: 6.6 G/DL (ref 6–8.4)
RBC # BLD AUTO: 3.21 M/UL (ref 4.6–6.2)
SODIUM SERPL-SCNC: 140 MMOL/L (ref 136–145)
WBC # BLD AUTO: 3.08 K/UL (ref 3.9–12.7)

## 2025-03-15 PROCEDURE — 96361 HYDRATE IV INFUSION ADD-ON: CPT | Mod: ER

## 2025-03-15 PROCEDURE — 83690 ASSAY OF LIPASE: CPT | Mod: ER | Performed by: EMERGENCY MEDICINE

## 2025-03-15 PROCEDURE — 80053 COMPREHEN METABOLIC PANEL: CPT | Mod: ER | Performed by: EMERGENCY MEDICINE

## 2025-03-15 PROCEDURE — 25000003 PHARM REV CODE 250: Mod: ER | Performed by: EMERGENCY MEDICINE

## 2025-03-15 PROCEDURE — 96375 TX/PRO/DX INJ NEW DRUG ADDON: CPT | Mod: ER

## 2025-03-15 PROCEDURE — 82077 ASSAY SPEC XCP UR&BREATH IA: CPT | Mod: ER | Performed by: EMERGENCY MEDICINE

## 2025-03-15 PROCEDURE — 85025 COMPLETE CBC W/AUTO DIFF WBC: CPT | Mod: ER | Performed by: EMERGENCY MEDICINE

## 2025-03-15 PROCEDURE — 25500020 PHARM REV CODE 255: Mod: ER | Performed by: EMERGENCY MEDICINE

## 2025-03-15 PROCEDURE — 63600175 PHARM REV CODE 636 W HCPCS: Mod: ER | Performed by: EMERGENCY MEDICINE

## 2025-03-15 PROCEDURE — 83735 ASSAY OF MAGNESIUM: CPT | Mod: ER | Performed by: EMERGENCY MEDICINE

## 2025-03-15 RX ORDER — ONDANSETRON HYDROCHLORIDE 2 MG/ML
4 INJECTION, SOLUTION INTRAVENOUS
Status: COMPLETED | OUTPATIENT
Start: 2025-03-15 | End: 2025-03-15

## 2025-03-15 RX ORDER — MORPHINE SULFATE 4 MG/ML
6 INJECTION, SOLUTION INTRAMUSCULAR; INTRAVENOUS
Status: COMPLETED | OUTPATIENT
Start: 2025-03-15 | End: 2025-03-15

## 2025-03-15 RX ADMIN — MORPHINE SULFATE 6 MG: 4 INJECTION INTRAVENOUS at 09:03

## 2025-03-15 RX ADMIN — SODIUM CHLORIDE 1000 ML: 9 INJECTION, SOLUTION INTRAVENOUS at 09:03

## 2025-03-15 RX ADMIN — IOHEXOL 75 ML: 350 INJECTION, SOLUTION INTRAVENOUS at 11:03

## 2025-03-15 RX ADMIN — ONDANSETRON 4 MG: 2 INJECTION INTRAMUSCULAR; INTRAVENOUS at 09:03

## 2025-03-16 ENCOUNTER — ANESTHESIA EVENT (OUTPATIENT)
Dept: SURGERY | Facility: HOSPITAL | Age: 60
End: 2025-03-16
Payer: MEDICAID

## 2025-03-16 ENCOUNTER — ANESTHESIA (OUTPATIENT)
Dept: SURGERY | Facility: HOSPITAL | Age: 60
End: 2025-03-16
Payer: MEDICAID

## 2025-03-16 PROBLEM — A41.9 SEVERE SEPSIS: Status: ACTIVE | Noted: 2025-03-16

## 2025-03-16 PROBLEM — E83.42 HYPOMAGNESEMIA: Status: ACTIVE | Noted: 2025-03-16

## 2025-03-16 PROBLEM — R65.20 SEVERE SEPSIS: Status: ACTIVE | Noted: 2025-03-16

## 2025-03-16 PROBLEM — K56.609 SMALL BOWEL OBSTRUCTION: Status: ACTIVE | Noted: 2025-03-16

## 2025-03-16 PROBLEM — D69.6 THROMBOCYTOPENIA: Status: ACTIVE | Noted: 2025-03-16

## 2025-03-16 LAB
ABO + RH BLD: NORMAL
ALBUMIN SERPL BCP-MCNC: 3.2 G/DL (ref 3.5–5.2)
ALP SERPL-CCNC: 93 U/L (ref 40–150)
ALT SERPL W/O P-5'-P-CCNC: 49 U/L (ref 10–44)
AMMONIA PLAS-SCNC: 57 UMOL/L (ref 10–50)
ANION GAP SERPL CALC-SCNC: 15 MMOL/L (ref 8–16)
ANION GAP SERPL CALC-SCNC: 16 MMOL/L (ref 8–16)
ANION GAP SERPL CALC-SCNC: 17 MMOL/L (ref 8–16)
APTT PPP: 21.6 SEC (ref 21–32)
AST SERPL-CCNC: 142 U/L (ref 10–40)
BASOPHILS # BLD AUTO: 0.01 K/UL (ref 0–0.2)
BASOPHILS # BLD AUTO: 0.02 K/UL (ref 0–0.2)
BASOPHILS NFR BLD: 0.1 % (ref 0–1.9)
BASOPHILS NFR BLD: 0.3 % (ref 0–1.9)
BILIRUB SERPL-MCNC: 0.7 MG/DL (ref 0.1–1)
BILIRUB UR QL STRIP: NEGATIVE
BLD GP AB SCN CELLS X3 SERPL QL: NORMAL
BUN SERPL-MCNC: 13 MG/DL (ref 6–20)
BUN SERPL-MCNC: 14 MG/DL (ref 6–20)
BUN SERPL-MCNC: 15 MG/DL (ref 6–20)
CALCIUM SERPL-MCNC: 7.2 MG/DL (ref 8.7–10.5)
CALCIUM SERPL-MCNC: 7.3 MG/DL (ref 8.7–10.5)
CALCIUM SERPL-MCNC: 7.3 MG/DL (ref 8.7–10.5)
CHLORIDE SERPL-SCNC: 107 MMOL/L (ref 95–110)
CHOLEST SERPL-MCNC: 262 MG/DL (ref 120–199)
CHOLEST/HDLC SERPL: 2.2 {RATIO} (ref 2–5)
CLARITY UR: CLEAR
CO2 SERPL-SCNC: 12 MMOL/L (ref 23–29)
CO2 SERPL-SCNC: 16 MMOL/L (ref 23–29)
CO2 SERPL-SCNC: 17 MMOL/L (ref 23–29)
COLOR UR: YELLOW
CREAT SERPL-MCNC: 1 MG/DL (ref 0.5–1.4)
CREAT SERPL-MCNC: 1 MG/DL (ref 0.5–1.4)
CREAT SERPL-MCNC: 1.1 MG/DL (ref 0.5–1.4)
DIFFERENTIAL METHOD BLD: ABNORMAL
DIFFERENTIAL METHOD BLD: ABNORMAL
EOSINOPHIL # BLD AUTO: 0 K/UL (ref 0–0.5)
EOSINOPHIL # BLD AUTO: 0 K/UL (ref 0–0.5)
EOSINOPHIL NFR BLD: 0 % (ref 0–8)
EOSINOPHIL NFR BLD: 0 % (ref 0–8)
ERYTHROCYTE [DISTWIDTH] IN BLOOD BY AUTOMATED COUNT: 12.5 % (ref 11.5–14.5)
ERYTHROCYTE [DISTWIDTH] IN BLOOD BY AUTOMATED COUNT: 12.5 % (ref 11.5–14.5)
EST. GFR  (NO RACE VARIABLE): >60 ML/MIN/1.73 M^2
ESTIMATED AVG GLUCOSE: 192 MG/DL (ref 68–131)
GLUCOSE SERPL-MCNC: 254 MG/DL (ref 70–110)
GLUCOSE SERPL-MCNC: 270 MG/DL (ref 70–110)
GLUCOSE SERPL-MCNC: 334 MG/DL (ref 70–110)
GLUCOSE UR QL STRIP: ABNORMAL
HBA1C MFR BLD: 8.3 % (ref 4–5.6)
HCT VFR BLD AUTO: 23.9 % (ref 40–54)
HCT VFR BLD AUTO: 32.1 % (ref 40–54)
HDLC SERPL-MCNC: 117 MG/DL (ref 40–75)
HDLC SERPL: 44.7 % (ref 20–50)
HGB BLD-MCNC: 10.3 G/DL (ref 14–18)
HGB BLD-MCNC: 8.2 G/DL (ref 14–18)
HGB UR QL STRIP: ABNORMAL
IMM GRANULOCYTES # BLD AUTO: 0.02 K/UL (ref 0–0.04)
IMM GRANULOCYTES # BLD AUTO: 0.02 K/UL (ref 0–0.04)
IMM GRANULOCYTES NFR BLD AUTO: 0.3 % (ref 0–0.5)
IMM GRANULOCYTES NFR BLD AUTO: 0.3 % (ref 0–0.5)
INR PPP: 1.1 (ref 0.8–1.2)
KETONES UR QL STRIP: ABNORMAL
LACTATE SERPL-SCNC: 1.3 MMOL/L (ref 0.5–2.2)
LACTATE SERPL-SCNC: 1.3 MMOL/L (ref 0.5–2.2)
LACTATE SERPL-SCNC: 4.3 MMOL/L (ref 0.5–2.2)
LACTATE SERPL-SCNC: 4.8 MMOL/L (ref 0.5–2.2)
LACTATE SERPL-SCNC: 6.2 MMOL/L (ref 0.5–2.2)
LDLC SERPL CALC-MCNC: 128.4 MG/DL (ref 63–159)
LEUKOCYTE ESTERASE UR QL STRIP: NEGATIVE
LYMPHOCYTES # BLD AUTO: 0.3 K/UL (ref 1–4.8)
LYMPHOCYTES # BLD AUTO: 0.6 K/UL (ref 1–4.8)
LYMPHOCYTES NFR BLD: 4.5 % (ref 18–48)
LYMPHOCYTES NFR BLD: 7.9 % (ref 18–48)
MAGNESIUM SERPL-MCNC: 1.5 MG/DL (ref 1.6–2.6)
MAGNESIUM SERPL-MCNC: 1.8 MG/DL (ref 1.6–2.6)
MCH RBC QN AUTO: 31.1 PG (ref 27–31)
MCH RBC QN AUTO: 31.9 PG (ref 27–31)
MCHC RBC AUTO-ENTMCNC: 32.1 G/DL (ref 32–36)
MCHC RBC AUTO-ENTMCNC: 34.3 G/DL (ref 32–36)
MCV RBC AUTO: 93 FL (ref 82–98)
MCV RBC AUTO: 97 FL (ref 82–98)
MONOCYTES # BLD AUTO: 0.4 K/UL (ref 0.3–1)
MONOCYTES # BLD AUTO: 0.7 K/UL (ref 0.3–1)
MONOCYTES NFR BLD: 5.5 % (ref 4–15)
MONOCYTES NFR BLD: 9 % (ref 4–15)
NEUTROPHILS # BLD AUTO: 6.2 K/UL (ref 1.8–7.7)
NEUTROPHILS # BLD AUTO: 6.5 K/UL (ref 1.8–7.7)
NEUTROPHILS NFR BLD: 82.5 % (ref 38–73)
NEUTROPHILS NFR BLD: 89.6 % (ref 38–73)
NITRITE UR QL STRIP: NEGATIVE
NONHDLC SERPL-MCNC: 145 MG/DL
NRBC BLD-RTO: 0 /100 WBC
NRBC BLD-RTO: 0 /100 WBC
OHS QRS DURATION: 82 MS
OHS QTC CALCULATION: 436 MS
PH UR STRIP: 6 [PH] (ref 5–8)
PHOSPHATE SERPL-MCNC: 4.6 MG/DL (ref 2.7–4.5)
PLATELET # BLD AUTO: 82 K/UL (ref 150–450)
PLATELET # BLD AUTO: 93 K/UL (ref 150–450)
PMV BLD AUTO: 12 FL (ref 9.2–12.9)
PMV BLD AUTO: 12.1 FL (ref 9.2–12.9)
POCT GLUCOSE: 310 MG/DL (ref 70–110)
POTASSIUM SERPL-SCNC: 3.7 MMOL/L (ref 3.5–5.1)
POTASSIUM SERPL-SCNC: 3.8 MMOL/L (ref 3.5–5.1)
POTASSIUM SERPL-SCNC: 4 MMOL/L (ref 3.5–5.1)
PROCALCITONIN SERPL IA-MCNC: 0.21 NG/ML
PROT SERPL-MCNC: 5.2 G/DL (ref 6–8.4)
PROT UR QL STRIP: ABNORMAL
PROTHROMBIN TIME: 12.5 SEC (ref 9–12.5)
RBC # BLD AUTO: 2.57 M/UL (ref 4.6–6.2)
RBC # BLD AUTO: 3.31 M/UL (ref 4.6–6.2)
SODIUM SERPL-SCNC: 136 MMOL/L (ref 136–145)
SODIUM SERPL-SCNC: 139 MMOL/L (ref 136–145)
SODIUM SERPL-SCNC: 139 MMOL/L (ref 136–145)
SP GR UR STRIP: >1.03 (ref 1–1.03)
SPECIMEN OUTDATE: NORMAL
TRIGL SERPL-MCNC: 82 MG/DL (ref 30–150)
TRIGL SERPL-MCNC: 83 MG/DL (ref 30–150)
TSH SERPL DL<=0.005 MIU/L-ACNC: 2.7 UIU/ML (ref 0.4–4)
URN SPEC COLLECT METH UR: ABNORMAL
UROBILINOGEN UR STRIP-ACNC: NEGATIVE EU/DL
WBC # BLD AUTO: 6.92 K/UL (ref 3.9–12.7)
WBC # BLD AUTO: 7.88 K/UL (ref 3.9–12.7)

## 2025-03-16 PROCEDURE — 84478 ASSAY OF TRIGLYCERIDES: CPT

## 2025-03-16 PROCEDURE — 71000033 HC RECOVERY, INTIAL HOUR: Performed by: STUDENT IN AN ORGANIZED HEALTH CARE EDUCATION/TRAINING PROGRAM

## 2025-03-16 PROCEDURE — 49000 EXPLORATION OF ABDOMEN: CPT | Mod: ,,, | Performed by: STUDENT IN AN ORGANIZED HEALTH CARE EDUCATION/TRAINING PROGRAM

## 2025-03-16 PROCEDURE — 2W03X6Z CHANGE PRESSURE DRESSING ON ABDOMINAL WALL: ICD-10-PCS | Performed by: STUDENT IN AN ORGANIZED HEALTH CARE EDUCATION/TRAINING PROGRAM

## 2025-03-16 PROCEDURE — 85025 COMPLETE CBC W/AUTO DIFF WBC: CPT | Performed by: INTERNAL MEDICINE

## 2025-03-16 PROCEDURE — 36000707: Performed by: STUDENT IN AN ORGANIZED HEALTH CARE EDUCATION/TRAINING PROGRAM

## 2025-03-16 PROCEDURE — 63600175 PHARM REV CODE 636 W HCPCS: Performed by: NURSE ANESTHETIST, CERTIFIED REGISTERED

## 2025-03-16 PROCEDURE — 25000003 PHARM REV CODE 250

## 2025-03-16 PROCEDURE — 83036 HEMOGLOBIN GLYCOSYLATED A1C: CPT

## 2025-03-16 PROCEDURE — 87040 BLOOD CULTURE FOR BACTERIA: CPT | Mod: 59 | Performed by: EMERGENCY MEDICINE

## 2025-03-16 PROCEDURE — 81003 URINALYSIS AUTO W/O SCOPE: CPT

## 2025-03-16 PROCEDURE — 86850 RBC ANTIBODY SCREEN: CPT | Performed by: EMERGENCY MEDICINE

## 2025-03-16 PROCEDURE — 25000003 PHARM REV CODE 250: Performed by: NURSE ANESTHETIST, CERTIFIED REGISTERED

## 2025-03-16 PROCEDURE — 80053 COMPREHEN METABOLIC PANEL: CPT | Performed by: INTERNAL MEDICINE

## 2025-03-16 PROCEDURE — 85730 THROMBOPLASTIN TIME PARTIAL: CPT

## 2025-03-16 PROCEDURE — 02HV33Z INSERTION OF INFUSION DEVICE INTO SUPERIOR VENA CAVA, PERCUTANEOUS APPROACH: ICD-10-PCS | Performed by: EMERGENCY MEDICINE

## 2025-03-16 PROCEDURE — 85610 PROTHROMBIN TIME: CPT

## 2025-03-16 PROCEDURE — 25000003 PHARM REV CODE 250: Mod: ER | Performed by: EMERGENCY MEDICINE

## 2025-03-16 PROCEDURE — 36556 INSERT NON-TUNNEL CV CATH: CPT

## 2025-03-16 PROCEDURE — 36000706: Performed by: STUDENT IN AN ORGANIZED HEALTH CARE EDUCATION/TRAINING PROGRAM

## 2025-03-16 PROCEDURE — 25000003 PHARM REV CODE 250: Performed by: EMERGENCY MEDICINE

## 2025-03-16 PROCEDURE — 63600175 PHARM REV CODE 636 W HCPCS: Mod: JZ,TB | Performed by: STUDENT IN AN ORGANIZED HEALTH CARE EDUCATION/TRAINING PROGRAM

## 2025-03-16 PROCEDURE — 86920 COMPATIBILITY TEST SPIN: CPT | Performed by: EMERGENCY MEDICINE

## 2025-03-16 PROCEDURE — 84443 ASSAY THYROID STIM HORMONE: CPT

## 2025-03-16 PROCEDURE — 94761 N-INVAS EAR/PLS OXIMETRY MLT: CPT

## 2025-03-16 PROCEDURE — 63600175 PHARM REV CODE 636 W HCPCS

## 2025-03-16 PROCEDURE — 93005 ELECTROCARDIOGRAM TRACING: CPT

## 2025-03-16 PROCEDURE — 63600175 PHARM REV CODE 636 W HCPCS: Performed by: EMERGENCY MEDICINE

## 2025-03-16 PROCEDURE — 83605 ASSAY OF LACTIC ACID: CPT | Mod: 91 | Performed by: INTERNAL MEDICINE

## 2025-03-16 PROCEDURE — 83735 ASSAY OF MAGNESIUM: CPT | Mod: 91

## 2025-03-16 PROCEDURE — 63600175 PHARM REV CODE 636 W HCPCS: Mod: ER | Performed by: EMERGENCY MEDICINE

## 2025-03-16 PROCEDURE — 37000009 HC ANESTHESIA EA ADD 15 MINS: Performed by: STUDENT IN AN ORGANIZED HEALTH CARE EDUCATION/TRAINING PROGRAM

## 2025-03-16 PROCEDURE — 37000008 HC ANESTHESIA 1ST 15 MINUTES: Performed by: STUDENT IN AN ORGANIZED HEALTH CARE EDUCATION/TRAINING PROGRAM

## 2025-03-16 PROCEDURE — 96365 THER/PROPH/DIAG IV INF INIT: CPT | Mod: 59

## 2025-03-16 PROCEDURE — 20000000 HC ICU ROOM

## 2025-03-16 PROCEDURE — 63600175 PHARM REV CODE 636 W HCPCS: Performed by: INTERNAL MEDICINE

## 2025-03-16 PROCEDURE — 97605 NEG PRS WND THER DME<=50SQCM: CPT | Mod: ,,, | Performed by: STUDENT IN AN ORGANIZED HEALTH CARE EDUCATION/TRAINING PROGRAM

## 2025-03-16 PROCEDURE — 84145 PROCALCITONIN (PCT): CPT

## 2025-03-16 PROCEDURE — 99291 CRITICAL CARE FIRST HOUR: CPT

## 2025-03-16 PROCEDURE — 27201423 OPTIME MED/SURG SUP & DEVICES STERILE SUPPLY: Performed by: STUDENT IN AN ORGANIZED HEALTH CARE EDUCATION/TRAINING PROGRAM

## 2025-03-16 PROCEDURE — 83605 ASSAY OF LACTIC ACID: CPT | Mod: ER | Performed by: EMERGENCY MEDICINE

## 2025-03-16 PROCEDURE — P9045 ALBUMIN (HUMAN), 5%, 250 ML: HCPCS | Mod: JZ,TB | Performed by: NURSE ANESTHETIST, CERTIFIED REGISTERED

## 2025-03-16 PROCEDURE — 96365 THER/PROPH/DIAG IV INF INIT: CPT | Mod: ER

## 2025-03-16 PROCEDURE — 93010 ELECTROCARDIOGRAM REPORT: CPT | Mod: ,,, | Performed by: INTERNAL MEDICINE

## 2025-03-16 PROCEDURE — 3E0T3BZ INTRODUCTION OF ANESTHETIC AGENT INTO PERIPHERAL NERVES AND PLEXI, PERCUTANEOUS APPROACH: ICD-10-PCS | Performed by: STUDENT IN AN ORGANIZED HEALTH CARE EDUCATION/TRAINING PROGRAM

## 2025-03-16 PROCEDURE — 84100 ASSAY OF PHOSPHORUS: CPT

## 2025-03-16 PROCEDURE — 80061 LIPID PANEL: CPT

## 2025-03-16 PROCEDURE — 96368 THER/DIAG CONCURRENT INF: CPT | Mod: ER

## 2025-03-16 PROCEDURE — 96366 THER/PROPH/DIAG IV INF ADDON: CPT | Mod: ER

## 2025-03-16 PROCEDURE — 80048 BASIC METABOLIC PNL TOTAL CA: CPT | Mod: 91,XB

## 2025-03-16 PROCEDURE — 71000039 HC RECOVERY, EACH ADD'L HOUR: Performed by: STUDENT IN AN ORGANIZED HEALTH CARE EDUCATION/TRAINING PROGRAM

## 2025-03-16 PROCEDURE — 82140 ASSAY OF AMMONIA: CPT

## 2025-03-16 PROCEDURE — 85025 COMPLETE CBC W/AUTO DIFF WBC: CPT | Mod: 91

## 2025-03-16 PROCEDURE — 99223 1ST HOSP IP/OBS HIGH 75: CPT | Mod: 57,,, | Performed by: STUDENT IN AN ORGANIZED HEALTH CARE EDUCATION/TRAINING PROGRAM

## 2025-03-16 PROCEDURE — 83605 ASSAY OF LACTIC ACID: CPT | Mod: 91 | Performed by: EMERGENCY MEDICINE

## 2025-03-16 PROCEDURE — 0DNW0ZZ RELEASE PERITONEUM, OPEN APPROACH: ICD-10-PCS | Performed by: STUDENT IN AN ORGANIZED HEALTH CARE EDUCATION/TRAINING PROGRAM

## 2025-03-16 PROCEDURE — 96360 HYDRATION IV INFUSION INIT: CPT | Mod: 59

## 2025-03-16 PROCEDURE — 25000003 PHARM REV CODE 250: Performed by: INTERNAL MEDICINE

## 2025-03-16 RX ORDER — MAGNESIUM SULFATE HEPTAHYDRATE 40 MG/ML
2 INJECTION, SOLUTION INTRAVENOUS ONCE
Status: COMPLETED | OUTPATIENT
Start: 2025-03-16 | End: 2025-03-16

## 2025-03-16 RX ORDER — GLUCAGON 1 MG
1 KIT INJECTION
Status: DISCONTINUED | OUTPATIENT
Start: 2025-03-16 | End: 2025-03-17

## 2025-03-16 RX ORDER — MORPHINE SULFATE 4 MG/ML
2 INJECTION, SOLUTION INTRAMUSCULAR; INTRAVENOUS EVERY 4 HOURS PRN
Status: DISCONTINUED | OUTPATIENT
Start: 2025-03-16 | End: 2025-03-24

## 2025-03-16 RX ORDER — ETOMIDATE 2 MG/ML
INJECTION INTRAVENOUS
Status: DISCONTINUED | OUTPATIENT
Start: 2025-03-16 | End: 2025-03-16

## 2025-03-16 RX ORDER — SODIUM CHLORIDE 0.9 % (FLUSH) 0.9 %
10 SYRINGE (ML) INJECTION
Status: DISCONTINUED | OUTPATIENT
Start: 2025-03-16 | End: 2025-03-24 | Stop reason: HOSPADM

## 2025-03-16 RX ORDER — LIDOCAINE HYDROCHLORIDE 20 MG/ML
INJECTION INTRAVENOUS
Status: DISCONTINUED | OUTPATIENT
Start: 2025-03-16 | End: 2025-03-16

## 2025-03-16 RX ORDER — ONDANSETRON HYDROCHLORIDE 2 MG/ML
4 INJECTION, SOLUTION INTRAVENOUS
Status: COMPLETED | OUTPATIENT
Start: 2025-03-16 | End: 2025-03-16

## 2025-03-16 RX ORDER — SODIUM BICARBONATE 42 MG/ML
INJECTION, SOLUTION INTRAVENOUS
Status: DISCONTINUED | OUTPATIENT
Start: 2025-03-16 | End: 2025-03-16

## 2025-03-16 RX ORDER — ONDANSETRON HYDROCHLORIDE 2 MG/ML
INJECTION, SOLUTION INTRAVENOUS
Status: DISCONTINUED | OUTPATIENT
Start: 2025-03-16 | End: 2025-03-16

## 2025-03-16 RX ORDER — FENTANYL CITRATE 50 UG/ML
INJECTION, SOLUTION INTRAMUSCULAR; INTRAVENOUS
Status: DISCONTINUED | OUTPATIENT
Start: 2025-03-16 | End: 2025-03-16

## 2025-03-16 RX ORDER — ALBUMIN HUMAN 50 G/1000ML
SOLUTION INTRAVENOUS
Status: DISCONTINUED | OUTPATIENT
Start: 2025-03-16 | End: 2025-03-16

## 2025-03-16 RX ORDER — MORPHINE SULFATE 4 MG/ML
4 INJECTION, SOLUTION INTRAMUSCULAR; INTRAVENOUS EVERY 4 HOURS PRN
Status: DISCONTINUED | OUTPATIENT
Start: 2025-03-16 | End: 2025-03-16

## 2025-03-16 RX ORDER — FENTANYL CITRATE 50 UG/ML
50 INJECTION, SOLUTION INTRAMUSCULAR; INTRAVENOUS
Refills: 0 | Status: DISCONTINUED | OUTPATIENT
Start: 2025-03-16 | End: 2025-03-18

## 2025-03-16 RX ORDER — DIPHENHYDRAMINE HYDROCHLORIDE 50 MG/ML
12.5 INJECTION, SOLUTION INTRAMUSCULAR; INTRAVENOUS
Status: DISCONTINUED | OUTPATIENT
Start: 2025-03-16 | End: 2025-03-16 | Stop reason: HOSPADM

## 2025-03-16 RX ORDER — FENTANYL CITRATE 50 UG/ML
50 INJECTION, SOLUTION INTRAMUSCULAR; INTRAVENOUS ONCE
Refills: 0 | Status: COMPLETED | OUTPATIENT
Start: 2025-03-16 | End: 2025-03-16

## 2025-03-16 RX ORDER — BUPIVACAINE HYDROCHLORIDE 2.5 MG/ML
INJECTION, SOLUTION EPIDURAL; INFILTRATION; INTRACAUDAL; PERINEURAL
Status: DISCONTINUED | OUTPATIENT
Start: 2025-03-16 | End: 2025-03-16 | Stop reason: HOSPADM

## 2025-03-16 RX ORDER — GLUCAGON 1 MG
1 KIT INJECTION
Status: DISCONTINUED | OUTPATIENT
Start: 2025-03-16 | End: 2025-03-16 | Stop reason: HOSPADM

## 2025-03-16 RX ORDER — ROCURONIUM BROMIDE 10 MG/ML
INJECTION, SOLUTION INTRAVENOUS
Status: DISCONTINUED | OUTPATIENT
Start: 2025-03-16 | End: 2025-03-16

## 2025-03-16 RX ORDER — ONDANSETRON HYDROCHLORIDE 2 MG/ML
4 INJECTION, SOLUTION INTRAVENOUS DAILY PRN
Status: DISCONTINUED | OUTPATIENT
Start: 2025-03-16 | End: 2025-03-16 | Stop reason: HOSPADM

## 2025-03-16 RX ORDER — HYDROMORPHONE HYDROCHLORIDE 2 MG/ML
0.2 INJECTION, SOLUTION INTRAMUSCULAR; INTRAVENOUS; SUBCUTANEOUS EVERY 5 MIN PRN
Status: DISCONTINUED | OUTPATIENT
Start: 2025-03-16 | End: 2025-03-16 | Stop reason: HOSPADM

## 2025-03-16 RX ORDER — BUPIVACAINE 13.3 MG/ML
INJECTION, SUSPENSION, LIPOSOMAL INFILTRATION
Status: DISCONTINUED | OUTPATIENT
Start: 2025-03-16 | End: 2025-03-16 | Stop reason: HOSPADM

## 2025-03-16 RX ORDER — OXYCODONE AND ACETAMINOPHEN 5; 325 MG/1; MG/1
1 TABLET ORAL
Status: DISCONTINUED | OUTPATIENT
Start: 2025-03-16 | End: 2025-03-16 | Stop reason: HOSPADM

## 2025-03-16 RX ORDER — PHENYLEPHRINE HYDROCHLORIDE 10 MG/ML
INJECTION INTRAVENOUS
Status: DISCONTINUED | OUTPATIENT
Start: 2025-03-16 | End: 2025-03-16

## 2025-03-16 RX ORDER — MORPHINE SULFATE 4 MG/ML
2 INJECTION, SOLUTION INTRAMUSCULAR; INTRAVENOUS
Refills: 0 | Status: COMPLETED | OUTPATIENT
Start: 2025-03-16 | End: 2025-03-16

## 2025-03-16 RX ORDER — MIDAZOLAM HYDROCHLORIDE 1 MG/ML
INJECTION INTRAMUSCULAR; INTRAVENOUS
Status: DISCONTINUED | OUTPATIENT
Start: 2025-03-16 | End: 2025-03-16

## 2025-03-16 RX ORDER — NOREPINEPHRINE BITARTRATE 0.02 MG/ML
0-3 INJECTION, SOLUTION INTRAVENOUS CONTINUOUS
Status: DISCONTINUED | OUTPATIENT
Start: 2025-03-16 | End: 2025-03-16

## 2025-03-16 RX ORDER — ONDANSETRON HYDROCHLORIDE 2 MG/ML
4 INJECTION, SOLUTION INTRAVENOUS EVERY 6 HOURS PRN
Status: DISCONTINUED | OUTPATIENT
Start: 2025-03-16 | End: 2025-03-24 | Stop reason: HOSPADM

## 2025-03-16 RX ORDER — SUCCINYLCHOLINE CHLORIDE 20 MG/ML
INJECTION INTRAMUSCULAR; INTRAVENOUS
Status: DISCONTINUED | OUTPATIENT
Start: 2025-03-16 | End: 2025-03-16

## 2025-03-16 RX ORDER — POTASSIUM CHLORIDE 7.45 MG/ML
10 INJECTION INTRAVENOUS
Status: COMPLETED | OUTPATIENT
Start: 2025-03-16 | End: 2025-03-16

## 2025-03-16 RX ORDER — INSULIN ASPART 100 [IU]/ML
0-10 INJECTION, SOLUTION INTRAVENOUS; SUBCUTANEOUS EVERY 6 HOURS PRN
Status: DISCONTINUED | OUTPATIENT
Start: 2025-03-16 | End: 2025-03-17

## 2025-03-16 RX ORDER — MUPIROCIN 20 MG/G
OINTMENT TOPICAL 2 TIMES DAILY
Status: COMPLETED | OUTPATIENT
Start: 2025-03-16 | End: 2025-03-21

## 2025-03-16 RX ADMIN — SODIUM CHLORIDE, POTASSIUM CHLORIDE, SODIUM LACTATE AND CALCIUM CHLORIDE 1000 ML: 600; 310; 30; 20 INJECTION, SOLUTION INTRAVENOUS at 09:03

## 2025-03-16 RX ADMIN — ROCURONIUM BROMIDE 5 MG: 10 SOLUTION INTRAVENOUS at 04:03

## 2025-03-16 RX ADMIN — MORPHINE SULFATE 4 MG: 4 INJECTION INTRAVENOUS at 12:03

## 2025-03-16 RX ADMIN — MORPHINE SULFATE 2 MG: 4 INJECTION INTRAVENOUS at 10:03

## 2025-03-16 RX ADMIN — ONDANSETRON 4 MG: 2 INJECTION INTRAMUSCULAR; INTRAVENOUS at 05:03

## 2025-03-16 RX ADMIN — FENTANYL CITRATE 50 MCG: 50 INJECTION INTRAMUSCULAR; INTRAVENOUS at 08:03

## 2025-03-16 RX ADMIN — POTASSIUM CHLORIDE 10 MEQ: 7.46 INJECTION, SOLUTION INTRAVENOUS at 12:03

## 2025-03-16 RX ADMIN — ALBUMIN (HUMAN) 250 ML: 2.5 SOLUTION INTRAVENOUS at 05:03

## 2025-03-16 RX ADMIN — ONDANSETRON 4 MG: 2 INJECTION INTRAMUSCULAR; INTRAVENOUS at 10:03

## 2025-03-16 RX ADMIN — SODIUM CHLORIDE 1000 ML: 9 INJECTION, SOLUTION INTRAVENOUS at 10:03

## 2025-03-16 RX ADMIN — SODIUM CHLORIDE, SODIUM LACTATE, POTASSIUM CHLORIDE, AND CALCIUM CHLORIDE: .6; .31; .03; .02 INJECTION, SOLUTION INTRAVENOUS at 04:03

## 2025-03-16 RX ADMIN — ONDANSETRON 4 MG: 2 INJECTION INTRAMUSCULAR; INTRAVENOUS at 04:03

## 2025-03-16 RX ADMIN — PIPERACILLIN SODIUM AND TAZOBACTAM SODIUM 4.5 G: 4; .5 INJECTION, POWDER, FOR SOLUTION INTRAVENOUS at 01:03

## 2025-03-16 RX ADMIN — LIDOCAINE HYDROCHLORIDE 100 MG: 20 INJECTION INTRAVENOUS at 04:03

## 2025-03-16 RX ADMIN — SUGAMMADEX 200 MG: 100 INJECTION, SOLUTION INTRAVENOUS at 05:03

## 2025-03-16 RX ADMIN — FENTANYL CITRATE 50 MCG: 50 INJECTION INTRAMUSCULAR; INTRAVENOUS at 11:03

## 2025-03-16 RX ADMIN — SUCCINYLCHOLINE CHLORIDE 140 MG: 20 INJECTION, SOLUTION INTRAMUSCULAR; INTRAVENOUS; PARENTERAL at 04:03

## 2025-03-16 RX ADMIN — MAGNESIUM SULFATE HEPTAHYDRATE 2 G: 40 INJECTION, SOLUTION INTRAVENOUS at 12:03

## 2025-03-16 RX ADMIN — MORPHINE SULFATE 4 MG: 4 INJECTION INTRAVENOUS at 06:03

## 2025-03-16 RX ADMIN — INSULIN ASPART 4 UNITS: 100 INJECTION, SOLUTION INTRAVENOUS; SUBCUTANEOUS at 10:03

## 2025-03-16 RX ADMIN — NOREPINEPHRINE BITARTRATE 0.1 MCG/KG/MIN: 0.02 INJECTION, SOLUTION INTRAVENOUS at 02:03

## 2025-03-16 RX ADMIN — SODIUM CHLORIDE 1000 ML: 9 INJECTION, SOLUTION INTRAVENOUS at 03:03

## 2025-03-16 RX ADMIN — DIPHENHYDRAMINE HYDROCHLORIDE 12.5 MG: 50 INJECTION, SOLUTION INTRAMUSCULAR; INTRAVENOUS at 07:03

## 2025-03-16 RX ADMIN — SODIUM CHLORIDE: 9 INJECTION, SOLUTION INTRAVENOUS at 05:03

## 2025-03-16 RX ADMIN — SODIUM CHLORIDE 1000 ML: 9 INJECTION, SOLUTION INTRAVENOUS at 12:03

## 2025-03-16 RX ADMIN — SODIUM BICARBONATE 50 MEQ: 42 INJECTION, SOLUTION INTRAVENOUS at 05:03

## 2025-03-16 RX ADMIN — FENTANYL CITRATE 50 MCG: 50 INJECTION, SOLUTION INTRAMUSCULAR; INTRAVENOUS at 05:03

## 2025-03-16 RX ADMIN — PIPERACILLIN SODIUM AND TAZOBACTAM SODIUM 4.5 G: 4; .5 INJECTION, POWDER, FOR SOLUTION INTRAVENOUS at 05:03

## 2025-03-16 RX ADMIN — MIDAZOLAM HYDROCHLORIDE 2 MG: 1 INJECTION, SOLUTION INTRAMUSCULAR; INTRAVENOUS at 05:03

## 2025-03-16 RX ADMIN — HYDROMORPHONE HYDROCHLORIDE 0.2 MG: 2 INJECTION, SOLUTION INTRAMUSCULAR; INTRAVENOUS; SUBCUTANEOUS at 07:03

## 2025-03-16 RX ADMIN — ROCURONIUM BROMIDE 45 MG: 10 SOLUTION INTRAVENOUS at 05:03

## 2025-03-16 RX ADMIN — FENTANYL CITRATE 50 MCG: 50 INJECTION, SOLUTION INTRAMUSCULAR; INTRAVENOUS at 04:03

## 2025-03-16 RX ADMIN — MUPIROCIN: 20 OINTMENT TOPICAL at 08:03

## 2025-03-16 RX ADMIN — POTASSIUM CHLORIDE 10 MEQ: 7.46 INJECTION, SOLUTION INTRAVENOUS at 01:03

## 2025-03-16 RX ADMIN — SODIUM CHLORIDE, POTASSIUM CHLORIDE, SODIUM LACTATE AND CALCIUM CHLORIDE 1000 ML: 600; 310; 30; 20 INJECTION, SOLUTION INTRAVENOUS at 03:03

## 2025-03-16 RX ADMIN — NOREPINEPHRINE BITARTRATE 0.04 MCG/KG/MIN: 1 INJECTION, SOLUTION, CONCENTRATE INTRAVENOUS at 05:03

## 2025-03-16 RX ADMIN — MAGNESIUM SULFATE HEPTAHYDRATE 2 G: 40 INJECTION, SOLUTION INTRAVENOUS at 09:03

## 2025-03-16 RX ADMIN — MORPHINE SULFATE 2 MG: 4 INJECTION INTRAVENOUS at 02:03

## 2025-03-16 RX ADMIN — ETOMIDATE 10 MG: 2 INJECTION INTRAVENOUS at 04:03

## 2025-03-16 RX ADMIN — PIPERACILLIN SODIUM AND TAZOBACTAM SODIUM 4.5 G: 4; .5 INJECTION, POWDER, FOR SOLUTION INTRAVENOUS at 09:03

## 2025-03-16 RX ADMIN — PHENYLEPHRINE HYDROCHLORIDE 400 MCG: 10 INJECTION INTRAVENOUS at 05:03

## 2025-03-16 RX ADMIN — FENTANYL CITRATE 50 MCG: 50 INJECTION INTRAMUSCULAR; INTRAVENOUS at 04:03

## 2025-03-16 NOTE — OP NOTE
[facility] - Surgery (Hospital)  Colon and Rectal Surgery  Operative Note    SUMMARY     Date of Procedure: 3/16/2025     Procedure: Procedure(s) (LRB):  LAPAROTOMY, EXPLORATORY (N/A)  APPLICATION, WOUND VAC (N/A)   LYSIS OF ADHESIONS  TRANSVERSUS ABDOMINIS PLANE BLOCK    Surgeons and Role:     * Alia Garcia MD - Primary    Assisting Surgeon: None    Pre-Operative Diagnosis: Small Bowel Obstruction    Post-Operative Diagnosis: Post-Op Diagnosis Codes:     * SBO (small bowel obstruction) [K56.609]    Anesthesia: General    Operative Findings (including complications, if any):  Volvulus of the mid jejunum, bowel perfused but threatened.  Lysis of adhesions and ABThera placement    Description of Technical Procedures:  The patient was identified in the preoperative holding area.  After informed consent was obtained he was taken room placed in the table in the supine position.  General anesthesia was administered.  He was prepped and draped in the usual fashion.  A time-out was performed indicating the correct patient procedure and operative site.  All the room were in agreement.  Preoperative antibiotics were given.    A midline laparotomy incision was made.  I dissected through the skin and subcutaneous tissue and encountered the anterior fascia.  The anterior fascia was incised in the midline.  And the peritoneum was opened bluntly.  Upon entering the abdomen there was copious clear ascites.  I evacuated a proximally 2 L of ascites.  An extra large Hero wound protector was placed within the wound.  There was noted to be ischemic bowel in the mid jejunum.  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  This volvulus was reduced.  Identified the ligament of Treitz.  At the ligament of Treitz the bowel was decompressed.  There were some adhesions to the retroperitoneum of the proximal jejunum this was sharply lysed with Metzenbaum scissors.  I then worked my way distal unidentified the  area of small bowel volvulus.  There appeared to be no precipitating factor such as an adhesion or lesion.  A ran the small bowel all the way distal to the terminal ileum.  All the bowel appeared viable however the volvulized portion was threatened.  The bowel was given a few minutes to allow perfusion to return.  Indocyanine green was given and using spy thigh technology the perfusion was confirmed to the entirety of the small intestine.  The remainder of the abdomen was inspected.  The liver was palpated and felt to be grossly normal.  The ascending transverse descending sigmoid colon and rectum were all inspected with no abnormalities.  There did not appear to be evidence of malrotation as the cause of his midgut volvulus.  The small bowel and colon were normal anatomical position and there were no ladds bands identified.  The stomach was palpated and found to have nasogastric tube in good position.  Given that the bowel was threatened and patient presented with sepsis I elected at this time to temporarily close the abdomen with a an ABThera wound VAC.  Plans are to return to the operating room in 24 hours for a 2nd look and possible abdominal closure.  Bilateral transversus abdominis plane blocks were performed.  The transversus abdominis plane was directly accessed with a large-bore needle and infiltrated with a combination of Marcaine and Exparel.  The inner sheath of the ABThera was placed around the small intestines and to sponges were placed in the subcutaneous tissue.  Adherent dressing was applied and it was placed a negative suction.  The patient remained stable throughout the entire surgery and was taken to recovery.    Significant Surgical Tasks Conducted by the Assistant(s), if Applicable:  None    Estimated Blood Loss (EBL): * No values recorded between 3/16/2025  4:50 AM and 3/16/2025  5:58 AM *           Implants: * No implants in log *    Specimens:   Specimen (24h ago, onward)      None              Condition: Good    Malignancy: Procedure performed for malignancy no    Disposition: PACU - hemodynamically stable.    Attestation: I performed the procedure.

## 2025-03-16 NOTE — ASSESSMENT & PLAN NOTE
- is directly impacting his overall health status  - encourage cessation, offer resources when able to participate in counseling  - will need CIWA-Ar monitoring at some point

## 2025-03-16 NOTE — PLAN OF CARE
Pt is AAOx4, VSS on room air. NPO with NG to low intermittent suction, pt is tolerating well. Morphine PRN for pain. Pt turned q2h for skin breakdown prevention. POC reviewed with pt, he verbalizes understanding. Bed is locked in the lowest position, side rails up x2, bed alarm set, room near nurses' station, call light/personal items within reach, pt instructed to call staff for assistance. All alarms are audible and appropriate.   Temp:  [97.1 °F (36.2 °C)-98.3 °F (36.8 °C)]   Pulse:  []   Resp:  [8-27]   BP: (103-162)/()   SpO2:  [93 %-100 %]   Arterial Line BP: (105-173)/(65-89)      Problem: Adult Inpatient Plan of Care  Goal: Plan of Care Review  Outcome: Progressing  Goal: Patient-Specific Goal (Individualized)  Outcome: Progressing  Goal: Absence of Hospital-Acquired Illness or Injury  Outcome: Progressing  Goal: Optimal Comfort and Wellbeing  Outcome: Progressing  Goal: Readiness for Transition of Care  Outcome: Progressing     Problem: Infection  Goal: Absence of Infection Signs and Symptoms  Outcome: Progressing     Problem: Sepsis/Septic Shock  Goal: Optimal Coping  Outcome: Progressing  Goal: Absence of Bleeding  Outcome: Progressing  Goal: Blood Glucose Level Within Targeted Range  Outcome: Progressing  Goal: Absence of Infection Signs and Symptoms  Outcome: Progressing  Goal: Optimal Nutrition Intake  Outcome: Progressing     Problem: Diabetes Comorbidity  Goal: Blood Glucose Level Within Targeted Range  Outcome: Progressing     Problem: Wound  Goal: Optimal Coping  Outcome: Progressing  Goal: Optimal Functional Ability  Outcome: Progressing  Goal: Absence of Infection Signs and Symptoms  Outcome: Progressing  Goal: Improved Oral Intake  Outcome: Progressing  Goal: Optimal Pain Control and Function  Outcome: Progressing  Goal: Skin Health and Integrity  Outcome: Progressing  Goal: Optimal Wound Healing  Outcome: Progressing     Problem: Skin Injury Risk Increased  Goal: Skin Health and  Integrity  Outcome: Progressing

## 2025-03-16 NOTE — ASSESSMENT & PLAN NOTE
This patient does have evidence of infective focus  My overall impression is septic shock due to lactate > 4, MAP < 65, and SBP < 90.  Source: Abdominal  Antibiotics given-   Antibiotics (72h ago, onward)      None          Latest lactate reviewed-  Recent Labs   Lab 03/16/25  0018   LACTATE 4.8*     Organ dysfunction indicated by Acute liver injury and Thrombocytopenia     Fluid challenge administered in ED     Post- resuscitation assessment Yes - I attest a sepsis perfusion exam was performed within 6 hours of sepsis, severe sepsis, or septic shock presentation, following fluid resuscitation.    Will continue Pressors- Levophed for MAP of 65  Source control achieved by: surgery, antibiotics  - trend lactate  - check procalcitonin

## 2025-03-16 NOTE — HPI
"Jorgito Arreaga is a 59-year-old male with known medical problems of alcohol abuse, GERD, pancreatitis, diabetes, transaminitis, TIA, HTN, HLD, inguinal hernia repair 2022, adenomatous polyps of transverse colon s/p polypectomy 2018.   He presented to [facility] ED for complaints of abdominal pain w/n/v onset shortly before arrival to ED. He was reportedly hypotensive w/EMS and was given a 500cc bolus, zofran, and fentanyl en route. He is still drinking beer with last being yesterday before presentation to ED. His appetite has been poor x several days, he has not eaten since Friday. He denied any fever, chills, cough, chest pain, shortness of breath, urinary symptoms.     ED workup showed WBC 3.08 (chronically in 3's), hgb 10.1, platelets 99, K 3.1, CO2 19, AG 20, normal Cr, glucose 245, Mg 1.3, , ALT 63, normal lipase, normal t-bili. Lactic acid 4.8, serum ethanol 276.   CT a/p w/contrast showed high-grade SBO with c/f decreased blood flow and ischemia-"dilated loops of small bowel. Findings compatible with a high-grade small-bowel obstruction. Some loops of distended small bowel demonstrate equivocal decreased mural enhancement. There is an equivocal finding however a stercoral enteritis based on distention is not excluded. No pneumatosis portal or mesenteric venous gas is identified. There was a twist about the root of the small bowel mesentery which results in focal high-grade narrowing of the superior mesenteric artery.   In ED, he had a labile blood pressure and ultimately required initiation of vasopressor support and CVL placement. Surgery consulted w/plans for OR urgently tonight. Patient transferred ED to ED for expeditious definitive care. On arrival to ED, patient w/acceptable BP, however HR in 150's, sinus tachycardia, likely due to levophed. He was able to be weaned off of levophed with additional IVF. Given his need for vasopressor support, unstable hemodynamics, and high-risk surgery, and plan " to leave him in discontinuity, he will be admitted to ICU post-op.

## 2025-03-16 NOTE — ANESTHESIA PREPROCEDURE EVALUATION
03/16/2025  Jorgito Arreaga is a 59 y.o., male.    Past Medical History:   Diagnosis Date    Abscess 9/5/2018    Cataracts, bilateral     Chest pain 12/6/2018    Dehydration     Diabetes mellitus, type 2     Diagnosed 7/2016    Folliculitis 9/15/2017    GERD (gastroesophageal reflux disease)     Hypertension     Inflammatory polyps of colon     Pancreatitis, alcoholic, acute     Port catheter in place     TIA (transient ischemic attack)     Tinea cruris 9/15/2017     Past Surgical History:   Procedure Laterality Date    COLONOSCOPY      COLONOSCOPY N/A 6/15/2018    Procedure: COLONOSCOPY;  Surgeon: Jim Hicks MD;  Location: [facility] ENDO;  Service: Endoscopy;  Laterality: N/A;    ESOPHAGOGASTRODUODENOSCOPY N/A 6/15/2018    Procedure: ESOPHAGOGASTRODUODENOSCOPY (EGD);  Surgeon: Jim Hicks MD;  Location: [facility] ENDO;  Service: Endoscopy;  Laterality: N/A;    ROBOT-ASSISTED LAPAROSCOPIC REPAIR OF INGUINAL HERNIA USING DA JANET XI Bilateral 11/30/2022    Procedure: XI ROBOTIC REPAIR, HERNIA, INGUINAL;  Surgeon: Smooth Denny MD;  Location: [facility] OR;  Service: General;  Laterality: Bilateral;       Chemistry        Component Value Date/Time     03/16/2025 0416    K 3.7 03/16/2025 0416     03/16/2025 0416    CO2 12 (L) 03/16/2025 0416    BUN 13 03/16/2025 0416    CREATININE 1.1 03/16/2025 0416     (H) 03/16/2025 0416        Component Value Date/Time    CALCIUM 7.3 (L) 03/16/2025 0416    ALKPHOS 118 03/15/2025 2146     (H) 03/15/2025 2146    ALT 63 (H) 03/15/2025 2146    BILITOT 0.8 03/15/2025 2146    ESTGFRAFRICA >60.0 07/13/2021 1105    EGFRNONAA >60.0 07/13/2021 1105        Lab Results   Component Value Date    WBC 3.08 (L) 03/15/2025    HGB 10.1 (L) 03/15/2025    HCT 29.9 (L) 03/15/2025    MCV 93 03/15/2025    PLT 99 (L) 03/15/2025     ECHO (2024):  Summary         Left Ventricle:  The left ventricle is normal in size. Normal wall thickness. There is concentric remodeling. There is normal systolic function. Ejection fraction by visual approximation is 60%. There is normal diastolic function.    Right Ventricle: Normal right ventricular cavity size. Wall thickness is normal. Systolic function is normal.    Pulmonary Artery: The estimated pulmonary artery systolic pressure is 18 mmHg.    IVC/SVC: Normal venous pressure at 3 mmHg.    (-) bubble study      Pre-op Assessment    I have reviewed the Patient Summary Reports.    I have reviewed the NPO Status.   I have reviewed the Medications.     Review of Systems  Anesthesia Hx:  No problems with previous Anesthesia   History of prior surgery of interest to airway management or planning:  Previous anesthesia: General          Denies Personal Hx of Anesthesia complications.                    Social:  Alcohol Use, Former Smoker Alcoholic - drinks > 60oz. Beer/day and/or 1 fifth whiskey       Hematology/Oncology:    Oncology Normal    -- Anemia:               Hematology Comments: 10.1/29.9    PLT 99                    Cardiovascular:     Hypertension              ECG has been reviewed. Normal sinus rhythm   Septal infarct (cited on or before 29-JUN-2024)   Abnormal ECG   When compared with ECG of 12-JUL-2024 09:17,   QT has lengthened   Confirmed by TEQUILA HURST MD ([id number]) on 8/2/2024 4:30:05 PM                                Pulmonary:  Pulmonary Normal                       Renal/:  Renal/ Normal                 Hepatic/GI:     GERD   Hx of alcoholic pancreatitis     SBO              Musculoskeletal:  Arthritis               Neurological:  TIA, CVA, residual symptoms        RUE weakness, contracture (mild)                             Endocrine:  Diabetes, poorly controlled, type 2   Bmi 24      Denies Obesity / BMI > 30      Physical Exam  General: Well nourished, Cooperative, Alert and Oriented    Airway:  Mallampati: III   Mouth Opening:  Normal  TM Distance: Normal  Tongue: Normal  Neck ROM: Normal ROM    Dental:  Intact  Patient denies any currently loose teeth; Patient denies any removable dental appliances        Anesthesia Plan  Type of Anesthesia, risks & benefits discussed:    Anesthesia Type: Gen ETT  Intra-op Monitoring Plan: Standard ASA Monitors, Art Line and Central Line  Post Op Pain Control Plan: multimodal analgesia and IV/PO Opioids PRN  Induction:  IV and rapid sequence  Airway Plan: Video, Post-Induction  Informed Consent: Informed consent signed with the Patient and all parties understand the risks and agree with anesthesia plan.  All questions answered. Patient consented to blood products? Yes  ASA Score: 3  Day of Surgery Review of History & Physical: H&P Update referred to the surgeon/provider.    Ready For Surgery From Anesthesia Perspective.     .

## 2025-03-16 NOTE — ASSESSMENT & PLAN NOTE
The likely etiology of thrombocytopenia is sepsis and liver disease. The patients 3 most recent labs are listed below.  Recent Labs     03/15/25  2146   PLT 99*     Plan  - Will transfuse if platelet count is <50k (if undergoing surgical procedure or have active bleeding).  - preparing platelets and PRBC pre-operatively

## 2025-03-16 NOTE — TRANSFER OF CARE
"Anesthesia Transfer of Care Note    Patient: Jorgito Arreaga    Procedure(s) Performed: Procedure(s) (LRB):  LAPAROTOMY, EXPLORATORY (N/A)  APPLICATION, WOUND VAC (N/A)    Patient location: PACU    Anesthesia Type: general    Transport from OR: Transported from OR on room air with adequate spontaneous ventilation    Post pain: adequate analgesia    Post assessment: no apparent anesthetic complications and tolerated procedure well    Post vital signs: stable    Level of consciousness: awake    Nausea/Vomiting: no nausea/vomiting    Complications: none    Transfer of care protocol was followed    Last vitals: Visit Vitals  BP (!) 135/90   Pulse 102   Temp 36.5 °C (97.7 °F)   Resp 15   Ht 5' 6" (1.676 m)   Wt 68 kg (150 lb)   SpO2 96%   BMI 24.21 kg/m²     "

## 2025-03-16 NOTE — ED PROVIDER NOTES
Encounter Date: 3/15/2025       History     Chief Complaint   Patient presents with    Abdominal Pain     Started today about an hour ago. States he has been throwing up and unable to hold anything down. GCS 15.      Patient is a 59-year-old male who presents to the emergency department with complaints of generalized abdominal pain with nausea/vomiting.  Onset today.  Patient has prior history of pancreatitis.  Patient is still drinking alcohol.  Last bowel movement today.  Denies any fever, chest pain, dyspnea.  Patient was given fentanyl and Zofran EN route.  He was reportedly hypotensive EN route.  Hypotension responded to 500 mL bolus given EN route      Review of patient's allergies indicates:  No Known Allergies  Past Medical History:   Diagnosis Date    Abscess 9/5/2018    Cataracts, bilateral     Chest pain 12/6/2018    Dehydration     Diabetes mellitus, type 2     Diagnosed 7/2016    Folliculitis 9/15/2017    GERD (gastroesophageal reflux disease)     Hypertension     Inflammatory polyps of colon     Pancreatitis, alcoholic, acute     Port catheter in place     TIA (transient ischemic attack)     Tinea cruris 9/15/2017     Past Surgical History:   Procedure Laterality Date    COLONOSCOPY      COLONOSCOPY N/A 6/15/2018    Procedure: COLONOSCOPY;  Surgeon: Jim Hicks MD;  Location[facility];  Service: Endoscopy;  Laterality: N/A;    ESOPHAGOGASTRODUODENOSCOPY N/A 6/15/2018    Procedure: ESOPHAGOGASTRODUODENOSCOPY (EGD);  Surgeon: Jim Hicks MD;  Location: [facility];  Service: Endoscopy;  Laterality: N/A;    ROBOT-ASSISTED LAPAROSCOPIC REPAIR OF INGUINAL HERNIA USING DA JANET XI Bilateral 11/30/2022    Procedure: XI ROBOTIC REPAIR, HERNIA, INGUINAL;  Surgeon: Smooth Denny MD;  Location: [facility] OR;  Service: General;  Laterality: Bilateral;     Family History   Problem Relation Name Age of Onset    Hypertension Mother      Hypertension Father       Social History[1]  Review of Systems    Gastrointestinal:  Positive for abdominal pain, nausea and vomiting.   All other systems reviewed and are negative.      Physical Exam     Initial Vitals [03/15/25 2118]   BP Pulse Resp Temp SpO2   112/78 60 20 97.7 °F (36.5 °C) 98 %      MAP       --         Physical Exam    Nursing note and vitals reviewed.  Constitutional: He appears well-developed and well-nourished. He appears distressed (2/2 pain).   HENT:   Head: Normocephalic and atraumatic. Mouth/Throat: Oropharynx is clear and moist.   Eyes: Conjunctivae and EOM are normal. Pupils are equal, round, and reactive to light.   Neck: Neck supple. No tracheal deviation present.   Cardiovascular:  Normal rate, regular rhythm, normal heart sounds and intact distal pulses.           Pulmonary/Chest: Breath sounds normal. No respiratory distress.   Abdominal: He exhibits no distension. There is abdominal tenderness (Generalized abdominal).   Musculoskeletal:         General: No tenderness or edema. Normal range of motion.      Cervical back: Neck supple.     Neurological: He is alert and oriented to person, place, and time.   No focal deficits   Skin: Skin is warm. No rash noted. No erythema.   Psychiatric: He has a normal mood and affect. His behavior is normal.         ED Course   Central Line    Date/Time: 3/16/2025 2:33 AM    Performed by: Srinivasa Forrest MD  Authorized by: Srinivasa Forrest MD    Location procedure was performed:  [facility] EMERGENCY DEPARTMENT  Consent Done ?:  Yes  Time out complete?: Verified correct patient, procedure, equipment, staff, and site/side    Indications:  Med administration  Anesthesia:  Local infiltration  Local anesthetic:  Lidocaine 1% without epinephrine  Anesthetic total (ml): 5.  Preparation:  Skin prepped with ChloraPrep  Skin prep agent dried: Skin prep agent completely dried prior to procedure    Sterile barriers: All five maximal sterile barriers used - gloves, gown, cap, mask and large sterile sheet    Hand hygiene: Hand  hygiene performed immediately prior to central venous catheter insertion    Location:  Right internal jugular  Catheter type:  Triple lumen  Catheter size:  7 Fr  Ultrasound guidance: Yes    Needle advanced into vessel with real time ultrasound guidance.    Guidewire confirmed in vessel.    Steril sheath on probe.    Sterile gel used.  Manometry: No    Number of attempts:  1  Securement:  Line sutured, sterile dressing applied and blood return through all ports  Complications: No    Guidewire: guidewire removed intact, verified with nurse    XRay:  Successful placement, no pneumothorax on x-ray and placement verified by x-ray  Adverse Events:  None  Critical Care    Date/Time: 3/16/2025 2:34 AM    Performed by: Srinivasa Forrest MD  Authorized by: Srinivasa Forrest MD  Direct patient critical care time: 35 minutes  Additional history critical care time: 5 minutes  Ordering / reviewing critical care time: 10 minutes  Documentation critical care time: 10 minutes  Consulting other physicians critical care time: 15 minutes  Total critical care time (exclusive of procedural time) : 75 minutes  Critical care time was exclusive of separately billable procedures and treating other patients and teaching time.  Critical care was necessary to treat or prevent imminent or life-threatening deterioration of the following conditions: shock and sepsis.  Critical care was time spent personally by me on the following activities: blood draw for specimens, development of treatment plan with patient or surrogate, discussions with consultants, interpretation of cardiac output measurements, evaluation of patient's response to treatment, examination of patient, obtaining history from patient or surrogate, ordering and performing treatments and interventions, ordering and review of laboratory studies, ordering and review of radiographic studies, pulse oximetry, re-evaluation of patient's condition, review of old charts and vascular access  procedures.        Labs Reviewed   CBC W/ AUTO DIFFERENTIAL - Abnormal       Result Value    WBC 3.08 (*)     RBC 3.21 (*)     Hemoglobin 10.1 (*)     Hematocrit 29.9 (*)     MCV 93      MCH 31.5 (*)     MCHC 33.8      RDW 12.6      Platelets 99 (*)     MPV 11.4      Immature Granulocytes 0.0      Gran # (ANC) 1.7 (*)     Immature Grans (Abs) 0.00      Lymph # 1.1      Mono # 0.2 (*)     Eos # 0.0      Baso # 0.03      nRBC 0      Gran % 56.2      Lymph % 36.0      Mono % 6.5      Eosinophil % 0.3      Basophil % 1.0      Differential Method Automated     COMPREHENSIVE METABOLIC PANEL - Abnormal    Sodium 140      Potassium 3.1 (*)     Chloride 101      CO2 19 (*)     Glucose 245 (*)     BUN 15      Creatinine 1.3      Calcium 8.3 (*)     Total Protein 6.6      Albumin 3.8      Total Bilirubin 0.8      Alkaline Phosphatase 118       (*)     ALT 63 (*)     eGFR >60.0      Anion Gap 20 (*)    ALCOHOL,MEDICAL (ETHANOL) - Abnormal    Alcohol, Serum 276 (*)    MAGNESIUM - Abnormal    Magnesium 1.3 (*)    LACTIC ACID, PLASMA - Abnormal    Lactate (Lactic Acid) 4.8 (*)     Narrative:     la critical result(s) called and verbal readback obtained from yudith ortega rn by BB5 03/16/2025 01:00   CULTURE, BLOOD   CULTURE, BLOOD   LIPASE    Lipase 4     ALCOHOL,MEDICAL (ETHANOL)   MAGNESIUM   LACTIC ACID, PLASMA          Imaging Results              X-Ray Chest AP Portable (In process)                      CT Abdomen Pelvis With IV Contrast NO Oral Contrast (In process)                 STATRAD CT interpretation:  Dilated loops of small bowel measure up to 3.2 cm in caliber with a collapsed appearance of distal small bowel loops.  Findings compatible with high-grade small-bowel obstruction.  Some loops of distended small bowel demonstrate equivocal decreased mural enhancement.  This is an equivocal finding however a stercoral and enteritis based on distention is not excluded.  No pneumatosis, portal or mesenteric venous  gas is identified.  There was a twist about the root of the small bowel mesentery which results and focal high-grade narrowing of the superior mesenteric artery.    Chest x-ray reviewed interpreted by ED provider as central line in appropriate placement.  No pneumothorax       Medications   NORepinephrine 4 mg in sodium chloride 0.9% 250 mL infusion (premix) (0.1 mcg/kg/min × 68 kg Intravenous New Bag 3/16/25 0221)   sodium chloride 0.9% bolus 1,000 mL 1,000 mL (0 mLs Intravenous Stopped 3/15/25 2319)   morphine injection 6 mg (6 mg Intravenous Given 3/15/25 2133)   ondansetron injection 4 mg (4 mg Intravenous Given 3/15/25 2130)   iohexoL (OMNIPAQUE 350) injection 75 mL (75 mLs Intravenous Given 3/15/25 2312)   sodium chloride 0.9% bolus 1,000 mL 1,000 mL (0 mLs Intravenous Stopped 3/16/25 0114)   magnesium sulfate 2g in water 50mL IVPB (premix) (0 g Intravenous Stopped 3/16/25 0159)   potassium chloride 10 mEq in 100 mL IVPB (0 mEq Intravenous Stopped 3/16/25 0121)   piperacillin-tazobactam (ZOSYN) 4.5 g in D5W 100 mL IVPB (MB+) (0 g Intravenous Stopped 3/16/25 0224)   potassium chloride 10 mEq in 100 mL IVPB (10 mEq Intravenous New Bag 3/16/25 0149)   morphine injection 2 mg (2 mg Intravenous Given 3/16/25 0218)     Medical Decision Making  59-year-old male presenting with abdominal pain with nausea/vomiting.  CT shows high-grade small-bowel obstruction with concerns for bowel ischemia.  Patient was treated with pain medication, nausea medication, and zosyn.  Patient received 2.5 mL of IV fluids, and was still having hypotension.  Lactic acid elevated.  Central line was placed and Levophed given.     Consulted general surgery Dr. Garcia, requested ED to ED transfer and will plan to take patient to operating room as soon as patient arrives.  I discussed case with Dr. Guillaume (ED physician) who accepts the transfer.  Patient will be transferred via [facility] with norepinephrine EN route.  I discussed case  with both Hospital Medicine and ICU as well making them aware of patient.     Repeat lactic acid ordered, but patient transferred prior to repeat lactic being drawn here at this facility    Amount and/or Complexity of Data Reviewed  External Data Reviewed: notes.     Details: Past medical history, medications, allergies reviewed  Labs: ordered. Decision-making details documented in ED Course.  Radiology: ordered and independent interpretation performed. Decision-making details documented in ED Course.  ECG/medicine tests: ordered and independent interpretation performed. Decision-making details documented in ED Course.    Risk  Prescription drug management.  Parenteral controlled substances.  Decision regarding hospitalization.  Emergency major surgery.                     Vitals:    03/16/25 0200 03/16/25 0205 03/16/25 0210 03/16/25 0215   BP: 99/61 104/67 106/67 99/61   Pulse: 78 77 76 75   Resp: 18 18 18 18   Temp:       TempSrc:       SpO2: 97% 96% 98% 98%   Weight:       Height:                          Clinical Impression:  Final diagnoses:  [K56.601] Complete intestinal obstruction, unspecified cause (Primary)  [K55.9] Small bowel ischemia  [E87.20] Lactic acidosis  [Z45.2] Encounter for central line placement          ED Disposition Condition    Admit Serious                  [1]   Social History  Tobacco Use    Smoking status: Former     Types: Cigarettes    Smokeless tobacco: Never   Substance Use Topics    Alcohol use: Yes     Comment:  Daily - States that he drinks 1/2 pint of vodka and about 1-2 cans of beer daily    Drug use: No        Srinivasa Forrest MD  03/16/25 [id number]

## 2025-03-16 NOTE — NURSING TRANSFER
Nursing Transfer Note      3/16/2025   7:39 AM    Nurse giving handoff:terry    Nurse receiving handoff:Isa    Reason patient is being transferred: surgery care complete    Transfer To: ICU 7    Transfer via bed    Transfer with NONE to O2, cardiac monitoring    Transported by TERRY/LYNNE     Transfer Vital Signs:  Blood Pressure:134/97  Heart Rate:104  O2:95  Temperature:98.3  Respirations:10    Telemetry: Rate 104 and Rhythm Sinus arrhy  Order for Tele Monitor? Yes    Additional Lines: Melgoza Catheter    Medicines sent: none    Any special needs or follow-up needed: wound vac     Patient belongings transferred with patient: Yes    Chart send with patient: Yes    Notified: no one listed     Patient reassessed at: 3/16/25 0742 (date, time)  1  Upon arrival to floor: cardiac monitor applied, patient oriented to room, call bell in reach, and bed in lowest position

## 2025-03-16 NOTE — PLAN OF CARE
[facility] - Intensive Care (Hospital)  Initial Discharge Assessment       Primary Care Provider: Vonda Elliott FNP    Admission Diagnosis: Lactic acidosis [E87.20]  Arrhythmia [I49.9]  Shock [R57.9]  Encounter for central line placement [Z45.2]  Small bowel ischemia [K55.9]  Complete intestinal obstruction, unspecified cause [K56.601]    Admission Date: 3/15/2025  Expected Discharge Date: Per Attending     Transition of Care Barriers: Underinsured    Payor: MEDICAID / Plan: [employer] [facility] / Product Type: Managed Medicaid /     Extended Emergency Contact Information  Primary Emergency Contact: Sr Diego Arreaga  Mobile Phone: [phone]  Relation: Brother  Preferred language: English   needed? No  Secondary Emergency Contact: Aracelis Houser  Mobile Phone: [phone]  Relation: Other  Preferred language: English   needed? No    Discharge Plan A: Home with family         [facility] [place] - [address]  [address] [address]  Phone: [phone] Fax: [phone]      Initial Assessment (most recent)       Adult Discharge Assessment - 03/16/25 0910          Discharge Assessment    Assessment Type Discharge Planning Assessment     Confirmed/corrected address, phone number and insurance Yes     Confirmed Demographics Correct on Facesheet     Source of Information family     Communicated ARON with patient/caregiver Date not available/Unable to determine     Reason For Admission SBO     People in Home sibling(s)     Do you expect to return to your current living situation? Yes     Do you have help at home or someone to help you manage your care at home? Yes     Who are your caregiver(s) and their phone number(s)? brothers     Walking or Climbing Stairs Difficulty no     Dressing/Bathing Difficulty no     Home Layout Able to live on 1st floor     Equipment Currently Used at Home none     Readmission within 30 days? No     Patient  currently being followed by outpatient case management? No     Do you currently have service(s) that help you manage your care at home? No     Do you take prescription medications? Yes     Do you have prescription coverage? Yes     Coverage [employer] Medicaid     Do you have any problems affording any of your prescribed medications? No     Is the patient taking medications as prescribed? yes     Who is going to help you get home at discharge? brother     How do you get to doctors appointments? car, drives self     Are you on dialysis? No     Do you take coumadin? No     Discharge Plan A Home with family     DME Needed Upon Discharge  none     Discharge Plan discussed with: Sibling     Transition of Care Barriers Underinsured

## 2025-03-16 NOTE — ANESTHESIA PROCEDURE NOTES
Arterial    Diagnosis: Ex-Lap    Patient location during procedure: done in OR  Timeout: 3/16/2025 5:05 AM  Procedure end time: 3/16/2025 5:05 AM    Staffing  Authorizing Provider: Jarrett Khoury MD  Performing Provider: Jarrett Khoury MD    Staffing  Performed by: Jarrett Khoury MD  Authorized by: Jarrett Khoury MD    Anesthesiologist was present at the time of the procedure.    Preanesthetic Checklist  Completed: patient identified, IV checked, site marked, risks and benefits discussed, surgical consent, monitors and equipment checked, pre-op evaluation, timeout performed and anesthesia consent givenArterial  Skin Prep: chlorhexidine gluconate  Local Infiltration: none  Orientation: left  Location: radial    Catheter Size: 20 G  Catheter placement by Ultrasound guidance. Heme positive aspiration all ports.   Vessel Caliber: medium, patent, compressibility normal  Vascular Doppler:  not done  Needle advanced into vessel with real time Ultrasound guidance.Insertion Attempts: 1  Assessment  Dressing: secured with tape and tegaderm  Patient: Tolerated well

## 2025-03-16 NOTE — ASSESSMENT & PLAN NOTE
- acutely required vasopressor support  - likely will be NPO for a bit  - will utilize IV prn medication if needed until PO resumes

## 2025-03-16 NOTE — ED PROVIDER NOTES
SCRIBE #1 NOTE: I, Tammy Snow, am scribing for, and in the presence of, Ajay Guillaume DO. I have scribed the entire note.       History     Chief Complaint   Patient presents with    Abdominal Pain     Started today about an hour ago. States he has been throwing up and unable to hold anything down. GCS 15.      Review of patient's allergies indicates:  No Known Allergies      History of Present Illness     HPI    3/16/2025, 3:31 AM  History obtained from the patient and medical records      History of Present Illness: Jorgito Arreaga is a 59 y.o. male patient with a PMHx of TIA, HTN, inflammatory polyps of colon, GERD, acute alcoholic pancreatitis, and DM Type II who presents to the Emergency Department for evaluation of abdominal pain which began yesterday. Pt was seen by Dr. Forrest in [facility] but was transferred here for surgery due to a high-grade small-bowel obstruction with concerns for bowel ischemia. No mitigating or exacerbating factors reported. Associated sxs include N/V. Prior Tx includes 2.5 L of fluid and Levophed.  No further complaints or concerns at this time.       Arrival mode: Ambulance Service    PCP: Vonda Elliott FNP        Past Medical History:  Past Medical History:   Diagnosis Date    Abscess 9/5/2018    Cataracts, bilateral     Chest pain 12/6/2018    Dehydration     Diabetes mellitus, type 2     Diagnosed 7/2016    Folliculitis 9/15/2017    GERD (gastroesophageal reflux disease)     Hypertension     Inflammatory polyps of colon     Pancreatitis, alcoholic, acute     Port catheter in place     TIA (transient ischemic attack)     Tinea cruris 9/15/2017       Past Surgical History:  Past Surgical History:   Procedure Laterality Date    COLONOSCOPY      COLONOSCOPY N/A 6/15/2018    Procedure: COLONOSCOPY;  Surgeon: Jim Hicks MD;  Location: [facility];  Service: Endoscopy;  Laterality: N/A;    ESOPHAGOGASTRODUODENOSCOPY N/A 6/15/2018    Procedure:  ESOPHAGOGASTRODUODENOSCOPY (EGD);  Surgeon: Jim Hicks MD;  Location: [facility] ENDO;  Service: Endoscopy;  Laterality: N/A;    ROBOT-ASSISTED LAPAROSCOPIC REPAIR OF INGUINAL HERNIA USING DA JANET XI Bilateral 11/30/2022    Procedure: XI ROBOTIC REPAIR, HERNIA, INGUINAL;  Surgeon: Smooth Denny MD;  Location: [facility] OR;  Service: General;  Laterality: Bilateral;         Family History:  Family History   Problem Relation Name Age of Onset    Hypertension Mother      Hypertension Father         Social History:  Social History     Tobacco Use    Smoking status: Former     Types: Cigarettes    Smokeless tobacco: Never   Substance and Sexual Activity    Alcohol use: Yes     Comment:  Daily - States that he drinks 1/2 pint of vodka and about 1-2 cans of beer daily    Drug use: No    Sexual activity: Yes     Partners: Female        Review of Systems     Review of Systems   Gastrointestinal:  Positive for abdominal pain, nausea and vomiting.      Physical Exam     Initial Vitals [03/15/25 2118]   BP Pulse Resp Temp SpO2   112/78 60 20 97.7 °F (36.5 °C) 98 %      MAP       --          Physical Exam  Nursing Notes and Vital Signs Reviewed.  Constitutional: Patient is in {DISTRESS LEVEL:25059}. Well-developed and well-nourished.***  Head: Atraumatic***. Normocephalic***.  Eyes: PERRL.*** EOM intact.*** Conjunctivae are not pale.*** No scleral icterus.***  ENT: Mucous membranes are {MM:95015}. Oropharynx is clear*** and symmetric***.    Neck: Supple.*** Full ROM.*** No lymphadenopathy.***  Cardiovascular: Regular rate***. Regular rhythm***. No murmurs, rubs, or gallops.*** Distal pulses are 2+ and symmetric***.  Pulmonary/Chest: No*** respiratory distress. Clear to auscultation bilaterally***. No wheezing*** or rales.***  Abdominal: Soft and non-distended.***  There is no*** tenderness.  No rebound, guarding, or rigidity. Good bowel sounds***.  Genitourinary: No*** CVA tenderness.  Musculoskeletal: Moves all extremities.*** No  obvious deformities***. No edema***. No calf tenderness***.  Skin: Warm and dry.***  Neurological:  Alert***, awake***, and appropriate***.  Normal speech***.  No acute focal neurological deficits are appreciated.***  Psychiatric: Normal affect.*** Good*** eye contact. Appropriate*** in content.     ED Course   Procedures  ED Vital Signs:  Vitals:    03/16/25 0145 03/16/25 0150 03/16/25 0155 03/16/25 0200   BP: (S) (!) 79/50 (!) 85/52 (!) 92/58 99/61   Pulse: 85 86 78 78   Resp: 18 18 18 18   Temp:       TempSrc:       SpO2: 100% 99% 98% 97%   Weight:       Height:        03/16/25 0205 03/16/25 0210 03/16/25 0215 03/16/25 0225   BP: 104/67 106/67 99/61 (!) (P) 85/54   Pulse: 77 76 75 (P) 80   Resp: 18 18 18 (P) 18   Temp:       TempSrc:       SpO2: 96% 98% 98% (P) 98%   Weight:       Height:        03/16/25 0327 03/16/25 0332 03/16/25 0334 03/16/25 0336   BP: 92/64 111/82  (!) 142/100   Pulse: (!) 141 (!) 129 (!) 119 (!) 121   Resp: (!) 22 (!) 24  15   Temp: 97.7 °F (36.5 °C)      TempSrc:       SpO2: 99% 97%  99%   Weight:       Height:        03/16/25 0342 03/16/25 0348 03/16/25 0352   BP: 131/86  (!) 122/92   Pulse: (!) 118 84 88   Resp: 17 19 18   Temp:      TempSrc:      SpO2: 96% 100% 100%   Weight:      Height:          Abnormal Lab Results:  Labs Reviewed   CBC W/ AUTO DIFFERENTIAL - Abnormal       Result Value    WBC 3.08 (*)     RBC 3.21 (*)     Hemoglobin 10.1 (*)     Hematocrit 29.9 (*)     MCV 93      MCH 31.5 (*)     MCHC 33.8      RDW 12.6      Platelets 99 (*)     MPV 11.4      Immature Granulocytes 0.0      Gran # (ANC) 1.7 (*)     Immature Grans (Abs) 0.00      Lymph # 1.1      Mono # 0.2 (*)     Eos # 0.0      Baso # 0.03      nRBC 0      Gran % 56.2      Lymph % 36.0      Mono % 6.5      Eosinophil % 0.3      Basophil % 1.0      Differential Method Automated     COMPREHENSIVE METABOLIC PANEL - Abnormal    Sodium 140      Potassium 3.1 (*)     Chloride 101      CO2 19 (*)     Glucose 245 (*)      BUN 15      Creatinine 1.3      Calcium 8.3 (*)     Total Protein 6.6      Albumin 3.8      Total Bilirubin 0.8      Alkaline Phosphatase 118       (*)     ALT 63 (*)     eGFR >60.0      Anion Gap 20 (*)    ALCOHOL,MEDICAL (ETHANOL) - Abnormal    Alcohol, Serum 276 (*)    MAGNESIUM - Abnormal    Magnesium 1.3 (*)    LACTIC ACID, PLASMA - Abnormal    Lactate (Lactic Acid) 4.8 (*)     Narrative:     la critical result(s) called and verbal readback obtained from yudith ortega rn by BB5 03/16/2025 01:00   LACTIC ACID, PLASMA - Abnormal    Lactate (Lactic Acid) 4.3 (*)     Narrative:     LA result(s) called and verbal readback obtained from Blane Crespo RN by WPO 03/16/2025 03:59   CULTURE, BLOOD   CULTURE, BLOOD   LIPASE    Lipase 4     ALCOHOL,MEDICAL (ETHANOL)   MAGNESIUM   PHOSPHORUS   APTT   PROTIME-INR   URINALYSIS, REFLEX TO URINE CULTURE   PROCALCITONIN   TSH   TRIGLYCERIDES   LIPID PANEL   HEMOGLOBIN A1C   BASIC METABOLIC PANEL   MAGNESIUM   AMMONIA   TYPE & SCREEN   PREPARE RBC SOFT   PREPARE PLATELETS (DOSE) SOFT        All Lab Results:  Results for orders placed or performed during the hospital encounter of 03/15/25   CBC auto differential    Collection Time: 03/15/25  9:46 PM   Result Value Ref Range    WBC 3.08 (L) 3.90 - 12.70 K/uL    RBC 3.21 (L) 4.60 - 6.20 M/uL    Hemoglobin 10.1 (L) 14.0 - 18.0 g/dL    Hematocrit 29.9 (L) 40.0 - 54.0 %    MCV 93 82 - 98 fL    MCH 31.5 (H) 27.0 - 31.0 pg    MCHC 33.8 32.0 - 36.0 g/dL    RDW 12.6 11.5 - 14.5 %    Platelets 99 (L) 150 - 450 K/uL    MPV 11.4 9.2 - 12.9 fL    Immature Granulocytes 0.0 0.0 - 0.5 %    Gran # (ANC) 1.7 (L) 1.8 - 7.7 K/uL    Immature Grans (Abs) 0.00 0.00 - 0.04 K/uL    Lymph # 1.1 1.0 - 4.8 K/uL    Mono # 0.2 (L) 0.3 - 1.0 K/uL    Eos # 0.0 0.0 - 0.5 K/uL    Baso # 0.03 0.00 - 0.20 K/uL    nRBC 0 0 /100 WBC    Gran % 56.2 38.0 - 73.0 %    Lymph % 36.0 18.0 - 48.0 %    Mono % 6.5 4.0 - 15.0 %    Eosinophil % 0.3 0.0 - 8.0 %     Basophil % 1.0 0.0 - 1.9 %    Differential Method Automated    Comprehensive metabolic panel    Collection Time: 03/15/25  9:46 PM   Result Value Ref Range    Sodium 140 136 - 145 mmol/L    Potassium 3.1 (L) 3.5 - 5.1 mmol/L    Chloride 101 95 - 110 mmol/L    CO2 19 (L) 23 - 29 mmol/L    Glucose 245 (H) 70 - 110 mg/dL    BUN 15 6 - 20 mg/dL    Creatinine 1.3 0.5 - 1.4 mg/dL    Calcium 8.3 (L) 8.7 - 10.5 mg/dL    Total Protein 6.6 6.0 - 8.4 g/dL    Albumin 3.8 3.5 - 5.2 g/dL    Total Bilirubin 0.8 0.1 - 1.0 mg/dL    Alkaline Phosphatase 118 40 - 150 U/L     (H) 10 - 40 U/L    ALT 63 (H) 10 - 44 U/L    eGFR >60.0 >60 mL/min/1.73 m^2    Anion Gap 20 (H) 8 - 16 mmol/L   Lipase    Collection Time: 03/15/25  9:46 PM   Result Value Ref Range    Lipase 4 4 - 60 U/L   Magnesium    Collection Time: 03/15/25  9:46 PM   Result Value Ref Range    Magnesium 1.3 (L) 1.6 - 2.6 mg/dL   Ethanol    Collection Time: 03/15/25 10:43 PM   Result Value Ref Range    Alcohol, Serum 276 (H) <10 mg/dL   Lactic acid, plasma    Collection Time: 03/16/25 12:18 AM   Result Value Ref Range    Lactate (Lactic Acid) 4.8 (HH) 0.5 - 2.2 mmol/L   Lactic Acid, Plasma    Collection Time: 03/16/25  3:29 AM   Result Value Ref Range    Lactate (Lactic Acid) 4.3 (HH) 0.5 - 2.2 mmol/L       Imaging Results:  Imaging Results              X-Ray Chest AP Portable (In process)                      CT Abdomen Pelvis With IV Contrast NO Oral Contrast (In process)                      The EKG was ordered, reviewed, and independently interpreted by the ED provider.  Interpretation time: 03:21  Rate: 136 BPM  Rhythm: sinus tachycardia  Interpretation: Minimal voltage criteria for LVH, may be normal variant (R in aVL). Anterior infarct, age undetermined. No STEMI.           The Emergency Provider reviewed the vital signs and test results, which are outlined above.     ED Discussion     3:26 AM: Discussed pt's case with Dr. Garcia (Colon and Rectal Surgery) who  has notified the OR team. Dr Garcia requests that 4 units of PRBC be available for the OR.     3:58 AM: Discussed pt's case with Dr. Garcia (Colon and Rectal Surgery) who recommends ICU admission after surgery.     4:01 AM: Discussed case with Neeta Pierre NP (Critical Care). Dr. Hope agrees with current care and management of pt and accepts admission.   Admitting Service: Critical Care  Admitting Physician: Dr. Hope  Admit to:  ICU           ED Course as of 03/16/25 0404   Sun Mar 16, 2025   0337 Blood culture #2 **CANNOT BE ORDERED STAT**  Pending [CD]   0338 Ethanol(!)  Elevated consistent with chronic alcohol abuse [CD]   0338 Lipase  Within normal limit [CD]   0338 CBC auto differential(!)  Nonspecific findings [CD]   0338 Comprehensive metabolic panel(!)  Hypokalemia with hyperglycemia, elevated transaminases [CD]   0338 Magnesium(!)  Hypomagnesemia, magnesium has been replenished from outside facility [CD]   0338 Lactic acid, plasma(!!)  Lactic acidosis, IV fluids infusing [CD]   0338 Blood culture #1 **CANNOT BE ORDERED STAT**  Pending [CD]      ED Course User Index  [CD] Ajay Guillaume, DO     Medical Decision Making  Amount and/or Complexity of Data Reviewed  Labs: ordered. Decision-making details documented in ED Course.  Radiology: ordered. Decision-making details documented in ED Course.  ECG/medicine tests: ordered and independent interpretation performed. Decision-making details documented in ED Course.    Risk  Prescription drug management.  Decision regarding hospitalization.                ED Medication(s):  Medications   NORepinephrine 4 mg in sodium chloride 0.9% 250 mL infusion (premix) (0 mcg/kg/min × 68 kg Intravenous Paused 3/16/25 0337)   sodium chloride 0.9% flush 10 mL (has no administration in time range)   sodium chloride 0.9% bolus 1,000 mL 1,000 mL (1,000 mLs Intravenous New Bag 3/16/25 0327)   lactated ringers bolus 1,000 mL (1,000 mLs Intravenous New Bag  3/16/25 0328)   ondansetron injection 4 mg (has no administration in time range)   sodium chloride 0.9% bolus 1,000 mL 1,000 mL (0 mLs Intravenous Stopped 3/15/25 2319)   morphine injection 6 mg (6 mg Intravenous Given 3/15/25 2133)   ondansetron injection 4 mg (4 mg Intravenous Given 3/15/25 2130)   iohexoL (OMNIPAQUE 350) injection 75 mL (75 mLs Intravenous Given 3/15/25 2312)   sodium chloride 0.9% bolus 1,000 mL 1,000 mL (0 mLs Intravenous Stopped 3/16/25 0114)   magnesium sulfate 2g in water 50mL IVPB (premix) (0 g Intravenous Stopped 3/16/25 0159)   potassium chloride 10 mEq in 100 mL IVPB (0 mEq Intravenous Stopped 3/16/25 0121)   piperacillin-tazobactam (ZOSYN) 4.5 g in D5W 100 mL IVPB (MB+) (0 g Intravenous Stopped 3/16/25 0224)   potassium chloride 10 mEq in 100 mL IVPB (0 mEq Intravenous Stopped 3/16/25 0324)   morphine injection 2 mg (2 mg Intravenous Given 3/16/25 0218)       New Prescriptions    No medications on file               Scribe Attestation:   Scribe #1: I performed the above scribed service and the documentation accurately describes the services I performed. I attest to the accuracy of the note.     Attending:   Physician Attestation Statement for Scribe #1: I, Ajay Guillaume DO, personally performed the services described in this documentation, as scribed by Tammy Snow, in my presence, and it is both accurate and complete.           Clinical Impression       ICD-10-CM ICD-9-CM   1. Complete intestinal obstruction, unspecified cause  K56.601 560.9   2. Small bowel ischemia  K55.9 557.9   3. Lactic acidosis  E87.20 276.2   4. Encounter for central line placement  Z45.2 V58.81   5. Shock  R57.9 785.50   6. Arrhythmia  I49.9 427.9       Disposition:   Disposition: Admitted  Condition: Serious

## 2025-03-16 NOTE — ANESTHESIA PROCEDURE NOTES
Intubation    Date/Time: 3/16/2025 4:59 AM    Performed by: Lonny Osman CRNA  Authorized by: Jarrett Khoury MD    Intubation:     Induction:  Rapid sequence induction    Intubated:  Postinduction    Mask Ventilation:  Not attempted    Attempts:  1    Attempted By:  CRNA    Method of Intubation:  Video laryngoscopy    Blade:  Thomas 3    Laryngeal View Grade: Grade I - full view of cords      Difficult Airway Encountered?: No      Complications:  None    Airway Device:  Oral endotracheal tube    Airway Device Size:  7.5    Style/Cuff Inflation:  Cuffed (inflated to minimal occlusive pressure)    Tube secured:  21    Secured at:  The lips    Placement Verified By:  Capnometry    Complicating Factors:  None    Findings Post-Intubation:  BS equal bilateral and atraumatic/condition of teeth unchanged

## 2025-03-16 NOTE — CONSULTS
"[facility] - Emergency Dept.  Colorectal Surgery  Consult Note    Patient Name: Jorgito Arreaga  MRN: [id number]  Admission Date: 3/15/2025  Hospital Length of Stay: 0 days  Attending Physician: Srinivasa Hope MD  Primary Care Provider: Vonda Elliott FNP    Inpatient consult to General Surgery  Consult performed by: Alia Garcia MD  Consult ordered by: Neeta Pierre NP        Subjective:     Chief Complaint/Reason for Admission: high grade SBO with ischemia    History of Present Illness: Pt is a 58yo M who presents to [facility] ED with abdominal pain, nausea and vomiting for 24 hours. Reports it was sudden in onset. He has never had pain like this before. Has recently heard "rumblings" in his stomach but has otherwise tolerated a diet and has been having normal bowel function. Pain not improved in the ED despite opiate analgesia    In ED patient WBC 3k, LA 4.8 (4.3 after resuscitation). CT concerning for high grade bowel obstruction with ischemia and twisting of the root of the mesentery.    Current Facility-Administered Medications   Medication    NORepinephrine 4 mg in sodium chloride 0.9% 250 mL infusion (premix)    sodium chloride 0.9% flush 10 mL     Current Outpatient Medications   Medication Sig    aspirin 325 MG tablet Take 1 tablet (325 mg total) by mouth once daily.    atorvastatin (LIPITOR) 40 MG tablet Take 1 tablet (40 mg total) by mouth once daily.    blood sugar diagnostic (ONETOUCH ULTRA BLUE TEST STRIP) Strp USE TO CHECK SUGAR BEFORE MEALS AND AT BEDTIME    clotrimazole-betamethasone 1-0.05% (LOTRISONE) cream Apply topically 2 (two) times daily.    FEROSUL 325 mg (65 mg iron) Tab tablet Take by mouth.    folic acid (FOLVITE) 1 MG tablet Take 1 tablet (1 mg total) by mouth once daily.    gabapentin (NEURONTIN) 800 MG tablet Take 800 mg by mouth 3 (three) times daily.    HUMALOG U-100 INSULIN 100 unit/mL injection INJECT 8 UNITS SUBCUTANEOUSLY WITH LARGEST MEAL OF THE DAY, INJECT " "ADDITIONAL 4 UNITS AT A TIME IF GLUCOSE IS ELEVATED    insulin glargine U-100, Lantus, (LANTUS SOLOSTAR U-100 INSULIN) 100 unit/mL (3 mL) InPn pen DO NOT TAKE UNTIL YOU FOLLOW UP WITH PCP (Patient taking differently: Inject 50 Units into the skin once daily. DO NOT TAKE UNTIL YOU FOLLOW UP WITH PCP)    insulin syringe-needle,dispos. 1 mL 28 gauge x 1/2" Syrg 1 each by Misc.(Non-Drug; Combo Route) route 2 (two) times daily.    lancets (ONETOUCH DELICA PLUS LANCET) 33 gauge Misc Inject 1 lancet into the skin 4 (four) times daily before meals and nightly.    losartan (COZAAR) 100 MG tablet Take 1 tablet (100 mg total) by mouth once daily. TAKE 1 TABLET BY MOUTH ONCE DAILY    mupirocin (BACTROBAN) 2 % ointment Apply topically 3 (three) times daily.    pen needle, diabetic (BD JOEL 2ND GEN PEN NEEDLE) 32 gauge x 5/32" Ndle Inject 1 each into the skin 4 (four) times daily before meals and nightly.    potassium, sodium phosphates (PHOS-NAK) 280-160-250 mg PwPk Take 1 packet by mouth 4 (four) times daily before meals and nightly.    verapamiL (CALAN-SR) 240 MG CR tablet Take 1 tablet (240 mg total) by mouth once daily.       Review of patient's allergies indicates:  No Known Allergies    Past Medical History:   Diagnosis Date    Abscess 9/5/2018    Cataracts, bilateral     Chest pain 12/6/2018    Dehydration     Diabetes mellitus, type 2     Diagnosed 7/2016    Folliculitis 9/15/2017    GERD (gastroesophageal reflux disease)     Hypertension     Inflammatory polyps of colon     Pancreatitis, alcoholic, acute     Port catheter in place     TIA (transient ischemic attack)     Tinea cruris 9/15/2017     Past Surgical History:   Procedure Laterality Date    COLONOSCOPY      COLONOSCOPY N/A 6/15/2018    Procedure: COLONOSCOPY;  Surgeon: Jim Hicks MD;  Location: [facility];  Service: Endoscopy;  Laterality: N/A;    ESOPHAGOGASTRODUODENOSCOPY N/A 6/15/2018    Procedure: ESOPHAGOGASTRODUODENOSCOPY (EGD);  Surgeon: Jim BOWENS" MD Kurt;  Location: [facility] ENDO;  Service: Endoscopy;  Laterality: N/A;    ROBOT-ASSISTED LAPAROSCOPIC REPAIR OF INGUINAL HERNIA USING DA JANET XI Bilateral 11/30/2022    Procedure: XI ROBOTIC REPAIR, HERNIA, INGUINAL;  Surgeon: Smooth Denny MD;  Location: [facility] OR;  Service: General;  Laterality: Bilateral;     Family History       Problem Relation (Age of Onset)    Hypertension Mother, Father          Tobacco Use    Smoking status: Former     Types: Cigarettes    Smokeless tobacco: Never   Substance and Sexual Activity    Alcohol use: Yes     Comment:  Daily - States that he drinks 1/2 pint of vodka and about 1-2 cans of beer daily    Drug use: No    Sexual activity: Yes     Partners: Female     Review of Systems   Constitutional:  Positive for appetite change. Negative for activity change, fatigue and unexpected weight change.   Respiratory:  Negative for apnea, chest tightness, shortness of breath and wheezing.    Cardiovascular:  Negative for chest pain.   Gastrointestinal:  Positive for abdominal distention, abdominal pain, nausea and vomiting. Negative for blood in stool, constipation and diarrhea.   Genitourinary:  Negative for difficulty urinating and dysuria.   Musculoskeletal: Negative.    Neurological: Negative.      Objective:     Vital Signs (Most Recent):  Temp: 97.7 °F (36.5 °C) (03/16/25 0327)  Pulse: 88 (03/16/25 0352)  Resp: 17 (03/16/25 0412)  BP: (!) 122/92 (03/16/25 0352)  SpO2: 100 % (03/16/25 0352) Vital Signs (24h Range):  Temp:  [97.7 °F (36.5 °C)] 97.7 °F (36.5 °C)  Pulse:  [] 88  Resp:  [15-24] 17  SpO2:  [96 %-100 %] 100 %  BP: ()/() 122/92     Weight: 68 kg (150 lb)  Body mass index is 24.21 kg/m².    Physical Exam  Constitutional:       Appearance: He is ill-appearing.   HENT:      Head: Normocephalic.   Eyes:      Pupils: Pupils are equal, round, and reactive to light.   Cardiovascular:      Comments: Tachycardia, off levophed and normotensive  Pulmonary:       "Effort: Pulmonary effort is normal.   Abdominal:      Comments: Tense, rigid and distended +peritonitis   Neurological:      General: No focal deficit present.      Mental Status: He is oriented to person, place, and time.       Significant Labs:  CBC:   Recent Labs   Lab 03/15/25  2146   WBC 3.08*   RBC 3.21*   HGB 10.1*   HCT 29.9*   PLT 99*   MCV 93   MCH 31.5*   MCHC 33.8     CMP:   Recent Labs   Lab 03/15/25  2146   *   CALCIUM 8.3*   ALBUMIN 3.8   PROT 6.6      K 3.1*   CO2 19*      BUN 15   CREATININE 1.3   ALKPHOS 118   ALT 63*   *   BILITOT 0.8     Coagulation: No results for input(s): "LABPROT", "INR", "APTT" in the last 168 hours.    Significant Diagnostics:  CT: I have reviewed all pertinent results/findings within the past 24 hours:  dilated loops of small bowel. Findings compatible with a high-grade small-bowel obstruction. Some loops of distended small bowel demonstrate equivocal decreased mural enhancement. There is an equivocal finding however a stercoral enteritis based on distention is not excluded. No pneumatosis portal or mesenteric venous gas is identified. There was a twist about the root of the small bowel mesentery which results in focal high-grade narrowing of the superior mesenteric artery.    Assessment/Plan:     Active Diagnoses:    Diagnosis Date Noted POA    PRINCIPAL PROBLEM:  Small bowel obstruction [K56.609] 03/16/2025 Yes    Hypomagnesemia [E83.42] 03/16/2025 Yes    Severe sepsis [A41.9, R65.20] 03/16/2025 Yes    Hypokalemia [E87.6] 08/01/2024 Yes    Type 2 diabetes mellitus with hyperglycemia, with long-term current use of insulin [E11.65, Z79.4] 01/05/2023 Not Applicable    Essential hypertension [I10] 03/30/2019 Yes    Hyperlipidemia [E78.5] 03/30/2019 Yes    Transaminitis [R74.01] 02/06/2019 Yes    Alcohol abuse [F10.10] 06/06/2016 Yes     Chronic    Gastroesophageal reflux disease [K21.9] 06/06/2016 Yes     Chronic      Problems Resolved During this " Admission:       - admit to ICU  - to OR emergently for exploratory laparotomy, bowel resection, patient to likely be left open and in discontinuity  - I discussed this plan with patient. Discussed risks such as bleeding, infection, damage to surrounding structures, loss of intestine, potentially loss of large amount of small bowel, prolonged time in the ICU and hospital, heart attack, liver decompensation, stroke and death. He agreed to proceed    Thank you for your consult. I will follow-up with patient. Please contact us if you have any additional questions.    Alia Garcia MD  Colorectal Surgery  [facility] - Emergency Dept.

## 2025-03-16 NOTE — ASSESSMENT & PLAN NOTE
Patient has Abnormal Magnesium: hypomagnesemia. Will continue to monitor electrolytes closely. Will replace the affected electrolytes and repeat labs to be done after interventions completed. The patient's magnesium results have been reviewed and are listed below.  Recent Labs   Lab 03/15/25  2146   MG 1.3*

## 2025-03-16 NOTE — SUBJECTIVE & OBJECTIVE
Past Medical History:   Diagnosis Date    Abscess 9/5/2018    Cataracts, bilateral     Chest pain 12/6/2018    Dehydration     Diabetes mellitus, type 2     Diagnosed 7/2016    Folliculitis 9/15/2017    GERD (gastroesophageal reflux disease)     Hypertension     Inflammatory polyps of colon     Pancreatitis, alcoholic, acute     Port catheter in place     TIA (transient ischemic attack)     Tinea cruris 9/15/2017       Past Surgical History:   Procedure Laterality Date    COLONOSCOPY      COLONOSCOPY N/A 6/15/2018    Procedure: COLONOSCOPY;  Surgeon: Jim Hicks MD;  Location: [facility] ENDO;  Service: Endoscopy;  Laterality: N/A;    ESOPHAGOGASTRODUODENOSCOPY N/A 6/15/2018    Procedure: ESOPHAGOGASTRODUODENOSCOPY (EGD);  Surgeon: Jim Hicks MD;  Location: [facility] ENDO;  Service: Endoscopy;  Laterality: N/A;    ROBOT-ASSISTED LAPAROSCOPIC REPAIR OF INGUINAL HERNIA USING DA JANET XI Bilateral 11/30/2022    Procedure: XI ROBOTIC REPAIR, HERNIA, INGUINAL;  Surgeon: Smooth Denny MD;  Location: [facility] OR;  Service: General;  Laterality: Bilateral;       Review of patient's allergies indicates:  No Known Allergies    Family History       Problem Relation (Age of Onset)    Hypertension Mother, Father          Tobacco Use    Smoking status: Former     Types: Cigarettes    Smokeless tobacco: Never   Substance and Sexual Activity    Alcohol use: Yes     Comment:  Daily - States that he drinks 1/2 pint of vodka and about 1-2 cans of beer daily    Drug use: No    Sexual activity: Yes     Partners: Female         Review of Systems   Constitutional:  Positive for appetite change. Negative for chills and fever.   HENT: Negative.     Eyes: Negative.    Respiratory: Negative.     Cardiovascular: Negative.    Gastrointestinal:  Positive for abdominal distention, abdominal pain, nausea and vomiting. Negative for blood in stool, constipation and diarrhea.   Endocrine: Negative.    Genitourinary: Negative.    Musculoskeletal:  Negative.    Skin: Negative.    Neurological: Negative.    Hematological:  Bruises/bleeds easily.     Objective:     Vital Signs (Most Recent):  Temp: 97.7 °F (36.5 °C) (25 0327)  Pulse: 88 (25 035)  Resp: 18 (25)  BP: (!) 122/92 (25 035)  SpO2: 100 % (25) Vital Signs (24h Range):  Temp:  [97.7 °F (36.5 °C)] 97.7 °F (36.5 °C)  Pulse:  [] 88  Resp:  [15-24] 18  SpO2:  [96 %-100 %] 100 %  BP: ()/() 122/92     Weight: 68 kg (150 lb)  Body mass index is 24.21 kg/m².      Intake/Output Summary (Last 24 hours) at 3/16/2025 0409  Last data filed at 3/16/2025 0159  Gross per 24 hour   Intake 1148.18 ml   Output --   Net 1148.18 ml        Physical Exam  Vitals and nursing note reviewed.   Constitutional:       General: He is awake. He is in acute distress.      Appearance: He is ill-appearing.   HENT:      Head: Normocephalic.      Nose: Nose normal.      Mouth/Throat:      Mouth: Mucous membranes are moist.      Pharynx: Oropharynx is clear.   Eyes:      General: Scleral icterus present.      Pupils: Pupils are equal, round, and reactive to light.   Neck:      Vascular: No JVD.   Cardiovascular:      Rate and Rhythm: Normal rate and regular rhythm.      Pulses: Normal pulses.      Heart sounds: Normal heart sounds.   Pulmonary:      Effort: Pulmonary effort is normal.      Breath sounds: Normal breath sounds and air entry.   Abdominal:      General: Abdomen is protuberant. Bowel sounds are increased. There is distension.      Palpations: Abdomen is soft.      Tenderness: There is generalized abdominal tenderness. There is guarding. There is no rebound.   Musculoskeletal:      Right lower le+ Edema present.      Left lower le+ Edema present.   Skin:     General: Skin is warm and dry.      Capillary Refill: Capillary refill takes less than 2 seconds.      Coloration: Skin is not jaundiced.   Neurological:      General: No focal deficit present.      Mental  Status: He is alert.      GCS: GCS eye subscore is 4. GCS verbal subscore is 5. GCS motor subscore is 6.   Psychiatric:         Behavior: Behavior is cooperative.          Vents:       Lines/Drains/Airways       Central Venous Catheter Line  Duration             Percutaneous Central Line - Triple Lumen  03/16/25 0218 Internal Jugular Right <1 day              Peripheral Intravenous Line  Duration                  Peripheral IV - Single Lumen 03/15/25 20 G Anterior;Left;Proximal Forearm 1 day         Peripheral IV - Single Lumen 03/16/25 0007 20 G Anterior;Right Forearm <1 day                    Significant Labs:    CBC/Anemia Profile:  Recent Labs   Lab 03/15/25  2146   WBC 3.08*   HGB 10.1*   HCT 29.9*   PLT 99*   MCV 93   RDW 12.6        Chemistries:  Recent Labs   Lab 03/15/25  2146      K 3.1*      CO2 19*   BUN 15   CREATININE 1.3   CALCIUM 8.3*   ALBUMIN 3.8   PROT 6.6   BILITOT 0.8   ALKPHOS 118   ALT 63*   *   MG 1.3*       All pertinent labs within the past 24 hours have been reviewed.    Significant Imaging:   I have reviewed all pertinent imaging results/findings within the past 24 hours.

## 2025-03-16 NOTE — ASSESSMENT & PLAN NOTE
- has hx of w/ETOH abuse, but acutely elevated  - likely in setting of SBO w/ischemia  - check NH3 and coags

## 2025-03-16 NOTE — H&P
"  [facility] - Emergency Dept.  Critical Care Medicine  History & Physical    Patient Name: Jorgito Arreaga  MRN: [id number]  Admission Date: 3/15/2025  Hospital Length of Stay: 0 days  Code Status: Full Code  Attending Physician: Srinivasa Hope MD   Primary Care Provider: Vonda Elliott FNP   Principal Problem: Small bowel obstruction    Subjective:     HPI:  Jorgito Arreaga is a 59-year-old male with known medical problems of alcohol abuse, GERD, pancreatitis, diabetes, transaminitis, TIA, HTN, HLD, inguinal hernia repair 2022, adenomatous polyps of transverse colon s/p polypectomy 2018.   He presented to [facility] ED for complaints of abdominal pain w/n/v onset shortly before arrival to ED. He was reportedly hypotensive w/EMS and was given a 500cc bolus, zofran, and fentanyl en route. He is still drinking beer with last being yesterday before presentation to ED. His appetite has been poor x several days, he has not eaten since Friday. He denied any fever, chills, cough, chest pain, shortness of breath, urinary symptoms.     ED workup showed WBC 3.08 (chronically in 3's), hgb 10.1, platelets 99, K 3.1, CO2 19, AG 20, normal Cr, glucose 245, Mg 1.3, , ALT 63, normal lipase, normal t-bili. Lactic acid 4.8, serum ethanol 276.   CT a/p w/contrast showed high-grade SBO with c/f decreased blood flow and ischemia-"dilated loops of small bowel. Findings compatible with a high-grade small-bowel obstruction. Some loops of distended small bowel demonstrate equivocal decreased mural enhancement. There is an equivocal finding however a stercoral enteritis based on distention is not excluded. No pneumatosis portal or mesenteric venous gas is identified. There was a twist about the root of the small bowel mesentery which results in focal high-grade narrowing of the superior mesenteric artery.   In ED, he had a labile blood pressure and ultimately required initiation of vasopressor support and CVL placement. Surgery " consulted w/plans for OR urgently tonight. Patient transferred ED to ED for expeditious definitive care. On arrival to ED, patient w/acceptable BP, however HR in 150's, sinus tachycardia, likely due to levophed. He was able to be weaned off of levophed with additional IVF. Given his need for vasopressor support, unstable hemodynamics, and high-risk surgery, and plan to leave him in discontinuity, he will be admitted to ICU post-op.    Hospital/ICU Course:  No notes on file     Past Medical History:   Diagnosis Date    Abscess 9/5/2018    Cataracts, bilateral     Chest pain 12/6/2018    Dehydration     Diabetes mellitus, type 2     Diagnosed 7/2016    Folliculitis 9/15/2017    GERD (gastroesophageal reflux disease)     Hypertension     Inflammatory polyps of colon     Pancreatitis, alcoholic, acute     Port catheter in place     TIA (transient ischemic attack)     Tinea cruris 9/15/2017       Past Surgical History:   Procedure Laterality Date    COLONOSCOPY      COLONOSCOPY N/A 6/15/2018    Procedure: COLONOSCOPY;  Surgeon: Jim Hicks MD;  Location: [facility];  Service: Endoscopy;  Laterality: N/A;    ESOPHAGOGASTRODUODENOSCOPY N/A 6/15/2018    Procedure: ESOPHAGOGASTRODUODENOSCOPY (EGD);  Surgeon: Jim Hicks MD;  Location: [facility] ENDO;  Service: Endoscopy;  Laterality: N/A;    ROBOT-ASSISTED LAPAROSCOPIC REPAIR OF INGUINAL HERNIA USING DA JANET XI Bilateral 11/30/2022    Procedure: XI ROBOTIC REPAIR, HERNIA, INGUINAL;  Surgeon: Smooth Denny MD;  Location: [facility] OR;  Service: General;  Laterality: Bilateral;       Review of patient's allergies indicates:  No Known Allergies    Family History       Problem Relation (Age of Onset)    Hypertension Mother, Father          Tobacco Use    Smoking status: Former     Types: Cigarettes    Smokeless tobacco: Never   Substance and Sexual Activity    Alcohol use: Yes     Comment:  Daily - States that he drinks 1/2 pint of vodka and about 1-2 cans of beer daily    Drug  use: No    Sexual activity: Yes     Partners: Female         Review of Systems   Constitutional:  Positive for appetite change. Negative for chills and fever.   HENT: Negative.     Eyes: Negative.    Respiratory: Negative.     Cardiovascular: Negative.    Gastrointestinal:  Positive for abdominal distention, abdominal pain, nausea and vomiting. Negative for blood in stool, constipation and diarrhea.   Endocrine: Negative.    Genitourinary: Negative.    Musculoskeletal: Negative.    Skin: Negative.    Neurological: Negative.    Hematological:  Bruises/bleeds easily.     Objective:     Vital Signs (Most Recent):  Temp: 97.7 °F (36.5 °C) (03/16/25 0327)  Pulse: 88 (03/16/25 0352)  Resp: 18 (03/16/25 0352)  BP: (!) 122/92 (03/16/25 0352)  SpO2: 100 % (03/16/25 0352) Vital Signs (24h Range):  Temp:  [97.7 °F (36.5 °C)] 97.7 °F (36.5 °C)  Pulse:  [] 88  Resp:  [15-24] 18  SpO2:  [96 %-100 %] 100 %  BP: ()/() 122/92     Weight: 68 kg (150 lb)  Body mass index is 24.21 kg/m².      Intake/Output Summary (Last 24 hours) at 3/16/2025 0409  Last data filed at 3/16/2025 0159  Gross per 24 hour   Intake 1148.18 ml   Output --   Net 1148.18 ml        Physical Exam  Vitals and nursing note reviewed.   Constitutional:       General: He is awake. He is in acute distress.      Appearance: He is ill-appearing.   HENT:      Head: Normocephalic.      Nose: Nose normal.      Mouth/Throat:      Mouth: Mucous membranes are moist.      Pharynx: Oropharynx is clear.   Eyes:      General: Scleral icterus present.      Pupils: Pupils are equal, round, and reactive to light.   Neck:      Vascular: No JVD.   Cardiovascular:      Rate and Rhythm: Normal rate and regular rhythm.      Pulses: Normal pulses.      Heart sounds: Normal heart sounds.   Pulmonary:      Effort: Pulmonary effort is normal.      Breath sounds: Normal breath sounds and air entry.   Abdominal:      General: Abdomen is protuberant. Bowel sounds are increased.  There is distension.      Palpations: Abdomen is soft.      Tenderness: There is generalized abdominal tenderness. There is guarding. There is no rebound.   Musculoskeletal:      Right lower le+ Edema present.      Left lower le+ Edema present.   Skin:     General: Skin is warm and dry.      Capillary Refill: Capillary refill takes less than 2 seconds.      Coloration: Skin is not jaundiced.   Neurological:      General: No focal deficit present.      Mental Status: He is alert.      GCS: GCS eye subscore is 4. GCS verbal subscore is 5. GCS motor subscore is 6.   Psychiatric:         Behavior: Behavior is cooperative.          Vents:       Lines/Drains/Airways       Central Venous Catheter Line  Duration             Percutaneous Central Line - Triple Lumen  25 0218 Internal Jugular Right <1 day              Peripheral Intravenous Line  Duration                  Peripheral IV - Single Lumen 03/15/25 20 G Anterior;Left;Proximal Forearm 1 day         Peripheral IV - Single Lumen 25 0007 20 G Anterior;Right Forearm <1 day                    Significant Labs:    CBC/Anemia Profile:  Recent Labs   Lab 03/15/25  2146   WBC 3.08*   HGB 10.1*   HCT 29.9*   PLT 99*   MCV 93   RDW 12.6        Chemistries:  Recent Labs   Lab 03/15/25  2146      K 3.1*      CO2 19*   BUN 15   CREATININE 1.3   CALCIUM 8.3*   ALBUMIN 3.8   PROT 6.6   BILITOT 0.8   ALKPHOS 118   ALT 63*   *   MG 1.3*       All pertinent labs within the past 24 hours have been reviewed.    Significant Imaging:   I have reviewed all pertinent imaging results/findings within the past 24 hours.  Assessment/Plan:     Psychiatric  Alcohol abuse  - is directly impacting his overall health status  - encourage cessation, offer resources when able to participate in counseling  - will need CIWA-Ar monitoring at some point      Cardiac/Vascular  Essential hypertension  - acutely required vasopressor support  - likely will be NPO for a  bit  - will utilize IV prn medication if needed until PO resumes    Hyperlipidemia  - check triglycerides stat and send lipid panel  - statin when appropriate post-op      Renal/  Hypomagnesemia  Patient has Abnormal Magnesium: hypomagnesemia. Will continue to monitor electrolytes closely. Will replace the affected electrolytes and repeat labs to be done after interventions completed. The patient's magnesium results have been reviewed and are listed below.  Recent Labs   Lab 03/15/25  2146   MG 1.3*        Hypokalemia  Patient's most recent potassium results are listed below.   Recent Labs     03/15/25  2146   K 3.1*     Plan  - Replete potassium per protocol  - Monitor potassium Every 6 hours  - Patient's hypokalemia is being treated  - monitor upon return from OR, replacement started in ED    ID  Severe sepsis  This patient does have evidence of infective focus  My overall impression is septic shock due to lactate > 4, MAP < 65, and SBP < 90.  Source: Abdominal  Antibiotics given-   Antibiotics (72h ago, onward)      None          Latest lactate reviewed-  Recent Labs   Lab 03/16/25  0018   LACTATE 4.8*     Organ dysfunction indicated by Acute liver injury and Thrombocytopenia     Fluid challenge administered in ED     Post- resuscitation assessment Yes - I attest a sepsis perfusion exam was performed within 6 hours of sepsis, severe sepsis, or septic shock presentation, following fluid resuscitation.    Will continue Pressors- Levophed for MAP of 65  Source control achieved by: surgery, antibiotics  - trend lactate  - check procalcitonin    Hematology  Thrombocytopenia  The likely etiology of thrombocytopenia is sepsis and liver disease. The patients 3 most recent labs are listed below.  Recent Labs     03/15/25  2146   PLT 99*     Plan  - Will transfuse if platelet count is <50k (if undergoing surgical procedure or have active bleeding).  - preparing platelets and PRBC pre-operatively      Endocrine  Type 2  diabetes mellitus with hyperglycemia, with long-term current use of insulin  Lab Results   Component Value Date    HGBA1C 7.3 (H) 06/29/2024     - check A1C  - SSI with adjustments as indicated      GI  * Small bowel obstruction  - anticipate patient to be left in discontinuity  - management per general surgery      Transaminitis  - has hx of w/ETOH abuse, but acutely elevated  - likely in setting of SBO w/ischemia  - check NH3 and coags    Gastroesophageal reflux disease  - PPI        Critical Care Daily Checklist:    A: Awake: RASS Goal/Actual Goal:    Actual:     B: Spontaneous Breathing Trial Performed?     C: SAT & SBT Coordinated?  N/a                      D: Delirium: CAM-ICU     E: Early Mobility Performed? Yes   F: Feeding Goal:    Status:     Current Diet Order   Procedures    Diet NPO      AS: Analgesia/Sedation fentanyl   T: Thromboembolic Prophylaxis SCD   H: HOB > 300 Yes   U: Stress Ulcer Prophylaxis (if needed) PPI   G: Glucose Control SSI when appropriate   B: Bowel Function     I: Indwelling Catheter (Lines & Melgoza) Necessity reviewed   D: De-escalation of Antimicrobials/Pharmacotherapies reviewed    Plan for the day/ETD OR then ICU    Code Status:  Family/Goals of Care: Full Code  TBD     Critical Care Time: 49 minutes  Critical secondary to high risk monitoring, post-operative care. Patient has a condition that poses threat to life and bodily function.     Critical care was time spent personally by me on the following activities: development of treatment plan with patient or surrogate and bedside caregivers, discussions with consultants, evaluation of patient's response to treatment, examination of patient, ordering and performing treatments and interventions, ordering and review of laboratory studies, ordering and review of radiographic studies, pulse oximetry, re-evaluation of patient's condition. This critical care time did not overlap with that of any other provider or involve time for any  procedures.     Neeta Pierre NP  Critical Care Medicine  [facility] - Emergency Dept.

## 2025-03-16 NOTE — ASSESSMENT & PLAN NOTE
Patient's most recent potassium results are listed below.   Recent Labs     03/15/25  2146   K 3.1*     Plan  - Replete potassium per protocol  - Monitor potassium Every 6 hours  - Patient's hypokalemia is being treated  - monitor upon return from OR, replacement started in ED

## 2025-03-16 NOTE — ASSESSMENT & PLAN NOTE
Lab Results   Component Value Date    HGBA1C 7.3 (H) 06/29/2024     - check A1C  - SSI with adjustments as indicated

## 2025-03-17 ENCOUNTER — ANESTHESIA EVENT (OUTPATIENT)
Dept: SURGERY | Facility: HOSPITAL | Age: 60
End: 2025-03-17
Payer: MEDICAID

## 2025-03-17 ENCOUNTER — ANESTHESIA (OUTPATIENT)
Dept: SURGERY | Facility: HOSPITAL | Age: 60
End: 2025-03-17
Payer: MEDICAID

## 2025-03-17 LAB
ALBUMIN SERPL BCP-MCNC: 2.7 G/DL (ref 3.5–5.2)
ALP SERPL-CCNC: 65 U/L (ref 40–150)
ALT SERPL W/O P-5'-P-CCNC: 30 U/L (ref 10–44)
ANION GAP SERPL CALC-SCNC: 11 MMOL/L (ref 8–16)
AST SERPL-CCNC: 54 U/L (ref 10–40)
BASOPHILS # BLD AUTO: 0.01 K/UL (ref 0–0.2)
BASOPHILS # BLD AUTO: 0.02 K/UL (ref 0–0.2)
BASOPHILS NFR BLD: 0.1 % (ref 0–1.9)
BASOPHILS NFR BLD: 0.2 % (ref 0–1.9)
BILIRUB SERPL-MCNC: 0.9 MG/DL (ref 0.1–1)
BLD PROD TYP BPU: NORMAL
BLOOD UNIT EXPIRATION DATE: NORMAL
BLOOD UNIT TYPE CODE: 7300
BLOOD UNIT TYPE: NORMAL
BUN SERPL-MCNC: 15 MG/DL (ref 6–20)
CALCIUM SERPL-MCNC: 7.3 MG/DL (ref 8.7–10.5)
CHLORIDE SERPL-SCNC: 109 MMOL/L (ref 95–110)
CO2 SERPL-SCNC: 20 MMOL/L (ref 23–29)
CODING SYSTEM: NORMAL
CREAT SERPL-MCNC: 1.1 MG/DL (ref 0.5–1.4)
CROSSMATCH INTERPRETATION: NORMAL
DIFFERENTIAL METHOD BLD: ABNORMAL
DIFFERENTIAL METHOD BLD: ABNORMAL
DISPENSE STATUS: NORMAL
EOSINOPHIL # BLD AUTO: 0 K/UL (ref 0–0.5)
EOSINOPHIL # BLD AUTO: 0 K/UL (ref 0–0.5)
EOSINOPHIL NFR BLD: 0 % (ref 0–8)
EOSINOPHIL NFR BLD: 0 % (ref 0–8)
ERYTHROCYTE [DISTWIDTH] IN BLOOD BY AUTOMATED COUNT: 12.6 % (ref 11.5–14.5)
ERYTHROCYTE [DISTWIDTH] IN BLOOD BY AUTOMATED COUNT: 14.2 % (ref 11.5–14.5)
EST. GFR  (NO RACE VARIABLE): >60 ML/MIN/1.73 M^2
GLUCOSE SERPL-MCNC: 238 MG/DL (ref 70–110)
HCT VFR BLD AUTO: 22.7 % (ref 40–54)
HCT VFR BLD AUTO: 26.9 % (ref 40–54)
HGB BLD-MCNC: 7.8 G/DL (ref 14–18)
HGB BLD-MCNC: 9.2 G/DL (ref 14–18)
IMM GRANULOCYTES # BLD AUTO: 0.02 K/UL (ref 0–0.04)
IMM GRANULOCYTES # BLD AUTO: 0.04 K/UL (ref 0–0.04)
IMM GRANULOCYTES NFR BLD AUTO: 0.3 % (ref 0–0.5)
IMM GRANULOCYTES NFR BLD AUTO: 0.5 % (ref 0–0.5)
LYMPHOCYTES # BLD AUTO: 0.6 K/UL (ref 1–4.8)
LYMPHOCYTES # BLD AUTO: 0.7 K/UL (ref 1–4.8)
LYMPHOCYTES NFR BLD: 8.9 % (ref 18–48)
LYMPHOCYTES NFR BLD: 8.9 % (ref 18–48)
MAGNESIUM SERPL-MCNC: 1.8 MG/DL (ref 1.6–2.6)
MCH RBC QN AUTO: 30.6 PG (ref 27–31)
MCH RBC QN AUTO: 32.1 PG (ref 27–31)
MCHC RBC AUTO-ENTMCNC: 34.2 G/DL (ref 32–36)
MCHC RBC AUTO-ENTMCNC: 34.4 G/DL (ref 32–36)
MCV RBC AUTO: 89 FL (ref 82–98)
MCV RBC AUTO: 93 FL (ref 82–98)
MONOCYTES # BLD AUTO: 0.6 K/UL (ref 0.3–1)
MONOCYTES # BLD AUTO: 0.8 K/UL (ref 0.3–1)
MONOCYTES NFR BLD: 10 % (ref 4–15)
MONOCYTES NFR BLD: 9.5 % (ref 4–15)
NEUTROPHILS # BLD AUTO: 5.5 K/UL (ref 1.8–7.7)
NEUTROPHILS # BLD AUTO: 6.6 K/UL (ref 1.8–7.7)
NEUTROPHILS NFR BLD: 80.4 % (ref 38–73)
NEUTROPHILS NFR BLD: 81.2 % (ref 38–73)
NRBC BLD-RTO: 0 /100 WBC
NRBC BLD-RTO: 0 /100 WBC
PHOSPHATE SERPL-MCNC: 3.8 MG/DL (ref 2.7–4.5)
PLATELET # BLD AUTO: 62 K/UL (ref 150–450)
PLATELET # BLD AUTO: 76 K/UL (ref 150–450)
PMV BLD AUTO: 11.9 FL (ref 9.2–12.9)
PMV BLD AUTO: 12.7 FL (ref 9.2–12.9)
POCT GLUCOSE: 115 MG/DL (ref 70–110)
POCT GLUCOSE: 223 MG/DL (ref 70–110)
POCT GLUCOSE: 225 MG/DL (ref 70–110)
POCT GLUCOSE: 245 MG/DL (ref 70–110)
POCT GLUCOSE: 96 MG/DL (ref 70–110)
POTASSIUM SERPL-SCNC: 3.8 MMOL/L (ref 3.5–5.1)
PROT SERPL-MCNC: 4.7 G/DL (ref 6–8.4)
RBC # BLD AUTO: 2.43 M/UL (ref 4.6–6.2)
RBC # BLD AUTO: 3.01 M/UL (ref 4.6–6.2)
SODIUM SERPL-SCNC: 140 MMOL/L (ref 136–145)
UNIT NUMBER: NORMAL
WBC # BLD AUTO: 6.77 K/UL (ref 3.9–12.7)
WBC # BLD AUTO: 8.22 K/UL (ref 3.9–12.7)

## 2025-03-17 PROCEDURE — 25000003 PHARM REV CODE 250: Performed by: NURSE ANESTHETIST, CERTIFIED REGISTERED

## 2025-03-17 PROCEDURE — 85025 COMPLETE CBC W/AUTO DIFF WBC: CPT | Performed by: STUDENT IN AN ORGANIZED HEALTH CARE EDUCATION/TRAINING PROGRAM

## 2025-03-17 PROCEDURE — 84100 ASSAY OF PHOSPHORUS: CPT | Performed by: STUDENT IN AN ORGANIZED HEALTH CARE EDUCATION/TRAINING PROGRAM

## 2025-03-17 PROCEDURE — 49002 REOPENING OF ABDOMEN: CPT | Mod: 58,,, | Performed by: STUDENT IN AN ORGANIZED HEALTH CARE EDUCATION/TRAINING PROGRAM

## 2025-03-17 PROCEDURE — 30233N1 TRANSFUSION OF NONAUTOLOGOUS RED BLOOD CELLS INTO PERIPHERAL VEIN, PERCUTANEOUS APPROACH: ICD-10-PCS | Performed by: STUDENT IN AN ORGANIZED HEALTH CARE EDUCATION/TRAINING PROGRAM

## 2025-03-17 PROCEDURE — 63600175 PHARM REV CODE 636 W HCPCS: Performed by: SPECIALIST

## 2025-03-17 PROCEDURE — 36000706: Performed by: STUDENT IN AN ORGANIZED HEALTH CARE EDUCATION/TRAINING PROGRAM

## 2025-03-17 PROCEDURE — 80053 COMPREHEN METABOLIC PANEL: CPT | Performed by: STUDENT IN AN ORGANIZED HEALTH CARE EDUCATION/TRAINING PROGRAM

## 2025-03-17 PROCEDURE — 37000008 HC ANESTHESIA 1ST 15 MINUTES: Performed by: STUDENT IN AN ORGANIZED HEALTH CARE EDUCATION/TRAINING PROGRAM

## 2025-03-17 PROCEDURE — P9016 RBC LEUKOCYTES REDUCED: HCPCS | Performed by: EMERGENCY MEDICINE

## 2025-03-17 PROCEDURE — 83735 ASSAY OF MAGNESIUM: CPT | Performed by: STUDENT IN AN ORGANIZED HEALTH CARE EDUCATION/TRAINING PROGRAM

## 2025-03-17 PROCEDURE — 0WQF0ZZ REPAIR ABDOMINAL WALL, OPEN APPROACH: ICD-10-PCS | Performed by: STUDENT IN AN ORGANIZED HEALTH CARE EDUCATION/TRAINING PROGRAM

## 2025-03-17 PROCEDURE — 63600175 PHARM REV CODE 636 W HCPCS: Performed by: NURSE ANESTHETIST, CERTIFIED REGISTERED

## 2025-03-17 PROCEDURE — 85025 COMPLETE CBC W/AUTO DIFF WBC: CPT | Mod: 91 | Performed by: SPECIALIST

## 2025-03-17 PROCEDURE — 25000003 PHARM REV CODE 250

## 2025-03-17 PROCEDURE — 25000003 PHARM REV CODE 250: Performed by: INTERNAL MEDICINE

## 2025-03-17 PROCEDURE — 63600175 PHARM REV CODE 636 W HCPCS

## 2025-03-17 PROCEDURE — 71000039 HC RECOVERY, EACH ADD'L HOUR: Performed by: STUDENT IN AN ORGANIZED HEALTH CARE EDUCATION/TRAINING PROGRAM

## 2025-03-17 PROCEDURE — 63600175 PHARM REV CODE 636 W HCPCS: Performed by: INTERNAL MEDICINE

## 2025-03-17 PROCEDURE — 36000707: Performed by: STUDENT IN AN ORGANIZED HEALTH CARE EDUCATION/TRAINING PROGRAM

## 2025-03-17 PROCEDURE — 20000000 HC ICU ROOM

## 2025-03-17 PROCEDURE — 51798 US URINE CAPACITY MEASURE: CPT

## 2025-03-17 PROCEDURE — 36430 TRANSFUSION BLD/BLD COMPNT: CPT

## 2025-03-17 PROCEDURE — 71000033 HC RECOVERY, INTIAL HOUR: Performed by: STUDENT IN AN ORGANIZED HEALTH CARE EDUCATION/TRAINING PROGRAM

## 2025-03-17 PROCEDURE — 37000009 HC ANESTHESIA EA ADD 15 MINS: Performed by: STUDENT IN AN ORGANIZED HEALTH CARE EDUCATION/TRAINING PROGRAM

## 2025-03-17 RX ORDER — ROCURONIUM BROMIDE 10 MG/ML
INJECTION, SOLUTION INTRAVENOUS
Status: DISCONTINUED | OUTPATIENT
Start: 2025-03-17 | End: 2025-03-17

## 2025-03-17 RX ORDER — LORAZEPAM 2 MG/ML
1 INJECTION INTRAMUSCULAR EVERY 4 HOURS PRN
Status: DISCONTINUED | OUTPATIENT
Start: 2025-03-17 | End: 2025-03-24 | Stop reason: HOSPADM

## 2025-03-17 RX ORDER — LIDOCAINE HYDROCHLORIDE 20 MG/ML
INJECTION INTRAVENOUS
Status: DISCONTINUED | OUTPATIENT
Start: 2025-03-17 | End: 2025-03-17

## 2025-03-17 RX ORDER — OXYCODONE AND ACETAMINOPHEN 5; 325 MG/1; MG/1
1 TABLET ORAL
Status: DISCONTINUED | OUTPATIENT
Start: 2025-03-17 | End: 2025-03-18 | Stop reason: HOSPADM

## 2025-03-17 RX ORDER — ONDANSETRON HYDROCHLORIDE 2 MG/ML
4 INJECTION, SOLUTION INTRAVENOUS DAILY PRN
Status: DISCONTINUED | OUTPATIENT
Start: 2025-03-17 | End: 2025-03-18 | Stop reason: HOSPADM

## 2025-03-17 RX ORDER — SUCCINYLCHOLINE CHLORIDE 20 MG/ML
INJECTION INTRAMUSCULAR; INTRAVENOUS
Status: DISCONTINUED | OUTPATIENT
Start: 2025-03-17 | End: 2025-03-17

## 2025-03-17 RX ORDER — INDOCYANINE GREEN AND WATER 25 MG
KIT INJECTION
Status: DISCONTINUED | OUTPATIENT
Start: 2025-03-17 | End: 2025-03-17

## 2025-03-17 RX ORDER — HYDROMORPHONE HYDROCHLORIDE 2 MG/ML
0.2 INJECTION, SOLUTION INTRAMUSCULAR; INTRAVENOUS; SUBCUTANEOUS EVERY 5 MIN PRN
Status: DISCONTINUED | OUTPATIENT
Start: 2025-03-17 | End: 2025-03-18 | Stop reason: HOSPADM

## 2025-03-17 RX ORDER — MIDAZOLAM HYDROCHLORIDE 1 MG/ML
INJECTION INTRAMUSCULAR; INTRAVENOUS
Status: DISCONTINUED | OUTPATIENT
Start: 2025-03-17 | End: 2025-03-17

## 2025-03-17 RX ORDER — PROPOFOL 10 MG/ML
VIAL (ML) INTRAVENOUS
Status: DISCONTINUED | OUTPATIENT
Start: 2025-03-17 | End: 2025-03-17

## 2025-03-17 RX ORDER — HYDROCODONE BITARTRATE AND ACETAMINOPHEN 500; 5 MG/1; MG/1
TABLET ORAL
Status: DISCONTINUED | OUTPATIENT
Start: 2025-03-17 | End: 2025-03-24 | Stop reason: HOSPADM

## 2025-03-17 RX ORDER — THIAMINE HYDROCHLORIDE 100 MG/ML
100 INJECTION, SOLUTION INTRAMUSCULAR; INTRAVENOUS DAILY
Status: COMPLETED | OUTPATIENT
Start: 2025-03-17 | End: 2025-03-19

## 2025-03-17 RX ORDER — PANTOPRAZOLE SODIUM 40 MG/10ML
40 INJECTION, POWDER, LYOPHILIZED, FOR SOLUTION INTRAVENOUS 2 TIMES DAILY
Status: DISCONTINUED | OUTPATIENT
Start: 2025-03-17 | End: 2025-03-22

## 2025-03-17 RX ORDER — GLUCAGON 1 MG
1 KIT INJECTION
Status: DISCONTINUED | OUTPATIENT
Start: 2025-03-17 | End: 2025-03-24 | Stop reason: HOSPADM

## 2025-03-17 RX ORDER — MAGNESIUM SULFATE HEPTAHYDRATE 40 MG/ML
2 INJECTION, SOLUTION INTRAVENOUS ONCE
Status: COMPLETED | OUTPATIENT
Start: 2025-03-17 | End: 2025-03-17

## 2025-03-17 RX ORDER — LORAZEPAM 2 MG/ML
2 INJECTION INTRAMUSCULAR
Status: DISCONTINUED | OUTPATIENT
Start: 2025-03-17 | End: 2025-03-24 | Stop reason: HOSPADM

## 2025-03-17 RX ORDER — INSULIN ASPART 100 [IU]/ML
0-10 INJECTION, SOLUTION INTRAVENOUS; SUBCUTANEOUS EVERY 4 HOURS PRN
Status: DISCONTINUED | OUTPATIENT
Start: 2025-03-17 | End: 2025-03-24 | Stop reason: HOSPADM

## 2025-03-17 RX ORDER — FENTANYL CITRATE 50 UG/ML
INJECTION, SOLUTION INTRAMUSCULAR; INTRAVENOUS
Status: DISCONTINUED | OUTPATIENT
Start: 2025-03-17 | End: 2025-03-17

## 2025-03-17 RX ORDER — PHENYLEPHRINE HYDROCHLORIDE 10 MG/ML
INJECTION INTRAVENOUS
Status: DISCONTINUED | OUTPATIENT
Start: 2025-03-17 | End: 2025-03-17

## 2025-03-17 RX ORDER — NOREPINEPHRINE BITARTRATE 0.02 MG/ML
0-3 INJECTION, SOLUTION INTRAVENOUS CONTINUOUS
Status: DISCONTINUED | OUTPATIENT
Start: 2025-03-17 | End: 2025-03-18

## 2025-03-17 RX ADMIN — INSULIN ASPART 1 UNITS: 100 INJECTION, SOLUTION INTRAVENOUS; SUBCUTANEOUS at 11:03

## 2025-03-17 RX ADMIN — PHENYLEPHRINE HYDROCHLORIDE 400 MCG: 10 INJECTION INTRAVENOUS at 09:03

## 2025-03-17 RX ADMIN — MUPIROCIN: 20 OINTMENT TOPICAL at 09:03

## 2025-03-17 RX ADMIN — MORPHINE SULFATE 2 MG: 4 INJECTION INTRAVENOUS at 03:03

## 2025-03-17 RX ADMIN — LIDOCAINE HYDROCHLORIDE 100 MG: 20 INJECTION INTRAVENOUS at 09:03

## 2025-03-17 RX ADMIN — MORPHINE SULFATE 2 MG: 4 INJECTION INTRAVENOUS at 05:03

## 2025-03-17 RX ADMIN — PANTOPRAZOLE SODIUM 40 MG: 40 INJECTION, POWDER, FOR SOLUTION INTRAVENOUS at 09:03

## 2025-03-17 RX ADMIN — SODIUM CHLORIDE, POTASSIUM CHLORIDE, SODIUM LACTATE AND CALCIUM CHLORIDE: 600; 310; 30; 20 INJECTION, SOLUTION INTRAVENOUS at 10:03

## 2025-03-17 RX ADMIN — LORAZEPAM 1 MG: 2 INJECTION INTRAMUSCULAR; INTRAVENOUS at 01:03

## 2025-03-17 RX ADMIN — PROPOFOL 100 MG: 10 INJECTION, EMULSION INTRAVENOUS at 09:03

## 2025-03-17 RX ADMIN — PIPERACILLIN SODIUM AND TAZOBACTAM SODIUM 4.5 G: 4; .5 INJECTION, POWDER, FOR SOLUTION INTRAVENOUS at 06:03

## 2025-03-17 RX ADMIN — PANTOPRAZOLE SODIUM 40 MG: 40 INJECTION, POWDER, FOR SOLUTION INTRAVENOUS at 01:03

## 2025-03-17 RX ADMIN — THIAMINE HYDROCHLORIDE 100 MG: 100 INJECTION, SOLUTION INTRAMUSCULAR; INTRAVENOUS at 08:03

## 2025-03-17 RX ADMIN — PHENYLEPHRINE HYDROCHLORIDE 400 MCG: 10 INJECTION INTRAVENOUS at 10:03

## 2025-03-17 RX ADMIN — ROCURONIUM BROMIDE 45 MG: 10 SOLUTION INTRAVENOUS at 09:03

## 2025-03-17 RX ADMIN — FOLIC ACID 1 MG: 5 INJECTION, SOLUTION INTRAMUSCULAR; INTRAVENOUS; SUBCUTANEOUS at 08:03

## 2025-03-17 RX ADMIN — INDOCYANINE GREEN AND WATER 2.5 MG: KIT at 09:03

## 2025-03-17 RX ADMIN — SUGAMMADEX 200 MG: 100 INJECTION, SOLUTION INTRAVENOUS at 10:03

## 2025-03-17 RX ADMIN — MAGNESIUM SULFATE HEPTAHYDRATE 2 G: 40 INJECTION, SOLUTION INTRAVENOUS at 03:03

## 2025-03-17 RX ADMIN — PIPERACILLIN SODIUM AND TAZOBACTAM SODIUM 4.5 G: 4; .5 INJECTION, POWDER, FOR SOLUTION INTRAVENOUS at 11:03

## 2025-03-17 RX ADMIN — INSULIN ASPART 4 UNITS: 100 INJECTION, SOLUTION INTRAVENOUS; SUBCUTANEOUS at 12:03

## 2025-03-17 RX ADMIN — MIDAZOLAM HYDROCHLORIDE 2 MG: 1 INJECTION, SOLUTION INTRAMUSCULAR; INTRAVENOUS at 09:03

## 2025-03-17 RX ADMIN — PIPERACILLIN SODIUM AND TAZOBACTAM SODIUM 4.5 G: 4; .5 INJECTION, POWDER, FOR SOLUTION INTRAVENOUS at 01:03

## 2025-03-17 RX ADMIN — SUCCINYLCHOLINE CHLORIDE 140 MG: 20 INJECTION, SOLUTION INTRAMUSCULAR; INTRAVENOUS; PARENTERAL at 09:03

## 2025-03-17 RX ADMIN — NOREPINEPHRINE BITARTRATE 0.02 MCG/KG/MIN: 0.02 INJECTION, SOLUTION INTRAVENOUS at 01:03

## 2025-03-17 RX ADMIN — ROCURONIUM BROMIDE 5 MG: 10 SOLUTION INTRAVENOUS at 09:03

## 2025-03-17 RX ADMIN — MUPIROCIN: 20 OINTMENT TOPICAL at 08:03

## 2025-03-17 RX ADMIN — FENTANYL CITRATE 100 MCG: 50 INJECTION, SOLUTION INTRAMUSCULAR; INTRAVENOUS at 09:03

## 2025-03-17 RX ADMIN — PANTOPRAZOLE SODIUM 40 MG: 40 INJECTION, POWDER, FOR SOLUTION INTRAVENOUS at 08:03

## 2025-03-17 RX ADMIN — MORPHINE SULFATE 2 MG: 4 INJECTION INTRAVENOUS at 09:03

## 2025-03-17 RX ADMIN — SODIUM CHLORIDE, POTASSIUM CHLORIDE, SODIUM LACTATE AND CALCIUM CHLORIDE: 600; 310; 30; 20 INJECTION, SOLUTION INTRAVENOUS at 09:03

## 2025-03-17 RX ADMIN — PHENYLEPHRINE HYDROCHLORIDE 300 MCG: 10 INJECTION INTRAVENOUS at 09:03

## 2025-03-17 RX ADMIN — INSULIN ASPART 4 UNITS: 100 INJECTION, SOLUTION INTRAVENOUS; SUBCUTANEOUS at 05:03

## 2025-03-17 NOTE — NURSING
Pt transported back to ICU from PACU via bed by PACU RN. Pt AAO, VSS on RA, no complaints of pain. Midline abd incision CDI, well approximated, dressed c dermabond, assessed c FLORESITA Jara. Scheduled Abx started during procedure continued on return to unit. NGT to low intermittent suction, joseph in place.

## 2025-03-17 NOTE — ANESTHESIA PREPROCEDURE EVALUATION
03/17/2025  Jorgito Arreaga is a 59 y.o., male.    Past Medical History:   Diagnosis Date    Abscess 9/5/2018    Cataracts, bilateral     Chest pain 12/6/2018    Dehydration     Diabetes mellitus, type 2     Diagnosed 7/2016    Folliculitis 9/15/2017    GERD (gastroesophageal reflux disease)     Hypertension     Inflammatory polyps of colon     Pancreatitis, alcoholic, acute     Port catheter in place     TIA (transient ischemic attack)     Tinea cruris 9/15/2017     Past Surgical History:   Procedure Laterality Date    COLONOSCOPY      COLONOSCOPY N/A 6/15/2018    Procedure: COLONOSCOPY;  Surgeon: Jim Hicks MD;  Location: [facility] ENDO;  Service: Endoscopy;  Laterality: N/A;    ESOPHAGOGASTRODUODENOSCOPY N/A 6/15/2018    Procedure: ESOPHAGOGASTRODUODENOSCOPY (EGD);  Surgeon: Jim Hicks MD;  Location: [facility] ENDO;  Service: Endoscopy;  Laterality: N/A;    ROBOT-ASSISTED LAPAROSCOPIC REPAIR OF INGUINAL HERNIA USING DA JANET XI Bilateral 11/30/2022    Procedure: XI ROBOTIC REPAIR, HERNIA, INGUINAL;  Surgeon: Smooth Denny MD;  Location: [facility] OR;  Service: General;  Laterality: Bilateral;       Chemistry        Component Value Date/Time     03/17/2025 0407    K 3.8 03/17/2025 0407     03/17/2025 0407    CO2 20 (L) 03/17/2025 0407    BUN 15 03/17/2025 0407    CREATININE 1.1 03/17/2025 0407     (H) 03/17/2025 0407        Component Value Date/Time    CALCIUM 7.3 (L) 03/17/2025 0407    ALKPHOS 65 03/17/2025 0407    AST 54 (H) 03/17/2025 0407    ALT 30 03/17/2025 0407    BILITOT 0.9 03/17/2025 0407    ESTGFRAFRICA >60.0 07/13/2021 1105    EGFRNONAA >60.0 07/13/2021 1105        Lab Results   Component Value Date    WBC 6.77 03/17/2025    HGB 7.8 (L) 03/17/2025    HCT 22.7 (L) 03/17/2025    MCV 93 03/17/2025    PLT 76 (L) 03/17/2025     ECHO (2024):  Summary         Left Ventricle: The left  ventricle is normal in size. Normal wall thickness. There is concentric remodeling. There is normal systolic function. Ejection fraction by visual approximation is 60%. There is normal diastolic function.    Right Ventricle: Normal right ventricular cavity size. Wall thickness is normal. Systolic function is normal.    Pulmonary Artery: The estimated pulmonary artery systolic pressure is 18 mmHg.    IVC/SVC: Normal venous pressure at 3 mmHg.    (-) bubble study      Pre-op Assessment    I have reviewed the Patient Summary Reports.    I have reviewed the NPO Status.   I have reviewed the Medications.     Review of Systems  Anesthesia Hx:  No problems with previous Anesthesia   History of prior surgery of interest to airway management or planning:  Previous anesthesia: General          Denies Personal Hx of Anesthesia complications.                    Social:  Alcohol Use, Former Smoker Alcoholic - drinks > 60oz. Beer/day and/or 1 fifth whiskey       Hematology/Oncology:    Oncology Normal    -- Anemia:               Hematology Comments: 7.8/22.7    PLT 76                    Cardiovascular:     Hypertension              ECG has been reviewed. Normal sinus rhythm   Septal infarct (cited on or before 29-JUN-2024)   Abnormal ECG   When compared with ECG of 12-JUL-2024 09:17,   QT has lengthened   Confirmed by TEQUILA HURST MD ([id number]) on 8/2/2024 4:30:05 PM                                Pulmonary:  Pulmonary Normal                       Renal/:  Renal/ Normal                 Hepatic/GI:     GERD   Hx of alcoholic pancreatitis     Recent SBO - s/p ex-lap 3/16/25             Musculoskeletal:  Arthritis               Neurological:  TIA, CVA, residual symptoms        RUE weakness, contracture (mild)                             Endocrine:  Diabetes, poorly controlled, type 2   Bmi 24      Denies Obesity / BMI > 30      Physical Exam  General: Well nourished, Cooperative, Alert and Oriented    Airway:  Mallampati: III   Mouth  Opening: Normal  TM Distance: Normal  Tongue: Normal  Neck ROM: Normal ROM    Dental:  Intact  Patient denies any currently loose teeth; Patient denies any removable dental appliances        Anesthesia Plan  Type of Anesthesia, risks & benefits discussed:    Anesthesia Type: Gen ETT  Intra-op Monitoring Plan: Standard ASA Monitors, Art Line and Central Line  Post Op Pain Control Plan: multimodal analgesia and IV/PO Opioids PRN  Induction:  IV  Airway Plan: Video, Post-Induction  Informed Consent: Informed consent signed with the Patient and all parties understand the risks and agree with anesthesia plan.  All questions answered. Patient consented to blood products? Yes  ASA Score: 3  Day of Surgery Review of History & Physical: H&P Update referred to the surgeon/provider.    Ready For Surgery From Anesthesia Perspective.     .

## 2025-03-17 NOTE — PROGRESS NOTES
[facility] - Intensive Care ([facility])  General Surgery  Progress Note    Subjective:     History of Present Illness:  No notes on file    Post-Op Info:  Procedure(s) (LRB):  LAPAROTOMY, EXPLORATORY (N/A)  CLOSURE, WOUND (N/A)   * Day of Surgery *     Interval History:  Patient reports pain.  NPO.  Lactic acid cleared overnight.  Off pressors.    Medications:  Continuous Infusions:   NORepinephrine bitartrate-D5W  0-3 mcg/kg/min Intravenous Continuous   Stopped at 03/17/25 0515     Scheduled Meds:   folic acid (FOLVITE) IVPB  1 mg Intravenous Daily    mupirocin   Nasal BID    pantoprazole  40 mg Intravenous BID    piperacillin-tazobactam (Zosyn) IV (PEDS and ADULTS) (extended infusion is not appropriate)  4.5 g Intravenous Q8H    thiamine (B-1) injection  100 mg Intravenous Daily     PRN Meds:  Current Facility-Administered Medications:     0.9%  NaCl infusion (for blood administration), , Intravenous, Q24H PRN    dextrose 50%, 12.5 g, Intravenous, PRN    fentaNYL, 50 mcg, Intravenous, Q3H PRN    glucagon (human recombinant), 1 mg, Intramuscular, PRN    insulin aspart U-100, 0-10 Units, Subcutaneous, Q6H PRN    lorazepam, 1 mg, Intravenous, Q4H PRN    lorazepam, 2 mg, Intravenous, PRN    morphine, 2 mg, Intravenous, Q4H PRN    ondansetron, 4 mg, Intravenous, Q6H PRN    sodium chloride 0.9%, 10 mL, Intravenous, PRN     Review of patient's allergies indicates:  No Known Allergies  Objective:     Vital Signs (Most Recent):  Temp: 97.5 °F (36.4 °C) (03/17/25 0905)  Pulse: 103 (03/17/25 0905)  Resp: 14 (03/17/25 0905)  BP: 119/74 (03/17/25 0625)  SpO2: 99 % (03/17/25 0905) Vital Signs (24h Range):  Temp:  [97.5 °F (36.4 °C)-98.4 °F (36.9 °C)] 97.5 °F (36.4 °C)  Pulse:  [] 103  Resp:  [10-43] 14  SpO2:  [93 %-100 %] 99 %  BP: ()/(46-99) 119/74  Arterial Line BP: ()/(47-92) 141/88     Weight: 68 kg (150 lb)  Body mass index is 24.21 kg/m².    Intake/Output - Last 3 Shifts         03/15 0700  03/16 0659 03/16  0700  03/17 0659 03/17 0700  03/18 0659    I.V. (mL/kg) 300 (4.4) 115.3 (1.7)     Blood   364.6    IV Piggyback 2498.2 4975 1128.9    Total Intake(mL/kg) 2798.2 (41.1) 5090.3 (74.9) 1493.5 (22)    Urine (mL/kg/hr)  825 (0.5) 40 (0.2)    Drains  1250 50    Other 500 1400 50    Total Output 500 3475 140    Net +2298.2 +1615.3 +1353.5                    Physical Exam  Constitutional:       Appearance: He is well-developed.   HENT:      Head: Normocephalic and atraumatic.   Eyes:      Conjunctiva/sclera: Conjunctivae normal.      Pupils: Pupils are equal, round, and reactive to light.   Neck:      Thyroid: No thyromegaly.   Cardiovascular:      Rate and Rhythm: Normal rate and regular rhythm.   Pulmonary:      Effort: Pulmonary effort is normal. No respiratory distress.   Abdominal:      Comments: Appropriately tender to palpation.  ABThera in place with serosanguineous drainage   Musculoskeletal:         General: No tenderness. Normal range of motion.      Cervical back: Normal range of motion.   Skin:     General: Skin is warm and dry.      Capillary Refill: Capillary refill takes less than 2 seconds.   Neurological:      General: No focal deficit present.      Mental Status: He is alert and oriented to person, place, and time.          Significant Labs:  I have reviewed all pertinent lab results within the past 24 hours.  CBC:   Recent Labs   Lab 03/17/25  0407   WBC 6.77   RBC 2.43*   HGB 7.8*   HCT 22.7*   PLT 76*   MCV 93   MCH 32.1*   MCHC 34.4     CMP:   Recent Labs   Lab 03/17/25  0407   *   CALCIUM 7.3*   ALBUMIN 2.7*   PROT 4.7*      K 3.8   CO2 20*      BUN 15   CREATININE 1.1   ALKPHOS 65   ALT 30   AST 54*   BILITOT 0.9       Significant Diagnostics:  I have reviewed all pertinent imaging results/findings within the past 24 hours.  Assessment/Plan:     * Small bowel obstruction  POD1 status post exploratory laparotomy with reduction of small bowel volvulus    - continue NG tube with the  NPO  - plan for OR today for DX lap with the assessment of small bowel viability and possible closure.  - risks benefits and alternatives discussed with the patient including bleeding, infection, damage to surrounding structures, expected postoperative ileus, delayed return of bowel function, prolonged hospitalization.  He voiced understanding and agreed to proceed    Type 2 diabetes mellitus with hyperglycemia, with long-term current use of insulin  Per critical Care Medicine    Essential hypertension  Per critical Care Medicine    Alcohol abuse  Per critical Care Medicine    Gastroesophageal reflux disease  Per critical Care Medicine        Alia Garcia MD  General Surgery  '[facility] - Intensive Care ([facility])

## 2025-03-17 NOTE — ASSESSMENT & PLAN NOTE
- has hx of w/ETOH abuse, but acutely elevated  - likely in setting of SBO w/ischemia  - check NH3 and coags  - Serial labs   Fluconazole Counseling:  Patient counseled regarding adverse effects of fluconazole including but not limited to headache, diarrhea, nausea, upset stomach, liver function test abnormalities, taste disturbance, and stomach pain.  There is a rare possibility of liver failure that can occur when taking fluconazole.  The patient understands that monitoring of LFTs and kidney function test may be required, especially at baseline. The patient verbalized understanding of the proper use and possible adverse effects of fluconazole.  All of the patient's questions and concerns were addressed.

## 2025-03-17 NOTE — HOSPITAL COURSE
3/17 Awake and alert  BP controlled  No emesis. Pain controlled.   No tremors  Gives hx of alcohol abuse but inconsistent stories on how often he drinks.  No hemodynamic or respiratory issues    3/18 Awake and alert.   No resp or hemodynamic issues

## 2025-03-17 NOTE — ASSESSMENT & PLAN NOTE
- anticipate patient to be left in discontinuity  - management per general surgery    3/17 To OR this am  Can get extubated in recovery and then to ICU  S/p PRBC

## 2025-03-17 NOTE — SUBJECTIVE & OBJECTIVE
Interval History:  Patient reports pain.  NPO.  Lactic acid cleared overnight.  Off pressors.    Medications:  Continuous Infusions:   NORepinephrine bitartrate-D5W  0-3 mcg/kg/min Intravenous Continuous   Stopped at 03/17/25 0515     Scheduled Meds:   folic acid (FOLVITE) IVPB  1 mg Intravenous Daily    mupirocin   Nasal BID    pantoprazole  40 mg Intravenous BID    piperacillin-tazobactam (Zosyn) IV (PEDS and ADULTS) (extended infusion is not appropriate)  4.5 g Intravenous Q8H    thiamine (B-1) injection  100 mg Intravenous Daily     PRN Meds:  Current Facility-Administered Medications:     0.9%  NaCl infusion (for blood administration), , Intravenous, Q24H PRN    dextrose 50%, 12.5 g, Intravenous, PRN    fentaNYL, 50 mcg, Intravenous, Q3H PRN    glucagon (human recombinant), 1 mg, Intramuscular, PRN    insulin aspart U-100, 0-10 Units, Subcutaneous, Q6H PRN    lorazepam, 1 mg, Intravenous, Q4H PRN    lorazepam, 2 mg, Intravenous, PRN    morphine, 2 mg, Intravenous, Q4H PRN    ondansetron, 4 mg, Intravenous, Q6H PRN    sodium chloride 0.9%, 10 mL, Intravenous, PRN     Review of patient's allergies indicates:  No Known Allergies  Objective:     Vital Signs (Most Recent):  Temp: 97.5 °F (36.4 °C) (03/17/25 0905)  Pulse: 103 (03/17/25 0905)  Resp: 14 (03/17/25 0905)  BP: 119/74 (03/17/25 0625)  SpO2: 99 % (03/17/25 0905) Vital Signs (24h Range):  Temp:  [97.5 °F (36.4 °C)-98.4 °F (36.9 °C)] 97.5 °F (36.4 °C)  Pulse:  [] 103  Resp:  [10-43] 14  SpO2:  [93 %-100 %] 99 %  BP: ()/(46-99) 119/74  Arterial Line BP: ()/(47-92) 141/88     Weight: 68 kg (150 lb)  Body mass index is 24.21 kg/m².    Intake/Output - Last 3 Shifts         03/15 0700  03/16 0659 03/16 0700 03/17 0659 03/17 0700 03/18 0659    I.V. (mL/kg) 300 (4.4) 115.3 (1.7)     Blood   364.6    IV Piggyback 2498.2 4975 1128.9    Total Intake(mL/kg) 2798.2 (41.1) 5090.3 (74.9) 1493.5 (22)    Urine (mL/kg/hr)  825 (0.5) 40 (0.2)    Drains   1250 50    Other 500 1400 50    Total Output 500 3475 140    Net +2298.2 +1615.3 +1353.5                    Physical Exam  Constitutional:       Appearance: He is well-developed.   HENT:      Head: Normocephalic and atraumatic.   Eyes:      Conjunctiva/sclera: Conjunctivae normal.      Pupils: Pupils are equal, round, and reactive to light.   Neck:      Thyroid: No thyromegaly.   Cardiovascular:      Rate and Rhythm: Normal rate and regular rhythm.   Pulmonary:      Effort: Pulmonary effort is normal. No respiratory distress.   Abdominal:      Comments: Appropriately tender to palpation.  ABThera in place with serosanguineous drainage   Musculoskeletal:         General: No tenderness. Normal range of motion.      Cervical back: Normal range of motion.   Skin:     General: Skin is warm and dry.      Capillary Refill: Capillary refill takes less than 2 seconds.   Neurological:      General: No focal deficit present.      Mental Status: He is alert and oriented to person, place, and time.          Significant Labs:  I have reviewed all pertinent lab results within the past 24 hours.  CBC:   Recent Labs   Lab 03/17/25  0407   WBC 6.77   RBC 2.43*   HGB 7.8*   HCT 22.7*   PLT 76*   MCV 93   MCH 32.1*   MCHC 34.4     CMP:   Recent Labs   Lab 03/17/25  0407   *   CALCIUM 7.3*   ALBUMIN 2.7*   PROT 4.7*      K 3.8   CO2 20*      BUN 15   CREATININE 1.1   ALKPHOS 65   ALT 30   AST 54*   BILITOT 0.9       Significant Diagnostics:  I have reviewed all pertinent imaging results/findings within the past 24 hours.

## 2025-03-17 NOTE — OP NOTE
[facility] - Intensive Care ([facility])  Colon and Rectal Surgery  Operative Note    SUMMARY     Date of Procedure: 3/17/2025     Procedure: Procedure(s) (LRB):  LAPAROTOMY, EXPLORATORY (N/A)  CLOSURE, WOUND (N/A)     Surgeons and Role:     * Alia Garcia MD - Primary    Assisting Surgeon: None    Pre-Operative Diagnosis: Complete intestinal obstruction, unspecified cause [K56.601]  Small bowel ischemia [K55.9]    Post-Operative Diagnosis: Post-Op Diagnosis Codes:     * Complete intestinal obstruction, unspecified cause [K56.601]     * Small bowel ischemia [K55.9]    Anesthesia: General    Operative Findings (including complications, if any):  Well-perfused bowel    Description of Technical Procedures:  The patient was identified in the intensive care unit.  After informed consent was obtained the patient was taken to the operating room placed in the operating table in the supine position.  General anesthesia was administered.  The outer dressing of the ABThera was removed and he was prepped and draped in the usual fashion.  Preoperative antibiotics were appropriate.  A time-out was performed indicating the correct patient procedure and operative site and all the room were in agreement.    An Hero wound protector was placed within the wound and the small bowel was extracorporealized.  The ligament of Treitz was identified.  There was noted to be a small serosal injury at the ligament of Treitz.  This was oversewn with 3-0 Vicryl and a Lembert fashion.  The remainder of the small bowel was inspected and run to the terminal ileum.  There was 1 additional area with serosal injury that was oversewn.  Indocyanine green was given and perfusion was confirmed throughout the entirety of the small bowel.  The small bowel was placed back in the abdomen ensuring that the mesentery was flat with no twisting of the mid small bowel.  Abdomen was copiously irrigated.  Hemostasis was confirmed.  The nasogastric tube was  confirmed to be within the stomach.  The fascia was then closed using running 0 looped PDS.  The skin was closed with subcuticular Monocryl and Dermabond was applied.  Sponge and instrument counts were correct.  Patient tolerated the procedure well.    Significant Surgical Tasks Conducted by the Assistant(s), if Applicable: none    Estimated Blood Loss (EBL): * No values recorded between 3/17/2025  9:24 AM and 3/17/2025 10:23 AM *           Implants: * No implants in log *    Specimens:   Specimen (24h ago, onward)      None             Condition: Good    Malignancy: Procedure performed for malignancy no    Disposition: PACU - hemodynamically stable.    Attestation: I performed the procedure.

## 2025-03-17 NOTE — TRANSFER OF CARE
"Anesthesia Transfer of Care Note    Patient: Jorgito Arreaga    Procedure(s) Performed: Procedure(s) (LRB):  LAPAROTOMY, EXPLORATORY (N/A)  CLOSURE, WOUND (N/A)    Patient location: PACU    Anesthesia Type: general    Transport from OR: Transported from OR on room air with adequate spontaneous ventilation    Post pain: adequate analgesia    Post assessment: no apparent anesthetic complications and tolerated procedure well    Post vital signs: stable    Level of consciousness: awake    Nausea/Vomiting: no nausea/vomiting    Complications: none    Transfer of care protocol was followed    Last vitals: Visit Vitals  /74   Pulse 103   Temp 36.4 °C (97.5 °F) (Oral)   Resp 14   Ht 5' 6" (1.676 m)   Wt 68 kg (150 lb)   SpO2 99%   BMI 24.21 kg/m²     "

## 2025-03-17 NOTE — CHAPLAIN
Went to visit patient, room was empty . Patient was out to Surgery. Will try to visit at a later time.      Jihan Galloway  Intern Gary

## 2025-03-17 NOTE — ASSESSMENT & PLAN NOTE
POD1 status post exploratory laparotomy with reduction of small bowel volvulus    - continue NG tube with the NPO  - plan for OR today for DX lap with the assessment of small bowel viability and possible closure.  - risks benefits and alternatives discussed with the patient including bleeding, infection, damage to surrounding structures, expected postoperative ileus, delayed return of bowel function, prolonged hospitalization.  He voiced understanding and agreed to proceed

## 2025-03-17 NOTE — ASSESSMENT & PLAN NOTE
This patient does have evidence of infective focus  My overall impression is septic shock due to lactate > 4, MAP < 65, and SBP < 90.  Source: Abdominal  Antibiotics given-   Antibiotics (72h ago, onward)      Start     Stop Route Frequency Ordered    03/16/25 2100  mupirocin 2 % ointment         03/21/25 2059 Nasl 2 times daily 03/16/25 1207    03/16/25 1015  piperacillin-tazobactam (ZOSYN) 4.5 g in D5W 100 mL IVPB (MB+)         -- IV Every 8 hours (non-standard times) 03/16/25 0910          Latest lactate reviewed-  Recent Labs   Lab 03/16/25  2020   LACTATE 1.3       Organ dysfunction indicated by Acute liver injury and Thrombocytopenia     Fluid challenge administered in ED     Post- resuscitation assessment Yes - I attest a sepsis perfusion exam was performed within 6 hours of sepsis, severe sepsis, or septic shock presentation, following fluid resuscitation.    Will continue Pressors- Levophed for MAP of 65  Source control achieved by: surgery, antibiotics  - trend lactate  - check procalcitonin    3/17 Abx and serial labs

## 2025-03-17 NOTE — CONSULTS
High Risk Wound Care Screen completed due to documented low Demetrius Score / Ab-Thera Wound Vac.    Chart reviewed.   Patient admitted yesterday with abdominal pain, noted to have high grade SBO with ischemia, and has exp lap with ab thera application per Dr. Garcia. Patient was in ICU overnight, returned to OR this morning with Dr. Garcia for exp lap and abdominal closure. Discussed with primary nurse - sacrum and heels intact with no breakdown. Patient on ICU YVON Isolibrium bed.  Pressure Injury Prevention Orderset initiated.

## 2025-03-17 NOTE — SUBJECTIVE & OBJECTIVE
Objective:     Vital Signs (Most Recent):  Temp: 97.5 °F (36.4 °C) (03/17/25 0905)  Pulse: 103 (03/17/25 0905)  Resp: 14 (03/17/25 0905)  BP: 119/74 (03/17/25 0625)  SpO2: 99 % (03/17/25 0905) Vital Signs (24h Range):  Temp:  [97.5 °F (36.4 °C)-98.4 °F (36.9 °C)] 97.5 °F (36.4 °C)  Pulse:  [] 103  Resp:  [10-43] 14  SpO2:  [93 %-100 %] 99 %  BP: ()/(46-99) 119/74  Arterial Line BP: ()/(47-92) 141/88     Weight: 68 kg (150 lb)  Body mass index is 24.21 kg/m².      Intake/Output Summary (Last 24 hours) at 3/17/2025 0948  Last data filed at 3/17/2025 0905  Gross per 24 hour   Intake 2704.03 ml   Output 3065 ml   Net -360.97 ml        Physical Exam  Vitals and nursing note reviewed.   Constitutional:       General: He is not in acute distress.     Appearance: He is ill-appearing. He is not toxic-appearing or diaphoretic.   HENT:      Head: Normocephalic.   Eyes:      Pupils: Pupils are equal, round, and reactive to light.   Cardiovascular:      Rate and Rhythm: Tachycardia present.      Pulses: Normal pulses.   Pulmonary:      Effort: No respiratory distress.   Abdominal:      General: There is distension.   Skin:     Capillary Refill: Capillary refill takes less than 2 seconds.   Neurological:      General: No focal deficit present.      Mental Status: He is alert.           Review of Systems   All other systems reviewed and are negative.      Vents:  Oxygen Concentration (%): 98 (03/16/25 0630)    Lines/Drains/Airways       Central Venous Catheter Line  Duration             Percutaneous Central Line - Triple Lumen  03/16/25 0218 Internal Jugular Right 1 day              Drain  Duration                  NG/OG Tube 03/16/25 0536 [facility] sump 18 Fr. Left nostril 1 day         Urethral Catheter 03/16/25 0425 16 Fr. 1 day              Airway  Duration                  Airway - Non-Surgical 03/17/25 0935 <1 day              Arterial Line  Duration             Arterial Line 03/16/25 0505 Left Radial 1 day               Peripheral Intravenous Line  Duration                  Peripheral IV - Single Lumen 03/16/25 0007 20 G Anterior;Right Forearm 1 day                    Significant Labs:    CBC/Anemia Profile:  Recent Labs   Lab 03/16/25  0829 03/16/25 2240 03/17/25  0407   WBC 6.92 7.88 6.77   HGB 10.3* 8.2* 7.8*   HCT 32.1* 23.9* 22.7*   PLT 93* 82* 76*   MCV 97 93 93   RDW 12.5 12.5 12.6        Chemistries:  Recent Labs   Lab 03/15/25  2146 03/16/25  0416 03/16/25  0829 03/16/25 2240 03/17/25  0407    136 139 139 140   K 3.1* 3.7 3.8 4.0 3.8    107 107 107 109   CO2 19* 12* 16* 17* 20*   BUN 15 13 14 15 15   CREATININE 1.3 1.1 1.0 1.0 1.1   CALCIUM 8.3* 7.3* 7.3* 7.2* 7.3*   ALBUMIN 3.8  --  3.2*  --  2.7*   PROT 6.6  --  5.2*  --  4.7*   BILITOT 0.8  --  0.7  --  0.9   ALKPHOS 118  --  93  --  65   ALT 63*  --  49*  --  30   *  --  142*  --  54*   MG 1.3* 1.5*  --  1.8 1.8   PHOS  --  4.6*  --   --  3.8       All pertinent labs within the past 24 hours have been reviewed.    Significant Imaging:  I have reviewed all pertinent imaging results/findings within the past 24 hours.

## 2025-03-17 NOTE — NURSING TRANSFER
Nursing Transfer Note      3/17/2025   12:45 PM    Nurse giving handoff:carrie  Nurse receiving handoff:harry    Reason patient is being transferred: post op    Transfer to ICU 7 from PACU:      Transfer via bed    Transfer with cardiac monitoring    Transported by keaton butler    Transfer Vital Signs:  Blood Pressure:131 95  Heart Rate:96  O2:95  Temperature:97.6  Respirations:12    Telemetry: Rate 96  Order for Tele Monitor? Yes    Additional Lines: Suction and Melgoza Catheter    Medicines sent: none    Any special needs or follow-up needed: no    Patient belongings transferred with patient: Yes    Chart send with patient: Yes    Notified: spouse    Patient reassessed at: 1152 3/17/25 (date, time)  1  Upon arrival to floor: cardiac monitor applied, call bell in reach, and bed in lowest position

## 2025-03-17 NOTE — PLAN OF CARE
No acute events during the shift. Pt is AAO on RA, sinus tach on the monitor, VSS. Since return from surgery, Pt is drowsy, reports some pain at incision. Minimal UO via joseph. Minimal bloody output via NGT. Repositioned with pillows to maintain skin integrity. Pt tolerating well.

## 2025-03-17 NOTE — ASSESSMENT & PLAN NOTE
- is directly impacting his overall health status  - encourage cessation, offer resources when able to participate in counseling  - will need CIWA-Ar monitoring at some point  - Inconsistent report per patient on drinking habits. May add low dose ativan IV

## 2025-03-17 NOTE — ANESTHESIA POSTPROCEDURE EVALUATION
Anesthesia Post Evaluation    Patient: Jorgito Arreaga    Procedure(s) Performed: Procedure(s) (LRB):  LAPAROTOMY, EXPLORATORY (N/A)  CLOSURE, WOUND (N/A)    Final Anesthesia Type: general      Patient location during evaluation: PACU  Patient participation: Yes- Able to Participate  Level of consciousness: awake and alert  Post-procedure vital signs: reviewed and stable  Pain management: adequate  Airway patency: patent  ALEXANDREA mitigation strategies: Verification of full reversal of neuromuscular block  PONV status at discharge: No PONV  Anesthetic complications: no      Cardiovascular status: hemodynamically stable  Respiratory status: spontaneous ventilation  Hydration status: euvolemic  Follow-up not needed.              Vitals Value Taken Time   /91 03/17/25 14:32   Temp 36.4 °C (97.6 °F) 03/17/25 12:00   Pulse 99 03/17/25 14:38   Resp 15 03/17/25 14:38   SpO2 98 % 03/17/25 14:38   Vitals shown include unfiled device data.      Event Time   Out of Recovery 03/17/2025 11:45:03         Pain/Marely Score: Pain Rating Prior to Med Admin: 7 (3/17/2025  5:42 AM)  Pain Rating Post Med Admin: 4 (3/17/2025  6:12 AM)  Marely Score: 5 (3/17/2025 11:00 AM)

## 2025-03-17 NOTE — PROGRESS NOTES
"[facility] - [facility] ([facility])  Critical Care Medicine  Progress Note    Patient Name: Jorgito Arreaga  MRN: [id number]  Admission Date: 3/15/2025  Hospital Length of Stay: 1 days  Code Status: Full Code  Attending Provider: Tod Solorzano MD  Primary Care Provider: Vonda Elliott FNP   Principal Problem: Small bowel obstruction    Subjective:     HPI:  Jorgito Arreaga is a 59-year-old male with known medical problems of alcohol abuse, GERD, pancreatitis, diabetes, transaminitis, TIA, HTN, HLD, inguinal hernia repair 2022, adenomatous polyps of transverse colon s/p polypectomy 2018.   He presented to [facility] ED for complaints of abdominal pain w/n/v onset shortly before arrival to ED. He was reportedly hypotensive w/EMS and was given a 500cc bolus, zofran, and fentanyl en route. He is still drinking beer with last being yesterday before presentation to ED. His appetite has been poor x several days, he has not eaten since Friday. He denied any fever, chills, cough, chest pain, shortness of breath, urinary symptoms.     ED workup showed WBC 3.08 (chronically in 3's), hgb 10.1, platelets 99, K 3.1, CO2 19, AG 20, normal Cr, glucose 245, Mg 1.3, , ALT 63, normal lipase, normal t-bili. Lactic acid 4.8, serum ethanol 276.   CT a/p w/contrast showed high-grade SBO with c/f decreased blood flow and ischemia-"dilated loops of small bowel. Findings compatible with a high-grade small-bowel obstruction. Some loops of distended small bowel demonstrate equivocal decreased mural enhancement. There is an equivocal finding however a stercoral enteritis based on distention is not excluded. No pneumatosis portal or mesenteric venous gas is identified. There was a twist about the root of the small bowel mesentery which results in focal high-grade narrowing of the superior mesenteric artery.   In ED, he had a labile blood pressure and ultimately required initiation of vasopressor support and CVL placement. Surgery " consulted w/plans for OR urgently tonight. Patient transferred ED to ED for expeditious definitive care. On arrival to ED, patient w/acceptable BP, however HR in 150's, sinus tachycardia, likely due to levophed. He was able to be weaned off of levophed with additional IVF. Given his need for vasopressor support, unstable hemodynamics, and high-risk surgery, and plan to leave him in discontinuity, he will be admitted to ICU post-op.    Hospital/ICU Course:  3/17 Awake and alert  BP controlled  No emesis. Pain controlled.   No tremors  Gives hx of alcohol abuse but inconsistent stories on how often he drinks.  No hemodynamic or respiratory issues        Objective:     Vital Signs (Most Recent):  Temp: 97.5 °F (36.4 °C) (03/17/25 0905)  Pulse: 103 (03/17/25 0905)  Resp: 14 (03/17/25 0905)  BP: 119/74 (03/17/25 0625)  SpO2: 99 % (03/17/25 0905) Vital Signs (24h Range):  Temp:  [97.5 °F (36.4 °C)-98.4 °F (36.9 °C)] 97.5 °F (36.4 °C)  Pulse:  [] 103  Resp:  [10-43] 14  SpO2:  [93 %-100 %] 99 %  BP: ()/(46-99) 119/74  Arterial Line BP: ()/(47-92) 141/88     Weight: 68 kg (150 lb)  Body mass index is 24.21 kg/m².      Intake/Output Summary (Last 24 hours) at 3/17/2025 0948  Last data filed at 3/17/2025 0905  Gross per 24 hour   Intake 2704.03 ml   Output 3065 ml   Net -360.97 ml        Physical Exam  Vitals and nursing note reviewed.   Constitutional:       General: He is not in acute distress.     Appearance: He is ill-appearing. He is not toxic-appearing or diaphoretic.   HENT:      Head: Normocephalic.   Eyes:      Pupils: Pupils are equal, round, and reactive to light.   Cardiovascular:      Rate and Rhythm: Tachycardia present.      Pulses: Normal pulses.   Pulmonary:      Effort: No respiratory distress.   Abdominal:      General: There is distension.   Skin:     Capillary Refill: Capillary refill takes less than 2 seconds.   Neurological:      General: No focal deficit present.      Mental Status:  "He is alert.           Review of Systems   All other systems reviewed and are negative.      Vents:  Oxygen Concentration (%): 98 (03/16/25 0630)    Lines/Drains/Airways       Central Venous Catheter Line  Duration             Percutaneous Central Line - Triple Lumen  03/16/25 0218 Internal Jugular Right 1 day              Drain  Duration                  NG/OG Tube 03/16/25 0536 [place] sump 18 Fr. Left nostril 1 day         Urethral Catheter 03/16/25 0425 16 Fr. 1 day              Airway  Duration                  Airway - Non-Surgical 03/17/25 0935 <1 day              Arterial Line  Duration             Arterial Line 03/16/25 0505 Left Radial 1 day              Peripheral Intravenous Line  Duration                  Peripheral IV - Single Lumen 03/16/25 0007 20 G Anterior;Right Forearm 1 day                    Significant Labs:    CBC/Anemia Profile:  Recent Labs   Lab 03/16/25  0829 03/16/25  2240 03/17/25  0407   WBC 6.92 7.88 6.77   HGB 10.3* 8.2* 7.8*   HCT 32.1* 23.9* 22.7*   PLT 93* 82* 76*   MCV 97 93 93   RDW 12.5 12.5 12.6        Chemistries:  Recent Labs   Lab 03/15/25  2146 03/16/25  0416 03/16/25  0829 03/16/25  2240 03/17/25  0407    136 139 139 140   K 3.1* 3.7 3.8 4.0 3.8    107 107 107 109   CO2 19* 12* 16* 17* 20*   BUN 15 13 14 15 15   CREATININE 1.3 1.1 1.0 1.0 1.1   CALCIUM 8.3* 7.3* 7.3* 7.2* 7.3*   ALBUMIN 3.8  --  3.2*  --  2.7*   PROT 6.6  --  5.2*  --  4.7*   BILITOT 0.8  --  0.7  --  0.9   ALKPHOS 118  --  93  --  65   ALT 63*  --  49*  --  30   *  --  142*  --  54*   MG 1.3* 1.5*  --  1.8 1.8   PHOS  --  4.6*  --   --  3.8       All pertinent labs within the past 24 hours have been reviewed.    Significant Imaging:  I have reviewed all pertinent imaging results/findings within the past 24 hours.    ABG  No results for input(s): "PH", "PO2", "PCO2", "HCO3", "BE" in the last 168 hours.  Assessment/Plan:     Psychiatric  Alcohol abuse  - is directly impacting his overall " health status  - encourage cessation, offer resources when able to participate in counseling  - will need CIWA-Ar monitoring at some point  - Inconsistent report per patient on drinking habits. May add low dose ativan IV      Cardiac/Vascular  Essential hypertension  - acutely required vasopressor support  - likely will be NPO for a bit  - will utilize IV prn medication if needed until PO resumes    Hyperlipidemia  - check triglycerides stat and send lipid panel  - statin when appropriate post-op      Renal/  Hypomagnesemia  Patient has Abnormal Magnesium: hypomagnesemia. Will continue to monitor electrolytes closely. Will replace the affected electrolytes and repeat labs to be done after interventions completed. The patient's magnesium results have been reviewed and are listed below.  Recent Labs   Lab 03/15/25  2146   MG 1.3*        Hypokalemia  Patient's most recent potassium results are listed below.   Recent Labs     03/15/25  2146   K 3.1*     Plan  - Replete potassium per protocol  - Monitor potassium Every 6 hours  - Patient's hypokalemia is being treated  - monitor upon return from OR, replacement started in ED    ID  Severe sepsis  This patient does have evidence of infective focus  My overall impression is septic shock due to lactate > 4, MAP < 65, and SBP < 90.  Source: Abdominal  Antibiotics given-   Antibiotics (72h ago, onward)      Start     Stop Route Frequency Ordered    03/16/25 2100  mupirocin 2 % ointment         03/21/25 2059 Nasl 2 times daily 03/16/25 1207    03/16/25 1015  piperacillin-tazobactam (ZOSYN) 4.5 g in D5W 100 mL IVPB (MB+)         -- IV Every 8 hours (non-standard times) 03/16/25 0910          Latest lactate reviewed-  Recent Labs   Lab 03/16/25  2020   LACTATE 1.3       Organ dysfunction indicated by Acute liver injury and Thrombocytopenia     Fluid challenge administered in ED     Post- resuscitation assessment Yes - I attest a sepsis perfusion exam was performed within 6  hours of sepsis, severe sepsis, or septic shock presentation, following fluid resuscitation.    Will continue Pressors- Levophed for MAP of 65  Source control achieved by: surgery, antibiotics  - trend lactate  - check procalcitonin    3/17 Abx and serial labs    Hematology  Thrombocytopenia  The likely etiology of thrombocytopenia is sepsis and liver disease. The patients 3 most recent labs are listed below.  Recent Labs     03/15/25  2146   PLT 99*     Plan  - Will transfuse if platelet count is <50k (if undergoing surgical procedure or have active bleeding).  - preparing platelets and PRBC pre-operatively      Endocrine  Type 2 diabetes mellitus with hyperglycemia, with long-term current use of insulin  Lab Results   Component Value Date    HGBA1C 7.3 (H) 06/29/2024     - check A1C  - SSI with adjustments as indicated      GI  * Small bowel obstruction  - anticipate patient to be left in discontinuity  - management per general surgery    3/17 To OR this am  Can get extubated in recovery and then to ICU  S/p PRBC        Transaminitis  - has hx of w/ETOH abuse, but acutely elevated  - likely in setting of SBO w/ischemia  - check NH3 and coags  - Serial labs    Gastroesophageal reflux disease  - PPI    Critical Care Time: 36 minutes  Critical secondary to Patient has a condition that poses threat to life and bodily function: Bowel ischemia, lactic acidosis      Critical care was time spent personally by me on the following activities: development of treatment plan with patient or surrogate and bedside caregivers, discussions with consultants, evaluation of patient's response to treatment, examination of patient, ordering and performing treatments and interventions, ordering and review of laboratory studies, ordering and review of radiographic studies, pulse oximetry, re-evaluation of patient's condition. This critical care time did not overlap with that of any other provider or involve time for any procedures.      Tod Solorzano MD  Critical Care Medicine  Counts include 234 beds at the [facility] - Intensive Care ([facility])

## 2025-03-17 NOTE — ANESTHESIA PROCEDURE NOTES
Intubation    Date/Time: 3/17/2025 9:35 AM    Performed by: Lonny Osman CRNA  Authorized by: Lonny Osman CRNA    Intubation:     Induction:  Rapid sequence induction    Intubated:  Postinduction    Mask Ventilation:  Not attempted    Attempts:  1    Attempted By:  CRNA    Method of Intubation:  Video laryngoscopy    Blade:  Thomas 3    Laryngeal View Grade: Grade I - full view of cords      Difficult Airway Encountered?: No      Complications:  None    Airway Device:  Oral endotracheal tube    Airway Device Size:  7.5    Style/Cuff Inflation:  Cuffed (inflated to minimal occlusive pressure)    Tube secured:  21    Secured at:  The lips    Placement Verified By:  Capnometry    Complicating Factors:  None    Findings Post-Intubation:  BS equal bilateral and atraumatic/condition of teeth unchanged

## 2025-03-17 NOTE — PLAN OF CARE
Patient remained free of falls/injury/trauma throughout shift. Patient AAOx4. Patient CIWA scores increased throughout the shift requiring Ativan administration. Patient continues to complain of abdominal pain. Fentanyl and Morphiene administered as needed. Patient continues to have bloody secretions via the NGT in place. Wound-Vac also continues to have large amounts of drainage. Patient hypotensive. 1 L NS administered followed by Levo gtt initiation. Levo later weaned off. Patient continues to have minimal UOP via the joseph cateter in place NP notified and aware. Midline abdominal incision remains CDI with Woundvac. Patient hemoglobin decreased. 1 unit PRBCs administered. Fall risk precautions reviewed and maintained with patient. POC reviewed with patient.   Problem: Adult Inpatient Plan of Care  Goal: Plan of Care Review  Outcome: Progressing  Goal: Patient-Specific Goal (Individualized)  Outcome: Progressing  Goal: Absence of Hospital-Acquired Illness or Injury  Outcome: Progressing  Goal: Optimal Comfort and Wellbeing  Outcome: Progressing  Goal: Readiness for Transition of Care  Outcome: Progressing     Problem: Infection  Goal: Absence of Infection Signs and Symptoms  Outcome: Progressing     Problem: Sepsis/Septic Shock  Goal: Optimal Coping  Outcome: Progressing  Goal: Absence of Bleeding  Outcome: Progressing  Goal: Blood Glucose Level Within Targeted Range  Outcome: Progressing  Goal: Absence of Infection Signs and Symptoms  Outcome: Progressing  Goal: Optimal Nutrition Intake  Outcome: Progressing     Problem: Diabetes Comorbidity  Goal: Blood Glucose Level Within Targeted Range  Outcome: Progressing     Problem: Wound  Goal: Optimal Coping  Outcome: Progressing  Goal: Optimal Functional Ability  Outcome: Progressing  Goal: Absence of Infection Signs and Symptoms  Outcome: Progressing  Goal: Improved Oral Intake  Outcome: Progressing  Goal: Optimal Pain Control and Function  Outcome: Progressing  Goal:  Skin Health and Integrity  Outcome: Progressing  Goal: Optimal Wound Healing  Outcome: Progressing     Problem: Skin Injury Risk Increased  Goal: Skin Health and Integrity  Outcome: Progressing     Problem: Fall Injury Risk  Goal: Absence of Fall and Fall-Related Injury  Outcome: Progressing

## 2025-03-18 LAB
ALBUMIN SERPL BCP-MCNC: 2.3 G/DL (ref 3.5–5.2)
ALP SERPL-CCNC: 56 U/L (ref 40–150)
ALT SERPL W/O P-5'-P-CCNC: 23 U/L (ref 10–44)
ANION GAP SERPL CALC-SCNC: 10 MMOL/L (ref 8–16)
AST SERPL-CCNC: 31 U/L (ref 10–40)
BASOPHILS # BLD AUTO: 0.01 K/UL (ref 0–0.2)
BASOPHILS NFR BLD: 0.2 % (ref 0–1.9)
BILIRUB SERPL-MCNC: 0.9 MG/DL (ref 0.1–1)
BUN SERPL-MCNC: 13 MG/DL (ref 6–20)
CALCIUM SERPL-MCNC: 7 MG/DL (ref 8.7–10.5)
CHLORIDE SERPL-SCNC: 115 MMOL/L (ref 95–110)
CO2 SERPL-SCNC: 18 MMOL/L (ref 23–29)
CREAT SERPL-MCNC: 0.8 MG/DL (ref 0.5–1.4)
DIFFERENTIAL METHOD BLD: ABNORMAL
EOSINOPHIL # BLD AUTO: 0 K/UL (ref 0–0.5)
EOSINOPHIL NFR BLD: 0 % (ref 0–8)
ERYTHROCYTE [DISTWIDTH] IN BLOOD BY AUTOMATED COUNT: 14.9 % (ref 11.5–14.5)
EST. GFR  (NO RACE VARIABLE): >60 ML/MIN/1.73 M^2
GLUCOSE SERPL-MCNC: 116 MG/DL (ref 70–110)
HCT VFR BLD AUTO: 22.4 % (ref 40–54)
HGB BLD-MCNC: 7.7 G/DL (ref 14–18)
IMM GRANULOCYTES # BLD AUTO: 0.06 K/UL (ref 0–0.04)
IMM GRANULOCYTES NFR BLD AUTO: 0.9 % (ref 0–0.5)
LYMPHOCYTES # BLD AUTO: 0.8 K/UL (ref 1–4.8)
LYMPHOCYTES NFR BLD: 12 % (ref 18–48)
MAGNESIUM SERPL-MCNC: 1.8 MG/DL (ref 1.6–2.6)
MCH RBC QN AUTO: 30.7 PG (ref 27–31)
MCHC RBC AUTO-ENTMCNC: 34.4 G/DL (ref 32–36)
MCV RBC AUTO: 89 FL (ref 82–98)
MONOCYTES # BLD AUTO: 0.7 K/UL (ref 0.3–1)
MONOCYTES NFR BLD: 11.3 % (ref 4–15)
NEUTROPHILS # BLD AUTO: 4.8 K/UL (ref 1.8–7.7)
NEUTROPHILS NFR BLD: 75.6 % (ref 38–73)
NRBC BLD-RTO: 0 /100 WBC
PHOSPHATE SERPL-MCNC: 2.7 MG/DL (ref 2.7–4.5)
PLATELET # BLD AUTO: 63 K/UL (ref 150–450)
PMV BLD AUTO: 12.7 FL (ref 9.2–12.9)
POCT GLUCOSE: 119 MG/DL (ref 70–110)
POCT GLUCOSE: 155 MG/DL (ref 70–110)
POCT GLUCOSE: 175 MG/DL (ref 70–110)
POCT GLUCOSE: 180 MG/DL (ref 70–110)
POCT GLUCOSE: 187 MG/DL (ref 70–110)
POCT GLUCOSE: 189 MG/DL (ref 70–110)
POCT GLUCOSE: 199 MG/DL (ref 70–110)
POTASSIUM SERPL-SCNC: 3.2 MMOL/L (ref 3.5–5.1)
PROT SERPL-MCNC: 4.1 G/DL (ref 6–8.4)
RBC # BLD AUTO: 2.51 M/UL (ref 4.6–6.2)
SODIUM SERPL-SCNC: 143 MMOL/L (ref 136–145)
WBC # BLD AUTO: 6.4 K/UL (ref 3.9–12.7)

## 2025-03-18 PROCEDURE — 85025 COMPLETE CBC W/AUTO DIFF WBC: CPT | Performed by: STUDENT IN AN ORGANIZED HEALTH CARE EDUCATION/TRAINING PROGRAM

## 2025-03-18 PROCEDURE — 63600175 PHARM REV CODE 636 W HCPCS

## 2025-03-18 PROCEDURE — 25000003 PHARM REV CODE 250: Performed by: NURSE PRACTITIONER

## 2025-03-18 PROCEDURE — 63600175 PHARM REV CODE 636 W HCPCS: Performed by: INTERNAL MEDICINE

## 2025-03-18 PROCEDURE — C9399 UNCLASSIFIED DRUGS OR BIOLOG: HCPCS | Performed by: NURSE PRACTITIONER

## 2025-03-18 PROCEDURE — 63600175 PHARM REV CODE 636 W HCPCS: Performed by: NURSE PRACTITIONER

## 2025-03-18 PROCEDURE — 25000003 PHARM REV CODE 250

## 2025-03-18 PROCEDURE — 99024 POSTOP FOLLOW-UP VISIT: CPT | Mod: ,,,

## 2025-03-18 PROCEDURE — 25000003 PHARM REV CODE 250: Performed by: INTERNAL MEDICINE

## 2025-03-18 PROCEDURE — 63600175 PHARM REV CODE 636 W HCPCS: Performed by: SPECIALIST

## 2025-03-18 PROCEDURE — 11000001 HC ACUTE MED/SURG PRIVATE ROOM

## 2025-03-18 PROCEDURE — 99024 POSTOP FOLLOW-UP VISIT: CPT | Mod: ,,, | Performed by: STUDENT IN AN ORGANIZED HEALTH CARE EDUCATION/TRAINING PROGRAM

## 2025-03-18 PROCEDURE — 21400001 HC TELEMETRY ROOM

## 2025-03-18 PROCEDURE — 84100 ASSAY OF PHOSPHORUS: CPT | Performed by: STUDENT IN AN ORGANIZED HEALTH CARE EDUCATION/TRAINING PROGRAM

## 2025-03-18 PROCEDURE — 80053 COMPREHEN METABOLIC PANEL: CPT | Performed by: STUDENT IN AN ORGANIZED HEALTH CARE EDUCATION/TRAINING PROGRAM

## 2025-03-18 PROCEDURE — 83735 ASSAY OF MAGNESIUM: CPT | Performed by: STUDENT IN AN ORGANIZED HEALTH CARE EDUCATION/TRAINING PROGRAM

## 2025-03-18 RX ORDER — POTASSIUM CHLORIDE 29.8 MG/ML
40 INJECTION INTRAVENOUS ONCE
Status: COMPLETED | OUTPATIENT
Start: 2025-03-18 | End: 2025-03-18

## 2025-03-18 RX ORDER — MAGNESIUM SULFATE HEPTAHYDRATE 40 MG/ML
2 INJECTION, SOLUTION INTRAVENOUS ONCE
Status: COMPLETED | OUTPATIENT
Start: 2025-03-18 | End: 2025-03-18

## 2025-03-18 RX ORDER — DEXMEDETOMIDINE HYDROCHLORIDE 4 UG/ML
0-1.4 INJECTION INTRAVENOUS CONTINUOUS
Status: DISCONTINUED | OUTPATIENT
Start: 2025-03-18 | End: 2025-03-18

## 2025-03-18 RX ADMIN — INSULIN ASPART 2 UNITS: 100 INJECTION, SOLUTION INTRAVENOUS; SUBCUTANEOUS at 08:03

## 2025-03-18 RX ADMIN — MUPIROCIN: 20 OINTMENT TOPICAL at 08:03

## 2025-03-18 RX ADMIN — PIPERACILLIN SODIUM AND TAZOBACTAM SODIUM 4.5 G: 4; .5 INJECTION, POWDER, FOR SOLUTION INTRAVENOUS at 02:03

## 2025-03-18 RX ADMIN — LORAZEPAM 1 MG: 2 INJECTION INTRAMUSCULAR; INTRAVENOUS at 02:03

## 2025-03-18 RX ADMIN — PIPERACILLIN SODIUM AND TAZOBACTAM SODIUM 4.5 G: 4; .5 INJECTION, POWDER, FOR SOLUTION INTRAVENOUS at 09:03

## 2025-03-18 RX ADMIN — INSULIN ASPART 2 UNITS: 100 INJECTION, SOLUTION INTRAVENOUS; SUBCUTANEOUS at 11:03

## 2025-03-18 RX ADMIN — PANTOPRAZOLE SODIUM 40 MG: 40 INJECTION, POWDER, FOR SOLUTION INTRAVENOUS at 08:03

## 2025-03-18 RX ADMIN — FOLIC ACID 1 MG: 5 INJECTION, SOLUTION INTRAMUSCULAR; INTRAVENOUS; SUBCUTANEOUS at 08:03

## 2025-03-18 RX ADMIN — LORAZEPAM 1 MG: 2 INJECTION INTRAMUSCULAR; INTRAVENOUS at 12:03

## 2025-03-18 RX ADMIN — DEXMEDETOMIDINE HYDROCHLORIDE 0.4 MCG/KG/HR: 4 INJECTION INTRAVENOUS at 05:03

## 2025-03-18 RX ADMIN — POTASSIUM CHLORIDE 40 MEQ: 29.8 INJECTION, SOLUTION INTRAVENOUS at 05:03

## 2025-03-18 RX ADMIN — MUPIROCIN: 20 OINTMENT TOPICAL at 10:03

## 2025-03-18 RX ADMIN — PANTOPRAZOLE SODIUM 40 MG: 40 INJECTION, POWDER, FOR SOLUTION INTRAVENOUS at 10:03

## 2025-03-18 RX ADMIN — THIAMINE HYDROCHLORIDE 100 MG: 100 INJECTION, SOLUTION INTRAMUSCULAR; INTRAVENOUS at 08:03

## 2025-03-18 RX ADMIN — MORPHINE SULFATE 2 MG: 4 INJECTION INTRAVENOUS at 10:03

## 2025-03-18 RX ADMIN — INSULIN ASPART 2 UNITS: 100 INJECTION, SOLUTION INTRAVENOUS; SUBCUTANEOUS at 04:03

## 2025-03-18 RX ADMIN — INSULIN ASPART 1 UNITS: 100 INJECTION, SOLUTION INTRAVENOUS; SUBCUTANEOUS at 10:03

## 2025-03-18 RX ADMIN — MORPHINE SULFATE 2 MG: 4 INJECTION INTRAVENOUS at 11:03

## 2025-03-18 RX ADMIN — MAGNESIUM SULFATE HEPTAHYDRATE 2 G: 40 INJECTION, SOLUTION INTRAVENOUS at 01:03

## 2025-03-18 NOTE — SUBJECTIVE & OBJECTIVE
Past Medical History:   Diagnosis Date    Abscess 9/5/2018    Cataracts, bilateral     Chest pain 12/6/2018    Dehydration     Diabetes mellitus, type 2     Diagnosed 7/2016    Folliculitis 9/15/2017    GERD (gastroesophageal reflux disease)     Hypertension     Inflammatory polyps of colon     Pancreatitis, alcoholic, acute     Port catheter in place     TIA (transient ischemic attack)     Tinea cruris 9/15/2017       Past Surgical History:   Procedure Laterality Date    APPLICATION OF WOUND VACUUM-ASSISTED CLOSURE DEVICE N/A 3/16/2025    Procedure: APPLICATION, WOUND VAC;  Surgeon: Alia Garcia MD;  Location: [facility] OR;  Service: Colon and Rectal;  Laterality: N/A;    CLOSURE OF WOUND N/A 3/17/2025    Procedure: CLOSURE, WOUND;  Surgeon: Alia Garcia MD;  Location: [facility] OR;  Service: Colon and Rectal;  Laterality: N/A;  ABDOMINAL WOUND CLOSURE    COLONOSCOPY      COLONOSCOPY N/A 6/15/2018    Procedure: COLONOSCOPY;  Surgeon: Jim Hicks MD;  Location: [facility] ENDO;  Service: Endoscopy;  Laterality: N/A;    ESOPHAGOGASTRODUODENOSCOPY N/A 6/15/2018    Procedure: ESOPHAGOGASTRODUODENOSCOPY (EGD);  Surgeon: Jim Hicks MD;  Location: [facility] ENDO;  Service: Endoscopy;  Laterality: N/A;    INJECTION OF ANESTHETIC AGENT INTO TISSUE PLANE DEFINED BY TRANSVERSUS ABDOMINIS MUSCLE N/A 3/16/2025    Procedure: BLOCK, TRANSVERSUS ABDOMINIS PLANE;  Surgeon: Alia Garcia MD;  Location: [facility] OR;  Service: Colon and Rectal;  Laterality: N/A;    LAPAROTOMY, EXPLORATORY N/A 3/17/2025    Procedure: LAPAROTOMY, EXPLORATORY;  Surgeon: Alia Garcia MD;  Location: [facility] OR;  Service: Colon and Rectal;  Laterality: N/A;  REMOVAL OF WOUND VAC    LAPAROTOMY, EXPLORATORY N/A 3/16/2025    Procedure: LAPAROTOMY, EXPLORATORY;  Surgeon: Alia Garcia MD;  Location: [facility] OR;  Service: Colon and Rectal;  Laterality: N/A;  lysis of adhesions    ROBOT-ASSISTED LAPAROSCOPIC REPAIR OF INGUINAL HERNIA USING DA JANET XI  "Bilateral 11/30/2022    Procedure: XI ROBOTIC REPAIR, HERNIA, INGUINAL;  Surgeon: Smooth Denny MD;  Location: [facility];  Service: General;  Laterality: Bilateral;       Review of patient's allergies indicates:  No Known Allergies    No current facility-administered medications on file prior to encounter.     Current Outpatient Medications on File Prior to Encounter   Medication Sig    aspirin 325 MG tablet Take 1 tablet (325 mg total) by mouth once daily.    atorvastatin (LIPITOR) 40 MG tablet Take 1 tablet (40 mg total) by mouth once daily.    blood sugar diagnostic (ONETOUCH ULTRA BLUE TEST STRIP) Strp USE TO CHECK SUGAR BEFORE MEALS AND AT BEDTIME    clotrimazole-betamethasone 1-0.05% (LOTRISONE) cream Apply topically 2 (two) times daily.    FEROSUL 325 mg (65 mg iron) Tab tablet Take by mouth.    folic acid (FOLVITE) 1 MG tablet Take 1 tablet (1 mg total) by mouth once daily.    gabapentin (NEURONTIN) 800 MG tablet Take 800 mg by mouth 3 (three) times daily.    HUMALOG U-100 INSULIN 100 unit/mL injection INJECT 8 UNITS SUBCUTANEOUSLY WITH LARGEST MEAL OF THE DAY, INJECT ADDITIONAL 4 UNITS AT A TIME IF GLUCOSE IS ELEVATED    insulin glargine U-100, Lantus, (LANTUS SOLOSTAR U-100 INSULIN) 100 unit/mL (3 mL) InPn pen DO NOT TAKE UNTIL YOU FOLLOW UP WITH PCP (Patient taking differently: Inject 50 Units into the skin once daily. DO NOT TAKE UNTIL YOU FOLLOW UP WITH PCP)    insulin syringe-needle,dispos. 1 mL 28 gauge x 1/2" Syrg 1 each by Misc.(Non-Drug; Combo Route) route 2 (two) times daily.    lancets (ONETOUCH DELICA PLUS LANCET) 33 gauge Misc Inject 1 lancet into the skin 4 (four) times daily before meals and nightly.    losartan (COZAAR) 100 MG tablet Take 1 tablet (100 mg total) by mouth once daily. TAKE 1 TABLET BY MOUTH ONCE DAILY    mupirocin (BACTROBAN) 2 % ointment Apply topically 3 (three) times daily.    pen needle, diabetic (BD JOEL 2ND GEN PEN NEEDLE) 32 gauge x 5/32" Ndle Inject 1 each into the " skin 4 (four) times daily before meals and nightly.    potassium, sodium phosphates (PHOS-NAK) 280-160-250 mg PwPk Take 1 packet by mouth 4 (four) times daily before meals and nightly.    verapamiL (CALAN-SR) 240 MG CR tablet Take 1 tablet (240 mg total) by mouth once daily.     Family History       Problem Relation (Age of Onset)    Hypertension Mother, Father          Tobacco Use    Smoking status: Former     Types: Cigarettes    Smokeless tobacco: Never   Substance and Sexual Activity    Alcohol use: Yes     Comment:  Daily - States that he drinks 1/2 pint of vodka and about 1-2 cans of beer daily    Drug use: No    Sexual activity: Yes     Partners: Female     Review of Systems   Gastrointestinal:  Positive for abdominal pain and nausea.   All other systems reviewed and are negative.    Objective:     Vital Signs (Most Recent):  Temp: 98.2 °F (36.8 °C) (03/18/25 1515)  Pulse: 87 (03/18/25 1644)  Resp: (!) 21 (03/18/25 1644)  BP: (!) 146/101 (03/18/25 1635)  SpO2: 96 % (03/18/25 1644) Vital Signs (24h Range):  Temp:  [98 °F (36.7 °C)-98.5 °F (36.9 °C)] 98.2 °F (36.8 °C)  Pulse:  [] 87  Resp:  [10-34] 21  SpO2:  [90 %-100 %] 96 %  BP: ()/() 146/101  Arterial Line BP: ()/() 152/89     Weight: 68 kg (150 lb)  Body mass index is 24.21 kg/m².     Physical Exam  HENT:      Head: Normocephalic and atraumatic.      Comments: NG tube in place  Cardiovascular:      Rate and Rhythm: Normal rate and regular rhythm.      Heart sounds: No murmur heard.  Pulmonary:      Effort: Pulmonary effort is normal. No respiratory distress.      Breath sounds: Normal breath sounds. No wheezing.   Abdominal:      General: There is distension.      Palpations: Abdomen is soft.      Tenderness: There is abdominal tenderness.      Comments: Midline incision clean dry intact   Musculoskeletal:         General: No swelling.   Skin:     General: Skin is warm and dry.   Neurological:      Mental Status: He is alert  and oriented to person, place, and time. Mental status is at baseline.          Significant Labs: All pertinent labs within the past 24 hours have been reviewed.  Recent Lab Results  (Last 5 results in the past 24 hours)        03/18/25  1622   03/18/25  1620   03/18/25  1118   03/18/25  0826   03/18/25  0432        Albumin               ALP               ALT               Anion Gap               AST               Baso #               Basophil %               BILIRUBIN TOTAL               BUN               Calcium               Chloride               CO2               Creatinine               Differential Method               eGFR               Eos #               Eos %               Glucose               Gran # (ANC)               Gran %               Hematocrit               Hemoglobin               Immature Grans (Abs)               Immature Granulocytes               Lymph #               Lymph %               Magnesium                MCH               MCHC               MCV               Mono #               Mono %               MPV               nRBC               Phosphorus Level               Platelet Count               POCT Glucose 189   180   199   187   119       Potassium               PROTEIN TOTAL               RBC               RDW               Sodium               WBC                                      Significant Imaging: I have reviewed all pertinent imaging results/findings within the past 24 hours.

## 2025-03-18 NOTE — SUBJECTIVE & OBJECTIVE
Interval History: s/p ex lap with findings of well perfused bowel amenable to abdominal wall closure yesterday. Requiring ativan and precedex due to agitation. Pulled NG but now replaced. No bowel function     Medications:  Continuous Infusions:  Scheduled Meds:   folic acid (FOLVITE) IVPB  1 mg Intravenous Daily    mupirocin   Nasal BID    pantoprazole  40 mg Intravenous BID    piperacillin-tazobactam (Zosyn) IV (PEDS and ADULTS) (extended infusion is not appropriate)  4.5 g Intravenous Q8H    thiamine (B-1) injection  100 mg Intravenous Daily     PRN Meds:  Current Facility-Administered Medications:     0.9%  NaCl infusion (for blood administration), , Intravenous, Q24H PRN    dextrose 50%, 12.5 g, Intravenous, PRN    dextrose 50%, 25 g, Intravenous, PRN    fentaNYL, 50 mcg, Intravenous, Q3H PRN    glucagon (human recombinant), 1 mg, Intramuscular, PRN    HYDROmorphone, 0.2 mg, Intravenous, Q5 Min PRN    influenza, 0.5 mL, Intramuscular, Prior to discharge    insulin aspart U-100, 0-10 Units, Subcutaneous, Q4H PRN    lorazepam, 1 mg, Intravenous, Q4H PRN    lorazepam, 2 mg, Intravenous, PRN    morphine, 2 mg, Intravenous, Q4H PRN    ondansetron, 4 mg, Intravenous, Q6H PRN    ondansetron, 4 mg, Intravenous, Daily PRN    oxyCODONE-acetaminophen, 1 tablet, Oral, Q3H PRN    pneumoc 20-guanako conj-dip cr(PF), 0.5 mL, Intramuscular, Prior to discharge    sodium chloride 0.9%, 10 mL, Intravenous, PRN     Review of patient's allergies indicates:  No Known Allergies  Objective:     Vital Signs (Most Recent):  Temp: 98 °F (36.7 °C) (03/18/25 0701)  Pulse: 82 (03/18/25 1029)  Resp: (!) 31 (03/18/25 1029)  BP: 127/81 (03/18/25 0935)  SpO2: 95 % (03/18/25 1029) Vital Signs (24h Range):  Temp:  [97.6 °F (36.4 °C)-98.5 °F (36.9 °C)] 98 °F (36.7 °C)  Pulse:  [] 82  Resp:  [9-34] 31  SpO2:  [90 %-100 %] 95 %  BP: ()/() 127/81  Arterial Line BP: ()/() 152/89     Weight: 68 kg (150 lb)  Body mass index is  24.21 kg/m².    Intake/Output - Last 3 Shifts         03/16 0700  03/17 0659 03/17 0700  03/18 0659 03/18 0700  03/19 0659    I.V. (mL/kg) 115.3 (1.7) 50 (0.7) 19 (0.3)    Blood  364.6     IV Piggyback 4975 1365.8 364.6    Total Intake(mL/kg) 5090.3 (74.9) 1780.4 (26.2) 383.6 (5.6)    Urine (mL/kg/hr) 825 (0.5) 400 (0.2)     Drains 1250 250     Other 1400 50     Total Output 3475 700     Net +1615.3 +1080.4 +383.6           Urine Occurrence  1 x 2 x             Physical Exam  Constitutional:       Appearance: He is well-developed.   HENT:      Head: Normocephalic and atraumatic.      Nose:      Comments: NG with red tinged fluid in canister  Eyes:      Conjunctiva/sclera: Conjunctivae normal.      Pupils: Pupils are equal, round, and reactive to light.   Neck:      Thyroid: No thyromegaly.   Cardiovascular:      Rate and Rhythm: Normal rate and regular rhythm.   Pulmonary:      Effort: Pulmonary effort is normal. No respiratory distress.   Abdominal:      Comments: Soft, mild distention, appropriate post op TTP. Midline incision c/d/I without erythema or drainage.   Musculoskeletal:         General: No tenderness. Normal range of motion.      Cervical back: Normal range of motion.   Skin:     General: Skin is warm and dry.      Capillary Refill: Capillary refill takes less than 2 seconds.          Significant Labs:  I have reviewed all pertinent lab results within the past 24 hours.  CBC:   Recent Labs   Lab 03/18/25  0430   WBC 6.40   RBC 2.51*   HGB 7.7*   HCT 22.4*   PLT 63*   MCV 89   MCH 30.7   MCHC 34.4     BMP:   Recent Labs   Lab 03/18/25  0430   *      K 3.2*   *   CO2 18*   BUN 13   CREATININE 0.8   CALCIUM 7.0*   MG 1.8     CMP:   Recent Labs   Lab 03/18/25  0430   *   CALCIUM 7.0*   ALBUMIN 2.3*   PROT 4.1*      K 3.2*   CO2 18*   *   BUN 13   CREATININE 0.8   ALKPHOS 56   ALT 23   AST 31   BILITOT 0.9       Significant Diagnostics:  I have reviewed all pertinent  imaging results/findings within the past 24 hours.

## 2025-03-18 NOTE — SUBJECTIVE & OBJECTIVE
Objective:     Vital Signs (Most Recent):  Temp: 98.1 °F (36.7 °C) (03/18/25 1104)  Pulse: 88 (03/18/25 1113)  Resp: (!) 24 (03/18/25 1123)  BP: 127/88 (03/18/25 1105)  SpO2: (!) 93 % (03/18/25 1113) Vital Signs (24h Range):  Temp:  [98 °F (36.7 °C)-98.5 °F (36.9 °C)] 98.1 °F (36.7 °C)  Pulse:  [] 88  Resp:  [10-34] 24  SpO2:  [90 %-100 %] 93 %  BP: ()/() 127/88  Arterial Line BP: ()/() 152/89     Weight: 68 kg (150 lb)  Body mass index is 24.21 kg/m².      Intake/Output Summary (Last 24 hours) at 3/18/2025 1220  Last data filed at 3/18/2025 1100  Gross per 24 hour   Intake 657.06 ml   Output 515 ml   Net 142.06 ml        Physical Exam  Vitals and nursing note reviewed.   Constitutional:       General: He is not in acute distress.     Appearance: He is ill-appearing. He is not toxic-appearing or diaphoretic.   HENT:      Head: Normocephalic.   Eyes:      Pupils: Pupils are equal, round, and reactive to light.   Cardiovascular:      Rate and Rhythm: Tachycardia present.      Pulses: Normal pulses.   Pulmonary:      Effort: No respiratory distress.      Breath sounds: No rhonchi.   Abdominal:      General: There is distension.   Musculoskeletal:         General: Normal range of motion.   Skin:     Capillary Refill: Capillary refill takes less than 2 seconds.   Neurological:      General: No focal deficit present.      Mental Status: He is alert.           Review of Systems   All other systems reviewed and are negative.      Vents:  Oxygen Concentration (%): 98 (03/17/25 1100)    Lines/Drains/Airways       Drain  Duration                  NG/OG Tube 03/18/25 0600 14 Fr. Left nostril <1 day              Peripheral Intravenous Line  Duration                  Peripheral IV - Single Lumen 03/16/25 0007 20 G Anterior;Right Forearm 2 days         Peripheral IV - Single Lumen 03/18/25 1035 20 G Anterior;Distal;Left Upper Arm <1 day                    Significant Labs:    CBC/Anemia  Profile:  Recent Labs   Lab 03/17/25  0407 03/17/25  1352 03/18/25  0430   WBC 6.77 8.22 6.40   HGB 7.8* 9.2* 7.7*   HCT 22.7* 26.9* 22.4*   PLT 76* 62* 63*   MCV 93 89 89   RDW 12.6 14.2 14.9*        Chemistries:  Recent Labs   Lab 03/16/25  2240 03/17/25  0407 03/18/25  0430    140 143   K 4.0 3.8 3.2*    109 115*   CO2 17* 20* 18*   BUN 15 15 13   CREATININE 1.0 1.1 0.8   CALCIUM 7.2* 7.3* 7.0*   ALBUMIN  --  2.7* 2.3*   PROT  --  4.7* 4.1*   BILITOT  --  0.9 0.9   ALKPHOS  --  65 56   ALT  --  30 23   AST  --  54* 31   MG 1.8 1.8 1.8   PHOS  --  3.8 2.7       All pertinent labs within the past 24 hours have been reviewed.    Significant Imaging:  I have reviewed all pertinent imaging results/findings within the past 24 hours.

## 2025-03-18 NOTE — PROGRESS NOTES
"[facility] ([facility])  Critical Care Medicine  Progress Note    Patient Name: Jorgito Arreaga  MRN: [id number]  Admission Date: 3/15/2025  Hospital Length of Stay: 2 days  Code Status: Full Code  Attending Provider: Tod Solorzano MD  Primary Care Provider: Vonda Elliott FNP   Principal Problem: Small bowel obstruction    Subjective:     HPI:  Jorgito Arreaga is a 59-year-old male with known medical problems of alcohol abuse, GERD, pancreatitis, diabetes, transaminitis, TIA, HTN, HLD, inguinal hernia repair 2022, adenomatous polyps of transverse colon s/p polypectomy 2018.   He presented to [facility] ED for complaints of abdominal pain w/n/v onset shortly before arrival to ED. He was reportedly hypotensive w/EMS and was given a 500cc bolus, zofran, and fentanyl en route. He is still drinking beer with last being yesterday before presentation to ED. His appetite has been poor x several days, he has not eaten since Friday. He denied any fever, chills, cough, chest pain, shortness of breath, urinary symptoms.     ED workup showed WBC 3.08 (chronically in 3's), hgb 10.1, platelets 99, K 3.1, CO2 19, AG 20, normal Cr, glucose 245, Mg 1.3, , ALT 63, normal lipase, normal t-bili. Lactic acid 4.8, serum ethanol 276.   CT a/p w/contrast showed high-grade SBO with c/f decreased blood flow and ischemia-"dilated loops of small bowel. Findings compatible with a high-grade small-bowel obstruction. Some loops of distended small bowel demonstrate equivocal decreased mural enhancement. There is an equivocal finding however a stercoral enteritis based on distention is not excluded. No pneumatosis portal or mesenteric venous gas is identified. There was a twist about the root of the small bowel mesentery which results in focal high-grade narrowing of the superior mesenteric artery.   In ED, he had a labile blood pressure and ultimately required initiation of vasopressor support and CVL placement. Surgery " consulted w/plans for OR urgently tonight. Patient transferred ED to ED for expeditious definitive care. On arrival to ED, patient w/acceptable BP, however HR in 150's, sinus tachycardia, likely due to levophed. He was able to be weaned off of levophed with additional IVF. Given his need for vasopressor support, unstable hemodynamics, and high-risk surgery, and plan to leave him in discontinuity, he will be admitted to ICU post-op.    Hospital/ICU Course:  3/17 Awake and alert  BP controlled  No emesis. Pain controlled.   No tremors  Gives hx of alcohol abuse but inconsistent stories on how often he drinks.  No hemodynamic or respiratory issues    3/18 Awake and alert.   No resp or hemodynamic issues      Objective:     Vital Signs (Most Recent):  Temp: 98.1 °F (36.7 °C) (03/18/25 1104)  Pulse: 88 (03/18/25 1113)  Resp: (!) 24 (03/18/25 1123)  BP: 127/88 (03/18/25 1105)  SpO2: (!) 93 % (03/18/25 1113) Vital Signs (24h Range):  Temp:  [98 °F (36.7 °C)-98.5 °F (36.9 °C)] 98.1 °F (36.7 °C)  Pulse:  [] 88  Resp:  [10-34] 24  SpO2:  [90 %-100 %] 93 %  BP: ()/() 127/88  Arterial Line BP: ()/() 152/89     Weight: 68 kg (150 lb)  Body mass index is 24.21 kg/m².      Intake/Output Summary (Last 24 hours) at 3/18/2025 1220  Last data filed at 3/18/2025 1100  Gross per 24 hour   Intake 657.06 ml   Output 515 ml   Net 142.06 ml        Physical Exam  Vitals and nursing note reviewed.   Constitutional:       General: He is not in acute distress.     Appearance: He is ill-appearing. He is not toxic-appearing or diaphoretic.   HENT:      Head: Normocephalic.   Eyes:      Pupils: Pupils are equal, round, and reactive to light.   Cardiovascular:      Rate and Rhythm: Tachycardia present.      Pulses: Normal pulses.   Pulmonary:      Effort: No respiratory distress.      Breath sounds: No rhonchi.   Abdominal:      General: There is distension.   Musculoskeletal:         General: Normal range of motion.  "  Skin:     Capillary Refill: Capillary refill takes less than 2 seconds.   Neurological:      General: No focal deficit present.      Mental Status: He is alert.           Review of Systems   All other systems reviewed and are negative.      Vents:  Oxygen Concentration (%): 98 (03/17/25 1100)    Lines/Drains/Airways       Drain  Duration                  NG/OG Tube 03/18/25 0600 14 Fr. Left nostril <1 day              Peripheral Intravenous Line  Duration                  Peripheral IV - Single Lumen 03/16/25 0007 20 G Anterior;Right Forearm 2 days         Peripheral IV - Single Lumen 03/18/25 1035 20 G Anterior;Distal;Left Upper Arm <1 day                    Significant Labs:    CBC/Anemia Profile:  Recent Labs   Lab 03/17/25  0407 03/17/25  1352 03/18/25  0430   WBC 6.77 8.22 6.40   HGB 7.8* 9.2* 7.7*   HCT 22.7* 26.9* 22.4*   PLT 76* 62* 63*   MCV 93 89 89   RDW 12.6 14.2 14.9*        Chemistries:  Recent Labs   Lab 03/16/25  2240 03/17/25  0407 03/18/25  0430    140 143   K 4.0 3.8 3.2*    109 115*   CO2 17* 20* 18*   BUN 15 15 13   CREATININE 1.0 1.1 0.8   CALCIUM 7.2* 7.3* 7.0*   ALBUMIN  --  2.7* 2.3*   PROT  --  4.7* 4.1*   BILITOT  --  0.9 0.9   ALKPHOS  --  65 56   ALT  --  30 23   AST  --  54* 31   MG 1.8 1.8 1.8   PHOS  --  3.8 2.7       All pertinent labs within the past 24 hours have been reviewed.    Significant Imaging:  I have reviewed all pertinent imaging results/findings within the past 24 hours.    ABG  No results for input(s): "PH", "PO2", "PCO2", "HCO3", "BE" in the last 168 hours.  Assessment/Plan:     Psychiatric  Alcohol abuse  - is directly impacting his overall health status  - encourage cessation, offer resources when able to participate in counseling  - will need CIWA-Ar monitoring at some point  - Inconsistent report per patient on drinking habits. May add low dose ativan IV      Cardiac/Vascular  Essential hypertension  - acutely required vasopressor support  - likely " will be NPO for a bit  - will utilize IV prn medication if needed until PO resumes    Hyperlipidemia  - check triglycerides stat and send lipid panel  - statin when appropriate post-op      Renal/  Hypomagnesemia  Patient has Abnormal Magnesium: hypomagnesemia. Will continue to monitor electrolytes closely. Will replace the affected electrolytes and repeat labs to be done after interventions completed. The patient's magnesium results have been reviewed and are listed below.  Recent Labs   Lab 03/15/25  2146   MG 1.3*        Hypokalemia  Patient's most recent potassium results are listed below.   Recent Labs     03/15/25  2146   K 3.1*     Plan  - Replete potassium per protocol  - Monitor potassium Every 6 hours  - Patient's hypokalemia is being treated  - monitor upon return from OR, replacement started in ED    ID  Severe sepsis  This patient does have evidence of infective focus  My overall impression is septic shock due to lactate > 4, MAP < 65, and SBP < 90.  Source: Abdominal  Antibiotics given-   Antibiotics (72h ago, onward)      Start     Stop Route Frequency Ordered    03/16/25 2100  mupirocin 2 % ointment         03/21/25 2059 Nasl 2 times daily 03/16/25 1207    03/16/25 1015  piperacillin-tazobactam (ZOSYN) 4.5 g in D5W 100 mL IVPB (MB+)         -- IV Every 8 hours (non-standard times) 03/16/25 0910          Latest lactate reviewed-  Recent Labs   Lab 03/16/25  2020   LACTATE 1.3       Organ dysfunction indicated by Acute liver injury and Thrombocytopenia     Fluid challenge administered in ED     Post- resuscitation assessment Yes - I attest a sepsis perfusion exam was performed within 6 hours of sepsis, severe sepsis, or septic shock presentation, following fluid resuscitation.    Will continue Pressors- Levophed for MAP of 65  Source control achieved by: surgery, antibiotics  - trend lactate  - check procalcitonin    3/17 Abx and serial labs    Hematology  Thrombocytopenia  The likely etiology of  thrombocytopenia is sepsis and liver disease. The patients 3 most recent labs are listed below.  Recent Labs     03/15/25  2146   PLT 99*     Plan  - Will transfuse if platelet count is <50k (if undergoing surgical procedure or have active bleeding).  - preparing platelets and PRBC pre-operatively      Endocrine  Type 2 diabetes mellitus with hyperglycemia, with long-term current use of insulin  Lab Results   Component Value Date    HGBA1C 7.3 (H) 06/29/2024     - check A1C  - SSI with adjustments as indicated      GI  * Small bowel obstruction  - anticipate patient to be left in discontinuity  - management per general surgery    3/17 To OR this am  Can get extubated in recovery and then to ICU  S/p PRBC    3/17 S/p surgery. Extubated in PACU yesterday.  No hemodynamic or resp issues overnight  On RA. Vitals stable  Transfer to floor if ok with surgery        Transaminitis  - has hx of w/ETOH abuse, but acutely elevated  - likely in setting of SBO w/ischemia  - check NH3 and coags  - Serial labs    Gastroesophageal reflux disease  - PPI         Tod Solorzano MD  Critical Care Medicine  [facility] ([facility])

## 2025-03-18 NOTE — PLAN OF CARE
Patient remained free of falls/injury/trauma throughout shift. Patient AAOx2-3. Patient CIWA scores labile throughout the shift requiring Ativan administration and Precedex gtt initiation. Patient continues to complain of abdominal pain. Morphiene administered as needed. Patient continues to have bloody secretions via the NGT in place. Patient removed surgically placed in NGT. NGT replaced to MARIOLA estes per Harish BEGUM instructions.  Patient continues to have minimal UOP via the joseph catheter. Patient catheter symptomatic and leaking. Joseph removed. Urinal provided and at the bedside. Midline abdominal incision remains CDI with Dermabond in place. Fall risk precautions reviewed and maintained with patient. Bed alarm on. POC reviewed with patient.     Problem: Adult Inpatient Plan of Care  Goal: Plan of Care Review  Outcome: Progressing  Goal: Patient-Specific Goal (Individualized)  Outcome: Progressing  Goal: Absence of Hospital-Acquired Illness or Injury  Outcome: Progressing  Goal: Optimal Comfort and Wellbeing  Outcome: Progressing  Goal: Readiness for Transition of Care  Outcome: Progressing     Problem: Infection  Goal: Absence of Infection Signs and Symptoms  Outcome: Progressing     Problem: Sepsis/Septic Shock  Goal: Optimal Coping  Outcome: Progressing  Goal: Absence of Bleeding  Outcome: Progressing  Goal: Blood Glucose Level Within Targeted Range  Outcome: Progressing  Goal: Absence of Infection Signs and Symptoms  Outcome: Progressing  Goal: Optimal Nutrition Intake  Outcome: Progressing     Problem: Diabetes Comorbidity  Goal: Blood Glucose Level Within Targeted Range  Outcome: Progressing     Problem: Wound  Goal: Optimal Coping  Outcome: Progressing  Goal: Optimal Functional Ability  Outcome: Progressing  Goal: Absence of Infection Signs and Symptoms  Outcome: Progressing  Goal: Improved Oral Intake  Outcome: Progressing  Goal: Optimal Pain Control and Function  Outcome: Progressing  Goal: Skin Health and  Integrity  Outcome: Progressing  Goal: Optimal Wound Healing  Outcome: Progressing     Problem: Skin Injury Risk Increased  Goal: Skin Health and Integrity  Outcome: Progressing     Problem: Fall Injury Risk  Goal: Absence of Fall and Fall-Related Injury  Outcome: Progressing

## 2025-03-18 NOTE — CONSULTS
"[facility] ([facility])  [facility] Medicine  Consult Note    Patient Name: Jorgito Arreaga  MRN: [id number]  Admission Date: 3/15/2025  Hospital Length of Stay: 2 days  Attending Physician:  Dr. Solorzano  Primary Care Provider: Vonda Elliott FNP           Patient information was obtained from patient, past medical records, and ER records.     Inpatient consult to Hospitalist  Consult performed by: Nette Davis MD  Consult ordered by: Tiffanie Pantoja NP        Subjective:     Principal Problem: Small bowel obstruction    Chief Complaint:   Chief Complaint   Patient presents with    Abdominal Pain     Started today about an hour ago. States he has been throwing up and unable to hold anything down. GCS 15.         HPI: 59-year-old male with known medical problems of alcohol abuse, GERD, pancreatitis, diabetes, transaminitis, TIA, HTN, HLD, inguinal hernia repair 2022, adenomatous polyps of transverse colon s/p polypectomy 2018.   He presented to [facility] ED for complaints of abdominal pain w/n/v onset shortly before arrival to ED. He was reportedly hypotensive w/EMS and was given a 500cc bolus, zofran, and fentanyl en route. He is still drinking beer with last being yesterday before presentation to ED. His appetite has been poor x several days, he has not eaten since Friday. He denied any fever, chills, cough, chest pain, shortness of breath, urinary symptoms.      ED workup showed WBC 3.08 (chronically in 3's), hgb 10.1, platelets 99, K 3.1, CO2 19, AG 20, normal Cr, glucose 245, Mg 1.3, , ALT 63, normal lipase, normal t-bili. Lactic acid 4.8, serum ethanol 276.   CT a/p w/contrast showed high-grade SBO with c/f decreased blood flow and ischemia-"dilated loops of small bowel. Findings compatible with a high-grade small-bowel obstruction. Some loops of distended small bowel demonstrate equivocal decreased mural enhancement. There is an equivocal finding however a stercoral enteritis based " on distention is not excluded. No pneumatosis portal or mesenteric venous gas is identified. There was a twist about the root of the small bowel mesentery which results in focal high-grade narrowing of the superior mesenteric artery.   In ED, he had a labile blood pressure and ultimately required initiation of vasopressor support and CVL placement. Surgery consulted w/plans for OR urgently tonight. Patient transferred ED to ED for expeditious definitive care. On arrival to ED, patient w/acceptable BP, however HR in 150's, sinus tachycardia, likely due to levophed. He was able to be weaned off of levophed with additional IVF. Given his need for vasopressor support, unstable hemodynamics, and high-risk surgery, and plan to leave him in discontinuity, he will be admitted to ICU post-op.   Patient taken to OR on 3/16/25, volvulus noted. He underwent lysis of adhesions. His abdomen ws temporarily closed with ABThera wound vac. He went back to the OR on 3/17/25 for a second look to ensure bowel viable. He was noted to have well perfused bowel. Abdomen closed. He was hemodynamically stable. Patient acceptable for the floor. Hospital medicine consulted. Patient reports difficult to talk with NG in place.    Past Medical History:   Diagnosis Date    Abscess 9/5/2018    Cataracts, bilateral     Chest pain 12/6/2018    Dehydration     Diabetes mellitus, type 2     Diagnosed 7/2016    Folliculitis 9/15/2017    GERD (gastroesophageal reflux disease)     Hypertension     Inflammatory polyps of colon     Pancreatitis, alcoholic, acute     Port catheter in place     TIA (transient ischemic attack)     Tinea cruris 9/15/2017       Past Surgical History:   Procedure Laterality Date    APPLICATION OF WOUND VACUUM-ASSISTED CLOSURE DEVICE N/A 3/16/2025    Procedure: APPLICATION, WOUND VAC;  Surgeon: Alia Garcia MD;  Location: [facility];  Service: Colon and Rectal;  Laterality: N/A;    CLOSURE OF WOUND N/A 3/17/2025    Procedure:  CLOSURE, WOUND;  Surgeon: Alia Garcia MD;  Location: [facility] OR;  Service: Colon and Rectal;  Laterality: N/A;  ABDOMINAL WOUND CLOSURE    COLONOSCOPY      COLONOSCOPY N/A 6/15/2018    Procedure: COLONOSCOPY;  Surgeon: Jim Hicks MD;  Location: [facility] ENDO;  Service: Endoscopy;  Laterality: N/A;    ESOPHAGOGASTRODUODENOSCOPY N/A 6/15/2018    Procedure: ESOPHAGOGASTRODUODENOSCOPY (EGD);  Surgeon: Jim Hicks MD;  Location: [facility] ENDO;  Service: Endoscopy;  Laterality: N/A;    INJECTION OF ANESTHETIC AGENT INTO TISSUE PLANE DEFINED BY TRANSVERSUS ABDOMINIS MUSCLE N/A 3/16/2025    Procedure: BLOCK, TRANSVERSUS ABDOMINIS PLANE;  Surgeon: Alia Garcia MD;  Location: [facility] OR;  Service: Colon and Rectal;  Laterality: N/A;    LAPAROTOMY, EXPLORATORY N/A 3/17/2025    Procedure: LAPAROTOMY, EXPLORATORY;  Surgeon: Alia Garcia MD;  Location: [facility] OR;  Service: Colon and Rectal;  Laterality: N/A;  REMOVAL OF WOUND VAC    LAPAROTOMY, EXPLORATORY N/A 3/16/2025    Procedure: LAPAROTOMY, EXPLORATORY;  Surgeon: Alia Garcia MD;  Location: [facility] OR;  Service: Colon and Rectal;  Laterality: N/A;  lysis of adhesions    ROBOT-ASSISTED LAPAROSCOPIC REPAIR OF INGUINAL HERNIA USING DA JANET XI Bilateral 11/30/2022    Procedure: XI ROBOTIC REPAIR, HERNIA, INGUINAL;  Surgeon: Smooth Denny MD;  Location: [facility] OR;  Service: General;  Laterality: Bilateral;       Review of patient's allergies indicates:  No Known Allergies    No current facility-administered medications on file prior to encounter.     Current Outpatient Medications on File Prior to Encounter   Medication Sig    aspirin 325 MG tablet Take 1 tablet (325 mg total) by mouth once daily.    atorvastatin (LIPITOR) 40 MG tablet Take 1 tablet (40 mg total) by mouth once daily.    blood sugar diagnostic (ONETOUCH ULTRA BLUE TEST STRIP) Strp USE TO CHECK SUGAR BEFORE MEALS AND AT BEDTIME    clotrimazole-betamethasone 1-0.05% (LOTRISONE) cream Apply  "topically 2 (two) times daily.    FEROSUL 325 mg (65 mg iron) Tab tablet Take by mouth.    folic acid (FOLVITE) 1 MG tablet Take 1 tablet (1 mg total) by mouth once daily.    gabapentin (NEURONTIN) 800 MG tablet Take 800 mg by mouth 3 (three) times daily.    HUMALOG U-100 INSULIN 100 unit/mL injection INJECT 8 UNITS SUBCUTANEOUSLY WITH LARGEST MEAL OF THE DAY, INJECT ADDITIONAL 4 UNITS AT A TIME IF GLUCOSE IS ELEVATED    insulin glargine U-100, Lantus, (LANTUS SOLOSTAR U-100 INSULIN) 100 unit/mL (3 mL) InPn pen DO NOT TAKE UNTIL YOU FOLLOW UP WITH PCP (Patient taking differently: Inject 50 Units into the skin once daily. DO NOT TAKE UNTIL YOU FOLLOW UP WITH PCP)    insulin syringe-needle,dispos. 1 mL 28 gauge x 1/2" Syrg 1 each by Misc.(Non-Drug; Combo Route) route 2 (two) times daily.    lancets (ONETOUCH DELICA PLUS LANCET) 33 gauge Misc Inject 1 lancet into the skin 4 (four) times daily before meals and nightly.    losartan (COZAAR) 100 MG tablet Take 1 tablet (100 mg total) by mouth once daily. TAKE 1 TABLET BY MOUTH ONCE DAILY    mupirocin (BACTROBAN) 2 % ointment Apply topically 3 (three) times daily.    pen needle, diabetic (BD JOEL 2ND GEN PEN NEEDLE) 32 gauge x 5/32" Ndle Inject 1 each into the skin 4 (four) times daily before meals and nightly.    potassium, sodium phosphates (PHOS-NAK) 280-160-250 mg PwPk Take 1 packet by mouth 4 (four) times daily before meals and nightly.    verapamiL (CALAN-SR) 240 MG CR tablet Take 1 tablet (240 mg total) by mouth once daily.     Family History       Problem Relation (Age of Onset)    Hypertension Mother, Father          Tobacco Use    Smoking status: Former     Types: Cigarettes    Smokeless tobacco: Never   Substance and Sexual Activity    Alcohol use: Yes     Comment:  Daily - States that he drinks 1/2 pint of vodka and about 1-2 cans of beer daily    Drug use: No    Sexual activity: Yes     Partners: Female     Review of Systems   Gastrointestinal:  Positive for " abdominal pain and nausea.   All other systems reviewed and are negative.    Objective:     Vital Signs (Most Recent):  Temp: 98.2 °F (36.8 °C) (03/18/25 1515)  Pulse: 87 (03/18/25 1644)  Resp: (!) 21 (03/18/25 1644)  BP: (!) 146/101 (03/18/25 1635)  SpO2: 96 % (03/18/25 1644) Vital Signs (24h Range):  Temp:  [98 °F (36.7 °C)-98.5 °F (36.9 °C)] 98.2 °F (36.8 °C)  Pulse:  [] 87  Resp:  [10-34] 21  SpO2:  [90 %-100 %] 96 %  BP: ()/() 146/101  Arterial Line BP: ()/() 152/89     Weight: 68 kg (150 lb)  Body mass index is 24.21 kg/m².     Physical Exam  HENT:      Head: Normocephalic and atraumatic.      Comments: NG tube in place  Cardiovascular:      Rate and Rhythm: Normal rate and regular rhythm.      Heart sounds: No murmur heard.  Pulmonary:      Effort: Pulmonary effort is normal. No respiratory distress.      Breath sounds: Normal breath sounds. No wheezing.   Abdominal:      General: There is distension.      Palpations: Abdomen is soft.      Tenderness: There is abdominal tenderness.      Comments: Midline incision clean dry intact   Musculoskeletal:         General: No swelling.   Skin:     General: Skin is warm and dry.   Neurological:      Mental Status: He is alert and oriented to person, place, and time. Mental status is at baseline.          Significant Labs: All pertinent labs within the past 24 hours have been reviewed.  Recent Lab Results  (Last 5 results in the past 24 hours)        03/18/25  1622   03/18/25  1620   03/18/25  1118   03/18/25  0826   03/18/25  0432        Albumin               ALP               ALT               Anion Gap               AST               Baso #               Basophil %               BILIRUBIN TOTAL               BUN               Calcium               Chloride               CO2               Creatinine               Differential Method               eGFR               Eos #               Eos %               Glucose               Gran #  (ANC)               Gran %               Hematocrit               Hemoglobin               Immature Grans (Abs)               Immature Granulocytes               Lymph #               Lymph %               Magnesium                MCH               MCHC               MCV               Mono #               Mono %               MPV               nRBC               Phosphorus Level               Platelet Count               POCT Glucose 189   180   199   187   119       Potassium               PROTEIN TOTAL               RBC               RDW               Sodium               WBC                                      Significant Imaging: I have reviewed all pertinent imaging results/findings within the past 24 hours.  Assessment/Plan:     * Small bowel obstruction  -due to volvulus and adhesions  -s/p DELROY   --per surgery  -currently with NG in place      Thrombocytopenia   The patients 3 most recent labs are listed below.  Recent Labs     03/17/25  0407 03/17/25  1352 03/18/25  0430   PLT 76* 62* 63*     Plan  - Will transfuse if platelet count is <50k (if undergoing surgical procedure or have active bleeding).  -       Type 2 diabetes mellitus with hyperglycemia, with long-term current use of insulin  Patient's FSGs are uncontrolled due to hyperglycemia on current medication regimen.  Last A1c reviewed-   Lab Results   Component Value Date    HGBA1C 8.3 (H) 03/16/2025     Most recent fingerstick glucose reviewed-   Recent Labs   Lab 03/18/25  0826 03/18/25  1118 03/18/25  1620 03/18/25  1622   POCTGLUCOSE 187* 199* 180* 189*     Current correctional scale  Medium  Maintain anti-hyperglycemic dose as follows-   Antihyperglycemics (From admission, onward)      Start     Stop Route Frequency Ordered    03/17/25 1509  insulin aspart U-100 pen 0-10 Units         -- SubQ Every 4 hours PRN 03/17/25 1410          Hold Oral hypoglycemics while patient is in the hospital.        Essential hypertension  Patient's blood pressure  range in the last 24 hours was: BP  Min: 82/63  Max: 152/93.The patient's inpatient anti-hypertensive regimen is listed below:  Current Antihypertensives       Plan  -  BP labile  - hold antihypertensive medications for now    Transaminitis  -downtrending      Alcohol abuse  -prn Ativan  -monitor for signs of withdrawal      Gastroesophageal reflux disease  -Continue IV PPI        VTE Risk Mitigation (From admission, onward)           Ordered     IP VTE LOW RISK PATIENT  Once         03/16/25 0401     Place sequential compression device  Until discontinued         03/16/25 0401                      Thank you for your consult. I will follow-up with patient. Please contact us if you have any additional questions.    Nette Davis MD  Department of Hospital Medicine   [facility] ([facility])

## 2025-03-18 NOTE — SUBJECTIVE & OBJECTIVE
Interval History: s/p ex lap with findings of well perfused bowel amenable to abdominal wall closure yesterday. Requiring ativan and precedex due to agitation. Pulled NG but now replaced. No bowel function     Medications:  Continuous Infusions:  Scheduled Meds:   folic acid (FOLVITE) IVPB  1 mg Intravenous Daily    magnesium sulfate 2 g IVPB  2 g Intravenous Once    mupirocin   Nasal BID    pantoprazole  40 mg Intravenous BID    piperacillin-tazobactam (Zosyn) IV (PEDS and ADULTS) (extended infusion is not appropriate)  4.5 g Intravenous Q8H    thiamine (B-1) injection  100 mg Intravenous Daily     PRN Meds:  Current Facility-Administered Medications:     0.9%  NaCl infusion (for blood administration), , Intravenous, Q24H PRN    dextrose 50%, 12.5 g, Intravenous, PRN    dextrose 50%, 25 g, Intravenous, PRN    fentaNYL, 50 mcg, Intravenous, Q3H PRN    glucagon (human recombinant), 1 mg, Intramuscular, PRN    influenza, 0.5 mL, Intramuscular, Prior to discharge    insulin aspart U-100, 0-10 Units, Subcutaneous, Q4H PRN    lorazepam, 1 mg, Intravenous, Q4H PRN    lorazepam, 2 mg, Intravenous, PRN    morphine, 2 mg, Intravenous, Q4H PRN    ondansetron, 4 mg, Intravenous, Q6H PRN    pneumoc 20-guanako conj-dip cr(PF), 0.5 mL, Intramuscular, Prior to discharge    sodium chloride 0.9%, 10 mL, Intravenous, PRN     Review of patient's allergies indicates:  No Known Allergies  Objective:     Vital Signs (Most Recent):  Temp: 98.1 °F (36.7 °C) (03/18/25 1104)  Pulse: 88 (03/18/25 1113)  Resp: (!) 24 (03/18/25 1123)  BP: 127/88 (03/18/25 1105)  SpO2: (!) 93 % (03/18/25 1113) Vital Signs (24h Range):  Temp:  [98 °F (36.7 °C)-98.5 °F (36.9 °C)] 98.1 °F (36.7 °C)  Pulse:  [] 88  Resp:  [10-34] 24  SpO2:  [90 %-100 %] 93 %  BP: ()/() 127/88  Arterial Line BP: ()/() 152/89     Weight: 68 kg (150 lb)  Body mass index is 24.21 kg/m².    Intake/Output - Last 3 Shifts         03/16 0700  03/17 0659 03/17  0700  03/18 0659 03/18 0700  03/19 0659    I.V. (mL/kg) 115.3 (1.7) 50 (0.7) 19 (0.3)    Blood  364.6     IV Piggyback 4975 1365.8 389.6    Total Intake(mL/kg) 5090.3 (74.9) 1780.4 (26.2) 408.6 (6)    Urine (mL/kg/hr) 825 (0.5) 400 (0.2)     Drains 1250 250     Other 1400 50     Total Output 3475 700     Net +1615.3 +1080.4 +408.6           Urine Occurrence  1 x 2 x             Physical Exam  Constitutional:       Appearance: He is well-developed.   HENT:      Head: Normocephalic and atraumatic.      Nose:      Comments: NG with red tinged fluid in canister  Eyes:      Conjunctiva/sclera: Conjunctivae normal.      Pupils: Pupils are equal, round, and reactive to light.   Neck:      Thyroid: No thyromegaly.   Cardiovascular:      Rate and Rhythm: Normal rate.   Pulmonary:      Effort: Pulmonary effort is normal. No respiratory distress.   Abdominal:      Comments: Soft, mild distention, appropriate post op TTP. Midline incision c/d/I without erythema or drainage.   Musculoskeletal:         General: No tenderness. Normal range of motion.      Cervical back: Normal range of motion.   Skin:     General: Skin is warm and dry.      Capillary Refill: Capillary refill takes less than 2 seconds.          Significant Labs:  I have reviewed all pertinent lab results within the past 24 hours.  CBC:   Recent Labs   Lab 03/18/25  0430   WBC 6.40   RBC 2.51*   HGB 7.7*   HCT 22.4*   PLT 63*   MCV 89   MCH 30.7   MCHC 34.4     BMP:   Recent Labs   Lab 03/18/25  0430   *      K 3.2*   *   CO2 18*   BUN 13   CREATININE 0.8   CALCIUM 7.0*   MG 1.8     CMP:   Recent Labs   Lab 03/18/25  0430   *   CALCIUM 7.0*   ALBUMIN 2.3*   PROT 4.1*      K 3.2*   CO2 18*   *   BUN 13   CREATININE 0.8   ALKPHOS 56   ALT 23   AST 31   BILITOT 0.9       Significant Diagnostics:  I have reviewed all pertinent imaging results/findings within the past 24 hours.

## 2025-03-18 NOTE — PLAN OF CARE
Neuro status fluctuates throughout day between oriented x4 and disoriented to date. One episode of visual hallucinations, ciwa 14, prn ativan given. NSR on monitor. BP stable. Arterial line removed. Afebrile. Room air. Productive cough - large brown/thick sputum. NPO. NG to LIWS. BG monitored, SSI given. Voids per urinal, intermittently incontinent. Pt turns independently most of the time, sometimes needs encouragement/reminders to turn. Central line removed, PIVs intact. Bed low, wheels locked, alarms audible. POC reviewed.

## 2025-03-18 NOTE — ASSESSMENT & PLAN NOTE
Patient's blood pressure range in the last 24 hours was: BP  Min: 82/63  Max: 152/93.The patient's inpatient anti-hypertensive regimen is listed below:  Current Antihypertensives       Plan  -  BP labile  - hold antihypertensive medications for now

## 2025-03-18 NOTE — ASSESSMENT & PLAN NOTE
- anticipate patient to be left in discontinuity  - management per general surgery    3/17 To OR this am  Can get extubated in recovery and then to ICU  S/p PRBC    3/17 S/p surgery. Extubated in PACU yesterday.  No hemodynamic or resp issues overnight  On RA. Vitals stable  Transfer to floor if ok with surgery

## 2025-03-18 NOTE — HPI
"59-year-old male with known medical problems of alcohol abuse, GERD, pancreatitis, diabetes, transaminitis, TIA, HTN, HLD, inguinal hernia repair 2022, adenomatous polyps of transverse colon s/p polypectomy 2018.   He presented to [facility] ED for complaints of abdominal pain w/n/v onset shortly before arrival to ED. He was reportedly hypotensive w/EMS and was given a 500cc bolus, zofran, and fentanyl en route. He is still drinking beer with last being yesterday before presentation to ED. His appetite has been poor x several days, he has not eaten since Friday. He denied any fever, chills, cough, chest pain, shortness of breath, urinary symptoms.      ED workup showed WBC 3.08 (chronically in 3's), hgb 10.1, platelets 99, K 3.1, CO2 19, AG 20, normal Cr, glucose 245, Mg 1.3, , ALT 63, normal lipase, normal t-bili. Lactic acid 4.8, serum ethanol 276.   CT a/p w/contrast showed high-grade SBO with c/f decreased blood flow and ischemia-"dilated loops of small bowel. Findings compatible with a high-grade small-bowel obstruction. Some loops of distended small bowel demonstrate equivocal decreased mural enhancement. There is an equivocal finding however a stercoral enteritis based on distention is not excluded. No pneumatosis portal or mesenteric venous gas is identified. There was a twist about the root of the small bowel mesentery which results in focal high-grade narrowing of the superior mesenteric artery.   In ED, he had a labile blood pressure and ultimately required initiation of vasopressor support and CVL placement. Surgery consulted w/plans for OR urgently tonight. Patient transferred ED to ED for expeditious definitive care. On arrival to ED, patient w/acceptable BP, however HR in 150's, sinus tachycardia, likely due to levophed. He was able to be weaned off of levophed with additional IVF. Given his need for vasopressor support, unstable hemodynamics, and high-risk surgery, and plan to leave him in " discontinuity, he will be admitted to ICU post-op.   Patient taken to OR on 3/16/25, volvulus noted. He underwent lysis of adhesions. His abdomen ws temporarily closed with ABThera wound vac. He went back to the OR on 3/17/25 for a second look to ensure bowel viable. He was noted to have well perfused bowel. Abdomen closed. He was hemodynamically stable. Patient acceptable for the floor. Hospital medicine consulted. Patient reports difficult to talk with NG in place.

## 2025-03-18 NOTE — PROGRESS NOTES
[facility] - Intensive Care ([facility])  General Surgery  Progress Note    Subjective:     Interval History: s/p ex lap with findings of well perfused bowel amenable to abdominal wall closure yesterday. Requiring ativan and precedex due to agitation. Pulled NG but now replaced. No bowel function     Medications:  Continuous Infusions:  Scheduled Meds:   folic acid (FOLVITE) IVPB  1 mg Intravenous Daily    mupirocin   Nasal BID    pantoprazole  40 mg Intravenous BID    piperacillin-tazobactam (Zosyn) IV (PEDS and ADULTS) (extended infusion is not appropriate)  4.5 g Intravenous Q8H    thiamine (B-1) injection  100 mg Intravenous Daily     PRN Meds:  Current Facility-Administered Medications:     0.9%  NaCl infusion (for blood administration), , Intravenous, Q24H PRN    dextrose 50%, 12.5 g, Intravenous, PRN    dextrose 50%, 25 g, Intravenous, PRN    fentaNYL, 50 mcg, Intravenous, Q3H PRN    glucagon (human recombinant), 1 mg, Intramuscular, PRN    HYDROmorphone, 0.2 mg, Intravenous, Q5 Min PRN    influenza, 0.5 mL, Intramuscular, Prior to discharge    insulin aspart U-100, 0-10 Units, Subcutaneous, Q4H PRN    lorazepam, 1 mg, Intravenous, Q4H PRN    lorazepam, 2 mg, Intravenous, PRN    morphine, 2 mg, Intravenous, Q4H PRN    ondansetron, 4 mg, Intravenous, Q6H PRN    ondansetron, 4 mg, Intravenous, Daily PRN    oxyCODONE-acetaminophen, 1 tablet, Oral, Q3H PRN    pneumoc 20-guanako conj-dip cr(PF), 0.5 mL, Intramuscular, Prior to discharge    sodium chloride 0.9%, 10 mL, Intravenous, PRN     Review of patient's allergies indicates:  No Known Allergies  Objective:     Vital Signs (Most Recent):  Temp: 98 °F (36.7 °C) (03/18/25 0701)  Pulse: 82 (03/18/25 1029)  Resp: (!) 31 (03/18/25 1029)  BP: 127/81 (03/18/25 0935)  SpO2: 95 % (03/18/25 1029) Vital Signs (24h Range):  Temp:  [97.6 °F (36.4 °C)-98.5 °F (36.9 °C)] 98 °F (36.7 °C)  Pulse:  [] 82  Resp:  [9-34] 31  SpO2:  [90 %-100 %] 95 %  BP: ()/()  127/81  Arterial Line BP: ()/() 152/89     Weight: 68 kg (150 lb)  Body mass index is 24.21 kg/m².    Intake/Output - Last 3 Shifts         03/16 0700  03/17 0659 03/17 0700  03/18 0659 03/18 0700  03/19 0659    I.V. (mL/kg) 115.3 (1.7) 50 (0.7) 19 (0.3)    Blood  364.6     IV Piggyback 4975 1365.8 364.6    Total Intake(mL/kg) 5090.3 (74.9) 1780.4 (26.2) 383.6 (5.6)    Urine (mL/kg/hr) 825 (0.5) 400 (0.2)     Drains 1250 250     Other 1400 50     Total Output 3475 700     Net +1615.3 +1080.4 +383.6           Urine Occurrence  1 x 2 x             Physical Exam  Constitutional:       Appearance: He is well-developed.   HENT:      Head: Normocephalic and atraumatic.      Nose:      Comments: NG with red tinged fluid in canister  Eyes:      Conjunctiva/sclera: Conjunctivae normal.      Pupils: Pupils are equal, round, and reactive to light.   Neck:      Thyroid: No thyromegaly.   Cardiovascular:      Rate and Rhythm: Normal rate and regular rhythm.   Pulmonary:      Effort: Pulmonary effort is normal. No respiratory distress.   Abdominal:      Comments: Soft, mild distention, appropriate post op TTP. Midline incision c/d/I without erythema or drainage.   Musculoskeletal:         General: No tenderness. Normal range of motion.      Cervical back: Normal range of motion.   Skin:     General: Skin is warm and dry.      Capillary Refill: Capillary refill takes less than 2 seconds.          Significant Labs:  I have reviewed all pertinent lab results within the past 24 hours.  CBC:   Recent Labs   Lab 03/18/25  0430   WBC 6.40   RBC 2.51*   HGB 7.7*   HCT 22.4*   PLT 63*   MCV 89   MCH 30.7   MCHC 34.4     BMP:   Recent Labs   Lab 03/18/25  0430   *      K 3.2*   *   CO2 18*   BUN 13   CREATININE 0.8   CALCIUM 7.0*   MG 1.8     CMP:   Recent Labs   Lab 03/18/25  0430   *   CALCIUM 7.0*   ALBUMIN 2.3*   PROT 4.1*      K 3.2*   CO2 18*   *   BUN 13   CREATININE 0.8   ALKPHOS 56    ALT 23   AST 31   BILITOT 0.9       Significant Diagnostics:  I have reviewed all pertinent imaging results/findings within the past 24 hours.  Assessment/Plan:     * Small bowel obstruction  POD2 status post exploratory laparotomy with reduction of small bowel volvulus  POD1 s/p ex lap with well perfused bowel amenable to abdominal wall closure    - continue NG tube, NPO until return of bowel function/improved distention  - OOB, ambulation encouraged  - incentive sandi  - abx    Type 2 diabetes mellitus with hyperglycemia, with long-term current use of insulin  Per critical Care Medicine    Essential hypertension  Per critical Care Medicine    Alcohol abuse  Per critical Care Medicine    Gastroesophageal reflux disease  Per critical Care Medicine        Diane Bhatti PA-C  General Surgery  [facility] - Intensive Care ([facility])

## 2025-03-18 NOTE — ASSESSMENT & PLAN NOTE
POD2 status post exploratory laparotomy with reduction of small bowel volvulus  POD1 s/p ex lap with well perfused bowel amenable to abdominal wall closure    - continue NG tube, NPO until return of bowel function/improved distention  - OOB, ambulation encouraged  - incentive sandi  - no need for abx from surgical standpoint

## 2025-03-18 NOTE — PROGRESS NOTES
[facility] - Intensive Care ([facility])  General Surgery  Progress Note    Subjective:     Interval History: s/p ex lap with findings of well perfused bowel amenable to abdominal wall closure yesterday. Requiring ativan and precedex due to agitation. Pulled NG but now replaced. No bowel function     Medications:  Continuous Infusions:  Scheduled Meds:   folic acid (FOLVITE) IVPB  1 mg Intravenous Daily    magnesium sulfate 2 g IVPB  2 g Intravenous Once    mupirocin   Nasal BID    pantoprazole  40 mg Intravenous BID    piperacillin-tazobactam (Zosyn) IV (PEDS and ADULTS) (extended infusion is not appropriate)  4.5 g Intravenous Q8H    thiamine (B-1) injection  100 mg Intravenous Daily     PRN Meds:  Current Facility-Administered Medications:     0.9%  NaCl infusion (for blood administration), , Intravenous, Q24H PRN    dextrose 50%, 12.5 g, Intravenous, PRN    dextrose 50%, 25 g, Intravenous, PRN    fentaNYL, 50 mcg, Intravenous, Q3H PRN    glucagon (human recombinant), 1 mg, Intramuscular, PRN    influenza, 0.5 mL, Intramuscular, Prior to discharge    insulin aspart U-100, 0-10 Units, Subcutaneous, Q4H PRN    lorazepam, 1 mg, Intravenous, Q4H PRN    lorazepam, 2 mg, Intravenous, PRN    morphine, 2 mg, Intravenous, Q4H PRN    ondansetron, 4 mg, Intravenous, Q6H PRN    pneumoc 20-guanako conj-dip cr(PF), 0.5 mL, Intramuscular, Prior to discharge    sodium chloride 0.9%, 10 mL, Intravenous, PRN     Review of patient's allergies indicates:  No Known Allergies  Objective:     Vital Signs (Most Recent):  Temp: 98.1 °F (36.7 °C) (03/18/25 1104)  Pulse: 88 (03/18/25 1113)  Resp: (!) 24 (03/18/25 1123)  BP: 127/88 (03/18/25 1105)  SpO2: (!) 93 % (03/18/25 1113) Vital Signs (24h Range):  Temp:  [98 °F (36.7 °C)-98.5 °F (36.9 °C)] 98.1 °F (36.7 °C)  Pulse:  [] 88  Resp:  [10-34] 24  SpO2:  [90 %-100 %] 93 %  BP: ()/() 127/88  Arterial Line BP: ()/() 152/89     Weight: 68 kg (150 lb)  Body mass index is  24.21 kg/m².    Intake/Output - Last 3 Shifts         03/16 0700  03/17 0659 03/17 0700  03/18 0659 03/18 0700  03/19 0659    I.V. (mL/kg) 115.3 (1.7) 50 (0.7) 19 (0.3)    Blood  364.6     IV Piggyback 4975 1365.8 389.6    Total Intake(mL/kg) 5090.3 (74.9) 1780.4 (26.2) 408.6 (6)    Urine (mL/kg/hr) 825 (0.5) 400 (0.2)     Drains 1250 250     Other 1400 50     Total Output 3475 700     Net +1615.3 +1080.4 +408.6           Urine Occurrence  1 x 2 x             Physical Exam  Constitutional:       Appearance: He is well-developed.   HENT:      Head: Normocephalic and atraumatic.      Nose:      Comments: NG with red tinged fluid in canister  Eyes:      Conjunctiva/sclera: Conjunctivae normal.      Pupils: Pupils are equal, round, and reactive to light.   Neck:      Thyroid: No thyromegaly.   Cardiovascular:      Rate and Rhythm: Normal rate.   Pulmonary:      Effort: Pulmonary effort is normal. No respiratory distress.   Abdominal:      Comments: Soft, mild distention, appropriate post op TTP. Midline incision c/d/I without erythema or drainage.   Musculoskeletal:         General: No tenderness. Normal range of motion.      Cervical back: Normal range of motion.   Skin:     General: Skin is warm and dry.      Capillary Refill: Capillary refill takes less than 2 seconds.          Significant Labs:  I have reviewed all pertinent lab results within the past 24 hours.  CBC:   Recent Labs   Lab 03/18/25  0430   WBC 6.40   RBC 2.51*   HGB 7.7*   HCT 22.4*   PLT 63*   MCV 89   MCH 30.7   MCHC 34.4     BMP:   Recent Labs   Lab 03/18/25  0430   *      K 3.2*   *   CO2 18*   BUN 13   CREATININE 0.8   CALCIUM 7.0*   MG 1.8     CMP:   Recent Labs   Lab 03/18/25  0430   *   CALCIUM 7.0*   ALBUMIN 2.3*   PROT 4.1*      K 3.2*   CO2 18*   *   BUN 13   CREATININE 0.8   ALKPHOS 56   ALT 23   AST 31   BILITOT 0.9       Significant Diagnostics:  I have reviewed all pertinent imaging results/findings  within the past 24 hours.  Assessment/Plan:     * Small bowel obstruction  POD2 status post exploratory laparotomy with reduction of small bowel volvulus  POD1 s/p ex lap with well perfused bowel amenable to abdominal wall closure    - continue NG tube, NPO until return of bowel function/improved distention  - OOB, ambulation encouraged  - incentive sandi  - no need for abx from surgical standpoint    Type 2 diabetes mellitus with hyperglycemia, with long-term current use of insulin  Per critical Care Medicine    Essential hypertension  Per critical Care Medicine    Alcohol abuse  Per critical Care Medicine    Gastroesophageal reflux disease  Per critical Care Medicine        Diane Bhatti PA-C  General Surgery  [facility] - Intensive Care ([facility])

## 2025-03-18 NOTE — ASSESSMENT & PLAN NOTE
Patient's FSGs are uncontrolled due to hyperglycemia on current medication regimen.  Last A1c reviewed-   Lab Results   Component Value Date    HGBA1C 8.3 (H) 03/16/2025     Most recent fingerstick glucose reviewed-   Recent Labs   Lab 03/18/25  0826 03/18/25  1118 03/18/25  1620 03/18/25  1622   POCTGLUCOSE 187* 199* 180* 189*     Current correctional scale  Medium  Maintain anti-hyperglycemic dose as follows-   Antihyperglycemics (From admission, onward)      Start     Stop Route Frequency Ordered    03/17/25 1509  insulin aspart U-100 pen 0-10 Units         -- SubQ Every 4 hours PRN 03/17/25 1410          Hold Oral hypoglycemics while patient is in the hospital.

## 2025-03-18 NOTE — ASSESSMENT & PLAN NOTE
The patients 3 most recent labs are listed below.  Recent Labs     03/17/25  0407 03/17/25  1352 03/18/25  0430   PLT 76* 62* 63*     Plan  - Will transfuse if platelet count is <50k (if undergoing surgical procedure or have active bleeding).  -

## 2025-03-18 NOTE — ASSESSMENT & PLAN NOTE
POD2 status post exploratory laparotomy with reduction of small bowel volvulus  POD1 s/p ex lap with well perfused bowel amenable to abdominal wall closure    - continue NG tube, NPO until return of bowel function/improved distention  - OOB, ambulation encouraged  - incentive sandi  - abx

## 2025-03-19 PROBLEM — R65.20 SEVERE SEPSIS: Status: RESOLVED | Noted: 2025-03-16 | Resolved: 2025-03-19

## 2025-03-19 PROBLEM — A41.9 SEVERE SEPSIS: Status: RESOLVED | Noted: 2025-03-16 | Resolved: 2025-03-19

## 2025-03-19 LAB
ALBUMIN SERPL BCP-MCNC: 2.7 G/DL (ref 3.5–5.2)
ALP SERPL-CCNC: 62 U/L (ref 40–150)
ALT SERPL W/O P-5'-P-CCNC: 20 U/L (ref 10–44)
ANION GAP SERPL CALC-SCNC: 15 MMOL/L (ref 8–16)
AST SERPL-CCNC: 25 U/L (ref 10–40)
BASOPHILS # BLD AUTO: 0.02 K/UL (ref 0–0.2)
BASOPHILS NFR BLD: 0.3 % (ref 0–1.9)
BILIRUB SERPL-MCNC: 1 MG/DL (ref 0.1–1)
BUN SERPL-MCNC: 13 MG/DL (ref 6–20)
CALCIUM SERPL-MCNC: 8.4 MG/DL (ref 8.7–10.5)
CHLORIDE SERPL-SCNC: 109 MMOL/L (ref 95–110)
CO2 SERPL-SCNC: 19 MMOL/L (ref 23–29)
CREAT SERPL-MCNC: 0.9 MG/DL (ref 0.5–1.4)
DIFFERENTIAL METHOD BLD: ABNORMAL
EOSINOPHIL # BLD AUTO: 0 K/UL (ref 0–0.5)
EOSINOPHIL NFR BLD: 0.1 % (ref 0–8)
ERYTHROCYTE [DISTWIDTH] IN BLOOD BY AUTOMATED COUNT: 14.8 % (ref 11.5–14.5)
EST. GFR  (NO RACE VARIABLE): >60 ML/MIN/1.73 M^2
GLUCOSE SERPL-MCNC: 173 MG/DL (ref 70–110)
HCT VFR BLD AUTO: 25.7 % (ref 40–54)
HGB BLD-MCNC: 8.2 G/DL (ref 14–18)
IMM GRANULOCYTES # BLD AUTO: 0.04 K/UL (ref 0–0.04)
IMM GRANULOCYTES NFR BLD AUTO: 0.6 % (ref 0–0.5)
LYMPHOCYTES # BLD AUTO: 0.9 K/UL (ref 1–4.8)
LYMPHOCYTES NFR BLD: 13.1 % (ref 18–48)
MAGNESIUM SERPL-MCNC: 2.1 MG/DL (ref 1.6–2.6)
MCH RBC QN AUTO: 30.6 PG (ref 27–31)
MCHC RBC AUTO-ENTMCNC: 31.9 G/DL (ref 32–36)
MCV RBC AUTO: 96 FL (ref 82–98)
MONOCYTES # BLD AUTO: 0.7 K/UL (ref 0.3–1)
MONOCYTES NFR BLD: 10.2 % (ref 4–15)
NEUTROPHILS # BLD AUTO: 5.2 K/UL (ref 1.8–7.7)
NEUTROPHILS NFR BLD: 75.7 % (ref 38–73)
NRBC BLD-RTO: 0 /100 WBC
PHOSPHATE SERPL-MCNC: 2.4 MG/DL (ref 2.7–4.5)
PLATELET # BLD AUTO: 110 K/UL (ref 150–450)
PLATELET BLD QL SMEAR: ABNORMAL
PMV BLD AUTO: 12 FL (ref 9.2–12.9)
POCT GLUCOSE: 166 MG/DL (ref 70–110)
POCT GLUCOSE: 179 MG/DL (ref 70–110)
POCT GLUCOSE: 196 MG/DL (ref 70–110)
POCT GLUCOSE: 203 MG/DL (ref 70–110)
POTASSIUM SERPL-SCNC: 3.5 MMOL/L (ref 3.5–5.1)
PROT SERPL-MCNC: 5 G/DL (ref 6–8.4)
RBC # BLD AUTO: 2.68 M/UL (ref 4.6–6.2)
SODIUM SERPL-SCNC: 143 MMOL/L (ref 136–145)
WBC # BLD AUTO: 6.87 K/UL (ref 3.9–12.7)

## 2025-03-19 PROCEDURE — 36415 COLL VENOUS BLD VENIPUNCTURE: CPT | Performed by: STUDENT IN AN ORGANIZED HEALTH CARE EDUCATION/TRAINING PROGRAM

## 2025-03-19 PROCEDURE — 84100 ASSAY OF PHOSPHORUS: CPT | Performed by: STUDENT IN AN ORGANIZED HEALTH CARE EDUCATION/TRAINING PROGRAM

## 2025-03-19 PROCEDURE — 63600175 PHARM REV CODE 636 W HCPCS: Performed by: HOSPITALIST

## 2025-03-19 PROCEDURE — 25000003 PHARM REV CODE 250: Performed by: COLON & RECTAL SURGERY

## 2025-03-19 PROCEDURE — 21400001 HC TELEMETRY ROOM

## 2025-03-19 PROCEDURE — 80053 COMPREHEN METABOLIC PANEL: CPT | Performed by: STUDENT IN AN ORGANIZED HEALTH CARE EDUCATION/TRAINING PROGRAM

## 2025-03-19 PROCEDURE — 63600175 PHARM REV CODE 636 W HCPCS

## 2025-03-19 PROCEDURE — 83735 ASSAY OF MAGNESIUM: CPT | Performed by: STUDENT IN AN ORGANIZED HEALTH CARE EDUCATION/TRAINING PROGRAM

## 2025-03-19 PROCEDURE — 85025 COMPLETE CBC W/AUTO DIFF WBC: CPT | Performed by: STUDENT IN AN ORGANIZED HEALTH CARE EDUCATION/TRAINING PROGRAM

## 2025-03-19 PROCEDURE — 99024 POSTOP FOLLOW-UP VISIT: CPT | Mod: ,,,

## 2025-03-19 PROCEDURE — 25000003 PHARM REV CODE 250

## 2025-03-19 RX ORDER — SODIUM CHLORIDE 450 MG/100ML
INJECTION, SOLUTION INTRAVENOUS CONTINUOUS
Status: DISCONTINUED | OUTPATIENT
Start: 2025-03-19 | End: 2025-03-20

## 2025-03-19 RX ORDER — HYDRALAZINE HYDROCHLORIDE 20 MG/ML
10 INJECTION INTRAMUSCULAR; INTRAVENOUS EVERY 6 HOURS PRN
Status: DISCONTINUED | OUTPATIENT
Start: 2025-03-19 | End: 2025-03-24 | Stop reason: HOSPADM

## 2025-03-19 RX ORDER — HYDROCODONE BITARTRATE AND ACETAMINOPHEN 500; 5 MG/1; MG/1
TABLET ORAL
Status: DISCONTINUED | OUTPATIENT
Start: 2025-03-19 | End: 2025-03-24 | Stop reason: HOSPADM

## 2025-03-19 RX ADMIN — PANTOPRAZOLE SODIUM 40 MG: 40 INJECTION, POWDER, FOR SOLUTION INTRAVENOUS at 08:03

## 2025-03-19 RX ADMIN — INSULIN ASPART 2 UNITS: 100 INJECTION, SOLUTION INTRAVENOUS; SUBCUTANEOUS at 04:03

## 2025-03-19 RX ADMIN — MORPHINE SULFATE 2 MG: 4 INJECTION INTRAVENOUS at 04:03

## 2025-03-19 RX ADMIN — SODIUM CHLORIDE: 4.5 INJECTION, SOLUTION INTRAVENOUS at 05:03

## 2025-03-19 RX ADMIN — HYDRALAZINE HYDROCHLORIDE 10 MG: 20 INJECTION, SOLUTION INTRAMUSCULAR; INTRAVENOUS at 05:03

## 2025-03-19 RX ADMIN — MORPHINE SULFATE 2 MG: 4 INJECTION INTRAVENOUS at 10:03

## 2025-03-19 RX ADMIN — PANTOPRAZOLE SODIUM 40 MG: 40 INJECTION, POWDER, FOR SOLUTION INTRAVENOUS at 09:03

## 2025-03-19 RX ADMIN — INSULIN ASPART 2 UNITS: 100 INJECTION, SOLUTION INTRAVENOUS; SUBCUTANEOUS at 06:03

## 2025-03-19 RX ADMIN — FOLIC ACID 1 MG: 5 INJECTION, SOLUTION INTRAMUSCULAR; INTRAVENOUS; SUBCUTANEOUS at 09:03

## 2025-03-19 RX ADMIN — MUPIROCIN: 20 OINTMENT TOPICAL at 08:03

## 2025-03-19 RX ADMIN — MORPHINE SULFATE 2 MG: 4 INJECTION INTRAVENOUS at 05:03

## 2025-03-19 RX ADMIN — THIAMINE HYDROCHLORIDE 100 MG: 100 INJECTION, SOLUTION INTRAMUSCULAR; INTRAVENOUS at 08:03

## 2025-03-19 RX ADMIN — INSULIN ASPART 2 UNITS: 100 INJECTION, SOLUTION INTRAVENOUS; SUBCUTANEOUS at 12:03

## 2025-03-19 RX ADMIN — MUPIROCIN: 20 OINTMENT TOPICAL at 09:03

## 2025-03-19 RX ADMIN — MORPHINE SULFATE 2 MG: 4 INJECTION INTRAVENOUS at 08:03

## 2025-03-19 NOTE — PROGRESS NOTES
"[facility] Medicine  Progress Note    Patient Name: Jorgito Arreaga  MRN: [id number]  Patient Class: IP- Inpatient   Admission Date: 3/15/2025  Length of Stay: 3 days  Attending Physician: Nette Davis MD  Primary Care Provider: Vonda Elliott FNP        Subjective     Principal Problem:Small bowel obstruction        HPI:  59-year-old male with known medical problems of alcohol abuse, GERD, pancreatitis, diabetes, transaminitis, TIA, HTN, HLD, inguinal hernia repair 2022, adenomatous polyps of transverse colon s/p polypectomy 2018.   He presented to [facility] ED for complaints of abdominal pain w/n/v onset shortly before arrival to ED. He was reportedly hypotensive w/EMS and was given a 500cc bolus, zofran, and fentanyl en route. He is still drinking beer with last being yesterday before presentation to ED. His appetite has been poor x several days, he has not eaten since Friday. He denied any fever, chills, cough, chest pain, shortness of breath, urinary symptoms.      ED workup showed WBC 3.08 (chronically in 3's), hgb 10.1, platelets 99, K 3.1, CO2 19, AG 20, normal Cr, glucose 245, Mg 1.3, , ALT 63, normal lipase, normal t-bili. Lactic acid 4.8, serum ethanol 276.   CT a/p w/contrast showed high-grade SBO with c/f decreased blood flow and ischemia-"dilated loops of small bowel. Findings compatible with a high-grade small-bowel obstruction. Some loops of distended small bowel demonstrate equivocal decreased mural enhancement. There is an equivocal finding however a stercoral enteritis based on distention is not excluded. No pneumatosis portal or mesenteric venous gas is identified. There was a twist about the root of the small bowel mesentery which results in focal high-grade narrowing of the superior mesenteric artery.   In ED, he had a labile blood pressure and ultimately required initiation of vasopressor support and CVL placement. Surgery consulted w/plans for OR urgently " tonight. Patient transferred ED to ED for expeditious definitive care. On arrival to ED, patient w/acceptable BP, however HR in 150's, sinus tachycardia, likely due to levophed. He was able to be weaned off of levophed with additional IVF. Given his need for vasopressor support, unstable hemodynamics, and high-risk surgery, and plan to leave him in discontinuity, he will be admitted to ICU post-op.   Patient taken to OR on 3/16/25, volvulus noted. He underwent lysis of adhesions. His abdomen ws temporarily closed with ABThera wound vac. He went back to the OR on 3/17/25 for a second look to ensure bowel viable. He was noted to have well perfused bowel. Abdomen closed. He was hemodynamically stable. Patient acceptable for the floor. Hospital medicine consulted. Patient reports difficult to talk with NG in place.    Overview/Hospital Course:  No notes on file    Interval History: f/u bowel obstruction  patient passing flatus and had small bowel movement. Reported episode of emesis, clear in appearance    Review of Systems  Objective:     Vital Signs (Most Recent):  Temp: 98.4 °F (36.9 °C) (03/19/25 1211)  Pulse: 100 (03/19/25 1211)  Resp: 18 (03/19/25 1211)  BP: (!) 151/102 (03/19/25 1211)  SpO2: 95 % (03/19/25 1211) Vital Signs (24h Range):  Temp:  [98.2 °F (36.8 °C)-99.4 °F (37.4 °C)] 98.4 °F (36.9 °C)  Pulse:  [] 100  Resp:  [13-34] 18  SpO2:  [89 %-97 %] 95 %  BP: ()/() 151/102     Weight: 68 kg (150 lb)  Body mass index is 24.21 kg/m².    Intake/Output Summary (Last 24 hours) at 3/19/2025 1307  Last data filed at 3/19/2025 0630  Gross per 24 hour   Intake 65.56 ml   Output 700 ml   Net -634.44 ml         Physical Exam  HENT:      Head: Normocephalic and atraumatic.      Comments: NG in place  Cardiovascular:      Rate and Rhythm: Normal rate and regular rhythm.      Heart sounds: No murmur heard.  Pulmonary:      Effort: Pulmonary effort is normal. No respiratory distress.      Breath sounds:  Normal breath sounds. No wheezing.   Abdominal:      General: Bowel sounds are normal. There is no distension.      Palpations: Abdomen is soft.      Tenderness: There is no abdominal tenderness.   Musculoskeletal:         General: No swelling.   Skin:     General: Skin is warm and dry.   Neurological:      Mental Status: He is alert and oriented to person, place, and time. Mental status is at baseline.               Significant Labs: All pertinent labs within the past 24 hours have been reviewed.  Recent Lab Results  (Last 5 results in the past 24 hours)        03/19/25  1056   03/19/25  0550   03/19/25  0438   03/18/25  2248   03/18/25  1622        Albumin   2.7             ALP   62             ALT   20             Anion Gap   15             AST   25             Baso #   0.02             Basophil %   0.3             BILIRUBIN TOTAL   1.0  Comment: For infants and newborns, interpretation of results should be based  on gestational age, weight and in agreement with clinical  observations.    Premature Infant recommended reference ranges:  Up to 24 hours.............<8.0 mg/dL  Up to 48 hours............<12.0 mg/dL  3-5 days..................<15.0 mg/dL  6-29 days.................<15.0 mg/dL               BUN   13             Calcium   8.4             Chloride   109             CO2   19             Creatinine   0.9             Differential Method   Automated             eGFR   >60             Eos #   0.0             Eos %   0.1             Glucose   173             Gran # (ANC)   5.2             Gran %   75.7             Hematocrit   25.7             Hemoglobin   8.2             Immature Grans (Abs)   0.04  Comment: Mild elevation in immature granulocytes is non specific and   can be seen in a variety of conditions including stress response,   acute inflammation, trauma and pregnancy. Correlation with other   laboratory and clinical findings is essential.               Immature Granulocytes   0.6             Lymph #    0.9             Lymph %   13.1             Magnesium    2.1             MCH   30.6             MCHC   31.9             MCV   96  Comment: Reviewed by Technologist.             Mono #   0.7             Mono %   10.2             MPV   12.0             nRBC   0             Phosphorus Level   2.4             Platelet Estimate   Appears normal             Platelet Count   110  Comment: Reviewed by Technologist.             POCT Glucose 166     196   175   189       Potassium   3.5             PROTEIN TOTAL   5.0             RBC   2.68             RDW   14.8             Sodium   143             WBC   6.87                                    Significant Imaging: I have reviewed all pertinent imaging results/findings within the past 24 hours.      Assessment & Plan  Small bowel obstruction  -due to volvulus and adhesions  -s/p DELROY   --per surgery  -currently with NG in place  -continue NPO for now, if nausea/vomiting resolves consider clear liquids tomorrow    Gastroesophageal reflux disease  -Continue IV PPI    Alcohol abuse  -prn Ativan  -monitor for signs of withdrawal    Transaminitis  -downtrending    Essential hypertension  Patient's blood pressure range in the last 24 hours was: BP  Min: 95/72  Max: 161/110.The patient's inpatient anti-hypertensive regimen is listed below:  Current Antihypertensives  hydrALAZINE injection 10 mg, Every 6 hours PRN, Intravenous    Plan  -  BP labile  - hold antihypertensive medications for now  Type 2 diabetes mellitus with hyperglycemia, with long-term current use of insulin  Patient's FSGs are uncontrolled due to hyperglycemia on current medication regimen.  Last A1c reviewed-   Lab Results   Component Value Date    HGBA1C 8.3 (H) 03/16/2025     Most recent fingerstick glucose reviewed-   Recent Labs   Lab 03/18/25  1622 03/18/25  2248 03/19/25  0438 03/19/25  1056   POCTGLUCOSE 189* 175* 196* 166*     Current correctional scale  Medium  Maintain anti-hyperglycemic dose as follows-    Antihyperglycemics (From admission, onward)      Start     Stop Route Frequency Ordered    03/17/25 1509  insulin aspart U-100 pen 0-10 Units         -- SubQ Every 4 hours PRN 03/17/25 1410          Hold Oral hypoglycemics while patient is in the hospital.      Hypokalemia  Patient's most recent potassium results are listed below.   Recent Labs     03/17/25  0407 03/18/25  0430 03/19/25  0550   K 3.8 3.2* 3.5     Plan  - Replete potassium per protocol  - Monitor potassium Daily  - Patient's hypokalemia is stable  -   Thrombocytopenia   The patients 3 most recent labs are listed below.  Recent Labs     03/17/25  1352 03/18/25  0430 03/19/25  0550   PLT 62* 63* 110*     Plan  - Will transfuse if platelet count is <50k (if undergoing surgical procedure or have active bleeding).  -     VTE Risk Mitigation (From admission, onward)           Ordered     IP VTE LOW RISK PATIENT  Once         03/16/25 0401     Place sequential compression device  Until discontinued         03/16/25 0401                    Discharge Planning   ARON:      Code Status: Full Code   Medical Readiness for Discharge Date:   Discharge Plan A: Home with family                        Nette Davis MD  Department of Hospital Medicine   [facility] - Med Surg

## 2025-03-19 NOTE — ASSESSMENT & PLAN NOTE
-due to volvulus and adhesions  -s/p DELROY   --per surgery  -currently with NG in place  -continue NPO for now, if nausea/vomiting resolves consider clear liquids tomorrow

## 2025-03-19 NOTE — SUBJECTIVE & OBJECTIVE
Interval History: reports episode of n/v overnight. Passing flatus and bm. Pain well controlled    Medications:  Continuous Infusions:  Scheduled Meds:   folic acid (FOLVITE) IVPB  1 mg Intravenous Daily    mupirocin   Nasal BID    pantoprazole  40 mg Intravenous BID     PRN Meds:  Current Facility-Administered Medications:     0.9%  NaCl infusion (for blood administration), , Intravenous, Q24H PRN    0.9%  NaCl infusion (for blood administration), , Intravenous, Q24H PRN    dextrose 50%, 12.5 g, Intravenous, PRN    dextrose 50%, 25 g, Intravenous, PRN    glucagon (human recombinant), 1 mg, Intramuscular, PRN    hydrALAZINE, 10 mg, Intravenous, Q6H PRN    influenza, 0.5 mL, Intramuscular, Prior to discharge    insulin aspart U-100, 0-10 Units, Subcutaneous, Q4H PRN    lorazepam, 1 mg, Intravenous, Q4H PRN    lorazepam, 2 mg, Intravenous, PRN    morphine, 2 mg, Intravenous, Q4H PRN    ondansetron, 4 mg, Intravenous, Q6H PRN    pneumoc 20-guanako conj-dip cr(PF), 0.5 mL, Intramuscular, Prior to discharge    sodium chloride 0.9%, 10 mL, Intravenous, PRN     Review of patient's allergies indicates:  No Known Allergies  Objective:     Vital Signs (Most Recent):  Temp: 98.4 °F (36.9 °C) (03/19/25 0702)  Pulse: 80 (03/19/25 0759)  Resp: 18 (03/19/25 0857)  BP: 125/87 (03/19/25 0702)  SpO2: 96 % (03/19/25 0702) Vital Signs (24h Range):  Temp:  [98.1 °F (36.7 °C)-99.4 °F (37.4 °C)] 98.4 °F (36.9 °C)  Pulse:  [] 80  Resp:  [13-34] 18  SpO2:  [89 %-97 %] 96 %  BP: ()/() 125/87     Weight: 68 kg (150 lb)  Body mass index is 24.21 kg/m².    Intake/Output - Last 3 Shifts         03/17 0700  03/18 0659 03/18 0700 03/19 0659 03/19 0700  03/20 0659    I.V. (mL/kg) 50 (0.7) 63.3 (0.9)     Blood 364.6      IV Piggyback 1365.8 460.8     Total Intake(mL/kg) 1780.4 (26.2) 524.1 (7.7)     Urine (mL/kg/hr) 400 (0.2) 100 (0.1)     Drains 250 600     Other 50      Stool  0     Total Output 700 700     Net +1080.4 -175.9             Urine Occurrence 1 x 3 x     Stool Occurrence  1 x              Physical Exam  Constitutional:       Appearance: He is well-developed.   HENT:      Head: Normocephalic and atraumatic.      Nose:      Comments: NG with dark bilious fluid in canister  Eyes:      Conjunctiva/sclera: Conjunctivae normal.      Pupils: Pupils are equal, round, and reactive to light.   Neck:      Thyroid: No thyromegaly.   Cardiovascular:      Rate and Rhythm: Normal rate.   Pulmonary:      Effort: Pulmonary effort is normal. No respiratory distress.   Abdominal:      Comments: Soft, non-distended, appropriate post op TTP. Midline incision c/d/I without erythema or drainage.   Musculoskeletal:         General: No tenderness. Normal range of motion.      Cervical back: Normal range of motion.   Skin:     General: Skin is warm and dry.      Capillary Refill: Capillary refill takes less than 2 seconds.          Significant Labs:  I have reviewed all pertinent lab results within the past 24 hours.  CBC:   Recent Labs   Lab 03/19/25  0550   WBC 6.87   RBC 2.68*   HGB 8.2*   HCT 25.7*   *   MCV 96   MCH 30.6   MCHC 31.9*     BMP:   Recent Labs   Lab 03/19/25  0550   *      K 3.5      CO2 19*   BUN 13   CREATININE 0.9   CALCIUM 8.4*   MG 2.1     CMP:   Recent Labs   Lab 03/19/25  0550   *   CALCIUM 8.4*   ALBUMIN 2.7*   PROT 5.0*      K 3.5   CO2 19*      BUN 13   CREATININE 0.9   ALKPHOS 62   ALT 20   AST 25   BILITOT 1.0       Significant Diagnostics:  I have reviewed all pertinent imaging results/findings within the past 24 hours.

## 2025-03-19 NOTE — PLAN OF CARE
03/19/25 0837   Rounds   Attendance Provider;Physical therapist;Occupational therapist;;Charge nurse   Discharge Plan A Home with family   Why the patient remains in the hospital Requires continued medical care   Transition of Care Barriers None

## 2025-03-19 NOTE — ANESTHESIA POSTPROCEDURE EVALUATION
Anesthesia Post Evaluation    Patient: Jorgito Arreaga    Procedure(s) Performed: Procedure(s) (LRB):  LAPAROTOMY, EXPLORATORY (N/A)  APPLICATION, WOUND VAC (N/A)  BLOCK, TRANSVERSUS ABDOMINIS PLANE (N/A)    Final Anesthesia Type: general      Patient location during evaluation: PACU  Patient participation: Yes- Able to Participate  Level of consciousness: awake and alert and oriented  Post-procedure vital signs: reviewed and stable  Pain management: adequate  Airway patency: patent  ALEXANDREA mitigation strategies: Verification of full reversal of neuromuscular block  PONV status at discharge: No PONV  Anesthetic complications: no      Cardiovascular status: blood pressure returned to baseline and hemodynamically stable  Respiratory status: unassisted  Hydration status: euvolemic  Follow-up not needed.              Vitals Value Taken Time   /92 03/16/25 07:25   Temp 36.8 °C (98.2 °F) 03/16/25 07:21   Pulse 90 03/16/25 07:20   Resp 21 03/16/25 07:21   SpO2 97 % 03/16/25 07:20   Vitals shown include unfiled device data.      Event Time   Out of Recovery 03/16/2025 07:27:11         Pain/Marely Score: Pain Rating Prior to Med Admin: 6 (3/18/2025 11:23 AM)  Pain Rating Post Med Admin: 0 (3/18/2025 11:53 AM)  Marely Score: 5 (3/17/2025 11:00 AM)

## 2025-03-19 NOTE — ASSESSMENT & PLAN NOTE
POD3 status post exploratory laparotomy with reduction of small bowel volvulus  POD2 s/p ex lap with well perfused bowel amenable to abdominal wall closure    - continue NG tube, NPO since patient had n/v overnight. If continued bowel function and no further n/v, possibly start diet soon  - OOB, ambulation encouraged  - incentive sandi  - no need for abx from surgical standpoint

## 2025-03-19 NOTE — DISCHARGE INSTRUCTIONS
Dear Jorgito,    Thank you for coming to [facility].  Year here with an intestinal obstruction requiring surgery to fix it.  You have improved.  We strongly suggest following up with your primary care provider, Vonda Elliott NP, within one week of discharge. Please return to taking a sliding scale of insulin at home.  Please continue to check your blood sugars at home.     For your wound care, we recommend daily wet to dry dressings.  This means soaking gauze and saline, placing it over the area of open wound, and then on top of that placing either dry gauze, abdominal pad, or other layer and then taping that down.  You can leave that in place for 24 hours and replace it daily.  Do not submerge the wound in water.  You can rinse it gently with warm water.  We will provide those supplies on discharge.  We are scheduling your follow up appointment in 2 weeks.    It was a pleasure taking care of you, and we wish you the best!

## 2025-03-19 NOTE — ASSESSMENT & PLAN NOTE
Patient's blood pressure range in the last 24 hours was: BP  Min: 95/72  Max: 161/110.The patient's inpatient anti-hypertensive regimen is listed below:  Current Antihypertensives  hydrALAZINE injection 10 mg, Every 6 hours PRN, Intravenous    Plan  -  BP labile  - hold antihypertensive medications for now

## 2025-03-19 NOTE — ASSESSMENT & PLAN NOTE
Patient's most recent potassium results are listed below.   Recent Labs     03/17/25  0407 03/18/25  0430 03/19/25  0550   K 3.8 3.2* 3.5     Plan  - Replete potassium per protocol  - Monitor potassium Daily  - Patient's hypokalemia is stable  -

## 2025-03-19 NOTE — HPI
"Pt is a 58yo M who presents to [facility] ED with abdominal pain, nausea and vomiting for 24 hours. Reports it was sudden in onset. He has never had pain like this before. Has recently heard "rumblings" in his stomach but has otherwise tolerated a diet and has been having normal bowel function. Pain not improved in the ED despite opiate analgesia     In ED patient WBC 3k, LA 4.8 (4.3 after resuscitation). CT concerning for high grade bowel obstruction with ischemia and twisting of the root of the mesentery.  "

## 2025-03-19 NOTE — ASSESSMENT & PLAN NOTE
The patients 3 most recent labs are listed below.  Recent Labs     03/17/25  1352 03/18/25  0430 03/19/25  0550   PLT 62* 63* 110*     Plan  - Will transfuse if platelet count is <50k (if undergoing surgical procedure or have active bleeding).  -

## 2025-03-19 NOTE — PROGRESS NOTES
[facility] Surg  General Surgery  Progress Note    Subjective:     Interval History: reports episode of n/v overnight. Passing flatus and bm. Pain well controlled    Medications:  Continuous Infusions:  Scheduled Meds:   folic acid (FOLVITE) IVPB  1 mg Intravenous Daily    mupirocin   Nasal BID    pantoprazole  40 mg Intravenous BID     PRN Meds:  Current Facility-Administered Medications:     0.9%  NaCl infusion (for blood administration), , Intravenous, Q24H PRN    0.9%  NaCl infusion (for blood administration), , Intravenous, Q24H PRN    dextrose 50%, 12.5 g, Intravenous, PRN    dextrose 50%, 25 g, Intravenous, PRN    glucagon (human recombinant), 1 mg, Intramuscular, PRN    hydrALAZINE, 10 mg, Intravenous, Q6H PRN    influenza, 0.5 mL, Intramuscular, Prior to discharge    insulin aspart U-100, 0-10 Units, Subcutaneous, Q4H PRN    lorazepam, 1 mg, Intravenous, Q4H PRN    lorazepam, 2 mg, Intravenous, PRN    morphine, 2 mg, Intravenous, Q4H PRN    ondansetron, 4 mg, Intravenous, Q6H PRN    pneumoc 20-guanako conj-dip cr(PF), 0.5 mL, Intramuscular, Prior to discharge    sodium chloride 0.9%, 10 mL, Intravenous, PRN     Review of patient's allergies indicates:  No Known Allergies  Objective:     Vital Signs (Most Recent):  Temp: 98.4 °F (36.9 °C) (03/19/25 0702)  Pulse: 80 (03/19/25 0759)  Resp: 18 (03/19/25 0857)  BP: 125/87 (03/19/25 0702)  SpO2: 96 % (03/19/25 0702) Vital Signs (24h Range):  Temp:  [98.1 °F (36.7 °C)-99.4 °F (37.4 °C)] 98.4 °F (36.9 °C)  Pulse:  [] 80  Resp:  [13-34] 18  SpO2:  [89 %-97 %] 96 %  BP: ()/() 125/87     Weight: 68 kg (150 lb)  Body mass index is 24.21 kg/m².    Intake/Output - Last 3 Shifts         03/17 0700 03/18 0659 03/18 0700 03/19 0659 03/19 0700 03/20 0659    I.V. (mL/kg) 50 (0.7) 63.3 (0.9)     Blood 364.6      IV Piggyback 1365.8 460.8     Total Intake(mL/kg) 1780.4 (26.2) 524.1 (7.7)     Urine (mL/kg/hr) 400 (0.2) 100 (0.1)     Drains 250 600     Other 50       Stool  0     Total Output 700 700     Net +1080.4 -175.9            Urine Occurrence 1 x 3 x     Stool Occurrence  1 x              Physical Exam  Constitutional:       Appearance: He is well-developed.   HENT:      Head: Normocephalic and atraumatic.      Nose:      Comments: NG with dark bilious fluid in canister  Eyes:      Conjunctiva/sclera: Conjunctivae normal.      Pupils: Pupils are equal, round, and reactive to light.   Neck:      Thyroid: No thyromegaly.   Cardiovascular:      Rate and Rhythm: Normal rate.   Pulmonary:      Effort: Pulmonary effort is normal. No respiratory distress.   Abdominal:      Comments: Soft, non-distended, appropriate post op TTP. Midline incision c/d/I without erythema or drainage.   Musculoskeletal:         General: No tenderness. Normal range of motion.      Cervical back: Normal range of motion.   Skin:     General: Skin is warm and dry.      Capillary Refill: Capillary refill takes less than 2 seconds.          Significant Labs:  I have reviewed all pertinent lab results within the past 24 hours.  CBC:   Recent Labs   Lab 03/19/25  0550   WBC 6.87   RBC 2.68*   HGB 8.2*   HCT 25.7*   *   MCV 96   MCH 30.6   MCHC 31.9*     BMP:   Recent Labs   Lab 03/19/25  0550   *      K 3.5      CO2 19*   BUN 13   CREATININE 0.9   CALCIUM 8.4*   MG 2.1     CMP:   Recent Labs   Lab 03/19/25  0550   *   CALCIUM 8.4*   ALBUMIN 2.7*   PROT 5.0*      K 3.5   CO2 19*      BUN 13   CREATININE 0.9   ALKPHOS 62   ALT 20   AST 25   BILITOT 1.0       Significant Diagnostics:  I have reviewed all pertinent imaging results/findings within the past 24 hours.  Assessment/Plan:     * Small bowel obstruction  POD3 status post exploratory laparotomy with reduction of small bowel volvulus  POD2 s/p ex lap with well perfused bowel amenable to abdominal wall closure    - continue NG tube, NPO since patient had n/v overnight. If continued bowel function and no  further n/v, possibly start diet soon  - OOB, ambulation encouraged  - incentive sandi  - no need for abx from surgical standpoint    Type 2 diabetes mellitus with hyperglycemia, with long-term current use of insulin  Per critical Care Medicine    Essential hypertension  Per hospital Medicine    Alcohol abuse  Per hospital medicine    Gastroesophageal reflux disease  Per hospital Medicine        Diane Bhatti PA-C  General Surgery  [facility]

## 2025-03-19 NOTE — ASSESSMENT & PLAN NOTE
Patient's FSGs are uncontrolled due to hyperglycemia on current medication regimen.  Last A1c reviewed-   Lab Results   Component Value Date    HGBA1C 8.3 (H) 03/16/2025     Most recent fingerstick glucose reviewed-   Recent Labs   Lab 03/18/25  1622 03/18/25  2248 03/19/25  0438 03/19/25  1056   POCTGLUCOSE 189* 175* 196* 166*     Current correctional scale  Medium  Maintain anti-hyperglycemic dose as follows-   Antihyperglycemics (From admission, onward)      Start     Stop Route Frequency Ordered    03/17/25 1509  insulin aspart U-100 pen 0-10 Units         -- SubQ Every 4 hours PRN 03/17/25 1410          Hold Oral hypoglycemics while patient is in the hospital.

## 2025-03-19 NOTE — SUBJECTIVE & OBJECTIVE
Interval History: f/u bowel obstruction  patient passing flatus and had small bowel movement. Reported episode of emesis, clear in appearance    Review of Systems  Objective:     Vital Signs (Most Recent):  Temp: 98.4 °F (36.9 °C) (03/19/25 1211)  Pulse: 100 (03/19/25 1211)  Resp: 18 (03/19/25 1211)  BP: (!) 151/102 (03/19/25 1211)  SpO2: 95 % (03/19/25 1211) Vital Signs (24h Range):  Temp:  [98.2 °F (36.8 °C)-99.4 °F (37.4 °C)] 98.4 °F (36.9 °C)  Pulse:  [] 100  Resp:  [13-34] 18  SpO2:  [89 %-97 %] 95 %  BP: ()/() 151/102     Weight: 68 kg (150 lb)  Body mass index is 24.21 kg/m².    Intake/Output Summary (Last 24 hours) at 3/19/2025 1307  Last data filed at 3/19/2025 0630  Gross per 24 hour   Intake 65.56 ml   Output 700 ml   Net -634.44 ml         Physical Exam  HENT:      Head: Normocephalic and atraumatic.      Comments: NG in place  Cardiovascular:      Rate and Rhythm: Normal rate and regular rhythm.      Heart sounds: No murmur heard.  Pulmonary:      Effort: Pulmonary effort is normal. No respiratory distress.      Breath sounds: Normal breath sounds. No wheezing.   Abdominal:      General: Bowel sounds are normal. There is no distension.      Palpations: Abdomen is soft.      Tenderness: There is no abdominal tenderness.   Musculoskeletal:         General: No swelling.   Skin:     General: Skin is warm and dry.   Neurological:      Mental Status: He is alert and oriented to person, place, and time. Mental status is at baseline.               Significant Labs: All pertinent labs within the past 24 hours have been reviewed.  Recent Lab Results  (Last 5 results in the past 24 hours)        03/19/25  1056   03/19/25  0550   03/19/25  0438   03/18/25  2248   03/18/25  1622        Albumin   2.7             ALP   62             ALT   20             Anion Gap   15             AST   25             Baso #   0.02             Basophil %   0.3             BILIRUBIN TOTAL   1.0  Comment: For infants  and newborns, interpretation of results should be based  on gestational age, weight and in agreement with clinical  observations.    Premature Infant recommended reference ranges:  Up to 24 hours.............<8.0 mg/dL  Up to 48 hours............<12.0 mg/dL  3-5 days..................<15.0 mg/dL  6-29 days.................<15.0 mg/dL               BUN   13             Calcium   8.4             Chloride   109             CO2   19             Creatinine   0.9             Differential Method   Automated             eGFR   >60             Eos #   0.0             Eos %   0.1             Glucose   173             Gran # (ANC)   5.2             Gran %   75.7             Hematocrit   25.7             Hemoglobin   8.2             Immature Grans (Abs)   0.04  Comment: Mild elevation in immature granulocytes is non specific and   can be seen in a variety of conditions including stress response,   acute inflammation, trauma and pregnancy. Correlation with other   laboratory and clinical findings is essential.               Immature Granulocytes   0.6             Lymph #   0.9             Lymph %   13.1             Magnesium    2.1             MCH   30.6             MCHC   31.9             MCV   96  Comment: Reviewed by Technologist.             Mono #   0.7             Mono %   10.2             MPV   12.0             nRBC   0             Phosphorus Level   2.4             Platelet Estimate   Appears normal             Platelet Count   110  Comment: Reviewed by Technologist.             POCT Glucose 166     196   175   189       Potassium   3.5             PROTEIN TOTAL   5.0             RBC   2.68             RDW   14.8             Sodium   143             WBC   6.87                                    Significant Imaging: I have reviewed all pertinent imaging results/findings within the past 24 hours.

## 2025-03-19 NOTE — PLAN OF CARE
Problem: Adult Inpatient Plan of Care  Goal: Plan of Care Review  Outcome: Progressing  Goal: Patient-Specific Goal (Individualized)  Outcome: Progressing  Goal: Absence of Hospital-Acquired Illness or Injury  Outcome: Progressing  Goal: Optimal Comfort and Wellbeing  Outcome: Progressing  Goal: Readiness for Transition of Care  Outcome: Progressing     Problem: Infection  Goal: Absence of Infection Signs and Symptoms  Outcome: Progressing     Problem: Sepsis/Septic Shock  Goal: Optimal Coping  Outcome: Progressing  Goal: Absence of Bleeding  Outcome: Progressing  Goal: Blood Glucose Level Within Targeted Range  Outcome: Progressing  Goal: Absence of Infection Signs and Symptoms  Outcome: Progressing  Goal: Optimal Nutrition Intake  Outcome: Progressing     Problem: Diabetes Comorbidity  Goal: Blood Glucose Level Within Targeted Range  Outcome: Progressing     Problem: Wound  Goal: Optimal Coping  Outcome: Progressing  Goal: Optimal Functional Ability  Outcome: Progressing  Goal: Absence of Infection Signs and Symptoms  Outcome: Progressing  Goal: Improved Oral Intake  Outcome: Progressing  Goal: Optimal Pain Control and Function  Outcome: Progressing  Goal: Skin Health and Integrity  Outcome: Progressing  Goal: Optimal Wound Healing  Outcome: Progressing     Problem: Skin Injury Risk Increased  Goal: Skin Health and Integrity  Outcome: Progressing     Problem: Fall Injury Risk  Goal: Absence of Fall and Fall-Related Injury  Outcome: Progressing

## 2025-03-20 LAB
ALBUMIN SERPL BCP-MCNC: 2.8 G/DL (ref 3.5–5.2)
ALP SERPL-CCNC: 62 U/L (ref 40–150)
ALT SERPL W/O P-5'-P-CCNC: 18 U/L (ref 10–44)
ANION GAP SERPL CALC-SCNC: 20 MMOL/L (ref 8–16)
AST SERPL-CCNC: 22 U/L (ref 10–40)
BASOPHILS # BLD AUTO: 0.02 K/UL (ref 0–0.2)
BASOPHILS NFR BLD: 0.4 % (ref 0–1.9)
BILIRUB SERPL-MCNC: 1 MG/DL (ref 0.1–1)
BLD PROD TYP BPU: NORMAL
BLOOD UNIT EXPIRATION DATE: NORMAL
BLOOD UNIT TYPE CODE: 5100
BLOOD UNIT TYPE: NORMAL
BUN SERPL-MCNC: 12 MG/DL (ref 6–20)
CALCIUM SERPL-MCNC: 8.5 MG/DL (ref 8.7–10.5)
CHLORIDE SERPL-SCNC: 109 MMOL/L (ref 95–110)
CO2 SERPL-SCNC: 16 MMOL/L (ref 23–29)
CODING SYSTEM: NORMAL
CREAT SERPL-MCNC: 0.9 MG/DL (ref 0.5–1.4)
CROSSMATCH INTERPRETATION: NORMAL
DIFFERENTIAL METHOD BLD: ABNORMAL
DISPENSE STATUS: NORMAL
EOSINOPHIL # BLD AUTO: 0 K/UL (ref 0–0.5)
EOSINOPHIL NFR BLD: 0.8 % (ref 0–8)
ERYTHROCYTE [DISTWIDTH] IN BLOOD BY AUTOMATED COUNT: 15.1 % (ref 11.5–14.5)
EST. GFR  (NO RACE VARIABLE): >60 ML/MIN/1.73 M^2
GLUCOSE SERPL-MCNC: 192 MG/DL (ref 70–110)
HCT VFR BLD AUTO: 26.3 % (ref 40–54)
HGB BLD-MCNC: 8.1 G/DL (ref 14–18)
IMM GRANULOCYTES # BLD AUTO: 0.02 K/UL (ref 0–0.04)
IMM GRANULOCYTES NFR BLD AUTO: 0.4 % (ref 0–0.5)
LYMPHOCYTES # BLD AUTO: 0.8 K/UL (ref 1–4.8)
LYMPHOCYTES NFR BLD: 14.5 % (ref 18–48)
MAGNESIUM SERPL-MCNC: 1.7 MG/DL (ref 1.6–2.6)
MCH RBC QN AUTO: 30.8 PG (ref 27–31)
MCHC RBC AUTO-ENTMCNC: 30.8 G/DL (ref 32–36)
MCV RBC AUTO: 100 FL (ref 82–98)
MONOCYTES # BLD AUTO: 0.7 K/UL (ref 0.3–1)
MONOCYTES NFR BLD: 13.4 % (ref 4–15)
NEUTROPHILS # BLD AUTO: 3.6 K/UL (ref 1.8–7.7)
NEUTROPHILS NFR BLD: 70.5 % (ref 38–73)
NRBC BLD-RTO: 0 /100 WBC
NUM UNITS TRANS PACKED RBC: NORMAL
PHOSPHATE SERPL-MCNC: 2.7 MG/DL (ref 2.7–4.5)
PLATELET # BLD AUTO: 157 K/UL (ref 150–450)
PLATELET BLD QL SMEAR: ABNORMAL
PMV BLD AUTO: 10.7 FL (ref 9.2–12.9)
POCT GLUCOSE: 150 MG/DL (ref 70–110)
POCT GLUCOSE: 177 MG/DL (ref 70–110)
POCT GLUCOSE: 194 MG/DL (ref 70–110)
POCT GLUCOSE: 200 MG/DL (ref 70–110)
POCT GLUCOSE: 232 MG/DL (ref 70–110)
POTASSIUM SERPL-SCNC: 3.4 MMOL/L (ref 3.5–5.1)
PROT SERPL-MCNC: 5 G/DL (ref 6–8.4)
RBC # BLD AUTO: 2.63 M/UL (ref 4.6–6.2)
SODIUM SERPL-SCNC: 145 MMOL/L (ref 136–145)
WBC # BLD AUTO: 5.16 K/UL (ref 3.9–12.7)

## 2025-03-20 PROCEDURE — 36415 COLL VENOUS BLD VENIPUNCTURE: CPT | Performed by: STUDENT IN AN ORGANIZED HEALTH CARE EDUCATION/TRAINING PROGRAM

## 2025-03-20 PROCEDURE — 63600175 PHARM REV CODE 636 W HCPCS

## 2025-03-20 PROCEDURE — 99024 POSTOP FOLLOW-UP VISIT: CPT | Mod: ,,,

## 2025-03-20 PROCEDURE — 25000003 PHARM REV CODE 250

## 2025-03-20 PROCEDURE — 21400001 HC TELEMETRY ROOM

## 2025-03-20 PROCEDURE — 25000003 PHARM REV CODE 250: Performed by: COLON & RECTAL SURGERY

## 2025-03-20 PROCEDURE — 85025 COMPLETE CBC W/AUTO DIFF WBC: CPT | Performed by: STUDENT IN AN ORGANIZED HEALTH CARE EDUCATION/TRAINING PROGRAM

## 2025-03-20 PROCEDURE — 84100 ASSAY OF PHOSPHORUS: CPT | Performed by: STUDENT IN AN ORGANIZED HEALTH CARE EDUCATION/TRAINING PROGRAM

## 2025-03-20 PROCEDURE — 99024 POSTOP FOLLOW-UP VISIT: CPT | Mod: ,,, | Performed by: STUDENT IN AN ORGANIZED HEALTH CARE EDUCATION/TRAINING PROGRAM

## 2025-03-20 PROCEDURE — 63600175 PHARM REV CODE 636 W HCPCS: Performed by: INTERNAL MEDICINE

## 2025-03-20 PROCEDURE — 63600175 PHARM REV CODE 636 W HCPCS: Performed by: SPECIALIST

## 2025-03-20 PROCEDURE — 63600175 PHARM REV CODE 636 W HCPCS: Performed by: HOSPITALIST

## 2025-03-20 PROCEDURE — 80053 COMPREHEN METABOLIC PANEL: CPT | Performed by: STUDENT IN AN ORGANIZED HEALTH CARE EDUCATION/TRAINING PROGRAM

## 2025-03-20 PROCEDURE — 83735 ASSAY OF MAGNESIUM: CPT | Performed by: STUDENT IN AN ORGANIZED HEALTH CARE EDUCATION/TRAINING PROGRAM

## 2025-03-20 RX ORDER — SODIUM CHLORIDE, SODIUM LACTATE, POTASSIUM CHLORIDE, CALCIUM CHLORIDE 600; 310; 30; 20 MG/100ML; MG/100ML; MG/100ML; MG/100ML
INJECTION, SOLUTION INTRAVENOUS CONTINUOUS
Status: DISCONTINUED | OUTPATIENT
Start: 2025-03-20 | End: 2025-03-24

## 2025-03-20 RX ADMIN — INSULIN ASPART 1 UNITS: 100 INJECTION, SOLUTION INTRAVENOUS; SUBCUTANEOUS at 09:03

## 2025-03-20 RX ADMIN — SODIUM CHLORIDE, POTASSIUM CHLORIDE, SODIUM LACTATE AND CALCIUM CHLORIDE: 600; 310; 30; 20 INJECTION, SOLUTION INTRAVENOUS at 02:03

## 2025-03-20 RX ADMIN — HYDRALAZINE HYDROCHLORIDE 10 MG: 20 INJECTION, SOLUTION INTRAMUSCULAR; INTRAVENOUS at 05:03

## 2025-03-20 RX ADMIN — MUPIROCIN: 20 OINTMENT TOPICAL at 08:03

## 2025-03-20 RX ADMIN — PANTOPRAZOLE SODIUM 40 MG: 40 INJECTION, POWDER, FOR SOLUTION INTRAVENOUS at 08:03

## 2025-03-20 RX ADMIN — MUPIROCIN: 20 OINTMENT TOPICAL at 09:03

## 2025-03-20 RX ADMIN — PANTOPRAZOLE SODIUM 40 MG: 40 INJECTION, POWDER, FOR SOLUTION INTRAVENOUS at 09:03

## 2025-03-20 RX ADMIN — MORPHINE SULFATE 2 MG: 4 INJECTION INTRAVENOUS at 09:03

## 2025-03-20 RX ADMIN — INSULIN ASPART 4 UNITS: 100 INJECTION, SOLUTION INTRAVENOUS; SUBCUTANEOUS at 11:03

## 2025-03-20 RX ADMIN — SODIUM CHLORIDE: 4.5 INJECTION, SOLUTION INTRAVENOUS at 04:03

## 2025-03-20 RX ADMIN — FOLIC ACID 1 MG: 5 INJECTION, SOLUTION INTRAMUSCULAR; INTRAVENOUS; SUBCUTANEOUS at 08:03

## 2025-03-20 RX ADMIN — INSULIN ASPART 1 UNITS: 100 INJECTION, SOLUTION INTRAVENOUS; SUBCUTANEOUS at 01:03

## 2025-03-20 NOTE — PLAN OF CARE
Discussed poc with pt, pt verbalized understanding    Purposeful rounding every 2hours    VS wnl  Cardiac monitoring in use, pt is NSR, tele monitor # [id number]  Blood glucose monitoring   Fall precautions in place, remains injury free  Pt denies c/o pain    IVFs  Accurate I&Os  Bed locked at lowest position  Call light within reach    Chart check complete  Will cont with POC

## 2025-03-20 NOTE — ASSESSMENT & PLAN NOTE
Patient's FSGs are uncontrolled due to hyperglycemia on current medication regimen.  Last A1c reviewed-   Lab Results   Component Value Date    HGBA1C 8.3 (H) 03/16/2025     Most recent fingerstick glucose reviewed-   Recent Labs   Lab 03/19/25  2126 03/20/25  0135 03/20/25  0533 03/20/25  1154   POCTGLUCOSE 179* 194* 200* 232*     Current correctional scale  Medium  Maintain anti-hyperglycemic dose as follows-   Antihyperglycemics (From admission, onward)      Start     Stop Route Frequency Ordered    03/17/25 1509  insulin aspart U-100 pen 0-10 Units         -- SubQ Every 4 hours PRN 03/17/25 1410          Hold Oral hypoglycemics while patient is in the hospital.

## 2025-03-20 NOTE — PLAN OF CARE
Discussed poc with pt, pt verbalized understanding    Purposeful rounding every 2hours    VSS  Cardiac monitoring in use, pt is NSR  Fall precautions in place, remains injury free  Pt denies c/o nausea and vomiting  Pain being managed by PRN pain  medication     IVFs  Accurate I&Os  Abx given as prescribed  Bed locked at lowest position  Call light within reach    NGT slightly dislodged from stomach on shift. Advanced NGT, stat chest xray done, placement re-verified    Chart check complete  Will cont with POC

## 2025-03-20 NOTE — PROGRESS NOTES
"[facility] Medicine  Progress Note    Patient Name: Jorgito Arreaga  MRN: [id number]  Patient Class: IP- Inpatient   Admission Date: 3/15/2025  Length of Stay: 4 days  Attending Physician: Nette Davis MD  Primary Care Provider: Vonda Elliott FNP        Subjective     Principal Problem:Small bowel obstruction        HPI:  59-year-old male with known medical problems of alcohol abuse, GERD, pancreatitis, diabetes, transaminitis, TIA, HTN, HLD, inguinal hernia repair 2022, adenomatous polyps of transverse colon s/p polypectomy 2018.   He presented to [facility] ED for complaints of abdominal pain w/n/v onset shortly before arrival to ED. He was reportedly hypotensive w/EMS and was given a 500cc bolus, zofran, and fentanyl en route. He is still drinking beer with last being yesterday before presentation to ED. His appetite has been poor x several days, he has not eaten since Friday. He denied any fever, chills, cough, chest pain, shortness of breath, urinary symptoms.      ED workup showed WBC 3.08 (chronically in 3's), hgb 10.1, platelets 99, K 3.1, CO2 19, AG 20, normal Cr, glucose 245, Mg 1.3, , ALT 63, normal lipase, normal t-bili. Lactic acid 4.8, serum ethanol 276.   CT a/p w/contrast showed high-grade SBO with c/f decreased blood flow and ischemia-"dilated loops of small bowel. Findings compatible with a high-grade small-bowel obstruction. Some loops of distended small bowel demonstrate equivocal decreased mural enhancement. There is an equivocal finding however a stercoral enteritis based on distention is not excluded. No pneumatosis portal or mesenteric venous gas is identified. There was a twist about the root of the small bowel mesentery which results in focal high-grade narrowing of the superior mesenteric artery.   In ED, he had a labile blood pressure and ultimately required initiation of vasopressor support and CVL placement. Surgery consulted w/plans for OR urgently " tonight. Patient transferred ED to ED for expeditious definitive care. On arrival to ED, patient w/acceptable BP, however HR in 150's, sinus tachycardia, likely due to levophed. He was able to be weaned off of levophed with additional IVF. Given his need for vasopressor support, unstable hemodynamics, and high-risk surgery, and plan to leave him in discontinuity, he will be admitted to ICU post-op.   Patient taken to OR on 3/16/25, volvulus noted. He underwent lysis of adhesions. His abdomen ws temporarily closed with ABThera wound vac. He went back to the OR on 3/17/25 for a second look to ensure bowel viable. He was noted to have well perfused bowel. Abdomen closed. He was hemodynamically stable. Patient acceptable for the floor. Hospital medicine consulted. Patient reports difficult to talk with NG in place.    Overview/Hospital Course:  No notes on file    Interval History: f/u  volvulus no vomiting overnight but NG output dark patient hopeful for something to drink    Review of Systems  Objective:     Vital Signs (Most Recent):  Temp: 98.3 °F (36.8 °C) (03/20/25 1207)  Pulse: 106 (03/20/25 1207)  Resp: 18 (03/20/25 1207)  BP: (!) 122/91 (03/20/25 1207)  SpO2: 97 % (03/20/25 1207) Vital Signs (24h Range):  Temp:  [97.9 °F (36.6 °C)-98.9 °F (37.2 °C)] 98.3 °F (36.8 °C)  Pulse:  [] 106  Resp:  [17-18] 18  SpO2:  [95 %-98 %] 97 %  BP: (122-190)/() 122/91     Weight: 68 kg (150 lb)  Body mass index is 24.21 kg/m².    Intake/Output Summary (Last 24 hours) at 3/20/2025 1237  Last data filed at 3/20/2025 0535  Gross per 24 hour   Intake 99.51 ml   Output 950 ml   Net -850.49 ml         Physical Exam  HENT:      Head: Normocephalic and atraumatic.   Cardiovascular:      Rate and Rhythm: Normal rate and regular rhythm.      Heart sounds: No murmur heard.  Pulmonary:      Effort: Pulmonary effort is normal. No respiratory distress.      Breath sounds: Normal breath sounds. No wheezing.   Abdominal:       General: Bowel sounds are normal.      Palpations: Abdomen is soft.      Comments: Midline dressing in place small area of blood noted   Musculoskeletal:         General: No swelling.   Skin:     General: Skin is warm and dry.   Neurological:      Mental Status: He is alert and oriented to person, place, and time. Mental status is at baseline.               Significant Labs: All pertinent labs within the past 24 hours have been reviewed.  Recent Lab Results  (Last 5 results in the past 24 hours)        03/20/25  1154   03/20/25  0540   03/20/25  0533   03/20/25  0135   03/19/25  2126        Albumin   2.8             ALP   62             ALT   18             Anion Gap   20             AST   22             Baso #   0.02             Basophil %   0.4             BILIRUBIN TOTAL   1.0  Comment: For infants and newborns, interpretation of results should be based  on gestational age, weight and in agreement with clinical  observations.    Premature Infant recommended reference ranges:  Up to 24 hours.............<8.0 mg/dL  Up to 48 hours............<12.0 mg/dL  3-5 days..................<15.0 mg/dL  6-29 days.................<15.0 mg/dL               BUN   12             Calcium   8.5             Chloride   109             CO2   16             Creatinine   0.9             Differential Method   Automated             eGFR   >60             Eos #   0.0             Eos %   0.8             Glucose   192             Gran # (ANC)   3.6             Gran %   70.5             Hematocrit   26.3             Hemoglobin   8.1             Immature Grans (Abs)   0.02  Comment: Mild elevation in immature granulocytes is non specific and   can be seen in a variety of conditions including stress response,   acute inflammation, trauma and pregnancy. Correlation with other   laboratory and clinical findings is essential.               Immature Granulocytes   0.4             Lymph #   0.8             Lymph %   14.5             Magnesium     1.7             MCH   30.8             MCHC   30.8             MCV   100             Mono #   0.7             Mono %   13.4             MPV   10.7             nRBC   0             Phosphorus Level   2.7             Platelet Estimate   Appears normal  Comment: Reviewed by Technologist.             Platelet Count   157             POCT Glucose 232     200   194   179       Potassium   3.4             PROTEIN TOTAL   5.0             RBC   2.63             RDW   15.1             Sodium   145             WBC   5.16                                    Significant Imaging: I have reviewed all pertinent imaging results/findings within the past 24 hours.      Assessment & Plan  Small bowel obstruction  -due to volvulus and adhesions  -s/p DELROY   --per surgery  -currently with NG in place  -continue NPO for now, if nausea/vomiting resolves consider clear liquids tomorrow    Gastroesophageal reflux disease  -Continue IV PPI    Alcohol abuse  -prn Ativan  -monitor for signs of withdrawal    Transaminitis  -resolved    Essential hypertension  Patient's blood pressure range in the last 24 hours was: BP  Min: 122/91  Max: 190/105.The patient's inpatient anti-hypertensive regimen is listed below:  Current Antihypertensives  hydrALAZINE injection 10 mg, Every 6 hours PRN, Intravenous    Plan  -  BP labile  - hold antihypertensive medications for now  Type 2 diabetes mellitus with hyperglycemia, with long-term current use of insulin  Patient's FSGs are uncontrolled due to hyperglycemia on current medication regimen.  Last A1c reviewed-   Lab Results   Component Value Date    HGBA1C 8.3 (H) 03/16/2025     Most recent fingerstick glucose reviewed-   Recent Labs   Lab 03/19/25  2126 03/20/25  0135 03/20/25  0533 03/20/25  1154   POCTGLUCOSE 179* 194* 200* 232*     Current correctional scale  Medium  Maintain anti-hyperglycemic dose as follows-   Antihyperglycemics (From admission, onward)      Start     Stop Route Frequency Ordered     03/17/25 1509  insulin aspart U-100 pen 0-10 Units         -- SubQ Every 4 hours PRN 03/17/25 1410          Hold Oral hypoglycemics while patient is in the hospital.      Hypokalemia  Patient's most recent potassium results are listed below.   Recent Labs     03/18/25  0430 03/19/25  0550 03/20/25  0540   K 3.2* 3.5 3.4*     Plan  - Replete potassium per protocol  - Monitor potassium Daily  - Patient's hypokalemia is stable  -   Thrombocytopenia   The patients 3 most recent labs are listed below.  Recent Labs     03/18/25  0430 03/19/25  0550 03/20/25  0540   PLT 63* 110* 157     Plan  - resolved  -     VTE Risk Mitigation (From admission, onward)           Ordered     IP VTE LOW RISK PATIENT  Once         03/16/25 0401     Place sequential compression device  Until discontinued         03/16/25 0401                    Discharge Planning   ARON:      Code Status: Full Code   Medical Readiness for Discharge Date:   Discharge Plan A: Home with family                        Nette Davis MD  Department of Hospital Medicine   [facility] Med [facility]

## 2025-03-20 NOTE — ASSESSMENT & PLAN NOTE
Patient's most recent potassium results are listed below.   Recent Labs     03/18/25  0430 03/19/25  0550 03/20/25  0540   K 3.2* 3.5 3.4*     Plan  - Replete potassium per protocol  - Monitor potassium Daily  - Patient's hypokalemia is stable  -

## 2025-03-20 NOTE — ASSESSMENT & PLAN NOTE
Patient's blood pressure range in the last 24 hours was: BP  Min: 122/91  Max: 190/105.The patient's inpatient anti-hypertensive regimen is listed below:  Current Antihypertensives  hydrALAZINE injection 10 mg, Every 6 hours PRN, Intravenous    Plan  -  BP labile  - hold antihypertensive medications for now

## 2025-03-20 NOTE — SUBJECTIVE & OBJECTIVE
Interval History: passing flatus and having non-bloody bowel movements. No further n/v. Non-distended. NG with dark bilious output. Poor ambulation    Medications:  Continuous Infusions:   0.45% NaCl   Intravenous Continuous 50 mL/hr at 03/20/25 0438 New Bag at 03/20/25 0438     Scheduled Meds:   folic acid (FOLVITE) IVPB  1 mg Intravenous Daily    mupirocin   Nasal BID    pantoprazole  40 mg Intravenous BID     PRN Meds:  Current Facility-Administered Medications:     0.9%  NaCl infusion (for blood administration), , Intravenous, Q24H PRN    0.9%  NaCl infusion (for blood administration), , Intravenous, Q24H PRN    dextrose 50%, 12.5 g, Intravenous, PRN    dextrose 50%, 25 g, Intravenous, PRN    glucagon (human recombinant), 1 mg, Intramuscular, PRN    hydrALAZINE, 10 mg, Intravenous, Q6H PRN    influenza, 0.5 mL, Intramuscular, Prior to discharge    insulin aspart U-100, 0-10 Units, Subcutaneous, Q4H PRN    lorazepam, 1 mg, Intravenous, Q4H PRN    lorazepam, 2 mg, Intravenous, PRN    morphine, 2 mg, Intravenous, Q4H PRN    ondansetron, 4 mg, Intravenous, Q6H PRN    pneumoc 20-guanako conj-dip cr(PF), 0.5 mL, Intramuscular, Prior to discharge    sodium chloride 0.9%, 10 mL, Intravenous, PRN     Review of patient's allergies indicates:  No Known Allergies  Objective:     Vital Signs (Most Recent):  Temp: 98.5 °F (36.9 °C) (03/20/25 0809)  Pulse: 100 (03/20/25 0809)  Resp: 18 (03/20/25 0809)  BP: 138/85 (03/20/25 0809)  SpO2: 97 % (03/20/25 0809) Vital Signs (24h Range):  Temp:  [97.9 °F (36.6 °C)-98.9 °F (37.2 °C)] 98.5 °F (36.9 °C)  Pulse:  [] 100  Resp:  [17-18] 18  SpO2:  [95 %-98 %] 97 %  BP: (138-190)/() 138/85     Weight: 68 kg (150 lb)  Body mass index is 24.21 kg/m².    Intake/Output - Last 3 Shifts         03/18 0700 03/19 0659 03/19 0700 03/20 0659 03/20 0700 03/21 0659    I.V. (mL/kg) 63.3 (0.9)      Blood       IV Piggyback 460.8 99.5     Total Intake(mL/kg) 524.1 (7.7) 99.5 (1.5)     Urine  (mL/kg/hr) 100 (0.1) 0 (0)     Drains 600 950     Other       Stool 0      Total Output 700 950     Net -175.9 -850.5            Urine Occurrence 3 x 2 x     Stool Occurrence 1 x               Physical Exam  Constitutional:       Appearance: He is well-developed.   HENT:      Head: Normocephalic and atraumatic.      Nose:      Comments: NG with dark bilious fluid in canister  Eyes:      Conjunctiva/sclera: Conjunctivae normal.      Pupils: Pupils are equal, round, and reactive to light.   Neck:      Thyroid: No thyromegaly.   Cardiovascular:      Rate and Rhythm: Normal rate.   Pulmonary:      Effort: Pulmonary effort is normal. No respiratory distress.   Abdominal:      Comments: Soft, non-distended, appropriate post op TTP. Midline incision intact with small area of SS oozing from umbilical area, no erythema or purulent drainage   Musculoskeletal:         General: No tenderness. Normal range of motion.      Cervical back: Normal range of motion.   Skin:     General: Skin is warm and dry.      Capillary Refill: Capillary refill takes less than 2 seconds.          Significant Labs:  I have reviewed all pertinent lab results within the past 24 hours.  CBC:   Recent Labs   Lab 03/20/25  0540   WBC 5.16   RBC 2.63*   HGB 8.1*   HCT 26.3*      *   MCH 30.8   MCHC 30.8*     BMP:   Recent Labs   Lab 03/20/25  0540   *      K 3.4*      CO2 16*   BUN 12   CREATININE 0.9   CALCIUM 8.5*   MG 1.7     CMP:   Recent Labs   Lab 03/20/25  0540   *   CALCIUM 8.5*   ALBUMIN 2.8*   PROT 5.0*      K 3.4*   CO2 16*      BUN 12   CREATININE 0.9   ALKPHOS 62   ALT 18   AST 22   BILITOT 1.0       Significant Diagnostics:  I have reviewed all pertinent imaging results/findings within the past 24 hours.

## 2025-03-20 NOTE — ASSESSMENT & PLAN NOTE
POD4 status post exploratory laparotomy with reduction of small bowel volvulus  POD3 s/p ex lap with well perfused bowel amenable to abdominal wall closure    - patient having continued bowel function and no further n/v, however still with dark bilious ng output. Will do clamp trial today - if no n/v, distention, abd pain in 6 hours, can remove NG and start CLD   - OOB, ambulation encouraged  - incentive sandi  - no need for abx from surgical standpoint  - ok to cover incision with gauze/tape to catch any drainage as needed

## 2025-03-20 NOTE — SUBJECTIVE & OBJECTIVE
Interval History: f/u  volvulus no vomiting overnight but NG output dark patient hopeful for something to drink    Review of Systems  Objective:     Vital Signs (Most Recent):  Temp: 98.3 °F (36.8 °C) (03/20/25 1207)  Pulse: 106 (03/20/25 1207)  Resp: 18 (03/20/25 1207)  BP: (!) 122/91 (03/20/25 1207)  SpO2: 97 % (03/20/25 1207) Vital Signs (24h Range):  Temp:  [97.9 °F (36.6 °C)-98.9 °F (37.2 °C)] 98.3 °F (36.8 °C)  Pulse:  [] 106  Resp:  [17-18] 18  SpO2:  [95 %-98 %] 97 %  BP: (122-190)/() 122/91     Weight: 68 kg (150 lb)  Body mass index is 24.21 kg/m².    Intake/Output Summary (Last 24 hours) at 3/20/2025 1237  Last data filed at 3/20/2025 0535  Gross per 24 hour   Intake 99.51 ml   Output 950 ml   Net -850.49 ml         Physical Exam  HENT:      Head: Normocephalic and atraumatic.   Cardiovascular:      Rate and Rhythm: Normal rate and regular rhythm.      Heart sounds: No murmur heard.  Pulmonary:      Effort: Pulmonary effort is normal. No respiratory distress.      Breath sounds: Normal breath sounds. No wheezing.   Abdominal:      General: Bowel sounds are normal.      Palpations: Abdomen is soft.      Comments: Midline dressing in place small area of blood noted   Musculoskeletal:         General: No swelling.   Skin:     General: Skin is warm and dry.   Neurological:      Mental Status: He is alert and oriented to person, place, and time. Mental status is at baseline.               Significant Labs: All pertinent labs within the past 24 hours have been reviewed.  Recent Lab Results  (Last 5 results in the past 24 hours)        03/20/25  1154   03/20/25  0540   03/20/25  0533   03/20/25  0135   03/19/25  2126        Albumin   2.8             ALP   62             ALT   18             Anion Gap   20             AST   22             Baso #   0.02             Basophil %   0.4             BILIRUBIN TOTAL   1.0  Comment: For infants and newborns, interpretation of results should be based  on  gestational age, weight and in agreement with clinical  observations.    Premature Infant recommended reference ranges:  Up to 24 hours.............<8.0 mg/dL  Up to 48 hours............<12.0 mg/dL  3-5 days..................<15.0 mg/dL  6-29 days.................<15.0 mg/dL               BUN   12             Calcium   8.5             Chloride   109             CO2   16             Creatinine   0.9             Differential Method   Automated             eGFR   >60             Eos #   0.0             Eos %   0.8             Glucose   192             Gran # (ANC)   3.6             Gran %   70.5             Hematocrit   26.3             Hemoglobin   8.1             Immature Grans (Abs)   0.02  Comment: Mild elevation in immature granulocytes is non specific and   can be seen in a variety of conditions including stress response,   acute inflammation, trauma and pregnancy. Correlation with other   laboratory and clinical findings is essential.               Immature Granulocytes   0.4             Lymph #   0.8             Lymph %   14.5             Magnesium    1.7             MCH   30.8             MCHC   30.8             MCV   100             Mono #   0.7             Mono %   13.4             MPV   10.7             nRBC   0             Phosphorus Level   2.7             Platelet Estimate   Appears normal  Comment: Reviewed by Technologist.             Platelet Count   157             POCT Glucose 232     200   194   179       Potassium   3.4             PROTEIN TOTAL   5.0             RBC   2.63             RDW   15.1             Sodium   145             WBC   5.16                                    Significant Imaging: I have reviewed all pertinent imaging results/findings within the past 24 hours.

## 2025-03-20 NOTE — PROGRESS NOTES
[facility] Surg  Colorectal Surgery  Progress Note    Subjective:     Interval History: passing flatus and having non-bloody bowel movements. No further n/v. Non-distended. NG with dark bilious output. Poor ambulation    Medications:  Continuous Infusions:   0.45% NaCl   Intravenous Continuous 50 mL/hr at 03/20/25 0438 New Bag at 03/20/25 0438     Scheduled Meds:   folic acid (FOLVITE) IVPB  1 mg Intravenous Daily    mupirocin   Nasal BID    pantoprazole  40 mg Intravenous BID     PRN Meds:  Current Facility-Administered Medications:     0.9%  NaCl infusion (for blood administration), , Intravenous, Q24H PRN    0.9%  NaCl infusion (for blood administration), , Intravenous, Q24H PRN    dextrose 50%, 12.5 g, Intravenous, PRN    dextrose 50%, 25 g, Intravenous, PRN    glucagon (human recombinant), 1 mg, Intramuscular, PRN    hydrALAZINE, 10 mg, Intravenous, Q6H PRN    influenza, 0.5 mL, Intramuscular, Prior to discharge    insulin aspart U-100, 0-10 Units, Subcutaneous, Q4H PRN    lorazepam, 1 mg, Intravenous, Q4H PRN    lorazepam, 2 mg, Intravenous, PRN    morphine, 2 mg, Intravenous, Q4H PRN    ondansetron, 4 mg, Intravenous, Q6H PRN    pneumoc 20-guanako conj-dip cr(PF), 0.5 mL, Intramuscular, Prior to discharge    sodium chloride 0.9%, 10 mL, Intravenous, PRN     Review of patient's allergies indicates:  No Known Allergies  Objective:     Vital Signs (Most Recent):  Temp: 98.5 °F (36.9 °C) (03/20/25 0809)  Pulse: 100 (03/20/25 0809)  Resp: 18 (03/20/25 0809)  BP: 138/85 (03/20/25 0809)  SpO2: 97 % (03/20/25 0809) Vital Signs (24h Range):  Temp:  [97.9 °F (36.6 °C)-98.9 °F (37.2 °C)] 98.5 °F (36.9 °C)  Pulse:  [] 100  Resp:  [17-18] 18  SpO2:  [95 %-98 %] 97 %  BP: (138-190)/() 138/85     Weight: 68 kg (150 lb)  Body mass index is 24.21 kg/m².    Intake/Output - Last 3 Shifts         03/18 0700  03/19 0659 03/19 0700 03/20 0659 03/20 0700 03/21 0659    I.V. (mL/kg) 63.3 (0.9)      Blood       IV  Piggyback 460.8 99.5     Total Intake(mL/kg) 524.1 (7.7) 99.5 (1.5)     Urine (mL/kg/hr) 100 (0.1) 0 (0)     Drains 600 950     Other       Stool 0      Total Output 700 950     Net -175.9 -850.5            Urine Occurrence 3 x 2 x     Stool Occurrence 1 x               Physical Exam  Constitutional:       Appearance: He is well-developed.   HENT:      Head: Normocephalic and atraumatic.      Nose:      Comments: NG with dark bilious fluid in canister  Eyes:      Conjunctiva/sclera: Conjunctivae normal.      Pupils: Pupils are equal, round, and reactive to light.   Neck:      Thyroid: No thyromegaly.   Cardiovascular:      Rate and Rhythm: Normal rate.   Pulmonary:      Effort: Pulmonary effort is normal. No respiratory distress.   Abdominal:      Comments: Soft, non-distended, appropriate post op TTP. Midline incision intact with small area of SS oozing from umbilical area, no erythema or purulent drainage   Musculoskeletal:         General: No tenderness. Normal range of motion.      Cervical back: Normal range of motion.   Skin:     General: Skin is warm and dry.      Capillary Refill: Capillary refill takes less than 2 seconds.          Significant Labs:  I have reviewed all pertinent lab results within the past 24 hours.  CBC:   Recent Labs   Lab 03/20/25  0540   WBC 5.16   RBC 2.63*   HGB 8.1*   HCT 26.3*      *   MCH 30.8   MCHC 30.8*     BMP:   Recent Labs   Lab 03/20/25  0540   *      K 3.4*      CO2 16*   BUN 12   CREATININE 0.9   CALCIUM 8.5*   MG 1.7     CMP:   Recent Labs   Lab 03/20/25  0540   *   CALCIUM 8.5*   ALBUMIN 2.8*   PROT 5.0*      K 3.4*   CO2 16*      BUN 12   CREATININE 0.9   ALKPHOS 62   ALT 18   AST 22   BILITOT 1.0       Significant Diagnostics:  I have reviewed all pertinent imaging results/findings within the past 24 hours.  Assessment/Plan:     * Small bowel obstruction  POD4 status post exploratory laparotomy with reduction of  small bowel volvulus  POD3 s/p ex lap with well perfused bowel amenable to abdominal wall closure    - patient having continued bowel function and no further n/v, however still with dark bilious ng output. Will do clamp trial today - if no n/v, distention, abd pain in 6 hours, can remove NG and start CLD   - OOB, ambulation encouraged  - incentive sandi  - no need for abx from surgical standpoint  - ok to cover incision with gauze/tape to catch any drainage as needed    Type 2 diabetes mellitus with hyperglycemia, with long-term current use of insulin  Per critical Care Medicine    Essential hypertension  Per hospital Medicine    Alcohol abuse  Per hospital medicine    Gastroesophageal reflux disease  Per hospital Medicine        Diane Bhatti PA-C  Colorectal Surgery  [facility] Med S[facility]

## 2025-03-20 NOTE — ASSESSMENT & PLAN NOTE
The patients 3 most recent labs are listed below.  Recent Labs     03/18/25  0430 03/19/25  0550 03/20/25  0540   PLT 63* 110* 157     Plan  - resolved  -

## 2025-03-21 LAB
ALBUMIN SERPL BCP-MCNC: 2.7 G/DL (ref 3.5–5.2)
ALP SERPL-CCNC: 64 U/L (ref 40–150)
ALT SERPL W/O P-5'-P-CCNC: 17 U/L (ref 10–44)
ANION GAP SERPL CALC-SCNC: 15 MMOL/L (ref 8–16)
AST SERPL-CCNC: 18 U/L (ref 10–40)
BASOPHILS # BLD AUTO: 0.02 K/UL (ref 0–0.2)
BASOPHILS NFR BLD: 0.4 % (ref 0–1.9)
BILIRUB SERPL-MCNC: 0.7 MG/DL (ref 0.1–1)
BUN SERPL-MCNC: 8 MG/DL (ref 6–20)
CALCIUM SERPL-MCNC: 8.5 MG/DL (ref 8.7–10.5)
CHLORIDE SERPL-SCNC: 110 MMOL/L (ref 95–110)
CO2 SERPL-SCNC: 18 MMOL/L (ref 23–29)
CREAT SERPL-MCNC: 0.8 MG/DL (ref 0.5–1.4)
DIFFERENTIAL METHOD BLD: ABNORMAL
EOSINOPHIL # BLD AUTO: 0.1 K/UL (ref 0–0.5)
EOSINOPHIL NFR BLD: 1.3 % (ref 0–8)
ERYTHROCYTE [DISTWIDTH] IN BLOOD BY AUTOMATED COUNT: 14.7 % (ref 11.5–14.5)
EST. GFR  (NO RACE VARIABLE): >60 ML/MIN/1.73 M^2
GLUCOSE SERPL-MCNC: 143 MG/DL (ref 70–110)
HCT VFR BLD AUTO: 26.4 % (ref 40–54)
HGB BLD-MCNC: 8.5 G/DL (ref 14–18)
IMM GRANULOCYTES # BLD AUTO: 0.01 K/UL (ref 0–0.04)
IMM GRANULOCYTES NFR BLD AUTO: 0.2 % (ref 0–0.5)
LYMPHOCYTES # BLD AUTO: 1.2 K/UL (ref 1–4.8)
LYMPHOCYTES NFR BLD: 21.4 % (ref 18–48)
MAGNESIUM SERPL-MCNC: 1.3 MG/DL (ref 1.6–2.6)
MCH RBC QN AUTO: 30.5 PG (ref 27–31)
MCHC RBC AUTO-ENTMCNC: 32.2 G/DL (ref 32–36)
MCV RBC AUTO: 95 FL (ref 82–98)
MONOCYTES # BLD AUTO: 0.6 K/UL (ref 0.3–1)
MONOCYTES NFR BLD: 11.5 % (ref 4–15)
NEUTROPHILS # BLD AUTO: 3.6 K/UL (ref 1.8–7.7)
NEUTROPHILS NFR BLD: 65.2 % (ref 38–73)
NRBC BLD-RTO: 0 /100 WBC
PHOSPHATE SERPL-MCNC: 2.3 MG/DL (ref 2.7–4.5)
PLATELET # BLD AUTO: 192 K/UL (ref 150–450)
PMV BLD AUTO: 10.1 FL (ref 9.2–12.9)
POCT GLUCOSE: 155 MG/DL (ref 70–110)
POCT GLUCOSE: 178 MG/DL (ref 70–110)
POCT GLUCOSE: 263 MG/DL (ref 70–110)
POTASSIUM SERPL-SCNC: 3.1 MMOL/L (ref 3.5–5.1)
PROT SERPL-MCNC: 5.1 G/DL (ref 6–8.4)
RBC # BLD AUTO: 2.79 M/UL (ref 4.6–6.2)
SODIUM SERPL-SCNC: 143 MMOL/L (ref 136–145)
WBC # BLD AUTO: 5.56 K/UL (ref 3.9–12.7)

## 2025-03-21 PROCEDURE — 99024 POSTOP FOLLOW-UP VISIT: CPT | Mod: ,,, | Performed by: STUDENT IN AN ORGANIZED HEALTH CARE EDUCATION/TRAINING PROGRAM

## 2025-03-21 PROCEDURE — 80053 COMPREHEN METABOLIC PANEL: CPT | Performed by: STUDENT IN AN ORGANIZED HEALTH CARE EDUCATION/TRAINING PROGRAM

## 2025-03-21 PROCEDURE — 36415 COLL VENOUS BLD VENIPUNCTURE: CPT | Performed by: STUDENT IN AN ORGANIZED HEALTH CARE EDUCATION/TRAINING PROGRAM

## 2025-03-21 PROCEDURE — 63600175 PHARM REV CODE 636 W HCPCS

## 2025-03-21 PROCEDURE — 21400001 HC TELEMETRY ROOM

## 2025-03-21 PROCEDURE — 83735 ASSAY OF MAGNESIUM: CPT | Performed by: STUDENT IN AN ORGANIZED HEALTH CARE EDUCATION/TRAINING PROGRAM

## 2025-03-21 PROCEDURE — 84100 ASSAY OF PHOSPHORUS: CPT | Performed by: STUDENT IN AN ORGANIZED HEALTH CARE EDUCATION/TRAINING PROGRAM

## 2025-03-21 PROCEDURE — 25000003 PHARM REV CODE 250

## 2025-03-21 PROCEDURE — 25000003 PHARM REV CODE 250: Performed by: INTERNAL MEDICINE

## 2025-03-21 PROCEDURE — 85025 COMPLETE CBC W/AUTO DIFF WBC: CPT | Performed by: STUDENT IN AN ORGANIZED HEALTH CARE EDUCATION/TRAINING PROGRAM

## 2025-03-21 PROCEDURE — 63600175 PHARM REV CODE 636 W HCPCS: Performed by: HOSPITALIST

## 2025-03-21 RX ORDER — LANOLIN ALCOHOL/MO/W.PET/CERES
800 CREAM (GRAM) TOPICAL ONCE
Status: COMPLETED | OUTPATIENT
Start: 2025-03-21 | End: 2025-03-21

## 2025-03-21 RX ORDER — METOPROLOL TARTRATE 1 MG/ML
5 INJECTION, SOLUTION INTRAVENOUS ONCE
Status: COMPLETED | OUTPATIENT
Start: 2025-03-21 | End: 2025-03-21

## 2025-03-21 RX ADMIN — ONDANSETRON 4 MG: 2 INJECTION INTRAMUSCULAR; INTRAVENOUS at 09:03

## 2025-03-21 RX ADMIN — INSULIN ASPART 2 UNITS: 100 INJECTION, SOLUTION INTRAVENOUS; SUBCUTANEOUS at 04:03

## 2025-03-21 RX ADMIN — INSULIN ASPART 6 UNITS: 100 INJECTION, SOLUTION INTRAVENOUS; SUBCUTANEOUS at 04:03

## 2025-03-21 RX ADMIN — INSULIN ASPART 2 UNITS: 100 INJECTION, SOLUTION INTRAVENOUS; SUBCUTANEOUS at 10:03

## 2025-03-21 RX ADMIN — Medication 800 MG: at 04:03

## 2025-03-21 RX ADMIN — MUPIROCIN: 20 OINTMENT TOPICAL at 09:03

## 2025-03-21 RX ADMIN — PANTOPRAZOLE SODIUM 40 MG: 40 INJECTION, POWDER, FOR SOLUTION INTRAVENOUS at 09:03

## 2025-03-21 RX ADMIN — PANTOPRAZOLE SODIUM 40 MG: 40 INJECTION, POWDER, FOR SOLUTION INTRAVENOUS at 08:03

## 2025-03-21 RX ADMIN — METOROPROLOL TARTRATE 5 MG: 5 INJECTION, SOLUTION INTRAVENOUS at 04:03

## 2025-03-21 RX ADMIN — FOLIC ACID 1 MG: 5 INJECTION, SOLUTION INTRAMUSCULAR; INTRAVENOUS; SUBCUTANEOUS at 09:03

## 2025-03-21 RX ADMIN — HYDRALAZINE HYDROCHLORIDE 10 MG: 20 INJECTION, SOLUTION INTRAMUSCULAR; INTRAVENOUS at 12:03

## 2025-03-21 NOTE — PLAN OF CARE
Pain reported this shift, morphine given but pt verbalized he does not like how it makes him feel.

## 2025-03-21 NOTE — PLAN OF CARE
Discussed poc with pt, pt verbalized understanding  Purposeful rounding every 2hours  VS wnl  Cardiac monitoring in use, pt is NSR, tele monitor # [id number]  Blood glucose monitoring   Fall precautions in place, remains injury free  Pt denies c/o pain  Nausea under control with PRN meds  IVFs  Accurate I&Os  Abx given as prescribed  Bed locked at lowest position  Call light within reach  Will cont with POC

## 2025-03-21 NOTE — ASSESSMENT & PLAN NOTE
Patient has Abnormal Magnesium: hypomagnesemia. Will continue to monitor electrolytes closely. Will replace the affected electrolytes and repeat labs to be done after interventions completed. The patient's magnesium results have been reviewed and are listed below.  Recent Labs   Lab 03/21/25  0648   MG 1.3*

## 2025-03-21 NOTE — ASSESSMENT & PLAN NOTE
Patient's FSGs are uncontrolled due to hyperglycemia on current medication regimen.  Last A1c reviewed-   Lab Results   Component Value Date    HGBA1C 8.3 (H) 03/16/2025     Most recent fingerstick glucose reviewed-   Recent Labs   Lab 03/20/25  2129 03/21/25  0456 03/21/25  1040 03/21/25  1620   POCTGLUCOSE 177* 178* 155* 263*     Current correctional scale  Medium  Maintain anti-hyperglycemic dose as follows-   Antihyperglycemics (From admission, onward)      Start     Stop Route Frequency Ordered    03/17/25 1509  insulin aspart U-100 pen 0-10 Units         -- SubQ Every 4 hours PRN 03/17/25 1410          Hold Oral hypoglycemics while patient is in the hospital.

## 2025-03-21 NOTE — PROGRESS NOTES
Nutrition Recommendations on 3/21/25:  Advance diet as tolerated/medically appropriate. Consider diabetic, low sodium restrictions.   If intake < 50% at meals, offer Boost Breeze while on clear liquid diet or Boost Glucose Control once advanced to full liquids or solid foods.  Will monitor % intake, diet tolerance, weight, labs/BG trends.     Goals:  Goal: Meet greater than 80% of nutritional needs by follow-up. (new)  Goal: Maintain weight throughout hospitalization. (new)      Romelia Chappell RD, LDN, CNSC

## 2025-03-21 NOTE — PROGRESS NOTES
[facility] Surg  General Surgery  Progress Note    Subjective:     Interval History: NG clamp trial was aborted yesterday because patient was complaining of nausea. No n/v. passing flatus and having bowel movements. Non-distended    Medications:  Continuous Infusions:   lactated ringers   Intravenous Continuous 75 mL/hr at 03/20/25 1416 New Bag at 03/20/25 1416     Scheduled Meds:   folic acid (FOLVITE) IVPB  1 mg Intravenous Daily    pantoprazole  40 mg Intravenous BID     PRN Meds:  Current Facility-Administered Medications:     0.9%  NaCl infusion (for blood administration), , Intravenous, Q24H PRN    0.9%  NaCl infusion (for blood administration), , Intravenous, Q24H PRN    dextrose 50%, 12.5 g, Intravenous, PRN    dextrose 50%, 25 g, Intravenous, PRN    glucagon (human recombinant), 1 mg, Intramuscular, PRN    hydrALAZINE, 10 mg, Intravenous, Q6H PRN    influenza, 0.5 mL, Intramuscular, Prior to discharge    insulin aspart U-100, 0-10 Units, Subcutaneous, Q4H PRN    lorazepam, 1 mg, Intravenous, Q4H PRN    lorazepam, 2 mg, Intravenous, PRN    morphine, 2 mg, Intravenous, Q4H PRN    ondansetron, 4 mg, Intravenous, Q6H PRN    pneumoc 20-guanako conj-dip cr(PF), 0.5 mL, Intramuscular, Prior to discharge    sodium chloride 0.9%, 10 mL, Intravenous, PRN     Review of patient's allergies indicates:  No Known Allergies  Objective:     Vital Signs (Most Recent):  Temp: 98.5 °F (36.9 °C) (03/21/25 0833)  Pulse: (!) 148 (03/21/25 1121)  Resp: 16 (03/21/25 0833)  BP: (!) 130/93 (03/21/25 0833)  SpO2: 97 % (03/21/25 0833) Vital Signs (24h Range):  Temp:  [98.3 °F (36.8 °C)-98.5 °F (36.9 °C)] 98.5 °F (36.9 °C)  Pulse:  [] 148  Resp:  [16-18] 16  SpO2:  [95 %-98 %] 97 %  BP: (122-178)/() 130/93     Weight: 68 kg (150 lb)  Body mass index is 24.21 kg/m².    Intake/Output - Last 3 Shifts         03/19 0700 03/20 0659 03/20 0700 03/21 0659 03/21 0700 03/22 0659    I.V. (mL/kg)       IV Piggyback 99.5      Total  Intake(mL/kg) 99.5 (1.5)      Urine (mL/kg/hr) 0 (0) 0 (0)     Drains 950 300     Stool  0     Total Output 950 300     Net -850.5 -300            Urine Occurrence 2 x 2 x     Stool Occurrence  3 x              Physical Exam  Constitutional:       Appearance: He is well-developed.   HENT:      Head: Normocephalic and atraumatic.   Eyes:      Conjunctiva/sclera: Conjunctivae normal.      Pupils: Pupils are equal, round, and reactive to light.   Neck:      Thyroid: No thyromegaly.   Cardiovascular:      Rate and Rhythm: Tachycardia present.   Pulmonary:      Effort: Pulmonary effort is normal. No respiratory distress.   Abdominal:      Comments: Soft, non-distended, appropriate post op TTP. Midline incision intact with small area of SS oozing from umbilical area, no erythema or purulent drainage   Musculoskeletal:         General: No tenderness. Normal range of motion.      Cervical back: Normal range of motion.   Skin:     General: Skin is warm and dry.      Capillary Refill: Capillary refill takes less than 2 seconds.          Significant Labs:  I have reviewed all pertinent lab results within the past 24 hours.  CBC:   Recent Labs   Lab 03/21/25  0648   WBC 5.56   RBC 2.79*   HGB 8.5*   HCT 26.4*      MCV 95   MCH 30.5   MCHC 32.2     BMP:   Recent Labs   Lab 03/21/25  0648   *      K 3.1*      CO2 18*   BUN 8   CREATININE 0.8   CALCIUM 8.5*   MG 1.3*     CMP:   Recent Labs   Lab 03/21/25  0648   *   CALCIUM 8.5*   ALBUMIN 2.7*   PROT 5.1*      K 3.1*   CO2 18*      BUN 8   CREATININE 0.8   ALKPHOS 64   ALT 17   AST 18   BILITOT 0.7       Significant Diagnostics:  I have reviewed all pertinent imaging results/findings within the past 24 hours.  Assessment/Plan:     * Small bowel obstruction  POD5 status post exploratory laparotomy with reduction of small bowel volvulus  POD4 s/p ex lap with well perfused bowel amenable to abdominal wall closure    - NG removed at  bedside. Having bowel function, clearer NG output. Start CLD and monitor tolerance  - OOB, ambulation encouraged  - incentive sandi  - ok to cover incision with gauze/tape to catch any drainage as needed    Type 2 diabetes mellitus with hyperglycemia, with long-term current use of insulin  Per critical Care Medicine    Essential hypertension  Per hospital Medicine    Alcohol abuse  Per hospital medicine    Gastroesophageal reflux disease  Per hospital Medicine        Diane Bhatti PA-C  General Surgery  [facility]

## 2025-03-21 NOTE — PROGRESS NOTES
Inpatient Nutrition Assessment    Admit Date: 3/15/2025   Total duration of encounter: 6 days   Patient Age: 59 y.o.    Nutrition Recommendation/Prescription     Advance diet as tolerated/medically appropriate. Consider diabetic, low sodium restrictions.   If intake < 50% at meals, offer Boost Breeze while on clear liquid diet or Boost Glucose Control once advanced to full liquids or solid foods.  Will monitor % intake, diet tolerance, weight, labs/BG trends.     Communication of Recommendations:  EMR    Nutrition Assessment     Malnutrition Assessment/Nutrition-Focused Physical Exam    Malnutrition Context: acute illness or injury (03/21/25 1201)  Malnutrition Level: moderate (03/21/25 1201)  Energy Intake (Malnutrition): less than or equal to 50% for greater than or equal to 5 days (NPO/CLD day 6) (03/21/25 1201)  Weight Loss (Malnutrition): other (see comments) (does not meet criteria per chart review/past records) (03/21/25 1201)  Subcutaneous Fat (Malnutrition): other (see comments) (unable to assess - dietitian working remotely) (03/21/25 1201)           Muscle Mass (Malnutrition): other (see comments) (unable to assess - dietitian working remotely) (03/21/25 1201)                          Fluid Accumulation (Malnutrition): mild (1+ generalized edema per flowsheets) (03/21/25 1201)        A minimum of two characteristics is recommended for diagnosis of either severe or non-severe malnutrition.    Chart Review    Reason Seen: malnutrition screening tool (MST)    Malnutrition Screening Tool Results   Have you recently lost weight without trying?: Yes: 24-33 lbs  Have you been eating poorly because of a decreased appetite?: No   MST Score: 3   Diagnosis:  Small bowel obstruction  Gastroesophageal reflux disease  Alcohol abuse  Transaminitis  Essential hypertension  Type 2 diabetes mellitus with hyperglycemia, with long-term current use of insulin  Hypokalemia   Thrombocytopenia     Relevant Medical History: HTN,  GERD, acute alcoholic pancreatitis, type 2 DM    Scheduled Medications:  folic acid (FOLVITE) IVPB, 1 mg, Daily  pantoprazole, 40 mg, BID    Continuous Infusions:  lactated ringers, Last Rate: 75 mL/hr at 03/20/25 1416    PRN Medications:  0.9%  NaCl infusion (for blood administration), , Q24H PRN  0.9%  NaCl infusion (for blood administration), , Q24H PRN  dextrose 50%, 12.5 g, PRN  dextrose 50%, 25 g, PRN  glucagon (human recombinant), 1 mg, PRN  hydrALAZINE, 10 mg, Q6H PRN  influenza, 0.5 mL, Prior to discharge  insulin aspart U-100, 0-10 Units, Q4H PRN  lorazepam, 1 mg, Q4H PRN  lorazepam, 2 mg, PRN  morphine, 2 mg, Q4H PRN  ondansetron, 4 mg, Q6H PRN  pneumoc 20-guanako conj-dip cr(PF), 0.5 mL, Prior to discharge  sodium chloride 0.9%, 10 mL, PRN    Calorie Containing IV Medications: no significant kcals from medications at this time    Recent Labs   Lab 03/15/25  2146 03/16/25  0416 03/16/25  0421 03/16/25  0829 03/16/25  2240 03/17/25  0407 03/17/25  1352 03/18/25  0430 03/19/25  0550 03/20/25  0540 03/21/25  0648    136  --  139 139 140  --  143 143 145 143   K 3.1* 3.7  --  3.8 4.0 3.8  --  3.2* 3.5 3.4* 3.1*   CALCIUM 8.3* 7.3*  --  7.3* 7.2* 7.3*  --  7.0* 8.4* 8.5* 8.5*   PHOS  --  4.6*  --   --   --  3.8  --  2.7 2.4* 2.7 2.3*   MG 1.3* 1.5*  --   --  1.8 1.8  --  1.8 2.1 1.7 1.3*    107  --  107 107 109  --  115* 109 109 110   CO2 19* 12*  --  16* 17* 20*  --  18* 19* 16* 18*   BUN 15 13  --  14 15 15  --  13 13 12 8   CREATININE 1.3 1.1  --  1.0 1.0 1.1  --  0.8 0.9 0.9 0.8   EGFRNORACEVR >60.0 >60  --  >60 >60 >60  --  >60 >60 >60 >60   BILITOT 0.8  --   --  0.7  --  0.9  --  0.9 1.0 1.0 0.7   ALKPHOS 118  --   --  93  --  65  --  56 62 62 64   ALT 63*  --   --  49*  --  30  --  23 20 18 17   *  --   --  142*  --  54*  --  31 25 22 18   ALBUMIN 3.8  --   --  3.2*  --  2.7*  --  2.3* 2.7* 2.8* 2.7*   TRIG  --  83  82  --   --   --   --   --   --   --   --   --    HGBA1C  --  8.3*  --    "--   --   --   --   --   --   --   --    AMMONIA  --   --  57*  --   --   --   --   --   --   --   --    LIPASE 4  --   --   --   --   --   --   --   --   --   --    WBC 3.08*  --   --  6.92 7.88 6.77 8.22 6.40 6.87 5.16 5.56   HGB 10.1*  --   --  10.3* 8.2* 7.8* 9.2* 7.7* 8.2* 8.1* 8.5*   HCT 29.9*  --   --  32.1* 23.9* 22.7* 26.9* 22.4* 25.7* 26.3* 26.4*     Nutrition Orders:  Diet Clear Liquid      Appetite/Oral Intake: NPO/NPO  Factors Affecting Nutritional Intake: clear liquid diet and NPO  Social Needs Impacting Access to Food: unable to assess at this time; will attempt on follow-up  Food/Muslim/Cultural Preferences: unable to obtain  Food Allergies: no known food allergies  Last Bowel Movement: 03/20/25  Wound(s):  no pressure injuries per EMR; + abdominal incision    Comments    3/21/25: Dietitian working remotely. Attempted to reach pt by phone without success. S/p ex-lap with reduction of small bowel volvulus, POD5. NG removed and started on clear liquids today (NPO/CLD day 6). Per H&P, pt reported last intake was Friday PTA. Pt reported 24-33 lb weight loss PTA on MST screen. Review of past records suggests weight has been stable or even ~ 15 lb weight gain since August 2024. Will need to verify weight history with pt at follow-up.    Anthropometrics    Height: 5' 6" (167.6 cm), Height Method: Stated  Last Weight: 68 kg (150 lb) (03/15/25 2118), Weight Method: Standard Scale  BMI (Calculated): 24.2  BMI Classification: normal (BMI 18.5-24.9)        Ideal Body Weight (IBW), Male: 142 lb     % Ideal Body Weight, Male (lb): 105.63 %                          Usual Weight Provided By: EMR weight history    Wt Readings from Last 5 Encounters:   03/15/25 68 kg (150 lb)   08/01/24 69.9 kg (154 lb 1.6 oz)   07/12/24 69.9 kg (154 lb)   07/05/24 69.9 kg (154 lb)   06/29/24 70 kg (154 lb 5.2 oz)   61 kg at clinic visit in August 2024 per chart review    Weight Change(s) Since Admission:   Wt Readings from Last 1 " Encounters:   03/15/25 2118 68 kg (150 lb)   Admit Weight: 68 kg (150 lb) (03/15/25 2118), Weight Method: Standard Scale    Estimated Needs    Weight Used For Calorie Calculations: 68 kg (149 lb 14.6 oz)  Energy Calorie Requirements (kcal): 8019-6457 kcal (25-30 kcal/kg)  Energy Need Method: Kcal/kg  Weight Used For Protein Calculations: 68 kg (149 lb 14.6 oz)  Protein Requirements: 68-81 g (1.0-1.2 g/kg)  Fluid Requirements (mL): 1377-3588 mL (30 mL/kg) or per MD  CHO Requirement: 191-229 g (45% estimated needs)     Enteral Nutrition     Patient not receiving enteral nutrition at this time.    Parenteral Nutrition     Patient not receiving parenteral nutrition support at this time.    Evaluation of Received Nutrient Intake    Calories: not meeting estimated needs  Protein: not meeting estimated needs    Patient Education     Not applicable.    Nutrition Diagnosis     PES: Inadequate energy intake related to acute illness as evidenced by NPO/CLD d/t SBO s/p surgical intervention. (new)     PES:            Nutrition Interventions     Intervention(s): modified composition of meals/snacks, multivitamin/mineral supplement therapy, and collaboration with other providers  Intervention(s):      Goal: Meet greater than 80% of nutritional needs by follow-up. (new)  Goal: Maintain weight throughout hospitalization. (new)    Nutrition Goals & Monitoring     Dietitian will monitor: food and beverage intake, energy intake, weight, electrolyte/renal panel, glucose/endocrine profile, and gastrointestinal profile  Discharge planning:  diabetic, low sodium diet with Boost Plus supplements PRN  Nutrition Risk/Follow-Up: high (follow-up in 1-4 days)   Please consult if re-assessment needed sooner.

## 2025-03-21 NOTE — SUBJECTIVE & OBJECTIVE
Interval History: f/u volvulus NG removed, clear liquids started    Review of Systems  Objective:     Vital Signs (Most Recent):  Temp: 97.3 °F (36.3 °C) (03/21/25 1252)  Pulse: (!) 150 (03/21/25 1403)  Resp: 16 (03/21/25 1252)  BP: (!) 152/95 (03/21/25 1252)  SpO2: 95 % (03/21/25 1252) Vital Signs (24h Range):  Temp:  [97.3 °F (36.3 °C)-98.5 °F (36.9 °C)] 97.3 °F (36.3 °C)  Pulse:  [] 150  Resp:  [16-18] 16  SpO2:  [95 %-98 %] 95 %  BP: (130-178)/() 152/95     Weight: 68 kg (150 lb)  Body mass index is 24.21 kg/m².    Intake/Output Summary (Last 24 hours) at 3/21/2025 1537  Last data filed at 3/20/2025 1934  Gross per 24 hour   Intake --   Output 300 ml   Net -300 ml         Physical Exam          Significant Labs: All pertinent labs within the past 24 hours have been reviewed.  Recent Lab Results  (Last 5 results in the past 24 hours)        03/21/25  1620   03/21/25  1040   03/21/25  0648   03/21/25  0456   03/20/25  2129        Albumin     2.7           ALP     64           ALT     17           Anion Gap     15           AST     18           Baso #     0.02           Basophil %     0.4           BILIRUBIN TOTAL     0.7  Comment: For infants and newborns, interpretation of results should be based  on gestational age, weight and in agreement with clinical  observations.    Premature Infant recommended reference ranges:  Up to 24 hours.............<8.0 mg/dL  Up to 48 hours............<12.0 mg/dL  3-5 days..................<15.0 mg/dL  6-29 days.................<15.0 mg/dL             BUN     8           Calcium     8.5           Chloride     110           CO2     18           Creatinine     0.8           Differential Method     Automated           eGFR     >60           Eos #     0.1           Eos %     1.3           Glucose     143           Gran # (ANC)     3.6           Gran %     65.2           Hematocrit     26.4           Hemoglobin     8.5           Immature Grans (Abs)     0.01  Comment: Mild  elevation in immature granulocytes is non specific and   can be seen in a variety of conditions including stress response,   acute inflammation, trauma and pregnancy. Correlation with other   laboratory and clinical findings is essential.             Immature Granulocytes     0.2           Lymph #     1.2           Lymph %     21.4           Magnesium      1.3           MCH     30.5           MCHC     32.2           MCV     95           Mono #     0.6           Mono %     11.5           MPV     10.1           nRBC     0           Phosphorus Level     2.3           Platelet Count     192           POCT Glucose 263   155     178   177       Potassium     3.1           PROTEIN TOTAL     5.1           RBC     2.79           RDW     14.7           Sodium     143           WBC     5.56                                  Significant Imaging: I have reviewed all pertinent imaging results/findings within the past 24 hours.

## 2025-03-21 NOTE — ASSESSMENT & PLAN NOTE
Patient's blood pressure range in the last 24 hours was: BP  Min: 130/93  Max: 178/113.The patient's inpatient anti-hypertensive regimen is listed below:  Current Antihypertensives  hydrALAZINE injection 10 mg, Every 6 hours PRN, Intravenous    Plan  -  BP labile  - hold antihypertensive medications for now

## 2025-03-21 NOTE — PROGRESS NOTES
"[facility] Medicine  Progress Note    Patient Name: Jorgito Arreaga  MRN: [id number]  Patient Class: IP- Inpatient   Admission Date: 3/15/2025  Length of Stay: 5 days  Attending Physician: Nette Davis MD  Primary Care Provider: Vonda Elliott FNP        Subjective     Principal Problem:Small bowel obstruction        HPI:  59-year-old male with known medical problems of alcohol abuse, GERD, pancreatitis, diabetes, transaminitis, TIA, HTN, HLD, inguinal hernia repair 2022, adenomatous polyps of transverse colon s/p polypectomy 2018.   He presented to [facility] ED for complaints of abdominal pain w/n/v onset shortly before arrival to ED. He was reportedly hypotensive w/EMS and was given a 500cc bolus, zofran, and fentanyl en route. He is still drinking beer with last being yesterday before presentation to ED. His appetite has been poor x several days, he has not eaten since Friday. He denied any fever, chills, cough, chest pain, shortness of breath, urinary symptoms.      ED workup showed WBC 3.08 (chronically in 3's), hgb 10.1, platelets 99, K 3.1, CO2 19, AG 20, normal Cr, glucose 245, Mg 1.3, , ALT 63, normal lipase, normal t-bili. Lactic acid 4.8, serum ethanol 276.   CT a/p w/contrast showed high-grade SBO with c/f decreased blood flow and ischemia-"dilated loops of small bowel. Findings compatible with a high-grade small-bowel obstruction. Some loops of distended small bowel demonstrate equivocal decreased mural enhancement. There is an equivocal finding however a stercoral enteritis based on distention is not excluded. No pneumatosis portal or mesenteric venous gas is identified. There was a twist about the root of the small bowel mesentery which results in focal high-grade narrowing of the superior mesenteric artery.   In ED, he had a labile blood pressure and ultimately required initiation of vasopressor support and CVL placement. Surgery consulted w/plans for OR urgently " tonight. Patient transferred ED to ED for expeditious definitive care. On arrival to ED, patient w/acceptable BP, however HR in 150's, sinus tachycardia, likely due to levophed. He was able to be weaned off of levophed with additional IVF. Given his need for vasopressor support, unstable hemodynamics, and high-risk surgery, and plan to leave him in discontinuity, he will be admitted to ICU post-op.   Patient taken to OR on 3/16/25, volvulus noted. He underwent lysis of adhesions. His abdomen ws temporarily closed with ABThera wound vac. He went back to the OR on 3/17/25 for a second look to ensure bowel viable. He was noted to have well perfused bowel. Abdomen closed. He was hemodynamically stable. Patient acceptable for the floor. Hospital medicine consulted. Patient reports difficult to talk with NG in place.    Overview/Hospital Course:  No notes on file    Interval History: f/u volvulus NG removed, clear liquids started    Review of Systems  Objective:     Vital Signs (Most Recent):  Temp: 97.3 °F (36.3 °C) (03/21/25 1252)  Pulse: (!) 150 (03/21/25 1403)  Resp: 16 (03/21/25 1252)  BP: (!) 152/95 (03/21/25 1252)  SpO2: 95 % (03/21/25 1252) Vital Signs (24h Range):  Temp:  [97.3 °F (36.3 °C)-98.5 °F (36.9 °C)] 97.3 °F (36.3 °C)  Pulse:  [] 150  Resp:  [16-18] 16  SpO2:  [95 %-98 %] 95 %  BP: (130-178)/() 152/95     Weight: 68 kg (150 lb)  Body mass index is 24.21 kg/m².    Intake/Output Summary (Last 24 hours) at 3/21/2025 1537  Last data filed at 3/20/2025 1934  Gross per 24 hour   Intake --   Output 300 ml   Net -300 ml         Physical Exam          Significant Labs: All pertinent labs within the past 24 hours have been reviewed.  Recent Lab Results  (Last 5 results in the past 24 hours)        03/21/25  1620   03/21/25  1040   03/21/25  0648   03/21/25  0456   03/20/25  2129        Albumin     2.7           ALP     64           ALT     17           Anion Gap     15           AST     18            Baso #     0.02           Basophil %     0.4           BILIRUBIN TOTAL     0.7  Comment: For infants and newborns, interpretation of results should be based  on gestational age, weight and in agreement with clinical  observations.    Premature Infant recommended reference ranges:  Up to 24 hours.............<8.0 mg/dL  Up to 48 hours............<12.0 mg/dL  3-5 days..................<15.0 mg/dL  6-29 days.................<15.0 mg/dL             BUN     8           Calcium     8.5           Chloride     110           CO2     18           Creatinine     0.8           Differential Method     Automated           eGFR     >60           Eos #     0.1           Eos %     1.3           Glucose     143           Gran # (ANC)     3.6           Gran %     65.2           Hematocrit     26.4           Hemoglobin     8.5           Immature Grans (Abs)     0.01  Comment: Mild elevation in immature granulocytes is non specific and   can be seen in a variety of conditions including stress response,   acute inflammation, trauma and pregnancy. Correlation with other   laboratory and clinical findings is essential.             Immature Granulocytes     0.2           Lymph #     1.2           Lymph %     21.4           Magnesium      1.3           MCH     30.5           MCHC     32.2           MCV     95           Mono #     0.6           Mono %     11.5           MPV     10.1           nRBC     0           Phosphorus Level     2.3           Platelet Count     192           POCT Glucose 263   155     178   177       Potassium     3.1           PROTEIN TOTAL     5.1           RBC     2.79           RDW     14.7           Sodium     143           WBC     5.56                                  Significant Imaging: I have reviewed all pertinent imaging results/findings within the past 24 hours.      Assessment & Plan  Small bowel obstruction  -due to volvulus and adhesions  -s/p DELROY   --per surgery  -NG removed 3/21  -clear liqudis  initiated, monitor tolerance  -encourage ambulation    Gastroesophageal reflux disease  -Continue IV PPI    Alcohol abuse  -prn Ativan  -monitor for signs of withdrawal    Transaminitis  -resolved    Essential hypertension  Patient's blood pressure range in the last 24 hours was: BP  Min: 130/93  Max: 178/113.The patient's inpatient anti-hypertensive regimen is listed below:  Current Antihypertensives  hydrALAZINE injection 10 mg, Every 6 hours PRN, Intravenous    Plan  -  BP labile  - hold antihypertensive medications for now  Type 2 diabetes mellitus with hyperglycemia, with long-term current use of insulin  Patient's FSGs are uncontrolled due to hyperglycemia on current medication regimen.  Last A1c reviewed-   Lab Results   Component Value Date    HGBA1C 8.3 (H) 03/16/2025     Most recent fingerstick glucose reviewed-   Recent Labs   Lab 03/20/25  2129 03/21/25  0456 03/21/25  1040 03/21/25  1620   POCTGLUCOSE 177* 178* 155* 263*     Current correctional scale  Medium  Maintain anti-hyperglycemic dose as follows-   Antihyperglycemics (From admission, onward)      Start     Stop Route Frequency Ordered    03/17/25 1509  insulin aspart U-100 pen 0-10 Units         -- SubQ Every 4 hours PRN 03/17/25 1410          Hold Oral hypoglycemics while patient is in the hospital.      Hypokalemia  Patient's most recent potassium results are listed below.   Recent Labs     03/19/25  0550 03/20/25  0540 03/21/25  0648   K 3.5 3.4* 3.1*     Plan  - Replete potassium per protocol  - Monitor potassium Daily  - Patient's hypokalemia is stable  -   Thrombocytopenia   The patients 3 most recent labs are listed below.  Recent Labs     03/19/25  0550 03/20/25  0540 03/21/25  0648   * 157 192     Plan  - resolved  -     Hypomagnesemia  Patient has Abnormal Magnesium: hypomagnesemia. Will continue to monitor electrolytes closely. Will replace the affected electrolytes and repeat labs to be done after interventions completed. The  patient's magnesium results have been reviewed and are listed below.  Recent Labs   Lab 03/21/25  0648   MG 1.3*      VTE Risk Mitigation (From admission, onward)           Ordered     IP VTE LOW RISK PATIENT  Once         03/16/25 0401     Place sequential compression device  Until discontinued         03/16/25 0401                    Discharge Planning   ARON:      Code Status: Full Code   Medical Readiness for Discharge Date:   Discharge Plan A: Home with family                        Nette Davis MD  Department of Hospital Medicine   [facility]

## 2025-03-21 NOTE — ASSESSMENT & PLAN NOTE
-due to volvulus and adhesions  -s/p DELROY   --per surgery  -NG removed 3/21  -clear liqudis initiated, monitor tolerance  -encourage ambulation
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
## 2025-03-21 NOTE — SUBJECTIVE & OBJECTIVE
Interval History: NG clamp trial was aborted yesterday because patient was complaining of nausea. No n/v. passing flatus and having bowel movements. Non-distended    Medications:  Continuous Infusions:   lactated ringers   Intravenous Continuous 75 mL/hr at 03/20/25 1416 New Bag at 03/20/25 1416     Scheduled Meds:   folic acid (FOLVITE) IVPB  1 mg Intravenous Daily    pantoprazole  40 mg Intravenous BID     PRN Meds:  Current Facility-Administered Medications:     0.9%  NaCl infusion (for blood administration), , Intravenous, Q24H PRN    0.9%  NaCl infusion (for blood administration), , Intravenous, Q24H PRN    dextrose 50%, 12.5 g, Intravenous, PRN    dextrose 50%, 25 g, Intravenous, PRN    glucagon (human recombinant), 1 mg, Intramuscular, PRN    hydrALAZINE, 10 mg, Intravenous, Q6H PRN    influenza, 0.5 mL, Intramuscular, Prior to discharge    insulin aspart U-100, 0-10 Units, Subcutaneous, Q4H PRN    lorazepam, 1 mg, Intravenous, Q4H PRN    lorazepam, 2 mg, Intravenous, PRN    morphine, 2 mg, Intravenous, Q4H PRN    ondansetron, 4 mg, Intravenous, Q6H PRN    pneumoc 20-guanako conj-dip cr(PF), 0.5 mL, Intramuscular, Prior to discharge    sodium chloride 0.9%, 10 mL, Intravenous, PRN     Review of patient's allergies indicates:  No Known Allergies  Objective:     Vital Signs (Most Recent):  Temp: 98.5 °F (36.9 °C) (03/21/25 0833)  Pulse: (!) 148 (03/21/25 1121)  Resp: 16 (03/21/25 0833)  BP: (!) 130/93 (03/21/25 0833)  SpO2: 97 % (03/21/25 0833) Vital Signs (24h Range):  Temp:  [98.3 °F (36.8 °C)-98.5 °F (36.9 °C)] 98.5 °F (36.9 °C)  Pulse:  [] 148  Resp:  [16-18] 16  SpO2:  [95 %-98 %] 97 %  BP: (122-178)/() 130/93     Weight: 68 kg (150 lb)  Body mass index is 24.21 kg/m².    Intake/Output - Last 3 Shifts         03/19 0700 03/20 0659 03/20 0700 03/21 0659 03/21 0700 03/22 0659    I.V. (mL/kg)       IV Piggyback 99.5      Total Intake(mL/kg) 99.5 (1.5)      Urine (mL/kg/hr) 0 (0) 0 (0)     Drains  950 300     Stool  0     Total Output 950 300     Net -850.5 -300            Urine Occurrence 2 x 2 x     Stool Occurrence  3 x              Physical Exam  Constitutional:       Appearance: He is well-developed.   HENT:      Head: Normocephalic and atraumatic.   Eyes:      Conjunctiva/sclera: Conjunctivae normal.      Pupils: Pupils are equal, round, and reactive to light.   Neck:      Thyroid: No thyromegaly.   Cardiovascular:      Rate and Rhythm: Tachycardia present.   Pulmonary:      Effort: Pulmonary effort is normal. No respiratory distress.   Abdominal:      Comments: Soft, non-distended, appropriate post op TTP. Midline incision intact with small area of SS oozing from umbilical area, no erythema or purulent drainage   Musculoskeletal:         General: No tenderness. Normal range of motion.      Cervical back: Normal range of motion.   Skin:     General: Skin is warm and dry.      Capillary Refill: Capillary refill takes less than 2 seconds.          Significant Labs:  I have reviewed all pertinent lab results within the past 24 hours.  CBC:   Recent Labs   Lab 03/21/25  0648   WBC 5.56   RBC 2.79*   HGB 8.5*   HCT 26.4*      MCV 95   MCH 30.5   MCHC 32.2     BMP:   Recent Labs   Lab 03/21/25  0648   *      K 3.1*      CO2 18*   BUN 8   CREATININE 0.8   CALCIUM 8.5*   MG 1.3*     CMP:   Recent Labs   Lab 03/21/25  0648   *   CALCIUM 8.5*   ALBUMIN 2.7*   PROT 5.1*      K 3.1*   CO2 18*      BUN 8   CREATININE 0.8   ALKPHOS 64   ALT 17   AST 18   BILITOT 0.7       Significant Diagnostics:  I have reviewed all pertinent imaging results/findings within the past 24 hours.

## 2025-03-21 NOTE — ASSESSMENT & PLAN NOTE
Patient's most recent potassium results are listed below.   Recent Labs     03/19/25  0550 03/20/25  0540 03/21/25  0648   K 3.5 3.4* 3.1*     Plan  - Replete potassium per protocol  - Monitor potassium Daily  - Patient's hypokalemia is stable  -

## 2025-03-21 NOTE — ASSESSMENT & PLAN NOTE
The patients 3 most recent labs are listed below.  Recent Labs     03/19/25  0550 03/20/25  0540 03/21/25  0648   * 157 192     Plan  - resolved  -

## 2025-03-22 LAB
ALBUMIN SERPL BCP-MCNC: 2.8 G/DL (ref 3.5–5.2)
ALP SERPL-CCNC: 64 UNIT/L (ref 40–150)
ALT SERPL W/O P-5'-P-CCNC: 14 UNIT/L (ref 10–44)
ANION GAP (OHS): 15 MMOL/L (ref 8–16)
AST SERPL-CCNC: 19 UNIT/L (ref 11–45)
BACTERIA BLD CULT: NORMAL
BACTERIA BLD CULT: NORMAL
BILIRUB SERPL-MCNC: 0.8 MG/DL (ref 0.1–1)
BUN SERPL-MCNC: 6 MG/DL (ref 6–20)
CALCIUM SERPL-MCNC: 8.5 MG/DL (ref 8.7–10.5)
CHLORIDE SERPL-SCNC: 107 MMOL/L (ref 95–110)
CO2 SERPL-SCNC: 18 MMOL/L (ref 23–29)
CREAT SERPL-MCNC: 0.9 MG/DL (ref 0.5–1.4)
GFR SERPLBLD CREATININE-BSD FMLA CKD-EPI: >60 ML/MIN/1.73/M2
GLUCOSE SERPL-MCNC: 161 MG/DL (ref 70–110)
MAGNESIUM SERPL-MCNC: 1.2 MG/DL (ref 1.6–2.6)
PHOSPHATE SERPL-MCNC: 2.3 MG/DL (ref 2.7–4.5)
POCT GLUCOSE: 170 MG/DL (ref 70–110)
POCT GLUCOSE: 221 MG/DL (ref 70–110)
POTASSIUM SERPL-SCNC: 3.2 MMOL/L (ref 3.5–5.1)
PROT SERPL-MCNC: 5.1 GM/DL (ref 6–8.4)
SODIUM SERPL-SCNC: 140 MMOL/L (ref 136–145)

## 2025-03-22 PROCEDURE — 99024 POSTOP FOLLOW-UP VISIT: CPT | Mod: ,,, | Performed by: COLON & RECTAL SURGERY

## 2025-03-22 PROCEDURE — 84100 ASSAY OF PHOSPHORUS: CPT | Performed by: STUDENT IN AN ORGANIZED HEALTH CARE EDUCATION/TRAINING PROGRAM

## 2025-03-22 PROCEDURE — 63600175 PHARM REV CODE 636 W HCPCS: Performed by: HOSPITALIST

## 2025-03-22 PROCEDURE — 25000003 PHARM REV CODE 250: Performed by: INTERNAL MEDICINE

## 2025-03-22 PROCEDURE — 83735 ASSAY OF MAGNESIUM: CPT | Performed by: STUDENT IN AN ORGANIZED HEALTH CARE EDUCATION/TRAINING PROGRAM

## 2025-03-22 PROCEDURE — 36415 COLL VENOUS BLD VENIPUNCTURE: CPT | Performed by: STUDENT IN AN ORGANIZED HEALTH CARE EDUCATION/TRAINING PROGRAM

## 2025-03-22 PROCEDURE — 63600175 PHARM REV CODE 636 W HCPCS

## 2025-03-22 PROCEDURE — 25000003 PHARM REV CODE 250

## 2025-03-22 PROCEDURE — 80053 COMPREHEN METABOLIC PANEL: CPT | Performed by: STUDENT IN AN ORGANIZED HEALTH CARE EDUCATION/TRAINING PROGRAM

## 2025-03-22 PROCEDURE — 21400001 HC TELEMETRY ROOM

## 2025-03-22 RX ORDER — LANOLIN ALCOHOL/MO/W.PET/CERES
800 CREAM (GRAM) TOPICAL ONCE
Status: COMPLETED | OUTPATIENT
Start: 2025-03-22 | End: 2025-03-22

## 2025-03-22 RX ORDER — FOLIC ACID 1 MG/1
1 TABLET ORAL DAILY
Status: DISCONTINUED | OUTPATIENT
Start: 2025-03-23 | End: 2025-03-24 | Stop reason: HOSPADM

## 2025-03-22 RX ORDER — PANTOPRAZOLE SODIUM 40 MG/1
40 TABLET, DELAYED RELEASE ORAL 2 TIMES DAILY
Status: DISCONTINUED | OUTPATIENT
Start: 2025-03-22 | End: 2025-03-24 | Stop reason: HOSPADM

## 2025-03-22 RX ADMIN — PANTOPRAZOLE SODIUM 40 MG: 40 INJECTION, POWDER, FOR SOLUTION INTRAVENOUS at 08:03

## 2025-03-22 RX ADMIN — HYDRALAZINE HYDROCHLORIDE 10 MG: 20 INJECTION, SOLUTION INTRAMUSCULAR; INTRAVENOUS at 05:03

## 2025-03-22 RX ADMIN — POTASSIUM BICARBONATE 25 MEQ: 978 TABLET, EFFERVESCENT ORAL at 09:03

## 2025-03-22 RX ADMIN — INSULIN ASPART 2 UNITS: 100 INJECTION, SOLUTION INTRAVENOUS; SUBCUTANEOUS at 07:03

## 2025-03-22 RX ADMIN — PANTOPRAZOLE SODIUM 40 MG: 40 TABLET, DELAYED RELEASE ORAL at 09:03

## 2025-03-22 RX ADMIN — INSULIN ASPART 4 UNITS: 100 INJECTION, SOLUTION INTRAVENOUS; SUBCUTANEOUS at 05:03

## 2025-03-22 RX ADMIN — Medication 800 MG: at 09:03

## 2025-03-22 RX ADMIN — FOLIC ACID 1 MG: 5 INJECTION, SOLUTION INTRAMUSCULAR; INTRAVENOUS; SUBCUTANEOUS at 08:03

## 2025-03-22 NOTE — SUBJECTIVE & OBJECTIVE
Interval History: No acute events overnight. Tolerating clear liquids well. No nausea or vomiting. +Flatus and BM.    Medications:  Continuous Infusions:   lactated ringers   Intravenous Continuous 75 mL/hr at 03/20/25 1416 New Bag at 03/20/25 1416     Scheduled Meds:   folic acid (FOLVITE) IVPB  1 mg Intravenous Daily    pantoprazole  40 mg Intravenous BID     PRN Meds:  Current Facility-Administered Medications:     0.9%  NaCl infusion (for blood administration), , Intravenous, Q24H PRN    0.9%  NaCl infusion (for blood administration), , Intravenous, Q24H PRN    dextrose 50%, 12.5 g, Intravenous, PRN    dextrose 50%, 25 g, Intravenous, PRN    glucagon (human recombinant), 1 mg, Intramuscular, PRN    hydrALAZINE, 10 mg, Intravenous, Q6H PRN    influenza, 0.5 mL, Intramuscular, Prior to discharge    insulin aspart U-100, 0-10 Units, Subcutaneous, Q4H PRN    lorazepam, 1 mg, Intravenous, Q4H PRN    lorazepam, 2 mg, Intravenous, PRN    morphine, 2 mg, Intravenous, Q4H PRN    ondansetron, 4 mg, Intravenous, Q6H PRN    pneumoc 20-guanako conj-dip cr(PF), 0.5 mL, Intramuscular, Prior to discharge    sodium chloride 0.9%, 10 mL, Intravenous, PRN     Review of patient's allergies indicates:  No Known Allergies  Objective:     Vital Signs (Most Recent):  Temp: 98.5 °F (36.9 °C) (03/22/25 1157)  Pulse: 107 (03/22/25 1157)  Resp: 16 (03/22/25 1157)  BP: 126/77 (03/22/25 1157)  SpO2: (!) 94 % (03/22/25 1157) Vital Signs (24h Range):  Temp:  [33.8 °F (1 °C)-98.7 °F (37.1 °C)] 98.5 °F (36.9 °C)  Pulse:  [] 107  Resp:  [16-18] 16  SpO2:  [94 %-96 %] 94 %  BP: (121-166)/() 126/77     Weight: 68 kg (150 lb)  Body mass index is 24.21 kg/m².    Intake/Output - Last 3 Shifts         03/20 0700 03/21 0659 03/21 0700 03/22 0659 03/22 0700 03/23 0659    IV Piggyback       Total Intake(mL/kg)       Urine (mL/kg/hr) 0 (0)      Drains 300      Stool 0      Total Output 300      Net -300             Urine Occurrence 2 x 2 x      Stool Occurrence 3 x 0 x              Physical Exam  Constitutional:       Appearance: He is well-developed.   HENT:      Head: Normocephalic and atraumatic.   Eyes:      Conjunctiva/sclera: Conjunctivae normal.      Pupils: Pupils are equal, round, and reactive to light.   Neck:      Thyroid: No thyromegaly.   Cardiovascular:      Rate and Rhythm: Tachycardia present.   Pulmonary:      Effort: Pulmonary effort is normal. No respiratory distress.   Abdominal:      Comments: Soft, non-distended, appropriate post op TTP. Midline incision mostly intact with small area of SS drainage from umbilical area, no erythema or purulent drainage   Musculoskeletal:         General: No tenderness. Normal range of motion.      Cervical back: Normal range of motion.   Skin:     General: Skin is warm and dry.      Capillary Refill: Capillary refill takes less than 2 seconds.          Significant Labs:  I have reviewed all pertinent lab results within the past 24 hours.  CBC:   Recent Labs   Lab 03/21/25  0648   WBC 5.56   RBC 2.79*   HGB 8.5*   HCT 26.4*      MCV 95   MCH 30.5   MCHC 32.2     BMP:   Recent Labs   Lab 03/21/25  0648 03/22/25  0648   *  --     140   K 3.1* 3.2*    107   CO2 18* 18*   BUN 8 6   CREATININE 0.8 0.9   CALCIUM 8.5* 8.5*   MG 1.3* 1.2*       Significant Diagnostics:  I have reviewed all pertinent imaging results/findings within the past 24 hours.

## 2025-03-22 NOTE — ASSESSMENT & PLAN NOTE
POD6 status post exploratory laparotomy with reduction of small bowel volvulus  POD5 s/p ex lap with well perfused bowel amenable to abdominal wall closure    - trial reg diet advancement today. Monitor tolerance  - OOB, ambulation encouraged  - incentive spirometry  - PRN pain control  - rest of care per primary team

## 2025-03-22 NOTE — PROGRESS NOTES
Pharmacist Intervention IV to PO Note    Jorgito Arreaga is a 59 y.o. male being treated with IV medication folic acid and Protonix    Patient Data:    Vital Signs (Most Recent):  Temp: 98.5 °F (36.9 °C) (03/22/25 1157)  Pulse: 100 (03/22/25 1500)  Resp: 16 (03/22/25 1157)  BP: 126/77 (03/22/25 1157)  SpO2: 96 % (03/22/25 1500) Vital Signs (72h Range):  Temp:  [33.8 °F (1 °C)-98.9 °F (37.2 °C)]   Pulse:  []   Resp:  [16-18]   BP: (121-190)/()   SpO2:  [94 %-98 %]      CBC:  Recent Labs   Lab 03/19/25  0550 03/20/25  0540 03/21/25  0648   WBC 6.87 5.16 5.56   RBC 2.68* 2.63* 2.79*   HGB 8.2* 8.1* 8.5*   HCT 25.7* 26.3* 26.4*   * 157 192   MCV 96 100* 95   MCH 30.6 30.8 30.5   MCHC 31.9* 30.8* 32.2     CMP:     Recent Labs   Lab 03/19/25  0550 03/20/25  0540 03/21/25  0648 03/22/25  0648   * 192* 143*  --    CALCIUM 8.4* 8.5* 8.5* 8.5*   ALBUMIN 2.7* 2.8* 2.7* 2.8*   PROT 5.0* 5.0* 5.1*  --     145 143 140   K 3.5 3.4* 3.1* 3.2*   CO2 19* 16* 18* 18*    109 110 107   BUN 13 12 8 6   CREATININE 0.9 0.9 0.8 0.9   ALKPHOS 62 62 64 64   ALT 20 18 17 14   AST 25 22 18 19   BILITOT 1.0 1.0 0.7 0.8       Dietary Orders:  Diet Orders            Diet Adult Regular: Regular starting at 03/22 1128            Based on the following criteria, this patient qualifies for intravenous to oral conversion:  [x] The patients gastrointestinal tract is functioning (tolerating medications via oral or enteral route for 24 hours and tolerating food or enteral feeds for 24 hours).  [x] The patient is hemodynamically stable for 24 hours (heart rate <100 beats per minute, systolic blood pressure >99 mm Hg, and respiratory rate <20 breaths per minute).  [x] The patient shows clinical improvement (afebrile for at least 24 hours and white blood cell count downtrending or normalized). Additionally, the patient must be non-neutropenic (absolute neutrophil count >500 cells/mm3).  [x] For antimicrobials, the  patient has received IV therapy for at least 24 hours.    IV medication Folic acid and Protonix will be changed to oral medication Folic and Protonix    Pharmacist's Name: Josef Manjarrez  Pharmacist's Extension: [phone]

## 2025-03-22 NOTE — PLAN OF CARE
Discussed poc with pt, pt verbalized understanding  Purposeful rounding every 2hours  VS wnl  Cardiac monitoring in use, pt is NSR,   Blood glucose monitoring   Fall precautions in place, remains injury free  Pain and nausea under control with PRN meds  IVFs  Accurate I&Os  Abx given as prescribed  Bed locked at lowest position  Call light within reach  Chart check complete  Will cont with POC

## 2025-03-22 NOTE — PLAN OF CARE
Discussed poc with pt, pt verbalized understanding    Purposeful rounding every 2hours    VS wnl  Cardiac monitoring in use, pt is NSR, tele monitor # [id number]  Fall precautions in place, remains injury free  Pain and nausea under control with PRN meds    IVFs  Accurate I&Os  Abx given as prescribed  Bed locked at lowest position  Call light within reach    Chart check complete  Will cont with POC

## 2025-03-22 NOTE — PROGRESS NOTES
[facility] S[facility]  General Surgery  Progress Note    Subjective:     Interval History: No acute events overnight. Tolerating clear liquids well. No nausea or vomiting. +Flatus and BM.    Medications:  Continuous Infusions:   lactated ringers   Intravenous Continuous 75 mL/hr at 03/20/25 1416 New Bag at 03/20/25 1416     Scheduled Meds:   folic acid (FOLVITE) IVPB  1 mg Intravenous Daily    pantoprazole  40 mg Intravenous BID     PRN Meds:  Current Facility-Administered Medications:     0.9%  NaCl infusion (for blood administration), , Intravenous, Q24H PRN    0.9%  NaCl infusion (for blood administration), , Intravenous, Q24H PRN    dextrose 50%, 12.5 g, Intravenous, PRN    dextrose 50%, 25 g, Intravenous, PRN    glucagon (human recombinant), 1 mg, Intramuscular, PRN    hydrALAZINE, 10 mg, Intravenous, Q6H PRN    influenza, 0.5 mL, Intramuscular, Prior to discharge    insulin aspart U-100, 0-10 Units, Subcutaneous, Q4H PRN    lorazepam, 1 mg, Intravenous, Q4H PRN    lorazepam, 2 mg, Intravenous, PRN    morphine, 2 mg, Intravenous, Q4H PRN    ondansetron, 4 mg, Intravenous, Q6H PRN    pneumoc 20-guanako conj-dip cr(PF), 0.5 mL, Intramuscular, Prior to discharge    sodium chloride 0.9%, 10 mL, Intravenous, PRN     Review of patient's allergies indicates:  No Known Allergies  Objective:     Vital Signs (Most Recent):  Temp: 98.5 °F (36.9 °C) (03/22/25 1157)  Pulse: 107 (03/22/25 1157)  Resp: 16 (03/22/25 1157)  BP: 126/77 (03/22/25 1157)  SpO2: (!) 94 % (03/22/25 1157) Vital Signs (24h Range):  Temp:  [33.8 °F (1 °C)-98.7 °F (37.1 °C)] 98.5 °F (36.9 °C)  Pulse:  [] 107  Resp:  [16-18] 16  SpO2:  [94 %-96 %] 94 %  BP: (121-166)/() 126/77     Weight: 68 kg (150 lb)  Body mass index is 24.21 kg/m².    Intake/Output - Last 3 Shifts         03/20 0700 03/21 0659 03/21 0700 03/22 0659 03/22 0700 03/23 0659    IV Piggyback       Total Intake(mL/kg)       Urine (mL/kg/hr) 0 (0)      Drains 300      Stool 0       Total Output 300      Net -300             Urine Occurrence 2 x 2 x     Stool Occurrence 3 x 0 x              Physical Exam  Constitutional:       Appearance: He is well-developed.   HENT:      Head: Normocephalic and atraumatic.   Eyes:      Conjunctiva/sclera: Conjunctivae normal.      Pupils: Pupils are equal, round, and reactive to light.   Neck:      Thyroid: No thyromegaly.   Cardiovascular:      Rate and Rhythm: Tachycardia present.   Pulmonary:      Effort: Pulmonary effort is normal. No respiratory distress.   Abdominal:      Comments: Soft, non-distended, appropriate post op TTP. Midline incision mostly intact with small area of SS drainage from umbilical area, no erythema or purulent drainage   Musculoskeletal:         General: No tenderness. Normal range of motion.      Cervical back: Normal range of motion.   Skin:     General: Skin is warm and dry.      Capillary Refill: Capillary refill takes less than 2 seconds.          Significant Labs:  I have reviewed all pertinent lab results within the past 24 hours.  CBC:   Recent Labs   Lab 03/21/25  0648   WBC 5.56   RBC 2.79*   HGB 8.5*   HCT 26.4*      MCV 95   MCH 30.5   MCHC 32.2     BMP:   Recent Labs   Lab 03/21/25  0648 03/22/25  0648   *  --     140   K 3.1* 3.2*    107   CO2 18* 18*   BUN 8 6   CREATININE 0.8 0.9   CALCIUM 8.5* 8.5*   MG 1.3* 1.2*       Significant Diagnostics:  I have reviewed all pertinent imaging results/findings within the past 24 hours.  Assessment/Plan:     * Small bowel obstruction  POD6 status post exploratory laparotomy with reduction of small bowel volvulus  POD5 s/p ex lap with well perfused bowel amenable to abdominal wall closure    - trial reg diet advancement today. Monitor tolerance  - OOB, ambulation encouraged  - incentive spirometry  - PRN pain control  - rest of care per primary team    Type 2 diabetes mellitus with hyperglycemia, with long-term current use of insulin  Per hospital  Medicine    Essential hypertension  Per hospital Medicine    Alcohol abuse  Per hospital medicine    Gastroesophageal reflux disease  Per hospital Medicine      Delio Murphy MD  General Surgery  [facility]

## 2025-03-23 LAB
ABSOLUTE EOSINOPHIL (OHS): 0.05 K/UL
ABSOLUTE MONOCYTE (OHS): 0.55 K/UL (ref 0.3–1)
ABSOLUTE NEUTROPHIL COUNT (OHS): 1.69 K/UL (ref 1.8–7.7)
ALBUMIN SERPL BCP-MCNC: 2.5 G/DL (ref 3.5–5.2)
ALP SERPL-CCNC: 55 UNIT/L (ref 40–150)
ALT SERPL W/O P-5'-P-CCNC: 11 UNIT/L (ref 10–44)
ANION GAP (OHS): 11 MMOL/L (ref 8–16)
AST SERPL-CCNC: 15 UNIT/L (ref 11–45)
BASOPHILS # BLD AUTO: 0.02 K/UL
BASOPHILS NFR BLD AUTO: 0.7 %
BILIRUB SERPL-MCNC: 0.7 MG/DL (ref 0.1–1)
BUN SERPL-MCNC: 6 MG/DL (ref 6–20)
CALCIUM SERPL-MCNC: 8 MG/DL (ref 8.7–10.5)
CHLORIDE SERPL-SCNC: 107 MMOL/L (ref 95–110)
CO2 SERPL-SCNC: 20 MMOL/L (ref 23–29)
CREAT SERPL-MCNC: 0.8 MG/DL (ref 0.5–1.4)
ERYTHROCYTE [DISTWIDTH] IN BLOOD BY AUTOMATED COUNT: 15 % (ref 11.5–14.5)
GFR SERPLBLD CREATININE-BSD FMLA CKD-EPI: >60 ML/MIN/1.73/M2
GLUCOSE SERPL-MCNC: 157 MG/DL (ref 70–110)
HCT VFR BLD AUTO: 24.5 % (ref 40–54)
HGB BLD-MCNC: 8 GM/DL (ref 14–18)
IMM GRANULOCYTES # BLD AUTO: 0.02 K/UL (ref 0–0.04)
IMM GRANULOCYTES NFR BLD AUTO: 0.7 % (ref 0–0.5)
LYMPHOCYTES # BLD AUTO: 0.71 K/UL (ref 1–4.8)
MAGNESIUM SERPL-MCNC: 1.2 MG/DL (ref 1.6–2.6)
MCH RBC QN AUTO: 31 PG (ref 27–50)
MCHC RBC AUTO-ENTMCNC: 32.7 G/DL (ref 32–36)
MCV RBC AUTO: 95 FL (ref 82–98)
NUCLEATED RBC (/100WBC) (OHS): 0 /100 WBC
PHOSPHATE SERPL-MCNC: 2.3 MG/DL (ref 2.7–4.5)
PLATELET # BLD AUTO: 241 K/UL (ref 150–450)
PMV BLD AUTO: 10.3 FL (ref 9.2–12.9)
POCT GLUCOSE: 122 MG/DL (ref 70–110)
POCT GLUCOSE: 170 MG/DL (ref 70–110)
POCT GLUCOSE: 307 MG/DL (ref 70–110)
POTASSIUM SERPL-SCNC: 3.4 MMOL/L (ref 3.5–5.1)
PROT SERPL-MCNC: 4.6 GM/DL (ref 6–8.4)
RBC # BLD AUTO: 2.58 M/UL (ref 4.6–6.2)
RELATIVE EOSINOPHIL (OHS): 1.6 %
RELATIVE LYMPHOCYTE (OHS): 23.4 % (ref 18–48)
RELATIVE MONOCYTE (OHS): 18.1 % (ref 4–15)
RELATIVE NEUTROPHIL (OHS): 55.5 % (ref 38–73)
SODIUM SERPL-SCNC: 138 MMOL/L (ref 136–145)
WBC # BLD AUTO: 3.04 K/UL (ref 3.9–12.7)

## 2025-03-23 PROCEDURE — 83735 ASSAY OF MAGNESIUM: CPT | Performed by: STUDENT IN AN ORGANIZED HEALTH CARE EDUCATION/TRAINING PROGRAM

## 2025-03-23 PROCEDURE — 85025 COMPLETE CBC W/AUTO DIFF WBC: CPT | Performed by: STUDENT IN AN ORGANIZED HEALTH CARE EDUCATION/TRAINING PROGRAM

## 2025-03-23 PROCEDURE — 21400001 HC TELEMETRY ROOM

## 2025-03-23 PROCEDURE — 84100 ASSAY OF PHOSPHORUS: CPT | Performed by: STUDENT IN AN ORGANIZED HEALTH CARE EDUCATION/TRAINING PROGRAM

## 2025-03-23 PROCEDURE — 25000003 PHARM REV CODE 250: Performed by: INTERNAL MEDICINE

## 2025-03-23 PROCEDURE — 99024 POSTOP FOLLOW-UP VISIT: CPT | Mod: ,,, | Performed by: COLON & RECTAL SURGERY

## 2025-03-23 PROCEDURE — 36415 COLL VENOUS BLD VENIPUNCTURE: CPT | Performed by: STUDENT IN AN ORGANIZED HEALTH CARE EDUCATION/TRAINING PROGRAM

## 2025-03-23 PROCEDURE — 84520 ASSAY OF UREA NITROGEN: CPT | Performed by: STUDENT IN AN ORGANIZED HEALTH CARE EDUCATION/TRAINING PROGRAM

## 2025-03-23 RX ORDER — SODIUM,POTASSIUM PHOSPHATES 280-250MG
1 POWDER IN PACKET (EA) ORAL ONCE
Status: COMPLETED | OUTPATIENT
Start: 2025-03-23 | End: 2025-03-23

## 2025-03-23 RX ORDER — LANOLIN ALCOHOL/MO/W.PET/CERES
800 CREAM (GRAM) TOPICAL ONCE
Status: COMPLETED | OUTPATIENT
Start: 2025-03-23 | End: 2025-03-23

## 2025-03-23 RX ADMIN — INSULIN ASPART 1 UNITS: 100 INJECTION, SOLUTION INTRAVENOUS; SUBCUTANEOUS at 05:03

## 2025-03-23 RX ADMIN — FOLIC ACID 1 MG: 1 TABLET ORAL at 09:03

## 2025-03-23 RX ADMIN — PANTOPRAZOLE SODIUM 40 MG: 40 TABLET, DELAYED RELEASE ORAL at 09:03

## 2025-03-23 RX ADMIN — Medication 800 MG: at 09:03

## 2025-03-23 RX ADMIN — INSULIN ASPART 8 UNITS: 100 INJECTION, SOLUTION INTRAVENOUS; SUBCUTANEOUS at 11:03

## 2025-03-23 RX ADMIN — Medication 1 PACKET: at 09:03

## 2025-03-23 NOTE — SUBJECTIVE & OBJECTIVE
Interval History:  Tolerating regular diet without nausea or vomiting.  Passing flatus and bowel movements.    Medications:  Continuous Infusions:   lactated ringers   Intravenous Continuous 75 mL/hr at 03/20/25 1416 New Bag at 03/20/25 1416     Scheduled Meds:   folic acid  1 mg Oral Daily    pantoprazole  40 mg Oral BID     PRN Meds:  Current Facility-Administered Medications:     0.9%  NaCl infusion (for blood administration), , Intravenous, Q24H PRN    0.9%  NaCl infusion (for blood administration), , Intravenous, Q24H PRN    dextrose 50%, 12.5 g, Intravenous, PRN    dextrose 50%, 25 g, Intravenous, PRN    glucagon (human recombinant), 1 mg, Intramuscular, PRN    hydrALAZINE, 10 mg, Intravenous, Q6H PRN    influenza, 0.5 mL, Intramuscular, Prior to discharge    insulin aspart U-100, 0-10 Units, Subcutaneous, Q4H PRN    lorazepam, 1 mg, Intravenous, Q4H PRN    lorazepam, 2 mg, Intravenous, PRN    morphine, 2 mg, Intravenous, Q4H PRN    ondansetron, 4 mg, Intravenous, Q6H PRN    pneumoc 20-guanako conj-dip cr(PF), 0.5 mL, Intramuscular, Prior to discharge    sodium chloride 0.9%, 10 mL, Intravenous, PRN     Review of patient's allergies indicates:  No Known Allergies  Objective:     Vital Signs (Most Recent):  Temp: 98.5 °F (36.9 °C) (03/23/25 1213)  Pulse: 95 (03/23/25 1213)  Resp: 16 (03/23/25 1213)  BP: (!) 135/94 (03/23/25 1213)  SpO2: 97 % (03/23/25 1213) Vital Signs (24h Range):  Temp:  [98.5 °F (36.9 °C)-99 °F (37.2 °C)] 98.5 °F (36.9 °C)  Pulse:  [] 95  Resp:  [16-18] 16  SpO2:  [95 %-99 %] 97 %  BP: (118-150)/(84-94) 135/94     Weight: 68 kg (150 lb)  Body mass index is 24.21 kg/m².    Intake/Output - Last 3 Shifts         03/21 0700 03/22 0659 03/22 0700 03/23 0659 03/23 0700 03/24 0659    IV Piggyback  205.1     Total Intake(mL/kg)  205.1 (3)     Urine (mL/kg/hr)       Drains       Stool       Total Output       Net  +205.1            Urine Occurrence 2 x 4 x     Stool Occurrence 0 x 5 x 2 x              Physical Exam  Constitutional:       Appearance: He is well-developed.   HENT:      Head: Normocephalic and atraumatic.   Eyes:      Conjunctiva/sclera: Conjunctivae normal.      Pupils: Pupils are equal, round, and reactive to light.   Neck:      Thyroid: No thyromegaly.   Cardiovascular:      Rate and Rhythm: Normal rate.   Pulmonary:      Effort: Pulmonary effort is normal. No respiratory distress.   Abdominal:      Comments: Soft, non-distended, appropriate post op TTP. Midline incision mostly intact with small area of dehiscence with SS drainage from umbilical area, no erythema or purulent drainage   Musculoskeletal:         General: No tenderness. Normal range of motion.      Cervical back: Normal range of motion.   Skin:     General: Skin is warm and dry.      Capillary Refill: Capillary refill takes less than 2 seconds.          Significant Labs:  I have reviewed all pertinent lab results within the past 24 hours.  CBC:   Recent Labs   Lab 03/23/25 0448   WBC 3.04*   RBC 2.58*   HGB 8.0*   HCT 24.5*      MCV 95   MCH 31.0   MCHC 32.7     BMP:   Recent Labs   Lab 03/21/25  0648 03/22/25  0648 03/23/25  0448   *  --   --       < > 138   K 3.1*   < > 3.4*      < > 107   CO2 18*   < > 20*   BUN 8   < > 6   CREATININE 0.8   < > 0.8   CALCIUM 8.5*   < > 8.0*   MG 1.3*   < > 1.2*    < > = values in this interval not displayed.     CMP:   Recent Labs   Lab 03/21/25  0648 03/22/25  0648 03/23/25 0448   *  --   --    CALCIUM 8.5*   < > 8.0*   ALBUMIN 2.7*   < > 2.5*   PROT 5.1*  --   --       < > 138   K 3.1*   < > 3.4*   CO2 18*   < > 20*      < > 107   BUN 8   < > 6   CREATININE 0.8   < > 0.8   ALKPHOS 64   < > 55   ALT 17   < > 11   AST 18   < > 15   BILITOT 0.7   < > 0.7    < > = values in this interval not displayed.     LFTs:   Recent Labs   Lab 03/21/25  0648 03/22/25  0648 03/23/25  0448   ALT 17   < > 11   AST 18   < > 15   ALKPHOS 64   < > 55   BILITOT  0.7   < > 0.7   PROT 5.1*  --   --    ALBUMIN 2.7*   < > 2.5*    < > = values in this interval not displayed.       Significant Diagnostics:  I have reviewed all pertinent imaging results/findings within the past 24 hours.

## 2025-03-23 NOTE — ASSESSMENT & PLAN NOTE
Patient's most recent potassium results are listed below.   Recent Labs     03/20/25  0540 03/21/25  0648 03/22/25  0648   K 3.4* 3.1* 3.2*     Plan  - Replete potassium per protocol  - Monitor potassium Daily  - Patient's hypokalemia is stable  -

## 2025-03-23 NOTE — ASSESSMENT & PLAN NOTE
POD7 status post exploratory laparotomy with reduction of small bowel volvulus  POD6 s/p ex lap with well perfused bowel amenable to abdominal wall closure    - cont reg diet   - OOB, ambulation encouraged  - incentive spirometry  - PRN pain control  - rest of care per primary team

## 2025-03-23 NOTE — ASSESSMENT & PLAN NOTE
Patient has Abnormal Magnesium: hypomagnesemia. Will continue to monitor electrolytes closely. Will replace the affected electrolytes and repeat labs to be done after interventions completed. The patient's magnesium results have been reviewed and are listed below.  Recent Labs   Lab 03/22/25  0648   MG 1.2*

## 2025-03-23 NOTE — PROGRESS NOTES
"[facility] Medicine  Progress Note    Patient Name: Jorgito Arreaga  MRN: [id number]  Patient Class: IP- Inpatient   Admission Date: 3/15/2025  Length of Stay: 6 days  Attending Physician: Nette Davis MD  Primary Care Provider: Vonda Elliott FNP        Subjective     Principal Problem:Small bowel obstruction        HPI:  59-year-old male with known medical problems of alcohol abuse, GERD, pancreatitis, diabetes, transaminitis, TIA, HTN, HLD, inguinal hernia repair 2022, adenomatous polyps of transverse colon s/p polypectomy 2018.   He presented to [facility] ED for complaints of abdominal pain w/n/v onset shortly before arrival to ED. He was reportedly hypotensive w/EMS and was given a 500cc bolus, zofran, and fentanyl en route. He is still drinking beer with last being yesterday before presentation to ED. His appetite has been poor x several days, he has not eaten since Friday. He denied any fever, chills, cough, chest pain, shortness of breath, urinary symptoms.      ED workup showed WBC 3.08 (chronically in 3's), hgb 10.1, platelets 99, K 3.1, CO2 19, AG 20, normal Cr, glucose 245, Mg 1.3, , ALT 63, normal lipase, normal t-bili. Lactic acid 4.8, serum ethanol 276.   CT a/p w/contrast showed high-grade SBO with c/f decreased blood flow and ischemia-"dilated loops of small bowel. Findings compatible with a high-grade small-bowel obstruction. Some loops of distended small bowel demonstrate equivocal decreased mural enhancement. There is an equivocal finding however a stercoral enteritis based on distention is not excluded. No pneumatosis portal or mesenteric venous gas is identified. There was a twist about the root of the small bowel mesentery which results in focal high-grade narrowing of the superior mesenteric artery.   In ED, he had a labile blood pressure and ultimately required initiation of vasopressor support and CVL placement. Surgery consulted w/plans for OR urgently " tonight. Patient transferred ED to ED for expeditious definitive care. On arrival to ED, patient w/acceptable BP, however HR in 150's, sinus tachycardia, likely due to levophed. He was able to be weaned off of levophed with additional IVF. Given his need for vasopressor support, unstable hemodynamics, and high-risk surgery, and plan to leave him in discontinuity, he will be admitted to ICU post-op.   Patient taken to OR on 3/16/25, volvulus noted. He underwent lysis of adhesions. His abdomen ws temporarily closed with ABThera wound vac. He went back to the OR on 3/17/25 for a second look to ensure bowel viable. He was noted to have well perfused bowel. Abdomen closed. He was hemodynamically stable. Patient acceptable for the floor. Hospital medicine consulted. Patient reports difficult to talk with NG in place.    Overview/Hospital Course:  No notes on file    Interval History: f/u incisional dehiscence  midline incision with some dehiscence wet to dry dressings per surgery    Review of Systems  Objective:     Vital Signs (Most Recent):  Temp: 98.5 °F (36.9 °C) (03/22/25 1611)  Pulse: 85 (03/22/25 1700)  Resp: 16 (03/22/25 1611)  BP: (!) 150/94 (03/22/25 1611)  SpO2: 97 % (03/22/25 1611) Vital Signs (24h Range):  Temp:  [33.8 °F (1 °C)-98.7 °F (37.1 °C)] 98.5 °F (36.9 °C)  Pulse:  [] 85  Resp:  [16-18] 16  SpO2:  [94 %-97 %] 97 %  BP: (121-166)/() 150/94     Weight: 68 kg (150 lb)  Body mass index is 24.21 kg/m².  No intake or output data in the 24 hours ending 03/22/25 1917      Physical Exam  HENT:      Head: Normocephalic and atraumatic.   Cardiovascular:      Rate and Rhythm: Normal rate and regular rhythm.      Heart sounds: No murmur heard.  Pulmonary:      Effort: Pulmonary effort is normal. No respiratory distress.      Breath sounds: Normal breath sounds. No wheezing.   Abdominal:      General: Bowel sounds are normal. There is no distension.      Palpations: Abdomen is soft.      Tenderness:  There is no abdominal tenderness.   Musculoskeletal:         General: No swelling.   Skin:     General: Skin is warm and dry.   Neurological:      Mental Status: He is alert and oriented to person, place, and time. Mental status is at baseline.               Significant Labs: All pertinent labs within the past 24 hours have been reviewed.  Recent Lab Results         03/22/25  1708   03/22/25  0648   03/22/25  0555        Albumin   2.8         ALP   64         ALT   14         Anion Gap   15         AST   19         BILIRUBIN TOTAL   0.8  Comment: For infants and newborns, interpretation of results should be based   on gestational age, weight and in agreement with clinical   observations.    Premature Infant recommended reference ranges:   0-24 hours:  <8.0 mg/dL   24-48 hours: <12.0 mg/dL   3-5 days:    <15.0 mg/dL   6-29 days:   <15.0 mg/dL         BUN   6         Calcium   8.5         Chloride   107         CO2   18         Creatinine   0.9         eGFR   >60  Comment: Estimated GFR calculated using the CKD-EPI creatinine (2021) equation.         Glucose   161         Magnesium    1.2         Phosphorus Level   2.3         POCT Glucose 221     170       Potassium   3.2         PROTEIN TOTAL   5.1         Sodium   140                 Significant Imaging: I have reviewed all pertinent imaging results/findings within the past 24 hours.      Assessment & Plan  Small bowel obstruction  -due to volvulus and adhesions  -s/p DELROY   --per surgery  -NG removed 3/21  -monitor tolerance of diet  -encourage ambulation    Gastroesophageal reflux disease  -Continue IV PPI, change to po tomorrow if tolerating diet    Alcohol abuse  -prn Ativan  -monitor for signs of withdrawal    Transaminitis  -resolved    Essential hypertension  Patient's blood pressure range in the last 24 hours was: BP  Min: 121/82  Max: 166/107.The patient's inpatient anti-hypertensive regimen is listed below:  Current Antihypertensives  hydrALAZINE injection  10 mg, Every 6 hours PRN, Intravenous    Plan  -  BP labile  - hold antihypertensive medications for now  Type 2 diabetes mellitus with hyperglycemia, with long-term current use of insulin  Patient's FSGs are uncontrolled due to hyperglycemia on current medication regimen.  Last A1c reviewed-   Lab Results   Component Value Date    HGBA1C 8.3 (H) 03/16/2025     Most recent fingerstick glucose reviewed-   Recent Labs   Lab 03/22/25  0555 03/22/25  1708   POCTGLUCOSE 170* 221*     Current correctional scale  Medium  Maintain anti-hyperglycemic dose as follows-   Antihyperglycemics (From admission, onward)      Start     Stop Route Frequency Ordered    03/17/25 1509  insulin aspart U-100 pen 0-10 Units         -- SubQ Every 4 hours PRN 03/17/25 1410          Hold Oral hypoglycemics while patient is in the hospital.      Hypokalemia  Patient's most recent potassium results are listed below.   Recent Labs     03/20/25  0540 03/21/25  0648 03/22/25  0648   K 3.4* 3.1* 3.2*     Plan  - Replete potassium per protocol  - Monitor potassium Daily  - Patient's hypokalemia is stable  -   Thrombocytopenia   The patients 3 most recent labs are listed below.  Recent Labs     03/20/25  0540 03/21/25  0648    192     Plan  - resolved  -     Hypomagnesemia  Patient has Abnormal Magnesium: hypomagnesemia. Will continue to monitor electrolytes closely. Will replace the affected electrolytes and repeat labs to be done after interventions completed. The patient's magnesium results have been reviewed and are listed below.  Recent Labs   Lab 03/22/25  0648   MG 1.2*      VTE Risk Mitigation (From admission, onward)           Ordered     IP VTE LOW RISK PATIENT  Once         03/16/25 0401     Place sequential compression device  Until discontinued         03/16/25 0401                    Discharge Planning   ARON:      Code Status: Full Code   Medical Readiness for Discharge Date:   Discharge Plan A: Home with family                         Nette Davis MD  Department of Hospital Medicine   [facility]

## 2025-03-23 NOTE — PROGRESS NOTES
[facility]  General Surgery  Progress Note    Subjective:     Interval History:  Tolerating regular diet without nausea or vomiting.  Passing flatus and bowel movements.    Medications:  Continuous Infusions:   lactated ringers   Intravenous Continuous 75 mL/hr at 03/20/25 1416 New Bag at 03/20/25 1416     Scheduled Meds:   folic acid  1 mg Oral Daily    pantoprazole  40 mg Oral BID     PRN Meds:  Current Facility-Administered Medications:     0.9%  NaCl infusion (for blood administration), , Intravenous, Q24H PRN    0.9%  NaCl infusion (for blood administration), , Intravenous, Q24H PRN    dextrose 50%, 12.5 g, Intravenous, PRN    dextrose 50%, 25 g, Intravenous, PRN    glucagon (human recombinant), 1 mg, Intramuscular, PRN    hydrALAZINE, 10 mg, Intravenous, Q6H PRN    influenza, 0.5 mL, Intramuscular, Prior to discharge    insulin aspart U-100, 0-10 Units, Subcutaneous, Q4H PRN    lorazepam, 1 mg, Intravenous, Q4H PRN    lorazepam, 2 mg, Intravenous, PRN    morphine, 2 mg, Intravenous, Q4H PRN    ondansetron, 4 mg, Intravenous, Q6H PRN    pneumoc 20-guanako conj-dip cr(PF), 0.5 mL, Intramuscular, Prior to discharge    sodium chloride 0.9%, 10 mL, Intravenous, PRN     Review of patient's allergies indicates:  No Known Allergies  Objective:     Vital Signs (Most Recent):  Temp: 98.5 °F (36.9 °C) (03/23/25 1213)  Pulse: 95 (03/23/25 1213)  Resp: 16 (03/23/25 1213)  BP: (!) 135/94 (03/23/25 1213)  SpO2: 97 % (03/23/25 1213) Vital Signs (24h Range):  Temp:  [98.5 °F (36.9 °C)-99 °F (37.2 °C)] 98.5 °F (36.9 °C)  Pulse:  [] 95  Resp:  [16-18] 16  SpO2:  [95 %-99 %] 97 %  BP: (118-150)/(84-94) 135/94     Weight: 68 kg (150 lb)  Body mass index is 24.21 kg/m².    Intake/Output - Last 3 Shifts         03/21 0700 03/22 0659 03/22 0700 03/23 0659 03/23 0700 03/24 0659    IV Piggyback  205.1     Total Intake(mL/kg)  205.1 (3)     Urine (mL/kg/hr)       Drains       Stool       Total Output       Net  +205.1             Urine Occurrence 2 x 4 x     Stool Occurrence 0 x 5 x 2 x             Physical Exam  Constitutional:       Appearance: He is well-developed.   HENT:      Head: Normocephalic and atraumatic.   Eyes:      Conjunctiva/sclera: Conjunctivae normal.      Pupils: Pupils are equal, round, and reactive to light.   Neck:      Thyroid: No thyromegaly.   Cardiovascular:      Rate and Rhythm: Normal rate.   Pulmonary:      Effort: Pulmonary effort is normal. No respiratory distress.   Abdominal:      Comments: Soft, non-distended, appropriate post op TTP. Midline incision mostly intact with small area of dehiscence with SS drainage from umbilical area, no erythema or purulent drainage   Musculoskeletal:         General: No tenderness. Normal range of motion.      Cervical back: Normal range of motion.   Skin:     General: Skin is warm and dry.      Capillary Refill: Capillary refill takes less than 2 seconds.          Significant Labs:  I have reviewed all pertinent lab results within the past 24 hours.  CBC:   Recent Labs   Lab 03/23/25  0448   WBC 3.04*   RBC 2.58*   HGB 8.0*   HCT 24.5*      MCV 95   MCH 31.0   MCHC 32.7     BMP:   Recent Labs   Lab 03/21/25  0648 03/22/25  0648 03/23/25  0448   *  --   --       < > 138   K 3.1*   < > 3.4*      < > 107   CO2 18*   < > 20*   BUN 8   < > 6   CREATININE 0.8   < > 0.8   CALCIUM 8.5*   < > 8.0*   MG 1.3*   < > 1.2*    < > = values in this interval not displayed.     CMP:   Recent Labs   Lab 03/21/25  0648 03/22/25  0648 03/23/25  0448   *  --   --    CALCIUM 8.5*   < > 8.0*   ALBUMIN 2.7*   < > 2.5*   PROT 5.1*  --   --       < > 138   K 3.1*   < > 3.4*   CO2 18*   < > 20*      < > 107   BUN 8   < > 6   CREATININE 0.8   < > 0.8   ALKPHOS 64   < > 55   ALT 17   < > 11   AST 18   < > 15   BILITOT 0.7   < > 0.7    < > = values in this interval not displayed.     LFTs:   Recent Labs   Lab 03/21/25  0648 03/22/25  0648 03/23/25  0448    ALT 17   < > 11   AST 18   < > 15   ALKPHOS 64   < > 55   BILITOT 0.7   < > 0.7   PROT 5.1*  --   --    ALBUMIN 2.7*   < > 2.5*    < > = values in this interval not displayed.       Significant Diagnostics:  I have reviewed all pertinent imaging results/findings within the past 24 hours.  Assessment/Plan:     * Small bowel obstruction  POD7 status post exploratory laparotomy with reduction of small bowel volvulus  POD6 s/p ex lap with well perfused bowel amenable to abdominal wall closure    - cont reg diet   - OOB, ambulation encouraged  - incentive spirometry  - PRN pain control  - rest of care per primary team    Type 2 diabetes mellitus with hyperglycemia, with long-term current use of insulin  Per hospital Medicine    Essential hypertension  Per hospital Medicine    Alcohol abuse  Per hospital medicine    Gastroesophageal reflux disease  Per hospital Medicine      Delio Murphy MD  General Surgery  [facility]

## 2025-03-23 NOTE — ASSESSMENT & PLAN NOTE
Patient's FSGs are uncontrolled due to hyperglycemia on current medication regimen.  Last A1c reviewed-   Lab Results   Component Value Date    HGBA1C 8.3 (H) 03/16/2025     Most recent fingerstick glucose reviewed-   Recent Labs   Lab 03/22/25  0555 03/22/25  1708   POCTGLUCOSE 170* 221*     Current correctional scale  Medium  Maintain anti-hyperglycemic dose as follows-   Antihyperglycemics (From admission, onward)      Start     Stop Route Frequency Ordered    03/17/25 1509  insulin aspart U-100 pen 0-10 Units         -- SubQ Every 4 hours PRN 03/17/25 1410          Hold Oral hypoglycemics while patient is in the hospital.

## 2025-03-23 NOTE — SUBJECTIVE & OBJECTIVE
Interval History: f/u incisional dehiscence  midline incision with some dehiscence wet to dry dressings per surgery    Review of Systems  Objective:     Vital Signs (Most Recent):  Temp: 98.5 °F (36.9 °C) (03/22/25 1611)  Pulse: 85 (03/22/25 1700)  Resp: 16 (03/22/25 1611)  BP: (!) 150/94 (03/22/25 1611)  SpO2: 97 % (03/22/25 1611) Vital Signs (24h Range):  Temp:  [33.8 °F (1 °C)-98.7 °F (37.1 °C)] 98.5 °F (36.9 °C)  Pulse:  [] 85  Resp:  [16-18] 16  SpO2:  [94 %-97 %] 97 %  BP: (121-166)/() 150/94     Weight: 68 kg (150 lb)  Body mass index is 24.21 kg/m².  No intake or output data in the 24 hours ending 03/22/25 1917      Physical Exam  HENT:      Head: Normocephalic and atraumatic.   Cardiovascular:      Rate and Rhythm: Normal rate and regular rhythm.      Heart sounds: No murmur heard.  Pulmonary:      Effort: Pulmonary effort is normal. No respiratory distress.      Breath sounds: Normal breath sounds. No wheezing.   Abdominal:      General: Bowel sounds are normal. There is no distension.      Palpations: Abdomen is soft.      Tenderness: There is no abdominal tenderness.   Musculoskeletal:         General: No swelling.   Skin:     General: Skin is warm and dry.   Neurological:      Mental Status: He is alert and oriented to person, place, and time. Mental status is at baseline.               Significant Labs: All pertinent labs within the past 24 hours have been reviewed.  Recent Lab Results         03/22/25  1708   03/22/25  0648   03/22/25  0555        Albumin   2.8         ALP   64         ALT   14         Anion Gap   15         AST   19         BILIRUBIN TOTAL   0.8  Comment: For infants and newborns, interpretation of results should be based   on gestational age, weight and in agreement with clinical   observations.    Premature Infant recommended reference ranges:   0-24 hours:  <8.0 mg/dL   24-48 hours: <12.0 mg/dL   3-5 days:    <15.0 mg/dL   6-29 days:   <15.0 mg/dL         BUN   6          Calcium   8.5         Chloride   107         CO2   18         Creatinine   0.9         eGFR   >60  Comment: Estimated GFR calculated using the CKD-EPI creatinine (2021) equation.         Glucose   161         Magnesium    1.2         Phosphorus Level   2.3         POCT Glucose 221     170       Potassium   3.2         PROTEIN TOTAL   5.1         Sodium   140                 Significant Imaging: I have reviewed all pertinent imaging results/findings within the past 24 hours.

## 2025-03-23 NOTE — ASSESSMENT & PLAN NOTE
The patients 3 most recent labs are listed below.  Recent Labs     03/20/25  0540 03/21/25  0648    192     Plan  - resolved  -

## 2025-03-23 NOTE — ASSESSMENT & PLAN NOTE
Patient's blood pressure range in the last 24 hours was: BP  Min: 121/82  Max: 166/107.The patient's inpatient anti-hypertensive regimen is listed below:  Current Antihypertensives  hydrALAZINE injection 10 mg, Every 6 hours PRN, Intravenous    Plan  -  BP labile  - hold antihypertensive medications for now

## 2025-03-24 VITALS
BODY MASS INDEX: 24.11 KG/M2 | WEIGHT: 150 LBS | DIASTOLIC BLOOD PRESSURE: 71 MMHG | OXYGEN SATURATION: 95 % | RESPIRATION RATE: 16 BRPM | TEMPERATURE: 99 F | HEART RATE: 113 BPM | HEIGHT: 66 IN | SYSTOLIC BLOOD PRESSURE: 108 MMHG

## 2025-03-24 PROBLEM — T81.30XA WOUND DEHISCENCE: Status: ACTIVE | Noted: 2025-03-24

## 2025-03-24 PROBLEM — D69.6 THROMBOCYTOPENIA: Status: RESOLVED | Noted: 2025-03-16 | Resolved: 2025-03-24

## 2025-03-24 LAB
ABSOLUTE EOSINOPHIL (OHS): 0.04 K/UL
ABSOLUTE MONOCYTE (OHS): 0.46 K/UL (ref 0.3–1)
ABSOLUTE NEUTROPHIL COUNT (OHS): 1.52 K/UL (ref 1.8–7.7)
ALBUMIN SERPL BCP-MCNC: 2.6 G/DL (ref 3.5–5.2)
ALP SERPL-CCNC: 61 UNIT/L (ref 40–150)
ALT SERPL W/O P-5'-P-CCNC: 10 UNIT/L (ref 10–44)
ANION GAP (OHS): 6 MMOL/L (ref 8–16)
AST SERPL-CCNC: 13 UNIT/L (ref 11–45)
BASOPHILS # BLD AUTO: 0.02 K/UL
BASOPHILS NFR BLD AUTO: 0.7 %
BILIRUB SERPL-MCNC: 0.6 MG/DL (ref 0.1–1)
BUN SERPL-MCNC: 6 MG/DL (ref 6–20)
CALCIUM SERPL-MCNC: 7.9 MG/DL (ref 8.7–10.5)
CHLORIDE SERPL-SCNC: 108 MMOL/L (ref 95–110)
CO2 SERPL-SCNC: 22 MMOL/L (ref 23–29)
CREAT SERPL-MCNC: 0.9 MG/DL (ref 0.5–1.4)
DACRYOCYTES BLD QL SMEAR: NORMAL
ERYTHROCYTE [DISTWIDTH] IN BLOOD BY AUTOMATED COUNT: 14.1 % (ref 11.5–14.5)
GFR SERPLBLD CREATININE-BSD FMLA CKD-EPI: >60 ML/MIN/1.73/M2
GLUCOSE SERPL-MCNC: 192 MG/DL (ref 70–110)
HCT VFR BLD AUTO: 24.7 % (ref 40–54)
HGB BLD-MCNC: 8.1 GM/DL (ref 14–18)
IMM GRANULOCYTES # BLD AUTO: 0.01 K/UL (ref 0–0.04)
IMM GRANULOCYTES NFR BLD AUTO: 0.3 % (ref 0–0.5)
LYMPHOCYTES # BLD AUTO: 0.91 K/UL (ref 1–4.8)
MAGNESIUM SERPL-MCNC: 1.3 MG/DL (ref 1.6–2.6)
MCH RBC QN AUTO: 30.6 PG (ref 27–50)
MCHC RBC AUTO-ENTMCNC: 32.8 G/DL (ref 32–36)
MCV RBC AUTO: 93 FL (ref 82–98)
NUCLEATED RBC (/100WBC) (OHS): 0 /100 WBC
PHOSPHATE SERPL-MCNC: 2.3 MG/DL (ref 2.7–4.5)
PLATELET # BLD AUTO: 251 K/UL (ref 150–450)
PLATELET BLD QL SMEAR: NORMAL
PMV BLD AUTO: 10.2 FL (ref 9.2–12.9)
POCT GLUCOSE: 219 MG/DL (ref 70–110)
POCT GLUCOSE: 257 MG/DL (ref 70–110)
POIKILOCYTOSIS BLD QL SMEAR: SLIGHT
POTASSIUM SERPL-SCNC: 3.5 MMOL/L (ref 3.5–5.1)
PROT SERPL-MCNC: 4.8 GM/DL (ref 6–8.4)
RBC # BLD AUTO: 2.65 M/UL (ref 4.6–6.2)
RELATIVE EOSINOPHIL (OHS): 1.4 %
RELATIVE LYMPHOCYTE (OHS): 30.7 % (ref 18–48)
RELATIVE MONOCYTE (OHS): 15.5 % (ref 4–15)
RELATIVE NEUTROPHIL (OHS): 51.4 % (ref 38–73)
SODIUM SERPL-SCNC: 136 MMOL/L (ref 136–145)
WBC # BLD AUTO: 2.96 K/UL (ref 3.9–12.7)

## 2025-03-24 PROCEDURE — 80053 COMPREHEN METABOLIC PANEL: CPT | Performed by: STUDENT IN AN ORGANIZED HEALTH CARE EDUCATION/TRAINING PROGRAM

## 2025-03-24 PROCEDURE — 36415 COLL VENOUS BLD VENIPUNCTURE: CPT | Performed by: STUDENT IN AN ORGANIZED HEALTH CARE EDUCATION/TRAINING PROGRAM

## 2025-03-24 PROCEDURE — 25000003 PHARM REV CODE 250: Performed by: STUDENT IN AN ORGANIZED HEALTH CARE EDUCATION/TRAINING PROGRAM

## 2025-03-24 PROCEDURE — 85025 COMPLETE CBC W/AUTO DIFF WBC: CPT | Performed by: STUDENT IN AN ORGANIZED HEALTH CARE EDUCATION/TRAINING PROGRAM

## 2025-03-24 PROCEDURE — 83735 ASSAY OF MAGNESIUM: CPT | Performed by: STUDENT IN AN ORGANIZED HEALTH CARE EDUCATION/TRAINING PROGRAM

## 2025-03-24 PROCEDURE — 63600175 PHARM REV CODE 636 W HCPCS

## 2025-03-24 PROCEDURE — 63600175 PHARM REV CODE 636 W HCPCS: Performed by: HOSPITALIST

## 2025-03-24 PROCEDURE — 84100 ASSAY OF PHOSPHORUS: CPT | Performed by: STUDENT IN AN ORGANIZED HEALTH CARE EDUCATION/TRAINING PROGRAM

## 2025-03-24 PROCEDURE — 99024 POSTOP FOLLOW-UP VISIT: CPT | Mod: ,,, | Performed by: STUDENT IN AN ORGANIZED HEALTH CARE EDUCATION/TRAINING PROGRAM

## 2025-03-24 PROCEDURE — 25000003 PHARM REV CODE 250: Performed by: INTERNAL MEDICINE

## 2025-03-24 RX ORDER — POTASSIUM CHLORIDE 20 MEQ/1
40 TABLET, EXTENDED RELEASE ORAL ONCE
Status: COMPLETED | OUTPATIENT
Start: 2025-03-24 | End: 2025-03-24

## 2025-03-24 RX ORDER — INSULIN GLARGINE 100 [IU]/ML
INJECTION, SOLUTION SUBCUTANEOUS
Status: CANCELLED
Start: 2025-03-24

## 2025-03-24 RX ORDER — SODIUM,POTASSIUM PHOSPHATES 280-250MG
2 POWDER IN PACKET (EA) ORAL ONCE
Status: COMPLETED | OUTPATIENT
Start: 2025-03-24 | End: 2025-03-24

## 2025-03-24 RX ORDER — LANOLIN ALCOHOL/MO/W.PET/CERES
400 CREAM (GRAM) TOPICAL DAILY
Qty: 10 TABLET | Refills: 0 | Status: SHIPPED | OUTPATIENT
Start: 2025-03-24 | End: 2025-04-03

## 2025-03-24 RX ORDER — INSULIN GLARGINE 100 [IU]/ML
INJECTION, SOLUTION SUBCUTANEOUS
Start: 2025-03-24

## 2025-03-24 RX ORDER — INSULIN LISPRO 100 [IU]/ML
INJECTION, SOLUTION INTRAVENOUS; SUBCUTANEOUS
Qty: 10 ML | Refills: 5 | Status: SHIPPED | OUTPATIENT
Start: 2025-03-24

## 2025-03-24 RX ADMIN — POTASSIUM CHLORIDE 40 MEQ: 1500 TABLET, EXTENDED RELEASE ORAL at 11:03

## 2025-03-24 RX ADMIN — INSULIN ASPART 6 UNITS: 100 INJECTION, SOLUTION INTRAVENOUS; SUBCUTANEOUS at 11:03

## 2025-03-24 RX ADMIN — PANTOPRAZOLE SODIUM 40 MG: 40 TABLET, DELAYED RELEASE ORAL at 08:03

## 2025-03-24 RX ADMIN — FOLIC ACID 1 MG: 1 TABLET ORAL at 08:03

## 2025-03-24 RX ADMIN — HYDRALAZINE HYDROCHLORIDE 10 MG: 20 INJECTION, SOLUTION INTRAMUSCULAR; INTRAVENOUS at 05:03

## 2025-03-24 RX ADMIN — MORPHINE SULFATE 2 MG: 4 INJECTION INTRAVENOUS at 12:03

## 2025-03-24 RX ADMIN — Medication 2 PACKET: at 11:03

## 2025-03-24 RX ADMIN — INSULIN ASPART 2 UNITS: 100 INJECTION, SOLUTION INTRAVENOUS; SUBCUTANEOUS at 05:03

## 2025-03-24 NOTE — ASSESSMENT & PLAN NOTE
Patient's FSGs are uncontrolled due to hyperglycemia on current medication regimen.  Last A1c reviewed-   Lab Results   Component Value Date    HGBA1C 8.3 (H) 03/16/2025     Most recent fingerstick glucose reviewed-   Recent Labs   Lab 03/23/25  0527 03/23/25  1134 03/23/25  1630   POCTGLUCOSE 170* 307* 122*     Current correctional scale  Medium  Maintain anti-hyperglycemic dose as follows-   Antihyperglycemics (From admission, onward)      Start     Stop Route Frequency Ordered    03/17/25 1509  insulin aspart U-100 pen 0-10 Units         -- SubQ Every 4 hours PRN 03/17/25 1410          Hold Oral hypoglycemics while patient is in the hospital.

## 2025-03-24 NOTE — ASSESSMENT & PLAN NOTE
Patient's most recent potassium results are listed below.   Recent Labs     03/22/25  0648 03/23/25  0448 03/24/25  0650   K 3.2* 3.4* 3.5     Plan  - Replete potassium per protocol  - Monitor potassium Daily  - Patient's hypokalemia is resolved

## 2025-03-24 NOTE — ASSESSMENT & PLAN NOTE
Patient's FSGs are uncontrolled due to hyperglycemia on current medication regimen.  Last A1c reviewed-   Lab Results   Component Value Date    HGBA1C 8.3 (H) 03/16/2025     Most recent fingerstick glucose reviewed-   Recent Labs   Lab 03/23/25  1630 03/24/25  0519 03/24/25  1113   POCTGLUCOSE 122* 219* 257*     Current correctional scale  Medium  Maintain anti-hyperglycemic dose as follows-   Antihyperglycemics (From admission, onward)      Start     Stop Route Frequency Ordered    03/17/25 1509  insulin aspart U-100 pen 0-10 Units         -- SubQ Every 4 hours PRN 03/17/25 1410          Hold Oral hypoglycemics while patient is in the hospital.  At home patient used to be on glargine, but had a hypoglycemic episode after work, and has been switched to a Humalog sliding scale by his PCP   We will continue the Humalog sliding scale on discharge  Patient reports he has that scale printed out at home and can recite to me the instructions

## 2025-03-24 NOTE — SUBJECTIVE & OBJECTIVE
Interval History: f/u incisional dehiscence no acute issues overnight.     Review of Systems  Objective:     Vital Signs (Most Recent):  Temp: 98.6 °F (37 °C) (03/23/25 1621)  Pulse: 92 (03/23/25 1700)  Resp: 16 (03/23/25 1621)  BP: 129/89 (03/23/25 1621)  SpO2: 98 % (03/23/25 1621) Vital Signs (24h Range):  Temp:  [98.5 °F (36.9 °C)-99 °F (37.2 °C)] 98.6 °F (37 °C)  Pulse:  [] 92  Resp:  [16-18] 16  SpO2:  [95 %-99 %] 98 %  BP: (118-135)/(84-94) 129/89     Weight: 68 kg (150 lb)  Body mass index is 24.21 kg/m².    Intake/Output Summary (Last 24 hours) at 3/23/2025 1922  Last data filed at 3/23/2025 0332  Gross per 24 hour   Intake 205.09 ml   Output --   Net 205.09 ml         Physical Exam  HENT:      Head: Normocephalic and atraumatic.   Cardiovascular:      Rate and Rhythm: Normal rate and regular rhythm.      Heart sounds: No murmur heard.  Pulmonary:      Effort: Pulmonary effort is normal. No respiratory distress.      Breath sounds: Normal breath sounds. No wheezing.   Abdominal:      General: Bowel sounds are normal. There is no distension.      Palpations: Abdomen is soft.      Tenderness: There is no abdominal tenderness.   Musculoskeletal:         General: No swelling.   Skin:     General: Skin is warm and dry.   Neurological:      Mental Status: He is alert and oriented to person, place, and time. Mental status is at baseline.               Significant Labs: All pertinent labs within the past 24 hours have been reviewed.  Recent Lab Results         03/23/25  1630   03/23/25  1134   03/23/25  0527   03/23/25  0448        Albumin       2.5       ALP       55       ALT       11       Anion Gap       11       AST       15       Baso #       0.02       Basophil %       0.7       BILIRUBIN TOTAL       0.7  Comment: For infants and newborns, interpretation of results should be based   on gestational age, weight and in agreement with clinical   observations.    Premature Infant recommended reference  ranges:   0-24 hours:  <8.0 mg/dL   24-48 hours: <12.0 mg/dL   3-5 days:    <15.0 mg/dL   6-29 days:   <15.0 mg/dL       BUN       6       Calcium       8.0       Chloride       107       CO2       20       Creatinine       0.8       eGFR       >60  Comment: Estimated GFR calculated using the CKD-EPI creatinine (2021) equation.       Eos #       0.05       Eos %       1.6       Glucose       157       Gran # (ANC)       1.69       Hematocrit       24.5       Hemoglobin       8.0       Immature Grans (Abs)       0.02  Comment: Mild elevation in immature granulocytes is non specific and can be seen in a variety of conditions including stress response, acute inflammation, trauma and pregnancy. Correlation with other laboratory and clinical findings is essential.       Immature Granulocytes       0.7       Lymph #       0.71       Lymph %       23.4       Magnesium        1.2       MCH       31.0       MCHC       32.7       MCV       95       Mono #       0.55       Mono %       18.1       MPV       10.3       Neut %       55.5       nRBC       0       Phosphorus Level       2.3       Platelet Count       241       POCT Glucose 122   307   170         Potassium       3.4       PROTEIN TOTAL       4.6       RBC       2.58       RDW       15.0       Sodium       138       WBC       3.04               Significant Imaging: I have reviewed all pertinent imaging results/findings within the past 24 hours.

## 2025-03-24 NOTE — PROGRESS NOTES
"[facility] Medicine  Progress Note    Patient Name: Jorgito Arreaga  MRN: [id number]  Patient Class: IP- Inpatient   Admission Date: 3/15/2025  Length of Stay: 7 days  Attending Physician: Nette Davis MD  Primary Care Provider: Vonda Elliott FNP        Subjective     Principal Problem:Small bowel obstruction        HPI:  59-year-old male with known medical problems of alcohol abuse, GERD, pancreatitis, diabetes, transaminitis, TIA, HTN, HLD, inguinal hernia repair 2022, adenomatous polyps of transverse colon s/p polypectomy 2018.   He presented to [facility] ED for complaints of abdominal pain w/n/v onset shortly before arrival to ED. He was reportedly hypotensive w/EMS and was given a 500cc bolus, zofran, and fentanyl en route. He is still drinking beer with last being yesterday before presentation to ED. His appetite has been poor x several days, he has not eaten since Friday. He denied any fever, chills, cough, chest pain, shortness of breath, urinary symptoms.      ED workup showed WBC 3.08 (chronically in 3's), hgb 10.1, platelets 99, K 3.1, CO2 19, AG 20, normal Cr, glucose 245, Mg 1.3, , ALT 63, normal lipase, normal t-bili. Lactic acid 4.8, serum ethanol 276.   CT a/p w/contrast showed high-grade SBO with c/f decreased blood flow and ischemia-"dilated loops of small bowel. Findings compatible with a high-grade small-bowel obstruction. Some loops of distended small bowel demonstrate equivocal decreased mural enhancement. There is an equivocal finding however a stercoral enteritis based on distention is not excluded. No pneumatosis portal or mesenteric venous gas is identified. There was a twist about the root of the small bowel mesentery which results in focal high-grade narrowing of the superior mesenteric artery.   In ED, he had a labile blood pressure and ultimately required initiation of vasopressor support and CVL placement. Surgery consulted w/plans for OR urgently " "tonight. Patient transferred ED to ED for expeditious definitive care. On arrival to ED, patient w/acceptable BP, however HR in 150's, sinus tachycardia, likely due to levophed. He was able to be weaned off of levophed with additional IVF. Given his need for vasopressor support, unstable hemodynamics, and high-risk surgery, and plan to leave him in discontinuity, he will be admitted to ICU post-op.   Patient taken to OR on 3/16/25, volvulus noted. He underwent lysis of adhesions. His abdomen ws temporarily closed with ABThera wound vac. He went back to the OR on 3/17/25 for a second look to ensure bowel viable. He was noted to have well perfused bowel. Abdomen closed. He was hemodynamically stable. Patient acceptable for the floor. Hospital medicine consulted. Patient reports difficult to talk with NG in place.    Overview/Hospital Course:  The patient presented with abdominal pain. CT a/p w/contrast showed high-grade SBO with c/f decreased blood flow and ischemia-"dilated loops of small bowel. Findings compatible with a high-grade small-bowel obstruction. Some loops of distended small bowel demonstrate equivocal decreased mural enhancement. There is an equivocal finding however a stercoral enteritis based on distention is not excluded. No pneumatosis portal or mesenteric venous gas is identified. There was a twist about the root of the small bowel mesentery which results in focal high-grade narrowing of the superior mesenteric artery.   In ED, he had a labile blood pressure and ultimately required initiation of vasopressor support and CVL placement. Surgery consulted w/plans for OR urgently tonight. Patient transferred ED to ED for expeditious definitive care. On arrival to ED, patient w/acceptable BP, however HR in 150's, sinus tachycardia, likely due to levophed. He was able to be weaned off of levophed with additional IVF. Given his need for vasopressor support, unstable hemodynamics, and high-risk surgery, and " plan to leave him in discontinuity, he will be admitted to ICU post-op.   Patient taken to OR on 3/16/25, volvulus noted. He underwent lysis of adhesions. His abdomen ws temporarily closed with ABThera wound vac. He went back to the OR on 3/17/25 for a second look to ensure bowel viable. He was noted to have well perfused bowel. Abdomen closed. He was hemodynamically stable. Patient acceptable for the floor on 3/18. Patient developed wound dehiscence. Wet to dry dressings per surgery.    Interval History: f/u incisional dehiscence no acute issues overnight.     Review of Systems  Objective:     Vital Signs (Most Recent):  Temp: 98.6 °F (37 °C) (03/23/25 1621)  Pulse: 92 (03/23/25 1700)  Resp: 16 (03/23/25 1621)  BP: 129/89 (03/23/25 1621)  SpO2: 98 % (03/23/25 1621) Vital Signs (24h Range):  Temp:  [98.5 °F (36.9 °C)-99 °F (37.2 °C)] 98.6 °F (37 °C)  Pulse:  [] 92  Resp:  [16-18] 16  SpO2:  [95 %-99 %] 98 %  BP: (118-135)/(84-94) 129/89     Weight: 68 kg (150 lb)  Body mass index is 24.21 kg/m².    Intake/Output Summary (Last 24 hours) at 3/23/2025 1922  Last data filed at 3/23/2025 0332  Gross per 24 hour   Intake 205.09 ml   Output --   Net 205.09 ml         Physical Exam  HENT:      Head: Normocephalic and atraumatic.   Cardiovascular:      Rate and Rhythm: Normal rate and regular rhythm.      Heart sounds: No murmur heard.  Pulmonary:      Effort: Pulmonary effort is normal. No respiratory distress.      Breath sounds: Normal breath sounds. No wheezing.   Abdominal:      General: Bowel sounds are normal. There is no distension.      Palpations: Abdomen is soft.      Tenderness: There is no abdominal tenderness.   Musculoskeletal:         General: No swelling.   Skin:     General: Skin is warm and dry.   Neurological:      Mental Status: He is alert and oriented to person, place, and time. Mental status is at baseline.               Significant Labs: All pertinent labs within the past 24 hours have been  reviewed.  Recent Lab Results         03/23/25  1630   03/23/25  1134   03/23/25  0527   03/23/25  0448        Albumin       2.5       ALP       55       ALT       11       Anion Gap       11       AST       15       Baso #       0.02       Basophil %       0.7       BILIRUBIN TOTAL       0.7  Comment: For infants and newborns, interpretation of results should be based   on gestational age, weight and in agreement with clinical   observations.    Premature Infant recommended reference ranges:   0-24 hours:  <8.0 mg/dL   24-48 hours: <12.0 mg/dL   3-5 days:    <15.0 mg/dL   6-29 days:   <15.0 mg/dL       BUN       6       Calcium       8.0       Chloride       107       CO2       20       Creatinine       0.8       eGFR       >60  Comment: Estimated GFR calculated using the CKD-EPI creatinine (2021) equation.       Eos #       0.05       Eos %       1.6       Glucose       157       Gran # (ANC)       1.69       Hematocrit       24.5       Hemoglobin       8.0       Immature Grans (Abs)       0.02  Comment: Mild elevation in immature granulocytes is non specific and can be seen in a variety of conditions including stress response, acute inflammation, trauma and pregnancy. Correlation with other laboratory and clinical findings is essential.       Immature Granulocytes       0.7       Lymph #       0.71       Lymph %       23.4       Magnesium        1.2       MCH       31.0       MCHC       32.7       MCV       95       Mono #       0.55       Mono %       18.1       MPV       10.3       Neut %       55.5       nRBC       0       Phosphorus Level       2.3       Platelet Count       241       POCT Glucose 122   307   170         Potassium       3.4       PROTEIN TOTAL       4.6       RBC       2.58       RDW       15.0       Sodium       138       WBC       3.04               Significant Imaging: I have reviewed all pertinent imaging results/findings within the past 24 hours.      Assessment & Plan  Small bowel  obstruction  -due to volvulus and adhesions  -s/p DELROY   --per surgery  -NG removed 3/21  -monitor tolerance of diet  -encourage ambulation    Gastroesophageal reflux disease  -Continue PPI    Alcohol abuse  -prn Ativan  -monitor for signs of withdrawal    Transaminitis  -resolved    Essential hypertension  Patient's blood pressure range in the last 24 hours was: BP  Min: 118/84  Max: 135/94.The patient's inpatient anti-hypertensive regimen is listed below:  Current Antihypertensives  hydrALAZINE injection 10 mg, Every 6 hours PRN, Intravenous    Plan  -  BP labile  - hold antihypertensive medications for now  Type 2 diabetes mellitus with hyperglycemia, with long-term current use of insulin  Patient's FSGs are uncontrolled due to hyperglycemia on current medication regimen.  Last A1c reviewed-   Lab Results   Component Value Date    HGBA1C 8.3 (H) 03/16/2025     Most recent fingerstick glucose reviewed-   Recent Labs   Lab 03/23/25  0527 03/23/25  1134 03/23/25  1630   POCTGLUCOSE 170* 307* 122*     Current correctional scale  Medium  Maintain anti-hyperglycemic dose as follows-   Antihyperglycemics (From admission, onward)      Start     Stop Route Frequency Ordered    03/17/25 1509  insulin aspart U-100 pen 0-10 Units         -- SubQ Every 4 hours PRN 03/17/25 1410          Hold Oral hypoglycemics while patient is in the hospital.      Hypokalemia  Patient's most recent potassium results are listed below.   Recent Labs     03/21/25  0648 03/22/25  0648 03/23/25  0448   K 3.1* 3.2* 3.4*     Plan  - Replete potassium per protocol  - Monitor potassium Daily  - Patient's hypokalemia is stable  -   Thrombocytopenia   The patients 3 most recent labs are listed below.  Recent Labs     03/21/25  0648 03/23/25  0448    241     Plan  - resolved  -     Hypomagnesemia  Patient has Abnormal Magnesium: hypomagnesemia. Will continue to monitor electrolytes closely. Will replace the affected electrolytes and repeat labs to  be done after interventions completed. The patient's magnesium results have been reviewed and are listed below.  Recent Labs   Lab 03/23/25  0448   MG 1.2*      VTE Risk Mitigation (From admission, onward)           Ordered     IP VTE LOW RISK PATIENT  Once         03/16/25 0401     Place sequential compression device  Until discontinued         03/16/25 0401                    Discharge Planning   ARON:      Code Status: Full Code   Medical Readiness for Discharge Date:   Discharge Plan A: Home with family                        Nette Davis MD  Department of Hospital Medicine   [facility]

## 2025-03-24 NOTE — ASSESSMENT & PLAN NOTE
Patient has Abnormal Magnesium: hypomagnesemia. Will continue to monitor electrolytes closely. Will replace the affected electrolytes and repeat labs to be done after interventions completed. The patient's magnesium results have been reviewed and are listed below.  Recent Labs   Lab 03/24/25  0650   MG 1.3*      - provided script for magnesium supplement on discharge

## 2025-03-24 NOTE — ASSESSMENT & PLAN NOTE
The patients 3 most recent labs are listed below.  Recent Labs     03/21/25  0648 03/23/25  0448    241     Plan  - resolved  -

## 2025-03-24 NOTE — PLAN OF CARE
[facility] Med Surg  Discharge Final Note    Primary Care Provider: Vonda Elliott FNP    Expected Discharge Date: 3/24/2025    Final Discharge Note (most recent)       Final Note - 03/24/25 1454          Final Note    Assessment Type Final Discharge Note     Anticipated Discharge Disposition Home or Self Care     Hospital Resources/Appts/Education Provided Appointment suggestion unavailable                              Contact Info       Diane Bhatti PA-C   Specialty: Colon and Rectal Surgery    [address] [place] [address] [address] [address]   Phone: [phone]       Next Steps: Schedule an appointment as soon as possible for a visit in 2 week(s)

## 2025-03-24 NOTE — SUBJECTIVE & OBJECTIVE
Interval History: tolerating regular diet, no n/v. Having bowel function.     Medications:  Continuous Infusions:  Scheduled Meds:   folic acid  1 mg Oral Daily    pantoprazole  40 mg Oral BID     PRN Meds:  Current Facility-Administered Medications:     0.9%  NaCl infusion (for blood administration), , Intravenous, Q24H PRN    0.9%  NaCl infusion (for blood administration), , Intravenous, Q24H PRN    dextrose 50%, 12.5 g, Intravenous, PRN    dextrose 50%, 25 g, Intravenous, PRN    glucagon (human recombinant), 1 mg, Intramuscular, PRN    hydrALAZINE, 10 mg, Intravenous, Q6H PRN    influenza, 0.5 mL, Intramuscular, Prior to discharge    insulin aspart U-100, 0-10 Units, Subcutaneous, Q4H PRN    lorazepam, 1 mg, Intravenous, Q4H PRN    lorazepam, 2 mg, Intravenous, PRN    ondansetron, 4 mg, Intravenous, Q6H PRN    pneumoc 20-guanako conj-dip cr(PF), 0.5 mL, Intramuscular, Prior to discharge    sodium chloride 0.9%, 10 mL, Intravenous, PRN     Review of patient's allergies indicates:  No Known Allergies  Objective:     Vital Signs (Most Recent):  Temp: 98.5 °F (36.9 °C) (03/24/25 1246)  Pulse: (!) 113 (03/24/25 1246)  Resp: 16 (03/24/25 1255)  BP: 108/71 (03/24/25 1246)  SpO2: 95 % (03/24/25 1246) Vital Signs (24h Range):  Temp:  [98.1 °F (36.7 °C)-98.8 °F (37.1 °C)] 98.5 °F (36.9 °C)  Pulse:  [] 113  Resp:  [16-19] 16  SpO2:  [93 %-98 %] 95 %  BP: (108-161)/() 108/71     Weight: 68 kg (150 lb)  Body mass index is 24.21 kg/m².    Intake/Output - Last 3 Shifts         03/22 0700  03/23 0659 03/23 0700 03/24 0659 03/24 0700 03/25 0659    IV Piggyback 205.1      Total Intake(mL/kg) 205.1 (3)      Stool   3    Total Output   3    Net +205.1  -3           Urine Occurrence 4 x      Stool Occurrence 5 x 2 x              Physical Exam  Constitutional:       Appearance: He is well-developed.   HENT:      Head: Normocephalic and atraumatic.   Eyes:      Conjunctiva/sclera: Conjunctivae normal.      Pupils: Pupils are  equal, round, and reactive to light.   Neck:      Thyroid: No thyromegaly.   Cardiovascular:      Rate and Rhythm: Normal rate.   Pulmonary:      Effort: Pulmonary effort is normal. No respiratory distress.   Abdominal:      Comments: Soft, non-distended, appropriate post op TTP. Midline incision mostly intact with small area of dehiscence with SS drainage from umbilical area, no erythema or purulent drainage   Musculoskeletal:         General: No tenderness. Normal range of motion.      Cervical back: Normal range of motion.   Skin:     General: Skin is warm and dry.      Capillary Refill: Capillary refill takes less than 2 seconds.          Significant Labs:  I have reviewed all pertinent lab results within the past 24 hours.  CBC:   Recent Labs   Lab 03/24/25  0650   WBC 2.96*   RBC 2.65*   HGB 8.1*   HCT 24.7*      MCV 93   MCH 30.6   MCHC 32.8     BMP:   Recent Labs   Lab 03/21/25  0648 03/22/25  0648 03/24/25  0650   *  --   --       < > 136   K 3.1*   < > 3.5      < > 108   CO2 18*   < > 22*   BUN 8   < > 6   CREATININE 0.8   < > 0.9   CALCIUM 8.5*   < > 7.9*   MG 1.3*   < > 1.3*    < > = values in this interval not displayed.     CMP:   Recent Labs   Lab 03/21/25  0648 03/22/25  0648 03/24/25  0650   *  --   --    CALCIUM 8.5*   < > 7.9*   ALBUMIN 2.7*   < > 2.6*   PROT 5.1*  --   --       < > 136   K 3.1*   < > 3.5   CO2 18*   < > 22*      < > 108   BUN 8   < > 6   CREATININE 0.8   < > 0.9   ALKPHOS 64   < > 61   ALT 17   < > 10   AST 18   < > 13   BILITOT 0.7   < > 0.6    < > = values in this interval not displayed.       Significant Diagnostics:  I have reviewed all pertinent imaging results/findings within the past 24 hours.

## 2025-03-24 NOTE — PLAN OF CARE
03/24/25 0919   Rounds   Attendance Provider;;Charge nurse;Physical therapist;Occupational therapist   Discharge Plan A Home with family   Why the patient remains in the hospital Requires continued medical care   Transition of Care Barriers None

## 2025-03-24 NOTE — ASSESSMENT & PLAN NOTE
Patient has Abnormal Magnesium: hypomagnesemia. Will continue to monitor electrolytes closely. Will replace the affected electrolytes and repeat labs to be done after interventions completed. The patient's magnesium results have been reviewed and are listed below.  Recent Labs   Lab 03/23/25  0448   MG 1.2*

## 2025-03-24 NOTE — ASSESSMENT & PLAN NOTE
POD8 status post exploratory laparotomy with reduction of small bowel volvulus  POD7 s/p ex lap with well perfused bowel amenable to abdominal wall closure    - cont reg diet   - OOB, ambulation encouraged  - incentive spirometry  - PRN pain control  - rest of care per primary team  - will f/u in clinic 2 weeks for staple removal

## 2025-03-24 NOTE — HOSPITAL COURSE
"-The patient presented with abdominal pain. CT a/p w/contrast showed high-grade SBO with c/f decreased blood flow and ischemia-"dilated loops of small bowel. Findings compatible with a high-grade small-bowel obstruction. Some loops of distended small bowel demonstrate equivocal decreased mural enhancement. There is an equivocal finding however a stercoral enteritis based on distention is not excluded. No pneumatosis portal or mesenteric venous gas is identified. There was a twist about the root of the small bowel mesentery which results in focal high-grade narrowing of the superior mesenteric artery."  -In ED, he had a labile blood pressure and ultimately required initiation of vasopressor support and CVL placement. Surgery consulted w/plans for OR urgently tonight. Patient transferred ED to ED for expeditious definitive care. On arrival to ED, patient w/acceptable BP, however HR in 150's, sinus tachycardia, likely due to levophed. He was able to be weaned off of levophed with additional IVF. Given his need for vasopressor support, unstable hemodynamics, and high-risk surgery, and plan to leave him in discontinuity, he will be admitted to ICU post-op.   -Patient taken to OR on 3/16/25, volvulus noted. He underwent lysis of adhesions. His abdomen ws temporarily closed with ABThera wound vac. He went back to the OR on 3/17/25 for a second look to ensure bowel viable. He was noted to have well perfused bowel. Abdomen closed. He was hemodynamically stable. Patient acceptable for the floor on 3/18. Patient developed wound dehiscence. Wet to dry dressings per surgery.  -By 3/24, doing better, eating well.  Case discussed with surgery.  Plan for discharge home with an Transitional Care Clinic, instructions for outpatient follow up  " Applied no

## 2025-03-24 NOTE — ASSESSMENT & PLAN NOTE
Patient's blood pressure range in the last 24 hours was: BP  Min: 108/71  Max: 161/103.The patient's inpatient anti-hypertensive regimen is listed below:  Current Antihypertensives  hydrALAZINE injection 10 mg, Every 6 hours PRN, Intravenous    Plan  - BP labile  - held antihypertensive medications in setting of acute illness / decreased intake  - discharge back to losartan, verapamil

## 2025-03-24 NOTE — ASSESSMENT & PLAN NOTE
Patient's blood pressure range in the last 24 hours was: BP  Min: 118/84  Max: 135/94.The patient's inpatient anti-hypertensive regimen is listed below:  Current Antihypertensives  hydrALAZINE injection 10 mg, Every 6 hours PRN, Intravenous    Plan  -  BP labile  - hold antihypertensive medications for now

## 2025-03-24 NOTE — ASSESSMENT & PLAN NOTE
Patient's most recent potassium results are listed below.   Recent Labs     03/21/25  0648 03/22/25  0648 03/23/25  0448   K 3.1* 3.2* 3.4*     Plan  - Replete potassium per protocol  - Monitor potassium Daily  - Patient's hypokalemia is stable  -

## 2025-03-24 NOTE — ASSESSMENT & PLAN NOTE
-due to volvulus and adhesions  -s/p DELROY   --per surgery  -NG removed 3/21  -monitor tolerance of diet  -encourage ambulation

## 2025-03-24 NOTE — DISCHARGE SUMMARY
"[facility]  Discharge Summary      Patient Name: Jorgito Arreaga  MRN: [id number]  MARTY: [id number]  Patient Class: IP- Inpatient  Admission Date: 3/15/2025  Hospital Length of Stay: 8 days  Discharge Date and Time:  03/24/2025 2:54 PM  Attending Physician: Norris Ramírez MD   Discharging Provider: Norris Ramírez MD  Primary Care Provider: Vonda Elliott FNP    Primary Care Team: Networked reference to record PCT     HPI:   59-year-old male with known medical problems of alcohol abuse, GERD, pancreatitis, diabetes, transaminitis, TIA, HTN, HLD, inguinal hernia repair 2022, adenomatous polyps of transverse colon s/p polypectomy 2018.   He presented to [facility] ED for complaints of abdominal pain w/n/v onset shortly before arrival to ED. He was reportedly hypotensive w/EMS and was given a 500cc bolus, zofran, and fentanyl en route. He is still drinking beer with last being yesterday before presentation to ED. His appetite has been poor x several days, he has not eaten since Friday. He denied any fever, chills, cough, chest pain, shortness of breath, urinary symptoms.      ED workup showed WBC 3.08 (chronically in 3's), hgb 10.1, platelets 99, K 3.1, CO2 19, AG 20, normal Cr, glucose 245, Mg 1.3, , ALT 63, normal lipase, normal t-bili. Lactic acid 4.8, serum ethanol 276.   CT a/p w/contrast showed high-grade SBO with c/f decreased blood flow and ischemia-"dilated loops of small bowel. Findings compatible with a high-grade small-bowel obstruction. Some loops of distended small bowel demonstrate equivocal decreased mural enhancement. There is an equivocal finding however a stercoral enteritis based on distention is not excluded. No pneumatosis portal or mesenteric venous gas is identified. There was a twist about the root of the small bowel mesentery which results in focal high-grade narrowing of the superior mesenteric artery.   In ED, he had a labile blood pressure and ultimately " "required initiation of vasopressor support and CVL placement. Surgery consulted w/plans for OR urgently tonight. Patient transferred ED to ED for expeditious definitive care. On arrival to ED, patient w/acceptable BP, however HR in 150's, sinus tachycardia, likely due to levophed. He was able to be weaned off of levophed with additional IVF. Given his need for vasopressor support, unstable hemodynamics, and high-risk surgery, and plan to leave him in discontinuity, he will be admitted to ICU post-op.   Patient taken to OR on 3/16/25, volvulus noted. He underwent lysis of adhesions. His abdomen ws temporarily closed with ABThera wound vac. He went back to the OR on 3/17/25 for a second look to ensure bowel viable. He was noted to have well perfused bowel. Abdomen closed. He was hemodynamically stable. Patient acceptable for the floor. Hospital medicine consulted. Patient reports difficult to talk with NG in place.    Procedure(s) (LRB):  LAPAROTOMY, EXPLORATORY (N/A)  CLOSURE, WOUND (N/A)      Hospital Course:   -The patient presented with abdominal pain. CT a/p w/contrast showed high-grade SBO with c/f decreased blood flow and ischemia-"dilated loops of small bowel. Findings compatible with a high-grade small-bowel obstruction. Some loops of distended small bowel demonstrate equivocal decreased mural enhancement. There is an equivocal finding however a stercoral enteritis based on distention is not excluded. No pneumatosis portal or mesenteric venous gas is identified. There was a twist about the root of the small bowel mesentery which results in focal high-grade narrowing of the superior mesenteric artery."  -In ED, he had a labile blood pressure and ultimately required initiation of vasopressor support and CVL placement. Surgery consulted w/plans for OR urgently tonight. Patient transferred ED to ED for expeditious definitive care. On arrival to ED, patient w/acceptable BP, however HR in 150's, sinus tachycardia, " likely due to levophed. He was able to be weaned off of levophed with additional IVF. Given his need for vasopressor support, unstable hemodynamics, and high-risk surgery, and plan to leave him in discontinuity, he will be admitted to ICU post-op.   -Patient taken to OR on 3/16/25, volvulus noted. He underwent lysis of adhesions. His abdomen ws temporarily closed with ABThera wound vac. He went back to the OR on 3/17/25 for a second look to ensure bowel viable. He was noted to have well perfused bowel. Abdomen closed. He was hemodynamically stable. Patient acceptable for the floor on 3/18. Patient developed wound dehiscence. Wet to dry dressings per surgery.  -By 3/24, doing better, eating well.  Case discussed with surgery.  Plan for discharge home with an Transitional Care Clinic, instructions for outpatient follow up     Goals of Care Treatment Preferences:  Code Status: Full Code      SDOH Screening:  The patient was screened for food insecurity, housing instability, transportation needs, utility difficulties, and interpersonal safety. The social determinant(s) of health identified as a concern this admission are:  Food insecurity  Utility difficulties    Will discuss with case management and/or community health workers.    Social Drivers of Health with Concerns     Food Insecurity: Food Insecurity Present (3/21/2025)   Utilities: At Risk (3/21/2025)        Consults:   Consults (From admission, onward)          Status Ordering Provider     Inpatient consult to Social Work  Once        Provider:  (Not yet assigned)    Completed SHADI FRAZIER     Inpatient consult to Hospitalist  Once        Provider:  Shadi Frazier MD    Completed JESSICA HOLLIS     Inpatient consult to General Surgery  Once        Provider:  Alia Garcia MD    Completed CHAMP MTZ            Assessment & Plan  Small bowel obstruction  -due to volvulus and adhesions  -s/p DELROY   --per surgery  -NG removed  3/21  -monitor tolerance of diet  -encourage ambulation  -improved significantly, close to baseline, eating and drinking well, having regular bowel movements    Gastroesophageal reflux disease  Placed on PPI, stopping on discharge    Alcohol abuse  -prn Ativan  -monitor for signs of withdrawal  -no evidence of withdrawal    Transaminitis  -resolved    Essential hypertension  Patient's blood pressure range in the last 24 hours was: BP  Min: 108/71  Max: 161/103.The patient's inpatient anti-hypertensive regimen is listed below:  Current Antihypertensives  hydrALAZINE injection 10 mg, Every 6 hours PRN, Intravenous    Plan  - BP labile  - held antihypertensive medications in setting of acute illness / decreased intake  - discharge back to losartan, verapamil  Type 2 diabetes mellitus with hyperglycemia, with long-term current use of insulin  Patient's FSGs are uncontrolled due to hyperglycemia on current medication regimen.  Last A1c reviewed-   Lab Results   Component Value Date    HGBA1C 8.3 (H) 03/16/2025     Most recent fingerstick glucose reviewed-   Recent Labs   Lab 03/23/25  1630 03/24/25  0519 03/24/25  1113   POCTGLUCOSE 122* 219* 257*     Current correctional scale  Medium  Maintain anti-hyperglycemic dose as follows-   Antihyperglycemics (From admission, onward)      Start     Stop Route Frequency Ordered    03/17/25 1509  insulin aspart U-100 pen 0-10 Units         -- SubQ Every 4 hours PRN 03/17/25 1410          Hold Oral hypoglycemics while patient is in the hospital.  At home patient used to be on glargine, but had a hypoglycemic episode after work, and has been switched to a Humalog sliding scale by his PCP   We will continue the Humalog sliding scale on discharge  Patient reports he has that scale printed out at home and can recite to me the instructions      Hypokalemia  Patient's most recent potassium results are listed below.   Recent Labs     03/22/25  0648 03/23/25  0448 03/24/25  0650   K 3.2*  3.4* 3.5     Plan  - Replete potassium per protocol  - Monitor potassium Daily  - Patient's hypokalemia is resolved  Hypomagnesemia  Patient has Abnormal Magnesium: hypomagnesemia. Will continue to monitor electrolytes closely. Will replace the affected electrolytes and repeat labs to be done after interventions completed. The patient's magnesium results have been reviewed and are listed below.  Recent Labs   Lab 03/24/25  0650   MG 1.3*      - provided script for magnesium supplement on discharge  Hyperlipidemia  Continue atorvastatin    Wound dehiscence  Noted on Friday, 321   Is the inferior aspect, approximately 3 cm, of the vertical midline abdominal incision   Rest of the incision is closed and appears to be healing well   The open portion has no purulence, edema, erythema, etc.   Per surgery, they recommend wet-to-dry dressings at home until follow-up  Providing instructions and materials for wet-to-dry dressing at home on discharge    Final Active Diagnoses:    Diagnosis Date Noted POA    PRINCIPAL PROBLEM:  Small bowel obstruction [K56.609] 03/16/2025 Yes    Wound dehiscence [T81.30XA] 03/24/2025 No    Hypomagnesemia [E83.42] 03/16/2025 Yes    Hypokalemia [E87.6] 08/01/2024 Yes    Type 2 diabetes mellitus with hyperglycemia, with long-term current use of insulin [E11.65, Z79.4] 01/05/2023 Not Applicable    Essential hypertension [I10] 03/30/2019 Yes    Hyperlipidemia [E78.5] 03/30/2019 Yes    Transaminitis [R74.01] 02/06/2019 Yes    Alcohol abuse [F10.10] 06/06/2016 Yes     Chronic    Gastroesophageal reflux disease [K21.9] 06/06/2016 Yes     Chronic      Problems Resolved During this Admission:    Diagnosis Date Noted Date Resolved POA    Thrombocytopenia [D69.6] 03/16/2025 03/24/2025 Yes       Discharged Condition: fair    Disposition: Home or Self Care    Follow Up:   Follow-up Information       Diane Bhatti PA-C. Schedule an appointment as soon as possible for a visit in 2 week(s).    Specialty:  Colon and Rectal Surgery  Contact information:  [address] [place] [address] [address] [id number]  [phone]                           Patient Instructions:      Ambulatory referral/consult to [facility]   Standing Status: Future   Referral Priority: Routine Referral Type: Consultation   Referral Reason: Specialty Services Required   Number of Visits Requested: 1     Ambulatory referral/consult to General Surgery   Standing Status: Future   Referral Priority: Routine Referral Type: Consultation   Referral Reason: Specialty Services Required   Requested Specialty: General Surgery   Number of Visits Requested: 1       Significant Diagnostic Studies: Labs: BMP:   Recent Labs   Lab 03/23/25  0448 03/24/25  0650    136   K 3.4* 3.5    108   CO2 20* 22*   BUN 6 6   CREATININE 0.8 0.9   CALCIUM 8.0* 7.9*   MG 1.2* 1.3*    and CBC   Recent Labs   Lab 03/23/25  0448 03/24/25  0650   WBC 3.04* 2.96*   HGB 8.0* 8.1*   HCT 24.5* 24.7*    251       Pending Diagnostic Studies:       None           Medications:  Reconciled Home Medications:      Medication List        START taking these medications      magnesium oxide 400 mg (241.3 mg magnesium) tablet  Commonly known as: MAG-OX  Take 1 tablet (400 mg total) by mouth once daily. for 10 days            CHANGE how you take these medications      HumaLOG U-100 Insulin 100 unit/mL injection  Generic drug: insulin lispro  Please resume your sliding scale as prescribed by your PCP  What changed: additional instructions     LANTUS SOLOSTAR U-100 INSULIN 100 unit/mL (3 mL) Inpn pen  Generic drug: insulin glargine U-100 (Lantus)  DO NOT TAKE UNTIL YOU FOLLOW UP WITH PCP  What changed:   how much to take  how to take this  when to take this            CONTINUE taking these medications      aspirin 325 MG tablet  Take 1 tablet (325 mg total) by mouth once daily.     atorvastatin 40 MG tablet  Commonly known as: LIPITOR  Take 1 tablet (40 mg total)  "by mouth once daily.     blood sugar diagnostic Strp  Commonly known as: ONETOUCH ULTRA BLUE TEST STRIP  USE TO CHECK SUGAR BEFORE MEALS AND AT BEDTIME     clotrimazole-betamethasone 1-0.05% cream  Commonly known as: LOTRISONE  Apply topically 2 (two) times daily.     FeroSuL 325 mg (65 mg iron) Tab tablet  Generic drug: ferrous sulfate  Take by mouth.     folic acid 1 MG tablet  Commonly known as: FOLVITE  Take 1 tablet (1 mg total) by mouth once daily.     gabapentin 800 MG tablet  Commonly known as: NEURONTIN  Take 800 mg by mouth 3 (three) times daily.     insulin syringe-needle,dispos. 1 mL 28 gauge x 1/2" Syrg  1 each by Misc.(Non-Drug; Combo Route) route 2 (two) times daily.     lancets 33 gauge Misc  Commonly known as: ONETOUCH DELICA PLUS LANCET  Inject 1 lancet into the skin 4 (four) times daily before meals and nightly.     losartan 100 MG tablet  Commonly known as: COZAAR  Take 1 tablet (100 mg total) by mouth once daily. TAKE 1 TABLET BY MOUTH ONCE DAILY     mupirocin 2 % ointment  Commonly known as: BACTROBAN  Apply topically 3 (three) times daily.     pen needle, diabetic 32 gauge x 5/32" Ndle  Commonly known as: BD JOEL 2ND GEN PEN NEEDLE  Inject 1 each into the skin 4 (four) times daily before meals and nightly.     potassium, sodium phosphates 280-160-250 mg Pwpk  Commonly known as: PHOS-NAK  Take 1 packet by mouth 4 (four) times daily before meals and nightly.     verapamiL 240 MG CR tablet  Commonly known as: CALAN-SR  Take 1 tablet (240 mg total) by mouth once daily.              Indwelling Lines/Drains at time of discharge:   Lines/Drains/Airways       None                   Time spent on the discharge of patient: 65 minutes         Norris Ramírez MD  Department of Hospital Medicine  [facility]  "

## 2025-03-24 NOTE — ASSESSMENT & PLAN NOTE
Noted on Friday, 321   Is the inferior aspect, approximately 3 cm, of the vertical midline abdominal incision   Rest of the incision is closed and appears to be healing well   The open portion has no purulence, edema, erythema, etc.   Per surgery, they recommend wet-to-dry dressings at home until follow-up  Providing instructions and materials for wet-to-dry dressing at home on discharge

## 2025-03-24 NOTE — PROGRESS NOTES
[facility] Surg  Colorectal Surgery  Progress Note    Subjective:     Interval History: tolerating regular diet, no n/v. Having bowel function.     Medications:  Continuous Infusions:  Scheduled Meds:   folic acid  1 mg Oral Daily    pantoprazole  40 mg Oral BID     PRN Meds:  Current Facility-Administered Medications:     0.9%  NaCl infusion (for blood administration), , Intravenous, Q24H PRN    0.9%  NaCl infusion (for blood administration), , Intravenous, Q24H PRN    dextrose 50%, 12.5 g, Intravenous, PRN    dextrose 50%, 25 g, Intravenous, PRN    glucagon (human recombinant), 1 mg, Intramuscular, PRN    hydrALAZINE, 10 mg, Intravenous, Q6H PRN    influenza, 0.5 mL, Intramuscular, Prior to discharge    insulin aspart U-100, 0-10 Units, Subcutaneous, Q4H PRN    lorazepam, 1 mg, Intravenous, Q4H PRN    lorazepam, 2 mg, Intravenous, PRN    ondansetron, 4 mg, Intravenous, Q6H PRN    pneumoc 20-guanako conj-dip cr(PF), 0.5 mL, Intramuscular, Prior to discharge    sodium chloride 0.9%, 10 mL, Intravenous, PRN     Review of patient's allergies indicates:  No Known Allergies  Objective:     Vital Signs (Most Recent):  Temp: 98.5 °F (36.9 °C) (03/24/25 1246)  Pulse: (!) 113 (03/24/25 1246)  Resp: 16 (03/24/25 1255)  BP: 108/71 (03/24/25 1246)  SpO2: 95 % (03/24/25 1246) Vital Signs (24h Range):  Temp:  [98.1 °F (36.7 °C)-98.8 °F (37.1 °C)] 98.5 °F (36.9 °C)  Pulse:  [] 113  Resp:  [16-19] 16  SpO2:  [93 %-98 %] 95 %  BP: (108-161)/() 108/71     Weight: 68 kg (150 lb)  Body mass index is 24.21 kg/m².    Intake/Output - Last 3 Shifts         03/22 0700  03/23 0659 03/23 0700 03/24 0659 03/24 0700 03/25 0659    IV Piggyback 205.1      Total Intake(mL/kg) 205.1 (3)      Stool   3    Total Output   3    Net +205.1  -3           Urine Occurrence 4 x      Stool Occurrence 5 x 2 x              Physical Exam  Constitutional:       Appearance: He is well-developed.   HENT:      Head: Normocephalic and atraumatic.    Eyes:      Conjunctiva/sclera: Conjunctivae normal.      Pupils: Pupils are equal, round, and reactive to light.   Neck:      Thyroid: No thyromegaly.   Cardiovascular:      Rate and Rhythm: Normal rate.   Pulmonary:      Effort: Pulmonary effort is normal. No respiratory distress.   Abdominal:      Comments: Soft, non-distended, appropriate post op TTP. Midline incision mostly intact with small area of dehiscence with SS drainage from umbilical area, no erythema or purulent drainage   Musculoskeletal:         General: No tenderness. Normal range of motion.      Cervical back: Normal range of motion.   Skin:     General: Skin is warm and dry.      Capillary Refill: Capillary refill takes less than 2 seconds.          Significant Labs:  I have reviewed all pertinent lab results within the past 24 hours.  CBC:   Recent Labs   Lab 03/24/25  0650   WBC 2.96*   RBC 2.65*   HGB 8.1*   HCT 24.7*      MCV 93   MCH 30.6   MCHC 32.8     BMP:   Recent Labs   Lab 03/21/25  0648 03/22/25  0648 03/24/25  0650   *  --   --       < > 136   K 3.1*   < > 3.5      < > 108   CO2 18*   < > 22*   BUN 8   < > 6   CREATININE 0.8   < > 0.9   CALCIUM 8.5*   < > 7.9*   MG 1.3*   < > 1.3*    < > = values in this interval not displayed.     CMP:   Recent Labs   Lab 03/21/25  0648 03/22/25  0648 03/24/25  0650   *  --   --    CALCIUM 8.5*   < > 7.9*   ALBUMIN 2.7*   < > 2.6*   PROT 5.1*  --   --       < > 136   K 3.1*   < > 3.5   CO2 18*   < > 22*      < > 108   BUN 8   < > 6   CREATININE 0.8   < > 0.9   ALKPHOS 64   < > 61   ALT 17   < > 10   AST 18   < > 13   BILITOT 0.7   < > 0.6    < > = values in this interval not displayed.       Significant Diagnostics:  I have reviewed all pertinent imaging results/findings within the past 24 hours.  Assessment/Plan:     * Small bowel obstruction  POD8 status post exploratory laparotomy with reduction of small bowel volvulus  POD7 s/p ex lap with well  perfused bowel amenable to abdominal wall closure    - cont reg diet   - OOB, ambulation encouraged  - incentive spirometry  - PRN pain control  - rest of care per primary team  - will f/u in clinic 2 weeks for staple removal     Type 2 diabetes mellitus with hyperglycemia, with long-term current use of insulin  Per hospital Medicine    Essential hypertension  Per hospital Medicine    Alcohol abuse  Per hospital medicine    Gastroesophageal reflux disease  Per hospital Medicine        Diane Bhatti PA-C  Colorectal Surgery  [facility]

## 2025-03-24 NOTE — PLAN OF CARE
Pt being discharged Home in stable condition. IV removed, catheter intact, pt tolerated well. Tele monitor removed, given to US. Discharge instructions given to pt, pt verbalized understanding.  Reviewed wet to dry dressing care for abdominal incision, provided wound care supplies.

## 2025-03-24 NOTE — ASSESSMENT & PLAN NOTE
-due to volvulus and adhesions  -s/p DELROY   --per surgery  -NG removed 3/21  -monitor tolerance of diet  -encourage ambulation  -improved significantly, close to baseline, eating and drinking well, having regular bowel movements

## 2025-03-28 ENCOUNTER — TELEPHONE (OUTPATIENT)
Dept: SURGERY | Facility: CLINIC | Age: 60
End: 2025-03-28
Payer: MEDICAID

## 2025-03-28 NOTE — TELEPHONE ENCOUNTER
Called pt to see if he has transportation through his insurance for his appt on 4/8. Pt informed me that transportation has been taken care of through his insurance. Date and time of appointment and location of clinic given to pt.      ----- Message from  Phyllis sent at 3/28/2025  9:23 AM CDT -----  Contact: self  Good morning, If you place a referral, KLE572 Ambulatory Referral to Outpatient Case Management, we can connect him to a  to assist with transportation. I don't know that it will be possible to have transportation in place prior to his appt on 4/8 though. Do you know if he has a transportation benefit through Medicaid? Thanks, Phyllis Bruner RN  ----- Message -----  From: Brianne Dickens RN  Sent: 3/28/2025   9:04 AM CDT  To: [facility] Outpatient Case Management Staff; [facility] C#    Good morning, I'm trying to figure out who can assist with this! Please let me know if I need to send elsewhere.Thank you so much!Brianne  ----- Message -----  From: Fransico Wu MSW  Sent: 3/28/2025   8:59 AM CDT  To: Brianne Dickens RN    Hey you may  need to send this to the outpatient case management. IM inpatient.  ----- Message -----  From: Brianne Dickens RN  Sent: 3/28/2025   8:50 AM CDT  To: MARCO Rodas    Good morning, This pt needs transportation to his post-op appointment. Would you please assist with this or forward to someone who can? Thank you!Brianne  ----- Message -----  From: Becki Andres  Sent: 3/28/2025   8:47 AM CDT  To: [facility] Clinical Suppo#    .Type:  Needs Medical AdviceWho Called: .Jorgito Roman the patient rather a call back or a response via [facility]? Call Kam Call Back Number: .[phone] Information: Pt would like a call back in regard to transportation, states it was set up fr him before.

## 2025-04-08 ENCOUNTER — TELEPHONE (OUTPATIENT)
Dept: SURGERY | Facility: CLINIC | Age: 60
End: 2025-04-08
Payer: MEDICAID

## 2025-04-08 NOTE — TELEPHONE ENCOUNTER
Called and spoke with patient regarding appointment. Patient stated he is in pain and will not make it to today's appointment. Patient ask to reschedule to next week. Informed patient he is scheduled on 4/16 at 10:00 am with Diane for a wound check. Patient verbalized understanding.    ----- Message from Naomi sent at 4/8/2025 12:00 PM CDT -----  Contact: self    ----- Message -----  From: Rosey Spear RN  Sent: 4/8/2025  11:46 AM CDT  To: Naomi Barboza    This dept is to schedule an endoscopy procedure, please send this msg to surgeon  ----- Message -----  From: Naomi Barboza  Sent: 4/8/2025  11:40 AM CDT  To: [facility]    Type:  Sooner Apoointment RequestCaller is requesting a sooner appointment.  Caller declined first available appointment listed below.  Caller will not accept being placed on the waitlist and is requesting a message be sent to doctor.Name of Caller:Jorgito Call is the first available appointment?Symptoms:2 week post opWould the patient rather a call back or a response via [facility]? Call [facility] Call Back Number:[phone] Information: the patient states that he is in pain and need to reschedule his post op due to him being in pain.

## 2025-06-26 ENCOUNTER — HOSPITAL ENCOUNTER (INPATIENT)
Facility: HOSPITAL | Age: 60
LOS: 4 days | Discharge: HOME | DRG: 872 | End: 2025-07-01
Attending: EMERGENCY MEDICINE | Admitting: HOSPITALIST
Payer: MEDICAID

## 2025-06-26 DIAGNOSIS — E83.39 HYPOPHOSPHATEMIA: ICD-10-CM

## 2025-06-26 DIAGNOSIS — I95.9 HYPOTENSION, UNSPECIFIED HYPOTENSION TYPE: ICD-10-CM

## 2025-06-26 DIAGNOSIS — R53.1 WEAKNESS: ICD-10-CM

## 2025-06-26 DIAGNOSIS — R19.7 DIARRHEA, UNSPECIFIED TYPE: ICD-10-CM

## 2025-06-26 DIAGNOSIS — D61.818 PANCYTOPENIA: ICD-10-CM

## 2025-06-26 DIAGNOSIS — R74.01 TRANSAMINITIS: ICD-10-CM

## 2025-06-26 DIAGNOSIS — D64.9 NORMOCYTIC ANEMIA: ICD-10-CM

## 2025-06-26 DIAGNOSIS — E87.20 LACTIC ACIDOSIS: ICD-10-CM

## 2025-06-26 DIAGNOSIS — E83.42 HYPOMAGNESEMIA: ICD-10-CM

## 2025-06-26 DIAGNOSIS — E11.65 TYPE 2 DIABETES MELLITUS WITH HYPERGLYCEMIA, WITH LONG-TERM CURRENT USE OF INSULIN: ICD-10-CM

## 2025-06-26 DIAGNOSIS — R93.5 ABNORMAL CT OF THE ABDOMEN: ICD-10-CM

## 2025-06-26 DIAGNOSIS — K21.9 GASTROESOPHAGEAL REFLUX DISEASE, UNSPECIFIED WHETHER ESOPHAGITIS PRESENT: ICD-10-CM

## 2025-06-26 DIAGNOSIS — R65.20 SEPSIS WITH ACUTE RENAL FAILURE WITHOUT SEPTIC SHOCK, DUE TO UNSPECIFIED ORGANISM, UNSPECIFIED ACUTE RENAL FAILURE TYPE: ICD-10-CM

## 2025-06-26 DIAGNOSIS — E87.6 HYPOKALEMIA: ICD-10-CM

## 2025-06-26 DIAGNOSIS — E83.51 HYPOCALCEMIA: ICD-10-CM

## 2025-06-26 DIAGNOSIS — N17.9 SEPSIS WITH ACUTE RENAL FAILURE WITHOUT SEPTIC SHOCK, DUE TO UNSPECIFIED ORGANISM, UNSPECIFIED ACUTE RENAL FAILURE TYPE: ICD-10-CM

## 2025-06-26 DIAGNOSIS — J90 PLEURAL EFFUSION: ICD-10-CM

## 2025-06-26 DIAGNOSIS — N17.9 AKI (ACUTE KIDNEY INJURY): ICD-10-CM

## 2025-06-26 DIAGNOSIS — E11.65 HYPERGLYCEMIA DUE TO DIABETES MELLITUS: Primary | ICD-10-CM

## 2025-06-26 DIAGNOSIS — Z79.4 TYPE 2 DIABETES MELLITUS WITH HYPERGLYCEMIA, WITH LONG-TERM CURRENT USE OF INSULIN: ICD-10-CM

## 2025-06-26 DIAGNOSIS — A41.9 SEPSIS WITH ACUTE RENAL FAILURE WITHOUT SEPTIC SHOCK, DUE TO UNSPECIFIED ORGANISM, UNSPECIFIED ACUTE RENAL FAILURE TYPE: ICD-10-CM

## 2025-06-26 DIAGNOSIS — Z13.6 SCREENING FOR CARDIOVASCULAR CONDITION: ICD-10-CM

## 2025-06-26 DIAGNOSIS — R07.9 CHEST PAIN: ICD-10-CM

## 2025-06-26 LAB
ABSOLUTE EOSINOPHIL (OHS): 0.02 K/UL
ABSOLUTE MONOCYTE (OHS): 0.29 K/UL (ref 0.3–1)
ABSOLUTE NEUTROPHIL COUNT (OHS): 2.3 K/UL (ref 1.8–7.7)
ALBUMIN SERPL BCP-MCNC: 2.6 G/DL (ref 3.5–5.2)
ALLENS TEST: ABNORMAL
ALP SERPL-CCNC: 76 UNIT/L (ref 40–150)
ALT SERPL W/O P-5'-P-CCNC: 26 UNIT/L (ref 10–44)
ANION GAP (OHS): 11 MMOL/L (ref 8–16)
ANION GAP (OHS): 12 MMOL/L (ref 8–16)
ANISOCYTOSIS BLD QL SMEAR: SLIGHT
AST SERPL-CCNC: 48 UNIT/L (ref 11–45)
B-OH-BUTYR BLD STRIP-SCNC: 0.4 MMOL/L
BACTERIA #/AREA URNS HPF: ABNORMAL /HPF
BASOPHILS # BLD AUTO: 0.01 K/UL
BASOPHILS NFR BLD AUTO: 0.3 %
BILIRUB SERPL-MCNC: 2.2 MG/DL (ref 0.1–1)
BILIRUB UR QL STRIP.AUTO: NEGATIVE
BUN SERPL-MCNC: 19 MG/DL (ref 6–20)
BUN SERPL-MCNC: 19 MG/DL (ref 6–20)
CALCIUM SERPL-MCNC: 6.6 MG/DL (ref 8.7–10.5)
CALCIUM SERPL-MCNC: 7.2 MG/DL (ref 8.7–10.5)
CHLORIDE SERPL-SCNC: 103 MMOL/L (ref 95–110)
CHLORIDE SERPL-SCNC: 98 MMOL/L (ref 95–110)
CLARITY UR: CLEAR
CO2 SERPL-SCNC: 19 MMOL/L (ref 23–29)
CO2 SERPL-SCNC: 22 MMOL/L (ref 23–29)
COLOR UR AUTO: YELLOW
CREAT SERPL-MCNC: 1.7 MG/DL (ref 0.5–1.4)
CREAT SERPL-MCNC: 2.1 MG/DL (ref 0.5–1.4)
CTP QC/QA: YES
CTP QC/QA: YES
DACRYOCYTES BLD QL SMEAR: ABNORMAL
DELSYS: ABNORMAL
ERYTHROCYTE [DISTWIDTH] IN BLOOD BY AUTOMATED COUNT: 13.2 % (ref 11.5–14.5)
FIO2: 21
GFR SERPLBLD CREATININE-BSD FMLA CKD-EPI: 36 ML/MIN/1.73/M2
GFR SERPLBLD CREATININE-BSD FMLA CKD-EPI: 46 ML/MIN/1.73/M2
GLUCOSE SERPL-MCNC: 434 MG/DL (ref 70–110)
GLUCOSE SERPL-MCNC: 517 MG/DL (ref 70–110)
GLUCOSE SERPL-MCNC: 543 MG/DL (ref 70–110)
GLUCOSE SERPL-MCNC: 630 MG/DL (ref 70–110)
GLUCOSE UR QL STRIP: ABNORMAL
HCO3 UR-SCNC: 24.2 MMOL/L (ref 24–28)
HCT VFR BLD AUTO: 19.6 % (ref 40–54)
HCT VFR BLD CALC: 25 %PCV (ref 36–54)
HGB BLD-MCNC: 6.9 GM/DL (ref 14–18)
HGB UR QL STRIP: ABNORMAL
HYALINE CASTS #/AREA URNS LPF: 1 /LPF (ref 0–1)
HYPOCHROMIA BLD QL SMEAR: ABNORMAL
IMM GRANULOCYTES # BLD AUTO: 0.01 K/UL (ref 0–0.04)
IMM GRANULOCYTES NFR BLD AUTO: 0.3 % (ref 0–0.5)
KETONES UR QL STRIP: NEGATIVE
LACTATE SERPL-SCNC: 2.9 MMOL/L (ref 0.5–2.2)
LACTATE SERPL-SCNC: 3.2 MMOL/L (ref 0.5–2.2)
LEUKOCYTE ESTERASE UR QL STRIP: NEGATIVE
LIPASE SERPL-CCNC: 7 U/L (ref 4–60)
LYMPHOCYTES # BLD AUTO: 0.68 K/UL (ref 1–4.8)
MAGNESIUM SERPL-MCNC: 0.8 MG/DL (ref 1.6–2.6)
MCH RBC QN AUTO: 31.7 PG (ref 27–31)
MCHC RBC AUTO-ENTMCNC: 35.2 G/DL (ref 32–36)
MCV RBC AUTO: 90 FL (ref 82–98)
MICROSCOPIC COMMENT: ABNORMAL
MODE: ABNORMAL
NITRITE UR QL STRIP: NEGATIVE
NUCLEATED RBC (/100WBC) (OHS): 2 /100 WBC
PCO2 BLDA: 42.7 MMHG (ref 35–45)
PH SMN: 7.36 [PH] (ref 7.35–7.45)
PH UR STRIP: 6 [PH]
PLATELET # BLD AUTO: 72 K/UL (ref 150–450)
PLATELET BLD QL SMEAR: ABNORMAL
PMV BLD AUTO: 13.1 FL (ref 9.2–12.9)
PO2 BLDA: 22 MMHG (ref 40–60)
POC BE: -1 MMOL/L (ref -2–2)
POC IONIZED CALCIUM: 1.08 MMOL/L (ref 1.06–1.42)
POC MOLECULAR INFLUENZA A AGN: NEGATIVE
POC MOLECULAR INFLUENZA B AGN: NEGATIVE
POC SATURATED O2: 35 % (ref 95–100)
POCT GLUCOSE: >500 MG/DL (ref 70–110)
POCT GLUCOSE: >500 MG/DL (ref 70–110)
POIKILOCYTOSIS BLD QL SMEAR: SLIGHT
POLYCHROMASIA BLD QL SMEAR: ABNORMAL
POTASSIUM BLD-SCNC: 3.8 MMOL/L (ref 3.5–5.1)
POTASSIUM SERPL-SCNC: 3 MMOL/L (ref 3.5–5.1)
POTASSIUM SERPL-SCNC: 3.8 MMOL/L (ref 3.5–5.1)
PROCALCITONIN SERPL-MCNC: 0.3 NG/ML
PROT SERPL-MCNC: 5.4 GM/DL (ref 6–8.4)
PROT UR QL STRIP: NEGATIVE
RBC # BLD AUTO: 2.18 M/UL (ref 4.6–6.2)
RBC #/AREA URNS HPF: 8 /HPF (ref 0–4)
RELATIVE EOSINOPHIL (OHS): 0.6 %
RELATIVE LYMPHOCYTE (OHS): 20.5 % (ref 18–48)
RELATIVE MONOCYTE (OHS): 8.8 % (ref 4–15)
RELATIVE NEUTROPHIL (OHS): 69.5 % (ref 38–73)
SAMPLE: ABNORMAL
SARS-COV-2 RDRP RESP QL NAA+PROBE: NEGATIVE
SITE: ABNORMAL
SODIUM BLD-SCNC: 133 MMOL/L (ref 136–145)
SODIUM SERPL-SCNC: 131 MMOL/L (ref 136–145)
SODIUM SERPL-SCNC: 134 MMOL/L (ref 136–145)
SP GR UR STRIP: 1.01
SP02: 99
SQUAMOUS #/AREA URNS HPF: 1 /HPF
STOMATOCYTES BLD QL SMEAR: PRESENT
TARGETS BLD QL SMEAR: ABNORMAL
TROPONIN I SERPL DL<=0.01 NG/ML-MCNC: 0.02 NG/ML
UROBILINOGEN UR STRIP-ACNC: NEGATIVE EU/DL
WBC # BLD AUTO: 3.31 K/UL (ref 3.9–12.7)
WBC #/AREA URNS HPF: 3 /HPF (ref 0–5)
YEAST URNS QL MICRO: ABNORMAL /HPF

## 2025-06-26 PROCEDURE — 96365 THER/PROPH/DIAG IV INF INIT: CPT | Mod: ER

## 2025-06-26 PROCEDURE — 87040 BLOOD CULTURE FOR BACTERIA: CPT | Performed by: EMERGENCY MEDICINE

## 2025-06-26 PROCEDURE — 93005 ELECTROCARDIOGRAM TRACING: CPT | Mod: ER

## 2025-06-26 PROCEDURE — 25500020 PHARM REV CODE 255: Mod: ER | Performed by: EMERGENCY MEDICINE

## 2025-06-26 PROCEDURE — 63600175 PHARM REV CODE 636 W HCPCS: Mod: ER | Performed by: EMERGENCY MEDICINE

## 2025-06-26 PROCEDURE — 84295 ASSAY OF SERUM SODIUM: CPT | Mod: ER

## 2025-06-26 PROCEDURE — 87389 HIV-1 AG W/HIV-1&-2 AB AG IA: CPT | Performed by: EMERGENCY MEDICINE

## 2025-06-26 PROCEDURE — 83690 ASSAY OF LIPASE: CPT | Mod: ER | Performed by: EMERGENCY MEDICINE

## 2025-06-26 PROCEDURE — 99285 EMERGENCY DEPT VISIT HI MDM: CPT | Mod: 25,ER

## 2025-06-26 PROCEDURE — 96366 THER/PROPH/DIAG IV INF ADDON: CPT | Mod: ER

## 2025-06-26 PROCEDURE — 96361 HYDRATE IV INFUSION ADD-ON: CPT | Mod: ER

## 2025-06-26 PROCEDURE — 25000003 PHARM REV CODE 250: Mod: ER | Performed by: EMERGENCY MEDICINE

## 2025-06-26 PROCEDURE — 82947 ASSAY GLUCOSE BLOOD QUANT: CPT | Mod: ER | Performed by: EMERGENCY MEDICINE

## 2025-06-26 PROCEDURE — 86803 HEPATITIS C AB TEST: CPT | Performed by: EMERGENCY MEDICINE

## 2025-06-26 PROCEDURE — 85025 COMPLETE CBC W/AUTO DIFF WBC: CPT | Mod: ER | Performed by: EMERGENCY MEDICINE

## 2025-06-26 PROCEDURE — 96375 TX/PRO/DX INJ NEW DRUG ADDON: CPT | Mod: ER

## 2025-06-26 PROCEDURE — 84145 PROCALCITONIN (PCT): CPT | Mod: ER | Performed by: EMERGENCY MEDICINE

## 2025-06-26 PROCEDURE — 84132 ASSAY OF SERUM POTASSIUM: CPT | Mod: ER

## 2025-06-26 PROCEDURE — 96367 TX/PROPH/DG ADDL SEQ IV INF: CPT | Mod: ER

## 2025-06-26 PROCEDURE — 82962 GLUCOSE BLOOD TEST: CPT | Mod: ER

## 2025-06-26 PROCEDURE — 81001 URINALYSIS AUTO W/SCOPE: CPT | Mod: ER | Performed by: EMERGENCY MEDICINE

## 2025-06-26 PROCEDURE — 83735 ASSAY OF MAGNESIUM: CPT | Mod: ER | Performed by: EMERGENCY MEDICINE

## 2025-06-26 PROCEDURE — 82010 KETONE BODYS QUAN: CPT | Mod: ER | Performed by: EMERGENCY MEDICINE

## 2025-06-26 PROCEDURE — 83605 ASSAY OF LACTIC ACID: CPT | Mod: ER | Performed by: EMERGENCY MEDICINE

## 2025-06-26 PROCEDURE — 87635 SARS-COV-2 COVID-19 AMP PRB: CPT | Mod: ER | Performed by: EMERGENCY MEDICINE

## 2025-06-26 PROCEDURE — 82803 BLOOD GASES ANY COMBINATION: CPT | Mod: ER

## 2025-06-26 PROCEDURE — 96372 THER/PROPH/DIAG INJ SC/IM: CPT | Performed by: EMERGENCY MEDICINE

## 2025-06-26 PROCEDURE — 82800 BLOOD PH: CPT | Mod: ER

## 2025-06-26 PROCEDURE — 82330 ASSAY OF CALCIUM: CPT | Mod: ER

## 2025-06-26 PROCEDURE — 93010 ELECTROCARDIOGRAM REPORT: CPT | Mod: ,,, | Performed by: INTERNAL MEDICINE

## 2025-06-26 PROCEDURE — A9698 NON-RAD CONTRAST MATERIALNOC: HCPCS | Mod: ER | Performed by: EMERGENCY MEDICINE

## 2025-06-26 PROCEDURE — 82040 ASSAY OF SERUM ALBUMIN: CPT | Mod: ER | Performed by: EMERGENCY MEDICINE

## 2025-06-26 PROCEDURE — 84484 ASSAY OF TROPONIN QUANT: CPT | Mod: ER | Performed by: EMERGENCY MEDICINE

## 2025-06-26 PROCEDURE — 87502 INFLUENZA DNA AMP PROBE: CPT | Mod: ER

## 2025-06-26 PROCEDURE — 85014 HEMATOCRIT: CPT | Mod: ER

## 2025-06-26 RX ORDER — SODIUM CHLORIDE, SODIUM LACTATE, POTASSIUM CHLORIDE, CALCIUM CHLORIDE 600; 310; 30; 20 MG/100ML; MG/100ML; MG/100ML; MG/100ML
1000 INJECTION, SOLUTION INTRAVENOUS
Status: COMPLETED | OUTPATIENT
Start: 2025-06-26 | End: 2025-06-26

## 2025-06-26 RX ORDER — MAGNESIUM SULFATE HEPTAHYDRATE 40 MG/ML
2 INJECTION, SOLUTION INTRAVENOUS ONCE
Status: COMPLETED | OUTPATIENT
Start: 2025-06-26 | End: 2025-06-26

## 2025-06-26 RX ORDER — POTASSIUM CHLORIDE 7.45 MG/ML
10 INJECTION INTRAVENOUS
Status: COMPLETED | OUTPATIENT
Start: 2025-06-26 | End: 2025-06-27

## 2025-06-26 RX ORDER — CEFEPIME HYDROCHLORIDE 2 G/1
2 INJECTION, POWDER, FOR SOLUTION INTRAVENOUS
Status: COMPLETED | OUTPATIENT
Start: 2025-06-26 | End: 2025-06-26

## 2025-06-26 RX ORDER — POTASSIUM CHLORIDE 7.45 MG/ML
10 INJECTION INTRAVENOUS
Status: COMPLETED | OUTPATIENT
Start: 2025-06-26 | End: 2025-06-26

## 2025-06-26 RX ORDER — CALCIUM GLUCONATE 20 MG/ML
1 INJECTION, SOLUTION INTRAVENOUS ONCE
Status: COMPLETED | OUTPATIENT
Start: 2025-06-27 | End: 2025-06-27

## 2025-06-26 RX ADMIN — SODIUM CHLORIDE 1000 ML: 9 INJECTION, SOLUTION INTRAVENOUS at 06:06

## 2025-06-26 RX ADMIN — IOHEXOL 1000 ML: 12 SOLUTION ORAL at 11:06

## 2025-06-26 RX ADMIN — MAGNESIUM SULFATE HEPTAHYDRATE 2 G: 40 INJECTION, SOLUTION INTRAVENOUS at 07:06

## 2025-06-26 RX ADMIN — CEFEPIME 2 G: 2 INJECTION, POWDER, FOR SOLUTION INTRAVENOUS at 09:06

## 2025-06-26 RX ADMIN — HUMAN INSULIN 5 UNITS: 100 INJECTION, SOLUTION SUBCUTANEOUS at 07:06

## 2025-06-26 RX ADMIN — POTASSIUM CHLORIDE 10 MEQ: 7.46 INJECTION, SOLUTION INTRAVENOUS at 07:06

## 2025-06-26 RX ADMIN — SODIUM CHLORIDE 1000 ML: 9 INJECTION, SOLUTION INTRAVENOUS at 07:06

## 2025-06-26 RX ADMIN — SODIUM CHLORIDE, POTASSIUM CHLORIDE, SODIUM LACTATE AND CALCIUM CHLORIDE 1000 ML: 600; 310; 30; 20 INJECTION, SOLUTION INTRAVENOUS at 09:06

## 2025-06-26 NOTE — ED PROVIDER NOTES
Encounter Date: 6/26/2025       History     Chief Complaint   Patient presents with    Weakness     Brought in by [facility] for generalized weakness progressively worsening over the last two weeks. Pt reports n/v/d for approximately 1 week. Systolic bp on scene was in the 80's. CBG on scene > 500. Pt given 300mL of NS in route. GCS of 15     59-year-old male with a history of CVA/TIA, diabetes, alcoholic pancreatitis GERD, and hypertension who presents to emergency department with generalized weakness for 1 month that has progressively worsened over the last 3 days.  He reports chronic vomiting for 1 year and diarrhea over the last 2 weeks.  He reports intermittent mild abdominal pain.  Patient states he vomits with any p.o. intake.  He reports difficulty urinating and shortness of breath.  He denies chest pain or fevers.  Reports significant weight loss over the last 2 years.  Patient was hypotensive and responded to a fluid bolus.    The history is provided by the patient and the EMS personnel.     Review of patient's allergies indicates:  No Known Allergies  Past Medical History:   Diagnosis Date    Abscess 9/5/2018    Cataracts, bilateral     Chest pain 12/6/2018    Dehydration     Diabetes mellitus, type 2     Diagnosed 7/2016    Folliculitis 9/15/2017    GERD (gastroesophageal reflux disease)     Hypertension     Inflammatory polyps of colon     Pancreatitis, alcoholic, acute     Port catheter in place     TIA (transient ischemic attack)     Tinea cruris 9/15/2017     Past Surgical History:   Procedure Laterality Date    APPLICATION OF WOUND VACUUM-ASSISTED CLOSURE DEVICE N/A 3/16/2025    Procedure: APPLICATION, WOUND VAC;  Surgeon: Alia Garcia MD;  Location: [facility] OR;  Service: Colon and Rectal;  Laterality: N/A;    CLOSURE OF WOUND N/A 3/17/2025    Procedure: CLOSURE, WOUND;  Surgeon: Alia Garcia MD;  Location: [facility] OR;  Service: Colon and Rectal;  Laterality: N/A;  ABDOMINAL WOUND CLOSURE     COLONOSCOPY      COLONOSCOPY N/A 6/15/2018    Procedure: COLONOSCOPY;  Surgeon: Jim Hicks MD;  Location: [facility];  Service: Endoscopy;  Laterality: N/A;    ESOPHAGOGASTRODUODENOSCOPY N/A 6/15/2018    Procedure: ESOPHAGOGASTRODUODENOSCOPY (EGD);  Surgeon: Jim Hicks MD;  Location: [facility];  Service: Endoscopy;  Laterality: N/A;    INJECTION OF ANESTHETIC AGENT INTO TISSUE PLANE DEFINED BY TRANSVERSUS ABDOMINIS MUSCLE N/A 3/16/2025    Procedure: BLOCK, TRANSVERSUS ABDOMINIS PLANE;  Surgeon: Alia Garcia MD;  Location: [facility] OR;  Service: Colon and Rectal;  Laterality: N/A;    LAPAROTOMY, EXPLORATORY N/A 3/17/2025    Procedure: LAPAROTOMY, EXPLORATORY;  Surgeon: Alia Garcia MD;  Location: [facility] OR;  Service: Colon and Rectal;  Laterality: N/A;  REMOVAL OF WOUND VAC    LAPAROTOMY, EXPLORATORY N/A 3/16/2025    Procedure: LAPAROTOMY, EXPLORATORY;  Surgeon: Alia Garcia MD;  Location: [facility];  Service: Colon and Rectal;  Laterality: N/A;  lysis of adhesions    ROBOT-ASSISTED LAPAROSCOPIC REPAIR OF INGUINAL HERNIA USING DA JANET XI Bilateral 11/30/2022    Procedure: XI ROBOTIC REPAIR, HERNIA, INGUINAL;  Surgeon: Smooth Denny MD;  Location: [facility] OR;  Service: General;  Laterality: Bilateral;     Family History   Problem Relation Name Age of Onset    Hypertension Mother      Hypertension Father       Social History[1]  Review of Systems   Constitutional:  Negative for fever.   Respiratory:  Positive for shortness of breath.    Cardiovascular:  Positive for leg swelling. Negative for chest pain.   Gastrointestinal:  Positive for abdominal pain, diarrhea and vomiting.   Genitourinary:  Positive for decreased urine volume and difficulty urinating.   Neurological:  Positive for weakness.       Physical Exam     Initial Vitals [06/26/25 1743]   BP Pulse Resp Temp SpO2   118/88 101 16 98.7 °F (37.1 °C) 96 %      MAP       --         Physical Exam    Vitals reviewed.  Constitutional: He appears  cachectic. He has a sickly appearance.   HENT:   Head: Normocephalic and atraumatic.   Eyes: Pupils are equal, round, and reactive to light.   Pale conjunctiva   Neck: No JVD present.   Cardiovascular:  Normal rate and regular rhythm.     Exam reveals no gallop and no friction rub.       No murmur heard.  Pulmonary/Chest: Breath sounds normal. No respiratory distress.   Abdominal: Abdomen is soft. He exhibits no distension. There is no abdominal tenderness. There is no rebound and no guarding.   Musculoskeletal:      Right lower le+ Pitting Edema present.      Left lower le+ Pitting Edema present.     Neurological: He is alert and oriented to person, place, and time.   Skin: Skin is warm and dry.   Psychiatric: He has a normal mood and affect.         ED Course   Critical Care    Date/Time: 2025 6:44 PM    Performed by: Mare Zhu DO  Authorized by: Mare Zhu DO  Total critical care time (exclusive of procedural time) : 0 minutes        Labs Reviewed   COMPREHENSIVE METABOLIC PANEL - Abnormal       Result Value    Sodium 131 (*)     Potassium 3.8      Chloride 98      CO2 22 (*)     Glucose 630 (*)     BUN 19      Creatinine 2.1 (*)     Calcium 7.2 (*)     Protein Total 5.4 (*)     Albumin 2.6 (*)     Bilirubin Total 2.2 (*)     ALP 76      AST 48 (*)     ALT 26      Anion Gap 11      eGFR 36 (*)    LACTIC ACID, PLASMA - Abnormal    Lactic Acid Level 2.9 (*)     Narrative:     Falsely low lactic acid results can be found in samples containing >=13.0 mg/dL total bilirubin and/or >=3.5 mg/dL direct bilirubin.    LACTIC ACID, PLASMA - Abnormal    Lactic Acid Level 3.2 (*)     Narrative:     Falsely low lactic acid results can be found in samples containing >=13.0 mg/dL total bilirubin and/or >=3.5 mg/dL direct bilirubin.    MAGNESIUM - Abnormal    Magnesium  0.8 (*)    PROCALCITONIN - Abnormal    Procalcitonin 0.30 (*)    CBC WITH DIFFERENTIAL - Abnormal    WBC 3.31 (*)     RBC 2.18 (*)      HGB 6.9 (*)     HCT 19.6 (*)     MCV 90      MCH 31.7 (*)     MCHC 35.2      RDW 13.2      Platelet Count 72 (*)     MPV 13.1 (*)     Nucleated RBC 2 (*)     Neut % 69.5      Lymph % 20.5      Mono % 8.8      Eos % 0.6      Basophil % 0.3      Imm Grans % 0.3      Neut # 2.30      Lymph # 0.68 (*)     Mono # 0.29 (*)     Eos # 0.02      Baso # 0.01      Imm Grans # 0.01      Narrative:     This is an appended report.  These results have been appended to a previously verified report.   MORPHOLOGY - Abnormal    Platelet Estimate Decreased (*)     Anisocytosis Slight      Poik Slight      Polychromasia Occasional      Hypochromia Occasional      Target Cell Occasional      Tear drop cell Occasional      Stomatocytes Present     URINALYSIS, REFLEX TO URINE CULTURE - Abnormal    Color, UA Yellow      Appearance, UA Clear      pH, UA 6.0      Spec Grav UA 1.010      Protein, UA Negative      Glucose, UA 3+ (*)     Ketones, UA Negative      Bilirubin, UA Negative      Blood, UA 3+ (*)     Nitrites, UA Negative      Urobilinogen, UA Negative      Leukocyte Esterase, UA Negative     URINALYSIS MICROSCOPIC - Abnormal    RBC, UA 8 (*)     WBC, UA 3      Bacteria, UA Rare      Yeast, UA None      Squamous Epithelial Cells, UA 1      Hyaline Casts, UA 1      Microscopic Comment       GLUCOSE, RANDOM - Abnormal    Glucose 517 (*)    BASIC METABOLIC PANEL - Abnormal    Sodium 134 (*)     Potassium 3.0 (*)     Chloride 103      CO2 19 (*)     Glucose 434 (*)     BUN 19      Creatinine 1.7 (*)     Calcium 6.6 (*)     Anion Gap 12      eGFR 46 (*)    COMPREHENSIVE METABOLIC PANEL - Abnormal    Sodium 135 (*)     Potassium 3.5      Chloride 102      CO2 20 (*)     Glucose 303 (*)     BUN 18      Creatinine 1.6 (*)     Calcium 7.1 (*)     Protein Total 5.6 (*)     Albumin 2.7 (*)     Bilirubin Total 2.0 (*)     ALP 91      AST 40      ALT 26      Anion Gap 13      eGFR 50 (*)    MAGNESIUM - Abnormal    Magnesium  1.1 (*)    LACTIC  ACID, PLASMA - Abnormal    Lactic Acid Level 4.6 (*)     Narrative:     Falsely low lactic acid results can be found in samples containing >=13.0 mg/dL total bilirubin and/or >=3.5 mg/dL direct bilirubin.   Falsely low lactic acid results can be found in samples containing >=13.0 mg/dL total bilirubin and/or >=3.5 mg/dL direct bilirubin.    PHOSPHORUS - Abnormal    Phosphorus Level <1.0 (*)    CBC WITH DIFFERENTIAL - Abnormal    WBC 3.70 (*)     RBC 2.43 (*)     HGB 7.7 (*)     HCT 22.1 (*)     MCV 91      MCH 31.7 (*)     MCHC 34.8      RDW 13.3      Platelet Count 80 (*)     MPV 12.6      Nucleated RBC 3 (*)     Neut % 65.1      Lymph % 23.5      Mono % 10.8      Eos % 0.0      Basophil % 0.3      Imm Grans % 0.3      Neut # 2.41      Lymph # 0.87 (*)     Mono # 0.40      Eos # 0.00      Baso # 0.01      Imm Grans # 0.01     POCT GLUCOSE - Abnormal    POCT Glucose >500 (*)    ISTAT PROCEDURE - Abnormal    POC PH 7.362      POC PCO2 42.7      POC PO2 22 (*)     POC HCO3 24.2      POC BE -1      POC SATURATED O2 35      POC Glucose 543 (*)     POC Sodium 133 (*)     POC Potassium 3.8      POC Ionized Calcium 1.08      POC Hematocrit 25 (*)     Sample VENOUS      Site Other      Allens Test N/A      DelSys Room Air      Mode SPONT      FiO2 21      Sp02 99     POCT GLUCOSE - Abnormal    POCT Glucose >500 (*)    POCT GLUCOSE - Abnormal    POCT Glucose 366 (*)    HEPATITIS C ANTIBODY - Normal    Hep C Ab Interp Negative     HIV 1 / 2 ANTIBODY - Normal    HIV 1/2 Ag/Ab Negative     TROPONIN I - Normal    Troponin-I 0.019     LIPASE - Normal    Lipase Level 7     BETA - HYDROXYBUTYRATE, SERUM - Normal    Beta-Hydroxybutyrate 0.4     CULTURE, BLOOD   CULTURE, BLOOD   CBC W/ AUTO DIFFERENTIAL    Narrative:     The following orders were created for panel order CBC auto differential.  Procedure                               Abnormality         Status                     ---------                               -----------          ------                     CBC with Differential[[id number]]       Abnormal            Final result                 Please view results for these tests on the individual orders.   CBC W/ AUTO DIFFERENTIAL    Narrative:     The following orders were created for panel order CBC auto differential.  Procedure                               Abnormality         Status                     ---------                               -----------         ------                     CBC with Differential[[id number]]       Abnormal            Final result                 Please view results for these tests on the individual orders.   HEP C VIRUS HOLD SPECIMEN   GREY TOP URINE HOLD   SARS-COV-2 RDRP GENE    POC Rapid COVID Negative       Acceptable Yes     POCT INFLUENZA A/B MOLECULAR    POC Molecular Influenza A Ag Negative      POC Molecular Influenza B Ag Negative       Acceptable Yes     POCT GLUCOSE MONITORING CONTINUOUS     EKG Readings: (Independently Interpreted)   Initial Reading: No STEMI. Rhythm: Normal Sinus Rhythm. Heart Rate: 92. Ectopy: No Ectopy. Conduction: Normal. ST Segments: Normal ST Segments. T Waves: Normal. Clinical Impression: Normal Sinus Rhythm   Low-voltage QRS       Imaging Results              CT Chest Abdomen Pelvis With IV Contrast (XPD) Routine Oral Contrast (Preliminary result)  Result time 06/27/25 03:13:54      Wet Read by Mare Zhu DO (06/27/25 03:13:54, [facility] - Emergency Dept, Emergency Medicine)    Stat rad Torito Grimes MD  Bilateral pleural effusions, left-greater-than-right, with left lower lobe atelectatic consolidation.  Small diffusely fatty and probably cirrhotic liver with moderate ascites, diffuse subcutaneous edema and ascending colonic wall thickening which could represent colitis or portal hypertension colopathy.  Chronic pancreatitis.  No evidence of small-bowel obstruction.                                     X-Ray Chest AP Portable  (Final result)  Result time 06/26/25 19:00:52      Final result by Srinivasa Dubose MD (06/26/25 19:00:52)                   Impression:     No acute cardiopulmonary abnormality.    Finalized on: 6/26/2025 7:00 PM By:  Srinivasa Dubose MD  [facility]# [id number]      2025-06-26 19:03:04.648     [facility]               Narrative:    EXAM:  XR CHEST AP PORTABLE    CLINICAL HISTORY: Sepsis    COMPARISON: 03/19/2025.    FINDINGS: Atelectasis left lung base.  Low lung volumes limits evaluation. No confluent airspace opacity or consolidation.  There is no evidence of pleural effusion, pneumothorax, or other acute pulmonary disease.  The cardiomediastinal silhouette is within normal limits.  No acute osseous abnormality is evident.   Moderate scattered degenerative change and atherosclerotic disease.                                        X-Rays:   Independently Interpreted Readings:   Chest X-Ray: Normal heart size.  No infiltrates.  No acute abnormalities.     Medications   magnesium sulfate 2g in water 50mL IVPB (premix) (2 g Intravenous New Bag 6/27/25 0436)   potassium phosphate 30 mmol in D5W 500 mL infusion (has no administration in time range)   sodium chloride 0.9% bolus 1,000 mL 1,000 mL (0 mLs Intravenous Stopped 6/26/25 1939)   magnesium sulfate 2g in water 50mL IVPB (premix) (0 g Intravenous Stopped 6/26/25 2258)   insulin regular injection 5 Units 0.05 mL (5 Units Subcutaneous Given 6/26/25 1957)   insulin regular injection 5 Units 0.05 mL (5 Units Intravenous Given 6/26/25 1955)   potassium chloride 10 mEq in 100 mL IVPB (0 mEq Intravenous Stopped 6/26/25 2049)   sodium chloride 0.9% bolus 1,000 mL 1,000 mL (0 mLs Intravenous Stopped 6/26/25 2050)   ceFEPIme injection 2 g (2 g Intravenous Given 6/26/25 2114)   lactated ringers infusion (1,000 mLs Intravenous New Bag 6/26/25 2147)   potassium chloride 10 mEq in 100 mL IVPB (0 mEq Intravenous Stopped 6/27/25 0200)   calcium gluconate 1 g in NS IVPB (premixed) (0 g  Intravenous Stopped 6/27/25 0200)   iohexoL (OMNIPAQUE 12) oral solution 1,000 mL (1,000 mLs Oral Given 6/26/25 2303)   iohexoL (OMNIPAQUE 350) injection 75 mL (75 mLs Intravenous Given 6/27/25 0043)     Medical Decision Making  9:15 PM: D/W Paul Faust MD (hospital medicine). would repeat stat BMP since he got fluids to see if he can get contrasted study. if not, CT w/o will have to do. want to make sure he did twist up again and need surgery eval. may just be his DM but given recent admission, need to rule it out. keep me posted.     11:02 PM: D/W Dr Faust regarding repeat BMP, he requests that we proceed with IV contrast.     3:27 AM: D/W Dr Faust who accepts admission to inpatient [facility].  Requests we get repeat labs including CBC, CMP, Mag, phos, and lactic acid.    4:50 AM: D/W PRABHA Lovett (ICU) and Dr Faust.  No plans for ICU admission now.  Replace Mag and phosphorus.    59-year-old male presents with generalized weakness with the associated nausea, vomiting, and diarrhea.  He has an KOREY with a glucose of 600. Normal anion gap, PH, and BHB.  He has received 2 L of fluids as well as insulin 5 units IV and subQ.  Glucose still greater than 500.  CBC shows chronic pancytopenia, progressively worsening.  Magnesium 0.8, receiving replacement.  Lactic acid 2.9.  Procalcitonin 0.3.  Covered with broad-spectrum antibiotics after obtaining blood cultures.  COVID and influenza negative.  Nonischemic cardiac workup.  Chest x-ray unremarkable.  Urine questionable but weak for infection. Repeat lactic acid 3.2.  Sepsis fluids given.  Started on maintenance fluids.  Repeat glucose 434 with a GFR of 46.  Calcium 6.6 and potassium 3.0.  Received electrolyte replacement.  Repeat glucose 366.  CT chest abdomen and pelvis with IV contrast shows bilateral pleural effusions, left-greater-than-right with a left lower lobe atelectatic consolidation as well as fatty cirrhotic liver with a ascites and colonic  wall thickening which could represent colitis or portal hypertensive colopathy as well as chronic pancreatitis with no signs of small-bowel obstruction.  Third lactic acid 4.6, magnesium remains low at 1.1 and phosphorus less than 1.  CBC shows persistent pancytopenia.  CMP shows improving renal function and glucose creatinine 1.6, glucose 303.             Amount and/or Complexity of Data Reviewed  Labs: ordered.  Radiology: ordered and independent interpretation performed.    Risk  OTC drugs.  Prescription drug management.  Decision regarding hospitalization.      Additional MDM:   Differential Diagnosis:   DKA, HHS, small-bowel obstruction, pneumonia, UTI, sepsis, colitis, electrolyte derangement, KOREY, ACS.                                    Clinical Impression:  Final diagnoses:  [Z13.6] Screening for cardiovascular condition  [N17.9] KOREY (acute kidney injury)  [E83.42] Hypomagnesemia  [E87.20] Lactic acidosis  [E11.65] Hyperglycemia due to diabetes mellitus (Primary)  [D64.9] Normocytic anemia  [R53.1] Weakness  [I95.9] Hypotension, unspecified hypotension type  [E83.51] Hypocalcemia  [E87.6] Hypokalemia  [J90] Pleural effusion  [A41.9, R65.20, N17.9] Sepsis with acute renal failure without septic shock, due to unspecified organism, unspecified acute renal failure type  [E83.39] Hypophosphatemia          ED Disposition Condition    Admit                     Maer Zhu,   06/27/25 0333       Mare Zhu, DO  06/27/25 0334         [1]   Social History  Tobacco Use    Smoking status: Former     Types: Cigarettes    Smokeless tobacco: Never   Substance Use Topics    Alcohol use: Yes     Comment:  Daily - States that he drinks 1/2 pint of vodka and about 1-2 cans of beer daily    Drug use: No        Mare Zhu, DO  06/27/25 1072

## 2025-06-27 PROBLEM — R93.5 ABNORMAL CT OF THE ABDOMEN: Status: ACTIVE | Noted: 2025-06-27

## 2025-06-27 PROBLEM — R19.7 DIARRHEA: Status: ACTIVE | Noted: 2025-06-27

## 2025-06-27 PROBLEM — D61.818 PANCYTOPENIA: Status: ACTIVE | Noted: 2025-06-27

## 2025-06-27 PROBLEM — E44.0 MODERATE MALNUTRITION: Status: ACTIVE | Noted: 2025-06-27

## 2025-06-27 PROBLEM — E83.39 HYPOPHOSPHATEMIA: Status: ACTIVE | Noted: 2025-06-27

## 2025-06-27 LAB
ABSOLUTE EOSINOPHIL (OHS): 0 K/UL
ABSOLUTE MONOCYTE (OHS): 0.4 K/UL (ref 0.3–1)
ABSOLUTE NEUTROPHIL COUNT (OHS): 2.41 K/UL (ref 1.8–7.7)
ALBUMIN SERPL BCP-MCNC: 2.7 G/DL (ref 3.5–5.2)
ALP SERPL-CCNC: 91 UNIT/L (ref 40–150)
ALT SERPL W/O P-5'-P-CCNC: 26 UNIT/L (ref 10–44)
ANION GAP (OHS): 13 MMOL/L (ref 8–16)
AST SERPL-CCNC: 40 UNIT/L (ref 11–45)
BASOPHILS # BLD AUTO: 0.01 K/UL
BASOPHILS NFR BLD AUTO: 0.3 %
BILIRUB SERPL-MCNC: 2 MG/DL (ref 0.1–1)
BUN SERPL-MCNC: 18 MG/DL (ref 6–20)
C DIFF GDH STL QL: POSITIVE
C DIFF TOX A+B STL QL IA: NEGATIVE
CALCIUM SERPL-MCNC: 7.1 MG/DL (ref 8.7–10.5)
CHLORIDE SERPL-SCNC: 102 MMOL/L (ref 95–110)
CO2 SERPL-SCNC: 20 MMOL/L (ref 23–29)
CREAT SERPL-MCNC: 1.6 MG/DL (ref 0.5–1.4)
EAG (OHS): 163 MG/DL (ref 68–131)
ERYTHROCYTE [DISTWIDTH] IN BLOOD BY AUTOMATED COUNT: 13.3 % (ref 11.5–14.5)
GFR SERPLBLD CREATININE-BSD FMLA CKD-EPI: 50 ML/MIN/1.73/M2
GLUCOSE SERPL-MCNC: 303 MG/DL (ref 70–110)
HBA1C MFR BLD: 7.3 % (ref 4–5.6)
HCT VFR BLD AUTO: 22.1 % (ref 40–54)
HCV AB SERPL QL IA: NEGATIVE
HGB BLD-MCNC: 7.7 GM/DL (ref 14–18)
HIV 1+2 AB+HIV1 P24 AG SERPL QL IA: NEGATIVE
HOLD SPECIMEN: NORMAL
IMM GRANULOCYTES # BLD AUTO: 0.01 K/UL (ref 0–0.04)
IMM GRANULOCYTES NFR BLD AUTO: 0.3 % (ref 0–0.5)
INR PPP: 1.6 (ref 0.8–1.2)
LACTATE SERPL-SCNC: 2.7 MMOL/L (ref 0.5–2.2)
LACTATE SERPL-SCNC: 2.9 MMOL/L (ref 0.5–2.2)
LACTATE SERPL-SCNC: 3.1 MMOL/L (ref 0.5–2.2)
LACTATE SERPL-SCNC: 4.6 MMOL/L (ref 0.5–2.2)
LYMPHOCYTES # BLD AUTO: 0.87 K/UL (ref 1–4.8)
MAGNESIUM SERPL-MCNC: 1.1 MG/DL (ref 1.6–2.6)
MCH RBC QN AUTO: 31.7 PG (ref 27–31)
MCHC RBC AUTO-ENTMCNC: 34.8 G/DL (ref 32–36)
MCV RBC AUTO: 91 FL (ref 82–98)
NUCLEATED RBC (/100WBC) (OHS): 3 /100 WBC
OHS QRS DURATION: 76 MS
OHS QTC CALCULATION: 445 MS
PHOSPHATE SERPL-MCNC: <1 MG/DL (ref 2.7–4.5)
PLATELET # BLD AUTO: 80 K/UL (ref 150–450)
PMV BLD AUTO: 12.6 FL (ref 9.2–12.9)
POCT GLUCOSE: 117 MG/DL (ref 70–110)
POCT GLUCOSE: 152 MG/DL (ref 70–110)
POCT GLUCOSE: 258 MG/DL (ref 70–110)
POCT GLUCOSE: 366 MG/DL (ref 70–110)
POCT GLUCOSE: 398 MG/DL (ref 70–110)
POTASSIUM SERPL-SCNC: 3.5 MMOL/L (ref 3.5–5.1)
PROT SERPL-MCNC: 5.6 GM/DL (ref 6–8.4)
PROTHROMBIN TIME: 17.5 SECONDS (ref 9–12.5)
RBC # BLD AUTO: 2.43 M/UL (ref 4.6–6.2)
RELATIVE EOSINOPHIL (OHS): 0 %
RELATIVE LYMPHOCYTE (OHS): 23.5 % (ref 18–48)
RELATIVE MONOCYTE (OHS): 10.8 % (ref 4–15)
RELATIVE NEUTROPHIL (OHS): 65.1 % (ref 38–73)
SODIUM SERPL-SCNC: 135 MMOL/L (ref 136–145)
WBC # BLD AUTO: 3.7 K/UL (ref 3.9–12.7)

## 2025-06-27 PROCEDURE — 89055 LEUKOCYTE ASSESSMENT FECAL: CPT | Performed by: NURSE PRACTITIONER

## 2025-06-27 PROCEDURE — 87209 SMEAR COMPLEX STAIN: CPT | Performed by: NURSE PRACTITIONER

## 2025-06-27 PROCEDURE — 87324 CLOSTRIDIUM AG IA: CPT | Performed by: NURSE PRACTITIONER

## 2025-06-27 PROCEDURE — 83605 ASSAY OF LACTIC ACID: CPT | Performed by: NURSE PRACTITIONER

## 2025-06-27 PROCEDURE — 83036 HEMOGLOBIN GLYCOSYLATED A1C: CPT | Performed by: NURSE PRACTITIONER

## 2025-06-27 PROCEDURE — 83735 ASSAY OF MAGNESIUM: CPT | Mod: ER | Performed by: EMERGENCY MEDICINE

## 2025-06-27 PROCEDURE — 63600175 PHARM REV CODE 636 W HCPCS: Performed by: NURSE PRACTITIONER

## 2025-06-27 PROCEDURE — 25000003 PHARM REV CODE 250: Mod: ER | Performed by: EMERGENCY MEDICINE

## 2025-06-27 PROCEDURE — 80053 COMPREHEN METABOLIC PANEL: CPT | Mod: ER | Performed by: EMERGENCY MEDICINE

## 2025-06-27 PROCEDURE — 85025 COMPLETE CBC W/AUTO DIFF WBC: CPT | Mod: ER | Performed by: EMERGENCY MEDICINE

## 2025-06-27 PROCEDURE — 84100 ASSAY OF PHOSPHORUS: CPT | Mod: ER | Performed by: EMERGENCY MEDICINE

## 2025-06-27 PROCEDURE — 83605 ASSAY OF LACTIC ACID: CPT | Mod: ER | Performed by: EMERGENCY MEDICINE

## 2025-06-27 PROCEDURE — 25000003 PHARM REV CODE 250: Mod: ER | Performed by: NURSE PRACTITIONER

## 2025-06-27 PROCEDURE — 87045 FECES CULTURE AEROBIC BACT: CPT | Performed by: NURSE PRACTITIONER

## 2025-06-27 PROCEDURE — 36415 COLL VENOUS BLD VENIPUNCTURE: CPT | Performed by: NURSE PRACTITIONER

## 2025-06-27 PROCEDURE — 82962 GLUCOSE BLOOD TEST: CPT | Mod: ER

## 2025-06-27 PROCEDURE — 27000207 HC ISOLATION

## 2025-06-27 PROCEDURE — 85610 PROTHROMBIN TIME: CPT | Performed by: NURSE PRACTITIONER

## 2025-06-27 PROCEDURE — 96366 THER/PROPH/DIAG IV INF ADDON: CPT | Mod: ER

## 2025-06-27 PROCEDURE — 25000003 PHARM REV CODE 250: Performed by: NURSE PRACTITIONER

## 2025-06-27 PROCEDURE — 87493 C DIFF AMPLIFIED PROBE: CPT | Performed by: NURSE PRACTITIONER

## 2025-06-27 PROCEDURE — 25500020 PHARM REV CODE 255: Mod: ER | Performed by: EMERGENCY MEDICINE

## 2025-06-27 PROCEDURE — 63600175 PHARM REV CODE 636 W HCPCS: Mod: ER | Performed by: EMERGENCY MEDICINE

## 2025-06-27 PROCEDURE — 96368 THER/DIAG CONCURRENT INF: CPT | Mod: ER

## 2025-06-27 PROCEDURE — 87427 SHIGA-LIKE TOXIN AG IA: CPT | Performed by: NURSE PRACTITIONER

## 2025-06-27 PROCEDURE — 87046 STOOL CULTR AEROBIC BACT EA: CPT | Performed by: NURSE PRACTITIONER

## 2025-06-27 PROCEDURE — 21400001 HC TELEMETRY ROOM

## 2025-06-27 RX ORDER — INSULIN ASPART 100 [IU]/ML
0-10 INJECTION, SOLUTION INTRAVENOUS; SUBCUTANEOUS
Status: DISCONTINUED | OUTPATIENT
Start: 2025-06-27 | End: 2025-07-01 | Stop reason: HOSPADM

## 2025-06-27 RX ORDER — MAGNESIUM SULFATE HEPTAHYDRATE 40 MG/ML
2 INJECTION, SOLUTION INTRAVENOUS ONCE
Status: COMPLETED | OUTPATIENT
Start: 2025-06-27 | End: 2025-06-27

## 2025-06-27 RX ORDER — TALC
6 POWDER (GRAM) TOPICAL NIGHTLY PRN
Status: DISCONTINUED | OUTPATIENT
Start: 2025-06-27 | End: 2025-07-01 | Stop reason: HOSPADM

## 2025-06-27 RX ORDER — SODIUM,POTASSIUM PHOSPHATES 280-250MG
2 POWDER IN PACKET (EA) ORAL ONCE
Status: COMPLETED | OUTPATIENT
Start: 2025-06-27 | End: 2025-06-27

## 2025-06-27 RX ORDER — ALUMINUM HYDROXIDE, MAGNESIUM HYDROXIDE, AND SIMETHICONE 1200; 120; 1200 MG/30ML; MG/30ML; MG/30ML
30 SUSPENSION ORAL 4 TIMES DAILY PRN
Status: DISCONTINUED | OUTPATIENT
Start: 2025-06-27 | End: 2025-07-01 | Stop reason: HOSPADM

## 2025-06-27 RX ORDER — SIMETHICONE 80 MG
1 TABLET,CHEWABLE ORAL 4 TIMES DAILY PRN
Status: DISCONTINUED | OUTPATIENT
Start: 2025-06-27 | End: 2025-07-01 | Stop reason: HOSPADM

## 2025-06-27 RX ORDER — IBUPROFEN 200 MG
24 TABLET ORAL
Status: DISCONTINUED | OUTPATIENT
Start: 2025-06-27 | End: 2025-07-01 | Stop reason: HOSPADM

## 2025-06-27 RX ORDER — IPRATROPIUM BROMIDE AND ALBUTEROL SULFATE 2.5; .5 MG/3ML; MG/3ML
3 SOLUTION RESPIRATORY (INHALATION) EVERY 4 HOURS PRN
Status: DISCONTINUED | OUTPATIENT
Start: 2025-06-27 | End: 2025-07-01 | Stop reason: HOSPADM

## 2025-06-27 RX ORDER — GLUCAGON 1 MG
1 KIT INJECTION
Status: DISCONTINUED | OUTPATIENT
Start: 2025-06-27 | End: 2025-07-01 | Stop reason: HOSPADM

## 2025-06-27 RX ORDER — AMOXICILLIN 250 MG
1 CAPSULE ORAL 2 TIMES DAILY PRN
Status: DISCONTINUED | OUTPATIENT
Start: 2025-06-27 | End: 2025-07-01 | Stop reason: HOSPADM

## 2025-06-27 RX ORDER — MAGNESIUM SULFATE HEPTAHYDRATE 40 MG/ML
2 INJECTION, SOLUTION INTRAVENOUS
Status: COMPLETED | OUTPATIENT
Start: 2025-06-27 | End: 2025-06-27

## 2025-06-27 RX ORDER — NALOXONE HCL 0.4 MG/ML
0.02 VIAL (ML) INJECTION
Status: DISCONTINUED | OUTPATIENT
Start: 2025-06-27 | End: 2025-07-01 | Stop reason: HOSPADM

## 2025-06-27 RX ORDER — IBUPROFEN 200 MG
16 TABLET ORAL
Status: DISCONTINUED | OUTPATIENT
Start: 2025-06-27 | End: 2025-07-01 | Stop reason: HOSPADM

## 2025-06-27 RX ORDER — ACETAMINOPHEN 325 MG/1
650 TABLET ORAL EVERY 4 HOURS PRN
Status: DISCONTINUED | OUTPATIENT
Start: 2025-06-27 | End: 2025-07-01 | Stop reason: HOSPADM

## 2025-06-27 RX ADMIN — POTASSIUM CHLORIDE 10 MEQ: 7.46 INJECTION, SOLUTION INTRAVENOUS at 12:06

## 2025-06-27 RX ADMIN — MAGNESIUM SULFATE HEPTAHYDRATE 2 G: 40 INJECTION, SOLUTION INTRAVENOUS at 10:06

## 2025-06-27 RX ADMIN — Medication 2 PACKET: at 05:06

## 2025-06-27 RX ADMIN — CALCIUM GLUCONATE 1 G: 20 INJECTION, SOLUTION INTRAVENOUS at 12:06

## 2025-06-27 RX ADMIN — IOHEXOL 75 ML: 350 INJECTION, SOLUTION INTRAVENOUS at 12:06

## 2025-06-27 RX ADMIN — MAGNESIUM SULFATE HEPTAHYDRATE 2 G: 40 INJECTION, SOLUTION INTRAVENOUS at 01:06

## 2025-06-27 RX ADMIN — MAGNESIUM SULFATE HEPTAHYDRATE 2 G: 40 INJECTION, SOLUTION INTRAVENOUS at 04:06

## 2025-06-27 RX ADMIN — INSULIN ASPART 10 UNITS: 100 INJECTION, SOLUTION INTRAVENOUS; SUBCUTANEOUS at 10:06

## 2025-06-27 RX ADMIN — SODIUM CHLORIDE 500 ML: 9 INJECTION, SOLUTION INTRAVENOUS at 03:06

## 2025-06-27 RX ADMIN — POTASSIUM PHOSPHATE, MONOBASIC POTASSIUM PHOSPHATE, DIBASIC INJECTION,: 236; 224 SOLUTION, CONCENTRATE INTRAVENOUS at 11:06

## 2025-06-27 NOTE — ASSESSMENT & PLAN NOTE
CT abdomen pelvis showed findings concerning for liver cirrhosis, portal hypertensive colopathy, and moderate ascites. Total Bilirubin was 2 but other liver enzymes are negative.   --briefly discussed case with GI  who recommended check INR and OP [facility] follow up if negative.

## 2025-06-27 NOTE — H&P
[facility] Medicine  History & Physical    Patient Name: Jorgito Arreaga  MRN: [id number]  Patient Class: IP- Inpatient  Admission Date: 6/26/2025  Attending Physician: Tod Solorzano MD   Primary Care Provider: Vonda Elliott FNP      Patient information was obtained from patient and ER records.     Subjective:     Principal Problem:Type 2 diabetes mellitus with hyperglycemia, with long-term current use of insulin    Chief Complaint:   Chief Complaint   Patient presents with    Weakness     Brought in by [facility] for generalized weakness progressively worsening over the last two weeks. Pt reports n/v/d for approximately 1 week. Systolic bp on scene was in the 80's. CBG on scene > 500. Pt given 300mL of NS in route. GCS of 15        HPI: Jorgito Arreaga is a 59 year old male with history of DM, GERD, HTN, chronic pancreatitis, and ETOH who presented to [facility] ED for further evaluation of progressive weakness and postural dizziness that began 2 days ago. Patient reports his symptoms and wane, but he was encouraged to come to ED by his sibling. He endorses associated diarrhea and single episode of vomiting. He gives insulin per sliding scale once day for DM. He has had some falls at home as well. He reports drinking binge drinking once a week beer and 1/2 pint of vodka. Denies history of cirrhosis. No recent admission for pancreatitis. Of note, the patient was admitted from 3/15/25 through 3/24/25 for SBO due to volvulus and underwent exploratory lap with lysis of adhesions.    In ED, labs were significant for hyperglycemia (630), KOREY, lactic acidosis, and electrolyte abnormalities. CXR was negative for acute cardiopulmonary abnormality. CT abdomen pelvis showed findings concerning for liver cirrhosis, portal hypertensive colopathy, and moderate ascites. Total Bilirubin was 2 but other liver enzymes are negative. Patient has been transferred to [facility] for further management.  Briefly discussed case with GI provider who recommended INR.     Past Medical History:   Diagnosis Date    Abscess 9/5/2018    Cataracts, bilateral     Chest pain 12/6/2018    Dehydration     Diabetes mellitus, type 2     Diagnosed 7/2016    Folliculitis 9/15/2017    GERD (gastroesophageal reflux disease)     Hypertension     Inflammatory polyps of colon     Pancreatitis, alcoholic, acute     Port catheter in place     TIA (transient ischemic attack)     Tinea cruris 9/15/2017       Past Surgical History:   Procedure Laterality Date    APPLICATION OF WOUND VACUUM-ASSISTED CLOSURE DEVICE N/A 3/16/2025    Procedure: APPLICATION, WOUND VAC;  Surgeon: Alia Garcia MD;  Location: [facility] OR;  Service: Colon and Rectal;  Laterality: N/A;    CLOSURE OF WOUND N/A 3/17/2025    Procedure: CLOSURE, WOUND;  Surgeon: Alia Garcia MD;  Location: [facility] OR;  Service: Colon and Rectal;  Laterality: N/A;  ABDOMINAL WOUND CLOSURE    COLONOSCOPY      COLONOSCOPY N/A 6/15/2018    Procedure: COLONOSCOPY;  Surgeon: Jim Hicks MD;  Location: [facility] ENDO;  Service: Endoscopy;  Laterality: N/A;    ESOPHAGOGASTRODUODENOSCOPY N/A 6/15/2018    Procedure: ESOPHAGOGASTRODUODENOSCOPY (EGD);  Surgeon: Jim Hicks MD;  Location: [facility] ENDO;  Service: Endoscopy;  Laterality: N/A;    INJECTION OF ANESTHETIC AGENT INTO TISSUE PLANE DEFINED BY TRANSVERSUS ABDOMINIS MUSCLE N/A 3/16/2025    Procedure: BLOCK, TRANSVERSUS ABDOMINIS PLANE;  Surgeon: Alia Garcia MD;  Location: [facility] OR;  Service: Colon and Rectal;  Laterality: N/A;    LAPAROTOMY, EXPLORATORY N/A 3/17/2025    Procedure: LAPAROTOMY, EXPLORATORY;  Surgeon: Alia Garcia MD;  Location: [facility] OR;  Service: Colon and Rectal;  Laterality: N/A;  REMOVAL OF WOUND VAC    LAPAROTOMY, EXPLORATORY N/A 3/16/2025    Procedure: LAPAROTOMY, EXPLORATORY;  Surgeon: Alia Garcia MD;  Location: [facility] OR;  Service: Colon and Rectal;  Laterality: N/A;  lysis of adhesions     "ROBOT-ASSISTED LAPAROSCOPIC REPAIR OF INGUINAL HERNIA USING DA JANET XI Bilateral 11/30/2022    Procedure: XI ROBOTIC REPAIR, HERNIA, INGUINAL;  Surgeon: Smooth Denny MD;  Location: [facility];  Service: General;  Laterality: Bilateral;       Review of patient's allergies indicates:  No Known Allergies    No current facility-administered medications on file prior to encounter.     Current Outpatient Medications on File Prior to Encounter   Medication Sig    HUMALOG U-100 INSULIN 100 unit/mL injection Please resume your sliding scale as prescribed by your PCP    aspirin 325 MG tablet Take 1 tablet (325 mg total) by mouth once daily.    atorvastatin (LIPITOR) 40 MG tablet Take 1 tablet (40 mg total) by mouth once daily.    blood sugar diagnostic (ONETOUCH ULTRA BLUE TEST STRIP) Strp USE TO CHECK SUGAR BEFORE MEALS AND AT BEDTIME    folic acid (FOLVITE) 1 MG tablet Take 1 tablet (1 mg total) by mouth once daily.    insulin syringe-needle,dispos. 1 mL 28 gauge x 1/2" Syrg 1 each by Misc.(Non-Drug; Combo Route) route 2 (two) times daily.    lancets (ONETOUCH DELICA PLUS LANCET) 33 gauge Misc Inject 1 lancet into the skin 4 (four) times daily before meals and nightly.    losartan (COZAAR) 100 MG tablet Take 1 tablet (100 mg total) by mouth once daily. TAKE 1 TABLET BY MOUTH ONCE DAILY    pen needle, diabetic (BD JOEL 2ND GEN PEN NEEDLE) 32 gauge x 5/32" Ndle Inject 1 each into the skin 4 (four) times daily before meals and nightly.    verapamiL (CALAN-SR) 240 MG CR tablet Take 1 tablet (240 mg total) by mouth once daily.     Family History       Problem Relation (Age of Onset)    Hypertension Mother, Father          Tobacco Use    Smoking status: Former     Types: Cigarettes    Smokeless tobacco: Never   Substance and Sexual Activity    Alcohol use: Yes     Comment:  Daily - States that he drinks 1/2 pint of vodka and about 1-2 cans of beer daily    Drug use: No    Sexual activity: Yes     Partners: Female     Review of " Systems   Constitutional:  Negative for chills, diaphoresis, fatigue and fever.        Appears older than age     HENT:  Negative for drooling, ear pain, rhinorrhea and sore throat.    Eyes: Negative.    Respiratory:  Positive for shortness of breath. Negative for cough and wheezing.    Cardiovascular:  Negative for palpitations and leg swelling.   Gastrointestinal:  Positive for diarrhea, nausea and vomiting. Negative for abdominal pain and constipation.   Endocrine: Negative.    Genitourinary:  Negative for dysuria, hematuria and urgency.   Musculoskeletal:  Positive for gait problem.        Falls     Skin:  Negative for color change and wound.   Allergic/Immunologic: Negative.    Neurological:  Positive for dizziness and weakness. Negative for syncope and speech difficulty.   Hematological: Negative.    Psychiatric/Behavioral: Negative.           Objective:     Vital Signs (Most Recent):  Temp: 97.2 °F (36.2 °C) (06/27/25 1147)  Pulse: 82 (06/27/25 1147)  Resp: 18 (06/27/25 1147)  BP: 119/81 (06/27/25 1147)  SpO2: 96 % (06/27/25 1147) Vital Signs (24h Range):  Temp:  [97.2 °F (36.2 °C)-98.7 °F (37.1 °C)] 97.2 °F (36.2 °C)  Pulse:  [] 82  Resp:  [16-20] 18  SpO2:  [96 %-100 %] 96 %  BP: (118-154)/() 119/81     Weight: 68 kg (150 lb)  Body mass index is 24.21 kg/m².     Physical Exam  Vitals and nursing note reviewed.   Constitutional:       General: He is not in acute distress.     Appearance: He is well-developed.   HENT:      Head: Normocephalic and atraumatic.   Cardiovascular:      Rate and Rhythm: Normal rate and regular rhythm.      Heart sounds: Normal heart sounds.   Pulmonary:      Effort: Pulmonary effort is normal. No respiratory distress.      Breath sounds: Normal breath sounds.   Abdominal:      General: There is distension.      Palpations: Abdomen is soft.      Tenderness: There is no abdominal tenderness.      Comments: Healed abdominal incision   Genitourinary:     Comments: Light  brown loose stool with mucous. Single streak of blood    Musculoskeletal:         General: Normal range of motion.      Cervical back: Normal range of motion and neck supple. No edema.      Right lower leg: Edema present.      Left lower leg: Edema present.   Skin:     General: Skin is dry.   Neurological:      Mental Status: He is alert and oriented to person, place, and time.               Significant Labs: All pertinent labs within the past 24 hours have been reviewed.  CBC:   Recent Labs   Lab 06/26/25  1842 06/26/25 1847 06/27/25  0356   WBC  --  3.31* 3.70*   HGB  --  6.9* 7.7*   HCT 25* 19.6* 22.1*   PLT  --  72* 80*     CMP:   Recent Labs   Lab 06/26/25 1847 06/26/25 2110 06/26/25 2207 06/27/25  0356   *  --  134* 135*   K 3.8  --  3.0* 3.5   CL 98  --  103 102   CO2 22*  --  19* 20*   * 517* 434* 303*   BUN 19  --  19 18   CREATININE 2.1*  --  1.7* 1.6*   CALCIUM 7.2*  --  6.6* 7.1*   PROT 5.4*  --   --  5.6*   ALBUMIN 2.6*  --   --  2.7*   BILITOT 2.2*  --   --  2.0*   ALKPHOS 76  --   --  91   AST 48*  --   --  40   ALT 26  --   --  26   ANIONGAP 11  --  12 13       Significant Imaging:   Imaging Results              CT Chest Abdomen Pelvis With IV Contrast (XPD) Routine Oral Contrast (Final result)  Result time 06/27/25 08:55:20      Final result by Srinivasa Dubose MD (06/27/25 08:55:20)                   Impression:      1.  Bilateral pleural effusions, left-greater-than-right, with left lower lobe atelectatic consolidation.    2.  Small diffusely fatty and probably cirrhotic liver with moderate ascites, diffuse subcutaneous edema and ascending colonic wall thickening which could represent colitis or portal hypertensive colopathy.    3.  Chronic pancreatitis        All CT scans at this facility use dose modulation, iterative reconstruction and/or weight based dosing when appropriate to reduce radiation dose to as low as reasonably achievable.      Electronically signed by: Srinivasa  MD Gracy  Date:    06/27/2025  Time:    08:55               Narrative:    EXAMINATION:  CT CHEST ABDOMEN PELVIS WITH IV CONTRAST (XPD)    CLINICAL HISTORY:  Sepsis;SBO;    TECHNIQUE:  The patient was surveyed from the thoracic inlet through the pelvis after the administration of 100 cc Omni 350 IV contrast as well as oral contrast and data was reconstructed for coronal, sagittal, and axial images.    COMPARISON:  None    FINDINGS:  CT chest FINDINGS:  Lungs: Atelectatic consolidation at the left lung base. Multiple bilateral foci of subsegmental atelectasis.  Pleural space: Moderate left and small right pleural effusions.  Heart: No cardiomegaly. No significant pericardial effusion.  Bones/joints: No acute fracture. No dislocation.  Soft tissues: Diffuse subcutaneous edema.  Vasculature: Normal. No thoracic aortic aneurysm.  Lymph nodes: No enlarged lymph nodes.    CT abdomen pelvis FINDINGS:    ABDOMEN:  Liver: Moderately severe fatty infiltration of the liver. Liver diminished in size.  Gallbladder and bile ducts: Cholelithiasis.  Pancreas: Pancreatic parenchymal calcifications with ductal dilatation, consistent with chronic pancreatitis.  Spleen: No splenomegaly.  Adrenals: No mass.  Kidneys and ureters: Nonobstructing mid left renal stone.  Stomach and bowel: Diffuse ascending colonic wall thickening.    PELVIS:  Appendix: No findings to suggest acute appendicitis.  Bladder: No mass.  Reproductive: Unremarkable as visualized.    ABDOMEN and PELVIS:  Intraperitoneal space: Moderate ascites.  Bones/joints: No acute fracture.  Soft tissues: Diffuse subcutaneous edema. Moderate mesenteric edema. Small right inguinal hernia containing ascites.  Vasculature: No abdominal aortic aneurysm.  Lymph nodes: No enlarged lymph nodes.                          Wet Read by Mare Zhu DO (06/27/25 03:13:54, [facility] - Emergency Dept, Emergency Medicine)    Stat rad Torito Grimes MD  Bilateral pleural effusions,  left-greater-than-right, with left lower lobe atelectatic consolidation.  Small diffusely fatty and probably cirrhotic liver with moderate ascites, diffuse subcutaneous edema and ascending colonic wall thickening which could represent colitis or portal hypertension colopathy.  Chronic pancreatitis.  No evidence of small-bowel obstruction.                                     X-Ray Chest AP Portable (Final result)  Result time 06/26/25 19:00:52      Final result by Srinivasa Dubose MD (06/26/25 19:00:52)                   Impression:     No acute cardiopulmonary abnormality.    Finalized on: 6/26/2025 7:00 PM By:  Srinivasa Dubose MD  [facility]# [id number]      2025-06-26 19:03:04.648     [facility]               Narrative:    EXAM:  XR CHEST AP PORTABLE    CLINICAL HISTORY: Sepsis    COMPARISON: 03/19/2025.    FINDINGS: Atelectasis left lung base.  Low lung volumes limits evaluation. No confluent airspace opacity or consolidation.  There is no evidence of pleural effusion, pneumothorax, or other acute pulmonary disease.  The cardiomediastinal silhouette is within normal limits.  No acute osseous abnormality is evident.   Moderate scattered degenerative change and atherosclerotic disease.                                          Assessment/Plan:     Assessment & Plan  Type 2 diabetes mellitus with hyperglycemia, with long-term current use of insulin  Glucose >600 on initial labs. Reports only using sliding scale once daily.   Plan:   --Check HgbA1c   --moderate SS  --has received gentle hydration    Alcohol abuse  Per HPI. Counseled on cessation    Lactic acid acidosis  Lab Results   Component Value Date    LACTATE 2.9 (H) 06/27/2025    LACTATE 2.7 (H) 06/27/2025    LACTATE 4.6 (HH) 06/27/2025    Trending down. Treat elevated glucose. Gentle hydration  KOREY (acute kidney injury)  KOREY is likely due to pre-renal azotemia due to intravascular volume depletion. Baseline creatinine is 0.9. Most recent creatinine and eGFR are listed  below.  Recent Labs     06/26/25  1847 06/26/25  2207 06/27/25  0356   CREATININE 2.1* 1.7* 1.6*   EGFRNORACEVR 36* 46* 50*      Plan  - KOREY is worsening. Glucose control. Gentle hydration with electrolyte replacement  - Avoid nephrotoxins and renally dose meds for GFR listed above  - Monitor urine output, serial BMP, and adjust therapy as needed  Essential hypertension  Patient's blood pressure range in the last 24 hours was: BP  Min: 118/88  Max: 154/105.The patient's inpatient anti-hypertensive regimen is listed below:  Current Antihypertensives   Hydralazine 10mg IV prn    Plan  - previously elevated, but now controleld    Hypomagnesemia  Patient has Abnormal Magnesium: hypomagnesemia. Will continue to monitor electrolytes closely. Will replace the affected electrolytes and repeat labs to be done after interventions completed. The patient's magnesium results have been reviewed and are listed below.  Recent Labs   Lab 06/27/25  0356   MG 1.1*      Abnormal CT of the abdomen   CT abdomen pelvis showed findings concerning for liver cirrhosis, portal hypertensive colopathy, and moderate ascites. Total Bilirubin was 2 but other liver enzymes are negative.   --briefly discussed case with GI  who recommended check INR and OP [facility] follow up if negative.    Hypophosphatemia  Patient's most recent phosphorus results are listed below.   Recent Labs     06/27/25  0356   PHOS <1.0*     Plan  - Will treat hypophosphatemia with potassium phosphate 30 mm  - Patient's hypophosphatemia is new  Pancytopenia  This patient is found to have pancytopenia, the likely etiology is malnutrition from chronic ETOH and possible cirrhosis, will monitor CBC Daily. Will transfuse red blood cells if the hemoglobin is <7g/dL (or <8 in the setting of ACS). Will transfuse platelets if platelet count is <10k. Hold DVT prophylaxis if platelets are <50k. The patient's hemoglobin, white blood cell count, and platelet count results have been  reviewed and are listed below.  Recent Labs   Lab 06/27/25  0356   HGB 7.7*   WBC 3.70*   PLT 80*     Diarrhea  Stool studies ordered  CT shows concerns for colitis versus portal hypertensive colopathy.     VTE Risk Mitigation (From admission, onward)           Ordered     IP VTE LOW RISK PATIENT  Once         06/27/25 1357     Place sequential compression device  Until discontinued         06/27/25 1357                    SDOH Screening:  The patient was screened for utility difficulties, food insecurity, transport difficulties, housing insecurity, and interpersonal safety and there were no concerns identified this admission.      Kari Rose NP  Department of Hospital Medicine  '[place] - Telemetry ([facility])

## 2025-06-27 NOTE — ASSESSMENT & PLAN NOTE
Patient's most recent phosphorus results are listed below.   Recent Labs     06/27/25  0356   PHOS <1.0*     Plan  - Will treat hypophosphatemia with potassium phosphate 30 mm  - Patient's hypophosphatemia is new

## 2025-06-27 NOTE — ASSESSMENT & PLAN NOTE
KOREY is likely due to pre-renal azotemia due to intravascular volume depletion. Baseline creatinine is 0.9. Most recent creatinine and eGFR are listed below.  Recent Labs     06/26/25  1847 06/26/25  2207 06/27/25  0356   CREATININE 2.1* 1.7* 1.6*   EGFRNORACEVR 36* 46* 50*      Plan  - KOREY is worsening. Glucose control. Gentle hydration with electrolyte replacement  - Avoid nephrotoxins and renally dose meds for GFR listed above  - Monitor urine output, serial BMP, and adjust therapy as needed

## 2025-06-27 NOTE — ASSESSMENT & PLAN NOTE
This patient is found to have pancytopenia, the likely etiology is malnutrition from chronic ETOH and possible cirrhosis, will monitor CBC Daily. Will transfuse red blood cells if the hemoglobin is <7g/dL (or <8 in the setting of ACS). Will transfuse platelets if platelet count is <10k. Hold DVT prophylaxis if platelets are <50k. The patient's hemoglobin, white blood cell count, and platelet count results have been reviewed and are listed below.  Recent Labs   Lab 06/27/25  0356   HGB 7.7*   WBC 3.70*   PLT 80*

## 2025-06-27 NOTE — PLAN OF CARE
Problem: Diabetes Comorbidity  Goal: Blood Glucose Level Within Targeted Range  Outcome: Progressing     Problem: Wound  Goal: Optimal Coping  Outcome: Progressing

## 2025-06-27 NOTE — SUBJECTIVE & OBJECTIVE
Past Medical History:   Diagnosis Date    Abscess 9/5/2018    Cataracts, bilateral     Chest pain 12/6/2018    Dehydration     Diabetes mellitus, type 2     Diagnosed 7/2016    Folliculitis 9/15/2017    GERD (gastroesophageal reflux disease)     Hypertension     Inflammatory polyps of colon     Pancreatitis, alcoholic, acute     Port catheter in place     TIA (transient ischemic attack)     Tinea cruris 9/15/2017       Past Surgical History:   Procedure Laterality Date    APPLICATION OF WOUND VACUUM-ASSISTED CLOSURE DEVICE N/A 3/16/2025    Procedure: APPLICATION, WOUND VAC;  Surgeon: Alia Garcia MD;  Location: [facility] OR;  Service: Colon and Rectal;  Laterality: N/A;    CLOSURE OF WOUND N/A 3/17/2025    Procedure: CLOSURE, WOUND;  Surgeon: Alia Garcia MD;  Location: [facility] OR;  Service: Colon and Rectal;  Laterality: N/A;  ABDOMINAL WOUND CLOSURE    COLONOSCOPY      COLONOSCOPY N/A 6/15/2018    Procedure: COLONOSCOPY;  Surgeon: Jim Hicks MD;  Location: [facility] ENDO;  Service: Endoscopy;  Laterality: N/A;    ESOPHAGOGASTRODUODENOSCOPY N/A 6/15/2018    Procedure: ESOPHAGOGASTRODUODENOSCOPY (EGD);  Surgeon: Jim Hicks MD;  Location: [facility] ENDO;  Service: Endoscopy;  Laterality: N/A;    INJECTION OF ANESTHETIC AGENT INTO TISSUE PLANE DEFINED BY TRANSVERSUS ABDOMINIS MUSCLE N/A 3/16/2025    Procedure: BLOCK, TRANSVERSUS ABDOMINIS PLANE;  Surgeon: Alia Garcia MD;  Location: [facility] OR;  Service: Colon and Rectal;  Laterality: N/A;    LAPAROTOMY, EXPLORATORY N/A 3/17/2025    Procedure: LAPAROTOMY, EXPLORATORY;  Surgeon: Alia Garcia MD;  Location: [facility] OR;  Service: Colon and Rectal;  Laterality: N/A;  REMOVAL OF WOUND VAC    LAPAROTOMY, EXPLORATORY N/A 3/16/2025    Procedure: LAPAROTOMY, EXPLORATORY;  Surgeon: Alia Garcia MD;  Location: [facility] OR;  Service: Colon and Rectal;  Laterality: N/A;  lysis of adhesions    ROBOT-ASSISTED LAPAROSCOPIC REPAIR OF INGUINAL HERNIA USING DA JANET XI  "Bilateral 11/30/2022    Procedure: XI ROBOTIC REPAIR, HERNIA, INGUINAL;  Surgeon: Smooth Denny MD;  Location: [facility];  Service: General;  Laterality: Bilateral;       Review of patient's allergies indicates:  No Known Allergies    No current facility-administered medications on file prior to encounter.     Current Outpatient Medications on File Prior to Encounter   Medication Sig    HUMALOG U-100 INSULIN 100 unit/mL injection Please resume your sliding scale as prescribed by your PCP    aspirin 325 MG tablet Take 1 tablet (325 mg total) by mouth once daily.    atorvastatin (LIPITOR) 40 MG tablet Take 1 tablet (40 mg total) by mouth once daily.    blood sugar diagnostic (ONETOUCH ULTRA BLUE TEST STRIP) Strp USE TO CHECK SUGAR BEFORE MEALS AND AT BEDTIME    folic acid (FOLVITE) 1 MG tablet Take 1 tablet (1 mg total) by mouth once daily.    insulin syringe-needle,dispos. 1 mL 28 gauge x 1/2" Syrg 1 each by Misc.(Non-Drug; Combo Route) route 2 (two) times daily.    lancets (ONETOUCH DELICA PLUS LANCET) 33 gauge Misc Inject 1 lancet into the skin 4 (four) times daily before meals and nightly.    losartan (COZAAR) 100 MG tablet Take 1 tablet (100 mg total) by mouth once daily. TAKE 1 TABLET BY MOUTH ONCE DAILY    pen needle, diabetic (BD JOEL 2ND GEN PEN NEEDLE) 32 gauge x 5/32" Ndle Inject 1 each into the skin 4 (four) times daily before meals and nightly.    verapamiL (CALAN-SR) 240 MG CR tablet Take 1 tablet (240 mg total) by mouth once daily.     Family History       Problem Relation (Age of Onset)    Hypertension Mother, Father          Tobacco Use    Smoking status: Former     Types: Cigarettes    Smokeless tobacco: Never   Substance and Sexual Activity    Alcohol use: Yes     Comment:  Daily - States that he drinks 1/2 pint of vodka and about 1-2 cans of beer daily    Drug use: No    Sexual activity: Yes     Partners: Female     Review of Systems   Constitutional:  Negative for chills, diaphoresis, fatigue " and fever.        Appears older than age     HENT:  Negative for drooling, ear pain, rhinorrhea and sore throat.    Eyes: Negative.    Respiratory:  Positive for shortness of breath. Negative for cough and wheezing.    Cardiovascular:  Negative for palpitations and leg swelling.   Gastrointestinal:  Positive for diarrhea, nausea and vomiting. Negative for abdominal pain and constipation.   Endocrine: Negative.    Genitourinary:  Negative for dysuria, hematuria and urgency.   Musculoskeletal:  Positive for gait problem.        Falls     Skin:  Negative for color change and wound.   Allergic/Immunologic: Negative.    Neurological:  Positive for dizziness and weakness. Negative for syncope and speech difficulty.   Hematological: Negative.    Psychiatric/Behavioral: Negative.           Objective:     Vital Signs (Most Recent):  Temp: 97.2 °F (36.2 °C) (06/27/25 1147)  Pulse: 82 (06/27/25 1147)  Resp: 18 (06/27/25 1147)  BP: 119/81 (06/27/25 1147)  SpO2: 96 % (06/27/25 1147) Vital Signs (24h Range):  Temp:  [97.2 °F (36.2 °C)-98.7 °F (37.1 °C)] 97.2 °F (36.2 °C)  Pulse:  [] 82  Resp:  [16-20] 18  SpO2:  [96 %-100 %] 96 %  BP: (118-154)/() 119/81     Weight: 68 kg (150 lb)  Body mass index is 24.21 kg/m².     Physical Exam  Vitals and nursing note reviewed.   Constitutional:       General: He is not in acute distress.     Appearance: He is well-developed.   HENT:      Head: Normocephalic and atraumatic.   Cardiovascular:      Rate and Rhythm: Normal rate and regular rhythm.      Heart sounds: Normal heart sounds.   Pulmonary:      Effort: Pulmonary effort is normal. No respiratory distress.      Breath sounds: Normal breath sounds.   Abdominal:      General: There is distension.      Palpations: Abdomen is soft.      Tenderness: There is no abdominal tenderness.      Comments: Healed abdominal incision   Genitourinary:     Comments: Light brown loose stool with mucous. Single streak of  blood    Musculoskeletal:         General: Normal range of motion.      Cervical back: Normal range of motion and neck supple. No edema.      Right lower leg: Edema present.      Left lower leg: Edema present.   Skin:     General: Skin is dry.   Neurological:      Mental Status: He is alert and oriented to person, place, and time.               Significant Labs: All pertinent labs within the past 24 hours have been reviewed.  CBC:   Recent Labs   Lab 06/26/25  1842 06/26/25  1847 06/27/25  0356   WBC  --  3.31* 3.70*   HGB  --  6.9* 7.7*   HCT 25* 19.6* 22.1*   PLT  --  72* 80*     CMP:   Recent Labs   Lab 06/26/25 1847 06/26/25  2110 06/26/25 2207 06/27/25  0356   *  --  134* 135*   K 3.8  --  3.0* 3.5   CL 98  --  103 102   CO2 22*  --  19* 20*   * 517* 434* 303*   BUN 19  --  19 18   CREATININE 2.1*  --  1.7* 1.6*   CALCIUM 7.2*  --  6.6* 7.1*   PROT 5.4*  --   --  5.6*   ALBUMIN 2.6*  --   --  2.7*   BILITOT 2.2*  --   --  2.0*   ALKPHOS 76  --   --  91   AST 48*  --   --  40   ALT 26  --   --  26   ANIONGAP 11  --  12 13       Significant Imaging:   Imaging Results              CT Chest Abdomen Pelvis With IV Contrast (XPD) Routine Oral Contrast (Final result)  Result time 06/27/25 08:55:20      Final result by Srinivasa Dubose MD (06/27/25 08:55:20)                   Impression:      1.  Bilateral pleural effusions, left-greater-than-right, with left lower lobe atelectatic consolidation.    2.  Small diffusely fatty and probably cirrhotic liver with moderate ascites, diffuse subcutaneous edema and ascending colonic wall thickening which could represent colitis or portal hypertensive colopathy.    3.  Chronic pancreatitis        All CT scans at this facility use dose modulation, iterative reconstruction and/or weight based dosing when appropriate to reduce radiation dose to as low as reasonably achievable.      Electronically signed by: Srinivasa Dubose MD  Date:    06/27/2025  Time:    08:55                Narrative:    EXAMINATION:  CT CHEST ABDOMEN PELVIS WITH IV CONTRAST (XPD)    CLINICAL HISTORY:  Sepsis;SBO;    TECHNIQUE:  The patient was surveyed from the thoracic inlet through the pelvis after the administration of 100 cc Omni 350 IV contrast as well as oral contrast and data was reconstructed for coronal, sagittal, and axial images.    COMPARISON:  None    FINDINGS:  CT chest FINDINGS:  Lungs: Atelectatic consolidation at the left lung base. Multiple bilateral foci of subsegmental atelectasis.  Pleural space: Moderate left and small right pleural effusions.  Heart: No cardiomegaly. No significant pericardial effusion.  Bones/joints: No acute fracture. No dislocation.  Soft tissues: Diffuse subcutaneous edema.  Vasculature: Normal. No thoracic aortic aneurysm.  Lymph nodes: No enlarged lymph nodes.    CT abdomen pelvis FINDINGS:    ABDOMEN:  Liver: Moderately severe fatty infiltration of the liver. Liver diminished in size.  Gallbladder and bile ducts: Cholelithiasis.  Pancreas: Pancreatic parenchymal calcifications with ductal dilatation, consistent with chronic pancreatitis.  Spleen: No splenomegaly.  Adrenals: No mass.  Kidneys and ureters: Nonobstructing mid left renal stone.  Stomach and bowel: Diffuse ascending colonic wall thickening.    PELVIS:  Appendix: No findings to suggest acute appendicitis.  Bladder: No mass.  Reproductive: Unremarkable as visualized.    ABDOMEN and PELVIS:  Intraperitoneal space: Moderate ascites.  Bones/joints: No acute fracture.  Soft tissues: Diffuse subcutaneous edema. Moderate mesenteric edema. Small right inguinal hernia containing ascites.  Vasculature: No abdominal aortic aneurysm.  Lymph nodes: No enlarged lymph nodes.                          Wet Read by Mare Zhu DO (06/27/25 03:13:54, [facility] - Emergency Dept, Emergency Medicine)    Stat rad Torito Grimes MD  Bilateral pleural effusions, left-greater-than-right, with left lower lobe atelectatic  consolidation.  Small diffusely fatty and probably cirrhotic liver with moderate ascites, diffuse subcutaneous edema and ascending colonic wall thickening which could represent colitis or portal hypertension colopathy.  Chronic pancreatitis.  No evidence of small-bowel obstruction.                                     X-Ray Chest AP Portable (Final result)  Result time 06/26/25 19:00:52      Final result by Srinivasa Dubose MD (06/26/25 19:00:52)                   Impression:     No acute cardiopulmonary abnormality.    Finalized on: 6/26/2025 7:00 PM By:  Srinivasa Dubose MD  [facility]# [id number]      2025-06-26 19:03:04.648     [facility]               Narrative:    EXAM:  XR CHEST AP PORTABLE    CLINICAL HISTORY: Sepsis    COMPARISON: 03/19/2025.    FINDINGS: Atelectasis left lung base.  Low lung volumes limits evaluation. No confluent airspace opacity or consolidation.  There is no evidence of pleural effusion, pneumothorax, or other acute pulmonary disease.  The cardiomediastinal silhouette is within normal limits.  No acute osseous abnormality is evident.   Moderate scattered degenerative change and atherosclerotic disease.

## 2025-06-27 NOTE — HPI
Jorgito Arreaga is a 59 year old male with history of DM, GERD, HTN, chronic pancreatitis, and ETOH who presented to [facility] ED for further evaluation of progressive weakness and postural dizziness that began 2 days ago. Patient reports his symptoms and wane, but he was encouraged to come to ED by his sibling. He endorses associated diarrhea and single episode of vomiting. He gives insulin per sliding scale once day for DM. He has had some falls at home as well. He reports drinking binge drinking once a week beer and 1/2 pint of vodka. Denies history of cirrhosis. No recent admission for pancreatitis. Of note, the patient was admitted from 3/15/25 through 3/24/25 for SBO due to volvulus and underwent exploratory lap with lysis of adhesions.    In ED, labs were significant for hyperglycemia (630), KOREY, lactic acidosis, and electrolyte abnormalities. CXR was negative for acute cardiopulmonary abnormality. CT abdomen pelvis showed findings concerning for liver cirrhosis, portal hypertensive colopathy, and moderate ascites. Total Bilirubin was 2 but other liver enzymes are negative. Patient has been transferred to [place] for further management. Briefly discussed case with GI provider who recommended INR.

## 2025-06-27 NOTE — ASSESSMENT & PLAN NOTE
Lab Results   Component Value Date    LACTATE 2.9 (H) 06/27/2025    LACTATE 2.7 (H) 06/27/2025    LACTATE 4.6 (HH) 06/27/2025    Trending down. Treat elevated glucose. Gentle hydration

## 2025-06-27 NOTE — ASSESSMENT & PLAN NOTE
Patient's blood pressure range in the last 24 hours was: BP  Min: 118/88  Max: 154/105.The patient's inpatient anti-hypertensive regimen is listed below:  Current Antihypertensives   Hydralazine 10mg IV prn    Plan  - previously elevated, but now controleld

## 2025-06-27 NOTE — ASSESSMENT & PLAN NOTE
Glucose >600 on initial labs. Reports only using sliding scale once daily.   Plan:   --Check HgbA1c   --moderate SS  --has received gentle hydration

## 2025-06-27 NOTE — ASSESSMENT & PLAN NOTE
Patient has Abnormal Magnesium: hypomagnesemia. Will continue to monitor electrolytes closely. Will replace the affected electrolytes and repeat labs to be done after interventions completed. The patient's magnesium results have been reviewed and are listed below.  Recent Labs   Lab 06/27/25  0356   MG 1.1*

## 2025-06-28 LAB
ABSOLUTE EOSINOPHIL (OHS): 0.02 K/UL
ABSOLUTE MONOCYTE (OHS): 0.32 K/UL (ref 0.3–1)
ABSOLUTE NEUTROPHIL COUNT (OHS): 1.84 K/UL (ref 1.8–7.7)
ALBUMIN SERPL BCP-MCNC: 2.4 G/DL (ref 3.5–5.2)
ALP SERPL-CCNC: 81 UNIT/L (ref 40–150)
ALT SERPL W/O P-5'-P-CCNC: 25 UNIT/L (ref 10–44)
ANION GAP (OHS): 10 MMOL/L (ref 8–16)
AST SERPL-CCNC: 52 UNIT/L (ref 11–45)
BASOPHILS # BLD AUTO: 0 K/UL
BASOPHILS NFR BLD AUTO: 0 %
BILIRUB SERPL-MCNC: 1.5 MG/DL (ref 0.1–1)
BUN SERPL-MCNC: 14 MG/DL (ref 6–20)
C DIFF TOX GENS STL QL NAA+PROBE: POSITIVE
CALCIUM SERPL-MCNC: 7.1 MG/DL (ref 8.7–10.5)
CHLORIDE SERPL-SCNC: 105 MMOL/L (ref 95–110)
CO2 SERPL-SCNC: 20 MMOL/L (ref 23–29)
CREAT SERPL-MCNC: 1.2 MG/DL (ref 0.5–1.4)
ERYTHROCYTE [DISTWIDTH] IN BLOOD BY AUTOMATED COUNT: 14.1 % (ref 11.5–14.5)
GFR SERPLBLD CREATININE-BSD FMLA CKD-EPI: >60 ML/MIN/1.73/M2
GLUCOSE SERPL-MCNC: 265 MG/DL (ref 70–110)
HCT VFR BLD AUTO: 22.1 % (ref 40–54)
HGB BLD-MCNC: 7.6 GM/DL (ref 14–18)
IMM GRANULOCYTES # BLD AUTO: 0.01 K/UL (ref 0–0.04)
IMM GRANULOCYTES NFR BLD AUTO: 0.4 % (ref 0–0.5)
LYMPHOCYTES # BLD AUTO: 0.47 K/UL (ref 1–4.8)
MAGNESIUM SERPL-MCNC: 1.9 MG/DL (ref 1.6–2.6)
MCH RBC QN AUTO: 31.5 PG (ref 27–31)
MCHC RBC AUTO-ENTMCNC: 34.4 G/DL (ref 32–36)
MCV RBC AUTO: 92 FL (ref 82–98)
NUCLEATED RBC (/100WBC) (OHS): 2 /100 WBC
PHOSPHATE SERPL-MCNC: 2.3 MG/DL (ref 2.7–4.5)
PLATELET # BLD AUTO: 89 K/UL (ref 150–450)
PMV BLD AUTO: 12.8 FL (ref 9.2–12.9)
POCT GLUCOSE: 122 MG/DL (ref 70–110)
POCT GLUCOSE: 238 MG/DL (ref 70–110)
POCT GLUCOSE: 281 MG/DL (ref 70–110)
POCT GLUCOSE: 368 MG/DL (ref 70–110)
POTASSIUM SERPL-SCNC: 3.7 MMOL/L (ref 3.5–5.1)
PROT SERPL-MCNC: 5.1 GM/DL (ref 6–8.4)
RBC # BLD AUTO: 2.41 M/UL (ref 4.6–6.2)
RELATIVE EOSINOPHIL (OHS): 0.8 %
RELATIVE LYMPHOCYTE (OHS): 17.7 % (ref 18–48)
RELATIVE MONOCYTE (OHS): 12 % (ref 4–15)
RELATIVE NEUTROPHIL (OHS): 69.1 % (ref 38–73)
SODIUM SERPL-SCNC: 135 MMOL/L (ref 136–145)
WBC # BLD AUTO: 2.66 K/UL (ref 3.9–12.7)
WBC #/AREA STL HPF: ABNORMAL /[HPF]

## 2025-06-28 PROCEDURE — 25000003 PHARM REV CODE 250: Performed by: NURSE PRACTITIONER

## 2025-06-28 PROCEDURE — 25000003 PHARM REV CODE 250: Performed by: STUDENT IN AN ORGANIZED HEALTH CARE EDUCATION/TRAINING PROGRAM

## 2025-06-28 PROCEDURE — 83735 ASSAY OF MAGNESIUM: CPT | Performed by: NURSE PRACTITIONER

## 2025-06-28 PROCEDURE — 21400001 HC TELEMETRY ROOM

## 2025-06-28 PROCEDURE — 27000207 HC ISOLATION

## 2025-06-28 PROCEDURE — 85025 COMPLETE CBC W/AUTO DIFF WBC: CPT | Performed by: NURSE PRACTITIONER

## 2025-06-28 PROCEDURE — 80053 COMPREHEN METABOLIC PANEL: CPT | Performed by: NURSE PRACTITIONER

## 2025-06-28 PROCEDURE — 84100 ASSAY OF PHOSPHORUS: CPT | Performed by: NURSE PRACTITIONER

## 2025-06-28 PROCEDURE — 36415 COLL VENOUS BLD VENIPUNCTURE: CPT | Performed by: NURSE PRACTITIONER

## 2025-06-28 RX ORDER — LOPERAMIDE HYDROCHLORIDE 2 MG/1
2 CAPSULE ORAL 4 TIMES DAILY PRN
Qty: 6 CAPSULE | Refills: 0 | Status: SHIPPED | OUTPATIENT
Start: 2025-06-28 | End: 2025-06-28 | Stop reason: HOSPADM

## 2025-06-28 RX ORDER — FERROUS SULFATE 325(65) MG
325 TABLET ORAL
Qty: 30 TABLET | Refills: 0 | Status: SHIPPED | OUTPATIENT
Start: 2025-06-28 | End: 2025-07-28

## 2025-06-28 RX ORDER — LANOLIN ALCOHOL/MO/W.PET/CERES
1000 CREAM (GRAM) TOPICAL DAILY
Qty: 30 TABLET | Refills: 0 | Status: SHIPPED | OUTPATIENT
Start: 2025-06-28 | End: 2025-07-28

## 2025-06-28 RX ORDER — SODIUM,POTASSIUM PHOSPHATES 280-250MG
1 POWDER IN PACKET (EA) ORAL ONCE
Status: COMPLETED | OUTPATIENT
Start: 2025-06-28 | End: 2025-06-28

## 2025-06-28 RX ORDER — SODIUM CHLORIDE 9 MG/ML
INJECTION, SOLUTION INTRAVENOUS CONTINUOUS
Status: DISCONTINUED | OUTPATIENT
Start: 2025-06-28 | End: 2025-06-29

## 2025-06-28 RX ORDER — LOPERAMIDE HYDROCHLORIDE 2 MG/1
2 CAPSULE ORAL 4 TIMES DAILY PRN
Status: DISCONTINUED | OUTPATIENT
Start: 2025-06-28 | End: 2025-07-01 | Stop reason: HOSPADM

## 2025-06-28 RX ORDER — FOLIC ACID 1 MG/1
1 TABLET ORAL DAILY
Qty: 30 TABLET | Refills: 0 | Status: SHIPPED | OUTPATIENT
Start: 2025-06-28 | End: 2025-07-28

## 2025-06-28 RX ORDER — MULTIVITAMIN
1 TABLET ORAL DAILY
Qty: 30 TABLET | Refills: 0 | Status: SHIPPED | OUTPATIENT
Start: 2025-06-28 | End: 2025-07-28

## 2025-06-28 RX ORDER — METFORMIN HYDROCHLORIDE 500 MG/1
500 TABLET ORAL 2 TIMES DAILY WITH MEALS
Qty: 180 TABLET | Refills: 3 | Status: SHIPPED | OUTPATIENT
Start: 2025-06-28 | End: 2025-06-28 | Stop reason: HOSPADM

## 2025-06-28 RX ORDER — SODIUM CHLORIDE 9 MG/ML
INJECTION, SOLUTION INTRAVENOUS ONCE
Status: COMPLETED | OUTPATIENT
Start: 2025-06-28 | End: 2025-06-28

## 2025-06-28 RX ORDER — INSULIN LISPRO 100 [IU]/ML
INJECTION, SOLUTION INTRAVENOUS; SUBCUTANEOUS
Qty: 10 ML | Refills: 1 | Status: SHIPPED | OUTPATIENT
Start: 2025-06-28

## 2025-06-28 RX ORDER — LEVOFLOXACIN 500 MG/1
500 TABLET, FILM COATED ORAL DAILY
Qty: 3 TABLET | Refills: 0 | Status: SHIPPED | OUTPATIENT
Start: 2025-06-28 | End: 2025-06-28 | Stop reason: HOSPADM

## 2025-06-28 RX ORDER — VANCOMYCIN HYDROCHLORIDE 25 MG/ML
125 KIT ORAL EVERY 6 HOURS
Qty: 180 ML | Refills: 0 | Status: SHIPPED | OUTPATIENT
Start: 2025-06-28 | End: 2025-06-28

## 2025-06-28 RX ORDER — VANCOMYCIN HYDROCHLORIDE 25 MG/ML
125 KIT ORAL EVERY 6 HOURS
Qty: 180 ML | Refills: 0 | Status: SHIPPED | OUTPATIENT
Start: 2025-06-28 | End: 2025-06-29

## 2025-06-28 RX ORDER — VANCOMYCIN HYDROCHLORIDE 25 MG/ML
125 KIT ORAL
Status: DISCONTINUED | OUTPATIENT
Start: 2025-06-28 | End: 2025-07-01 | Stop reason: HOSPADM

## 2025-06-28 RX ORDER — LANOLIN ALCOHOL/MO/W.PET/CERES
400 CREAM (GRAM) TOPICAL ONCE
Status: COMPLETED | OUTPATIENT
Start: 2025-06-28 | End: 2025-06-28

## 2025-06-28 RX ADMIN — SODIUM CHLORIDE 1000 ML: 9 INJECTION, SOLUTION INTRAVENOUS at 01:06

## 2025-06-28 RX ADMIN — SODIUM CHLORIDE 500 ML: 9 INJECTION, SOLUTION INTRAVENOUS at 08:06

## 2025-06-28 RX ADMIN — VANCOMYCIN HYDROCHLORIDE 125 MG: KIT at 06:06

## 2025-06-28 RX ADMIN — INSULIN ASPART 4 UNITS: 100 INJECTION, SOLUTION INTRAVENOUS; SUBCUTANEOUS at 04:06

## 2025-06-28 RX ADMIN — Medication 400 MG: at 08:06

## 2025-06-28 RX ADMIN — INSULIN ASPART 10 UNITS: 100 INJECTION, SOLUTION INTRAVENOUS; SUBCUTANEOUS at 12:06

## 2025-06-28 RX ADMIN — SODIUM CHLORIDE: 9 INJECTION, SOLUTION INTRAVENOUS at 08:06

## 2025-06-28 RX ADMIN — Medication 1 PACKET: at 08:06

## 2025-06-28 RX ADMIN — VANCOMYCIN HYDROCHLORIDE 125 MG: KIT at 01:06

## 2025-06-28 RX ADMIN — VANCOMYCIN HYDROCHLORIDE 125 MG: KIT at 03:06

## 2025-06-28 RX ADMIN — SODIUM CHLORIDE: 9 INJECTION, SOLUTION INTRAVENOUS at 09:06

## 2025-06-28 RX ADMIN — VANCOMYCIN HYDROCHLORIDE 125 MG: KIT at 08:06

## 2025-06-28 RX ADMIN — LOPERAMIDE HYDROCHLORIDE 2 MG: 2 CAPSULE ORAL at 11:06

## 2025-06-28 RX ADMIN — INSULIN ASPART 4 UNITS: 100 INJECTION, SOLUTION INTRAVENOUS; SUBCUTANEOUS at 08:06

## 2025-06-28 NOTE — PLAN OF CARE
Problem: Adult Inpatient Plan of Care  Goal: Plan of Care Review  Outcome: Progressing  Goal: Patient-Specific Goal (Individualized)  Outcome: Progressing  Goal: Absence of Hospital-Acquired Illness or Injury  Outcome: Progressing  Goal: Optimal Comfort and Wellbeing  Outcome: Progressing  Goal: Readiness for Transition of Care  Outcome: Progressing     Problem: Diabetes Comorbidity  Goal: Blood Glucose Level Within Targeted Range  Outcome: Progressing     Problem: Wound  Goal: Optimal Coping  Outcome: Progressing  Goal: Optimal Functional Ability  Outcome: Progressing  Goal: Absence of Infection Signs and Symptoms  Outcome: Progressing  Goal: Improved Oral Intake  Outcome: Progressing  Goal: Optimal Pain Control and Function  Outcome: Progressing  Goal: Skin Health and Integrity  Outcome: Progressing  Goal: Optimal Wound Healing  Outcome: Progressing     Problem: Acute Kidney Injury/Impairment  Goal: Fluid and Electrolyte Balance  Outcome: Progressing  Goal: Improved Oral Intake  Outcome: Progressing  Goal: Effective Renal Function  Outcome: Progressing     Problem: Infection  Goal: Absence of Infection Signs and Symptoms  Outcome: Progressing

## 2025-06-28 NOTE — PLAN OF CARE
Problem: Adult Inpatient Plan of Care  Goal: Plan of Care Review  Outcome: Met  Goal: Patient-Specific Goal (Individualized)  Outcome: Met  Goal: Absence of Hospital-Acquired Illness or Injury  Outcome: Met  Goal: Optimal Comfort and Wellbeing  Outcome: Met  Goal: Readiness for Transition of Care  Outcome: Met     Problem: Diabetes Comorbidity  Goal: Blood Glucose Level Within Targeted Range  Outcome: Met     Problem: Wound  Goal: Optimal Coping  Outcome: Met  Goal: Optimal Functional Ability  Outcome: Met  Goal: Absence of Infection Signs and Symptoms  Outcome: Met  Goal: Improved Oral Intake  Outcome: Met  Goal: Optimal Pain Control and Function  Outcome: Met  Goal: Skin Health and Integrity  Outcome: Met  Goal: Optimal Wound Healing  Outcome: Met     Problem: Acute Kidney Injury/Impairment  Goal: Fluid and Electrolyte Balance  Outcome: Met  Goal: Improved Oral Intake  Outcome: Met  Goal: Effective Renal Function  Outcome: Met     Problem: Infection  Goal: Absence of Infection Signs and Symptoms  Outcome: Met

## 2025-06-28 NOTE — ASSESSMENT & PLAN NOTE
KOREY is likely due to pre-renal azotemia due to intravascular volume depletion. Baseline creatinine is 0.9. Most recent creatinine and eGFR are listed below.  Recent Labs     06/26/25  2207 06/27/25  0356 06/28/25  0548   CREATININE 1.7* 1.6* 1.2   EGFRNORACEVR 46* 50* >60      Plan  - KOREY is worsening. Glucose control. Gentle hydration with electrolyte replacement  - Avoid nephrotoxins and renally dose meds for GFR listed above  - Monitor urine output, serial BMP, and adjust therapy as needed

## 2025-06-28 NOTE — PROGRESS NOTES
[facility] - Telemetry ([facility])  Adult Nutrition  Progress Note    SUMMARY       Recommendations    Recommendation/Intervention:   1. Recommend modify pt's diet to a GI soft, Consistent carbohydrate 2000 calorie diet, texture per SLP recommendations   2. Recommend Suplena TID to assist filling nutritional gaps   3. Recommend Banatrol TID to assist with diarrhea or BID for loose stools as warranted   4. Recommend anti emetic to assist with N/V as warranted   5. Encourage PO intake, recommend feeding assistance/ meal set up as warranted   6. Weigh twice weekly    Goals:   1. Pt's diet will be modified and to safest texture within 24 hrs   2. Pt will tolerate and consume >75% EEN and EPN prior to RD follow up   3. Pt's diarrhea/loose stools will be improved prior to RD follow up   4. Pt's N/V will be improved prior to RD follow up  Nutrition Goal Status: new  Communication of RD Recs: other (comment) (RD notes, POC, sticky notes)    Nutrition Discharge Planning    Nutrition Discharge Planning: Therapeutic diet (comments), Oral supplement regimen (comments), Diet texture per SLP  Therapeutic diet (comments): GI soft, Consistent carbohydrate 2000 calorie diet + texture per SLP recommendations  Oral supplement regimen (comments): Boost glucose control as warranted    Assessment and Plan    Endocrine  Moderate malnutrition  Malnutrition Type:  Context: acute on chronic illness  Level: moderate    Related to (etiology):   Alteration in GI tract structure/function    Signs and Symptoms (as evidenced by):   Inc loss of subcutaneous fat  Inc muscle loss  Change in functional indicators ( strength)  Unable or unwilling to eat sufficient energy/protein to maintain a healthy weight  Food avoidance and/or lack of interest in food    Malnutrition Characteristic Summary:  Energy Intake (Malnutrition): less than or equal to 75% for greater than or equal to 1 month  Subcutaneous Fat (Malnutrition): moderate depletion  Muscle Mass  (Malnutrition): mild depletion  Hand  Strength, Left (Malnutrition): decreased  Hand  Strength, Right (Malnutrition): decreased    Interventions/Recommendations (treatment strategy):  1. Decreased fiber/residue, consistent carbohydrate, texture modified diet  2. Commercial beverage medical food supplement therapy  3. Commercial food medical food supplement therapy  4. Management of nutrition related prescription medication  5. Feeding assistance/ Meal set up management  6. Collaboration by nutrition professional with other providers    Nutrition Diagnosis Status:   New         Malnutrition Assessment (6/27/25):  Malnutrition Context: acute illness or injury, chronic illness  Malnutrition Level: moderate  Skin (Micronutrient): edema (Demetrius score = 19 (no risk)       Energy Intake (Malnutrition): less than or equal to 75% for greater than or equal to 1 month  Subcutaneous Fat (Malnutrition): moderate depletion  Muscle Mass (Malnutrition): mild depletion  Hand  Strength, Left (Malnutrition): decreased  Hand  Strength, Right (Malnutrition): decreased   Orbital Region (Subcutaneous Fat Loss): moderate depletion  Upper Arm Region (Subcutaneous Fat Loss): moderate depletion   Latter day Region (Muscle Loss): moderate depletion  Clavicle Bone Region (Muscle Loss): mild depletion  Clavicle and Acromion Bone Region (Muscle Loss): mild depletion  Dorsal Hand (Muscle Loss): mild depletion  Patellar Region (Muscle Loss): mild depletion  Anterior Thigh Region (Muscle Loss): mild depletion  Posterior Calf Region (Muscle Loss): mild depletion                 Reason for Assessment    Reason For Assessment: identified at risk by screening criteria  Diagnosis: diabetes diagnosis/complications (Type 2 diabetes mellitus with hyperglycemia, with long-term current use of insulin)  General Information Comments:   6/27/25: 59 y.o. Male admitted for Type 2 diabetes mellitus with hyperglycemia, with long-term current use of  "insulin. PMH: Alcohol abuse, Lactic acidosis, KOREY, HTN, Hypomagnesemia, Abnormal CT of abdomen, Hypophosphatemia, Pancytopenia; Hx: SBO 3/16/25, TIA. Pt currently on a Consistent Carbohydrate 2000 calorie diet, was NPO at time of encounter. RD assessing pt d/t identified at risk per MST, score of 3 d/t 14-23 lb wt loss and decreased appetite. EMR noted "CT shows concerns for colitis versus portal hypertensive colopathy", bilateral leg edema noted. RD visited pt wt bedside, pt reported no PO intake x 2 days PTA, 1 meal/day + ONS prior to no PO intake, pt stated "for a while now". Pt confirmed that he is hungry, denied abd pain or swallowing difficulties, confirmed N/V/D and chewing difficulty, pt stated he prefers soft, moist, easy to chew foods. RD provided pt with a menu once diet advanced to encourage pt preferred food choices, discussed benefits of Suplena ONS, pt receptive to try, encouraged PO intake and ONS as snacks, RD added to pt's orders and trays. Pt stated his UBW was 225 lbs x over a year ago, wt loss d/t decreased appetite. NFPE performed, mild/moderate malnutrition noted, pt confirmed decreased hand strength. Per chart: skin WDL. Labs, meds, weights reviewed. Weight charted 6/29/24 154 lbs, 6/26/25 150 lbs (BMI 24.21, Normal), -4 lb wt loss (3% wt change) x 1 year, note 3/15/25 150 lbs, wt stable x 3 months. RD will continue to follow and monitor pt's nutritional status during admit.    Nutrition/Diet History    Nutrition Intake History: 1 meal/day + ONS  Food Preferences: soft, moist, easy to chew food  Spiritual, Cultural Beliefs, Jew Practices, Values that Affect Care: no  Food Allergies: NKFA  Factors Affecting Nutritional Intake: altered gastrointestinal function, chewing difficulties/inability to chew food, decreased appetite, diarrhea, nausea/vomiting  Nutrition-related SDOH: Adequate food in home environment, None Identified    Anthropometrics    Height: 5' 6" (167.6 cm)  Height " (inches): 66 in  Height Method: Stated  Weight: 68 kg (149 lb 14.6 oz)  Weight (lb): 149.91 lb  Weight Method: Stated  Ideal Body Weight (IBW), Male: 142 lb  % Ideal Body Weight, Male (lb): 105.57 %  BMI (Calculated): 24.2  BMI Grade: 18.5-24.9 - normal  Usual Body Weight (UBW), k kg  Weight Change Amount: 4 lb  % Usual Body Weight: 97.35  % Weight Change From Usual Weight: -2.86 %       Wt Readings from Last 15 Encounters:   25 68 kg (149 lb 14.6 oz)   03/15/25 68 kg (150 lb)   24 69.9 kg (154 lb 1.6 oz)   24 69.9 kg (154 lb)   24 69.9 kg (154 lb)   24 70 kg (154 lb 5.2 oz)   24 63.5 kg (140 lb)   23 71.5 kg (157 lb 10.1 oz)   12/15/22 68.6 kg (151 lb 3.8 oz)   22 68.7 kg (151 lb 7.3 oz)   22 71.7 kg (158 lb 1.1 oz)   22 73.8 kg (162 lb 11.2 oz)   22 67.5 kg (148 lb 13 oz)   22 71.6 kg (157 lb 13.6 oz)   21 79 kg (174 lb 2.6 oz)     Lab/Procedures/Meds    Pertinent Labs Reviewed: reviewed  Pertinent Medications Reviewed: reviewed  Pertinent Medications Comments: potassium phosphate, magnesium sulfate    BMP  Lab Results   Component Value Date     (L) 2025    K 3.5 2025     2025    CO2 20 (L) 2025    BUN 18 2025    CREATININE 1.6 (H) 2025    CALCIUM 7.1 (L) 2025    ANIONGAP 13 2025    EGFRNORACEVR 50 (L) 2025     Lab Results   Component Value Date    CALCIUM 7.1 (L) 2025    PHOS <1.0 (LL) 2025     Lab Results   Component Value Date    ALBUMIN 2.7 (L) 2025     Lab Results   Component Value Date    ALT 26 2025    AST 40 2025    ALKPHOS 91 2025    BILITOT 2.0 (H) 2025     Recent Labs   Lab 25  1807   POCTGLUCOSE 117*     Lab Results   Component Value Date    HGBA1C 7.3 (H) 2025     Lab Results   Component Value Date    WBC 3.70 (L) 2025    HGB 7.7 (L) 2025    HCT 22.1 (L) 2025    MCV 91 2025     PLT 80 (L) 06/27/2025       Scheduled Meds:   [START ON 6/28/2025] vancomycin  125 mg Oral Q6H     Continuous Infusions:  PRN Meds:.  Current Facility-Administered Medications:     acetaminophen, 650 mg, Oral, Q4H PRN    albuterol-ipratropium, 3 mL, Nebulization, Q4H PRN    aluminum-magnesium hydroxide-simethicone, 30 mL, Oral, QID PRN    dextrose 50%, 12.5 g, Intravenous, PRN    dextrose 50%, 25 g, Intravenous, PRN    glucagon (human recombinant), 1 mg, Intramuscular, PRN    glucose, 16 g, Oral, PRN    glucose, 24 g, Oral, PRN    insulin aspart U-100, 0-10 Units, Subcutaneous, QID (AC + HS) PRN    melatonin, 6 mg, Oral, Nightly PRN    naloxone, 0.02 mg, Intravenous, PRN    senna-docusate, 1 tablet, Oral, BID PRN    simethicone, 1 tablet, Oral, QID PRN      Estimated/Assessed Needs    Weight Used For Calorie Calculations: 68 kg (149 lb 14.6 oz)  Energy Calorie Requirements (kcal): 7440-7982 kcals (MSJ x 1.2-1.4 AF (GI disorder/ Diabetes)  Energy Need Method: [place]  Protein Requirements: 54-68 g (0.8-1.0 g/kg ABW (KOREY, no dialysis/ GI disorder, adult)  Weight Used For Protein Calculations: 68 kg (149 lb 14.6 oz)  Fluid Requirements (mL): 500 mL + total output (KOREY, per MD/NP)  Estimated Fluid Requirement Method: other (see comments)  RDA Method (mL): 1731  CHO Requirement: 216-253 g (3671-4728 kcals/8 = 50%)      Nutrition Prescription Ordered    Current Diet Order: Consistent carbohydrate 2000 calorie diet  Oral Nutrition Supplement: Suplena TID    Evaluation of Received Nutrient/Fluid Intake  I/O: (Net since admit):  6/27/25: +2350 mL    Energy Calories Required: not meeting needs  Protein Required: not meeting needs  Fluid Required: exceeds needs  Total Fluid Intake (mL): 2350  Total Fluid Output (mL): none  Comments: LBM 6/27 (loose); MST score = 3, performed 6/27/25 x 1 day after admit  Tolerance: other (see comments) (Currently no intake)  % Intake of Estimated Energy Needs: 0 - 25 %  % Meal Intake: 0  - 25 %    PES Statement  See malnutrition above    Nutrition Risk    Level of Risk/Frequency of Follow-up: high (F/u x 2 weekly)     Monitor and Evaluation    Monitor and Evaluation: Energy intake, Food and beverage intake, Protein intake, Carbohydrate intake, Diet order, Weight, Electrolyte and renal panel, Gastrointestinal profile, Glucose/endocrine profile, Inflammatory profile, Nutrition focused physical findings, Skin     Nutrition Follow-Up    RD Follow-up?: Yes  Chasity Whelan, [place], RDN, LDN

## 2025-06-28 NOTE — ASSESSMENT & PLAN NOTE
Patient's most recent phosphorus results are listed below.   Recent Labs     06/27/25  0356 06/28/25  0548   PHOS <1.0* 2.3*     Plan  - Will treat hypophosphatemia with potassium phosphate 30 mm  - Patient's hypophosphatemia is new

## 2025-06-28 NOTE — ASSESSMENT & PLAN NOTE
Malnutrition Type:  Context: acute on chronic illness  Level: moderate    Related to (etiology):   Alteration in GI tract structure/function    Signs and Symptoms (as evidenced by):   Inc loss of subcutaneous fat  Inc muscle loss  Change in functional indicators ( strength)  Unable or unwilling to eat sufficient energy/protein to maintain a healthy weight  Food avoidance and/or lack of interest in food    Malnutrition Characteristic Summary:  Energy Intake (Malnutrition): less than or equal to 75% for greater than or equal to 1 month  Subcutaneous Fat (Malnutrition): moderate depletion  Muscle Mass (Malnutrition): mild depletion  Hand  Strength, Left (Malnutrition): decreased  Hand  Strength, Right (Malnutrition): decreased    Interventions/Recommendations (treatment strategy):  1. Decreased fiber/residue, consistent carbohydrate, texture modified diet  2. Commercial beverage medical food supplement therapy  3. Commercial food medical food supplement therapy  4. Management of nutrition related prescription medication  5. Feeding assistance/ Meal set up management  6. Collaboration by nutrition professional with other providers    Nutrition Diagnosis Status:   New

## 2025-06-28 NOTE — ASSESSMENT & PLAN NOTE
Nutrition consulted. Most recent weight and BMI monitored-     Measurements:  Wt Readings from Last 1 Encounters:   06/28/25 62.8 kg (138 lb 7.2 oz)   Body mass index is 22.35 kg/m².    Patient has been screened and assessed by RD.    Malnutrition Type:  Context: acute illness or injury, chronic illness  Level: moderate    Malnutrition Characteristic Summary:  Energy Intake (Malnutrition): less than or equal to 75% for greater than or equal to 1 month  Subcutaneous Fat (Malnutrition): moderate depletion  Muscle Mass (Malnutrition): mild depletion  Hand  Strength, Left (Malnutrition): decreased  Hand  Strength, Right (Malnutrition): decreased    Interventions/Recommendations (treatment strategy):  1. Recommend modify pt's diet to a GI soft, Consistent carbohydrate 2000 calorie diet, texture per SLP recommendations 2. Recommend Suplena TID to assist filling nutritional gaps 3. Recommend Banatrol TID to assist with diarrhea or BID for loose stools as warranted 4. Recommend anti emetic to assist with N/V as warranted 5. Encourage PO intake, recommend feeding assistance/ meal set up as warranted 6. Weigh twice weekly

## 2025-06-28 NOTE — ASSESSMENT & PLAN NOTE
Lab Results   Component Value Date    LACTATE 3.1 (H) 06/27/2025    LACTATE 2.9 (H) 06/27/2025    LACTATE 2.7 (H) 06/27/2025    Trending down. Treat elevated glucose. Gentle hydration

## 2025-06-28 NOTE — PLAN OF CARE
Nutrition Recommendations/Interventions for malnutrition 6/27/25:   1. Recommend modify pt's diet to a GI soft, Consistent carbohydrate 2000 calorie diet, texture per SLP recommendations   2. Recommend Suplena TID to assist filling nutritional gaps   3. Recommend Banatrol TID to assist with diarrhea or BID for loose stools as warranted   4. Recommend anti emetic to assist with N/V as warranted   5. Encourage PO intake, recommend feeding assistance/ meal set up as warranted   6. Weigh twice weekly  7. Collaboration by nutrition professional with other providers    Goals:   1. Pt's diet will be modified and to safest texture within 24 hrs   2. Pt will tolerate and consume >75% EEN and EPN prior to RD follow up   3. Pt's diarrhea/loose stools will be improved prior to RD follow up   4. Pt's N/V will be improved prior to RD follow up    [place] Brizuela, RDN, LDN

## 2025-06-28 NOTE — ASSESSMENT & PLAN NOTE
Patient has Abnormal Magnesium: hypomagnesemia. Will continue to monitor electrolytes closely. Will replace the affected electrolytes and repeat labs to be done after interventions completed. The patient's magnesium results have been reviewed and are listed below.  Recent Labs   Lab 06/28/25  0548   MG 1.9

## 2025-06-28 NOTE — PLAN OF CARE
[facility] - Telemetry (Hospital)  Discharge Assessment    Primary Care Provider: Vonda Elliott FNP     Discharge Assessment (most recent)       BRIEF DISCHARGE ASSESSMENT - 06/28/25 1010          Discharge Planning    Assessment Type Discharge Planning Brief Assessment     Resource/Environmental Concerns none     Support Systems Family members     Assistance Needed Independent     Equipment Currently Used at Home none     Current Living Arrangements home     Patient/Family Anticipates Transition to home     Patient/Family Anticipated Services at Transition none     DME Needed Upon Discharge  none     Discharge Plan A Home                   Patient has no d/c needs at this time. Sw to follow up, as needed, for d/c planning purposes.

## 2025-06-28 NOTE — ASSESSMENT & PLAN NOTE
This patient is found to have pancytopenia, the likely etiology is malnutrition from chronic ETOH and possible cirrhosis, will monitor CBC Daily. Will transfuse red blood cells if the hemoglobin is <7g/dL (or <8 in the setting of ACS). Will transfuse platelets if platelet count is <10k. Hold DVT prophylaxis if platelets are <50k. The patient's hemoglobin, white blood cell count, and platelet count results have been reviewed and are listed below.  Recent Labs   Lab 06/28/25  0548   HGB 7.6*   WBC 2.66*   PLT 89*

## 2025-06-28 NOTE — HOSPITAL COURSE
Admitted under Hospital Medicine for dizziness, diarrhea  C diff antigen positive, toxin a/B negative, C diff toxin by PCR positive-- has been started on p.o. vancomycin   Blood cultures negative to date   Received gentle hydration with improvement in dizziness;    Electrolytes replaced   LFTs trended down, stated improvement in abdominal distention.  Denied any abdominal pain/right upper quadrant tenderness.  Emphasized on abstinence from alcohol, recommended outpatient hepatology follow up.      06/29- Noted to have clinical improvement, blood pressure is stabilized, attempted for discharge, however patient stated lack of transportation to obtain outpatient medications from pharmacy; stated that none of his family members can provide support to obtain medications.   attempted to reach out to nearest pharmacy/[facility]-per pharmacy, [facility] will have to order medication and it will not be in until tomorrow/ /6/30 06/30/2025- [facility]. Pt reports he feels better. Still has some loose stool but no nausea, vomiting, abd pain. Mg repleted.  Pt was initially planned for discharge today but prefers to stay additional day due to diarrhea.     07/01  No acute events overnight.  Hypomagnesemia improved.  Patient reports he feels well today, stool is forming up slightly, denies abdominal pain.  Appears stable for discharge home today per my exam.  Will continue p.o. vancomycin to complete total 10 day course of antibiotics for C diff infection.  Follow up with PCP within 3-5 days.  Follow up with hepatology ASAP (ambulatory referral sent per case management).

## 2025-06-28 NOTE — DISCHARGE SUMMARY
[facility] Medicine  Discharge Summary      Patient Name: Jorgito Arreaga  MRN: [id number]  [facility]: [id number]  Patient Class: IP- Inpatient  Admission Date: 6/26/2025  Hospital Length of Stay: 1 days  Discharge Date and Time: 06/28/2025 3:49 PM  Attending Physician: Violeta Cruz,*   Discharging Provider: Violeta Cruz MD  Primary Care Provider: Vonda Elliott FNP    Primary Care Team: Networked reference to record PCT     HPI:   Jorgito Arreaga is a 59 year old male with history of DM, GERD, HTN, chronic pancreatitis, and ETOH who presented to [facility] ED for further evaluation of progressive weakness and postural dizziness that began 2 days ago. Patient reports his symptoms and wane, but he was encouraged to come to ED by his sibling. He endorses associated diarrhea and single episode of vomiting. He gives insulin per sliding scale once day for DM. He has had some falls at home as well. He reports drinking binge drinking once a week beer and 1/2 pint of vodka. Denies history of cirrhosis. No recent admission for pancreatitis. Of note, the patient was admitted from 3/15/25 through 3/24/25 for SBO due to volvulus and underwent exploratory lap with lysis of adhesions.    In ED, labs were significant for hyperglycemia (630), KOREY, lactic acidosis, and electrolyte abnormalities. CXR was negative for acute cardiopulmonary abnormality. CT abdomen pelvis showed findings concerning for liver cirrhosis, portal hypertensive colopathy, and moderate ascites. Total Bilirubin was 2 but other liver enzymes are negative. Patient has been transferred to [place] for further management. Briefly discussed case with GI provider who recommended INR.     * No surgery found *      Hospital Course:   Jorgito Arreaga is a 59 year old male with history of DM, GERD, HTN, chronic pancreatitis, and ETOH who presented to [facility] ED for further evaluation of progressive weakness and postural  dizziness that began 2 days ago. Patient reports his symptoms and wane, but he was encouraged to come to ED by his sibling. He endorses associated diarrhea and single episode of vomiting. He gives insulin per sliding scale once day for DM. He has had some falls at home as well. He reports drinking binge drinking once a week beer and 1/2 pint of vodka. Denies history of cirrhosis. No recent admission for pancreatitis. Of note, the patient was admitted from 3/15/25 through 3/24/25 for SBO due to volvulus and underwent exploratory lap with lysis of adhesions.     In ED, labs were significant for hyperglycemia (630), KOREY, lactic acidosis, and electrolyte abnormalities. CXR was negative for acute cardiopulmonary abnormality. CT abdomen pelvis showed findings concerning for liver cirrhosis, portal hypertensive colopathy, and moderate ascites. Total Bilirubin was 2 but other liver enzymes are negative. Patient has been transferred to [place] for further management. Briefly discussed case with GI provider who recommended OP f/u;    6/28  Examination done at bedside, appeared alert and oriented times   Denied acute events overnight   Stated improvement in symptoms   Denied abdominal pain, tolerating diet without issues; stated improvement in abdominal distention  Electrolytes improved, stable  Appeared hemodynamically stable   C diff antigen positive, toxin negative, PCR positive, patient has been started on p.o. vancomycin during hospital stay   LFTs/T bili trended down   Lipase within normal limit   Planning to discharge patient today, Strongly emphasized on abstinence from alcohol ; Recommended outpatient follow up with PCP, GI upon discharge, emphasized on compliance with medications--patient expressed understanding, agreed with the plan   Ordered home health, NP at home program       Goals of Care Treatment Preferences:  Code Status: Full Code         Consults:     Assessment & Plan  Type 2 diabetes mellitus with  hyperglycemia, with long-term current use of insulin  Glucose >600 on initial labs. Reports only using sliding scale once daily.   Plan:   --Check HgbA1c   --moderate SS  --has received gentle hydration    Alcohol abuse  Per HPI. Counseled on cessation    Lactic acid acidosis  Lab Results   Component Value Date    LACTATE 3.1 (H) 06/27/2025    LACTATE 2.9 (H) 06/27/2025    LACTATE 2.7 (H) 06/27/2025    Trending down. Treat elevated glucose. Gentle hydration  KOREY (acute kidney injury)  KOREY is likely due to pre-renal azotemia due to intravascular volume depletion. Baseline creatinine is 0.9. Most recent creatinine and eGFR are listed below.  Recent Labs     06/26/25  2207 06/27/25  0356 06/28/25  0548   CREATININE 1.7* 1.6* 1.2   EGFRNORACEVR 46* 50* >60      Plan  - KOREY is worsening. Glucose control. Gentle hydration with electrolyte replacement  - Avoid nephrotoxins and renally dose meds for GFR listed above  - Monitor urine output, serial BMP, and adjust therapy as needed  Essential hypertension  Patient's blood pressure range in the last 24 hours was: BP  Min: 82/53  Max: 124/83.The patient's inpatient anti-hypertensive regimen is listed below:  Current Antihypertensives   Hydralazine 10mg IV prn    Plan  - previously elevated, but now controleld    Hypomagnesemia  Patient has Abnormal Magnesium: hypomagnesemia. Will continue to monitor electrolytes closely. Will replace the affected electrolytes and repeat labs to be done after interventions completed. The patient's magnesium results have been reviewed and are listed below.  Recent Labs   Lab 06/28/25  0548   MG 1.9      Abnormal CT of the abdomen   CT abdomen pelvis showed findings concerning for liver cirrhosis, portal hypertensive colopathy, and moderate ascites. Total Bilirubin was 2 but other liver enzymes are negative.   --briefly discussed case with GI  who recommended check INR and OP [facility] follow up if negative.    Hypophosphatemia  Patient's  most recent phosphorus results are listed below.   Recent Labs     06/27/25  0356 06/28/25  0548   PHOS <1.0* 2.3*     Plan  - Will treat hypophosphatemia with potassium phosphate 30 mm  - Patient's hypophosphatemia is new  Pancytopenia  This patient is found to have pancytopenia, the likely etiology is malnutrition from chronic ETOH and possible cirrhosis, will monitor CBC Daily. Will transfuse red blood cells if the hemoglobin is <7g/dL (or <8 in the setting of ACS). Will transfuse platelets if platelet count is <10k. Hold DVT prophylaxis if platelets are <50k. The patient's hemoglobin, white blood cell count, and platelet count results have been reviewed and are listed below.  Recent Labs   Lab 06/28/25  0548   HGB 7.6*   WBC 2.66*   PLT 89*     Diarrhea  Stool studies ordered  CT shows concerns for colitis versus portal hypertensive colopathy.     Moderate malnutrition  Nutrition consulted. Most recent weight and BMI monitored-     Measurements:  Wt Readings from Last 1 Encounters:   06/28/25 62.8 kg (138 lb 7.2 oz)   Body mass index is 22.35 kg/m².    Patient has been screened and assessed by RD.    Malnutrition Type:  Context: acute illness or injury, chronic illness  Level: moderate    Malnutrition Characteristic Summary:  Energy Intake (Malnutrition): less than or equal to 75% for greater than or equal to 1 month  Subcutaneous Fat (Malnutrition): moderate depletion  Muscle Mass (Malnutrition): mild depletion  Hand  Strength, Left (Malnutrition): decreased  Hand  Strength, Right (Malnutrition): decreased    Interventions/Recommendations (treatment strategy):  1. Recommend modify pt's diet to a GI soft, Consistent carbohydrate 2000 calorie diet, texture per SLP recommendations 2. Recommend Suplena TID to assist filling nutritional gaps 3. Recommend Banatrol TID to assist with diarrhea or BID for loose stools as warranted 4. Recommend anti emetic to assist with N/V as warranted 5. Encourage PO intake,  recommend feeding assistance/ meal set up as warranted 6. Weigh twice weekly    Final Active Diagnoses:    Diagnosis Date Noted POA    PRINCIPAL PROBLEM:  Type 2 diabetes mellitus with hyperglycemia, with long-term current use of insulin [E11.65, Z79.4] 01/05/2023 Not Applicable    Abnormal CT of the abdomen [R93.5] 06/27/2025 Yes    Hypophosphatemia [E83.39] 06/27/2025 Yes    Pancytopenia [D61.818] 06/27/2025 Yes    Diarrhea [R19.7] 06/27/2025 Yes    Moderate malnutrition [E44.0] 06/27/2025 Yes    Hypomagnesemia [E83.42] 03/16/2025 Yes    Essential hypertension [I10] 03/30/2019 Yes    KOREY (acute kidney injury) [N17.9] 07/19/2018 Yes    Lactic acid acidosis [E87.20]  Yes    Alcohol abuse [F10.10] 06/06/2016 Yes     Chronic      Problems Resolved During this Admission:       Discharged Condition: fair    Disposition: Home or Self Care    Follow Up:   Follow-up Information       Vonda Elliott FNP Follow up in 1 week(s).    Specialty: Family Medicine  Contact information:  [address] [place] [id number] [phone]                           Patient Instructions:      Ambulatory referral/consult to Endocrinology   Standing Status: Future   Referral Priority: Routine Referral Type: Consultation   Requested Specialty: Endocrinology   Number of Visits Requested: 1     Ambulatory referral/consult to Internal Medicine   Standing Status: Future   Referral Priority: Routine Referral Type: Consultation   Referral Reason: Specialty Services Required   Requested Specialty: Internal Medicine   Number of Visits Requested: 1     Ambulatory referral/consult to Gastroenterology   Standing Status: Future   Referral Priority: Routine Referral Type: Consultation   Referral Reason: Specialty Services Required   Requested Specialty: Gastroenterology   Number of Visits Requested: 1     Ambulatory referral/consult to Hepatology   Standing Status: Future   Referral Priority: Routine Referral Type: Consultation   Referral Reason:  Specialty Services Required   Requested Specialty: Hepatology   Number of Visits Requested: 1     Ambulatory referral/consult to [facility] [address]   Standing Status: Future   Referral Priority: Routine Referral Type: Consultation   Referral Reason: Specialty Services Required   Number of Visits Requested: 1       Significant Diagnostic Studies:     Results for orders placed or performed during the hospital encounter of 06/26/25   POCT glucose    Collection Time: 06/26/25  5:43 PM   Result Value Ref Range    POCT Glucose >500 (HH) 70 - 110 mg/dL   EKG 12-lead    Collection Time: 06/26/25  6:30 PM   Result Value Ref Range    QRS Duration 76 ms    [facility] QTC Calculation 445 ms   ISTAT PROCEDURE    Collection Time: 06/26/25  6:42 PM   Result Value Ref Range    POC PH 7.362 7.35 - 7.45    POC PCO2 42.7 35 - 45 mmHg    POC PO2 22 (L) 40 - 60 mmHg    POC HCO3 24.2 24 - 28 mmol/L    POC BE -1 -2 to 2 mmol/L    POC SATURATED O2 35 95 - 100 %    POC Glucose 543 (HH) 70 - 110 mg/dL    POC Sodium 133 (L) 136 - 145 mmol/L    POC Potassium 3.8 3.5 - 5.1 mmol/L    POC Ionized Calcium 1.08 1.06 - 1.42 mmol/L    POC Hematocrit 25 (L) 36 - 54 %PCV    Sample VENOUS     Site Other     Allens Test N/A     DelSys Room Air     Mode SPONT     FiO2 21     Sp02 99    Blood culture x two cultures. Draw prior to antibiotics.    Collection Time: 06/26/25  6:47 PM    Specimen: Peripheral, Forearm, Right; Blood   Result Value Ref Range    Blood Culture No Growth After 24 Hours    Hepatitis C Antibody    Collection Time: 06/26/25  6:47 PM   Result Value Ref Range    Hep C Ab Interp Negative Negative   HIV 1/2 Ag/Ab (4th Gen)    Collection Time: 06/26/25  6:47 PM   Result Value Ref Range    HIV 1/2 Ag/Ab Negative Negative   Comprehensive metabolic panel    Collection Time: 06/26/25  6:47 PM   Result Value Ref Range    Sodium 131 (L) 136 - 145 mmol/L    Potassium 3.8 3.5 - 5.1 mmol/L    Chloride 98 95 - 110 mmol/L    CO2 22 (L) 23 - 29 mmol/L     Glucose 630 (HH) 70 - 110 mg/dL    BUN 19 6 - 20 mg/dL    Creatinine 2.1 (H) 0.5 - 1.4 mg/dL    Calcium 7.2 (L) 8.7 - 10.5 mg/dL    Protein Total 5.4 (L) 6.0 - 8.4 gm/dL    Albumin 2.6 (L) 3.5 - 5.2 g/dL    Bilirubin Total 2.2 (H) 0.1 - 1.0 mg/dL    ALP 76 40 - 150 unit/L    AST 48 (H) 11 - 45 unit/L    ALT 26 10 - 44 unit/L    Anion Gap 11 8 - 16 mmol/L    eGFR 36 (L) >60 mL/min/1.73/m2   Lactic acid, plasma #1    Collection Time: 06/26/25  6:47 PM   Result Value Ref Range    Lactic Acid Level 2.9 (H) 0.5 - 2.2 mmol/L   Magnesium    Collection Time: 06/26/25  6:47 PM   Result Value Ref Range    Magnesium  0.8 (LL) 1.6 - 2.6 mg/dL   Troponin I    Collection Time: 06/26/25  6:47 PM   Result Value Ref Range    Troponin-I 0.019 <=0.026 ng/mL   Lipase    Collection Time: 06/26/25  6:47 PM   Result Value Ref Range    Lipase Level 7 4 - 60 U/L   Procalcitonin    Collection Time: 06/26/25  6:47 PM   Result Value Ref Range    Procalcitonin 0.30 (H) <0.25 ng/mL   Beta-Hydroxybutyrate, Serum    Collection Time: 06/26/25  6:47 PM   Result Value Ref Range    Beta-Hydroxybutyrate 0.4 <=0.5 mmol/L   CBC with Differential    Collection Time: 06/26/25  6:47 PM   Result Value Ref Range    WBC 3.31 (L) 3.90 - 12.70 K/uL    RBC 2.18 (L) 4.60 - 6.20 M/uL    HGB 6.9 (L) 14.0 - 18.0 gm/dL    HCT 19.6 (LL) 40.0 - 54.0 %    MCV 90 82 - 98 fL    MCH 31.7 (H) 27.0 - 31.0 pg    MCHC 35.2 32.0 - 36.0 g/dL    RDW 13.2 11.5 - 14.5 %    Platelet Count 72 (L) 150 - 450 K/uL    MPV 13.1 (H) 9.2 - 12.9 fL    Nucleated RBC 2 (H) <=0 /100 WBC    Neut % 69.5 38 - 73 %    Lymph % 20.5 18 - 48 %    Mono % 8.8 4 - 15 %    Eos % 0.6 <=8 %    Basophil % 0.3 <=1.9 %    Imm Grans % 0.3 0.0 - 0.5 %    Neut # 2.30 1.8 - 7.7 K/uL    Lymph # 0.68 (L) 1 - 4.8 K/uL    Mono # 0.29 (L) 0.3 - 1 K/uL    Eos # 0.02 <=0.5 K/uL    Baso # 0.01 <=0.2 K/uL    Imm Grans # 0.01 0.00 - 0.04 K/uL   Morphology    Collection Time: 06/26/25  6:47 PM    Specimen: Blood   Result  Value Ref Range    Platelet Estimate Decreased (A)      Anisocytosis Slight     Poik Slight     Polychromasia Occasional     Hypochromia Occasional     Target Cell Occasional     Tear drop cell Occasional     Stomatocytes Present    Blood culture x two cultures. Draw prior to antibiotics.    Collection Time: 06/26/25  6:50 PM    Specimen: Peripheral, Antecubital, Left; Blood   Result Value Ref Range    Blood Culture No Growth After 24 Hours    POCT COVID-19 Rapid Screening    Collection Time: 06/26/25  7:08 PM   Result Value Ref Range    POC Rapid COVID Negative Negative     Acceptable Yes    POCT Influenza A/B Molecular    Collection Time: 06/26/25  7:08 PM   Result Value Ref Range    POC Molecular Influenza A Ag Negative Negative    POC Molecular Influenza B Ag Negative Negative     Acceptable Yes    Urinalysis, Reflex to Urine Culture Urine, Catheterized    Collection Time: 06/26/25  7:54 PM    Specimen: Urine   Result Value Ref Range    Color, UA Yellow Straw, Brittany, Yellow, Light-Orange    Appearance, UA Clear Clear    pH, UA 6.0 5.0 - 8.0    Spec Grav UA 1.010 1.005 - 1.030    Protein, UA Negative Negative    Glucose, UA 3+ (A) Negative    Ketones, UA Negative Negative    Bilirubin, UA Negative Negative    Blood, UA 3+ (A) Negative    Nitrites, UA Negative Negative    Urobilinogen, UA Negative <2.0 EU/dL    Leukocyte Esterase, UA Negative Negative   GREY TOP URINE HOLD    Collection Time: 06/26/25  7:54 PM   Result Value Ref Range    Extra Tube Hold for add-ons.    Urinalysis Microscopic    Collection Time: 06/26/25  7:54 PM   Result Value Ref Range    RBC, UA 8 (H) 0 - 4 /HPF    WBC, UA 3 0 - 5 /HPF    Bacteria, UA Rare None, Rare, Occasional /HPF    Yeast, UA None None /HPF    Squamous Epithelial Cells, UA 1 /HPF    Hyaline Casts, UA 1 0 - 1 /LPF    Microscopic Comment     POCT glucose    Collection Time: 06/26/25  8:46 PM   Result Value Ref Range    POCT Glucose >500 (HH) 70 -  110 mg/dL   Lactic acid, plasma #2    Collection Time: 06/26/25  9:10 PM   Result Value Ref Range    Lactic Acid Level 3.2 (H) 0.5 - 2.2 mmol/L   Glucose, Random    Collection Time: 06/26/25  9:10 PM   Result Value Ref Range    Glucose 517 (HH) 70 - 110 mg/dL   Basic metabolic panel    Collection Time: 06/26/25 10:07 PM   Result Value Ref Range    Sodium 134 (L) 136 - 145 mmol/L    Potassium 3.0 (L) 3.5 - 5.1 mmol/L    Chloride 103 95 - 110 mmol/L    CO2 19 (L) 23 - 29 mmol/L    Glucose 434 (H) 70 - 110 mg/dL    BUN 19 6 - 20 mg/dL    Creatinine 1.7 (H) 0.5 - 1.4 mg/dL    Calcium 6.6 (LL) 8.7 - 10.5 mg/dL    Anion Gap 12 8 - 16 mmol/L    eGFR 46 (L) >60 mL/min/1.73/m2   POCT glucose    Collection Time: 06/27/25 12:16 AM   Result Value Ref Range    POCT Glucose 366 (H) 70 - 110 mg/dL   Comprehensive metabolic panel    Collection Time: 06/27/25  3:56 AM   Result Value Ref Range    Sodium 135 (L) 136 - 145 mmol/L    Potassium 3.5 3.5 - 5.1 mmol/L    Chloride 102 95 - 110 mmol/L    CO2 20 (L) 23 - 29 mmol/L    Glucose 303 (H) 70 - 110 mg/dL    BUN 18 6 - 20 mg/dL    Creatinine 1.6 (H) 0.5 - 1.4 mg/dL    Calcium 7.1 (L) 8.7 - 10.5 mg/dL    Protein Total 5.6 (L) 6.0 - 8.4 gm/dL    Albumin 2.7 (L) 3.5 - 5.2 g/dL    Bilirubin Total 2.0 (H) 0.1 - 1.0 mg/dL    ALP 91 40 - 150 unit/L    AST 40 11 - 45 unit/L    ALT 26 10 - 44 unit/L    Anion Gap 13 8 - 16 mmol/L    eGFR 50 (L) >60 mL/min/1.73/m2   Magnesium    Collection Time: 06/27/25  3:56 AM   Result Value Ref Range    Magnesium  1.1 (L) 1.6 - 2.6 mg/dL   Lactic acid, plasma    Collection Time: 06/27/25  3:56 AM   Result Value Ref Range    Lactic Acid Level 4.6 (HH) 0.5 - 2.2 mmol/L   Phosphorus    Collection Time: 06/27/25  3:56 AM   Result Value Ref Range    Phosphorus Level <1.0 (LL) 2.7 - 4.5 mg/dL   CBC with Differential    Collection Time: 06/27/25  3:56 AM   Result Value Ref Range    WBC 3.70 (L) 3.90 - 12.70 K/uL    RBC 2.43 (L) 4.60 - 6.20 M/uL    HGB 7.7 (L) 14.0  - 18.0 gm/dL    HCT 22.1 (L) 40.0 - 54.0 %    MCV 91 82 - 98 fL    MCH 31.7 (H) 27.0 - 31.0 pg    MCHC 34.8 32.0 - 36.0 g/dL    RDW 13.3 11.5 - 14.5 %    Platelet Count 80 (L) 150 - 450 K/uL    MPV 12.6 9.2 - 12.9 fL    Nucleated RBC 3 (H) <=0 /100 WBC    Neut % 65.1 38 - 73 %    Lymph % 23.5 18 - 48 %    Mono % 10.8 4 - 15 %    Eos % 0.0 <=8 %    Basophil % 0.3 <=1.9 %    Imm Grans % 0.3 0.0 - 0.5 %    Neut # 2.41 1.8 - 7.7 K/uL    Lymph # 0.87 (L) 1 - 4.8 K/uL    Mono # 0.40 0.3 - 1 K/uL    Eos # 0.00 <=0.5 K/uL    Baso # 0.01 <=0.2 K/uL    Imm Grans # 0.01 0.00 - 0.04 K/uL   POCT glucose    Collection Time: 06/27/25 10:07 AM   Result Value Ref Range    POCT Glucose 398 (H) 70 - 110 mg/dL   Lactic acid, plasma    Collection Time: 06/27/25 10:54 AM   Result Value Ref Range    Lactic Acid Level 2.7 (H) 0.5 - 2.2 mmol/L   Hemoglobin A1c if not done in past 3 months    Collection Time: 06/27/25 10:54 AM   Result Value Ref Range    Hemoglobin A1c 7.3 (H) 4.0 - 5.6 %    Estimated Average Glucose 163 (H) 68 - 131 mg/dL   Protime-INR    Collection Time: 06/27/25 12:20 PM   Result Value Ref Range    PT 17.5 (H) 9.0 - 12.5 seconds    INR 1.6 (H) 0.8 - 1.2   Lactic acid, plasma    Collection Time: 06/27/25 12:20 PM   Result Value Ref Range    Lactic Acid Level 2.9 (H) 0.5 - 2.2 mmol/L   POCT glucose    Collection Time: 06/27/25  1:50 PM   Result Value Ref Range    POCT Glucose 258 (H) 70 - 110 mg/dL   Clostridium difficile EIA    Collection Time: 06/27/25  3:58 PM    Specimen: Stool   Result Value Ref Range    C. DIFFICILE GDH AG Positive (A) Negative    Clostridioides difficile Toxin A/B Negative Negative   C Diff Toxin by PCR    Collection Time: 06/27/25  3:58 PM    Specimen: Stool   Result Value Ref Range    Clostridioides difficile toxin PCR Positive (A) Negative   POCT glucose    Collection Time: 06/27/25  6:07 PM   Result Value Ref Range    POCT Glucose 117 (H) 70 - 110 mg/dL   Lactic acid, plasma    Collection Time:  06/27/25  7:02 PM   Result Value Ref Range    Lactic Acid Level 3.1 (H) 0.5 - 2.2 mmol/L   POCT glucose    Collection Time: 06/27/25  8:20 PM   Result Value Ref Range    POCT Glucose 152 (H) 70 - 110 mg/dL   Comprehensive Metabolic Panel    Collection Time: 06/28/25  5:48 AM   Result Value Ref Range    Sodium 135 (L) 136 - 145 mmol/L    Potassium 3.7 3.5 - 5.1 mmol/L    Chloride 105 95 - 110 mmol/L    CO2 20 (L) 23 - 29 mmol/L    Glucose 265 (H) 70 - 110 mg/dL    BUN 14 6 - 20 mg/dL    Creatinine 1.2 0.5 - 1.4 mg/dL    Calcium 7.1 (L) 8.7 - 10.5 mg/dL    Protein Total 5.1 (L) 6.0 - 8.4 gm/dL    Albumin 2.4 (L) 3.5 - 5.2 g/dL    Bilirubin Total 1.5 (H) 0.1 - 1.0 mg/dL    ALP 81 40 - 150 unit/L    AST 52 (H) 11 - 45 unit/L    ALT 25 10 - 44 unit/L    Anion Gap 10 8 - 16 mmol/L    eGFR >60 >60 mL/min/1.73/m2   Phosphorus    Collection Time: 06/28/25  5:48 AM   Result Value Ref Range    Phosphorus Level 2.3 (L) 2.7 - 4.5 mg/dL   Magnesium    Collection Time: 06/28/25  5:48 AM   Result Value Ref Range    Magnesium  1.9 1.6 - 2.6 mg/dL   CBC with Differential    Collection Time: 06/28/25  5:48 AM   Result Value Ref Range    WBC 2.66 (L) 3.90 - 12.70 K/uL    RBC 2.41 (L) 4.60 - 6.20 M/uL    HGB 7.6 (L) 14.0 - 18.0 gm/dL    HCT 22.1 (L) 40.0 - 54.0 %    MCV 92 82 - 98 fL    MCH 31.5 (H) 27.0 - 31.0 pg    MCHC 34.4 32.0 - 36.0 g/dL    RDW 14.1 11.5 - 14.5 %    Platelet Count 89 (L) 150 - 450 K/uL    MPV 12.8 9.2 - 12.9 fL    Nucleated RBC 2 (H) <=0 /100 WBC    Neut % 69.1 38 - 73 %    Lymph % 17.7 (L) 18 - 48 %    Mono % 12.0 4 - 15 %    Eos % 0.8 <=8 %    Basophil % 0.0 <=1.9 %    Imm Grans % 0.4 0.0 - 0.5 %    Neut # 1.84 1.8 - 7.7 K/uL    Lymph # 0.47 (L) 1 - 4.8 K/uL    Mono # 0.32 0.3 - 1 K/uL    Eos # 0.02 <=0.5 K/uL    Baso # 0.00 <=0.2 K/uL    Imm Grans # 0.01 0.00 - 0.04 K/uL   POCT glucose    Collection Time: 06/28/25  6:12 AM   Result Value Ref Range    POCT Glucose 281 (H) 70 - 110 mg/dL   POCT glucose     Collection Time: 06/28/25 12:40 PM   Result Value Ref Range    POCT Glucose 368 (H) 70 - 110 mg/dL        Imaging Results              CT Chest Abdomen Pelvis With IV Contrast (XPD) Routine Oral Contrast (Final result)  Result time 06/27/25 08:55:20      Final result by Srinivasa Dubose MD (06/27/25 08:55:20)                   Impression:      1.  Bilateral pleural effusions, left-greater-than-right, with left lower lobe atelectatic consolidation.    2.  Small diffusely fatty and probably cirrhotic liver with moderate ascites, diffuse subcutaneous edema and ascending colonic wall thickening which could represent colitis or portal hypertensive colopathy.    3.  Chronic pancreatitis        All CT scans at this facility use dose modulation, iterative reconstruction and/or weight based dosing when appropriate to reduce radiation dose to as low as reasonably achievable.      Electronically signed by: Srinivasa Dubose MD  Date:    06/27/2025  Time:    08:55               Narrative:    EXAMINATION:  CT CHEST ABDOMEN PELVIS WITH IV CONTRAST (XPD)    CLINICAL HISTORY:  Sepsis;SBO;    TECHNIQUE:  The patient was surveyed from the thoracic inlet through the pelvis after the administration of 100 cc Omni 350 IV contrast as well as oral contrast and data was reconstructed for coronal, sagittal, and axial images.    COMPARISON:  None    FINDINGS:  CT chest FINDINGS:  Lungs: Atelectatic consolidation at the left lung base. Multiple bilateral foci of subsegmental atelectasis.  Pleural space: Moderate left and small right pleural effusions.  Heart: No cardiomegaly. No significant pericardial effusion.  Bones/joints: No acute fracture. No dislocation.  Soft tissues: Diffuse subcutaneous edema.  Vasculature: Normal. No thoracic aortic aneurysm.  Lymph nodes: No enlarged lymph nodes.    CT abdomen pelvis FINDINGS:    ABDOMEN:  Liver: Moderately severe fatty infiltration of the liver. Liver diminished in size.  Gallbladder and bile ducts:  Cholelithiasis.  Pancreas: Pancreatic parenchymal calcifications with ductal dilatation, consistent with chronic pancreatitis.  Spleen: No splenomegaly.  Adrenals: No mass.  Kidneys and ureters: Nonobstructing mid left renal stone.  Stomach and bowel: Diffuse ascending colonic wall thickening.    PELVIS:  Appendix: No findings to suggest acute appendicitis.  Bladder: No mass.  Reproductive: Unremarkable as visualized.    ABDOMEN and PELVIS:  Intraperitoneal space: Moderate ascites.  Bones/joints: No acute fracture.  Soft tissues: Diffuse subcutaneous edema. Moderate mesenteric edema. Small right inguinal hernia containing ascites.  Vasculature: No abdominal aortic aneurysm.  Lymph nodes: No enlarged lymph nodes.                          Wet Read by Mare Zhu DO (06/27/25 03:13:54, [facility] - Emergency Dept, Emergency Medicine)    Stat rad Torito Grimes MD  Bilateral pleural effusions, left-greater-than-right, with left lower lobe atelectatic consolidation.  Small diffusely fatty and probably cirrhotic liver with moderate ascites, diffuse subcutaneous edema and ascending colonic wall thickening which could represent colitis or portal hypertension colopathy.  Chronic pancreatitis.  No evidence of small-bowel obstruction.                                     X-Ray Chest AP Portable (Final result)  Result time 06/26/25 19:00:52      Final result by Srinivasa Dubose MD (06/26/25 19:00:52)                   Impression:     No acute cardiopulmonary abnormality.    Finalized on: 6/26/2025 7:00 PM By:  Srinivasa Dubose MD  [facility]# [id number]      2025-06-26 19:03:[id number]     [facility]               Narrative:    EXAM:  XR CHEST AP PORTABLE    CLINICAL HISTORY: Sepsis    COMPARISON: 03/19/2025.    FINDINGS: Atelectasis left lung base.  Low lung volumes limits evaluation. No confluent airspace opacity or consolidation.  There is no evidence of pleural effusion, pneumothorax, or other acute pulmonary disease.  The  "cardiomediastinal silhouette is within normal limits.  No acute osseous abnormality is evident.   Moderate scattered degenerative change and atherosclerotic disease.                                           Pending Diagnostic Studies:       Procedure Component Value Units Date/Time    HCV Virus Hold Specimen [[id number]] Collected: 06/26/25 1847    Order Status: Sent Lab Status: In process Updated: 06/26/25 1853    Specimen: Blood     Stool Exam-Ova,Cysts,Parasites [[id number]] Collected: 06/27/25 1557    Order Status: Sent Lab Status: In process Updated: 06/27/25 1606    Specimen: Stool     WBC, Stool [[id number]] Collected: 06/27/25 1557    Order Status: Sent Lab Status: In process Updated: 06/27/25 1606    Specimen: Stool            Medications:     Medication List        START taking these medications      cyanocobalamin 1000 MCG tablet  Commonly known as: VITAMIN B-12  Take 1 tablet (1,000 mcg total) by mouth once daily.     ferrous sulfate 325 mg (65 mg iron) Tab tablet  Commonly known as: FEOSOL  Take 1 tablet (325 mg total) by mouth daily with breakfast.     multivitamin per tablet  Commonly known as: THERAGRAN  Take 1 tablet by mouth once daily.     vancomycin 25 mg/mL Solr  Commonly known as: FIRVANQ  Take 5 mLs (125 mg total) by mouth every 6 (six) hours. for 9 days            CONTINUE taking these medications      aspirin 325 MG tablet  Take 1 tablet (325 mg total) by mouth once daily.     blood sugar diagnostic Strp  Commonly known as: ONETOUCH ULTRA BLUE TEST STRIP  USE TO CHECK SUGAR BEFORE MEALS AND AT BEDTIME     folic acid 1 MG tablet  Commonly known as: FOLVITE  Take 1 tablet (1 mg total) by mouth once daily.     HumaLOG U-100 Insulin 100 unit/mL injection  Generic drug: insulin lispro  Please resume your sliding scale as prescribed by your PCP     insulin syringe-needle,dispos. 1 mL 28 gauge x 1/2" Syrg  1 each by Misc.(Non-Drug; Combo Route) route 2 (two) times daily.     lancets 33 gauge " "Misc  Commonly known as: ONETOUCH DELICA PLUS LANCET  Inject 1 lancet into the skin 4 (four) times daily before meals and nightly.     losartan 100 MG tablet  Commonly known as: COZAAR  Take 1 tablet (100 mg total) by mouth once daily. TAKE 1 TABLET BY MOUTH ONCE DAILY     pen needle, diabetic 32 gauge x 5/32" Ndle  Commonly known as: BD JOEL 2ND GEN PEN NEEDLE  Inject 1 each into the skin 4 (four) times daily before meals and nightly.            STOP taking these medications      atorvastatin 40 MG tablet  Commonly known as: LIPITOR     verapamiL 240 MG CR tablet  Commonly known as: CALAN-SR               Where to Get Your Medications        These medications were sent to [facility] - [place] - [address]  [address] [place] [address]      Phone: [phone]   cyanocobalamin 1000 MCG tablet  ferrous sulfate 325 mg (65 mg iron) Tab tablet  folic acid 1 MG tablet  HumaLOG U-100 Insulin 100 unit/mL injection  multivitamin per tablet       You can get these medications from any pharmacy    Bring a paper prescription for each of these medications  vancomycin 25 mg/mL Solr          Indwelling Lines/Drains at time of discharge:   Lines/Drains/Airways       None                       Time spent on the discharge of patient: 92 minutes         Violeta Cruz MD  Department of Hospital Medicine  [facility] - Telemetry ([facility])  "

## 2025-06-28 NOTE — PLAN OF CARE
[facility] - Tele[facility] (Hospital)  Discharge Final Note    Primary Care Provider: Vonda Elliott FNP    Expected Discharge Date: 6/28/2025    Final Discharge Note (most recent)       Final Note - 06/28/25 1246          Final Note    Assessment Type Final Discharge Note     Anticipated Discharge Disposition Home or Self Care        Post-Acute Status    Discharge Delays None known at this time                     Important Message from Medicare

## 2025-06-28 NOTE — DISCHARGE INSTRUCTIONS
Our goal at [facility] is to always give you outstanding care and exceptional service. You may receive a survey from [employer] by mail, text or e-mail in the next 24-48 hours asking about the care you received with us. The survey should only take 5-10 minutes to complete and is very important to us.     Your feedback provides us with a way to recognize our staff who work tirelessly to provide the best care! Also, your responses help us learn how to improve when your experience was below our aspiration of excellence. We are always looking for ways to improve your stay. We WILL use your feedback to continue making improvements to help us provide the highest quality care. We keep your personal information and feedback confidential. We appreciate your time completing this survey and can't wait to hear from you!!!    We look forward to your continued care with us! Thanks so much for choosing [facility] for your healthcare needs!     Recommend compliance with medications, outpatient follow-up visits with PCP, endocrinology within 1-2 weeks upon discharge   Recommended abstinence from alcohol, illicit drug use

## 2025-06-28 NOTE — ASSESSMENT & PLAN NOTE
Patient's blood pressure range in the last 24 hours was: BP  Min: 82/53  Max: 124/83.The patient's inpatient anti-hypertensive regimen is listed below:  Current Antihypertensives   Hydralazine 10mg IV prn    Plan  - previously elevated, but now controleld

## 2025-06-29 LAB
HOLD SPECIMEN: NORMAL
POCT GLUCOSE: 101 MG/DL (ref 70–110)
POCT GLUCOSE: 166 MG/DL (ref 70–110)
POCT GLUCOSE: 191 MG/DL (ref 70–110)
POCT GLUCOSE: 222 MG/DL (ref 70–110)

## 2025-06-29 PROCEDURE — 21400001 HC TELEMETRY ROOM

## 2025-06-29 PROCEDURE — 25000003 PHARM REV CODE 250: Performed by: STUDENT IN AN ORGANIZED HEALTH CARE EDUCATION/TRAINING PROGRAM

## 2025-06-29 PROCEDURE — 27000207 HC ISOLATION

## 2025-06-29 RX ORDER — VANCOMYCIN HYDROCHLORIDE 25 MG/ML
125 KIT ORAL EVERY 6 HOURS
Qty: 180 ML | Refills: 0 | Status: SHIPPED | OUTPATIENT
Start: 2025-06-29 | End: 2025-06-30 | Stop reason: HOSPADM

## 2025-06-29 RX ADMIN — VANCOMYCIN HYDROCHLORIDE 125 MG: KIT at 09:06

## 2025-06-29 RX ADMIN — VANCOMYCIN HYDROCHLORIDE 125 MG: KIT at 08:06

## 2025-06-29 RX ADMIN — SODIUM CHLORIDE 500 ML: 9 INJECTION, SOLUTION INTRAVENOUS at 08:06

## 2025-06-29 RX ADMIN — VANCOMYCIN HYDROCHLORIDE 125 MG: KIT at 02:06

## 2025-06-29 RX ADMIN — LOPERAMIDE HYDROCHLORIDE 2 MG: 2 CAPSULE ORAL at 09:06

## 2025-06-29 RX ADMIN — INSULIN ASPART 2 UNITS: 100 INJECTION, SOLUTION INTRAVENOUS; SUBCUTANEOUS at 09:06

## 2025-06-29 NOTE — PLAN OF CARE
Paper prescriptions provided for discharge. [facility] setting up transportation home. Per patient, family unable to assist patient with getting medications until tomorrow afternoon. Vancomycin sent to local [facility] for . [facility] will have to order medication and it will not be in until tomorrow. Discharge delayed until tomorrow. Avoidable night documented.

## 2025-06-29 NOTE — ASSESSMENT & PLAN NOTE
Nutrition consulted. Most recent weight and BMI monitored-     Measurements:  Wt Readings from Last 1 Encounters:   06/29/25 66.3 kg (146 lb 2.6 oz)   Body mass index is 23.59 kg/m².    Patient has been screened and assessed by RD.    Malnutrition Type:  Context: acute illness or injury, chronic illness  Level: moderate    Malnutrition Characteristic Summary:  Energy Intake (Malnutrition): less than or equal to 75% for greater than or equal to 1 month  Subcutaneous Fat (Malnutrition): moderate depletion  Muscle Mass (Malnutrition): mild depletion  Hand  Strength, Left (Malnutrition): decreased  Hand  Strength, Right (Malnutrition): decreased    Interventions/Recommendations (treatment strategy):  1. Recommend modify pt's diet to a GI soft, Consistent carbohydrate 2000 calorie diet, texture per SLP recommendations 2. Recommend Suplena TID to assist filling nutritional gaps 3. Recommend Banatrol TID to assist with diarrhea or BID for loose stools as warranted 4. Recommend anti emetic to assist with N/V as warranted 5. Encourage PO intake, recommend feeding assistance/ meal set up as warranted 6. Weigh twice weekly

## 2025-06-29 NOTE — ASSESSMENT & PLAN NOTE
Patient's blood pressure range in the last 24 hours was: BP  Min: 71/49  Max: 126/92.The patient's inpatient anti-hypertensive regimen is listed below:  Current Antihypertensives   Hydralazine 10mg IV prn    Plan  - previously elevated, but now controleld

## 2025-06-29 NOTE — PROGRESS NOTES
[facility] Medicine  Progress Note    Patient Name: Jorgito Arreaga  MRN: [id number]  Patient Class: IP- Inpatient   Admission Date: 6/26/2025  Length of Stay: 2 days  Attending Physician: Violeta Cruz,*  Primary Care Provider: Vonda Elliott FNP        Subjective     Principal Problem:Type 2 diabetes mellitus with hyperglycemia, with long-term current use of insulin        HPI:  Jorgito Arreaga is a 59 year old male with history of DM, GERD, HTN, chronic pancreatitis, and ETOH who presented to [facility] ED for further evaluation of progressive weakness and postural dizziness that began 2 days ago. Patient reports his symptoms and wane, but he was encouraged to come to ED by his sibling. He endorses associated diarrhea and single episode of vomiting. He gives insulin per sliding scale once day for DM. He has had some falls at home as well. He reports drinking binge drinking once a week beer and 1/2 pint of vodka. Denies history of cirrhosis. No recent admission for pancreatitis. Of note, the patient was admitted from 3/15/25 through 3/24/25 for SBO due to volvulus and underwent exploratory lap with lysis of adhesions.    In ED, labs were significant for hyperglycemia (630), KOREY, lactic acidosis, and electrolyte abnormalities. CXR was negative for acute cardiopulmonary abnormality. CT abdomen pelvis showed findings concerning for liver cirrhosis, portal hypertensive colopathy, and moderate ascites. Total Bilirubin was 2 but other liver enzymes are negative. Patient has been transferred to [place] for further management. Briefly discussed case with GI provider who recommended INR.     Overview/Hospital Course:  Jorgito Arreaga is a 59 year old male with history of DM, GERD, HTN, chronic pancreatitis, and ETOH who presented to [facility] ED for further evaluation of progressive weakness and postural dizziness that began 2 days ago. Patient reports his symptoms and wane, but he  was encouraged to come to ED by his sibling. He endorses associated diarrhea and single episode of vomiting. He gives insulin per sliding scale once day for DM. He has had some falls at home as well. He reports drinking binge drinking once a week beer and 1/2 pint of vodka. Denies history of cirrhosis. No recent admission for pancreatitis. Of note, the patient was admitted from 3/15/25 through 3/24/25 for SBO due to volvulus and underwent exploratory lap with lysis of adhesions.     In ED, labs were significant for hyperglycemia (630), KOREY, lactic acidosis, and electrolyte abnormalities. CXR was negative for acute cardiopulmonary abnormality. CT abdomen pelvis showed findings concerning for liver cirrhosis, portal hypertensive colopathy, and moderate ascites. Total Bilirubin was 2 but other liver enzymes are negative. Patient has been transferred to [place] for further management. Briefly discussed case with GI provider who recommended OP f/u;    6/28  Examination done at bedside, appeared alert and oriented times   Denied acute events overnight   Stated improvement in symptoms   Denied abdominal pain, tolerating diet without issues; stated improvement in abdominal distention  Electrolytes improved, stable  Appeared hemodynamically stable   C diff antigen positive, toxin negative, PCR positive, patient has been started on p.o. vancomycin during hospital stay   LFTs/T bili trended down   Lipase within normal limit   Planning to discharge patient today, Strongly emphasized on abstinence from alcohol ; Recommended outpatient follow up with PCP, GI upon discharge, emphasized on compliance with medications--patient expressed understanding, agreed with the plan   Ordered home health, NP at home program      Update  Had episode of hypotension associated with dizziness   Discharge held  Ordered fluid bolus        Review of Systems        Constitutional:  Negative for chills, diaphoresis, fatigue and fever.         Appears older than age     HENT:  Negative for drooling, ear pain, rhinorrhea and sore throat.    Eyes: Negative.    Respiratory:  Positive for shortness of breath. Negative for cough and wheezing.    Cardiovascular:  Negative for palpitations and leg swelling.   Gastrointestinal:  Positive for diarrhea, nausea and vomiting. Negative for abdominal pain and constipation.   Endocrine: Negative.    Genitourinary:  Negative for dysuria, hematuria and urgency.   Musculoskeletal:  Positive for gait problem.        Falls     Skin:  Negative for color change and wound.   Allergic/Immunologic: Negative.    Neurological:  Positive for dizziness and weakness. Negative for syncope and speech difficulty.   Hematological: Negative.    Psychiatric/Behavioral: Negative.         Objective:     Vital Signs (Most Recent):  Temp: 98.1 °F (36.7 °C) (06/29/25 0759)  Pulse: 92 (06/29/25 1135)  Resp: 20 (06/29/25 0759)  BP: 111/89 (06/29/25 1135)  SpO2: 98 % (06/29/25 0759) Vital Signs (24h Range):  Temp:  [97.5 °F (36.4 °C)-98.1 °F (36.7 °C)] 98.1 °F (36.7 °C)  Pulse:  [] 92  Resp:  [18-20] 20  SpO2:  [94 %-98 %] 98 %  BP: ()/(49-92) 111/89     Weight: 66.3 kg (146 lb 2.6 oz)  Body mass index is 23.59 kg/m².    Intake/Output Summary (Last 24 hours) at 6/29/2025 1620  Last data filed at 6/28/2025 2246  Gross per 24 hour   Intake 878.84 ml   Output --   Net 878.84 ml         Physical Exam        Constitutional:       General: He is not in acute distress.     Appearance: He is well-developed.   HENT:      Head: Normocephalic and atraumatic.   Cardiovascular:      Rate and Rhythm: Normal rate and regular rhythm.      Heart sounds: Normal heart sounds.   Pulmonary:      Effort: Pulmonary effort is normal. No respiratory distress.      Breath sounds: Normal breath sounds.   Abdominal:      General: There is distension.      Palpations: Abdomen is soft.      Tenderness: There is no abdominal tenderness.      Comments: Healed  abdominal incision   Genitourinary:     Comments: Light brown loose stool with mucous. Single streak of blood     Musculoskeletal:         General: Normal range of motion.      Cervical back: Normal range of motion and neck supple. No edema.      Right lower leg: Edema present.      Left lower leg: Edema present.   Skin:     General: Skin is dry.   Neurological:      Mental Status: He is alert and oriented to person, place, and time.   Significant Labs: All pertinent labs within the past 24 hours have been reviewed.  CBC:   Recent Labs   Lab 06/28/25  0548   WBC 2.66*   HGB 7.6*   HCT 22.1*   PLT 89*     CMP:   Recent Labs   Lab 06/28/25  0548   *   K 3.7      CO2 20*   *   BUN 14   CREATININE 1.2   CALCIUM 7.1*   PROT 5.1*   ALBUMIN 2.4*   BILITOT 1.5*   ALKPHOS 81   AST 52*   ALT 25   ANIONGAP 10       Significant Imaging:     Imaging Results              CT Chest Abdomen Pelvis With IV Contrast (XPD) Routine Oral Contrast (Final result)  Result time 06/27/25 08:55:20      Final result by Srinivasa Dubose MD (06/27/25 08:55:20)                   Impression:      1.  Bilateral pleural effusions, left-greater-than-right, with left lower lobe atelectatic consolidation.    2.  Small diffusely fatty and probably cirrhotic liver with moderate ascites, diffuse subcutaneous edema and ascending colonic wall thickening which could represent colitis or portal hypertensive colopathy.    3.  Chronic pancreatitis        All CT scans at this facility use dose modulation, iterative reconstruction and/or weight based dosing when appropriate to reduce radiation dose to as low as reasonably achievable.      Electronically signed by: Srinivasa Dubose MD  Date:    06/27/2025  Time:    08:55               Narrative:    EXAMINATION:  CT CHEST ABDOMEN PELVIS WITH IV CONTRAST (XPD)    CLINICAL HISTORY:  Sepsis;SBO;    TECHNIQUE:  The patient was surveyed from the thoracic inlet through the pelvis after the administration  of 100 cc Omni 350 IV contrast as well as oral contrast and data was reconstructed for coronal, sagittal, and axial images.    COMPARISON:  None    FINDINGS:  CT chest FINDINGS:  Lungs: Atelectatic consolidation at the left lung base. Multiple bilateral foci of subsegmental atelectasis.  Pleural space: Moderate left and small right pleural effusions.  Heart: No cardiomegaly. No significant pericardial effusion.  Bones/joints: No acute fracture. No dislocation.  Soft tissues: Diffuse subcutaneous edema.  Vasculature: Normal. No thoracic aortic aneurysm.  Lymph nodes: No enlarged lymph nodes.    CT abdomen pelvis FINDINGS:    ABDOMEN:  Liver: Moderately severe fatty infiltration of the liver. Liver diminished in size.  Gallbladder and bile ducts: Cholelithiasis.  Pancreas: Pancreatic parenchymal calcifications with ductal dilatation, consistent with chronic pancreatitis.  Spleen: No splenomegaly.  Adrenals: No mass.  Kidneys and ureters: Nonobstructing mid left renal stone.  Stomach and bowel: Diffuse ascending colonic wall thickening.    PELVIS:  Appendix: No findings to suggest acute appendicitis.  Bladder: No mass.  Reproductive: Unremarkable as visualized.    ABDOMEN and PELVIS:  Intraperitoneal space: Moderate ascites.  Bones/joints: No acute fracture.  Soft tissues: Diffuse subcutaneous edema. Moderate mesenteric edema. Small right inguinal hernia containing ascites.  Vasculature: No abdominal aortic aneurysm.  Lymph nodes: No enlarged lymph nodes.                          Wet Read by Mare Zhu DO (06/27/25 03:13:54, [facility] - Emergency Dept, Emergency Medicine)    Stat rad Torito Grimes MD  Bilateral pleural effusions, left-greater-than-right, with left lower lobe atelectatic consolidation.  Small diffusely fatty and probably cirrhotic liver with moderate ascites, diffuse subcutaneous edema and ascending colonic wall thickening which could represent colitis or portal hypertension colopathy.  Chronic  pancreatitis.  No evidence of small-bowel obstruction.                                     X-Ray Chest AP Portable (Final result)  Result time 06/26/25 19:00:52      Final result by Srinivasa Dubose MD (06/26/25 19:00:52)                   Impression:     No acute cardiopulmonary abnormality.    Finalized on: 6/26/2025 7:00 PM By:  Srinivasa Dubose MD  [facility]# [id number]      2025-06-26 19:03:04.648     [facility]               Narrative:    EXAM:  XR CHEST AP PORTABLE    CLINICAL HISTORY: Sepsis    COMPARISON: 03/19/2025.    FINDINGS: Atelectasis left lung base.  Low lung volumes limits evaluation. No confluent airspace opacity or consolidation.  There is no evidence of pleural effusion, pneumothorax, or other acute pulmonary disease.  The cardiomediastinal silhouette is within normal limits.  No acute osseous abnormality is evident.   Moderate scattered degenerative change and atherosclerotic disease.                                             Assessment & Plan  Type 2 diabetes mellitus with hyperglycemia, with long-term current use of insulin  Glucose >600 on initial labs. Reports only using sliding scale once daily.   Plan:   --Check HgbA1c   --moderate SS  --has received gentle hydration    Alcohol abuse  Per HPI. Counseled on cessation    Lactic acid acidosis  Lab Results   Component Value Date    LACTATE 3.1 (H) 06/27/2025    LACTATE 2.9 (H) 06/27/2025    LACTATE 2.7 (H) 06/27/2025    Trending down. Treat elevated glucose. Gentle hydration  KOREY (acute kidney injury)  KOREY is likely due to pre-renal azotemia due to intravascular volume depletion. Baseline creatinine is 0.9. Most recent creatinine and eGFR are listed below.  Recent Labs     06/26/25  2207 06/27/25  0356 06/28/25  0548   CREATININE 1.7* 1.6* 1.2   EGFRNORACEVR 46* 50* >60      Plan  - KOREY is worsening. Glucose control. Gentle hydration with electrolyte replacement  - Avoid nephrotoxins and renally dose meds for GFR listed above  - Monitor urine output,  "serial BMP, and adjust therapy as needed  Essential hypertension  Patient's blood pressure range in the last 24 hours was: BP  Min: 71/49  Max: 126/92.The patient's inpatient anti-hypertensive regimen is listed below:  Current Antihypertensives   Hydralazine 10mg IV prn    Plan  - previously elevated, but now controleld    Hypomagnesemia  Patient has Abnormal Magnesium: hypomagnesemia. Will continue to monitor electrolytes closely. Will replace the affected electrolytes and repeat labs to be done after interventions completed. The patient's magnesium results have been reviewed and are listed below.  No results for input(s): "MG" in the last 24 hours.     Abnormal CT of the abdomen   CT abdomen pelvis showed findings concerning for liver cirrhosis, portal hypertensive colopathy, and moderate ascites. Total Bilirubin was 2 but other liver enzymes are negative.   --briefly discussed case with GI  who recommended check INR and OP [facility] follow up if negative.    Hypophosphatemia  Patient's most recent phosphorus results are listed below.   Recent Labs     06/27/25  0356 06/28/25  0548   PHOS <1.0* 2.3*     Plan  - Will treat hypophosphatemia with potassium phosphate 30 mm  - Patient's hypophosphatemia is new  Pancytopenia  This patient is found to have pancytopenia, the likely etiology is malnutrition from chronic ETOH and possible cirrhosis, will monitor CBC Daily. Will transfuse red blood cells if the hemoglobin is <7g/dL (or <8 in the setting of ACS). Will transfuse platelets if platelet count is <10k. Hold DVT prophylaxis if platelets are <50k. The patient's hemoglobin, white blood cell count, and platelet count results have been reviewed and are listed below.  No results for input(s): "HGB", "WBC", "PLT" in the last 24 hours.    Diarrhea  Stool studies ordered  CT shows concerns for colitis versus portal hypertensive colopathy.     Moderate malnutrition  Nutrition consulted. Most recent weight and BMI " monitored-     Measurements:  Wt Readings from Last 1 Encounters:   06/29/25 66.3 kg (146 lb 2.6 oz)   Body mass index is 23.59 kg/m².    Patient has been screened and assessed by RD.    Malnutrition Type:  Context: acute illness or injury, chronic illness  Level: moderate    Malnutrition Characteristic Summary:  Energy Intake (Malnutrition): less than or equal to 75% for greater than or equal to 1 month  Subcutaneous Fat (Malnutrition): moderate depletion  Muscle Mass (Malnutrition): mild depletion  Hand  Strength, Left (Malnutrition): decreased  Hand  Strength, Right (Malnutrition): decreased    Interventions/Recommendations (treatment strategy):  1. Recommend modify pt's diet to a GI soft, Consistent carbohydrate 2000 calorie diet, texture per SLP recommendations 2. Recommend Suplena TID to assist filling nutritional gaps 3. Recommend Banatrol TID to assist with diarrhea or BID for loose stools as warranted 4. Recommend anti emetic to assist with N/V as warranted 5. Encourage PO intake, recommend feeding assistance/ meal set up as warranted 6. Weigh twice weekly    VTE Risk Mitigation (From admission, onward)           Ordered     IP VTE LOW RISK PATIENT  Once         06/27/25 1357     Place sequential compression device  Until discontinued         06/27/25 1357                    Discharge Planning   ARON: 6/29/2025     Code Status: Full Code   Medical Readiness for Discharge Date: 6/28/2025  Discharge Plan A: Home   Discharge Delays: None known at this time                    Violeta Cruz MD  Department of Hospital Medicine   [facility] ([facility])

## 2025-06-29 NOTE — ASSESSMENT & PLAN NOTE
"Patient has Abnormal Magnesium: hypomagnesemia. Will continue to monitor electrolytes closely. Will replace the affected electrolytes and repeat labs to be done after interventions completed. The patient's magnesium results have been reviewed and are listed below.  No results for input(s): "MG" in the last 24 hours.    Received replacement     "

## 2025-06-29 NOTE — ASSESSMENT & PLAN NOTE
Patient's blood pressure range in the last 24 hours was: BP  Min: 71/49  Max: 131/96.The patient's inpatient anti-hypertensive regimen is listed below:  Current Antihypertensives   Hydralazine 10mg IV prn    Monitor and adjust regimen

## 2025-06-29 NOTE — SUBJECTIVE & OBJECTIVE
Review of Systems        Constitutional:  Negative for chills, diaphoresis, fatigue and fever.        Appears older than age     HENT:  Negative for drooling, ear pain, rhinorrhea and sore throat.    Eyes: Negative.    Respiratory:  Positive for shortness of breath. Negative for cough and wheezing.    Cardiovascular:  Negative for palpitations and leg swelling.   Gastrointestinal:  Positive for diarrhea, nausea and vomiting. Negative for abdominal pain and constipation.   Endocrine: Negative.    Genitourinary:  Negative for dysuria, hematuria and urgency.   Musculoskeletal:  Positive for gait problem.        Falls     Skin:  Negative for color change and wound.   Allergic/Immunologic: Negative.    Neurological:  Positive for dizziness and weakness. Negative for syncope and speech difficulty.   Hematological: Negative.    Psychiatric/Behavioral: Negative.         Objective:     Vital Signs (Most Recent):  Temp: 98.1 °F (36.7 °C) (06/29/25 0759)  Pulse: 92 (06/29/25 1135)  Resp: 20 (06/29/25 0759)  BP: 111/89 (06/29/25 1135)  SpO2: 98 % (06/29/25 0759) Vital Signs (24h Range):  Temp:  [97.5 °F (36.4 °C)-98.1 °F (36.7 °C)] 98.1 °F (36.7 °C)  Pulse:  [] 92  Resp:  [18-20] 20  SpO2:  [94 %-98 %] 98 %  BP: ()/(49-92) 111/89     Weight: 66.3 kg (146 lb 2.6 oz)  Body mass index is 23.59 kg/m².    Intake/Output Summary (Last 24 hours) at 6/29/2025 1620  Last data filed at 6/28/2025 2246  Gross per 24 hour   Intake 878.84 ml   Output --   Net 878.84 ml         Physical Exam        Constitutional:       General: He is not in acute distress.     Appearance: He is well-developed.   HENT:      Head: Normocephalic and atraumatic.   Cardiovascular:      Rate and Rhythm: Normal rate and regular rhythm.      Heart sounds: Normal heart sounds.   Pulmonary:      Effort: Pulmonary effort is normal. No respiratory distress.      Breath sounds: Normal breath sounds.   Abdominal:      General: There is distension.       Palpations: Abdomen is soft.      Tenderness: There is no abdominal tenderness.      Comments: Healed abdominal incision   Genitourinary:     Comments: Light brown loose stool with mucous. Single streak of blood     Musculoskeletal:         General: Normal range of motion.      Cervical back: Normal range of motion and neck supple. No edema.      Right lower leg: Edema present.      Left lower leg: Edema present.   Skin:     General: Skin is dry.   Neurological:      Mental Status: He is alert and oriented to person, place, and time.   Significant Labs: All pertinent labs within the past 24 hours have been reviewed.  CBC:   Recent Labs   Lab 06/28/25  0548   WBC 2.66*   HGB 7.6*   HCT 22.1*   PLT 89*     CMP:   Recent Labs   Lab 06/28/25  0548   *   K 3.7      CO2 20*   *   BUN 14   CREATININE 1.2   CALCIUM 7.1*   PROT 5.1*   ALBUMIN 2.4*   BILITOT 1.5*   ALKPHOS 81   AST 52*   ALT 25   ANIONGAP 10       Significant Imaging:     Imaging Results              CT Chest Abdomen Pelvis With IV Contrast (XPD) Routine Oral Contrast (Final result)  Result time 06/27/25 08:55:20      Final result by Srinivasa Dubose MD (06/27/25 08:55:20)                   Impression:      1.  Bilateral pleural effusions, left-greater-than-right, with left lower lobe atelectatic consolidation.    2.  Small diffusely fatty and probably cirrhotic liver with moderate ascites, diffuse subcutaneous edema and ascending colonic wall thickening which could represent colitis or portal hypertensive colopathy.    3.  Chronic pancreatitis        All CT scans at this facility use dose modulation, iterative reconstruction and/or weight based dosing when appropriate to reduce radiation dose to as low as reasonably achievable.      Electronically signed by: Srinivasa Dubose MD  Date:    06/27/2025  Time:    08:55               Narrative:    EXAMINATION:  CT CHEST ABDOMEN PELVIS WITH IV CONTRAST (XPD)    CLINICAL  HISTORY:  Sepsis;SBO;    TECHNIQUE:  The patient was surveyed from the thoracic inlet through the pelvis after the administration of 100 cc Omni 350 IV contrast as well as oral contrast and data was reconstructed for coronal, sagittal, and axial images.    COMPARISON:  None    FINDINGS:  CT chest FINDINGS:  Lungs: Atelectatic consolidation at the left lung base. Multiple bilateral foci of subsegmental atelectasis.  Pleural space: Moderate left and small right pleural effusions.  Heart: No cardiomegaly. No significant pericardial effusion.  Bones/joints: No acute fracture. No dislocation.  Soft tissues: Diffuse subcutaneous edema.  Vasculature: Normal. No thoracic aortic aneurysm.  Lymph nodes: No enlarged lymph nodes.    CT abdomen pelvis FINDINGS:    ABDOMEN:  Liver: Moderately severe fatty infiltration of the liver. Liver diminished in size.  Gallbladder and bile ducts: Cholelithiasis.  Pancreas: Pancreatic parenchymal calcifications with ductal dilatation, consistent with chronic pancreatitis.  Spleen: No splenomegaly.  Adrenals: No mass.  Kidneys and ureters: Nonobstructing mid left renal stone.  Stomach and bowel: Diffuse ascending colonic wall thickening.    PELVIS:  Appendix: No findings to suggest acute appendicitis.  Bladder: No mass.  Reproductive: Unremarkable as visualized.    ABDOMEN and PELVIS:  Intraperitoneal space: Moderate ascites.  Bones/joints: No acute fracture.  Soft tissues: Diffuse subcutaneous edema. Moderate mesenteric edema. Small right inguinal hernia containing ascites.  Vasculature: No abdominal aortic aneurysm.  Lymph nodes: No enlarged lymph nodes.                          Wet Read by Mare Zhu DO (06/27/25 03:13:54, [facility] - Emergency Dept, Emergency Medicine)    Stat rad Torito Grimes MD  Bilateral pleural effusions, left-greater-than-right, with left lower lobe atelectatic consolidation.  Small diffusely fatty and probably cirrhotic liver with moderate ascites, diffuse  subcutaneous edema and ascending colonic wall thickening which could represent colitis or portal hypertension colopathy.  Chronic pancreatitis.  No evidence of small-bowel obstruction.                                     X-Ray Chest AP Portable (Final result)  Result time 06/26/25 19:00:52      Final result by Srinivasa Dubose MD (06/26/25 19:00:52)                   Impression:     No acute cardiopulmonary abnormality.    Finalized on: 6/26/2025 7:00 PM By:  Srinivasa Dubose MD  [facility]# [id number]      2025-06-26 19:03:04.648     [facility]               Narrative:    EXAM:  XR CHEST AP PORTABLE    CLINICAL HISTORY: Sepsis    COMPARISON: 03/19/2025.    FINDINGS: Atelectasis left lung base.  Low lung volumes limits evaluation. No confluent airspace opacity or consolidation.  There is no evidence of pleural effusion, pneumothorax, or other acute pulmonary disease.  The cardiomediastinal silhouette is within normal limits.  No acute osseous abnormality is evident.   Moderate scattered degenerative change and atherosclerotic disease.

## 2025-06-29 NOTE — ASSESSMENT & PLAN NOTE
Glucose >600 on initial labs. Reports only using sliding scale once daily.   Plan:   --HgbA1c 7.3  --moderate SS  --has received gentle hydration  -blood glucose stabilized

## 2025-06-29 NOTE — ASSESSMENT & PLAN NOTE
CARL is likely due to pre-renal azotemia due to intravascular volume depletion. Baseline creatinine is 0.9. Most recent creatinine and eGFR are listed below.  Recent Labs     06/26/25  2207 06/27/25  0356 06/28/25  0548   CREATININE 1.7* 1.6* 1.2   EGFRNORACEVR 46* 50* >60      Plan    - Avoid nephrotoxins and renally dose meds for GFR listed above  - Monitor urine output, serial BMP, and adjust therapy as needed    Carl resolved

## 2025-06-29 NOTE — SUBJECTIVE & OBJECTIVE
Interval History:     Alert and oriented   Stated feeling much better, stated improvement in diarrhea   Stated improvement in dizziness   Blood pressure is more stable  afebrile  Patient eager to get discharge today, explained the ongoing situation and need for antibiotics, patient expressed understanding, agreed with the plan        Review of Systems      Constitutional:  Negative for chills, diaphoresis, fatigue and fever.   HENT:  Negative for drooling, ear pain, rhinorrhea and sore throat.    Eyes: Negative.    Respiratory:  Negative for cough and wheezing.    Cardiovascular:  Negative for palpitations and leg swelling.   Gastrointestinal:. Negative for abdominal pain and constipation.   Endocrine: Negative.    Genitourinary:  Negative for dysuria, hematuria and urgency.   Skin:  Negative for color change and wound.   Allergic/Immunologic: Negative.    Neurological:  Negative for syncope and speech difficulty.   Hematological: Negative.    Psychiatric/Behavioral: Negative.         Objective:     Vital Signs (Most Recent):  Temp: 97.4 °F (36.3 °C) (06/29/25 1623)  Pulse: 105 (06/29/25 1623)  Resp: 18 (06/29/25 1623)  BP: (!) 131/96 (06/29/25 1623)  SpO2: 97 % (06/29/25 1623) Vital Signs (24h Range):  Temp:  [97.4 °F (36.3 °C)-98.1 °F (36.7 °C)] 97.4 °F (36.3 °C)  Pulse:  [] 105  Resp:  [18-20] 18  SpO2:  [94 %-98 %] 97 %  BP: ()/(49-96) 131/96     Weight: 66.3 kg (146 lb 2.6 oz)  Body mass index is 23.59 kg/m².    Intake/Output Summary (Last 24 hours) at 6/29/2025 1626  Last data filed at 6/28/2025 2246  Gross per 24 hour   Intake 878.84 ml   Output --   Net 878.84 ml         Physical Exam      Constitutional:       General: He is not in acute distress.     Appearance: He is well-developed.   HENT:      Head: Normocephalic and atraumatic.   Cardiovascular:      Rate and Rhythm: Normal rate and regular rhythm.      Heart sounds: Normal heart sounds.   Pulmonary:      Effort: Pulmonary effort is normal.  No respiratory distress.      Breath sounds: Normal breath sounds.   Abdominal:            Palpations: Abdomen is soft.      Tenderness: There is no abdominal tenderness.      Comments: Healed abdominal incision        Musculoskeletal:         General: Normal range of motion.      Cervical back: Normal range of motion and neck supple. No edema.      Skin:     General: Skin is dry.   Neurological:      Mental Status: He is alert and oriented to person, place, and time.     Significant Labs: All pertinent labs within the past 24 hours have been reviewed.  CBC:   Recent Labs   Lab 06/28/25  0548   WBC 2.66*   HGB 7.6*   HCT 22.1*   PLT 89*     CMP:   Recent Labs   Lab 06/28/25  0548   *   K 3.7      CO2 20*   *   BUN 14   CREATININE 1.2   CALCIUM 7.1*   PROT 5.1*   ALBUMIN 2.4*   BILITOT 1.5*   ALKPHOS 81   AST 52*   ALT 25   ANIONGAP 10       Significant Imaging:     Imaging Results              CT Chest Abdomen Pelvis With IV Contrast (XPD) Routine Oral Contrast (Final result)  Result time 06/27/25 08:55:20      Final result by Srinivasa Dubose MD (06/27/25 08:55:20)                   Impression:      1.  Bilateral pleural effusions, left-greater-than-right, with left lower lobe atelectatic consolidation.    2.  Small diffusely fatty and probably cirrhotic liver with moderate ascites, diffuse subcutaneous edema and ascending colonic wall thickening which could represent colitis or portal hypertensive colopathy.    3.  Chronic pancreatitis        All CT scans at this facility use dose modulation, iterative reconstruction and/or weight based dosing when appropriate to reduce radiation dose to as low as reasonably achievable.      Electronically signed by: Srinivasa Dubose MD  Date:    06/27/2025  Time:    08:55               Narrative:    EXAMINATION:  CT CHEST ABDOMEN PELVIS WITH IV CONTRAST (XPD)    CLINICAL HISTORY:  Sepsis;SBO;    TECHNIQUE:  The patient was surveyed from the thoracic inlet through  the pelvis after the administration of 100 cc Omni 350 IV contrast as well as oral contrast and data was reconstructed for coronal, sagittal, and axial images.    COMPARISON:  None    FINDINGS:  CT chest FINDINGS:  Lungs: Atelectatic consolidation at the left lung base. Multiple bilateral foci of subsegmental atelectasis.  Pleural space: Moderate left and small right pleural effusions.  Heart: No cardiomegaly. No significant pericardial effusion.  Bones/joints: No acute fracture. No dislocation.  Soft tissues: Diffuse subcutaneous edema.  Vasculature: Normal. No thoracic aortic aneurysm.  Lymph nodes: No enlarged lymph nodes.    CT abdomen pelvis FINDINGS:    ABDOMEN:  Liver: Moderately severe fatty infiltration of the liver. Liver diminished in size.  Gallbladder and bile ducts: Cholelithiasis.  Pancreas: Pancreatic parenchymal calcifications with ductal dilatation, consistent with chronic pancreatitis.  Spleen: No splenomegaly.  Adrenals: No mass.  Kidneys and ureters: Nonobstructing mid left renal stone.  Stomach and bowel: Diffuse ascending colonic wall thickening.    PELVIS:  Appendix: No findings to suggest acute appendicitis.  Bladder: No mass.  Reproductive: Unremarkable as visualized.    ABDOMEN and PELVIS:  Intraperitoneal space: Moderate ascites.  Bones/joints: No acute fracture.  Soft tissues: Diffuse subcutaneous edema. Moderate mesenteric edema. Small right inguinal hernia containing ascites.  Vasculature: No abdominal aortic aneurysm.  Lymph nodes: No enlarged lymph nodes.                          Wet Read by Mare Zhu DO (06/27/25 03:13:54, [facility] - Emergency Dept, Emergency Medicine)    Stat rad Torito Grimes MD  Bilateral pleural effusions, left-greater-than-right, with left lower lobe atelectatic consolidation.  Small diffusely fatty and probably cirrhotic liver with moderate ascites, diffuse subcutaneous edema and ascending colonic wall thickening which could represent colitis or  portal hypertension colopathy.  Chronic pancreatitis.  No evidence of small-bowel obstruction.                                     X-Ray Chest AP Portable (Final result)  Result time 06/26/25 19:00:52      Final result by Srinivasa Dubose MD (06/26/25 19:00:52)                   Impression:     No acute cardiopulmonary abnormality.    Finalized on: 6/26/2025 7:00 PM By:  Srinivasa Dubose MD  [facility]# [id number]      2025-06-26 19:03:04.648     [facility]               Narrative:    EXAM:  XR CHEST AP PORTABLE    CLINICAL HISTORY: Sepsis    COMPARISON: 03/19/2025.    FINDINGS: Atelectasis left lung base.  Low lung volumes limits evaluation. No confluent airspace opacity or consolidation.  There is no evidence of pleural effusion, pneumothorax, or other acute pulmonary disease.  The cardiomediastinal silhouette is within normal limits.  No acute osseous abnormality is evident.   Moderate scattered degenerative change and atherosclerotic disease.

## 2025-06-29 NOTE — ASSESSMENT & PLAN NOTE
"This patient is found to have pancytopenia, the likely etiology is malnutrition from chronic ETOH and possible cirrhosis, will monitor CBC Daily. Will transfuse red blood cells if the hemoglobin is <7g/dL (or <8 in the setting of ACS). Will transfuse platelets if platelet count is <10k. Hold DVT prophylaxis if platelets are <50k. The patient's hemoglobin, white blood cell count, and platelet count results have been reviewed and are listed below.  No results for input(s): "HGB", "WBC", "PLT" in the last 24 hours.    "

## 2025-06-29 NOTE — PROGRESS NOTES
[facility] [facility] Medicine  Progress Note    Patient Name: Jorgito Arreaga  MRN: [id number]  Patient Class: IP- Inpatient   Admission Date: 6/26/2025  Length of Stay: 2 days  Attending Physician: Violeta Cruz,*  Primary Care Provider: Vonda Elliott FNP        Subjective     Principal Problem:Type 2 diabetes mellitus with hyperglycemia, with long-term current use of insulin        HPI:  Jorgito Arreaga is a 59 year old male with history of DM, GERD, HTN, chronic pancreatitis, and ETOH who presented to [facility] ED for further evaluation of progressive weakness and postural dizziness that began 2 days ago. Patient reports his symptoms and wane, but he was encouraged to come to ED by his sibling. He endorses associated diarrhea and single episode of vomiting. He gives insulin per sliding scale once day for DM. He has had some falls at home as well. He reports drinking binge drinking once a week beer and 1/2 pint of vodka. Denies history of cirrhosis. No recent admission for pancreatitis. Of note, the patient was admitted from 3/15/25 through 3/24/25 for SBO due to volvulus and underwent exploratory lap with lysis of adhesions.    In ED, labs were significant for hyperglycemia (630), KOREY, lactic acidosis, and electrolyte abnormalities. CXR was negative for acute cardiopulmonary abnormality. CT abdomen pelvis showed findings concerning for liver cirrhosis, portal hypertensive colopathy, and moderate ascites. Total Bilirubin was 2 but other liver enzymes are negative. Patient has been transferred to [facility] for further management. Briefly discussed case with GI provider who recommended INR.     Overview/Hospital Course:  Admitted under Hospital Medicine for dizziness, diarrhea  C diff antigen positive, toxin a/B negative, C diff toxin by PCR positive-- has been started on p.o. vancomycin   Blood cultures negative to date   Received gentle hydration with improvement in dizziness;     Electrolytes replaced   LFTs trended down, stated improvement in abdominal distention.  Denied any abdominal pain/right upper quadrant tenderness.  Emphasized on abstinence from alcohol, recommended outpatient hepatology follow up.      Noted to have clinical improvement, blood pressure is stabilized, attempted for discharge, however patient stated lack of transportation to obtain outpatient medications from pharmacy; stated that none of his family members can provide support to obtain medications.   attempted to reach out to nearest pharmacy/[facility]-per pharmacy, [facility] will have to order medication and it will not be in until tomorrow/ /6/30    Dispo-anticipate discharge in next 24 hours based on antibiotic arrangement.  Needs transport for discharge;        Interval History:     Alert and oriented   Stated feeling much better, stated improvement in diarrhea   Stated improvement in dizziness   Blood pressure is more stable  afebrile  Patient eager to get discharge today, explained the ongoing situation and need for antibiotics, patient expressed understanding, agreed with the plan        Review of Systems      Constitutional:  Negative for chills, diaphoresis, fatigue and fever.   HENT:  Negative for drooling, ear pain, rhinorrhea and sore throat.    Eyes: Negative.    Respiratory:  Negative for cough and wheezing.    Cardiovascular:  Negative for palpitations and leg swelling.   Gastrointestinal:. Negative for abdominal pain and constipation.   Endocrine: Negative.    Genitourinary:  Negative for dysuria, hematuria and urgency.   Skin:  Negative for color change and wound.   Allergic/Immunologic: Negative.    Neurological:  Negative for syncope and speech difficulty.   Hematological: Negative.    Psychiatric/Behavioral: Negative.         Objective:     Vital Signs (Most Recent):  Temp: 97.4 °F (36.3 °C) (06/29/25 1623)  Pulse: 105 (06/29/25 1623)  Resp: 18 (06/29/25 1623)  BP: (!) 131/96  (06/29/25 1623)  SpO2: 97 % (06/29/25 1623) Vital Signs (24h Range):  Temp:  [97.4 °F (36.3 °C)-98.1 °F (36.7 °C)] 97.4 °F (36.3 °C)  Pulse:  [] 105  Resp:  [18-20] 18  SpO2:  [94 %-98 %] 97 %  BP: ()/(49-96) 131/96     Weight: 66.3 kg (146 lb 2.6 oz)  Body mass index is 23.59 kg/m².    Intake/Output Summary (Last 24 hours) at 6/29/2025 1626  Last data filed at 6/28/2025 2246  Gross per 24 hour   Intake 878.84 ml   Output --   Net 878.84 ml         Physical Exam      Constitutional:       General: He is not in acute distress.     Appearance: He is well-developed.   HENT:      Head: Normocephalic and atraumatic.   Cardiovascular:      Rate and Rhythm: Normal rate and regular rhythm.      Heart sounds: Normal heart sounds.   Pulmonary:      Effort: Pulmonary effort is normal. No respiratory distress.      Breath sounds: Normal breath sounds.   Abdominal:            Palpations: Abdomen is soft.      Tenderness: There is no abdominal tenderness.      Comments: Healed abdominal incision        Musculoskeletal:         General: Normal range of motion.      Cervical back: Normal range of motion and neck supple. No edema.      Skin:     General: Skin is dry.   Neurological:      Mental Status: He is alert and oriented to person, place, and time.     Significant Labs: All pertinent labs within the past 24 hours have been reviewed.  CBC:   Recent Labs   Lab 06/28/25  0548   WBC 2.66*   HGB 7.6*   HCT 22.1*   PLT 89*     CMP:   Recent Labs   Lab 06/28/25  0548   *   K 3.7      CO2 20*   *   BUN 14   CREATININE 1.2   CALCIUM 7.1*   PROT 5.1*   ALBUMIN 2.4*   BILITOT 1.5*   ALKPHOS 81   AST 52*   ALT 25   ANIONGAP 10       Significant Imaging:     Imaging Results              CT Chest Abdomen Pelvis With IV Contrast (XPD) Routine Oral Contrast (Final result)  Result time 06/27/25 08:55:20      Final result by Srinivasa Dubose MD (06/27/25 08:55:20)                   Impression:      1.  Bilateral  pleural effusions, left-greater-than-right, with left lower lobe atelectatic consolidation.    2.  Small diffusely fatty and probably cirrhotic liver with moderate ascites, diffuse subcutaneous edema and ascending colonic wall thickening which could represent colitis or portal hypertensive colopathy.    3.  Chronic pancreatitis        All CT scans at this facility use dose modulation, iterative reconstruction and/or weight based dosing when appropriate to reduce radiation dose to as low as reasonably achievable.      Electronically signed by: Srinivasa Dubose MD  Date:    06/27/2025  Time:    08:55               Narrative:    EXAMINATION:  CT CHEST ABDOMEN PELVIS WITH IV CONTRAST (XPD)    CLINICAL HISTORY:  Sepsis;SBO;    TECHNIQUE:  The patient was surveyed from the thoracic inlet through the pelvis after the administration of 100 cc Omni 350 IV contrast as well as oral contrast and data was reconstructed for coronal, sagittal, and axial images.    COMPARISON:  None    FINDINGS:  CT chest FINDINGS:  Lungs: Atelectatic consolidation at the left lung base. Multiple bilateral foci of subsegmental atelectasis.  Pleural space: Moderate left and small right pleural effusions.  Heart: No cardiomegaly. No significant pericardial effusion.  Bones/joints: No acute fracture. No dislocation.  Soft tissues: Diffuse subcutaneous edema.  Vasculature: Normal. No thoracic aortic aneurysm.  Lymph nodes: No enlarged lymph nodes.    CT abdomen pelvis FINDINGS:    ABDOMEN:  Liver: Moderately severe fatty infiltration of the liver. Liver diminished in size.  Gallbladder and bile ducts: Cholelithiasis.  Pancreas: Pancreatic parenchymal calcifications with ductal dilatation, consistent with chronic pancreatitis.  Spleen: No splenomegaly.  Adrenals: No mass.  Kidneys and ureters: Nonobstructing mid left renal stone.  Stomach and bowel: Diffuse ascending colonic wall thickening.    PELVIS:  Appendix: No findings to suggest acute  appendicitis.  Bladder: No mass.  Reproductive: Unremarkable as visualized.    ABDOMEN and PELVIS:  Intraperitoneal space: Moderate ascites.  Bones/joints: No acute fracture.  Soft tissues: Diffuse subcutaneous edema. Moderate mesenteric edema. Small right inguinal hernia containing ascites.  Vasculature: No abdominal aortic aneurysm.  Lymph nodes: No enlarged lymph nodes.                          Wet Read by Mare Zhu DO (06/27/25 03:13:54, [facility] Emergency Dept, Emergency Medicine)    Stat rad Torito Grimes MD  Bilateral pleural effusions, left-greater-than-right, with left lower lobe atelectatic consolidation.  Small diffusely fatty and probably cirrhotic liver with moderate ascites, diffuse subcutaneous edema and ascending colonic wall thickening which could represent colitis or portal hypertension colopathy.  Chronic pancreatitis.  No evidence of small-bowel obstruction.                                     X-Ray Chest AP Portable (Final result)  Result time 06/26/25 19:00:52      Final result by Srinivasa Dubose MD (06/26/25 19:00:52)                   Impression:     No acute cardiopulmonary abnormality.    Finalized on: 6/26/2025 7:00 PM By:  Srinivasa Dubose MD  [facility]# [id number]      2025-06-26 19:03:04.648     [facility]               Narrative:    EXAM:  XR CHEST AP PORTABLE    CLINICAL HISTORY: Sepsis    COMPARISON: 03/19/2025.    FINDINGS: Atelectasis left lung base.  Low lung volumes limits evaluation. No confluent airspace opacity or consolidation.  There is no evidence of pleural effusion, pneumothorax, or other acute pulmonary disease.  The cardiomediastinal silhouette is within normal limits.  No acute osseous abnormality is evident.   Moderate scattered degenerative change and atherosclerotic disease.                                             Assessment & Plan  Type 2 diabetes mellitus with hyperglycemia, with long-term current use of insulin  Glucose >600 on initial labs. Reports only  "using sliding scale once daily.   Plan:   --HgbA1c 7.3  --moderate SS  --has received gentle hydration  -blood glucose stabilized    Alcohol abuse  Per HPI. Counseled on cessation    Lactic acid acidosis  Lab Results   Component Value Date    LACTATE 3.1 (H) 06/27/2025    LACTATE 2.9 (H) 06/27/2025    LACTATE 2.7 (H) 06/27/2025    Trending down. Treat elevated glucose. Received Gentle hydration  CARL (acute kidney injury)  CARL is likely due to pre-renal azotemia due to intravascular volume depletion. Baseline creatinine is 0.9. Most recent creatinine and eGFR are listed below.  Recent Labs     06/26/25  2207 06/27/25  0356 06/28/25  0548   CREATININE 1.7* 1.6* 1.2   EGFRNORACEVR 46* 50* >60      Plan    - Avoid nephrotoxins and renally dose meds for GFR listed above  - Monitor urine output, serial BMP, and adjust therapy as needed    Carl resolved   Essential hypertension  Patient's blood pressure range in the last 24 hours was: BP  Min: 71/49  Max: 131/96.The patient's inpatient anti-hypertensive regimen is listed below:  Current Antihypertensives   Hydralazine 10mg IV prn    Monitor and adjust regimen       Hypomagnesemia  Patient has Abnormal Magnesium: hypomagnesemia. Will continue to monitor electrolytes closely. Will replace the affected electrolytes and repeat labs to be done after interventions completed. The patient's magnesium results have been reviewed and are listed below.  No results for input(s): "MG" in the last 24 hours.    Received replacement     Abnormal CT of the abdomen   CT abdomen pelvis showed findings concerning for liver cirrhosis, portal hypertensive colopathy, and moderate ascites. Total Bilirubin was 2 but other liver enzymes are negative.   --briefly discussed case with GI  who recommended check INR and OP [facility] Hepatology follow up if negative.    Hypophosphatemia  Patient's most recent phosphorus results are listed below.   Recent Labs     06/27/25  0356 06/28/25  0548   PHOS <1.0* 2.3* " "    Plan  - Will treat hypophosphatemia with potassium phosphate 30 mm  - Patient's hypophosphatemia is new    Received replacement  Pancytopenia  This patient is found to have pancytopenia, the likely etiology is malnutrition from chronic ETOH and possible cirrhosis, will monitor CBC Daily. Will transfuse red blood cells if the hemoglobin is <7g/dL (or <8 in the setting of ACS). Will transfuse platelets if platelet count is <10k. Hold DVT prophylaxis if platelets are <50k. The patient's hemoglobin, white blood cell count, and platelet count results have been reviewed and are listed below.  No results for input(s): "HGB", "WBC", "PLT" in the last 24 hours.    Diarrhea  C diff antigen positive, toxin a/B negative, C diff toxin by PCR positive-- has been started on p.o. vancomycin  Noted improvement in diarrhea episodes during hospital stay     Moderate malnutrition  Nutrition consulted. Most recent weight and BMI monitored-     Measurements:  Wt Readings from Last 1 Encounters:   06/29/25 66.3 kg (146 lb 2.6 oz)   Body mass index is 23.59 kg/m².    Patient has been screened and assessed by RD.    Malnutrition Type:  Context: acute illness or injury, chronic illness  Level: moderate    Malnutrition Characteristic Summary:  Energy Intake (Malnutrition): less than or equal to 75% for greater than or equal to 1 month  Subcutaneous Fat (Malnutrition): moderate depletion  Muscle Mass (Malnutrition): mild depletion  Hand  Strength, Left (Malnutrition): decreased  Hand  Strength, Right (Malnutrition): decreased    Interventions/Recommendations (treatment strategy):  1. Recommend modify pt's diet to a GI soft, Consistent carbohydrate 2000 calorie diet, texture per SLP recommendations 2. Recommend Suplena TID to assist filling nutritional gaps 3. Recommend Banatrol TID to assist with diarrhea or BID for loose stools as warranted 4. Recommend anti emetic to assist with N/V as warranted 5. Encourage PO intake, recommend " feeding assistance/ meal set up as warranted 6. Weigh twice weekly    VTE Risk Mitigation (From admission, onward)           Ordered     IP VTE LOW RISK PATIENT  Once         06/27/25 1357     Place sequential compression device  Until discontinued         06/27/25 1357                    Discharge Planning   ARON: 6/29/2025     Code Status: Full Code   Medical Readiness for Discharge Date: 6/28/2025  Discharge Plan A: Home   Discharge Delays: None known at this time                    Violeta Cruz MD  Department of Hospital Medicine   '[place] - Telemetry ([facility])

## 2025-06-29 NOTE — PLAN OF CARE
Pt AAOx4, hypotensive last night, fluids given. POC reviewed with patient. Safety precautions in place, no falls and nonskid socks on. Pt verbalizes feeling better, will continue to monitor until end of shift.  Problem: Adult Inpatient Plan of Care  Goal: Plan of Care Review  Outcome: Progressing  Goal: Patient-Specific Goal (Individualized)  Outcome: Progressing  Goal: Absence of Hospital-Acquired Illness or Injury  Outcome: Progressing  Goal: Optimal Comfort and Wellbeing  Outcome: Progressing  Goal: Readiness for Transition of Care  Outcome: Progressing     Problem: Acute Kidney Injury/Impairment  Goal: Fluid and Electrolyte Balance  6/29/2025 0523 by Wendy Cm RN  Outcome: Progressing  6/29/2025 0521 by Wendy Cm RN  Outcome: Progressing  Goal: Improved Oral Intake  6/29/2025 0523 by Wendy Cm RN  Outcome: Progressing  6/29/2025 0521 by Wendy Cm RN  Outcome: Progressing  Goal: Effective Renal Function  6/29/2025 0523 by Wendy Cm RN  Outcome: Progressing  6/29/2025 0521 by Wendy Cm RN  Outcome: Progressing     Problem: Infection  Goal: Absence of Infection Signs and Symptoms  6/29/2025 0523 by Wendy Cm RN  Outcome: Progressing  6/29/2025 0521 by Wendy Cm RN  Outcome: Progressing

## 2025-06-29 NOTE — ASSESSMENT & PLAN NOTE
Lab Results   Component Value Date    LACTATE 3.1 (H) 06/27/2025    LACTATE 2.9 (H) 06/27/2025    LACTATE 2.7 (H) 06/27/2025    Trending down. Treat elevated glucose. Received Gentle hydration

## 2025-06-29 NOTE — PROGRESS NOTES
AVS virtually reviewed with patient in its entirety with emphasis on diet, medications, follow-up appointments and reasons to return to the ED. Patient unable to  prescriptions until tomorrow afternoon due to lack of ride to pharmacy.  SW arranging  for antibiotic so that patient can start taking today. Patient without internet access so unable to set up portal.  Education complete and patient voiced understanding. All questions answered. Discharge teaching complete.

## 2025-06-29 NOTE — PLAN OF CARE
A215/A215 BRITTANI Ayaladonovan Arreaga is a 59 y.o.male admitted on 6/26/2025 for Type 2 diabetes mellitus with hyperglycemia, with long-term current use of insulin   Code Status: Full Code MRN: [id number]   Review of patient's allergies indicates:  No Known Allergies  Past Medical History:   Diagnosis Date    Abscess 9/5/2018    Cataracts, bilateral     Chest pain 12/6/2018    Dehydration     Diabetes mellitus, type 2     Diagnosed 7/2016    Folliculitis 9/15/2017    GERD (gastroesophageal reflux disease)     Hypertension     Inflammatory polyps of colon     Pancreatitis, alcoholic, acute     Port catheter in place     TIA (transient ischemic attack)     Tinea cruris 9/15/2017      PRN meds    acetaminophen, 650 mg, Q4H PRN  albuterol-ipratropium, 3 mL, Q4H PRN  aluminum-magnesium hydroxide-simethicone, 30 mL, QID PRN  dextrose 50%, 12.5 g, PRN  dextrose 50%, 25 g, PRN  glucagon (human recombinant), 1 mg, PRN  glucose, 16 g, PRN  glucose, 24 g, PRN  insulin aspart U-100, 0-10 Units, QID (AC + HS) PRN  loperamide, 2 mg, QID PRN  melatonin, 6 mg, Nightly PRN  naloxone, 0.02 mg, PRN  senna-docusate, 1 tablet, BID PRN  simethicone, 1 tablet, QID PRN      Chart check completed. Will continue plan of care.      Orientation: oriented x 4  Josephine Coma Scale Score: 15     Lead Monitored: Lead II Rhythm: normal sinus rhythm    Cardiac/Telemetry Box Number: [id number]    Last Bowel Movement: 06/29/25  Diet Consistent Carbohydrate 2000 Calories (up to 75 gm per meal)     Demetrius Score: 19  Fall Risk Score: 6  Accucheck [x]   Freq? achs     Lines/Drains/Airways       Peripheral Intravenous Line  Duration             Peripheral IV 06/28/25 2207 22 G Posterior;Right Forearm <1 day

## 2025-06-29 NOTE — ASSESSMENT & PLAN NOTE
Patient's most recent phosphorus results are listed below.   Recent Labs     06/27/25  0356 06/28/25  0548   PHOS <1.0* 2.3*     Plan  - Will treat hypophosphatemia with potassium phosphate 30 mm  - Patient's hypophosphatemia is new    Received replacement

## 2025-06-29 NOTE — ASSESSMENT & PLAN NOTE
C diff antigen positive, toxin a/B negative, C diff toxin by PCR positive-- has been started on p.o. vancomycin  Noted improvement in diarrhea episodes during hospital stay

## 2025-06-30 PROBLEM — N17.9 AKI (ACUTE KIDNEY INJURY): Status: RESOLVED | Noted: 2018-07-19 | Resolved: 2025-06-30

## 2025-06-30 PROBLEM — E83.39 HYPOPHOSPHATEMIA: Status: RESOLVED | Noted: 2025-06-27 | Resolved: 2025-06-30

## 2025-06-30 LAB
ANION GAP (OHS): 5 MMOL/L (ref 8–16)
BACTERIA STL CULT: NORMAL
BUN SERPL-MCNC: 13 MG/DL (ref 6–20)
C COLI+JEJ+UPSA DNA STL QL NAA+NON-PROBE: NEGATIVE
CALCIUM SERPL-MCNC: 7.1 MG/DL (ref 8.7–10.5)
CHLORIDE SERPL-SCNC: 110 MMOL/L (ref 95–110)
CO2 SERPL-SCNC: 19 MMOL/L (ref 23–29)
CREAT SERPL-MCNC: 0.9 MG/DL (ref 0.5–1.4)
E COLI SXT1 STL QL IA: NEGATIVE
E COLI SXT2 STL QL IA: NEGATIVE
GFR SERPLBLD CREATININE-BSD FMLA CKD-EPI: >60 ML/MIN/1.73/M2
GLUCOSE SERPL-MCNC: 122 MG/DL (ref 70–110)
MAGNESIUM SERPL-MCNC: 1.4 MG/DL (ref 1.6–2.6)
POCT GLUCOSE: 133 MG/DL (ref 70–110)
POCT GLUCOSE: 211 MG/DL (ref 70–110)
POCT GLUCOSE: 278 MG/DL (ref 70–110)
POCT GLUCOSE: 82 MG/DL (ref 70–110)
POTASSIUM SERPL-SCNC: 3.8 MMOL/L (ref 3.5–5.1)
SODIUM SERPL-SCNC: 134 MMOL/L (ref 136–145)

## 2025-06-30 PROCEDURE — 21400001 HC TELEMETRY ROOM

## 2025-06-30 PROCEDURE — 25000003 PHARM REV CODE 250: Performed by: STUDENT IN AN ORGANIZED HEALTH CARE EDUCATION/TRAINING PROGRAM

## 2025-06-30 PROCEDURE — 83735 ASSAY OF MAGNESIUM: CPT | Performed by: STUDENT IN AN ORGANIZED HEALTH CARE EDUCATION/TRAINING PROGRAM

## 2025-06-30 PROCEDURE — 63600175 PHARM REV CODE 636 W HCPCS: Performed by: INTERNAL MEDICINE

## 2025-06-30 PROCEDURE — 25000003 PHARM REV CODE 250: Performed by: NURSE PRACTITIONER

## 2025-06-30 PROCEDURE — 36415 COLL VENOUS BLD VENIPUNCTURE: CPT | Performed by: STUDENT IN AN ORGANIZED HEALTH CARE EDUCATION/TRAINING PROGRAM

## 2025-06-30 PROCEDURE — 80048 BASIC METABOLIC PNL TOTAL CA: CPT | Performed by: STUDENT IN AN ORGANIZED HEALTH CARE EDUCATION/TRAINING PROGRAM

## 2025-06-30 PROCEDURE — 27000207 HC ISOLATION

## 2025-06-30 RX ORDER — MAGNESIUM SULFATE HEPTAHYDRATE 40 MG/ML
2 INJECTION, SOLUTION INTRAVENOUS ONCE
Status: COMPLETED | OUTPATIENT
Start: 2025-06-30 | End: 2025-06-30

## 2025-06-30 RX ORDER — VANCOMYCIN HYDROCHLORIDE 125 MG/1
125 CAPSULE ORAL EVERY 6 HOURS
Qty: 36 CAPSULE | Refills: 0 | Status: SHIPPED | OUTPATIENT
Start: 2025-06-30 | End: 2025-07-09

## 2025-06-30 RX ORDER — LANOLIN ALCOHOL/MO/W.PET/CERES
400 CREAM (GRAM) TOPICAL 2 TIMES DAILY
Qty: 14 TABLET | Refills: 0 | Status: SHIPPED | OUTPATIENT
Start: 2025-06-30 | End: 2025-07-07

## 2025-06-30 RX ORDER — HEPARIN SODIUM 5000 [USP'U]/ML
5000 INJECTION, SOLUTION INTRAVENOUS; SUBCUTANEOUS EVERY 8 HOURS
Status: DISCONTINUED | OUTPATIENT
Start: 2025-06-30 | End: 2025-07-01 | Stop reason: HOSPADM

## 2025-06-30 RX ADMIN — VANCOMYCIN HYDROCHLORIDE 125 MG: KIT at 02:06

## 2025-06-30 RX ADMIN — MAGNESIUM SULFATE HEPTAHYDRATE 2 G: 40 INJECTION, SOLUTION INTRAVENOUS at 01:06

## 2025-06-30 RX ADMIN — VANCOMYCIN HYDROCHLORIDE 125 MG: KIT at 09:06

## 2025-06-30 RX ADMIN — Medication 6 MG: at 10:06

## 2025-06-30 RX ADMIN — HEPARIN SODIUM 5000 UNITS: 5000 INJECTION INTRAVENOUS; SUBCUTANEOUS at 09:06

## 2025-06-30 RX ADMIN — MAGNESIUM SULFATE HEPTAHYDRATE 2 G: 40 INJECTION, SOLUTION INTRAVENOUS at 03:06

## 2025-06-30 RX ADMIN — INSULIN ASPART 6 UNITS: 100 INJECTION, SOLUTION INTRAVENOUS; SUBCUTANEOUS at 12:06

## 2025-06-30 RX ADMIN — INSULIN ASPART 4 UNITS: 100 INJECTION, SOLUTION INTRAVENOUS; SUBCUTANEOUS at 03:06

## 2025-06-30 RX ADMIN — LOPERAMIDE HYDROCHLORIDE 2 MG: 2 CAPSULE ORAL at 04:06

## 2025-06-30 RX ADMIN — LOPERAMIDE HYDROCHLORIDE 2 MG: 2 CAPSULE ORAL at 09:06

## 2025-06-30 NOTE — ASSESSMENT & PLAN NOTE
Patient's blood pressure range in the last 24 hours was: BP  Min: 89/62  Max: 141/101.The patient's inpatient anti-hypertensive regimen is listed below:  Current Antihypertensives   Hydralazine 10mg IV prn    Monitor and adjust regimen   Hold HOME ANTIHYPERTENSIVES

## 2025-06-30 NOTE — ASSESSMENT & PLAN NOTE
Lab Results   Component Value Date    LACTATE 3.1 (H) 06/27/2025    LACTATE 2.9 (H) 06/27/2025    LACTATE 2.7 (H) 06/27/2025   Repeat with AM labs

## 2025-06-30 NOTE — ASSESSMENT & PLAN NOTE
CT abdomen pelvis showed findings concerning for liver cirrhosis, portal hypertensive colopathy, and moderate ascites. Total Bilirubin was 2 but other liver enzymes are wnl.   - Outpatient hepatology followup on discharge

## 2025-06-30 NOTE — PLAN OF CARE
Plan of care reviewed with patient with verbal understanding. Chart and orders reviewed.  Pt remains free from falls this shift, Safety precautions in place.   Pt currently resting comfortably in bed.   Purposeful rounding with bed in lowest position, side rails up, call light in reach.  Will continue to monitor until end of shift.       Problem: Adult Inpatient Plan of Care  Goal: Plan of Care Review  6/30/2025 0013 by Sandi Keating RN  Outcome: Progressing  Flowsheets (Taken 6/30/2025 0013)  Plan of Care Reviewed With: patient  6/30/2025 0012 by Sandi Keating RN  Reactivated  Goal: Patient-Specific Goal (Individualized)  6/30/2025 0013 by Sandi Keating RN  Outcome: Progressing  Flowsheets (Taken 6/30/2025 0013)  Individualized Care Needs: The pt request help going to the bathroom in a timely manner  Anxieties, Fears or Concerns: Pt claims he is worried about continued loose stools.  Patient/Family-Specific Goals (Include Timeframe): The pt will show reduced signs of infection by end of admission.  6/30/2025 0012 by Sandi Keating RN  Reactivated     Problem: Diarrhea  Goal: Effective Diarrhea Management  Outcome: Progressing     Problem: Infection  Goal: Absence of Infection Signs and Symptoms  Outcome: Progressing     Problem: Fatigue  Goal: Improved Activity Tolerance  Outcome: Progressing

## 2025-06-30 NOTE — PLAN OF CARE
[facility] - Telemetry (Hospital)  Discharge Final Note    Primary Care Provider: Vonda Elliott FNP    Expected Discharge Date: 6/30/2025    Final Discharge Note (most recent)       Final Note - 06/30/25 1149          Final Note    Assessment Type Final Discharge Note     Anticipated Discharge Disposition Home or Self Care        Post-Acute Status    Discharge Delays None known at this time                     Important Message from Medicare             Contact Info       Vonda Elliott FNP   Specialty: Family Medicine   Relationship: PCP - General    [address] [place] [address]   Phone: [phone]       Next Steps: Follow up in 3 day(s)          Patient needed referral sent to [facility] to establish with Hepatologist. SW faxed referral and information to [facility].    Patient has resources to schedule hospital follow up with non-[facility] PCP on S.

## 2025-06-30 NOTE — DISCHARGE SUMMARY
[facility])  [facility] Medicine  Discharge Summary      Patient Name: Jorgito Arreaga  MRN: [id number]  [facility]: [id number]  Patient Class: IP- Inpatient  Admission Date: 6/26/2025  Hospital Length of Stay: 3 days  Discharge Date and Time: 06/30/2025 11:35 AM  Attending Physician: Magda Lerma MD   Discharging Provider: Magda Lerma MD  Primary Care Provider: Vonda Elliott FNP    Primary Care Team: Networked reference to record PCT     HPI:   Jorgito Arreaga is a 59 year old male with history of DM, GERD, HTN, chronic pancreatitis, and ETOH who presented to [facility] ED for further evaluation of progressive weakness and postural dizziness that began 2 days ago. Patient reports his symptoms and wane, but he was encouraged to come to ED by his sibling. He endorses associated diarrhea and single episode of vomiting. He gives insulin per sliding scale once day for DM. He has had some falls at home as well. He reports drinking binge drinking once a week beer and 1/2 pint of vodka. Denies history of cirrhosis. No recent admission for pancreatitis. Of note, the patient was admitted from 3/15/25 through 3/24/25 for SBO due to volvulus and underwent exploratory lap with lysis of adhesions.    In ED, labs were significant for hyperglycemia (630), KOREY, lactic acidosis, and electrolyte abnormalities. CXR was negative for acute cardiopulmonary abnormality. CT abdomen pelvis showed findings concerning for liver cirrhosis, portal hypertensive colopathy, and moderate ascites. Total Bilirubin was 2 but other liver enzymes are negative. Patient has been transferred to [place] for further management. Briefly discussed case with GI provider who recommended INR.     * No surgery found *      Hospital Course:   Admitted under Hospital Medicine for dizziness, diarrhea  C diff antigen positive, toxin a/B negative, C diff toxin by PCR positive-- has been started on p.o. vancomycin   Blood cultures negative to  date   Received gentle hydration with improvement in dizziness;    Electrolytes replaced   LFTs trended down, stated improvement in abdominal distention.  Denied any abdominal pain/right upper quadrant tenderness.  Emphasized on abstinence from alcohol, recommended outpatient hepatology follow up.      06/29- Noted to have clinical improvement, blood pressure is stabilized, attempted for discharge, however patient stated lack of transportation to obtain outpatient medications from pharmacy; stated that none of his family members can provide support to obtain medications.   attempted to reach out to nearest pharmacy/[facility]-per pharmacy, [facility] will have to order medication and it will not be in until tomorrow/ /6/30 06/30/2025- PARK. Pt reports he feels better. Still has some loose stool but no nausea, vomiting, abd pain. Mg repleted. Pt appears stable for discharge home today per my exam. Will continue PO Vanc to complete total 10d course for Cdif (filled prior to discharge). Hold ARB and statin until PCP followup. Followup with PCP within 3-5 days for repeat labs and further management. [facility] NP at home referral placed. Amb referral to hepatology for followup of cirrhosis noted on imaging and transaminitis.          Goals of Care Treatment Preferences:  Code Status: Full Code         Consults:   Consults (From admission, onward)          Status Ordering Provider     Inpatient consult to Social Work  Once        Provider:  (Not yet assigned)    Ordered CELESTINA LITTLEJOHN            Assessment & Plan  Type 2 diabetes mellitus with hyperglycemia, with long-term current use of insulin  Patient's FSGs are controlled on current medication regimen.  Last A1c reviewed-   Lab Results   Component Value Date    HGBA1C 7.3 (H) 06/27/2025     Most recent fingerstick glucose reviewed-   Recent Labs   Lab 06/29/25  1623 06/29/25  2116 06/30/25  0533   POCTGLUCOSE 191* 222* 133*     Continue home SSI regimen    Followup with PCP for further management/adjustment       Essential hypertension  Patient's blood pressure range in the last 24 hours was: BP  Min: 124/75  Max: 141/101.The patient's inpatient anti-hypertensive regimen is listed below:  Current Antihypertensives   Hydralazine 10mg IV prn    Hold ARB until PCP followup     Hypomagnesemia  Patient has Abnormal Magnesium: hypomagnesemia. Will continue to monitor electrolytes closely. Will replace the affected electrolytes and repeat labs to be done after interventions completed. The patient's magnesium results have been reviewed and are listed below.  Recent Labs   Lab 06/30/25  0445   MG 1.4*   Improving   Received IV repletions   PO Mg ox on discharge  Followup with PCP for repeat labs and further management   Abnormal CT of the abdomen  Alcohol abuse   CT abdomen pelvis showed findings concerning for liver cirrhosis, portal hypertensive colopathy, and moderate ascites. Total Bilirubin was 2 but other liver enzymes are negative.   - Outpatient hepatology referral on discharge     Pancytopenia  This patient is found to have pancytopenia, the likely etiology is malnutrition from chronic ETOH and possible cirrhosis  No overt s/s of bleeding     Diarrhea  C diff antigen positive, toxin a/B negative, C diff toxin by PCR positive-- has been started on p.o. vancomycin  Continue PO Vanc x 10 days     Moderate malnutrition  Nutrition consulted. Most recent weight and BMI monitored-     Measurements:  Wt Readings from Last 1 Encounters:   06/29/25 66.3 kg (146 lb 2.6 oz)   Body mass index is 23.59 kg/m².    Patient has been screened and assessed by RD.    Malnutrition Type:  Context: acute illness or injury, chronic illness  Level: moderate    Malnutrition Characteristic Summary:  Energy Intake (Malnutrition): less than or equal to 75% for greater than or equal to 1 month  Subcutaneous Fat (Malnutrition): moderate depletion  Muscle Mass (Malnutrition): mild depletion  Hand   Strength, Left (Malnutrition): decreased  Hand  Strength, Right (Malnutrition): decreased    Interventions/Recommendations (treatment strategy):  1. Recommend modify pt's diet to a GI soft, Consistent carbohydrate 2000 calorie diet, texture per SLP recommendations 2. Recommend Suplena TID to assist filling nutritional gaps 3. Recommend Banatrol TID to assist with diarrhea or BID for loose stools as warranted 4. Recommend anti emetic to assist with N/V as warranted 5. Encourage PO intake, recommend feeding assistance/ meal set up as warranted 6. Weigh twice weekly    Final Active Diagnoses:    Diagnosis Date Noted POA    PRINCIPAL PROBLEM:  Diarrhea [R19.7] 06/27/2025 Yes    Type 2 diabetes mellitus with hyperglycemia, with long-term current use of insulin [E11.65, Z79.4] 01/05/2023 Not Applicable    Abnormal CT of the abdomen [R93.5] 06/27/2025 Yes    Pancytopenia [D61.818] 06/27/2025 Yes    Moderate malnutrition [E44.0] 06/27/2025 Yes    Hypomagnesemia [E83.42] 03/16/2025 Yes    Essential hypertension [I10] 03/30/2019 Yes    Lactic acid acidosis [E87.20]  Yes    Alcohol abuse [F10.10] 06/06/2016 Yes     Chronic      Problems Resolved During this Admission:    Diagnosis Date Noted Date Resolved POA    Hypophosphatemia [E83.39] 06/27/2025 06/30/2025 Yes    KOREY (acute kidney injury) [N17.9] 07/19/2018 06/30/2025 Yes       Discharged Condition: good    Disposition: Home or Self Care    Follow Up:   Follow-up Information       Vonda Elliott FNP Follow up in 3 day(s).    Specialty: Family Medicine  Contact information:  [address] [place] [id number] [phone]                           Patient Instructions:      Ambulatory referral/consult to Endocrinology   Standing Status: Future   Referral Priority: Routine Referral Type: Consultation   Requested Specialty: Endocrinology   Number of Visits Requested: 1     Ambulatory referral/consult to Internal Medicine   Standing Status: Future   Referral  Priority: Routine Referral Type: Consultation   Referral Reason: Specialty Services Required   Requested Specialty: Internal Medicine   Number of Visits Requested: 1     Ambulatory referral/consult to Gastroenterology   Standing Status: Future   Referral Priority: Routine Referral Type: Consultation   Referral Reason: Specialty Services Required   Requested Specialty: Gastroenterology   Number of Visits Requested: 1     Ambulatory referral/consult to Hepatology   Standing Status: Future   Referral Priority: Routine Referral Type: Consultation   Referral Reason: Specialty Services Required   Requested Specialty: Hepatology   Number of Visits Requested: 1     Ambulatory referral/consult to [facility]   Standing Status: Future   Referral Priority: Routine Referral Type: Consultation   Referral Reason: Specialty Services Required   Number of Visits Requested: 1     [facility]ulatory [facility]   Standing Status: Future   Referral Priority: Routine Referral Type: Consultation   Referral Reason: Specialty Services Required   Number of Visits Requested: 1       Significant Diagnostic Studies:  See Hospital Course     Pending Diagnostic Studies:       Procedure Component Value Units Date/Time    Stool Exam-Ova,Cysts,Parasites [[id number]] Collected: 06/27/25 1555    Order Status: Sent Lab Status: In process Updated: 06/27/25 1607    Specimen: Stool            Medications:  Reconciled Home Medications:      Medication List        PAUSE taking these medications      losartan 100 MG tablet  Wait to take this until your doctor or other care provider tells you to start again.  Commonly known as: COZAAR  Take 1 tablet (100 mg total) by mouth once daily. TAKE 1 TABLET BY MOUTH ONCE DAILY            START taking these medications      cyanocobalamin 1000 MCG tablet  Commonly known as: VITAMIN B-12  Take 1 tablet (1,000 mcg total) by mouth once daily.     ferrous sulfate 325 mg (65 mg  "iron) Tab tablet  Commonly known as: FEOSOL  Take 1 tablet (325 mg total) by mouth daily with breakfast.     magnesium oxide 400 mg (241.3 mg magnesium) tablet  Commonly known as: MAG-OX  Take 1 tablet (400 mg total) by mouth 2 (two) times daily. for 7 days     multivitamin per tablet  Commonly known as: THERAGRAN  Take 1 tablet by mouth once daily.     vancomycin 125 MG capsule  Commonly known as: VANCOCIN  Take 1 capsule (125 mg total) by mouth every 6 (six) hours. for 9 days            CONTINUE taking these medications      aspirin 325 MG tablet  Take 1 tablet (325 mg total) by mouth once daily.     blood sugar diagnostic Strp  Commonly known as: ONETOUCH ULTRA BLUE TEST STRIP  USE TO CHECK SUGAR BEFORE MEALS AND AT BEDTIME     folic acid 1 MG tablet  Commonly known as: FOLVITE  Take 1 tablet (1 mg total) by mouth once daily.     HumaLOG U-100 Insulin 100 unit/mL injection  Generic drug: insulin lispro  Please resume your sliding scale as prescribed by your PCP     insulin syringe-needle,dispos. 1 mL 28 gauge x 1/2" Syrg  1 each by Misc.(Non-Drug; Combo Route) route 2 (two) times daily.     lancets 33 gauge Misc  Commonly known as: ONETOUCH DELICA PLUS LANCET  Inject 1 lancet into the skin 4 (four) times daily before meals and nightly.     pen needle, diabetic 32 gauge x 5/32" Ndle  Commonly known as: BD JOEL 2ND GEN PEN NEEDLE  Inject 1 each into the skin 4 (four) times daily before meals and nightly.            STOP taking these medications      atorvastatin 40 MG tablet  Commonly known as: LIPITOR     verapamiL 240 MG CR tablet  Commonly known as: CALAN-SR              Indwelling Lines/Drains at time of discharge:   Lines/Drains/Airways       None                       Time spent on the discharge of patient: 32 minutes         Magda Lerma MD  Department of Hospital Medicine  [facility] - Telemetry ([facility])  "

## 2025-06-30 NOTE — ASSESSMENT & PLAN NOTE
Patient has Abnormal Magnesium: hypomagnesemia. Will continue to monitor electrolytes closely. Will replace the affected electrolytes and repeat labs to be done after interventions completed. The patient's magnesium results have been reviewed and are listed below.  Recent Labs   Lab 06/30/25  0445   MG 1.4*   IV repletion ordered

## 2025-06-30 NOTE — ASSESSMENT & PLAN NOTE
C diff antigen positive, toxin a/B negative, C diff toxin by PCR positive-- has been started on p.o. vancomycin  Continue PO Vanc x 10 days

## 2025-06-30 NOTE — SUBJECTIVE & OBJECTIVE
Interval History: See Hospital Course     Review of Systems  Objective:     Vital Signs (Most Recent):  Temp: 98.1 °F (36.7 °C) (06/30/25 1538)  Pulse: 104 (06/30/25 1544)  Resp: 20 (06/30/25 1538)  BP: 122/84 (06/30/25 1805)  SpO2: 97 % (06/30/25 1538) Vital Signs (24h Range):  Temp:  [97.5 °F (36.4 °C)-98.1 °F (36.7 °C)] 98.1 °F (36.7 °C)  Pulse:  [] 104  Resp:  [16-20] 20  SpO2:  [95 %-99 %] 97 %  BP: ()/() 122/84     Weight: 66.3 kg (146 lb 2.6 oz)  Body mass index is 23.59 kg/m².    Intake/Output Summary (Last 24 hours) at 6/30/2025 1819  Last data filed at 6/30/2025 0800  Gross per 24 hour   Intake 1534.55 ml   Output --   Net 1534.55 ml         Physical Exam  Vitals and nursing note reviewed.   Constitutional:       General: He is not in acute distress.     Appearance: He is not ill-appearing.   Cardiovascular:      Rate and Rhythm: Normal rate and regular rhythm.      Heart sounds: No murmur heard.  Pulmonary:      Effort: Pulmonary effort is normal.      Breath sounds: Normal breath sounds. No wheezing or rales.   Abdominal:      General: Bowel sounds are normal. There is no distension.      Palpations: Abdomen is soft.      Tenderness: There is no abdominal tenderness.   Musculoskeletal:      Right lower leg: No edema.      Left lower leg: No edema.   Neurological:      Mental Status: He is alert and oriented to person, place, and time.               Significant Labs: All pertinent labs within the past 24 hours have been reviewed.    Significant Imaging: I have reviewed all pertinent imaging results/findings within the past 24 hours.

## 2025-06-30 NOTE — ASSESSMENT & PLAN NOTE
Patient's blood pressure range in the last 24 hours was: BP  Min: 124/75  Max: 141/101.The patient's inpatient anti-hypertensive regimen is listed below:  Current Antihypertensives   Hydralazine 10mg IV prn    Hold ARB until PCP followup

## 2025-06-30 NOTE — ASSESSMENT & PLAN NOTE
This patient is found to have pancytopenia, the likely etiology is malnutrition from chronic ETOH and possible cirrhosis  No overt s/s of bleeding

## 2025-06-30 NOTE — ASSESSMENT & PLAN NOTE
This patient is found to have pancytopenia, the likely etiology is malnutrition from chronic ETOH and possible cirrhosis, will monitor CBC Daily. Will transfuse red blood cells if the hemoglobin is <7g/dL (or <8 in the setting of ACS). Will transfuse platelets if platelet count is <10k. Hold DVT prophylaxis if platelets are <50k. The patient's hemoglobin, white blood cell count, and platelet count results have been reviewed and are listed below.   Monitor CBC   No S/S of bleeding at this time

## 2025-06-30 NOTE — ASSESSMENT & PLAN NOTE
Patient has Abnormal Magnesium: hypomagnesemia. Will continue to monitor electrolytes closely. Will replace the affected electrolytes and repeat labs to be done after interventions completed. The patient's magnesium results have been reviewed and are listed below.  Recent Labs   Lab 06/30/25  0445   MG 1.4*   Improving   Received IV repletions   PO Mg ox on discharge  Followup with PCP for repeat labs and further management

## 2025-06-30 NOTE — PROGRESS NOTES
[facility] Medicine  Progress Note    Patient Name: Jorgito Arreaga  MRN: [id number]  Patient Class: IP- Inpatient   Admission Date: 6/26/2025  Length of Stay: 3 days  Attending Physician: Magda Lerma MD  Primary Care Provider: Vonda Elliott FNP        Subjective     Principal Problem:Diarrhea        HPI:  Jorgito Arreaga is a 59 year old male with history of DM, GERD, HTN, chronic pancreatitis, and ETOH who presented to [facility] ED for further evaluation of progressive weakness and postural dizziness that began 2 days ago. Patient reports his symptoms and wane, but he was encouraged to come to ED by his sibling. He endorses associated diarrhea and single episode of vomiting. He gives insulin per sliding scale once day for DM. He has had some falls at home as well. He reports drinking binge drinking once a week beer and 1/2 pint of vodka. Denies history of cirrhosis. No recent admission for pancreatitis. Of note, the patient was admitted from 3/15/25 through 3/24/25 for SBO due to volvulus and underwent exploratory lap with lysis of adhesions.    In ED, labs were significant for hyperglycemia (630), KOREY, lactic acidosis, and electrolyte abnormalities. CXR was negative for acute cardiopulmonary abnormality. CT abdomen pelvis showed findings concerning for liver cirrhosis, portal hypertensive colopathy, and moderate ascites. Total Bilirubin was 2 but other liver enzymes are negative. Patient has been transferred to [place] for further management. Briefly discussed case with GI provider who recommended INR.     Overview/Hospital Course:  Admitted under Hospital Medicine for dizziness, diarrhea  C diff antigen positive, toxin a/B negative, C diff toxin by PCR positive-- has been started on p.o. vancomycin   Blood cultures negative to date   Received gentle hydration with improvement in dizziness;    Electrolytes replaced   LFTs trended down, stated improvement in abdominal  distention.  Denied any abdominal pain/right upper quadrant tenderness.  Emphasized on abstinence from alcohol, recommended outpatient hepatology follow up.      06/29- Noted to have clinical improvement, blood pressure is stabilized, attempted for discharge, however patient stated lack of transportation to obtain outpatient medications from pharmacy; stated that none of his family members can provide support to obtain medications.   attempted to reach out to nearest pharmacy/[facility]-per pharmacy, [facility] will have to order medication and it will not be in until tomorrow/ /6/30 06/30/2025- NAEON. Pt reports he feels better. Still has some loose stool but no nausea, vomiting, abd pain. Mg repleted.  Pt was initially planned for discharge today but prefers to stay additional day due to diarrhea.         Interval History: See Hospital Course      Review of Systems  Objective:      Vital Signs (Most Recent):  Temp: 98.1 °F (36.7 °C) (06/30/25 1538)  Pulse: 104 (06/30/25 1544)  Resp: 20 (06/30/25 1538)  BP: 122/84 (06/30/25 1805)  SpO2: 97 % (06/30/25 1538) Vital Signs (24h Range):  Temp:  [97.5 °F (36.4 °C)-98.1 °F (36.7 °C)] 98.1 °F (36.7 °C)  Pulse:  [] 104  Resp:  [16-20] 20  SpO2:  [95 %-99 %] 97 %  BP: ()/() 122/84      Weight: 66.3 kg (146 lb 2.6 oz)  Body mass index is 23.59 kg/m².     Intake/Output Summary (Last 24 hours) at 6/30/2025 1819  Last data filed at 6/30/2025 0800      Gross per 24 hour   Intake 1534.55 ml   Output --   Net 1534.55 ml         Physical Exam  Vitals and nursing note reviewed.   Constitutional:       General: He is not in acute distress.     Appearance: He is not ill-appearing.   Cardiovascular:      Rate and Rhythm: Normal rate and regular rhythm.      Heart sounds: No murmur heard.  Pulmonary:      Effort: Pulmonary effort is normal.      Breath sounds: Normal breath sounds. No wheezing or rales.   Abdominal:      General: Bowel sounds are normal.  There is no distension.      Palpations: Abdomen is soft.      Tenderness: There is no abdominal tenderness.   Musculoskeletal:      Right lower leg: No edema.      Left lower leg: No edema.   Neurological:      Mental Status: He is alert and oriented to person, place, and time.                   Significant Labs: All pertinent labs within the past 24 hours have been reviewed.     Significant Imaging: I have reviewed all pertinent imaging results/findings within the past 24 hours.           Assessment & Plan  Diarrhea  C diff antigen positive, toxin a/B negative, C diff toxin by PCR positive-- has been started on p.o. vancomycin  Continue PO Vanc     Type 2 diabetes mellitus with hyperglycemia, with long-term current use of insulin  Glucose >600 on initial labs. Reports only using sliding scale once daily as outpatient   Patient's FSGs are controlled on current medication regimen.  Last A1c reviewed-   Lab Results   Component Value Date    HGBA1C 7.3 (H) 06/27/2025     Most recent fingerstick glucose reviewed-   Recent Labs   Lab 06/29/25  2116 06/30/25  0533 06/30/25  1141 06/30/25  1528   POCTGLUCOSE 222* 133* 278* 211*     Current correctional scale  Medium  Maintain anti-hyperglycemic dose as follows-   Antihyperglycemics (From admission, onward)      Start     Stop Route Frequency Ordered    06/27/25 1110  insulin aspart U-100 pen 0-10 Units         -- SubQ Before meals & nightly PRN 06/27/25 1010          Hold Oral hypoglycemics while patient is in the hospital.      Essential hypertension  Patient's blood pressure range in the last 24 hours was: BP  Min: 89/62  Max: 141/101.The patient's inpatient anti-hypertensive regimen is listed below:  Current Antihypertensives   Hydralazine 10mg IV prn    Monitor and adjust regimen   Hold HOME ANTIHYPERTENSIVES   Hypomagnesemia  Patient has Abnormal Magnesium: hypomagnesemia. Will continue to monitor electrolytes closely. Will replace the affected electrolytes and  repeat labs to be done after interventions completed. The patient's magnesium results have been reviewed and are listed below.  Recent Labs   Lab 06/30/25  0445   MG 1.4*   IV repletion ordered      Abnormal CT of the abdomen  Alcohol abuse   CT abdomen pelvis showed findings concerning for liver cirrhosis, portal hypertensive colopathy, and moderate ascites. Total Bilirubin was 2 but other liver enzymes are wnl.   - Outpatient hepatology followup on discharge     Pancytopenia  This patient is found to have pancytopenia, the likely etiology is malnutrition from chronic ETOH and possible cirrhosis, will monitor CBC Daily. Will transfuse red blood cells if the hemoglobin is <7g/dL (or <8 in the setting of ACS). Will transfuse platelets if platelet count is <10k. Hold DVT prophylaxis if platelets are <50k. The patient's hemoglobin, white blood cell count, and platelet count results have been reviewed and are listed below.   Monitor CBC   No S/S of bleeding at this time   Moderate malnutrition  Nutrition consulted. Most recent weight and BMI monitored-     Measurements:  Wt Readings from Last 1 Encounters:   06/29/25 66.3 kg (146 lb 2.6 oz)   Body mass index is 23.59 kg/m².    Patient has been screened and assessed by RD.    Malnutrition Type:  Context: acute illness or injury, chronic illness  Level: moderate    Malnutrition Characteristic Summary:  Energy Intake (Malnutrition): less than or equal to 75% for greater than or equal to 1 month  Subcutaneous Fat (Malnutrition): moderate depletion  Muscle Mass (Malnutrition): mild depletion  Hand  Strength, Left (Malnutrition): decreased  Hand  Strength, Right (Malnutrition): decreased    Interventions/Recommendations (treatment strategy):  1. Recommend modify pt's diet to a GI soft, Consistent carbohydrate 2000 calorie diet, texture per SLP recommendations 2. Recommend Suplena TID to assist filling nutritional gaps 3. Recommend Banatrol TID to assist with diarrhea  or BID for loose stools as warranted 4. Recommend anti emetic to assist with N/V as warranted 5. Encourage PO intake, recommend feeding assistance/ meal set up as warranted 6. Weigh twice weekly    Lactic acid acidosis  Lab Results   Component Value Date    LACTATE 3.1 (H) 06/27/2025    LACTATE 2.9 (H) 06/27/2025    LACTATE 2.7 (H) 06/27/2025   Repeat with AM labs       VTE Risk Mitigation (From admission, onward)           Ordered     heparin (porcine) injection 5,000 Units  Every 8 hours         06/30/25 1822     IP VTE LOW RISK PATIENT  Once         06/27/25 1357     Place sequential compression device  Until discontinued         06/27/25 1357                    Discharge Planning   ARON: 6/30/2025     Code Status: Full Code   Medical Readiness for Discharge Date: 6/28/2025  Discharge Plan A: Home   Discharge Delays: None known at this time                    Magda Lerma MD  Department of Hospital Medicine   [place] - Telemetry ([facility])

## 2025-06-30 NOTE — ASSESSMENT & PLAN NOTE
CT abdomen pelvis showed findings concerning for liver cirrhosis, portal hypertensive colopathy, and moderate ascites. Total Bilirubin was 2 but other liver enzymes are negative.   - Outpatient hepatology referral on discharge

## 2025-06-30 NOTE — ASSESSMENT & PLAN NOTE
C diff antigen positive, toxin a/B negative, C diff toxin by PCR positive-- has been started on p.o. vancomycin  Continue PO Vanc

## 2025-06-30 NOTE — ASSESSMENT & PLAN NOTE
Glucose >600 on initial labs. Reports only using sliding scale once daily as outpatient   Patient's FSGs are controlled on current medication regimen.  Last A1c reviewed-   Lab Results   Component Value Date    HGBA1C 7.3 (H) 06/27/2025     Most recent fingerstick glucose reviewed-   Recent Labs   Lab 06/29/25  2116 06/30/25  0533 06/30/25  1141 06/30/25  1528   POCTGLUCOSE 222* 133* 278* 211*     Current correctional scale  Medium  Maintain anti-hyperglycemic dose as follows-   Antihyperglycemics (From admission, onward)      Start     Stop Route Frequency Ordered    06/27/25 1110  insulin aspart U-100 pen 0-10 Units         -- SubQ Before meals & nightly PRN 06/27/25 1010          Hold Oral hypoglycemics while patient is in the hospital.

## 2025-06-30 NOTE — CONSULTS
Referral faxed to [facility] to establish patient with Hepatology.

## 2025-06-30 NOTE — ASSESSMENT & PLAN NOTE
Patient's FSGs are controlled on current medication regimen.  Last A1c reviewed-   Lab Results   Component Value Date    HGBA1C 7.3 (H) 06/27/2025     Most recent fingerstick glucose reviewed-   Recent Labs   Lab 06/29/25  1623 06/29/25  2116 06/30/25  0533   POCTGLUCOSE 191* 222* 133*     Continue home SSI regimen   Followup with PCP for further management/adjustment

## 2025-07-01 VITALS
HEIGHT: 66 IN | TEMPERATURE: 98 F | BODY MASS INDEX: 23.49 KG/M2 | OXYGEN SATURATION: 100 % | WEIGHT: 146.19 LBS | SYSTOLIC BLOOD PRESSURE: 105 MMHG | RESPIRATION RATE: 18 BRPM | HEART RATE: 95 BPM | DIASTOLIC BLOOD PRESSURE: 85 MMHG

## 2025-07-01 LAB
ABSOLUTE EOSINOPHIL (OHS): 0.02 K/UL
ABSOLUTE MONOCYTE (OHS): 0.55 K/UL (ref 0.3–1)
ABSOLUTE NEUTROPHIL COUNT (OHS): 1.76 K/UL (ref 1.8–7.7)
ANION GAP (OHS): 8 MMOL/L (ref 8–16)
BASOPHILS # BLD AUTO: 0.01 K/UL
BASOPHILS NFR BLD AUTO: 0.3 %
BUN SERPL-MCNC: 12 MG/DL (ref 6–20)
CALCIUM SERPL-MCNC: 7.3 MG/DL (ref 8.7–10.5)
CHLORIDE SERPL-SCNC: 111 MMOL/L (ref 95–110)
CO2 SERPL-SCNC: 18 MMOL/L (ref 23–29)
CREAT SERPL-MCNC: 1 MG/DL (ref 0.5–1.4)
ERYTHROCYTE [DISTWIDTH] IN BLOOD BY AUTOMATED COUNT: 15.5 % (ref 11.5–14.5)
GFR SERPLBLD CREATININE-BSD FMLA CKD-EPI: >60 ML/MIN/1.73/M2
GLUCOSE SERPL-MCNC: 200 MG/DL (ref 70–110)
HCT VFR BLD AUTO: 22.2 % (ref 40–54)
HGB BLD-MCNC: 7.2 GM/DL (ref 14–18)
IMM GRANULOCYTES # BLD AUTO: 0.01 K/UL (ref 0–0.04)
IMM GRANULOCYTES NFR BLD AUTO: 0.3 % (ref 0–0.5)
LACTATE SERPL-SCNC: 1.4 MMOL/L (ref 0.5–2.2)
LYMPHOCYTES # BLD AUTO: 0.76 K/UL (ref 1–4.8)
MAGNESIUM SERPL-MCNC: 1.9 MG/DL (ref 1.6–2.6)
MCH RBC QN AUTO: 30.8 PG (ref 27–31)
MCHC RBC AUTO-ENTMCNC: 32.4 G/DL (ref 32–36)
MCV RBC AUTO: 95 FL (ref 82–98)
NUCLEATED RBC (/100WBC) (OHS): 1 /100 WBC
PLATELET # BLD AUTO: 146 K/UL (ref 150–450)
PLATELET BLD QL SMEAR: NORMAL
PMV BLD AUTO: 11.9 FL (ref 9.2–12.9)
POCT GLUCOSE: 276 MG/DL (ref 70–110)
POTASSIUM SERPL-SCNC: 3.5 MMOL/L (ref 3.5–5.1)
RBC # BLD AUTO: 2.34 M/UL (ref 4.6–6.2)
RELATIVE EOSINOPHIL (OHS): 0.6 %
RELATIVE LYMPHOCYTE (OHS): 24.4 % (ref 18–48)
RELATIVE MONOCYTE (OHS): 17.7 % (ref 4–15)
RELATIVE NEUTROPHIL (OHS): 56.7 % (ref 38–73)
SODIUM SERPL-SCNC: 137 MMOL/L (ref 136–145)
WBC # BLD AUTO: 3.11 K/UL (ref 3.9–12.7)

## 2025-07-01 PROCEDURE — 63600175 PHARM REV CODE 636 W HCPCS: Performed by: INTERNAL MEDICINE

## 2025-07-01 PROCEDURE — 80048 BASIC METABOLIC PNL TOTAL CA: CPT | Performed by: INTERNAL MEDICINE

## 2025-07-01 PROCEDURE — 51798 US URINE CAPACITY MEASURE: CPT

## 2025-07-01 PROCEDURE — 36415 COLL VENOUS BLD VENIPUNCTURE: CPT | Performed by: INTERNAL MEDICINE

## 2025-07-01 PROCEDURE — 83735 ASSAY OF MAGNESIUM: CPT | Performed by: INTERNAL MEDICINE

## 2025-07-01 PROCEDURE — 85025 COMPLETE CBC W/AUTO DIFF WBC: CPT | Performed by: INTERNAL MEDICINE

## 2025-07-01 PROCEDURE — 25000003 PHARM REV CODE 250: Performed by: STUDENT IN AN ORGANIZED HEALTH CARE EDUCATION/TRAINING PROGRAM

## 2025-07-01 PROCEDURE — 25000003 PHARM REV CODE 250: Performed by: INTERNAL MEDICINE

## 2025-07-01 PROCEDURE — 83605 ASSAY OF LACTIC ACID: CPT | Performed by: INTERNAL MEDICINE

## 2025-07-01 RX ORDER — POTASSIUM CHLORIDE 20 MEQ/1
40 TABLET, EXTENDED RELEASE ORAL ONCE
Status: COMPLETED | OUTPATIENT
Start: 2025-07-01 | End: 2025-07-01

## 2025-07-01 RX ADMIN — LOPERAMIDE HYDROCHLORIDE 2 MG: 2 CAPSULE ORAL at 09:07

## 2025-07-01 RX ADMIN — POTASSIUM CHLORIDE 40 MEQ: 1500 TABLET, EXTENDED RELEASE ORAL at 09:07

## 2025-07-01 RX ADMIN — INSULIN ASPART 6 UNITS: 100 INJECTION, SOLUTION INTRAVENOUS; SUBCUTANEOUS at 06:07

## 2025-07-01 RX ADMIN — VANCOMYCIN HYDROCHLORIDE 125 MG: KIT at 02:07

## 2025-07-01 RX ADMIN — VANCOMYCIN HYDROCHLORIDE 125 MG: KIT at 09:07

## 2025-07-01 RX ADMIN — HEPARIN SODIUM 5000 UNITS: 5000 INJECTION INTRAVENOUS; SUBCUTANEOUS at 06:07

## 2025-07-01 NOTE — ASSESSMENT & PLAN NOTE
Glucose >600 on initial labs. Reports only using sliding scale once daily as outpatient   Patient's FSGs are controlled on current medication regimen.  Last A1c reviewed-   Lab Results   Component Value Date    HGBA1C 7.3 (H) 06/27/2025       Continue sliding scale insulin  Follow up with PCP

## 2025-07-01 NOTE — NURSING
AVS virtually reviewed with patient in its entirety with emphasis on diet, medications, follow-up appointments and reasons to return to the ED. Patient also encouraged to utilize their patient portal. Ease and convenience of use reiterated. Education complete and patient voiced understanding. All questions answered. Discharge teaching complete.   
Patient c/o feeling weak and dizzy 6/28/25 at pm shiftchange while waiting for discharge pickup.  Patient was hypotensive, but not orthostatic. Provider ordered fluid bolus and  discharge rescinded. Will be reevaluated today, patient got rest and reports feeling better after more fluids.  
Pt's bp checked this evening and was 89/62 in right arm, changed the cuff size, then left arm 99/71, rechecked the right arm and it was 92/68 map 104. Pt verbalized feeling a little weak when asked, encouraged to call for assistance to the toilet. Pt continues to have multiple loose bowel movements, prn loperamide given, see MAR. Dr. Lerma informed, orders received.  
Anxious/Expressive

## 2025-07-01 NOTE — ASSESSMENT & PLAN NOTE
C diff antigen positive, toxin a/B negative, C diff toxin by PCR positive-- has been started on p.o. vancomycin  Continue PO Vanc times 10 days  Follow up PCP

## 2025-07-01 NOTE — PLAN OF CARE
Problem: Adult Inpatient Plan of Care  Goal: Plan of Care Review  Outcome: Met  Goal: Patient-Specific Goal (Individualized)  Outcome: Met  Goal: Absence of Hospital-Acquired Illness or Injury  Outcome: Met  Goal: Optimal Comfort and Wellbeing  Outcome: Met  Goal: Readiness for Transition of Care  Outcome: Met     Problem: Diarrhea  Goal: Effective Diarrhea Management  Outcome: Met     Problem: Infection  Goal: Absence of Infection Signs and Symptoms  Outcome: Met     Problem: Fatigue  Goal: Improved Activity Tolerance  Outcome: Met

## 2025-07-01 NOTE — ASSESSMENT & PLAN NOTE
Patient advised to hold home antihypertensives until he can follow up PCP and have blood pressure rechecked

## 2025-07-01 NOTE — ASSESSMENT & PLAN NOTE
Nutrition consulted. Most recent weight and BMI monitored-     Measurements:  Wt Readings from Last 1 Encounters:   06/29/25 66.3 kg (146 lb 2.6 oz)   Body mass index is 23.59 kg/m².    Patient has been screened and assessed by RD.    Malnutrition Type:  Context: acute illness or injury, chronic illness  Level: moderate    Malnutrition Characteristic Summary:  Energy Intake (Malnutrition): less than or equal to 75% for greater than or equal to 1 month  Subcutaneous Fat (Malnutrition): moderate depletion  Muscle Mass (Malnutrition): mild depletion  Hand  Strength, Left (Malnutrition): decreased  Hand  Strength, Right (Malnutrition): decreased    Interventions/Recommendations (treatment strategy):  1. Recommend modify pt's diet to a GI soft, Consistent carbohydrate 2000 calorie diet, texture per SLP recommendations 2. Recommend Suplena TID to assist filling nutritional gaps 3. Recommend Banatrol TID to assist with diarrhea or BID for loose stools as warranted 4. Recommend anti emetic to assist with N/V as warranted 5. Encourage PO intake, recommend feeding assistance/ meal set up as warranted 6. Weigh twice weekly     Statement Selected

## 2025-07-01 NOTE — PROGRESS NOTES
AVS virtually reviewed with patient in its entirety with emphasis on diet, medications, follow-up appointments and reasons to return to the ED or contact the [facility] On Call Nurse Care Line. Patient also encouraged to utilize their patient portal. Ease and convenience of use reiterated. Education complete and patient voiced understanding. All questions answered. Discharge teaching complete. Encouraged to complete patient survey.  Encouraged to activate patient portal.  Encouraged alcohol cessation.

## 2025-07-01 NOTE — PLAN OF CARE
A215/A215 ESTEBANShirley  Jorgitodonovan Arreaga is a 59 y.o.male admitted on 6/26/2025 for Diarrhea   Code Status: Full Code MRN: [id number]   Review of patient's allergies indicates:  No Known Allergies  Past Medical History:   Diagnosis Date    Abscess 09/05/2018    KOREY (acute kidney injury) 07/19/2018    Cataracts, bilateral     Chest pain 12/06/2018    Dehydration     Diabetes mellitus, type 2     Diagnosed 7/2016    Folliculitis 09/15/2017    GERD (gastroesophageal reflux disease)     Hypertension     Hypophosphatemia 06/27/2025    Inflammatory polyps of colon     Pancreatitis, alcoholic, acute     Port catheter in place     TIA (transient ischemic attack)     Tinea cruris 09/15/2017      PRN meds    acetaminophen, 650 mg, Q4H PRN  albuterol-ipratropium, 3 mL, Q4H PRN  aluminum-magnesium hydroxide-simethicone, 30 mL, QID PRN  dextrose 50%, 12.5 g, PRN  dextrose 50%, 25 g, PRN  glucagon (human recombinant), 1 mg, PRN  glucose, 16 g, PRN  glucose, 24 g, PRN  insulin aspart U-100, 0-10 Units, QID (AC + HS) PRN  loperamide, 2 mg, QID PRN  melatonin, 6 mg, Nightly PRN  naloxone, 0.02 mg, PRN  senna-docusate, 1 tablet, BID PRN  simethicone, 1 tablet, QID PRN      Chart check completed.      Orientation: oriented x 4  [place] Coma Scale Score: 15     Lead Monitored: Lead II Rhythm: normal sinus rhythm    Cardiac/Telemetry Box Number: No monitor  VTE Core Measure: Pharmacological prophylaxis initiated/maintained Last Bowel Movement: 06/30/25  Diet Consistent Carbohydrate 2000 Calories (up to 75 gm per meal)  Voiding Characteristics: incontinence  Demetrius Score: 19  Fall Risk Score: 1  Accucheck [x]   Freq? AC/HS     Lines/Drains/Airways       Peripheral Intravenous Line  Duration             Peripheral IV 06/30/25 1322 22 G Left;Posterior Forearm <1 day                         Problem: Adult Inpatient Plan of Care  Goal: Plan of Care Review  Outcome: Progressing  Goal: Patient-Specific Goal (Individualized)  Outcome: Progressing  Goal:  Absence of Hospital-Acquired Illness or Injury  Outcome: Progressing  Goal: Optimal Comfort and Wellbeing  Outcome: Progressing  Goal: Readiness for Transition of Care  Outcome: Progressing     Problem: Diarrhea  Goal: Effective Diarrhea Management  Outcome: Progressing     Problem: Infection  Goal: Absence of Infection Signs and Symptoms  Outcome: Progressing     Problem: Fatigue  Goal: Improved Activity Tolerance  Outcome: Progressing

## 2025-07-01 NOTE — DISCHARGE SUMMARY
[facility] - Tele[facility] ([facility])  [facility] Medicine  Discharge Summary      Patient Name: Jorgito Arreaga  MRN: [id number]  MARTY: [id number]  Patient Class: IP- Inpatient  Admission Date: 6/26/2025  Hospital Length of Stay: 4 days  Discharge Date and Time: 7/1/2025 10:16 AM  Attending Physician: Kathie att. providers found   Discharging Provider: Magda Lerma MD  Primary Care Provider: Vonda Elliott FNP    Primary Care Team: Networked reference to record PCT     HPI:   Jorgito Arreaga is a 59 year old male with history of DM, GERD, HTN, chronic pancreatitis, and ETOH who presented to [facility] ED for further evaluation of progressive weakness and postural dizziness that began 2 days ago. Patient reports his symptoms and wane, but he was encouraged to come to ED by his sibling. He endorses associated diarrhea and single episode of vomiting. He gives insulin per sliding scale once day for DM. He has had some falls at home as well. He reports drinking binge drinking once a week beer and 1/2 pint of vodka. Denies history of cirrhosis. No recent admission for pancreatitis. Of note, the patient was admitted from 3/15/25 through 3/24/25 for SBO due to volvulus and underwent exploratory lap with lysis of adhesions.    In ED, labs were significant for hyperglycemia (630), KOREY, lactic acidosis, and electrolyte abnormalities. CXR was negative for acute cardiopulmonary abnormality. CT abdomen pelvis showed findings concerning for liver cirrhosis, portal hypertensive colopathy, and moderate ascites. Total Bilirubin was 2 but other liver enzymes are negative. Patient has been transferred to [place] for further management. Briefly discussed case with GI provider who recommended INR.     * No surgery found *      Hospital Course:   Admitted under Hospital Medicine for dizziness, diarrhea  C diff antigen positive, toxin a/B negative, C diff toxin by PCR positive-- has been started on p.o. vancomycin   Blood cultures negative to  date   Received gentle hydration with improvement in dizziness;    Electrolytes replaced   LFTs trended down, stated improvement in abdominal distention.  Denied any abdominal pain/right upper quadrant tenderness.  Emphasized on abstinence from alcohol, recommended outpatient hepatology follow up.      06/29- Noted to have clinical improvement, blood pressure is stabilized, attempted for discharge, however patient stated lack of transportation to obtain outpatient medications from pharmacy; stated that none of his family members can provide support to obtain medications.   attempted to reach out to nearest pharmacy/[facility]-per pharmacy, [facility] will have to order medication and it will not be in until tomorrow/ /6/30 06/30/2025- PARK. Pt reports he feels better. Still has some loose stool but no nausea, vomiting, abd pain. Mg repleted.  Pt was initially planned for discharge today but prefers to stay additional day due to diarrhea.     07/01  No acute events overnight.  Hypomagnesemia improved.  Patient reports he feels well today, stool is forming up slightly, denies abdominal pain.  Appears stable for discharge home today per my exam.  Will continue p.o. vancomycin to complete total 10 day course of antibiotics for C diff infection.  Follow up with PCP within 3-5 days.  Follow up with hepatology ASAP (ambulatory referral sent per case management).         Goals of Care Treatment Preferences:  Code Status: Full Code         Consults:   Consults (From admission, onward)          Status Ordering Provider     Inpatient consult to Social Work  Once        Provider:  (Not yet assigned)    Completed CELESTINA LITTLEJOHN            Assessment & Plan  Diarrhea  C diff antigen positive, toxin a/B negative, C diff toxin by PCR positive-- has been started on p.o. vancomycin  Continue PO Vanc times 10 days  Follow up PCP    Type 2 diabetes mellitus with hyperglycemia, with long-term current use of  insulin  Glucose >600 on initial labs. Reports only using sliding scale once daily as outpatient   Patient's FSGs are controlled on current medication regimen.  Last A1c reviewed-   Lab Results   Component Value Date    HGBA1C 7.3 (H) 06/27/2025       Continue sliding scale insulin  Follow up with PCP    Essential hypertension  Patient advised to hold home antihypertensives until he can follow up PCP and have blood pressure rechecked  Hypomagnesemia  Resolved  Mag 1.9 prior to discharge     Abnormal CT of the abdomen  Alcohol abuse   CT abdomen pelvis showed findings concerning for liver cirrhosis, portal hypertensive colopathy, and moderate ascites. Total Bilirubin was 2 but other liver enzymes are wnl.   - Outpatient hepatology followup on discharge     Pancytopenia  This patient is found to have pancytopenia, the likely etiology is malnutrition from chronic ETOH and possible cirrhosis, will monitor CBC Daily. Will transfuse red blood cells if the hemoglobin is <7g/dL (or <8 in the setting of ACS). Will transfuse platelets if platelet count is <10k. Hold DVT prophylaxis if platelets are <50k. The patient's hemoglobin, white blood cell count, and platelet count results have been reviewed and are listed below.   Monitor CBC   No S/S of bleeding at this time   Hepatology and PCP follow up as above  Moderate malnutrition  Nutrition consulted. Most recent weight and BMI monitored-     Measurements:  Wt Readings from Last 1 Encounters:   06/29/25 66.3 kg (146 lb 2.6 oz)   Body mass index is 23.59 kg/m².    Patient has been screened and assessed by RD.    Malnutrition Type:  Context: acute illness or injury, chronic illness  Level: moderate    Malnutrition Characteristic Summary:  Energy Intake (Malnutrition): less than or equal to 75% for greater than or equal to 1 month  Subcutaneous Fat (Malnutrition): moderate depletion  Muscle Mass (Malnutrition): mild depletion  Hand  Strength, Left (Malnutrition):  decreased  Hand  Strength, Right (Malnutrition): decreased    Interventions/Recommendations (treatment strategy):  1. Recommend modify pt's diet to a GI soft, Consistent carbohydrate 2000 calorie diet, texture per SLP recommendations 2. Recommend Suplena TID to assist filling nutritional gaps 3. Recommend Banatrol TID to assist with diarrhea or BID for loose stools as warranted 4. Recommend anti emetic to assist with N/V as warranted 5. Encourage PO intake, recommend feeding assistance/ meal set up as warranted 6. Weigh twice weekly    Lactic acid acidosis  Lab Results   Component Value Date    LACTATE 1.4 07/01/2025    LACTATE 3.1 (H) 06/27/2025    LACTATE 2.9 (H) 06/27/2025   Resolved    Final Active Diagnoses:    Diagnosis Date Noted POA    PRINCIPAL PROBLEM:  Diarrhea [R19.7] 06/27/2025 Yes    Type 2 diabetes mellitus with hyperglycemia, with long-term current use of insulin [E11.65, Z79.4] 01/05/2023 Not Applicable    Abnormal CT of the abdomen [R93.5] 06/27/2025 Yes    Pancytopenia [D61.818] 06/27/2025 Yes    Moderate malnutrition [E44.0] 06/27/2025 Yes    Hypomagnesemia [E83.42] 03/16/2025 Yes    Essential hypertension [I10] 03/30/2019 Yes    Lactic acid acidosis [E87.20]  Yes    Alcohol abuse [F10.10] 06/06/2016 Yes     Chronic      Problems Resolved During this Admission:    Diagnosis Date Noted Date Resolved POA    Hypophosphatemia [E83.39] 06/27/2025 06/30/2025 Yes    KOREY (acute kidney injury) [N17.9] 07/19/2018 06/30/2025 Yes       Discharged Condition: good    Disposition: Home or Self Care    Follow Up:   Follow-up Information       Vonda Elliott FNP Follow up in 3 day(s).    Specialty: Family Medicine  Contact information:  [address] [facility] [address] [phone]               [facility] Follow up.    Why: call to schedule hepatology (liver clinic) followup  Contact information:  [address]  [facility]  [address]  [phone]                           Patient Instructions:      Ambulatory referral/consult to Endocrinology   Standing Status: Future   Referral Priority: Routine Referral Type: Consultation   Requested Specialty: Endocrinology   Number of Visits Requested: 1     Ambulatory referral/consult to Internal Medicine   Standing Status: Future   Referral Priority: Routine Referral Type: Consultation   Referral Reason: Specialty Services Required   Requested Specialty: Internal Medicine   Number of Visits Requested: 1     Ambulatory referral/consult to Gastroenterology   Standing Status: Future   Referral Priority: Routine Referral Type: Consultation   Referral Reason: Specialty Services Required   Requested Specialty: Gastroenterology   Number of Visits Requested: 1     Ambulatory referral/consult to Hepatology   Standing Status: Future   Referral Priority: Routine Referral Type: Consultation   Referral Reason: Specialty Services Required   Requested Specialty: Hepatology   Number of Visits Requested: 1     Ambulatory referral/consult to [facility]   Standing Status: Future   Referral Priority: Routine Referral Type: Consultation   Referral Reason: Specialty Services Required   Number of Visits Requested: 1     Ambulatory referral/consult to [facility]   Standing Status: Future   Referral Priority: Routine Referral Type: Consultation   Referral Reason: Specialty Services Required   Number of Visits Requested: 1     Diet diabetic     Notify your health care provider if you experience any of the following:  temperature >100.4     Notify your health care provider if you experience any of the following:  persistent nausea and vomiting or diarrhea     Activity as tolerated       Significant Diagnostic Studies:  See hospital course    Pending Diagnostic Studies:       Procedure Component Value Units Date/Time    Stool Exam-Ova,Cysts,Parasites [[id number]] Collected: 06/27/25 1552     "Order Status: Sent Lab Status: In process Updated: 06/27/25 4990    Specimen: Stool            Medications:  Reconciled Home Medications:      Medication List        PAUSE taking these medications      losartan 100 MG tablet  Wait to take this until your doctor or other care provider tells you to start again.  Commonly known as: COZAAR  Take 1 tablet (100 mg total) by mouth once daily. TAKE 1 TABLET BY MOUTH ONCE DAILY            START taking these medications      cyanocobalamin 1000 MCG tablet  Commonly known as: VITAMIN B-12  Take 1 tablet (1,000 mcg total) by mouth once daily.     ferrous sulfate 325 mg (65 mg iron) Tab tablet  Commonly known as: FEOSOL  Take 1 tablet (325 mg total) by mouth daily with breakfast.     magnesium oxide 400 mg (241.3 mg magnesium) tablet  Commonly known as: MAG-OX  Take 1 tablet (400 mg total) by mouth 2 (two) times daily. for 7 days     multivitamin per tablet  Commonly known as: THERAGRAN  Take 1 tablet by mouth once daily.     vancomycin 125 MG capsule  Commonly known as: VANCOCIN  Take 1 capsule (125 mg total) by mouth every 6 (six) hours. for 9 days            CONTINUE taking these medications      aspirin 325 MG tablet  Take 1 tablet (325 mg total) by mouth once daily.     blood sugar diagnostic Strp  Commonly known as: ONETOUCH ULTRA BLUE TEST STRIP  USE TO CHECK SUGAR BEFORE MEALS AND AT BEDTIME     folic acid 1 MG tablet  Commonly known as: FOLVITE  Take 1 tablet (1 mg total) by mouth once daily.     HumaLOG U-100 Insulin 100 unit/mL injection  Generic drug: insulin lispro  Please resume your sliding scale as prescribed by your PCP     insulin syringe-needle,dispos. 1 mL 28 gauge x 1/2" Syrg  1 each by Misc.(Non-Drug; Combo Route) route 2 (two) times daily.     lancets 33 gauge Misc  Commonly known as: ONETOUCH DELICA PLUS LANCET  Inject 1 lancet into the skin 4 (four) times daily before meals and nightly.     pen needle, diabetic 32 gauge x 5/32" Ndle  Commonly known as: " BD JOEL 2ND GEN PEN NEEDLE  Inject 1 each into the skin 4 (four) times daily before meals and nightly.            STOP taking these medications      atorvastatin 40 MG tablet  Commonly known as: LIPITOR     verapamiL 240 MG CR tablet  Commonly known as: CALAN-SR              Indwelling Lines/Drains at time of discharge:   Lines/Drains/Airways       None                       Time spent on the discharge of patient: 32 minutes         Magda Lerma MD  Department of Hospital Medicine  [facility] - Telemetry ([facility])

## 2025-07-01 NOTE — ASSESSMENT & PLAN NOTE
Lab Results   Component Value Date    LACTATE 1.4 07/01/2025    LACTATE 3.1 (H) 06/27/2025    LACTATE 2.9 (H) 06/27/2025   Resolved

## 2025-07-01 NOTE — ASSESSMENT & PLAN NOTE
This patient is found to have pancytopenia, the likely etiology is malnutrition from chronic ETOH and possible cirrhosis, will monitor CBC Daily. Will transfuse red blood cells if the hemoglobin is <7g/dL (or <8 in the setting of ACS). Will transfuse platelets if platelet count is <10k. Hold DVT prophylaxis if platelets are <50k. The patient's hemoglobin, white blood cell count, and platelet count results have been reviewed and are listed below.   Monitor CBC   No S/S of bleeding at this time   Hepatology and PCP follow up as above

## 2025-07-02 LAB
BACTERIA BLD CULT: NORMAL
BACTERIA BLD CULT: NORMAL

## 2025-07-03 ENCOUNTER — TELEPHONE (OUTPATIENT)
Dept: HOME HEALTH SERVICES | Facility: CLINIC | Age: 60
End: 2025-07-03
Payer: MEDICAID

## 2025-07-03 NOTE — PHYSICIAN QUERY
Please clarify the infectious disease diagnosis.    Sepsis due to suspected organism (please specify): Cdif

## 2025-07-10 ENCOUNTER — OFFICE VISIT (OUTPATIENT)
Dept: HOME HEALTH SERVICES | Facility: CLINIC | Age: 60
End: 2025-07-10
Payer: MEDICAID

## 2025-07-10 VITALS
HEART RATE: 99 BPM | RESPIRATION RATE: 18 BRPM | DIASTOLIC BLOOD PRESSURE: 60 MMHG | SYSTOLIC BLOOD PRESSURE: 100 MMHG | OXYGEN SATURATION: 99 % | TEMPERATURE: 98 F

## 2025-07-10 DIAGNOSIS — D61.818 PANCYTOPENIA: ICD-10-CM

## 2025-07-10 DIAGNOSIS — E11.65 HYPERGLYCEMIA DUE TO DIABETES MELLITUS: ICD-10-CM

## 2025-07-10 NOTE — PROGRESS NOTES
[facility]  Transitional Care Management (TCM) Home Visit    Encounter Provider: Nancy Cummins   PCP: Vonda Elliott FNP  Consult Requested By: Dr. Violeta Mejia*  Admit Date: 6/26/25   IP Discharge Date: 7/1/25  Hospital Length of Stay:RRHLOS@ days  Days since discharge (from IP or SNF): 9 days   [facility] Contact Note: 07/03/2025  Hospital Diagnosis: Hyperglycemia due to diabetes mellitus [E11.65];Pancytopenia [D61.818]     HISTORY OF PRESENT ILLNESS      Patient ID: Jorgito Arreaga is a 59 y.o. male was recently admitted to the hospital, this is their TCM encounter.    Hospital Course Synopsis:    1)  59-year-old male with a history of CVA/TIA, diabetes, alcoholic pancreatitis GERD, and hypertension who presents to emergency department with generalized weakness for 1 month that has progressively worsened over the last 3 days. He reports chronic vomiting for 1 year and diarrhea over the last 2 weeks. He reports intermittent mild abdominal pain. Patient states he vomits with any p.o. intake. He reports difficulty urinating and shortness of breath. He denies chest pain or fevers. Reports significant weight loss over the last 2 years. Patient was hypotensive and responded to a fluid bolus.   2) Developments since hospitalization and current needs: patient needs follow up with referrals   3) Please confirm dates of admit, discharge, LOS above are correct: verified with the patient and the MR    DECISION MAKING TODAY       Assessment & Plan:  1. Hyperglycemia due to diabetes mellitus  Comments:  Patient has referral placed to endocrinology  continue all home medications as prescribed  Orders:  -     Ambulatory referral/consult to [facility]    2. Pancytopenia  Comments:  continue to monitor   referral placed to hepatology   follow up with PCP  Orders:  -     Ambulatory referral/consult to [facility]         Medication List on Discharge:     Medication List             "Accurate as of July 10, 2025 11:59 PM. If you have any questions, ask your nurse or doctor.                PAUSE taking these medications      losartan 100 MG tablet  Wait to take this until your doctor or other care provider tells you to start again.  Commonly known as: COZAAR  Take 1 tablet (100 mg total) by mouth once daily. TAKE 1 TABLET BY MOUTH ONCE DAILY            CONTINUE taking these medications      aspirin 325 MG tablet  Take 1 tablet (325 mg total) by mouth once daily.     blood sugar diagnostic Strp  Commonly known as: ONETOUCH ULTRA BLUE TEST STRIP  USE TO CHECK SUGAR BEFORE MEALS AND AT BEDTIME     cyanocobalamin 1000 MCG tablet  Commonly known as: VITAMIN B-12  Take 1 tablet (1,000 mcg total) by mouth once daily.     ferrous sulfate 325 mg (65 mg iron) Tab tablet  Commonly known as: FEOSOL  Take 1 tablet (325 mg total) by mouth daily with breakfast.     folic acid 1 MG tablet  Commonly known as: FOLVITE  Take 1 tablet (1 mg total) by mouth once daily.     HumaLOG U-100 Insulin 100 unit/mL injection  Generic drug: insulin lispro  Please resume your sliding scale as prescribed by your PCP     insulin syringe-needle,dispos. 1 mL 28 gauge x 1/2" Syrg  1 each by Misc.(Non-Drug; Combo Route) route 2 (two) times daily.     lancets 33 gauge Misc  Commonly known as: ONETOUCH DELICA PLUS LANCET  Inject 1 lancet into the skin 4 (four) times daily before meals and nightly.     multivitamin per tablet  Commonly known as: THERAGRAN  Take 1 tablet by mouth once daily.     pen needle, diabetic 32 gauge x 5/32" Ndle  Commonly known as: BD JOEL 2ND GEN PEN NEEDLE  Inject 1 each into the skin 4 (four) times daily before meals and nightly.              Medication Reconciliation:  Were medications changed on discharge? Yes  Were medications in the home? Some of new medications have not been picked up  Is the patient taking the medications as directed? No patient was supposed to have completed his antibiotic on the 9th " and most of the medication was in the bottle  Does the patient understand the medications and changes? Yes  Does updated med list accurately reflects meds patient is currently taking? Yes    ENVIRONMENT OF CARE      Family and/or Caregiver present at visit?  No  Name of Caregiver: N/A  History provided by: patient    Advance Care Planning   Advanced Care Planning Status:  Patient has had an ACP conversation  Living Will: No  Power of : No  [place]: No    Does Caregiver have HCPoA: No  Changes today: None  Is patient hospice appropriate: No  (If needed, use PPS <30 or FAST score >7)  Was referral to hospice placed: No       Impression upon entering the home:  Physical Dwelling: single family home   Appearance of home environment: walking pathways: clear and lighting: adequate  Functional Status: independent  Mobility: ambulatory  Nutritional access: adequate intake and access  Home Health: No, and does not need it at this time   DME/Supplies: none     Diagnostic tests reviewed/disposition: No diagnosic tests pending after this hospitalization.  Disease/illness education: Diabetes and Cirrhosis  Establishment or re-establishment of referral orders for community resources: No other necessary community resources.   Discussion with other health care providers: No discussion with other health care providers necessary.   Does patient have a PCP at [facility]? No   Repatriation plan with PCP? follow-up with PCP within 90d   Does patient have an ostomy (ileostomy, colostomy, suprapubic catheter, nephrostomy tube, tracheostomy, PEG tube, pleurex catheter, cholecystostomy, etc)? No  Were BPAs reviewed? Yes    Social History     Socioeconomic History    Marital status:     Number of children: 3   Tobacco Use    Smoking status: Former     Types: Cigarettes    Smokeless tobacco: Never   Substance and Sexual Activity    Alcohol use: Yes     Comment:  Daily - States that he drinks 1/2 pint of vodka and about 1-2 cans of beer  daily    Drug use: No    Sexual activity: Yes     Partners: Female     Social Drivers of Health     Financial Resource Strain: Low Risk  (6/27/2025)    Overall Financial Resource Strain (CARDIA)     Difficulty of Paying Living Expenses: Not hard at all   Food Insecurity: No Food Insecurity (6/27/2025)    Hunger Vital Sign     Worried About Running Out of Food in the Last Year: Never true     Ran Out of Food in the Last Year: Never true   Transportation Needs: No Transportation Needs (6/27/2025)    PRAPARE - Transportation     Lack of Transportation (Medical): No     Lack of Transportation (Non-Medical): No   Physical Activity: Inactive (7/1/2024)    Exercise Vital Sign     Days of Exercise per Week: 0 days     Minutes of Exercise per Session: 0 min   Stress: No Stress Concern Present (6/27/2025)    [employer] - Occupational Stress Questionnaire     Feeling of Stress : Not at all   Housing Stability: Low Risk  (6/27/2025)    Housing Stability Vital Sign     Unable to Pay for Housing in the Last Year: No     Number of Times Moved in the Last Year: 0     Homeless in the Last Year: No       OBJECTIVE:     Vital Signs:  Vitals:    07/10/25 1014   BP: 100/60   Pulse: 99   Resp: 18   Temp: 98 °F (36.7 °C)       Review of Systems    Physical Exam:  Physical Exam    INSTRUCTIONS FOR PATIENT:   Encounter for Medical Follow-Up and Medication Review  - [facility]  to schedule follow-up visit with patient in 4 weeks or PRN     Patient Instructions Given:  - Continue all medications, treatments and therapies as ordered.   - Follow all instructions, recommendations as discussed.  - Maintain Safety Precautions at all times.  - Attend all medical appointments as scheduled.  - For worsening symptoms: call Primary Care Physician or Nurse Practitioner.  - For emergencies, call 911 or immediately report to the nearest emergency room          Scheduled Follow-up, Appts Reviewed with Modifications  if Needed: Yes  No future appointments.    Signature: Nancy Cummins NP    Transition of Care Visit:  I have reviewed and updated the history and problem list.  I have reconciled the medication list.  I have discussed the hospitalization and current medical issues, prognosis and plans with the patient/family.

## 2025-07-14 ENCOUNTER — TELEPHONE (OUTPATIENT)
Dept: HOME HEALTH SERVICES | Facility: CLINIC | Age: 60
End: 2025-07-14
Payer: MEDICAID

## 2025-07-14 NOTE — TELEPHONE ENCOUNTER
Sent in basket message to multiple pools to assist with getting patient scheduled with Internal medicine, Hepatology, GI, and Endocrine.  Also fax referrals to [facility] to assist with getting patient scheduled @ [phone].  Fax confirmation received.

## 2025-08-06 ENCOUNTER — HOSPITAL ENCOUNTER (EMERGENCY)
Facility: HOSPITAL | Age: 60
Discharge: HOME | End: 2025-08-06
Attending: EMERGENCY MEDICINE
Payer: MEDICAID

## 2025-08-06 VITALS
WEIGHT: 145 LBS | RESPIRATION RATE: 19 BRPM | SYSTOLIC BLOOD PRESSURE: 128 MMHG | BODY MASS INDEX: 23.3 KG/M2 | HEIGHT: 66 IN | DIASTOLIC BLOOD PRESSURE: 91 MMHG | TEMPERATURE: 99 F | HEART RATE: 109 BPM | OXYGEN SATURATION: 100 %

## 2025-08-06 DIAGNOSIS — F10.929 ALCOHOLIC INTOXICATION WITH COMPLICATION: Primary | ICD-10-CM

## 2025-08-06 DIAGNOSIS — R06.02 SHORTNESS OF BREATH: ICD-10-CM

## 2025-08-06 DIAGNOSIS — R60.0 BILATERAL LEG EDEMA: ICD-10-CM

## 2025-08-06 DIAGNOSIS — R60.9 EDEMA: ICD-10-CM

## 2025-08-06 DIAGNOSIS — E11.65 HYPERGLYCEMIA DUE TO DIABETES MELLITUS: ICD-10-CM

## 2025-08-06 LAB
ABSOLUTE EOSINOPHIL (OHS): 0.03 K/UL
ABSOLUTE MONOCYTE (OHS): 0.48 K/UL (ref 0.3–1)
ABSOLUTE NEUTROPHIL COUNT (OHS): 2.93 K/UL (ref 1.8–7.7)
ALBUMIN SERPL BCP-MCNC: 2.3 G/DL (ref 3.5–5.2)
ALP SERPL-CCNC: 72 UNIT/L (ref 40–150)
ALT SERPL W/O P-5'-P-CCNC: 26 UNIT/L (ref 10–44)
ANION GAP (OHS): 10 MMOL/L (ref 8–16)
AST SERPL-CCNC: 55 UNIT/L (ref 11–45)
BASOPHILS # BLD AUTO: 0.07 K/UL
BASOPHILS NFR BLD AUTO: 1.4 %
BILIRUB SERPL-MCNC: 1 MG/DL (ref 0.1–1)
BUN SERPL-MCNC: 8 MG/DL (ref 6–20)
CALCIUM SERPL-MCNC: 7.3 MG/DL (ref 8.7–10.5)
CHLORIDE SERPL-SCNC: 109 MMOL/L (ref 95–110)
CK SERPL-CCNC: 57 U/L (ref 20–200)
CO2 SERPL-SCNC: 20 MMOL/L (ref 23–29)
CREAT SERPL-MCNC: 0.9 MG/DL (ref 0.5–1.4)
ERYTHROCYTE [DISTWIDTH] IN BLOOD BY AUTOMATED COUNT: 15.9 % (ref 11.5–14.5)
ETHANOL SERPL-MCNC: 116 MG/DL
GFR SERPLBLD CREATININE-BSD FMLA CKD-EPI: >60 ML/MIN/1.73/M2
GLUCOSE SERPL-MCNC: 513 MG/DL (ref 70–110)
HCT VFR BLD AUTO: 25.4 % (ref 40–54)
HGB BLD-MCNC: 8.5 GM/DL (ref 14–18)
IMM GRANULOCYTES # BLD AUTO: 0.01 K/UL (ref 0–0.04)
IMM GRANULOCYTES NFR BLD AUTO: 0.2 % (ref 0–0.5)
LYMPHOCYTES # BLD AUTO: 1.36 K/UL (ref 1–4.8)
MAGNESIUM SERPL-MCNC: 1.4 MG/DL (ref 1.6–2.6)
MCH RBC QN AUTO: 30.2 PG (ref 27–31)
MCHC RBC AUTO-ENTMCNC: 33.5 G/DL (ref 32–36)
MCV RBC AUTO: 90 FL (ref 82–98)
NT-PROBNP SERPL-MCNC: 298 PG/ML
NUCLEATED RBC (/100WBC) (OHS): 0 /100 WBC
OHS QRS DURATION: 80 MS
OHS QTC CALCULATION: 497 MS
PLATELET # BLD AUTO: 158 K/UL (ref 150–450)
PMV BLD AUTO: 12.9 FL (ref 9.2–12.9)
POCT GLUCOSE: 378 MG/DL (ref 70–110)
POCT GLUCOSE: >500 MG/DL (ref 70–110)
POTASSIUM SERPL-SCNC: 3.9 MMOL/L (ref 3.5–5.1)
PROT SERPL-MCNC: 5.4 GM/DL (ref 6–8.4)
RBC # BLD AUTO: 2.81 M/UL (ref 4.6–6.2)
RELATIVE EOSINOPHIL (OHS): 0.6 %
RELATIVE LYMPHOCYTE (OHS): 27.9 % (ref 18–48)
RELATIVE MONOCYTE (OHS): 9.8 % (ref 4–15)
RELATIVE NEUTROPHIL (OHS): 60.1 % (ref 38–73)
SODIUM SERPL-SCNC: 139 MMOL/L (ref 136–145)
TROPONIN I SERPL HS-MCNC: 6 NG/L
WBC # BLD AUTO: 4.88 K/UL (ref 3.9–12.7)

## 2025-08-06 PROCEDURE — 83880 ASSAY OF NATRIURETIC PEPTIDE: CPT | Mod: ER | Performed by: EMERGENCY MEDICINE

## 2025-08-06 PROCEDURE — 96374 THER/PROPH/DIAG INJ IV PUSH: CPT | Mod: ER

## 2025-08-06 PROCEDURE — 82077 ASSAY SPEC XCP UR&BREATH IA: CPT | Mod: ER | Performed by: EMERGENCY MEDICINE

## 2025-08-06 PROCEDURE — 84484 ASSAY OF TROPONIN QUANT: CPT | Mod: ER | Performed by: EMERGENCY MEDICINE

## 2025-08-06 PROCEDURE — 85025 COMPLETE CBC W/AUTO DIFF WBC: CPT | Mod: ER | Performed by: EMERGENCY MEDICINE

## 2025-08-06 PROCEDURE — 93010 ELECTROCARDIOGRAM REPORT: CPT | Mod: ,,, | Performed by: INTERNAL MEDICINE

## 2025-08-06 PROCEDURE — 99285 EMERGENCY DEPT VISIT HI MDM: CPT | Mod: 25,ER

## 2025-08-06 PROCEDURE — 93005 ELECTROCARDIOGRAM TRACING: CPT | Mod: ER

## 2025-08-06 PROCEDURE — 96375 TX/PRO/DX INJ NEW DRUG ADDON: CPT | Mod: ER

## 2025-08-06 PROCEDURE — 82962 GLUCOSE BLOOD TEST: CPT | Mod: ER

## 2025-08-06 PROCEDURE — 83735 ASSAY OF MAGNESIUM: CPT | Mod: ER | Performed by: EMERGENCY MEDICINE

## 2025-08-06 PROCEDURE — 82040 ASSAY OF SERUM ALBUMIN: CPT | Mod: ER | Performed by: EMERGENCY MEDICINE

## 2025-08-06 PROCEDURE — 63600175 PHARM REV CODE 636 W HCPCS: Mod: ER | Performed by: EMERGENCY MEDICINE

## 2025-08-06 PROCEDURE — 82550 ASSAY OF CK (CPK): CPT | Mod: ER | Performed by: EMERGENCY MEDICINE

## 2025-08-06 RX ORDER — FUROSEMIDE 20 MG/1
20 TABLET ORAL DAILY
Qty: 30 TABLET | Refills: 0 | Status: SHIPPED | OUTPATIENT
Start: 2025-08-06 | End: 2026-08-06

## 2025-08-06 RX ORDER — FUROSEMIDE 10 MG/ML
60 INJECTION INTRAMUSCULAR; INTRAVENOUS
Status: COMPLETED | OUTPATIENT
Start: 2025-08-06 | End: 2025-08-06

## 2025-08-06 RX ADMIN — FUROSEMIDE 60 MG: 10 INJECTION, SOLUTION INTRAVENOUS at 01:08

## 2025-08-06 RX ADMIN — HUMAN INSULIN 7 UNITS: 100 INJECTION, SOLUTION SUBCUTANEOUS at 02:08

## 2025-09-04 ENCOUNTER — DOCUMENTATION ONLY (OUTPATIENT)
Dept: CARDIOLOGY | Facility: CLINIC | Age: 60
End: 2025-09-04
Payer: MEDICAID

## 2025-09-04 PROBLEM — I50.9 CHF EXACERBATION: Status: ACTIVE | Noted: 2025-09-04

## 2025-09-04 PROBLEM — F10.11 HISTORY OF ETOH ABUSE: Status: ACTIVE | Noted: 2025-09-04

## 2025-09-04 PROBLEM — D64.9 ANEMIA: Status: ACTIVE | Noted: 2025-09-04

## 2025-09-04 PROBLEM — D69.6 THROMBOCYTOPENIA: Status: ACTIVE | Noted: 2025-09-04

## (undated) DEVICE — SPONGE LAP 18X18 PREWASHED

## (undated) DEVICE — SEAL UNIVERSAL 5MM-8MM XI

## (undated) DEVICE — DRESSING ABTHERA SENSA TRAC

## (undated) DEVICE — ELECTRODE BLD EXT 6.50 ST DISP

## (undated) DEVICE — NDL ECLIPSE SAFETY 18GX1-1/2IN

## (undated) DEVICE — SUT 0 60IN PDS II VIO MONO

## (undated) DEVICE — GOWN POLY REINF BRTH SLV XL

## (undated) DEVICE — COVER LIGHT HANDLE 80/CA

## (undated) DEVICE — MANIFOLD 4 PORT

## (undated) DEVICE — SUT MONOCRYL 4-0 PS-1 UND

## (undated) DEVICE — DRAPE LAP T SHT W/ INSTR PAD

## (undated) DEVICE — TIP GRASPER FENESTRATED DISP

## (undated) DEVICE — SOL 9P NACL IRR PIC IL

## (undated) DEVICE — SUT VICRYL PLUS 3-0 SH 18IN

## (undated) DEVICE — PACK BASIC SETUP SC BR

## (undated) DEVICE — OBTURATOR BLADELESS 8MM XI CLR

## (undated) DEVICE — GLOVE SURG BIOGEL LATEX SZ 7.5

## (undated) DEVICE — APPLICATOR CHLORAPREP ORN 26ML

## (undated) DEVICE — TAPE SILK 3IN

## (undated) DEVICE — SOL NORMAL USPCA 0.9%

## (undated) DEVICE — ELECTRODE REM PLYHSV RETURN 9

## (undated) DEVICE — DRAPE ARM DAVINCI XI

## (undated) DEVICE — CANISTER VACCUUM DRSNG KCI

## (undated) DEVICE — NDL PNEUMO INSUFFLATI 120MM

## (undated) DEVICE — PAD PINK TRENDELENBURG POS XL

## (undated) DEVICE — SUT PDSII 3-0 RB-1 27IN

## (undated) DEVICE — SUT STRATAFIX 2-0 30CM

## (undated) DEVICE — Device

## (undated) DEVICE — SUT MCRYL PLUS 4-0 PS2 27IN

## (undated) DEVICE — TRAY CATH FOL SIL URIMTR 16FR

## (undated) DEVICE — GLOVE SIGNATURE ESSNTL LTX 7

## (undated) DEVICE — GLOVE SENSICARE PI MICRO 7

## (undated) DEVICE — TOWEL OR DISP STRL BLUE 4/PK

## (undated) DEVICE — NDL SAFETY 22G X 1.5 ECLIPSE

## (undated) DEVICE — SYR 10CC LUER LOCK

## (undated) DEVICE — DRAPE COLUMN DAVINCI XI

## (undated) DEVICE — PACK SPY-PHI DRUG DRAPE

## (undated) DEVICE — CLIPPER BLADE MOD 4406 (CAREF)

## (undated) DEVICE — ADHESIVE DERMABOND ADVANCED

## (undated) DEVICE — GLOVE SIGNATURE ESSNTL LTX 6.5

## (undated) DEVICE — KIT ANTIFOG W/SPONG & FLUID

## (undated) DEVICE — DRAPE THREE-QTR REINF 53X77IN

## (undated) DEVICE — GLOVE SIGNATURE ESSNTL LTX 8

## (undated) DEVICE — GLOVE SENSICARE PI GRN 7

## (undated) DEVICE — DRAPE ABDOMINAL TIBURON 14X11

## (undated) DEVICE — CONTAINER SPECIMEN OR STER 4OZ

## (undated) DEVICE — BAG TISSUE RETRIEVAL 5MM

## (undated) DEVICE — SET PNEUMOCLEAR HEAT HUM SE HF

## (undated) DEVICE — SOL NS 1000CC

## (undated) DEVICE — GLOVE SENSICARE PI ALOE 6.5

## (undated) DEVICE — GLOVE SENSICARE PI GRN 8

## (undated) DEVICE — COVER TIP CURVED SCISSORS XI